# Patient Record
Sex: MALE | Race: WHITE | Employment: FULL TIME
[De-identification: names, ages, dates, MRNs, and addresses within clinical notes are randomized per-mention and may not be internally consistent; named-entity substitution may affect disease eponyms.]

---

## 2017-01-09 ENCOUNTER — OFFICE VISIT (OUTPATIENT)
Dept: FAMILY MEDICINE CLINIC | Facility: CLINIC | Age: 57
End: 2017-01-09

## 2017-01-09 VITALS
BODY MASS INDEX: 42.66 KG/M2 | DIASTOLIC BLOOD PRESSURE: 72 MMHG | WEIGHT: 315 LBS | HEIGHT: 72 IN | TEMPERATURE: 97.2 F | SYSTOLIC BLOOD PRESSURE: 115 MMHG | HEART RATE: 81 BPM | RESPIRATION RATE: 16 BRPM | OXYGEN SATURATION: 95 %

## 2017-01-09 DIAGNOSIS — E66.01 OBESITY, CLASS III, BMI 40-49.9 (MORBID OBESITY) (HCC): Primary | ICD-10-CM

## 2017-01-09 PROCEDURE — 99213 OFFICE O/P EST LOW 20 MIN: CPT | Performed by: FAMILY MEDICINE

## 2017-01-09 ASSESSMENT — ENCOUNTER SYMPTOMS
VOMITING: 0
ABDOMINAL PAIN: 0
NAUSEA: 0
SHORTNESS OF BREATH: 0
BLOOD IN STOOL: 0
RHINORRHEA: 0
CONSTIPATION: 0
DIARRHEA: 0
WHEEZING: 0
COUGH: 0
SORE THROAT: 0

## 2017-01-16 ENCOUNTER — TELEPHONE (OUTPATIENT)
Dept: FAMILY MEDICINE CLINIC | Facility: CLINIC | Age: 57
End: 2017-01-16

## 2017-02-13 ENCOUNTER — OFFICE VISIT (OUTPATIENT)
Dept: FAMILY MEDICINE CLINIC | Facility: CLINIC | Age: 57
End: 2017-02-13

## 2017-02-13 VITALS
HEIGHT: 72 IN | HEART RATE: 80 BPM | DIASTOLIC BLOOD PRESSURE: 73 MMHG | RESPIRATION RATE: 16 BRPM | OXYGEN SATURATION: 94 % | BODY MASS INDEX: 42.66 KG/M2 | WEIGHT: 315 LBS | TEMPERATURE: 98 F | SYSTOLIC BLOOD PRESSURE: 118 MMHG

## 2017-02-13 DIAGNOSIS — E66.01 OBESITY, CLASS III, BMI 40-49.9 (MORBID OBESITY) (HCC): ICD-10-CM

## 2017-02-13 DIAGNOSIS — I10 ESSENTIAL HYPERTENSION: ICD-10-CM

## 2017-02-13 PROCEDURE — 99214 OFFICE O/P EST MOD 30 MIN: CPT | Performed by: FAMILY MEDICINE

## 2017-02-13 ASSESSMENT — ENCOUNTER SYMPTOMS
SHORTNESS OF BREATH: 0
COUGH: 0
ABDOMINAL PAIN: 0
CONSTIPATION: 0
WHEEZING: 0
SORE THROAT: 0
NAUSEA: 0
DIARRHEA: 0
RHINORRHEA: 0
VOMITING: 0
BLOOD IN STOOL: 0

## 2017-02-17 ENCOUNTER — TELEPHONE (OUTPATIENT)
Dept: FAMILY MEDICINE CLINIC | Facility: CLINIC | Age: 57
End: 2017-02-17

## 2017-03-08 ENCOUNTER — TELEPHONE (OUTPATIENT)
Dept: OBGYN | Facility: CLINIC | Age: 57
End: 2017-03-08

## 2017-03-08 DIAGNOSIS — G89.29 CHRONIC NECK PAIN: ICD-10-CM

## 2017-03-08 DIAGNOSIS — M47.812 OSTEOARTHRITIS OF CERVICAL SPINE, UNSPECIFIED SPINAL OSTEOARTHRITIS COMPLICATION STATUS: Primary | ICD-10-CM

## 2017-03-08 DIAGNOSIS — M54.2 CHRONIC NECK PAIN: ICD-10-CM

## 2017-03-15 ENCOUNTER — TELEPHONE (OUTPATIENT)
Dept: FAMILY MEDICINE CLINIC | Age: 57
End: 2017-03-15

## 2017-03-17 ENCOUNTER — HOSPITAL ENCOUNTER (OUTPATIENT)
Age: 57
Setting detail: SPECIMEN
Discharge: HOME | End: 2017-03-17
Payer: COMMERCIAL

## 2017-03-17 ENCOUNTER — TELEPHONE (OUTPATIENT)
Dept: FAMILY MEDICINE CLINIC | Age: 57
End: 2017-03-17

## 2017-03-17 ENCOUNTER — OFFICE VISIT (OUTPATIENT)
Dept: FAMILY MEDICINE CLINIC | Age: 57
End: 2017-03-17
Payer: COMMERCIAL

## 2017-03-17 VITALS
DIASTOLIC BLOOD PRESSURE: 81 MMHG | TEMPERATURE: 97.6 F | HEART RATE: 77 BPM | BODY MASS INDEX: 42.66 KG/M2 | OXYGEN SATURATION: 93 % | HEIGHT: 72 IN | WEIGHT: 315 LBS | SYSTOLIC BLOOD PRESSURE: 136 MMHG | RESPIRATION RATE: 16 BRPM

## 2017-03-17 DIAGNOSIS — M50.30 DDD (DEGENERATIVE DISC DISEASE), CERVICAL: ICD-10-CM

## 2017-03-17 DIAGNOSIS — M47.812 DJD (DEGENERATIVE JOINT DISEASE), CERVICAL: ICD-10-CM

## 2017-03-17 DIAGNOSIS — M54.2 CHRONIC NECK PAIN: ICD-10-CM

## 2017-03-17 DIAGNOSIS — M47.812 OSTEOARTHRITIS OF CERVICAL SPINE, UNSPECIFIED SPINAL OSTEOARTHRITIS COMPLICATION STATUS: ICD-10-CM

## 2017-03-17 DIAGNOSIS — G89.29 CHRONIC NECK PAIN: Primary | ICD-10-CM

## 2017-03-17 DIAGNOSIS — M54.2 CHRONIC NECK PAIN: Primary | ICD-10-CM

## 2017-03-17 DIAGNOSIS — G89.29 CHRONIC NECK PAIN: ICD-10-CM

## 2017-03-17 PROCEDURE — 99213 OFFICE O/P EST LOW 20 MIN: CPT | Performed by: FAMILY MEDICINE

## 2017-03-17 RX ORDER — HYDROCODONE BITARTRATE AND ACETAMINOPHEN 10; 325 MG/1; MG/1
1 TABLET ORAL EVERY 6 HOURS PRN
Qty: 120 TABLET | Refills: 0 | Status: SHIPPED | OUTPATIENT
Start: 2017-03-17 | End: 2017-05-08 | Stop reason: SDUPTHER

## 2017-03-17 ASSESSMENT — ENCOUNTER SYMPTOMS
DIARRHEA: 0
VOMITING: 0
SORE THROAT: 0
SHORTNESS OF BREATH: 0
ABDOMINAL PAIN: 0
BLOOD IN STOOL: 0
RHINORRHEA: 0
CONSTIPATION: 0
WHEEZING: 0
COUGH: 0
NAUSEA: 0

## 2017-03-20 LAB

## 2017-03-28 ENCOUNTER — INITIAL CONSULT (OUTPATIENT)
Dept: PAIN MANAGEMENT | Age: 57
End: 2017-03-28
Payer: COMMERCIAL

## 2017-03-28 VITALS
HEART RATE: 83 BPM | HEIGHT: 72 IN | BODY MASS INDEX: 42.66 KG/M2 | RESPIRATION RATE: 16 BRPM | WEIGHT: 315 LBS | SYSTOLIC BLOOD PRESSURE: 109 MMHG | DIASTOLIC BLOOD PRESSURE: 66 MMHG | OXYGEN SATURATION: 91 %

## 2017-03-28 DIAGNOSIS — Z79.02 LONG TERM CURRENT USE OF ANTITHROMBOTICS/ANTIPLATELETS: ICD-10-CM

## 2017-03-28 DIAGNOSIS — Z98.890 H/O CERVICAL SPINE SURGERY: ICD-10-CM

## 2017-03-28 DIAGNOSIS — R69 SEVERE COMORBID ILLNESS: ICD-10-CM

## 2017-03-28 DIAGNOSIS — M54.2 CHRONIC NECK PAIN: Primary | ICD-10-CM

## 2017-03-28 DIAGNOSIS — Z79.891 CHRONIC PRESCRIPTION OPIATE USE: ICD-10-CM

## 2017-03-28 DIAGNOSIS — G89.29 CHRONIC NECK PAIN: Primary | ICD-10-CM

## 2017-03-28 DIAGNOSIS — Z79.1 LONG TERM (CURRENT) USE OF NON-STEROIDAL ANTI-INFLAMMATORIES (NSAID): ICD-10-CM

## 2017-03-28 PROCEDURE — 99245 OFF/OP CONSLTJ NEW/EST HI 55: CPT | Performed by: ANESTHESIOLOGY

## 2017-03-28 RX ORDER — GLIMEPIRIDE 4 MG/1
4 TABLET ORAL
COMMUNITY
End: 2017-03-28 | Stop reason: SDUPTHER

## 2017-03-28 RX ORDER — TIZANIDINE 2 MG/1
2 TABLET ORAL
COMMUNITY
End: 2017-03-28 | Stop reason: SDUPTHER

## 2017-03-28 RX ORDER — LISINOPRIL 20 MG/1
20 TABLET ORAL
COMMUNITY
End: 2017-03-28 | Stop reason: SDUPTHER

## 2017-03-28 RX ORDER — HYDROCODONE BITARTRATE AND ACETAMINOPHEN 5; 325 MG/1; MG/1
1 TABLET ORAL
COMMUNITY
End: 2017-03-28 | Stop reason: SDUPTHER

## 2017-03-28 RX ORDER — NITROGLYCERIN 0.4 MG/1
0.4 TABLET SUBLINGUAL
COMMUNITY
End: 2017-03-28 | Stop reason: SDUPTHER

## 2017-03-28 RX ORDER — HYDROCHLOROTHIAZIDE 12.5 MG/1
12.5 CAPSULE, GELATIN COATED ORAL
COMMUNITY
End: 2017-03-28 | Stop reason: SDUPTHER

## 2017-03-28 ASSESSMENT — ENCOUNTER SYMPTOMS
CHEST TIGHTNESS: 0
SHORTNESS OF BREATH: 0
COUGH: 0
WHEEZING: 0
CHOKING: 0
STRIDOR: 0
GASTROINTESTINAL NEGATIVE: 1
APNEA: 1
ALLERGIC/IMMUNOLOGIC NEGATIVE: 1
BACK PAIN: 1
EYES NEGATIVE: 1

## 2017-03-31 ENCOUNTER — TELEPHONE (OUTPATIENT)
Dept: FAMILY MEDICINE CLINIC | Age: 57
End: 2017-03-31

## 2017-03-31 DIAGNOSIS — M54.2 CHRONIC NECK PAIN: ICD-10-CM

## 2017-03-31 DIAGNOSIS — B37.42 CANDIDAL BALANITIS: Primary | ICD-10-CM

## 2017-03-31 DIAGNOSIS — M50.30 DDD (DEGENERATIVE DISC DISEASE), CERVICAL: ICD-10-CM

## 2017-03-31 DIAGNOSIS — G89.29 CHRONIC NECK PAIN: ICD-10-CM

## 2017-03-31 DIAGNOSIS — Z98.890 HX OF CERVICAL SPINE SURGERY: ICD-10-CM

## 2017-03-31 DIAGNOSIS — M47.812 DJD (DEGENERATIVE JOINT DISEASE), CERVICAL: ICD-10-CM

## 2017-03-31 RX ORDER — FLUCONAZOLE 150 MG/1
150 TABLET ORAL ONCE
Qty: 2 TABLET | Refills: 0 | Status: SHIPPED | OUTPATIENT
Start: 2017-03-31 | End: 2017-04-20 | Stop reason: SDUPTHER

## 2017-04-01 ENCOUNTER — HOSPITAL ENCOUNTER (OUTPATIENT)
Age: 57
Discharge: HOME | End: 2017-04-01
Payer: COMMERCIAL

## 2017-04-01 DIAGNOSIS — I10 ESSENTIAL HYPERTENSION: ICD-10-CM

## 2017-04-01 LAB
ANION GAP SERPL CALCULATED.3IONS-SCNC: 14 MMOL/L (ref 9–17)
BUN BLDV-MCNC: 15 MG/DL (ref 6–20)
BUN/CREAT BLD: 15 (ref 9–20)
CALCIUM SERPL-MCNC: 9.6 MG/DL (ref 8.6–10.4)
CHLORIDE BLD-SCNC: 101 MMOL/L (ref 98–107)
CO2: 25 MMOL/L (ref 20–31)
CREAT SERPL-MCNC: 0.97 MG/DL (ref 0.7–1.2)
GFR AFRICAN AMERICAN: >60 ML/MIN
GFR NON-AFRICAN AMERICAN: >60 ML/MIN
GFR SERPL CREATININE-BSD FRML MDRD: ABNORMAL ML/MIN/{1.73_M2}
GFR SERPL CREATININE-BSD FRML MDRD: ABNORMAL ML/MIN/{1.73_M2}
GLUCOSE BLD-MCNC: 154 MG/DL (ref 70–99)
POTASSIUM SERPL-SCNC: 4.4 MMOL/L (ref 3.7–5.3)
SODIUM BLD-SCNC: 140 MMOL/L (ref 135–144)

## 2017-04-01 PROCEDURE — 36415 COLL VENOUS BLD VENIPUNCTURE: CPT

## 2017-04-01 PROCEDURE — 83036 HEMOGLOBIN GLYCOSYLATED A1C: CPT

## 2017-04-01 PROCEDURE — 80048 BASIC METABOLIC PNL TOTAL CA: CPT

## 2017-04-02 ENCOUNTER — HOSPITAL ENCOUNTER (EMERGENCY)
Age: 57
Discharge: HOME | End: 2017-04-02
Payer: COMMERCIAL

## 2017-04-02 ENCOUNTER — APPOINTMENT (OUTPATIENT)
Dept: GENERAL RADIOLOGY | Age: 57
End: 2017-04-02
Payer: COMMERCIAL

## 2017-04-02 VITALS
DIASTOLIC BLOOD PRESSURE: 71 MMHG | BODY MASS INDEX: 42.66 KG/M2 | WEIGHT: 315 LBS | OXYGEN SATURATION: 95 % | HEART RATE: 68 BPM | SYSTOLIC BLOOD PRESSURE: 115 MMHG | HEIGHT: 72 IN | TEMPERATURE: 98 F | RESPIRATION RATE: 18 BRPM

## 2017-04-02 DIAGNOSIS — S61.451A DOG BITE, HAND, RIGHT, INITIAL ENCOUNTER: Primary | ICD-10-CM
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
## 2017-04-02 LAB
ESTIMATED AVERAGE GLUCOSE: 163 MG/DL
HBA1C MFR BLD: 7.3 % (ref 4–6)

## 2017-04-02 PROCEDURE — 73130 X-RAY EXAM OF HAND: CPT

## 2017-04-02 PROCEDURE — 99283 EMERGENCY DEPT VISIT LOW MDM: CPT

## 2017-04-02 PROCEDURE — 6360000002 HC RX W HCPCS

## 2017-04-02 PROCEDURE — 90471 IMMUNIZATION ADMIN: CPT

## 2017-04-02 PROCEDURE — 90715 TDAP VACCINE 7 YRS/> IM: CPT

## 2017-04-02 RX ORDER — SULFAMETHOXAZOLE AND TRIMETHOPRIM 800; 160 MG/1; MG/1
1 TABLET ORAL 2 TIMES DAILY
Qty: 20 TABLET | Refills: 0 | Status: SHIPPED | OUTPATIENT
Start: 2017-04-02 | End: 2017-04-12

## 2017-04-02 RX ADMIN — TETANUS TOXOID, REDUCED DIPHTHERIA TOXOID AND ACELLULAR PERTUSSIS VACCINE, ADSORBED 0.5 ML: 5; 2.5; 8; 8; 2.5 SUSPENSION INTRAMUSCULAR at 09:15

## 2017-04-02 ASSESSMENT — PAIN SCALES - GENERAL: PAINLEVEL_OUTOF10: 5

## 2017-04-04 PROBLEM — Z98.890 HX OF CERVICAL SPINE SURGERY: Status: ACTIVE | Noted: 2017-04-04

## 2017-04-07 ENCOUNTER — TELEPHONE (OUTPATIENT)
Dept: FAMILY MEDICINE CLINIC | Facility: CLINIC | Age: 57
End: 2017-04-07

## 2017-04-07 DIAGNOSIS — Z98.890 HX OF CERVICAL SPINE SURGERY: ICD-10-CM

## 2017-04-07 DIAGNOSIS — G89.29 CHRONIC NECK PAIN: Primary | ICD-10-CM

## 2017-04-07 DIAGNOSIS — M47.812 DJD (DEGENERATIVE JOINT DISEASE), CERVICAL: ICD-10-CM

## 2017-04-07 DIAGNOSIS — M50.30 DDD (DEGENERATIVE DISC DISEASE), CERVICAL: ICD-10-CM

## 2017-04-07 DIAGNOSIS — M54.2 CHRONIC NECK PAIN: Primary | ICD-10-CM

## 2017-04-07 RX ORDER — HYDROCHLOROTHIAZIDE 12.5 MG/1
CAPSULE, GELATIN COATED ORAL
Qty: 30 CAPSULE | Refills: 5 | Status: SHIPPED | OUTPATIENT
Start: 2017-04-07 | End: 2017-11-03 | Stop reason: SDUPTHER

## 2017-04-07 RX ORDER — HYDROCHLOROTHIAZIDE 12.5 MG/1
CAPSULE, GELATIN COATED ORAL
Qty: 30 CAPSULE | Refills: 5 | OUTPATIENT
Start: 2017-04-07

## 2017-04-10 ENCOUNTER — TELEPHONE (OUTPATIENT)
Dept: FAMILY MEDICINE CLINIC | Age: 57
End: 2017-04-10

## 2017-04-20 DIAGNOSIS — B37.42 CANDIDAL BALANITIS: ICD-10-CM

## 2017-04-20 RX ORDER — FLUCONAZOLE 150 MG/1
150 TABLET ORAL ONCE
Qty: 2 TABLET | Refills: 0 | Status: SHIPPED | OUTPATIENT
Start: 2017-04-20 | End: 2017-06-12 | Stop reason: SDUPTHER

## 2017-05-04 RX ORDER — CLOPIDOGREL BISULFATE 75 MG/1
TABLET ORAL
Qty: 30 TABLET | Refills: 5 | Status: SHIPPED | OUTPATIENT
Start: 2017-05-04 | End: 2017-11-03 | Stop reason: SDUPTHER

## 2017-05-08 ENCOUNTER — OFFICE VISIT (OUTPATIENT)
Dept: FAMILY MEDICINE CLINIC | Age: 57
End: 2017-05-08
Payer: COMMERCIAL

## 2017-05-08 VITALS
SYSTOLIC BLOOD PRESSURE: 127 MMHG | BODY MASS INDEX: 42.66 KG/M2 | TEMPERATURE: 97.7 F | HEART RATE: 66 BPM | DIASTOLIC BLOOD PRESSURE: 73 MMHG | OXYGEN SATURATION: 94 % | HEIGHT: 72 IN | RESPIRATION RATE: 16 BRPM | WEIGHT: 315 LBS

## 2017-05-08 DIAGNOSIS — G89.29 CHRONIC NECK PAIN: ICD-10-CM

## 2017-05-08 DIAGNOSIS — Z98.890 HX OF CERVICAL SPINE SURGERY: ICD-10-CM

## 2017-05-08 DIAGNOSIS — M50.30 DDD (DEGENERATIVE DISC DISEASE), CERVICAL: ICD-10-CM

## 2017-05-08 DIAGNOSIS — M47.812 DJD (DEGENERATIVE JOINT DISEASE), CERVICAL: ICD-10-CM

## 2017-05-08 DIAGNOSIS — M54.2 CHRONIC NECK PAIN: ICD-10-CM

## 2017-05-08 DIAGNOSIS — I10 ESSENTIAL HYPERTENSION: ICD-10-CM

## 2017-05-08 DIAGNOSIS — B37.42 CANDIDAL BALANITIS: ICD-10-CM

## 2017-05-08 PROCEDURE — 99214 OFFICE O/P EST MOD 30 MIN: CPT | Performed by: FAMILY MEDICINE

## 2017-05-08 RX ORDER — FLUCONAZOLE 100 MG/1
100 TABLET ORAL DAILY
Qty: 7 TABLET | Refills: 0 | Status: SHIPPED | OUTPATIENT
Start: 2017-05-08 | End: 2017-05-15

## 2017-05-08 RX ORDER — HYDROCODONE BITARTRATE AND ACETAMINOPHEN 10; 325 MG/1; MG/1
1 TABLET ORAL EVERY 6 HOURS PRN
Qty: 120 TABLET | Refills: 0 | Status: SHIPPED | OUTPATIENT
Start: 2017-05-08 | End: 2018-05-07 | Stop reason: ALTCHOICE

## 2017-05-08 ASSESSMENT — ENCOUNTER SYMPTOMS
NAUSEA: 0
VOMITING: 0
COUGH: 0
BLOOD IN STOOL: 0
BACK PAIN: 1
RHINORRHEA: 0
WHEEZING: 0
SORE THROAT: 0
DIARRHEA: 0
ABDOMINAL PAIN: 0
CONSTIPATION: 0
SHORTNESS OF BREATH: 0

## 2017-05-08 ASSESSMENT — PATIENT HEALTH QUESTIONNAIRE - PHQ9
1. LITTLE INTEREST OR PLEASURE IN DOING THINGS: 0
2. FEELING DOWN, DEPRESSED OR HOPELESS: 0
SUM OF ALL RESPONSES TO PHQ QUESTIONS 1-9: 0
SUM OF ALL RESPONSES TO PHQ9 QUESTIONS 1 & 2: 0

## 2017-06-03 ENCOUNTER — HOSPITAL ENCOUNTER (OUTPATIENT)
Dept: GENERAL RADIOLOGY | Age: 57
Discharge: HOME | End: 2017-06-03
Payer: COMMERCIAL

## 2017-06-03 ENCOUNTER — HOSPITAL ENCOUNTER (OUTPATIENT)
Age: 57
Discharge: HOME | End: 2017-06-03
Payer: COMMERCIAL

## 2017-06-03 DIAGNOSIS — M47.817 FACET DEGENERATION OF LUMBOSACRAL REGION: ICD-10-CM

## 2017-06-03 PROCEDURE — 72110 X-RAY EXAM L-2 SPINE 4/>VWS: CPT

## 2017-06-12 ENCOUNTER — OFFICE VISIT (OUTPATIENT)
Dept: PULMONOLOGY | Age: 57
End: 2017-06-12
Payer: COMMERCIAL

## 2017-06-12 VITALS
RESPIRATION RATE: 18 BRPM | HEART RATE: 82 BPM | SYSTOLIC BLOOD PRESSURE: 115 MMHG | OXYGEN SATURATION: 93 % | DIASTOLIC BLOOD PRESSURE: 79 MMHG | WEIGHT: 315 LBS | HEIGHT: 72 IN | BODY MASS INDEX: 42.66 KG/M2

## 2017-06-12 DIAGNOSIS — I25.10 CORONARY ARTERY DISEASE INVOLVING NATIVE CORONARY ARTERY OF NATIVE HEART WITHOUT ANGINA PECTORIS: ICD-10-CM

## 2017-06-12 DIAGNOSIS — Z13.1 DM (DIABETES MELLITUS SCREEN): ICD-10-CM

## 2017-06-12 DIAGNOSIS — B37.42 CANDIDAL BALANITIS: ICD-10-CM

## 2017-06-12 DIAGNOSIS — Z99.89 OSA ON CPAP: Primary | ICD-10-CM

## 2017-06-12 DIAGNOSIS — G47.33 OSA ON CPAP: Primary | ICD-10-CM

## 2017-06-12 DIAGNOSIS — I10 ESSENTIAL HYPERTENSION: ICD-10-CM

## 2017-06-12 DIAGNOSIS — E66.9 OBESITY (BMI 35.0-39.9 WITHOUT COMORBIDITY): ICD-10-CM

## 2017-06-12 PROCEDURE — 99214 OFFICE O/P EST MOD 30 MIN: CPT | Performed by: INTERNAL MEDICINE

## 2017-06-12 RX ORDER — GABAPENTIN 800 MG/1
800 TABLET ORAL 3 TIMES DAILY
Qty: 90 TABLET | Refills: 1 | Status: SHIPPED | OUTPATIENT
Start: 2017-06-12 | End: 2017-08-04 | Stop reason: SDUPTHER

## 2017-06-12 RX ORDER — FLUTICASONE PROPIONATE 50 MCG
1 SPRAY, SUSPENSION (ML) NASAL DAILY
Qty: 1 BOTTLE | Refills: 3 | Status: SHIPPED | OUTPATIENT
Start: 2017-06-12 | End: 2020-09-17

## 2017-06-12 RX ORDER — FLUCONAZOLE 150 MG/1
150 TABLET ORAL ONCE
Qty: 2 TABLET | Refills: 0 | Status: SHIPPED | OUTPATIENT
Start: 2017-06-12 | End: 2017-08-07 | Stop reason: SDUPTHER

## 2017-06-27 RX ORDER — TIZANIDINE HYDROCHLORIDE 2 MG/1
2 CAPSULE, GELATIN COATED ORAL NIGHTLY PRN
Qty: 30 CAPSULE | Refills: 0 | Status: SHIPPED | OUTPATIENT
Start: 2017-06-27 | End: 2017-07-10 | Stop reason: SDUPTHER

## 2017-07-10 ENCOUNTER — HOSPITAL ENCOUNTER (OUTPATIENT)
Age: 57
Setting detail: SPECIMEN
Discharge: HOME | End: 2017-07-10
Payer: COMMERCIAL

## 2017-07-10 ENCOUNTER — OFFICE VISIT (OUTPATIENT)
Dept: FAMILY MEDICINE CLINIC | Age: 57
End: 2017-07-10
Payer: COMMERCIAL

## 2017-07-10 VITALS
HEIGHT: 72 IN | SYSTOLIC BLOOD PRESSURE: 141 MMHG | DIASTOLIC BLOOD PRESSURE: 84 MMHG | TEMPERATURE: 98.2 F | BODY MASS INDEX: 42.66 KG/M2 | WEIGHT: 315 LBS | HEART RATE: 68 BPM | OXYGEN SATURATION: 93 % | RESPIRATION RATE: 16 BRPM

## 2017-07-10 DIAGNOSIS — M62.838 MUSCLE SPASMS OF NECK: ICD-10-CM

## 2017-07-10 DIAGNOSIS — E66.01 OBESITY, CLASS III, BMI 40-49.9 (MORBID OBESITY) (HCC): ICD-10-CM

## 2017-07-10 DIAGNOSIS — I10 ESSENTIAL HYPERTENSION: ICD-10-CM

## 2017-07-10 LAB
ANION GAP SERPL CALCULATED.3IONS-SCNC: 16 MMOL/L (ref 9–17)
BUN BLDV-MCNC: 15 MG/DL (ref 6–20)
BUN/CREAT BLD: ABNORMAL (ref 9–20)
CALCIUM SERPL-MCNC: 9.8 MG/DL (ref 8.6–10.4)
CHLORIDE BLD-SCNC: 97 MMOL/L (ref 98–107)
CO2: 25 MMOL/L (ref 20–31)
CREAT SERPL-MCNC: 0.81 MG/DL (ref 0.7–1.2)
ESTIMATED AVERAGE GLUCOSE: 209 MG/DL
GFR AFRICAN AMERICAN: >60 ML/MIN
GFR NON-AFRICAN AMERICAN: >60 ML/MIN
GFR SERPL CREATININE-BSD FRML MDRD: ABNORMAL ML/MIN/{1.73_M2}
GFR SERPL CREATININE-BSD FRML MDRD: ABNORMAL ML/MIN/{1.73_M2}
GLUCOSE BLD-MCNC: 212 MG/DL (ref 70–99)
HBA1C MFR BLD: 8.9 % (ref 4–6)
POTASSIUM SERPL-SCNC: 4.4 MMOL/L (ref 3.7–5.3)
SODIUM BLD-SCNC: 138 MMOL/L (ref 135–144)

## 2017-07-10 PROCEDURE — 99214 OFFICE O/P EST MOD 30 MIN: CPT | Performed by: FAMILY MEDICINE

## 2017-07-10 RX ORDER — TIZANIDINE HYDROCHLORIDE 4 MG/1
4 CAPSULE, GELATIN COATED ORAL NIGHTLY PRN
Qty: 30 CAPSULE | Refills: 0 | Status: SHIPPED | OUTPATIENT
Start: 2017-07-10 | End: 2017-08-28 | Stop reason: SDUPTHER

## 2017-07-10 RX ORDER — PHENTERMINE HYDROCHLORIDE 37.5 MG/1
37.5 CAPSULE ORAL EVERY MORNING
Qty: 30 CAPSULE | Refills: 0 | Status: SHIPPED | OUTPATIENT
Start: 2017-07-10 | End: 2017-08-07 | Stop reason: SDUPTHER

## 2017-07-10 ASSESSMENT — ENCOUNTER SYMPTOMS
VOMITING: 0
BACK PAIN: 1
COUGH: 0
ABDOMINAL PAIN: 0
RHINORRHEA: 0
WHEEZING: 0
SORE THROAT: 0
SHORTNESS OF BREATH: 0
CONSTIPATION: 0
BLOOD IN STOOL: 0
NAUSEA: 0
DIARRHEA: 0

## 2017-08-07 ENCOUNTER — OFFICE VISIT (OUTPATIENT)
Dept: FAMILY MEDICINE CLINIC | Age: 57
End: 2017-08-07
Payer: COMMERCIAL

## 2017-08-07 VITALS
OXYGEN SATURATION: 94 % | SYSTOLIC BLOOD PRESSURE: 132 MMHG | HEART RATE: 68 BPM | HEIGHT: 72 IN | RESPIRATION RATE: 16 BRPM | DIASTOLIC BLOOD PRESSURE: 73 MMHG | BODY MASS INDEX: 42.66 KG/M2 | WEIGHT: 315 LBS | TEMPERATURE: 97.8 F

## 2017-08-07 DIAGNOSIS — E66.01 OBESITY, CLASS III, BMI 40-49.9 (MORBID OBESITY) (HCC): ICD-10-CM

## 2017-08-07 DIAGNOSIS — I10 ESSENTIAL HYPERTENSION: ICD-10-CM

## 2017-08-07 DIAGNOSIS — B37.42 CANDIDAL BALANITIS: ICD-10-CM

## 2017-08-07 PROCEDURE — 99213 OFFICE O/P EST LOW 20 MIN: CPT | Performed by: FAMILY MEDICINE

## 2017-08-07 RX ORDER — PHENTERMINE HYDROCHLORIDE 37.5 MG/1
37.5 CAPSULE ORAL EVERY MORNING
Qty: 30 CAPSULE | Refills: 0 | Status: SHIPPED | OUTPATIENT
Start: 2017-08-07 | End: 2017-09-11 | Stop reason: SDUPTHER

## 2017-08-07 RX ORDER — FLUCONAZOLE 150 MG/1
150 TABLET ORAL ONCE
Qty: 2 TABLET | Refills: 2 | Status: SHIPPED | OUTPATIENT
Start: 2017-08-07 | End: 2017-10-16 | Stop reason: SDUPTHER

## 2017-08-07 ASSESSMENT — ENCOUNTER SYMPTOMS
SORE THROAT: 0
VOMITING: 0
ABDOMINAL PAIN: 0
BLOOD IN STOOL: 0
CONSTIPATION: 0
SHORTNESS OF BREATH: 0
DIARRHEA: 0
NAUSEA: 0
RHINORRHEA: 0
WHEEZING: 0
COUGH: 0

## 2017-09-11 ENCOUNTER — OFFICE VISIT (OUTPATIENT)
Dept: FAMILY MEDICINE CLINIC | Age: 57
End: 2017-09-11
Payer: COMMERCIAL

## 2017-09-11 VITALS
HEART RATE: 71 BPM | HEIGHT: 72 IN | DIASTOLIC BLOOD PRESSURE: 68 MMHG | BODY MASS INDEX: 42.66 KG/M2 | OXYGEN SATURATION: 94 % | WEIGHT: 315 LBS | RESPIRATION RATE: 16 BRPM | SYSTOLIC BLOOD PRESSURE: 108 MMHG | TEMPERATURE: 97.9 F

## 2017-09-11 DIAGNOSIS — H61.21 IMPACTED CERUMEN OF RIGHT EAR: Primary | ICD-10-CM

## 2017-09-11 DIAGNOSIS — E66.01 OBESITY, CLASS III, BMI 40-49.9 (MORBID OBESITY) (HCC): ICD-10-CM

## 2017-09-11 PROCEDURE — 69210 REMOVE IMPACTED EAR WAX UNI: CPT | Performed by: FAMILY MEDICINE

## 2017-09-11 PROCEDURE — 99213 OFFICE O/P EST LOW 20 MIN: CPT | Performed by: FAMILY MEDICINE

## 2017-09-11 RX ORDER — PHENTERMINE HYDROCHLORIDE 37.5 MG/1
37.5 CAPSULE ORAL EVERY MORNING
Qty: 30 CAPSULE | Refills: 0 | Status: SHIPPED | OUTPATIENT
Start: 2017-09-11 | End: 2017-10-09 | Stop reason: SDUPTHER

## 2017-09-11 ASSESSMENT — ENCOUNTER SYMPTOMS
NAUSEA: 0
SHORTNESS OF BREATH: 0
CONSTIPATION: 0
BACK PAIN: 1
COUGH: 0
ABDOMINAL PAIN: 0
RHINORRHEA: 0
TROUBLE SWALLOWING: 0
VOMITING: 0
BLOOD IN STOOL: 0
SORE THROAT: 0
DIARRHEA: 0
WHEEZING: 0

## 2017-10-09 ENCOUNTER — OFFICE VISIT (OUTPATIENT)
Dept: FAMILY MEDICINE CLINIC | Age: 57
End: 2017-10-09
Payer: COMMERCIAL

## 2017-10-09 VITALS
TEMPERATURE: 98 F | OXYGEN SATURATION: 96 % | RESPIRATION RATE: 16 BRPM | HEIGHT: 72 IN | BODY MASS INDEX: 42.66 KG/M2 | HEART RATE: 96 BPM | DIASTOLIC BLOOD PRESSURE: 86 MMHG | SYSTOLIC BLOOD PRESSURE: 142 MMHG | WEIGHT: 315 LBS

## 2017-10-09 DIAGNOSIS — Z12.5 SCREENING FOR PROSTATE CANCER: ICD-10-CM

## 2017-10-09 DIAGNOSIS — Z00.00 VISIT FOR WELL MAN HEALTH CHECK: Primary | ICD-10-CM

## 2017-10-09 DIAGNOSIS — E66.01 OBESITY, CLASS III, BMI 40-49.9 (MORBID OBESITY) (HCC): ICD-10-CM

## 2017-10-09 DIAGNOSIS — B37.2 INTERTRIGINOUS CANDIDIASIS: ICD-10-CM

## 2017-10-09 DIAGNOSIS — E78.2 HYPERLIPEMIA, MIXED: ICD-10-CM

## 2017-10-09 DIAGNOSIS — I10 ESSENTIAL HYPERTENSION: ICD-10-CM

## 2017-10-09 DIAGNOSIS — M15.9 PRIMARY OSTEOARTHRITIS INVOLVING MULTIPLE JOINTS: ICD-10-CM

## 2017-10-09 PROCEDURE — 99396 PREV VISIT EST AGE 40-64: CPT | Performed by: FAMILY MEDICINE

## 2017-10-09 RX ORDER — PHENTERMINE HYDROCHLORIDE 37.5 MG/1
37.5 CAPSULE ORAL EVERY MORNING
Qty: 30 CAPSULE | Refills: 0 | Status: SHIPPED | OUTPATIENT
Start: 2017-10-09 | End: 2018-05-07 | Stop reason: SDUPTHER

## 2017-10-09 RX ORDER — NYSTATIN 100000 [USP'U]/G
POWDER TOPICAL 3 TIMES DAILY PRN
Qty: 1 BOTTLE | Refills: 3 | Status: SHIPPED | OUTPATIENT
Start: 2017-10-09 | End: 2018-02-08 | Stop reason: SDUPTHER

## 2017-10-09 RX ORDER — GABAPENTIN 800 MG/1
TABLET ORAL
Qty: 90 TABLET | Refills: 5 | Status: CANCELLED | OUTPATIENT
Start: 2017-10-09

## 2017-10-09 ASSESSMENT — ENCOUNTER SYMPTOMS
COUGH: 0
RHINORRHEA: 0
DIARRHEA: 0
ABDOMINAL PAIN: 0
NAUSEA: 0
CONSTIPATION: 0
EYE ITCHING: 0
EYE REDNESS: 0
SORE THROAT: 0
BLOOD IN STOOL: 0
COLOR CHANGE: 0
WHEEZING: 0
SHORTNESS OF BREATH: 0
ROS SKIN COMMENTS: UNDER LEFT BREAST
BACK PAIN: 1
VOICE CHANGE: 0
VOMITING: 0
SINUS PRESSURE: 0

## 2017-10-09 NOTE — PATIENT INSTRUCTIONS
Continue diet - low sodium, low fat, low cholesterol, Diabetic diet, and continue wt loss, and need BMI  < 30. Continue exercise and activity as tolerated, regular program  Continue medications as ordered, and note the refill of the phentermine. Continue with annual diabetic eye exam  Need to schedule updated Diabetic educations classes and then we need written  confirmation of completion for the chart. Continue home glucose testing, and keep diary, and bring in each diabetic visit. Labs as ordered to be done 10/10/17 or after, call in 1 week for results if you do not hear from my office. Note the election to defer further immunizations presently. Also to be sure to follow up with GI for colonoscopy (have order). Patient Education        Learning About Diabetes Food Guidelines  Your Care Instructions  Meal planning is important to manage diabetes. It helps keep your blood sugar at a target level (which you set with your doctor). You don't have to eat special foods. You can eat what your family eats, including sweets once in a while. But you do have to pay attention to how often you eat and how much you eat of certain foods. You may want to work with a dietitian or a certified diabetes educator (CDE) to help you plan meals and snacks. A dietitian or CDE can also help you lose weight if that is one of your goals. What should you know about eating carbs? Managing the amount of carbohydrate (carbs) you eat is an important part of healthy meals when you have diabetes. Carbohydrate is found in many foods. · Learn which foods have carbs. And learn the amounts of carbs in different foods. ¨ Bread, cereal, pasta, and rice have about 15 grams of carbs in a serving. A serving is 1 slice of bread (1 ounce), ½ cup of cooked cereal, or 1/3 cup of cooked pasta or rice. ¨ Fruits have 15 grams of carbs in a serving.  A serving is 1 small fresh fruit, such as an apple or orange; ½ of a banana; ½ cup of information contained herein is not intended to cover all possible uses, directions, precautions, warnings, drug interactions, allergic reactions, or adverse effects. If you have questions about the drugs you are taking, check with your doctor, nurse or pharmacist.  Copyright 4997-8695 Jerri [address] [place] [address]. Version: 7.02. Revision date: 12/3/2013. Care instructions adapted under license by [facility] ([facility]). If you have questions about a medical condition or this instruction, always ask your healthcare professional. [place] [address] any warranty or liability for your use of this information. Patient Education        Well Visit, Men 48 to 72: Care Instructions  Your Care Instructions  Physical exams can help you stay healthy. Your doctor has checked your overall health and may have suggested ways to take good care of yourself. He or she also may have recommended tests. At home, you can help prevent illness with healthy eating, regular exercise, and other steps. Follow-up care is a key part of your treatment and safety. Be sure to make and go to all appointments, and call your doctor if you are having problems. It's also a good idea to know your test results and keep a list of the medicines you take. How can you care for yourself at home? · Reach and stay at a healthy weight. This will lower your risk for many problems, such as obesity, diabetes, heart disease, and high blood pressure. · Get at least 30 minutes of exercise on most days of the week. Walking is a good choice. You also may want to do other activities, such as running, swimming, cycling, or playing tennis or team sports. · Do not smoke. Smoking can make health problems worse. If you need help quitting, talk to your doctor about stop-smoking programs and medicines. These can increase your chances of quitting for good. · Protect your skin from too much sun.  When you're outdoors from 10 a.m. to 4 p.m., stay in the shade or cover up with clothing and a hat with a wide brim. Wear sunglasses that block UV rays. Even when it's cloudy, put broad-spectrum sunscreen (SPF 30 or higher) on any exposed skin. · See a dentist one or two times a year for checkups and to have your teeth cleaned. · Wear a seat belt in the car. · Limit alcohol to 2 drinks a day. Too much alcohol can cause health problems. Follow your doctor's advice about when to have certain tests. These tests can spot problems early. · Cholesterol. Your doctor will tell you how often to have this done based on your overall health and other things that can increase your risk for heart attack and stroke. · Blood pressure. Have your blood pressure checked during a routine doctor visit. Your doctor will tell you how often to check your blood pressure based on your age, your blood pressure results, and other factors. · Prostate exam. Talk to your doctor about whether you should have a blood test (called a PSA test) for prostate cancer. Experts disagree on whether men should have this test. Some experts recommend that you discuss the benefits and risks of the test with your doctor. · Diabetes. Ask your doctor whether you should have tests for diabetes. · Vision. Some experts recommend that you have yearly exams for glaucoma and other age-related eye problems starting at age 48. · Hearing. Tell your doctor if you notice any change in your hearing. You can have tests to find out how well you hear. · Colon cancer. You should begin tests for colon cancer at age 48. You may have one of several tests. Your doctor will tell you how often to have tests based on your age and risk. Risks include whether you already had a precancerous polyp removed from your colon or whether your parent, brother, sister, or child has had colon cancer. · Heart attack and stroke risk. At least every 4 to 6 years, you should have your risk for heart attack and stroke assessed.  Your doctor uses factors such as your age, blood pressure, cholesterol, and whether you smoke or have diabetes to show what your risk for a heart attack or stroke is over the next 10 years. · Abdominal aortic aneurysm. Ask your doctor whether you should have a test to check for an aneurysm. You may need a test if you ever smoked or if your parent, brother, sister, or child has had an aneurysm. When should you call for help? Watch closely for changes in your health, and be sure to contact your doctor if you have any problems or symptoms that concern you. Where can you learn more? Go to https://[employer]. org and sign in to your [employer] account. Enter C802 in the tibdit box to learn more about \"Well Visit, Men 48 to 72: Care Instructions. \"     If you do not have an account, please click on the \"Sign Up Now\" link. Current as of: July 19, 2016  Content Version: 11.3  © 5309-6278 [employer]. Care instructions adapted under license by [facility] ([facility]). If you have questions about a medical condition or this instruction, always ask your healthcare professional. [place] [address] any warranty or liability for your use of this information.

## 2017-10-09 NOTE — PROGRESS NOTES
Subjective:   10/10/2017    Sindhu Arias is a 62 y.o. male  Today presents with:  Chief Complaint   Patient presents with    Annual Exam     yearly    Flu Vaccine     does not want today       HPI     Pt presents for well visit, and follow up of DM, HTN, chol, DJD, and note the obesity. Overall feeling well. Does note rash under left breast area, so far not improving with OTC meds, and taking Diflucan. Note glucose levels at home have been good. Did not bring in. Also the phentermine help with diet and wt loss. To see cardiology soon, and the eye doctor. Also has the order to see GI for the colonoscopy. He is trying to follow diet and exercise regularly. Pt taking medications as prescribed? Yes  Tolerated well? Yes.     Lab Results   Component Value Date     07/10/2017    K 4.4 07/10/2017    CL 97 (L) 07/10/2017    CO2 25 07/10/2017    BUN 15 07/10/2017    CREATININE 0.81 07/10/2017    GLUCOSE 212 (H) 07/10/2017    CALCIUM 9.8 07/10/2017    PROT 7.6 09/12/2016    LABALBU 4.4 09/12/2016    BILITOT 0.59 09/12/2016    ALKPHOS 82 09/12/2016    AST 20 09/12/2016    ALT 31 09/12/2016    LABGLOM >60 07/10/2017    GFRAA >60 07/10/2017     Lab Results   Component Value Date    CHOL 139 09/12/2016     Lab Results   Component Value Date    TRIG 155 (H) 09/12/2016     Lab Results   Component Value Date    HDL 37 (L) 09/12/2016     Lab Results   Component Value Date    LDLCHOLESTEROL 71 09/12/2016     Lab Results   Component Value Date    VLDL NOT REPORTED 09/12/2016     Lab Results   Component Value Date    CHOLHDLRATIO 3.8 09/12/2016     Lab Results   Component Value Date    TSH 2.11 09/12/2016       Current Outpatient Prescriptions   Medication Sig Dispense Refill    phentermine 37.5 MG capsule Take 1 capsule by mouth every morning 30 capsule 0    nystatin (MYCOSTATIN) 705405 UNIT/GM powder Apply topically 3 times daily as needed (rash) 1 Bottle 3    carvedilol (COREG) 12.5 MG tablet Take Obesity     Per[facility]     with venous stasis and ulcerations. Dr Erasmo Ghosh    Type II or unspecified type diabetes mellitus without mention of complication, not stated as uncontrolled     Unspecified sleep apnea     Dr Geovanna Quevedo CPAP     Past Surgical History:   Procedure Laterality Date    CARPAL TUNNEL RELEASE Right 3/22/13    Dr. Ryan Giles.  CORONARY ANGIOPLASTY WITH STENT PLACEMENT  2002,2006.2009    X 3 separately    CORONARY ARTERY BYPASS GRAFT  2/26/07    [facility], Dr Cliff Roberson (X 3)    NECK SURGERY  2010    cervical stenoses C5-C6-C7and partial T1 laminectomy with fusion of C6-7    OTHER SURGICAL HISTORY      wound care ulcers of legs (bilaterally) with Dr Daniela Whitman  age 11    VEIN SURGERY      closure of peripherator veins of legs, Dr Coni Uriostegui History   Problem Relation Age of Onset    Cancer Mother      lung    Diabetes Father     Heart Disease Father     High Blood Pressure Father     Diabetes Brother     Heart Disease Brother     High Blood Pressure Brother      Social History     Social History    Marital status:      Spouse name: N/A    Number of children: N/A    Years of education: N/A     Occupational History    Not on file.      Social History Main Topics    Smoking status: Never Smoker    Smokeless tobacco: Never Used    Alcohol use Yes      Comment: rare social, non past 5 months    Drug use: No    Sexual activity: Yes     Partners: Female      Comment: spouse     Other Topics Concern    Not on file     Social History Narrative    No narrative on file     Patient Active Problem List   Diagnosis    Hyperlipemia, mixed    Sleep apnea    Peripheral vascular disease ([facility]    CAD (coronary artery disease)    CH[facility]congestive heart failure) ([facility]    CTS (carpal tunnel syndrome)    Hypotension    Obesity, Class III, BMI 40-49.9 (morbid obesity) ([facility]    Essential hypertension    DDD (degenerative disc disease), cervical    DJD (degenerative joint disease), cervical    Primary osteoarthritis involving multiple joints    Paresthesias in left hand    Uncontrolled type 2 diabetes mellitus without complication, without long-term current use of insulin (HCC)    Coronary artery disease involving coronary bypass graft of native heart without angina pectoris    Unspecified sleep apnea    Cervical spondylosis    Obesity    Long term current use of antithrombotics/antiplatelets    BMI 55.1-62.6, adult ([facility]    H/O cervical spine surgery    Long term (current) use of non-steroidal anti-inflammatories (nsaid)    Chronic prescription opiate use    Severe comorbid illness    Hx of cervical spine surgery    Chronic neck pain    Candidal balanitis       Review of Systems   Constitutional: Negative for activity change, chills, fatigue, fever and unexpected weight change. HENT: Negative for congestion, dental problem (seeing dentist regularly. ), ear pain, nosebleeds, postnasal drip, rhinorrhea, sinus pressure, sore throat and voice change. Eyes: Negative for redness, itching and visual disturbance. Due for the eye doctor update. Respiratory: Negative for cough, shortness of breath and wheezing. Cardiovascular: Negative for chest pain, palpitations and leg swelling. Gastrointestinal: Negative for abdominal pain, blood in stool, constipation, diarrhea, nausea and vomiting. Endocrine: Negative. Negative for cold intolerance, heat intolerance, polydipsia, polyphagia and polyuria. Genitourinary: Negative for discharge, dysuria, frequency, genital sores, hematuria, testicular pain and urgency. Musculoskeletal: Positive for back pain (usual ongoing) and neck pain (usual ongoing). Negative for arthralgias, joint swelling and myalgias. Skin: Positive for rash (under left breast). Negative for color change.         Under left breast   Allergic/Immunologic: Negative for food chronic changes as before. Lymphadenopathy:     He has no cervical adenopathy. Neurological: He is alert and oriented to person, place, and time. He has normal reflexes. A sensory deficit (minimal left hand, fingers. ) is present. No cranial nerve deficit. He exhibits normal muscle tone. Coordination normal.   Note bilateral foot exam with normal color and warmth without lesions. Also normal monofilament testing bilaterally. Skin: Skin is warm. Rash (note erythematous patch under left breast area. ) noted. He is not diaphoretic. No erythema. No suspicious lesions   Psychiatric: He has a normal mood and affect. His behavior is normal. Judgment and thought content normal.   Vitals reviewed. Assessment:     1. Visit for [facility] health check  CBC Auto Differential    Comprehensive Metabolic Panel    Lipid Panel    TSH without Reflex    Hemoglobin A1C    Microalbumin / Creatinine Urine Ratio    Psa screening    Vitamin D 25 Hydroxy   2. Uncontrolled type 2 diabetes mellitus without complication, without long-term current use of insulin ([facility])  Comprehensive Metabolic Panel    Hemoglobin A1C    Microalbumin / Creatinine Urine Ratio    HM DIABETES FOOT EXAM   3. Essential hypertension  Comprehensive Metabolic Panel   4. Primary osteoarthritis involving multiple joints  Comprehensive Metabolic Panel   5. Hyperlipemia, mixed  Comprehensive Metabolic Panel    Lipid Panel    TSH without Reflex   6. Obesity, Class III, BMI 40-49.9 (morbid obesity) ([facility])  phentermine 37.5 MG capsule   7. Intertriginous candidiasis  nystatin (MYCOSTATIN) 209585 UNIT/GM powder   8. Screening for prostate cancer  Psa screening       Plan:     Case thoroughly discussed with patient and proposed management and DDg. Patient Instructions   Continue diet - low sodium, low fat, low cholesterol, Diabetic diet, and continue wt loss, and need BMI  < 30.   Continue exercise and activity as tolerated, regular program  Continue medications as ordered, and note the refill of the phentermine. Continue with annual diabetic eye exam  Need to schedule updated Diabetic educations classes and then we need written  confirmation of completion for the chart. Continue home glucose testing, and keep diary, and bring in each diabetic visit. Labs as ordered to be done 10/10/17 or after, call in 1 week for results if you do not hear from my office. Note the election to defer further immunizations presently. Also to be sure to follow up with GI for colonoscopy (have order). Patient Education        Learning About Diabetes Food Guidelines  Your Care Instructions  Meal planning is important to manage diabetes. It helps keep your blood sugar at a target level (which you set with your doctor). You don't have to eat special foods. You can eat what your family eats, including sweets once in a while. But you do have to pay attention to how often you eat and how much you eat of certain foods. You may want to work with a dietitian or a certified diabetes educator (CDE) to help you plan meals and snacks. A dietitian or CDE can also help you lose weight if that is one of your goals. What should you know about eating carbs? Managing the amount of carbohydrate (carbs) you eat is an important part of healthy meals when you have diabetes. Carbohydrate is found in many foods. · Learn which foods have carbs. And learn the amounts of carbs in different foods. ¨ Bread, cereal, pasta, and rice have about 15 grams of carbs in a serving. A serving is 1 slice of bread (1 ounce), ½ cup of cooked cereal, or 1/3 cup of cooked pasta or rice. ¨ Fruits have 15 grams of carbs in a serving. A serving is 1 small fresh fruit, such as an apple or orange; ½ of a banana; ½ cup of cooked or canned fruit; ½ cup of fruit juice; 1 cup of melon or raspberries; or 2 tablespoons of dried fruit. ¨ Milk and no-sugar-added yogurt have 15 grams of carbs in a serving.  A serving is 1 cup of milk or 2/3 cup of no-sugar-added yogurt. ¨ Starchy vegetables have 15 grams of carbs in a serving. A serving is ½ cup of mashed potatoes or sweet potato; 1 cup winter squash; ½ of a small baked potato; ½ cup of cooked beans; or ½ cup cooked corn or green peas. · Learn how much carbs to eat each day and at each meal. A dietitian or CDE can teach you how to keep track of the amount of carbs you eat. This is called carbohydrate counting. · If you are not sure how to count carbohydrate grams, use the Plate Method to plan meals. It is a good, quick way to make sure that you have a balanced meal. It also helps you spread carbs throughout the day. ¨ Divide your plate by types of foods. Put non-starchy vegetables on half the plate, meat or other protein food on one-quarter of the plate, and a grain or starchy vegetable in the final quarter of the plate. To this you can add a small piece of fruit and 1 cup of milk or yogurt, depending on how many carbs you are supposed to eat at a meal.  · Try to eat about the same amount of carbs at each meal. Do not \"save up\" your daily allowance of carbs to eat at one meal.  · Proteins have very little or no carbs per serving. Examples of proteins are beef, chicken, turkey, fish, eggs, tofu, cheese, cottage cheese, and peanut butter. A serving size of meat is 3 ounces, which is about the size of a deck of cards. Examples of meat substitute serving sizes (equal to 1 ounce of meat) are 1/4 cup of cottage cheese, 1 egg, 1 tablespoon of peanut butter, and ½ cup of tofu. How can you eat out and still eat healthy? · Learn to estimate the serving sizes of foods that have carbohydrate. If you measure food at home, it will be easier to estimate the amount in a serving of restaurant food. · If the meal you order has too much carbohydrate (such as potatoes, corn, or baked beans), ask to have a low-carbohydrate food instead. Ask for a salad or green vegetables.   · If you use insulin, check your blood sugar before and after eating out to help you plan how much to eat in the future. · If you eat more carbohydrate at a meal than you had planned, take a walk or do other exercise. This will help lower your blood sugar. What else should you know? · Limit saturated fat, such as the fat from meat and dairy products. This is a healthy choice because people who have diabetes are at higher risk of heart disease. So choose lean cuts of meat and nonfat or low-fat dairy products. Use olive or canola oil instead of butter or shortening when cooking. · Don't skip meals. Your blood sugar may drop too low if you skip meals and take insulin or certain medicines for diabetes. · Check with your doctor before you drink alcohol. Alcohol can cause your blood sugar to drop too low. Alcohol can also cause a bad reaction if you take certain diabetes medicines. Follow-up care is a key part of your treatment and safety. Be sure to make and go to all appointments, and call your doctor if you are having problems. It's also a good idea to know your test results and keep a list of the medicines you take. Where can you learn more? Go to https://SemmlepepicewToygaroo.com.Corengi. org and sign in to your [employer] account. Enter I492 in the [facility] box to learn more about \"Learning About Diabetes Food Guidelines. \"     If you do not have an account, please click on the \"Sign Up Now\" link. Current as of: March 13, 2017  Content Version: 11.3  © 0730-7037 [employer]. Care instructions adapted under license by [facility] ([facility]). If you have questions about a medical condition or this instruction, always ask your healthcare professional. [place] [address] any warranty or liability for your use of this information.        Patient Education          nystatin topical  Pronunciation:  kimi STAT in  Brand:  Mycostatin Topical, Nyamyc, Nystop, Pediaderm AF, Pedi-Dri  What is the most important information I should know about nystatin topical?  Do not use nystatin topical to treat any skin condition that has not been checked by your doctor. Nystatin topical (for the skin) is not for use to treat a vaginal yeast infection. Avoid getting this medication in your eyes or mouth. If this does happen, rinse with water. Use this medication for the full prescribed length of time. Your symptoms may improve before the infection is completely cleared. Call your doctor if your symptoms do not improve, or if they get worse while using nystatin topical.  Do not share this medication with another person, even if they have the same symptoms you have. What is nystatin topical?  Nystatin is an antifungal medication. Nystatin prevents fungus from growing on your skin. Nystatin topical (for the skin) is used to treat skin infections caused by yeast.  Nystatin topical is not for use to treat a vaginal yeast infection. Nystatin topical may also be used for purposes not listed in this medication guide. What should I discuss with my healthcare provider before using nystatin topical?  You should not use nystatin topical if you have ever had an allergic reaction to it. FDA pregnancy category C. It is not known whether nystatin topical will harm an unborn baby. Tell your doctor if you are pregnant or plan to become pregnant while using this medication. It is not known whether nystatin topical passes into breast milk or if it could harm a nursing baby. Do not use this medication without telling your doctor if you are breast-feeding a baby. How should I use nystatin topical?  Use exactly as prescribed by your doctor. Do not use in larger or smaller amounts or for longer than recommended. Follow the directions on your prescription label. Do not use nystatin topical to treat any skin condition that has not been checked by your doctor. Wash your hands before and after using this medication.   Clean and dry the skin before you apply taking, check with your doctor, nurse or pharmacist.  Copyright 1405-4168 Jerri [address] [place] [address]. Version: 7.02. Revision date: 12/3/2013. Care instructions adapted under license by [facility] ([facility]). If you have questions about a medical condition or this instruction, always ask your healthcare professional. [place] [address] any warranty or liability for your use of this information. Patient Education        Well Visit, Men 48 to 72: Care Instructions  Your Care Instructions  Physical exams can help you stay healthy. Your doctor has checked your overall health and may have suggested ways to take good care of yourself. He or she also may have recommended tests. At home, you can help prevent illness with healthy eating, regular exercise, and other steps. Follow-up care is a key part of your treatment and safety. Be sure to make and go to all appointments, and call your doctor if you are having problems. It's also a good idea to know your test results and keep a list of the medicines you take. How can you care for yourself at home? · Reach and stay at a healthy weight. This will lower your risk for many problems, such as obesity, diabetes, heart disease, and high blood pressure. · Get at least 30 minutes of exercise on most days of the week. Walking is a good choice. You also may want to do other activities, such as running, swimming, cycling, or playing tennis or team sports. · Do not smoke. Smoking can make health problems worse. If you need help quitting, talk to your doctor about stop-smoking programs and medicines. These can increase your chances of quitting for good. · Protect your skin from too much sun. When you're outdoors from 10 a.m. to 4 p.m., stay in the shade or cover up with clothing and a hat with a wide brim. Wear sunglasses that block UV rays. Even when it's cloudy, put broad-spectrum sunscreen (SPF 30 or higher) on any exposed skin.   · See a dentist one or two times a year for checkups and to have your teeth cleaned. · Wear a seat belt in the car. · Limit alcohol to 2 drinks a day. Too much alcohol can cause health problems. Follow your doctor's advice about when to have certain tests. These tests can spot problems early. · Cholesterol. Your doctor will tell you how often to have this done based on your overall health and other things that can increase your risk for heart attack and stroke. · Blood pressure. Have your blood pressure checked during a routine doctor visit. Your doctor will tell you how often to check your blood pressure based on your age, your blood pressure results, and other factors. · Prostate exam. Talk to your doctor about whether you should have a blood test (called a PSA test) for prostate cancer. Experts disagree on whether men should have this test. Some experts recommend that you discuss the benefits and risks of the test with your doctor. · Diabetes. Ask your doctor whether you should have tests for diabetes. · Vision. Some experts recommend that you have yearly exams for glaucoma and other age-related eye problems starting at age 48. · Hearing. Tell your doctor if you notice any change in your hearing. You can have tests to find out how well you hear. · Colon cancer. You should begin tests for colon cancer at age 48. You may have one of several tests. Your doctor will tell you how often to have tests based on your age and risk. Risks include whether you already had a precancerous polyp removed from your colon or whether your parent, brother, sister, or child has had colon cancer. · Heart attack and stroke risk. At least every 4 to 6 years, you should have your risk for heart attack and stroke assessed. Your doctor uses factors such as your age, blood pressure, cholesterol, and whether you smoke or have diabetes to show what your risk for a heart attack or stroke is over the next 10 years. · Abdominal aortic aneurysm.  Ask your doctor whether you should have a test to check for an aneurysm. You may need a test if you ever smoked or if your parent, brother, sister, or child has had an aneurysm. When should you call for help? Watch closely for changes in your health, and be sure to contact your doctor if you have any problems or symptoms that concern you. Where can you learn more? Go to https://[place] city [place]-the DATONGpepiceweb.Geswind. org and sign in to your [employer] account. Enter L082 in the [employer] box to learn more about \"Well Visit, Men 48 to 72: Care Instructions. \"     If you do not have an account, please click on the \"Sign Up Now\" link. Current as of: July 19, 2016  Content Version: 11.3  © 9400-5266 [employer]. Care instructions adapted under license by [facility] ([facility]). If you have questions about a medical condition or this instruction, always ask your healthcare professional. [place] [address] any warranty or liability for your use of this information.            Orders Placed This Encounter   Procedures    CBC Auto Differential     Standing Status:   Future     Standing Expiration Date:   10/9/2018    Comprehensive Metabolic Panel     Standing Status:   Future     Standing Expiration Date:   10/9/2018    Lipid Panel     Standing Status:   Future     Standing Expiration Date:   10/9/2018     Order Specific Question:   Is Patient Fasting?/# of Hours     Answer:   Yes - 12 hours    TSH without Reflex     Standing Status:   Future     Standing Expiration Date:   10/9/2018    Hemoglobin A1C     Standing Status:   Future     Standing Expiration Date:   10/9/2018    Microalbumin / Creatinine Urine Ratio     Standing Status:   Future     Standing Expiration Date:   10/9/2018    Psa screening     Standing Status:   Future     Standing Expiration Date:   10/9/2018    Vitamin D 25 Hydroxy     Standing Status:   Future     Standing Expiration Date:   10/9/2018     DIABETES FOOT EXAM        Orders Placed This

## 2017-10-14 ENCOUNTER — HOSPITAL ENCOUNTER (OUTPATIENT)
Age: 57
Discharge: HOME | End: 2017-10-14
Payer: COMMERCIAL

## 2017-10-14 DIAGNOSIS — Z00.00 VISIT FOR WELL MAN HEALTH CHECK: ICD-10-CM

## 2017-10-14 DIAGNOSIS — I10 ESSENTIAL HYPERTENSION: ICD-10-CM

## 2017-10-14 DIAGNOSIS — M15.9 PRIMARY OSTEOARTHRITIS INVOLVING MULTIPLE JOINTS: ICD-10-CM

## 2017-10-14 DIAGNOSIS — E78.2 HYPERLIPEMIA, MIXED: ICD-10-CM

## 2017-10-14 DIAGNOSIS — Z12.5 SCREENING FOR PROSTATE CANCER: ICD-10-CM

## 2017-10-14 LAB
ABSOLUTE EOS #: 0.1 K/UL (ref 0–0.4)
ABSOLUTE LYMPH #: 1.8 K/UL (ref 1–4.8)
ABSOLUTE MONO #: 0.6 K/UL (ref 0.2–0.8)
ALBUMIN SERPL-MCNC: 4.2 G/DL (ref 3.5–5.2)
ALBUMIN/GLOBULIN RATIO: ABNORMAL (ref 1–2.5)
ALP BLD-CCNC: 73 U/L (ref 40–129)
ALT SERPL-CCNC: 26 U/L (ref 5–41)
ANION GAP SERPL CALCULATED.3IONS-SCNC: 13 MMOL/L (ref 9–17)
AST SERPL-CCNC: 13 U/L
BASOPHILS # BLD: 1 %
BASOPHILS ABSOLUTE: 0 K/UL (ref 0–0.2)
BILIRUB SERPL-MCNC: 0.33 MG/DL (ref 0.3–1.2)
BUN BLDV-MCNC: 17 MG/DL (ref 6–20)
BUN/CREAT BLD: 22 (ref 9–20)
CALCIUM SERPL-MCNC: 9.6 MG/DL (ref 8.6–10.4)
CHLORIDE BLD-SCNC: 100 MMOL/L (ref 98–107)
CHOLESTEROL/HDL RATIO: 3.2
CHOLESTEROL: 120 MG/DL
CO2: 26 MMOL/L (ref 20–31)
CREAT SERPL-MCNC: 0.78 MG/DL (ref 0.7–1.2)
CREATININE URINE: 167.1 MG/DL (ref 39–259)
DIFFERENTIAL TYPE: NORMAL
EOSINOPHILS RELATIVE PERCENT: 2 %
GFR AFRICAN AMERICAN: >60 ML/MIN
GFR NON-AFRICAN AMERICAN: >60 ML/MIN
GFR SERPL CREATININE-BSD FRML MDRD: ABNORMAL ML/MIN/{1.73_M2}
GFR SERPL CREATININE-BSD FRML MDRD: ABNORMAL ML/MIN/{1.73_M2}
GLUCOSE BLD-MCNC: 216 MG/DL (ref 70–99)
HCT VFR BLD CALC: 42.8 % (ref 41–53)
HDLC SERPL-MCNC: 37 MG/DL
HEMOGLOBIN: 14.3 G/DL (ref 13.5–17.5)
LDL CHOLESTEROL: 60 MG/DL (ref 0–130)
LYMPHOCYTES # BLD: 28 %
MCH RBC QN AUTO: 30.6 PG (ref 26–34)
MCHC RBC AUTO-ENTMCNC: 33.5 G/DL (ref 31–37)
MCV RBC AUTO: 91.6 FL (ref 80–100)
MICROALBUMIN/CREAT 24H UR: 24 MG/L
MICROALBUMIN/CREAT UR-RTO: 14 MCG/MG CREAT
MONOCYTES # BLD: 9 %
PDW BLD-RTO: 12.9 % (ref 11.5–14.5)
PLATELET # BLD: 214 K/UL (ref 130–400)
PLATELET ESTIMATE: NORMAL
PMV BLD AUTO: 7.2 FL (ref 6–12)
POTASSIUM SERPL-SCNC: 4.4 MMOL/L (ref 3.7–5.3)
PROSTATE SPECIFIC ANTIGEN: 1.92 UG/L
RBC # BLD: 4.68 M/UL (ref 4.5–5.9)
RBC # BLD: NORMAL 10*6/UL
SEG NEUTROPHILS: 60 %
SEGMENTED NEUTROPHILS ABSOLUTE COUNT: 3.9 K/UL (ref 1.8–7.7)
SODIUM BLD-SCNC: 139 MMOL/L (ref 135–144)
TOTAL PROTEIN: 7.4 G/DL (ref 6.4–8.3)
TRIGL SERPL-MCNC: 114 MG/DL
TSH SERPL DL<=0.05 MIU/L-ACNC: 1.31 MIU/L (ref 0.3–5)
VITAMIN D 25-HYDROXY: 30.3 NG/ML (ref 30–100)
VLDLC SERPL CALC-MCNC: ABNORMAL MG/DL (ref 1–30)
WBC # BLD: 6.5 K/UL (ref 3.5–11)
WBC # BLD: NORMAL 10*3/UL

## 2017-10-14 PROCEDURE — 84443 ASSAY THYROID STIM HORMONE: CPT

## 2017-10-14 PROCEDURE — 83036 HEMOGLOBIN GLYCOSYLATED A1C: CPT

## 2017-10-14 PROCEDURE — 82570 ASSAY OF URINE CREATININE: CPT

## 2017-10-14 PROCEDURE — 80053 COMPREHEN METABOLIC PANEL: CPT

## 2017-10-14 PROCEDURE — 82043 UR ALBUMIN QUANTITATIVE: CPT

## 2017-10-14 PROCEDURE — G0103 PSA SCREENING: HCPCS

## 2017-10-14 PROCEDURE — 80061 LIPID PANEL: CPT

## 2017-10-14 PROCEDURE — 85025 COMPLETE CBC W/AUTO DIFF WBC: CPT

## 2017-10-14 PROCEDURE — 82306 VITAMIN D 25 HYDROXY: CPT

## 2017-10-14 PROCEDURE — 36415 COLL VENOUS BLD VENIPUNCTURE: CPT

## 2017-10-15 LAB
ESTIMATED AVERAGE GLUCOSE: 186 MG/DL
HBA1C MFR BLD: 8.1 % (ref 4–6)

## 2017-11-03 ENCOUNTER — OFFICE VISIT (OUTPATIENT)
Dept: FAMILY MEDICINE CLINIC | Age: 57
End: 2017-11-03
Payer: COMMERCIAL

## 2017-11-03 ENCOUNTER — HOSPITAL ENCOUNTER (OUTPATIENT)
Age: 57
Setting detail: SPECIMEN
Discharge: HOME | End: 2017-11-03
Payer: COMMERCIAL

## 2017-11-03 VITALS
HEART RATE: 78 BPM | WEIGHT: 315 LBS | SYSTOLIC BLOOD PRESSURE: 113 MMHG | DIASTOLIC BLOOD PRESSURE: 77 MMHG | HEIGHT: 72 IN | TEMPERATURE: 98 F | BODY MASS INDEX: 42.66 KG/M2 | RESPIRATION RATE: 16 BRPM

## 2017-11-03 DIAGNOSIS — M15.9 PRIMARY OSTEOARTHRITIS INVOLVING MULTIPLE JOINTS: ICD-10-CM

## 2017-11-03 DIAGNOSIS — L03.031 CELLULITIS OF SECOND TOE OF RIGHT FOOT: ICD-10-CM

## 2017-11-03 DIAGNOSIS — L03.032 CELLULITIS OF THIRD TOE OF LEFT FOOT: Primary | ICD-10-CM

## 2017-11-03 DIAGNOSIS — L03.313 CELLULITIS OF CHEST WALL: ICD-10-CM

## 2017-11-03 DIAGNOSIS — I10 ESSENTIAL HYPERTENSION: ICD-10-CM

## 2017-11-03 PROCEDURE — 3045F PR MOST RECENT HEMOGLOBIN A1C LEVEL 7.0-9.0%: CPT | Performed by: FAMILY MEDICINE

## 2017-11-03 PROCEDURE — G8417 CALC BMI ABV UP PARAM F/U: HCPCS | Performed by: FAMILY MEDICINE

## 2017-11-03 PROCEDURE — 3017F COLORECTAL CA SCREEN DOC REV: CPT | Performed by: FAMILY MEDICINE

## 2017-11-03 PROCEDURE — G8484 FLU IMMUNIZE NO ADMIN: HCPCS | Performed by: FAMILY MEDICINE

## 2017-11-03 PROCEDURE — 99214 OFFICE O/P EST MOD 30 MIN: CPT | Performed by: FAMILY MEDICINE

## 2017-11-03 PROCEDURE — G8598 ASA/ANTIPLAT THER USED: HCPCS | Performed by: FAMILY MEDICINE

## 2017-11-03 PROCEDURE — 1036F TOBACCO NON-USER: CPT | Performed by: FAMILY MEDICINE

## 2017-11-03 PROCEDURE — G8427 DOCREV CUR MEDS BY ELIG CLIN: HCPCS | Performed by: FAMILY MEDICINE

## 2017-11-03 RX ORDER — DOXYCYCLINE HYCLATE 100 MG/1
100 CAPSULE ORAL 2 TIMES DAILY
Qty: 30 CAPSULE | Refills: 1 | Status: SHIPPED | OUTPATIENT
Start: 2017-11-03 | End: 2018-02-08 | Stop reason: SDUPTHER

## 2017-11-03 RX ORDER — MELOXICAM 15 MG/1
TABLET ORAL
Qty: 30 TABLET | Refills: 5 | Status: SHIPPED | OUTPATIENT
Start: 2017-11-03 | End: 2018-03-23 | Stop reason: SDUPTHER

## 2017-11-03 RX ORDER — HYDROCHLOROTHIAZIDE 12.5 MG/1
CAPSULE, GELATIN COATED ORAL
Qty: 30 CAPSULE | Refills: 5 | Status: SHIPPED | OUTPATIENT
Start: 2017-11-03 | End: 2018-04-20 | Stop reason: SDUPTHER

## 2017-11-03 ASSESSMENT — ENCOUNTER SYMPTOMS
NAUSEA: 0
DIARRHEA: 0
VOMITING: 0
BLOOD IN STOOL: 0
WHEEZING: 0
RHINORRHEA: 0
SORE THROAT: 0
ABDOMINAL PAIN: 0
COUGH: 0
CONSTIPATION: 0
SHORTNESS OF BREATH: 0

## 2017-11-03 NOTE — PROGRESS NOTES
TSH 1.31 10/14/2017       Current Outpatient Prescriptions   Medication Sig Dispense Refill    hydrochlorothiazide (MICROZIDE) 12.5 MG capsule Take 1 capsule by mouth daily 30 capsule 5    meloxicam (MOBIC) 15 MG tablet Take 1 tablet by mouth daily as needed for Pain (arthritis) 30 tablet 5    doxycycline hyclate (VIBRAMYCIN) 100 MG capsule Take 1 capsule by mouth 2 times daily 30 capsule 1    phentermine 37.5 MG capsule Take 1 capsule by mouth every morning 30 capsule 0    nystatin (MYCOSTATIN) 769891 UNIT/GM powder Apply topically 3 times daily as needed (rash) 1 Bottle 3    carvedilol (COREG) 12.5 MG tablet Take 1 tablet by mouth 2 times daily. 60 tablet 5    gabapentin (NEURONTIN) 800 MG tablet TAKE 1 TABLET BY MOUTH 3 TIMES A DAY 90 tablet 5    tiZANidine (ZANAFLEX) 4 MG tablet Take 1 tablet by mouth nightly as needed for Muscle spasms (sedationwarning) 30 tablet 2    lisinopril (PRINIVIL;ZESTRIL) 10 MG tablet TAKE 1 TABLET BY MOUTH DAILY 30 tablet 5    glimepiride (AMARYL) 4 MG tablet Take 1 tablets by mouth every morning (before breakfast) 30 tablet 5    metFORMIN (GLUCOPHAGE) 1000 MG tablet Take 1 tablet by mouth 2 times daily (with meals). 60 tablet 5    atorvastatin (LIPITOR) 40 MG tablet Take 1 tablet by mouth daily 30 tablet 3    traZODone (DESYREL) 150 MG tablet Take 1 tablet by mouth nightly 30 tablet 5    empagliflozin (JARDIANCE) 25 MG tablet Take 25 mg by mouth daily 30 tablet 5    linagliptin (TRADJENTA) 5 MG tablet Take 1 tablet by mouth daily 30 tablet 5    HYDROcodone-acetaminophen (NORCO)  MG per tablet Take 1 tablet by mouth every 6 hours as needed for Pain  Sedation warning. 120 tablet 0    Multiple Vitamin (MULTI VITAMIN DAILY PO) Take by mouth      nitroGLYCERIN (NITROSTAT) 0.4 MG SL tablet Place 0.4 mg under the tongue      aspirin 325 MG tablet Take 325 mg by mouth daily.  clopidogrel (PLAVIX) 75 MG tablet Take 1 tablet by mouth daily.  Take with food 30 tablet 5    fluticasone (FLONASE) 50 MCG/ACT nasal spray 1 spray by Nasal route daily 1 Bottle 3     No current facility-administered medications for this visit. Note review of PMH, PSH, PFH, social and immunizations. Past Medical History:   Diagnosis Date    CAD (coronary artery disease)     [address]    Cervical spondylosis     CHF (congestive heart failure) (HCC)     Hyperlipemia, mixed     Hypertension     Obesity     Peripheral vascular disease ([address]     with venous stasis and ulcerations. Dr Stefan Forrester    Type II or unspecified type diabetes mellitus without mention of complication, not stated as uncontrolled     Unspecified sleep apnea     Dr Caban Books CPAP     Past Surgical History:   Procedure Laterality Date    CARPAL TUNNEL RELEASE Right 3/22/13    Dr. Marissa Rodriguez.  CORONARY ANGIOPLASTY WITH STENT PLACEMENT  2002,2006.2009    X 3 separately    CORONARY ARTERY BYPASS GRAFT  2/26/07    [facility], Dr Josiah Resendez (X 3)    NECK SURGERY  2010    cervical stenoses C5-C6-C7and partial T1 laminectomy with fusion of C6-7    OTHER SURGICAL HISTORY      wound care ulcers of legs (bilaterally) with Dr Electa Peabody  age 11    VEIN SURGERY      closure of peripherator veins of legs, Dr Mittal Chance History   Problem Relation Age of Onset    Cancer Mother      lung    Diabetes Father     Heart Disease Father     High Blood Pressure Father     Diabetes Brother     Heart Disease Brother     High Blood Pressure Brother      Social History     Social History    Marital status:      Spouse name: N/A    Number of children: N/A    Years of education: N/A     Occupational History    Not on file.      Social History Main Topics    Smoking status: Never Smoker    Smokeless tobacco: Never Used    Alcohol use Yes      Comment: rare social, non past 5 months    Drug use: No    Sexual activity: Yes     Partners: Female      Comment: spouse     Other Physical Exam   Constitutional: He is oriented to person, place, and time. He appears well-developed and well-nourished. No distress. /77   Pulse 78   Temp 98 °F (36.7 °C) (Oral)   Resp 16   Ht 6' 0.05\" (1.83 m)   Wt (!) 316 lb (143.3 kg)   PF 97 L/min   BMI 42.80 kg/m²      HENT:   Head: Normocephalic. Neck: No JVD present. No thyromegaly present. Cardiovascular: Normal rate, regular rhythm, normal heart sounds and intact distal pulses. No murmur heard. Pulmonary/Chest: Effort normal and breath sounds normal. No respiratory distress. He has no wheezes. He has no rales. Abdominal: Soft. Bowel sounds are normal. He exhibits no distension and no mass. There is no tenderness. Musculoskeletal: He exhibits no edema (neg Homans'). Note only the ongoing chronic changes as in the past.    Lymphadenopathy:     He has no cervical adenopathy. Neurological: He is alert and oriented to person, place, and time. He exhibits normal muscle tone. Note bilateral foot exam reveal erythematous area most of the length of the middle and distal phalanxes of the 3rd toe, with blistering that is ruptured, in dorsal location. Minimal drainage. Also the right 2nd toe with apparent nail splint in the middle, and the lateral aspect avulsed. Note minimal erythema, and drainage. Note all involvement appears superficial dermal layers presently. Also deeper structures do not appear involved. Note bilateral foot exam otherwise unchanged. Skin: Rash (large area, approximately 4 cm X 10 cm area under left breast, with eyrthem and crusty exudate. ) noted. Psychiatric:   Overall normal.      Vitals reviewed. Assessment:     1. Cellulitis of third toe of left foot  doxycycline hyclate (VIBRAMYCIN) 100 MG capsule    Aerobic Culture   2. Cellulitis of second toe of right foot  doxycycline hyclate (VIBRAMYCIN) 100 MG capsule   3.  Cellulitis of chest wall  doxycycline hyclate (VIBRAMYCIN) 100 MG instructions. If you did not get instructions, follow this general advice:  ¨ Wash the wound with clean water 2 times a day. Don't use hydrogen peroxide or alcohol, which can slow healing. ¨ You may cover the wound with a thin layer of petroleum jelly, such as Vaseline, and a nonstick bandage. ¨ Apply more petroleum jelly and replace the bandage as needed. · Be safe with medicines. Take pain medicines exactly as directed. ¨ If the doctor gave you a prescription medicine for pain, take it as prescribed. ¨ If you are not taking a prescription pain medicine, ask your doctor if you can take an over-the-counter medicine. To prevent cellulitis in the future  · Try to prevent cuts, scrapes, or other injuries to your skin. Cellulitis most often occurs where there is a break in the skin. · If you get a scrape, cut, mild burn, or bite, wash the wound with clean water as soon as you can to help avoid infection. Don't use hydrogen peroxide or alcohol, which can slow healing. · If you have swelling in your legs (edema), support stockings and good skin care may help prevent leg sores and cellulitis. · Take care of your feet, especially if you have diabetes or other conditions that increase the risk of infection. Wear shoes and socks. Do not go barefoot. If you have athlete's foot or other skin problems on your feet, talk to your doctor about how to treat them. When should you call for help? Call your doctor now or seek immediate medical care if:  · You have signs that your infection is getting worse, such as:  ¨ Increased pain, swelling, warmth, or redness. ¨ Red streaks leading from the area. ¨ Pus draining from the area. ¨ A fever. · You get a rash. Watch closely for changes in your health, and be sure to contact your doctor if:  · You are not getting better after 1 day (24 hours). · You do not get better as expected. Where can you learn more? Go to https://chblueeb.health-partners. org and sign in to your Selleroutlet account. Enter L310 in the [facility] box to learn more about \"Cellulitis: Care Instructions. \"     If you do not have an account, please click on the \"Sign Up Now\" link. Current as of: October 13, 2016  Content Version: 11.3  © 1305-9307 [employer]. Care instructions adapted under license by [facility] ([facility]). If you have questions about a medical condition or this instruction, always ask your healthcare professional. [place] [address] any warranty or liability for your use of this information. Patient Education        Learning About Diabetes Food Guidelines  Your Care Instructions  Meal planning is important to manage diabetes. It helps keep your blood sugar at a target level (which you set with your doctor). You don't have to eat special foods. You can eat what your family eats, including sweets once in a while. But you do have to pay attention to how often you eat and how much you eat of certain foods. You may want to work with a dietitian or a certified diabetes educator (CDE) to help you plan meals and snacks. A dietitian or CDE can also help you lose weight if that is one of your goals. What should you know about eating carbs? Managing the amount of carbohydrate (carbs) you eat is an important part of healthy meals when you have diabetes. Carbohydrate is found in many foods. · Learn which foods have carbs. And learn the amounts of carbs in different foods. ¨ Bread, cereal, pasta, and rice have about 15 grams of carbs in a serving. A serving is 1 slice of bread (1 ounce), ½ cup of cooked cereal, or 1/3 cup of cooked pasta or rice. ¨ Fruits have 15 grams of carbs in a serving. A serving is 1 small fresh fruit, such as an apple or orange; ½ of a banana; ½ cup of cooked or canned fruit; ½ cup of fruit juice; 1 cup of melon or raspberries; or 2 tablespoons of dried fruit. ¨ Milk and no-sugar-added yogurt have 15 grams of carbs in a serving.  A serving is damage the nerve endings and blood vessels in your feet, making you less likely to notice when your feet are injured. Diabetes also limits your body's ability to fight infection and get blood to areas that need it. If you get a minor foot injury, it could become an ulcer or a serious infection. With good foot care, you can prevent most of these problems. Caring for your feet can be quick and easy. Most of the care can be done when you are bathing or getting ready for bed. Follow-up care is a key part of your treatment and safety. Be sure to make and go to all appointments, and call your doctor if you are having problems. Its also a good idea to know your test results and keep a list of the medicines you take. How can you care for yourself at home? · Keep your blood sugar close to normal by watching what and how much you eat, monitoring blood sugar, taking medicines if prescribed, and getting regular exercise. · Do not smoke. Smoking affects blood flow and can make foot problems worse. If you need help quitting, talk to your doctor about stop-smoking programs and medicines. These can increase your chances of quitting for good. · Eat a diet that is low in fats. High fat intake can cause fat to build up in your blood vessels and decrease blood flow. · Inspect your feet daily for blisters, cuts, cracks, or sores. If you cannot see well, use a mirror or have someone help you. · Take care of your feet:  [place] your feet every day. Use warm (not hot) water. Check the water temperature with your wrists or other part of your body, not your feet. ¨ Dry your feet well. Pat them dry. Do not rub the skin on your feet too hard. Dry well between your toes. If the skin on your feet stays moist, bacteria or a fungus can grow, which can lead to infection. ¨ Keep your skin soft. Use moisturizing skin cream to keep the skin on your feet soft and prevent calluses and cracks.  But do not put the cream between your toes, and medication adherence    Patient given educational materials on Diabetes, Nutrition and diabetic foot care. I have instructed Eric Clark to complete a self tracking handout on Blood Sugars  and Blood Pressures  and instructed them to bring it with them to his next appointment. Discussed use, benefit, and side effects of prescribed medications. Barriers to medication compliance addressed. All patient questions answered. Pt voiced understanding.

## 2017-11-03 NOTE — LETTER
[facility]  [address] [facility]  Phone: [phone]  Fax: [phone] [facility], DO        November 3, 2017     Patient: Mihai Saleh   YOB: 1960   Date of Visit: 11/3/2017       To Whom It May Concern: It is my medical opinion that Serena Brown requires a disability parking placard for the following reasons:  He has a cardiac condition to the extent that functional limitations are classified in severity as class III, according to standards accepted by the American Heart Association. Duration of need: permanent, expires 11/3/2022    If you have any questions or concerns, please don't hesitate to call.     Sincerely,        Deloris Kin, DO

## 2017-11-03 NOTE — PATIENT INSTRUCTIONS
always ask your healthcare professional. [place] [address] any warranty or liability for your use of this information. Patient Education        Learning About Diabetes Food Guidelines  Your Care Instructions  Meal planning is important to manage diabetes. It helps keep your blood sugar at a target level (which you set with your doctor). You don't have to eat special foods. You can eat what your family eats, including sweets once in a while. But you do have to pay attention to how often you eat and how much you eat of certain foods. You may want to work with a dietitian or a certified diabetes educator (CDE) to help you plan meals and snacks. A dietitian or CDE can also help you lose weight if that is one of your goals. What should you know about eating carbs? Managing the amount of carbohydrate (carbs) you eat is an important part of healthy meals when you have diabetes. Carbohydrate is found in many foods. · Learn which foods have carbs. And learn the amounts of carbs in different foods. ¨ Bread, cereal, pasta, and rice have about 15 grams of carbs in a serving. A serving is 1 slice of bread (1 ounce), ½ cup of cooked cereal, or 1/3 cup of cooked pasta or rice. ¨ Fruits have 15 grams of carbs in a serving. A serving is 1 small fresh fruit, such as an apple or orange; ½ of a banana; ½ cup of cooked or canned fruit; ½ cup of fruit juice; 1 cup of melon or raspberries; or 2 tablespoons of dried fruit. ¨ Milk and no-sugar-added yogurt have 15 grams of carbs in a serving. A serving is 1 cup of milk or 2/3 cup of no-sugar-added yogurt. ¨ Starchy vegetables have 15 grams of carbs in a serving. A serving is ½ cup of mashed potatoes or sweet potato; 1 cup winter squash; ½ of a small baked potato; ½ cup of cooked beans; or ½ cup cooked corn or green peas. · Learn how much carbs to eat each day and at each meal. A dietitian or CDE can teach you how to keep track of the amount of carbs you eat. This is called carbohydrate counting. · If you are not sure how to count carbohydrate grams, use the Plate Method to plan meals. It is a good, quick way to make sure that you have a balanced meal. It also helps you spread carbs throughout the day. ¨ Divide your plate by types of foods. Put non-starchy vegetables on half the plate, meat or other protein food on one-quarter of the plate, and a grain or starchy vegetable in the final quarter of the plate. To this you can add a small piece of fruit and 1 cup of milk or yogurt, depending on how many carbs you are supposed to eat at a meal.  · Try to eat about the same amount of carbs at each meal. Do not \"save up\" your daily allowance of carbs to eat at one meal.  · Proteins have very little or no carbs per serving. Examples of proteins are beef, chicken, turkey, fish, eggs, tofu, cheese, cottage cheese, and peanut butter. A serving size of meat is 3 ounces, which is about the size of a deck of cards. Examples of meat substitute serving sizes (equal to 1 ounce of meat) are 1/4 cup of cottage cheese, 1 egg, 1 tablespoon of peanut butter, and ½ cup of tofu. How can you eat out and still eat healthy? · Learn to estimate the serving sizes of foods that have carbohydrate. If you measure food at home, it will be easier to estimate the amount in a serving of restaurant food. · If the meal you order has too much carbohydrate (such as potatoes, corn, or baked beans), ask to have a low-carbohydrate food instead. Ask for a salad or green vegetables. · If you use insulin, check your blood sugar before and after eating out to help you plan how much to eat in the future. · If you eat more carbohydrate at a meal than you had planned, take a walk or do other exercise. This will help lower your blood sugar. What else should you know? · Limit saturated fat, such as the fat from meat and dairy products.  This is a healthy choice because people who have diabetes are at higher risk of heart disease. So choose lean cuts of meat and nonfat or low-fat dairy products. Use olive or canola oil instead of butter or shortening when cooking. · Don't skip meals. Your blood sugar may drop too low if you skip meals and take insulin or certain medicines for diabetes. · Check with your doctor before you drink alcohol. Alcohol can cause your blood sugar to drop too low. Alcohol can also cause a bad reaction if you take certain diabetes medicines. Follow-up care is a key part of your treatment and safety. Be sure to make and go to all appointments, and call your doctor if you are having problems. It's also a good idea to know your test results and keep a list of the medicines you take. Where can you learn more? Go to https://[employer] Lining Limited.TekStream Solutions. org and sign in to your Social Intelligence account. Enter T329 in the [employer] box to learn more about \"Learning About Diabetes Food Guidelines. \"     If you do not have an account, please click on the \"Sign Up Now\" link. Current as of: March 13, 2017  Content Version: 11.3  © 3549-5084 [employer]. Care instructions adapted under license by [facility] ([facility]). If you have questions about a medical condition or this instruction, always ask your healthcare professional. [place] [address] any warranty or liability for your use of this information. Patient Education        Diabetes Foot Health: Care Instructions  Your Care Instructions    When you have diabetes, your feet need extra care and attention. Diabetes can damage the nerve endings and blood vessels in your feet, making you less likely to notice when your feet are injured. Diabetes also limits your body's ability to fight infection and get blood to areas that need it. If you get a minor foot injury, it could become an ulcer or a serious infection. With good foot care, you can prevent most of these problems. Caring for your feet can be quick and easy.  Most of the care can be done when you are bathing or getting ready for bed. Follow-up care is a key part of your treatment and safety. Be sure to make and go to all appointments, and call your doctor if you are having problems. Its also a good idea to know your test results and keep a list of the medicines you take. How can you care for yourself at home? · Keep your blood sugar close to normal by watching what and how much you eat, monitoring blood sugar, taking medicines if prescribed, and getting regular exercise. · Do not smoke. Smoking affects blood flow and can make foot problems worse. If you need help quitting, talk to your doctor about stop-smoking programs and medicines. These can increase your chances of quitting for good. · Eat a diet that is low in fats. High fat intake can cause fat to build up in your blood vessels and decrease blood flow. · Inspect your feet daily for blisters, cuts, cracks, or sores. If you cannot see well, use a mirror or have someone help you. · Take care of your feet:  [facility] your feet every day. Use warm (not hot) water. Check the water temperature with your wrists or other part of your body, not your feet. ¨ Dry your feet well. Pat them dry. Do not rub the skin on your feet too hard. Dry well between your toes. If the skin on your feet stays moist, bacteria or a fungus can grow, which can lead to infection. ¨ Keep your skin soft. Use moisturizing skin cream to keep the skin on your feet soft and prevent calluses and cracks. But do not put the cream between your toes, and stop using any cream that causes a rash. ¨ Clean underneath your toenails carefully. Do not use a sharp object to clean underneath your toenails. Use the blunt end of a nail file or other rounded tool. ¨ Trim and file your toenails straight across to prevent ingrown toenails. Use a nail clipper, not scissors. Use an emery board to smooth the edges. · Change socks daily.  Socks without seams are best, because positions. · You have unexplained or unusual swelling of the foot or ankle. Watch closely for changes in your health, and be sure to contact your doctor if:  · You cannot do proper foot care. Where can you learn more? Go to https://chpejoseeb.[employer] Stage. org and sign in to your Overcart account. Enter A739 in the [employer] box to learn more about \"Diabetes Foot Health: Care Instructions. \"     If you do not have an account, please click on the \"Sign Up Now\" link. Current as of: March 13, 2017  Content Version: 11.3  © 0455-6238 [employer]. Care instructions adapted under license by [facility] ([facility]). If you have questions about a medical condition or this instruction, always ask your healthcare professional. [address] any warranty or liability for your use of this information.

## 2017-11-05 LAB
CULTURE: ABNORMAL
DIRECT EXAM: ABNORMAL
Lab: ABNORMAL
Lab: ABNORMAL
ORGANISM: ABNORMAL
ORGANISM: ABNORMAL
SPECIMEN DESCRIPTION: ABNORMAL
SPECIMEN DESCRIPTION: ABNORMAL
STATUS: ABNORMAL
STATUS: ABNORMAL

## 2017-11-06 ENCOUNTER — OFFICE VISIT (OUTPATIENT)
Dept: PODIATRY | Age: 57
End: 2017-11-06
Payer: COMMERCIAL

## 2017-11-06 ENCOUNTER — OFFICE VISIT (OUTPATIENT)
Dept: FAMILY MEDICINE CLINIC | Age: 57
End: 2017-11-06
Payer: COMMERCIAL

## 2017-11-06 VITALS
SYSTOLIC BLOOD PRESSURE: 110 MMHG | TEMPERATURE: 97.4 F | OXYGEN SATURATION: 96 % | BODY MASS INDEX: 42.66 KG/M2 | HEIGHT: 72 IN | WEIGHT: 315 LBS | HEART RATE: 69 BPM | DIASTOLIC BLOOD PRESSURE: 72 MMHG | RESPIRATION RATE: 16 BRPM

## 2017-11-06 VITALS — BODY MASS INDEX: 42.66 KG/M2 | HEIGHT: 72 IN | WEIGHT: 315 LBS

## 2017-11-06 DIAGNOSIS — L97.509 DIABETIC FOOT ULCER WITH OSTEOMYELITIS (HCC): ICD-10-CM

## 2017-11-06 DIAGNOSIS — M86.9 DIABETIC FOOT ULCER WITH OSTEOMYELITIS (HCC): ICD-10-CM

## 2017-11-06 DIAGNOSIS — L03.032 CELLULITIS OF THIRD TOE, LEFT: Primary | ICD-10-CM

## 2017-11-06 DIAGNOSIS — E11.42 DIABETIC POLYNEUROPATHY ASSOCIATED WITH TYPE 2 DIABETES MELLITUS (HCC): Primary | ICD-10-CM

## 2017-11-06 DIAGNOSIS — S91.209A NAIL AVULSION, TOE, INITIAL ENCOUNTER: ICD-10-CM

## 2017-11-06 DIAGNOSIS — E11.621 DIABETIC FOOT ULCER WITH OSTEOMYELITIS (HCC): ICD-10-CM

## 2017-11-06 DIAGNOSIS — L03.031 CELLULITIS OF SECOND TOE, RIGHT: ICD-10-CM

## 2017-11-06 DIAGNOSIS — E11.69 DIABETIC FOOT ULCER WITH OSTEOMYELITIS (HCC): ICD-10-CM

## 2017-11-06 DIAGNOSIS — L97.522 ULCER OF LEFT FOOT, WITH FAT LAYER EXPOSED (HCC): ICD-10-CM

## 2017-11-06 PROCEDURE — G8484 FLU IMMUNIZE NO ADMIN: HCPCS | Performed by: PODIATRIST

## 2017-11-06 PROCEDURE — 99213 OFFICE O/P EST LOW 20 MIN: CPT | Performed by: FAMILY MEDICINE

## 2017-11-06 PROCEDURE — G8427 DOCREV CUR MEDS BY ELIG CLIN: HCPCS | Performed by: FAMILY MEDICINE

## 2017-11-06 PROCEDURE — 99203 OFFICE O/P NEW LOW 30 MIN: CPT | Performed by: PODIATRIST

## 2017-11-06 PROCEDURE — G8427 DOCREV CUR MEDS BY ELIG CLIN: HCPCS | Performed by: PODIATRIST

## 2017-11-06 PROCEDURE — G8598 ASA/ANTIPLAT THER USED: HCPCS | Performed by: FAMILY MEDICINE

## 2017-11-06 PROCEDURE — 1036F TOBACCO NON-USER: CPT | Performed by: PODIATRIST

## 2017-11-06 PROCEDURE — G8484 FLU IMMUNIZE NO ADMIN: HCPCS | Performed by: FAMILY MEDICINE

## 2017-11-06 PROCEDURE — 1036F TOBACCO NON-USER: CPT | Performed by: FAMILY MEDICINE

## 2017-11-06 PROCEDURE — G8598 ASA/ANTIPLAT THER USED: HCPCS | Performed by: PODIATRIST

## 2017-11-06 PROCEDURE — 3045F PR MOST RECENT HEMOGLOBIN A1C LEVEL 7.0-9.0%: CPT | Performed by: PODIATRIST

## 2017-11-06 PROCEDURE — G8417 CALC BMI ABV UP PARAM F/U: HCPCS | Performed by: PODIATRIST

## 2017-11-06 PROCEDURE — 3017F COLORECTAL CA SCREEN DOC REV: CPT | Performed by: PODIATRIST

## 2017-11-06 PROCEDURE — 11042 DBRDMT SUBQ TIS 1ST 20SQCM/<: CPT | Performed by: PODIATRIST

## 2017-11-06 PROCEDURE — 3017F COLORECTAL CA SCREEN DOC REV: CPT | Performed by: FAMILY MEDICINE

## 2017-11-06 PROCEDURE — G8417 CALC BMI ABV UP PARAM F/U: HCPCS | Performed by: FAMILY MEDICINE

## 2017-11-06 ASSESSMENT — ENCOUNTER SYMPTOMS
WHEEZING: 0
RHINORRHEA: 0
VOMITING: 0
SHORTNESS OF BREATH: 0
BLOOD IN STOOL: 0
CONSTIPATION: 0
COUGH: 0
DIARRHEA: 0
SORE THROAT: 0
ABDOMINAL PAIN: 0
NAUSEA: 0

## 2017-11-06 NOTE — PROGRESS NOTES
Subjective:   11/6/2017    Wilmer Castellano is a 62 y.o. male  Today presents with:  Chief Complaint   Patient presents with    Wound Check     here to have cellulitis checked       HPI    Pt presents for follow up of the cellulitis of the toes, and the rash on the chest.     Note is some better. Taking the meds and tolerated well. He feels he is progressing with this med. But note still wearing the same new shoes. Still that may be the primary problem. He is trying to follow diet and exercise regularly. Pt taking medications as prescribed? Yes  Tolerated well? Yes. Lab Results   Component Value Date     10/14/2017    K 4.4 10/14/2017     10/14/2017    CO2 26 10/14/2017    BUN 17 10/14/2017    CREATININE 0.78 10/14/2017    GLUCOSE 216 (H) 10/14/2017    CALCIUM 9.6 10/14/2017    PROT 7.4 10/14/2017    LABALBU 4.2 10/14/2017    BILITOT 0.33 10/14/2017    ALKPHOS 73 10/14/2017    AST 13 10/14/2017    ALT 26 10/14/2017    LABGLOM >60 10/14/2017    GFRAA >60 10/14/2017     Lab Results   Component Value Date    CHOL 120 10/14/2017     Lab Results   Component Value Date    TRIG 114 10/14/2017     Lab Results   Component Value Date    HDL 37 (L) 10/14/2017     Lab Results   Component Value Date    LDLCHOLESTEROL 60 10/14/2017     Lab Results   Component Value Date    VLDL NOT REPORTED 10/14/2017     Lab Results   Component Value Date    CHOLHDLRATIO 3.2 10/14/2017     Lab Results   Component Value Date    TSH 1.31 10/14/2017       Current Outpatient Prescriptions   Medication Sig Dispense Refill    clopidogrel (PLAVIX) 75 MG tablet Take 1 tablet by mouth daily.  Take with food 30 tablet 5    hydrochlorothiazide (MICROZIDE) 12.5 MG capsule Take 1 capsule by mouth daily 30 capsule 5    meloxicam (MOBIC) 15 MG tablet Take 1 tablet by mouth daily as needed for Pain (arthritis) 30 tablet 5    doxycycline hyclate (VIBRAMYCIN) 100 MG capsule Take 1 capsule by mouth 2 times daily 30 capsule 1    sounds and intact distal pulses. No murmur heard. Pulmonary/Chest: Effort normal and breath sounds normal. No respiratory distress. He has no wheezes. He has no rales. Abdominal: Soft. Bowel sounds are normal. He exhibits no distension and no mass. There is no tenderness. Musculoskeletal: He exhibits no edema (neg Homans'). Note further spread of erythema of the involved toes, but less exudate. Note the left 3rd toe with more bloody drainage. \  Note further avulsion of right 2nd toe. Neurological: He is alert and oriented to person, place, and time. He exhibits normal muscle tone. Skin: Rash (on left chest appears a little better, but persists yet. ) noted. Vitals reviewed. Assessment:     1. Cellulitis of third toe, left  [address] [place] [address], Charisse Case DPM, Podiatry [place]    CANCELED: [address], [place]   2. Cellulitis of second toe, right  [address] [place] [address], [address]    CANCELED: [address] [place], [place]   3. Nail avulsion, toe, initial encounter  [address] [place] [address], Charisse Case DPM, [address]    CANCELED: [address], [place]       Plan:     Case thoroughly discussed with patient and proposed management and DDg. Patient Instructions   Continue Diet low salt, high fiber, avoiding concentrated sweets, diabetic. Continue activity and exercise as tolerated. Continue present medications as ordered. Note to see the foot doctor ASAP, as we need further shoe gear adjustment done. Note as discussed, may require wound care, and we can have podiatry decide on that, or let us know. Patient Education        Cellulitis: Care Instructions  Your Care Instructions    Cellulitis is a skin infection. It often occurs after a break in the skin from a scrape, cut, bite, or puncture, or after a rash. The doctor has checked you carefully, but problems can develop later.  If you notice any problems or new symptoms, get medical treatment right away. Follow-up care is a key part of your treatment and safety. Be sure to make and go to all appointments, and call your doctor if you are having problems. It's also a good idea to know your test results and keep a list of the medicines you take. How can you care for yourself at home? · Take your antibiotics as directed. Do not stop taking them just because you feel better. You need to take the full course of antibiotics. · Prop up the infected area on pillows to reduce pain and swelling. Try to keep the area above the level of your heart as often as you can. · If your doctor told you how to care for your wound, follow your doctor's instructions. If you did not get instructions, follow this general advice:  ¨ Wash the wound with clean water 2 times a day. Don't use hydrogen peroxide or alcohol, which can slow healing. ¨ You may cover the wound with a thin layer of petroleum jelly, such as Vaseline, and a nonstick bandage. ¨ Apply more petroleum jelly and replace the bandage as needed. · Be safe with medicines. Take pain medicines exactly as directed. ¨ If the doctor gave you a prescription medicine for pain, take it as prescribed. ¨ If you are not taking a prescription pain medicine, ask your doctor if you can take an over-the-counter medicine. To prevent cellulitis in the future  · Try to prevent cuts, scrapes, or other injuries to your skin. Cellulitis most often occurs where there is a break in the skin. · If you get a scrape, cut, mild burn, or bite, wash the wound with clean water as soon as you can to help avoid infection. Don't use hydrogen peroxide or alcohol, which can slow healing. · If you have swelling in your legs (edema), support stockings and good skin care may help prevent leg sores and cellulitis. · Take care of your feet, especially if you have diabetes or other conditions that increase the risk of infection. Wear shoes and socks. Do not go barefoot.  If you

## 2017-11-06 NOTE — PATIENT INSTRUCTIONS
Continue Diet low salt, high fiber, avoiding concentrated sweets, diabetic. Continue activity and exercise as tolerated. Continue present medications as ordered. Note to see the foot doctor ASAP, as we need further shoe gear adjustment done. Note as discussed, may require wound care, and we can have podiatry decide on that, or let us know. Patient Education        Cellulitis: Care Instructions  Your Care Instructions    Cellulitis is a skin infection. It often occurs after a break in the skin from a scrape, cut, bite, or puncture, or after a rash. The doctor has checked you carefully, but problems can develop later. If you notice any problems or new symptoms, get medical treatment right away. Follow-up care is a key part of your treatment and safety. Be sure to make and go to all appointments, and call your doctor if you are having problems. It's also a good idea to know your test results and keep a list of the medicines you take. How can you care for yourself at home? · Take your antibiotics as directed. Do not stop taking them just because you feel better. You need to take the full course of antibiotics. · Prop up the infected area on pillows to reduce pain and swelling. Try to keep the area above the level of your heart as often as you can. · If your doctor told you how to care for your wound, follow your doctor's instructions. If you did not get instructions, follow this general advice:  ¨ Wash the wound with clean water 2 times a day. Don't use hydrogen peroxide or alcohol, which can slow healing. ¨ You may cover the wound with a thin layer of petroleum jelly, such as Vaseline, and a nonstick bandage. ¨ Apply more petroleum jelly and replace the bandage as needed. · Be safe with medicines. Take pain medicines exactly as directed. ¨ If the doctor gave you a prescription medicine for pain, take it as prescribed.   ¨ If you are not taking a prescription pain medicine, ask your doctor if

## 2017-11-06 NOTE — PROGRESS NOTES
GRAFT  2/26/07    [facility] (X 3)    NECK SURGERY  2010    cervical stenoses C5-C6-C7and partial T1 laminectomy with fusion of C6-7    OTHER SURGICAL HISTORY      wound care ulcers of legs (bilaterally) with Dr Patti Byrne  age 11    VEIN SURGERY      closure of peripherator veins of legs, Dr Blanco Falling History   Problem Relation Age of Onset    Cancer Mother      lung    Diabetes Father     Heart Disease Father     High Blood Pressure Father     Diabetes Brother     Heart Disease Brother     High Blood Pressure Brother        Social History   Substance Use Topics    Smoking status: Never Smoker    Smokeless tobacco: Never Used    Alcohol use Yes      Comment: rare social, non past 5 months       Review of Systems    Lower Extremity Physical Examination:     Vitals: There were no vitals filed for this visit. General: AAO x 3 in NAD. Dermatologic Exam:  Skin lesion/ulceration Present. Measuring 3x4x0. 1 cm to the left 3rd toe. Skin No rashes or nodules noted. .       Musculoskeletal:     1st MPJ ROM decreased, Bilateral.  Muscle strength 5/5, Bilateral.  Pain absent upon palpation of toes 1-5, Bilateral. decreased medial longitudinal arch, Bilateral.  Ankle ROM decreased,Bilateral.    Dorsally contracted digits present digits 2 and 3 left. Vascular: DP and PT pulses palpable 2/4, Bilateral.  CFT <3 seconds, Bilateral.  Hair growth absent to the level of the digits, Bilateral.  Edema present, Left. Varicosities absent, Bilateral. Erythema absent, Bilateral    Neurological: Sensation intact to light touch to level of digits, Bilateral.  Protective sensation intact 5/10 sites via 5.07/10g [place] Monofilament, Bilateral.  negative Tinel's, Bilateral.  negative Valleix sign, Bilateral.      Integument: Warm, dry, supple, Bilateral.  Open lesion present, Left.   Interdigital maceration absent to web spaces 4, Bilateral. Fissures absent, Bilateral     Asessment: Patient is a 62 y.o. male with:   1. Diabetic polyneuropathy associated with type 2 diabetes mellitus (HCC)  MO DEBRIDEMENT, SKIN, SUB-Q TISSUE,=<20 SQ CM   2. Ulcer of left foot, with fat layer exposed ([address]  MO DEBRIDEMENT, SKIN, SUB-Q TISSUE,=<20 SQ CM   3. Diabetic foot ulcer with osteomyelitis ([address]  MO DEBRIDEMENT, SKIN, SUB-Q TISSUE,=<20 SQ CM       Plan: Patient examined and evaluated. Current condition and treatment options discussed in detail. Discussed conservative and surgical options with the patient. Sharp excisional debridement down thru and including subcutaneous tissue was done after topical EMLA cream was applied. All necrotic tissue was removed. Hemostasis was achieved via direct pressure. Sterile dressings were placed on the patient. 0/10 post procedural pain. Advised pt to use silvadene cream and it was prescribed to the patient and sent to his pharmacy. Verbal and written instructions given to patient. Contact office with any questions/problems/concerns. RTC in 2week(s).     11/6/2017      Electronically signed by Dr. Arnulfo Mejia DPM

## 2017-11-13 ENCOUNTER — OFFICE VISIT (OUTPATIENT)
Dept: PODIATRY | Age: 57
End: 2017-11-13
Payer: COMMERCIAL

## 2017-11-13 VITALS — HEIGHT: 72 IN | BODY MASS INDEX: 42.66 KG/M2 | WEIGHT: 315 LBS

## 2017-11-13 DIAGNOSIS — E11.42 DIABETIC POLYNEUROPATHY ASSOCIATED WITH TYPE 2 DIABETES MELLITUS (HCC): Primary | ICD-10-CM

## 2017-11-13 DIAGNOSIS — E11.621 DIABETIC FOOT ULCER WITH OSTEOMYELITIS (HCC): ICD-10-CM

## 2017-11-13 DIAGNOSIS — M86.9 DIABETIC FOOT ULCER WITH OSTEOMYELITIS (HCC): ICD-10-CM

## 2017-11-13 DIAGNOSIS — E11.69 DIABETIC FOOT ULCER WITH OSTEOMYELITIS (HCC): ICD-10-CM

## 2017-11-13 DIAGNOSIS — L97.509 DIABETIC FOOT ULCER WITH OSTEOMYELITIS (HCC): ICD-10-CM

## 2017-11-13 DIAGNOSIS — L97.522 ULCER OF LEFT FOOT, WITH FAT LAYER EXPOSED (HCC): ICD-10-CM

## 2017-11-13 PROCEDURE — G8417 CALC BMI ABV UP PARAM F/U: HCPCS | Performed by: PODIATRIST

## 2017-11-13 PROCEDURE — 3045F PR MOST RECENT HEMOGLOBIN A1C LEVEL 7.0-9.0%: CPT | Performed by: PODIATRIST

## 2017-11-13 PROCEDURE — 1036F TOBACCO NON-USER: CPT | Performed by: PODIATRIST

## 2017-11-13 PROCEDURE — G8598 ASA/ANTIPLAT THER USED: HCPCS | Performed by: PODIATRIST

## 2017-11-13 PROCEDURE — 3017F COLORECTAL CA SCREEN DOC REV: CPT | Performed by: PODIATRIST

## 2017-11-13 PROCEDURE — G8484 FLU IMMUNIZE NO ADMIN: HCPCS | Performed by: PODIATRIST

## 2017-11-13 PROCEDURE — 11042 DBRDMT SUBQ TIS 1ST 20SQCM/<: CPT | Performed by: PODIATRIST

## 2017-11-13 PROCEDURE — G8427 DOCREV CUR MEDS BY ELIG CLIN: HCPCS | Performed by: PODIATRIST

## 2017-11-13 NOTE — PROGRESS NOTES
[facility] Podiatry  Return Patient Progress Note      Yobani Aleman  AGE: 62 y.o. GENDER: male  : 1960  TODAY'S DATE:  2017    Subjective:       Yobani Aleman is a 62 y.o. male presents to the office today for follow up of an ulceration. Denies F/C/N/V. Wound Type:arterial and diabetic  Wound Location:left 3rd toe  Modifying factors:venous stasis, diabetes and poor glucose control            Lab Results   Component Value Date    LABA1C 8.1 (H) 10/14/2017       ALLERGIES    Allergies   Allergen Reactions    Pcn [Penicillins] Other (See Comments)     Unknown rx       Medications:    Current Outpatient Prescriptions:     silver sulfADIAZINE (SILVADENE) 1 % cream, Apply topically to wound daily. , Disp: 400 g, Rfl: 1    clopidogrel (PLAVIX) 75 MG tablet, Take 1 tablet by mouth daily. Take with food, Disp: 30 tablet, Rfl: 5    hydrochlorothiazide (MICROZIDE) 12.5 MG capsule, Take 1 capsule by mouth daily, Disp: 30 capsule, Rfl: 5    meloxicam (MOBIC) 15 MG tablet, Take 1 tablet by mouth daily as needed for Pain (arthritis), Disp: 30 tablet, Rfl: 5    doxycycline hyclate (VIBRAMYCIN) 100 MG capsule, Take 1 capsule by mouth 2 times daily, Disp: 30 capsule, Rfl: 1    nystatin (MYCOSTATIN) 043872 UNIT/GM powder, Apply topically 3 times daily as needed (rash), Disp: 1 Bottle, Rfl: 3    carvedilol (COREG) 12.5 MG tablet, Take 1 tablet by mouth 2 times daily. , Disp: 60 tablet, Rfl: 5    gabapentin (NEURONTIN) 800 MG tablet, TAKE 1 TABLET BY MOUTH 3 TIMES A DAY, Disp: 90 tablet, Rfl: 5    tiZANidine (ZANAFLEX) 4 MG tablet, Take 1 tablet by mouth nightly as needed for Muscle spasms (sedationwarning), Disp: 30 tablet, Rfl: 2    lisinopril (PRINIVIL;ZESTRIL) 10 MG tablet, TAKE 1 TABLET BY MOUTH DAILY, Disp: 30 tablet, Rfl: 5    glimepiride (AMARYL) 4 MG tablet, Take 1 tablets by mouth every morning (before breakfast), Disp: 30 tablet, Rfl: 5    metFORMIN (GLUCOPHAGE) 1000 MG tablet, Take 1 DEBRIDEMENT, SKIN, SUB-Q TISSUE,=<20 SQ CM   2. Diabetic foot ulcer with osteomyelitis ([address]  N[place] DEBRIDEMENT, SKIN, SUB-Q TISSUE,=<20 SQ CM   3. Ulcer of left foot, with fat layer exposed ([address]  [place] DEBRIDEMENT, SKIN, SUB-Q TISSUE,=<20 SQ CM         Overall Wound Assessment  Wound is has moderately improved. Size has unchanged  Appearance has not changed      Plan:     Pt examined and evaluated. Current condition and treatment options discussed in detail. Sindhu Arias Age:  62 y.o. Gender:  male   :  1960  Today's Date:  2017      Procedure: With the patient in supine position, Lidocaine soaked gauze was applied per physician order at beginning of wound evaluation. It was subsequently removed. Using a curette and Pick-up, the wound was debrided down to and included the removal of the following tissue:     [] epidermis, [x] dermis, [x]  subcutaneous tissue,  [] muscle/fascia tissue,   and/or  [] bone     Also debrided was all  [] fibrin, [] biofilm, [] slough,  [] necrotic,  [] hypergranulation tissue, and/or  [] hyperkeratotic tissue. Wound was copiously irrigated with normal saline solution. Bleeding:  Minimal.  Hemostasis:  pressure     100%  of wound debrided. Patient tolerated procedure well. Pain 1 / 10 during procedure and pain 2 / 10 after procedure. Discussed appropriate home care of this wound. Wound redressed. Patient instructions were given. Discussed appropriate home care of this wound. Dressings: No orders of the defined types were placed in this encounter. No orders of the defined types were placed in this encounter. Follow up: 1 week.       Electronically signed by Dr. Alfredito Trinh DPM on 2017 at 8:59 AM  2017

## 2017-11-21 ENCOUNTER — OFFICE VISIT (OUTPATIENT)
Dept: PODIATRY | Age: 57
End: 2017-11-21
Payer: COMMERCIAL

## 2017-11-21 VITALS — WEIGHT: 315 LBS | HEIGHT: 72 IN | BODY MASS INDEX: 42.66 KG/M2

## 2017-11-21 DIAGNOSIS — M86.9 DIABETIC FOOT ULCER WITH OSTEOMYELITIS (HCC): ICD-10-CM

## 2017-11-21 DIAGNOSIS — E11.621 DIABETIC FOOT ULCER WITH OSTEOMYELITIS (HCC): ICD-10-CM

## 2017-11-21 DIAGNOSIS — L97.522 ULCER OF LEFT FOOT, WITH FAT LAYER EXPOSED (HCC): ICD-10-CM

## 2017-11-21 DIAGNOSIS — E11.69 DIABETIC FOOT ULCER WITH OSTEOMYELITIS (HCC): ICD-10-CM

## 2017-11-21 DIAGNOSIS — L97.509 DIABETIC FOOT ULCER WITH OSTEOMYELITIS (HCC): ICD-10-CM

## 2017-11-21 DIAGNOSIS — E11.42 DIABETIC POLYNEUROPATHY ASSOCIATED WITH TYPE 2 DIABETES MELLITUS (HCC): Primary | ICD-10-CM

## 2017-11-21 PROCEDURE — G8598 ASA/ANTIPLAT THER USED: HCPCS | Performed by: PODIATRIST

## 2017-11-21 PROCEDURE — 11042 DBRDMT SUBQ TIS 1ST 20SQCM/<: CPT | Performed by: PODIATRIST

## 2017-11-21 PROCEDURE — 1036F TOBACCO NON-USER: CPT | Performed by: PODIATRIST

## 2017-11-21 PROCEDURE — G8417 CALC BMI ABV UP PARAM F/U: HCPCS | Performed by: PODIATRIST

## 2017-11-21 PROCEDURE — 3017F COLORECTAL CA SCREEN DOC REV: CPT | Performed by: PODIATRIST

## 2017-11-21 PROCEDURE — G8484 FLU IMMUNIZE NO ADMIN: HCPCS | Performed by: PODIATRIST

## 2017-11-21 PROCEDURE — 3045F PR MOST RECENT HEMOGLOBIN A1C LEVEL 7.0-9.0%: CPT | Performed by: PODIATRIST

## 2017-11-21 PROCEDURE — G8427 DOCREV CUR MEDS BY ELIG CLIN: HCPCS | Performed by: PODIATRIST

## 2017-11-21 NOTE — PROGRESS NOTES
Review of 1 [facility] Podiatry  Return Patient Progress Note      Ni Obrien  AGE: 62 y.o. GENDER: male  : 1960  TODAY'S DATE:  2017    Subjective:       Ni Obrien is a 62 y.o. male presents to the office today for follow up of an ulceration. Denies F/C/N/V. Wound Type:arterial and diabetic  Wound Location:left 3rd toe  Modifying factors:venous stasis, diabetes and poor glucose control            Lab Results   Component Value Date    LABA1C 8.1 (H) 10/14/2017       ALLERGIES    Allergies   Allergen Reactions    Pcn [Penicillins] Other (See Comments)     Unknown rx       Medications:    Current Outpatient Prescriptions:     silver sulfADIAZINE (SILVADENE) 1 % cream, Apply topically to wound daily. , Disp: 400 g, Rfl: 1    clopidogrel (PLAVIX) 75 MG tablet, Take 1 tablet by mouth daily. Take with food, Disp: 30 tablet, Rfl: 5    hydrochlorothiazide (MICROZIDE) 12.5 MG capsule, Take 1 capsule by mouth daily, Disp: 30 capsule, Rfl: 5    meloxicam (MOBIC) 15 MG tablet, Take 1 tablet by mouth daily as needed for Pain (arthritis), Disp: 30 tablet, Rfl: 5    doxycycline hyclate (VIBRAMYCIN) 100 MG capsule, Take 1 capsule by mouth 2 times daily, Disp: 30 capsule, Rfl: 1    nystatin (MYCOSTATIN) 538466 UNIT/GM powder, Apply topically 3 times daily as needed (rash), Disp: 1 Bottle, Rfl: 3    carvedilol (COREG) 12.5 MG tablet, Take 1 tablet by mouth 2 times daily. , Disp: 60 tablet, Rfl: 5    gabapentin (NEURONTIN) 800 MG tablet, TAKE 1 TABLET BY MOUTH 3 TIMES A DAY, Disp: 90 tablet, Rfl: 5    tiZANidine (ZANAFLEX) 4 MG tablet, Take 1 tablet by mouth nightly as needed for Muscle spasms (sedationwarning), Disp: 30 tablet, Rfl: 2    lisinopril (PRINIVIL;ZESTRIL) 10 MG tablet, TAKE 1 TABLET BY MOUTH DAILY, Disp: 30 tablet, Rfl: 5    glimepiride (AMARYL) 4 MG tablet, Take 1 tablets by mouth every morning (before breakfast), Disp: 30 tablet, Rfl: 5    metFORMIN (GLUCOPHAGE) 1000 MG tablet, Take 1 tablet by mouth 2 times daily (with meals). , Disp: 60 tablet, Rfl: 5    atorvastatin (LIPITOR) 40 MG tablet, Take 1 tablet by mouth daily, Disp: 30 tablet, Rfl: 3    traZODone (DESYREL) 150 MG tablet, Take 1 tablet by mouth nightly, Disp: 30 tablet, Rfl: 5    empagliflozin (JARDIANCE) 25 MG tablet, Take 25 mg by mouth daily, Disp: 30 tablet, Rfl: 5    linagliptin (TRADJENTA) 5 MG tablet, Take 1 tablet by mouth daily, Disp: 30 tablet, Rfl: 5    HYDROcodone-acetaminophen (NORCO)  MG per tablet, Take 1 tablet by mouth every 6 hours as needed for Pain  Sedation warning., Disp: 120 tablet, Rfl: 0    Multiple Vitamin (MULTI VITAMIN DAILY PO), Take by mouth, Disp: , Rfl:     nitroGLYCERIN (NITROSTAT) 0.4 MG SL tablet, Place 0.4 mg under the tongue, Disp: , Rfl:     aspirin 325 MG tablet, Take 325 mg by mouth daily. , Disp: , Rfl:     fluticasone (FLONASE) 50 MCG/ACT nasal spray, 1 spray by Nasal route daily, Disp: 1 Bottle, Rfl: 3    Review of Systems    Objective:       Physical Exam:  Ht 6' (1.829 m)   Wt (!) 317 lb (143.8 kg)   BMI 42.99 kg/m²     NV status remains unchanged since last visit. Wound #:   1    diabetic foot ulcer   left left    Wound measurements:  #1  4 cm by 2 cm  by   1 mm         Wound Depth: subcutaneous. Drainage:    minimal Type: minimal    Wound Bed status:   Granulation Tissue: 50%   Necrotic 20%    Epithelialization 30%   Hypergranular 30%   Periwound hyperkeratosis:  absent  Erythema present  Odor:no  Maceration:no  Undermining/tract/tunneling:no  Culture taken:   no             X ray of the left foot 3 views taken. Foot X-Ray    Interpreted by: Lady Hernandes      Findings: Left foot: No fracture, Normal alignment, No foreign body, No soft tissue swelling  No gas in tissue. Calcified arteries seen in the left foot. Assessment:     Assessment: Patient is a 62 y.o. male with:  1.  Diabetic polyneuropathy associated with type 2 diabetes

## 2017-11-29 DIAGNOSIS — M62.838 MUSCLE SPASMS OF NECK: ICD-10-CM

## 2017-11-29 RX ORDER — TIZANIDINE 4 MG/1
4 TABLET ORAL NIGHTLY PRN
Qty: 30 TABLET | Refills: 2 | Status: SHIPPED | OUTPATIENT
Start: 2017-11-29 | End: 2018-03-22 | Stop reason: SDUPTHER

## 2017-11-29 RX ORDER — TRAZODONE HYDROCHLORIDE 150 MG/1
150 TABLET ORAL NIGHTLY
Qty: 30 TABLET | Refills: 5 | Status: SHIPPED | OUTPATIENT
Start: 2017-11-29 | End: 2018-04-20 | Stop reason: SDUPTHER

## 2017-12-04 ENCOUNTER — OFFICE VISIT (OUTPATIENT)
Dept: PODIATRY | Age: 57
End: 2017-12-04
Payer: COMMERCIAL

## 2017-12-04 VITALS — WEIGHT: 315 LBS | BODY MASS INDEX: 42.66 KG/M2 | HEIGHT: 72 IN

## 2017-12-04 DIAGNOSIS — E11.42 DIABETIC POLYNEUROPATHY ASSOCIATED WITH TYPE 2 DIABETES MELLITUS (HCC): Primary | ICD-10-CM

## 2017-12-04 DIAGNOSIS — B35.1 DERMATOPHYTOSIS OF NAIL: ICD-10-CM

## 2017-12-04 DIAGNOSIS — M86.9 DIABETIC FOOT ULCER WITH OSTEOMYELITIS (HCC): ICD-10-CM

## 2017-12-04 DIAGNOSIS — L97.522 ULCER OF LEFT FOOT, WITH FAT LAYER EXPOSED (HCC): ICD-10-CM

## 2017-12-04 DIAGNOSIS — E11.69 DIABETIC FOOT ULCER WITH OSTEOMYELITIS (HCC): ICD-10-CM

## 2017-12-04 DIAGNOSIS — L97.509 DIABETIC FOOT ULCER WITH OSTEOMYELITIS (HCC): ICD-10-CM

## 2017-12-04 DIAGNOSIS — E11.621 DIABETIC FOOT ULCER WITH OSTEOMYELITIS (HCC): ICD-10-CM

## 2017-12-04 PROCEDURE — 11721 DEBRIDE NAIL 6 OR MORE: CPT | Performed by: PODIATRIST

## 2017-12-04 PROCEDURE — 11042 DBRDMT SUBQ TIS 1ST 20SQCM/<: CPT | Performed by: PODIATRIST

## 2017-12-04 RX ORDER — FLUCONAZOLE 100 MG/1
100 TABLET ORAL DAILY
Qty: 15 TABLET | Refills: 1 | Status: SHIPPED | OUTPATIENT
Start: 2017-12-04 | End: 2018-07-10 | Stop reason: ALTCHOICE

## 2017-12-04 NOTE — PROGRESS NOTES
Review of Systems    Review of 1 [facility] Podiatry  Return Patient Progress Note      Olivia Avaols  AGE: 62 y.o. GENDER: male  : 1960  TODAY'S DATE: 17    Subjective:       Olivia Avalos is a 62 y.o. male presents to the office today for follow up of an ulceration. Denies F/C/N/V. Wound Type:arterial and diabetic  Wound Location:left 3rd toe  Modifying factors:venous stasis, diabetes and poor glucose control            Lab Results   Component Value Date    LABA1C 8.1 (H) 10/14/2017       ALLERGIES    Allergies   Allergen Reactions    Pcn [Penicillins] Other (See Comments)     Unknown rx       Medications:    Current Outpatient Prescriptions:     fluconazole (DIFLUCAN) 100 MG tablet, Take 1 tablet by mouth daily Take 2 tablets for the first dose only, then 1 tablet twice daily, Disp: 15 tablet, Rfl: 1    collagenase (SANTYL) 250 UNIT/GM ointment, Apply topically to wound followed by secondary dressing change daily. , Disp: 90 g, Rfl: 1    traZODone (DESYREL) 150 MG tablet, Take 1 tablet by mouth nightly, Disp: 30 tablet, Rfl: 5    tiZANidine (ZANAFLEX) 4 MG tablet, Take 1 tablet by mouth nightly as needed (muscle spasm) Sedation Warning., Disp: 30 tablet, Rfl: 2    silver sulfADIAZINE (SILVADENE) 1 % cream, Apply topically to wound daily. , Disp: 400 g, Rfl: 1    clopidogrel (PLAVIX) 75 MG tablet, Take 1 tablet by mouth daily.  Take with food, Disp: 30 tablet, Rfl: 5    hydrochlorothiazide (MICROZIDE) 12.5 MG capsule, Take 1 capsule by mouth daily, Disp: 30 capsule, Rfl: 5    meloxicam (MOBIC) 15 MG tablet, Take 1 tablet by mouth daily as needed for Pain (arthritis), Disp: 30 tablet, Rfl: 5    doxycycline hyclate (VIBRAMYCIN) 100 MG capsule, Take 1 capsule by mouth 2 times daily, Disp: 30 capsule, Rfl: 1    nystatin (MYCOSTATIN) 838886 UNIT/GM powder, Apply topically 3 times daily as needed (rash), Disp: 1 Bottle, Rfl: 3    carvedilol (COREG) 12.5 MG tablet, Take 1 procedure well. Pain 1 / 10 during procedure and pain 2 / 10 after procedure. Discussed appropriate home care of this wound. Wound redressed. Patient instructions were given. Discussed appropriate home care of this wound. Dressings:   Orders Placed This Encounter   Procedures     DIABETES FOOT EXAM    45195 - VT DEBRIDEMENT OF NAILS, 6 OR MORE    VT DEBRIDEMENT, SKIN, SUB-Q TISSUE,=<20 SQ CM      Orders Placed This Encounter   Medications    fluconazole (DIFLUCAN) 100 MG tablet     Sig: Take 1 tablet by mouth daily Take 2 tablets for the first dose only, then 1 tablet twice daily     Dispense:  15 tablet     Refill:  1    collagenase (SANTYL) 250 UNIT/GM ointment     Sig: Apply topically to wound followed by secondary dressing change daily. Dispense:  90 g     Refill:  1       Nails 1,2,3,4,5 Right and 1,2,3,4,5 Left were debrided and ground smooth with a dremmel. The patient tolerated the procedure well without apparent complications. Follow up: 1 week.       Electronically signed by Dr. Nic Browne DPM

## 2017-12-18 ENCOUNTER — OFFICE VISIT (OUTPATIENT)
Dept: PODIATRY | Age: 57
End: 2017-12-18
Payer: COMMERCIAL

## 2017-12-18 ENCOUNTER — HOSPITAL ENCOUNTER (OUTPATIENT)
Dept: MRI IMAGING | Facility: CLINIC | Age: 57
Discharge: HOME | End: 2017-12-18
Payer: COMMERCIAL

## 2017-12-18 VITALS
WEIGHT: 315 LBS | DIASTOLIC BLOOD PRESSURE: 76 MMHG | BODY MASS INDEX: 42.66 KG/M2 | SYSTOLIC BLOOD PRESSURE: 139 MMHG | HEIGHT: 72 IN | RESPIRATION RATE: 18 BRPM | HEART RATE: 60 BPM

## 2017-12-18 DIAGNOSIS — M86.9 DIABETIC FOOT ULCER WITH OSTEOMYELITIS (HCC): ICD-10-CM

## 2017-12-18 DIAGNOSIS — E11.42 DIABETIC POLYNEUROPATHY ASSOCIATED WITH TYPE 2 DIABETES MELLITUS (HCC): Primary | ICD-10-CM

## 2017-12-18 DIAGNOSIS — M47.817 ARTHRITIS OF LUMBOSACRAL SPINE: ICD-10-CM

## 2017-12-18 DIAGNOSIS — I73.9 PVD (PERIPHERAL VASCULAR DISEASE) (HCC): ICD-10-CM

## 2017-12-18 DIAGNOSIS — E11.621 DIABETIC FOOT ULCER WITH OSTEOMYELITIS (HCC): ICD-10-CM

## 2017-12-18 DIAGNOSIS — E11.69 DIABETIC FOOT ULCER WITH OSTEOMYELITIS (HCC): ICD-10-CM

## 2017-12-18 DIAGNOSIS — L97.522 ULCER OF LEFT FOOT, WITH FAT LAYER EXPOSED (HCC): ICD-10-CM

## 2017-12-18 DIAGNOSIS — L97.509 DIABETIC FOOT ULCER WITH OSTEOMYELITIS (HCC): ICD-10-CM

## 2017-12-18 PROBLEM — E78.00 HYPERCHOLESTEROLEMIA: Status: ACTIVE | Noted: 2017-12-05

## 2017-12-18 PROBLEM — R94.39 ABNORMAL STRESS TEST: Status: ACTIVE | Noted: 2017-12-05

## 2017-12-18 PROBLEM — R94.39 ABNORMAL NUCLEAR STRESS TEST: Status: ACTIVE | Noted: 2017-12-05

## 2017-12-18 PROCEDURE — 3045F PR MOST RECENT HEMOGLOBIN A1C LEVEL 7.0-9.0%: CPT | Performed by: PODIATRIST

## 2017-12-18 PROCEDURE — G8417 CALC BMI ABV UP PARAM F/U: HCPCS | Performed by: PODIATRIST

## 2017-12-18 PROCEDURE — G8427 DOCREV CUR MEDS BY ELIG CLIN: HCPCS | Performed by: PODIATRIST

## 2017-12-18 PROCEDURE — G8598 ASA/ANTIPLAT THER USED: HCPCS | Performed by: PODIATRIST

## 2017-12-18 PROCEDURE — 3017F COLORECTAL CA SCREEN DOC REV: CPT | Performed by: PODIATRIST

## 2017-12-18 PROCEDURE — G8484 FLU IMMUNIZE NO ADMIN: HCPCS | Performed by: PODIATRIST

## 2017-12-18 PROCEDURE — 11042 DBRDMT SUBQ TIS 1ST 20SQCM/<: CPT | Performed by: PODIATRIST

## 2017-12-18 PROCEDURE — 72148 MRI LUMBAR SPINE W/O DYE: CPT

## 2017-12-18 PROCEDURE — 1036F TOBACCO NON-USER: CPT | Performed by: PODIATRIST

## 2017-12-18 RX ORDER — DOXYCYCLINE HYCLATE 100 MG
100 TABLET ORAL 2 TIMES DAILY
Qty: 42 TABLET | Refills: 0 | Status: SHIPPED | OUTPATIENT
Start: 2017-12-18 | End: 2018-02-08 | Stop reason: SDUPTHER

## 2017-12-18 NOTE — PROGRESS NOTES
fracture, Normal alignment, No foreign body, No soft tissue swelling  No gas in tissue. Calcified arteries seen in the left foot. Assessment:     Assessment: Patient is a 62 y.o. male with:  1. Diabetic polyneuropathy associated with type 2 diabetes mellitus (HCC)  AL DEBRIDEMENT, SKIN, SUB-Q TISSUE,=<20 SQ CM    VL Lower Extremity Arterial Segmental Pressures W Ppg   2. Diabetic foot ulcer with osteomyelitis ([address]  AL DEBRIDEMENT, SKIN, SUB-Q TISSUE,=<20 SQ CM    VL Lower Extremity Arterial Segmental Pressures W Ppg   3. Ulcer of left foot, with fat layer exposed ([address]  VL Lower Extremity Arterial Segmental Pressures W Ppg   4. PVD (peripheral vascular disease) (HCC)  VL Lower Extremity Arterial Segmental Pressures W Ppg         Overall Wound Assessment  Wound is has moderately improved. Size has unchanged  Appearance has not changed      Plan:     Pt examined and evaluated. Current condition and treatment options discussed in detail. Mo Riff Age:  62 y.o. Gender:  male   :  1960  Today's Date:  2017      Procedure: With the patient in supine position, Lidocaine soaked gauze was applied per physician order at beginning of wound evaluation. It was subsequently removed. Using a curette and Pick-up, the wound was debrided down to and included the removal of the following tissue:     [] epidermis, [x] dermis, [x]  subcutaneous tissue,  [] muscle/fascia tissue,   and/or  [] bone     Also debrided was all  [x] fibrin, [x] biofilm, [x] slough,  [x] necrotic,  [] hypergranulation tissue, and/or  [] hyperkeratotic tissue. Wound was copiously irrigated with normal saline solution. Bleeding:  Minimal.  Hemostasis:  pressure     100%  of wound debrided. Patient tolerated procedure well. Pain 6 / 10 during procedure and pain 6 / 10 after procedure. Discussed appropriate home care of this wound. Wound redressed. Patient instructions were given.       Discussed appropriate home care of this wound. Dressings:   No orders of the defined types were placed in this encounter. No orders of the defined types were placed in this encounter. We gave him an Rx for Doxycycline 100mg for 3 weeks as he will not be able to get off of work until then. He is to use his santyl. He may need a surgical debridement and epifix wound dressings as it has been 4 weeks and it is not healing like we would like. Follow up: 1 week.       Electronically signed by Dr. Elba Spivey DPM

## 2018-01-08 ENCOUNTER — OFFICE VISIT (OUTPATIENT)
Dept: FAMILY MEDICINE CLINIC | Age: 58
End: 2018-01-08
Payer: COMMERCIAL

## 2018-01-08 VITALS
TEMPERATURE: 98.1 F | DIASTOLIC BLOOD PRESSURE: 56 MMHG | OXYGEN SATURATION: 96 % | WEIGHT: 315 LBS | BODY MASS INDEX: 42.66 KG/M2 | HEIGHT: 72 IN | HEART RATE: 81 BPM | RESPIRATION RATE: 16 BRPM | SYSTOLIC BLOOD PRESSURE: 101 MMHG

## 2018-01-08 DIAGNOSIS — L03.032 CELLULITIS OF THIRD TOE, LEFT: ICD-10-CM

## 2018-01-08 DIAGNOSIS — I25.810 CORONARY ARTERY DISEASE INVOLVING CORONARY BYPASS GRAFT OF NATIVE HEART WITHOUT ANGINA PECTORIS: ICD-10-CM

## 2018-01-08 DIAGNOSIS — M15.9 PRIMARY OSTEOARTHRITIS INVOLVING MULTIPLE JOINTS: ICD-10-CM

## 2018-01-08 DIAGNOSIS — I10 ESSENTIAL HYPERTENSION: ICD-10-CM

## 2018-01-08 PROCEDURE — G8484 FLU IMMUNIZE NO ADMIN: HCPCS | Performed by: FAMILY MEDICINE

## 2018-01-08 PROCEDURE — G8417 CALC BMI ABV UP PARAM F/U: HCPCS | Performed by: FAMILY MEDICINE

## 2018-01-08 PROCEDURE — G8598 ASA/ANTIPLAT THER USED: HCPCS | Performed by: FAMILY MEDICINE

## 2018-01-08 PROCEDURE — G8427 DOCREV CUR MEDS BY ELIG CLIN: HCPCS | Performed by: FAMILY MEDICINE

## 2018-01-08 PROCEDURE — 1036F TOBACCO NON-USER: CPT | Performed by: FAMILY MEDICINE

## 2018-01-08 PROCEDURE — 99214 OFFICE O/P EST MOD 30 MIN: CPT | Performed by: FAMILY MEDICINE

## 2018-01-08 PROCEDURE — 3017F COLORECTAL CA SCREEN DOC REV: CPT | Performed by: FAMILY MEDICINE

## 2018-01-08 PROCEDURE — 3046F HEMOGLOBIN A1C LEVEL >9.0%: CPT | Performed by: FAMILY MEDICINE

## 2018-01-08 ASSESSMENT — ENCOUNTER SYMPTOMS
WHEEZING: 0
BACK PAIN: 1
VOMITING: 0
RHINORRHEA: 0
SHORTNESS OF BREATH: 0
NAUSEA: 0
COUGH: 0
DIARRHEA: 0
SORE THROAT: 0
CONSTIPATION: 0
ABDOMINAL PAIN: 0
BLOOD IN STOOL: 0

## 2018-01-08 NOTE — PROGRESS NOTES
Addis Bernal is a 62 y.o. male  presents for follow up of DM, HTN,  DJD, and the left foot cellulitis. Note since the last visit is following with the podiatrist, and note the ulcer of the left third toe being managed with him. Also since last visit has had cardiology evaluation and heart cath. Has a blocked artery on back of heart, but no additional treatment yet. To see him 1/29/18 for follow up. Also notes had DOT physical, and the A1C there was 7.0. But no result here. But still working. Also with pain management had MRI of lower back on 12/18/17, and results as noted in chart. He is trying to follow diet Yes, diabetic, and did fairly well thru the holidays, but some indiscretions. Pt is getting back on track again now. Exercising regularly No, no regular exercise presently, except with work due to above. Home glucose testing, testing blood sugars twice a day lowest has been 96 and highest 230  Diary provided by patient and copied into chart? No.    Any foot concerns? Yes, still has slight infection in toe, and followed with podiatry as noted in his note. Pt taking medications as prescribed? Yes  Tolerated well? Yes.     Hemoglobin A1C (%)   Date Value   10/14/2017 8.1 (H)     Lab Results   Component Value Date     10/14/2017    K 4.4 10/14/2017     10/14/2017    CO2 26 10/14/2017    BUN 17 10/14/2017    CREATININE 0.78 10/14/2017    GLUCOSE 216 (H) 10/14/2017    CALCIUM 9.6 10/14/2017    PROT 7.4 10/14/2017    LABALBU 4.2 10/14/2017    BILITOT 0.33 10/14/2017    ALKPHOS 73 10/14/2017    AST 13 10/14/2017    ALT 26 10/14/2017    LABGLOM >60 10/14/2017    GFRAA >60 10/14/2017     Lab Results   Component Value Date    CHOL 120 10/14/2017     Lab Results   Component Value Date    TRIG 114 10/14/2017     Lab Results   Component Value Date    HDL 37 (L) 10/14/2017     Lab Results   Component Value Date    LDLCHOLESTEROL 60 10/14/2017     Lab Results   Component Value Date anti-inflammatories (nsaid)    Chronic prescription opiate use    Severe comorbid illness    Hx of cervical spine surgery    Chronic neck pain    Candidal balanitis    Cellulitis of third toe, left    Nail avulsion, toe, initial encounter    Cellulitis of second toe, right    Abnormal nuclear stress test    Abnormal stress test    Hypercholesterolemia       Review of Systems   Constitutional: Negative for activity change (still working, but hard to walk very far with the toe. ), appetite change, chills and fever. HENT: Negative for congestion, ear pain, rhinorrhea and sore throat. Respiratory: Negative for cough, shortness of breath and wheezing. Cardiovascular: Negative for chest pain, palpitations and leg swelling. Gastrointestinal: Negative for abdominal pain, blood in stool, constipation, diarrhea, nausea and vomiting. Genitourinary: Negative for dysuria, frequency, hematuria and urgency. Musculoskeletal: Positive for arthralgias (ongoing as before. ), back pain (ongoing and using TENS unit) and neck pain (ongoing, stable. ). Skin: Positive for wound (of the left foot as noted. ). Negative for rash. Neurological: Positive for weakness (left lateral hand, worse with the cold weather. ) and numbness (left lateral two fingers, ongoing). Negative for dizziness and headaches. Hematological: Negative. Psychiatric/Behavioral: Negative. Objective:     BP (!) 101/56   Pulse 81   Temp 98.1 °F (36.7 °C) (Oral)   Resp 16   Ht 6' 0.01\" (1.829 m)   Wt (!) 321 lb (145.6 kg)   SpO2 96%   BMI 43.53 kg/m²     Physical Exam   Constitutional: He is oriented to person, place, and time. He appears well-developed and well-nourished. No distress. BP (!) 101/56   Pulse 81   Temp 98.1 °F (36.7 °C) (Oral)   Resp 16   Ht 6' 0.01\" (1.829 m)   Wt (!) 321 lb (145.6 kg)   SpO2 96%   BMI 43.53 kg/m²      HENT:   Head: Normocephalic. Neck: No JVD present. No thyromegaly present. Barriers to medication compliance addressed. All patient questions answered. Pt voiced understanding.

## 2018-01-08 NOTE — PATIENT INSTRUCTIONS
Continue diet - low sodium, low fat, low cholesterol, Diabetic diet, and continue wt loss program   Continue exercise and activity as tolerated  Continue medications as ordered  Continue with annual diabetic eye exam  Need to schedule updated Diabetic educations classes and then we need written  confirmation of completion for the chart. Continue home glucose testing, and keep diary, and bring in each diabetic visit. Labs as ordered, to be done about 1/29/18, call in 1 week for results if you do not hear from my office. Keep follow up with the specialist as noted. Note the colonoscopy referral on hold as discussed, until cardiology can clear him. Note with the ongoing above care, then elects to hold off follow up until May. Patient Education        Learning About Diabetes Food Guidelines  Your Care Instructions    Meal planning is important to manage diabetes. It helps keep your blood sugar at a target level (which you set with your doctor). You don't have to eat special foods. You can eat what your family eats, including sweets once in a while. But you do have to pay attention to how often you eat and how much you eat of certain foods. You may want to work with a dietitian or a certified diabetes educator (CDE) to help you plan meals and snacks. A dietitian or CDE can also help you lose weight if that is one of your goals. What should you know about eating carbs? Managing the amount of carbohydrate (carbs) you eat is an important part of healthy meals when you have diabetes. Carbohydrate is found in many foods. · Learn which foods have carbs. And learn the amounts of carbs in different foods. ¨ Bread, cereal, pasta, and rice have about 15 grams of carbs in a serving. A serving is 1 slice of bread (1 ounce), ½ cup of cooked cereal, or 1/3 cup of cooked pasta or rice. ¨ Fruits have 15 grams of carbs in a serving.  A serving is 1 small fresh fruit, such as an apple or orange; ½ of a banana; ½ cup of cooked or canned fruit; ½ cup of fruit juice; 1 cup of melon or raspberries; or 2 tablespoons of dried fruit. ¨ Milk and no-sugar-added yogurt have 15 grams of carbs in a serving. A serving is 1 cup of milk or 2/3 cup of no-sugar-added yogurt. ¨ Starchy vegetables have 15 grams of carbs in a serving. A serving is ½ cup of mashed potatoes or sweet potato; 1 cup winter squash; ½ of a small baked potato; ½ cup of cooked beans; or ½ cup cooked corn or green peas. · Learn how much carbs to eat each day and at each meal. A dietitian or CDE can teach you how to keep track of the amount of carbs you eat. This is called carbohydrate counting. · If you are not sure how to count carbohydrate grams, use the Plate Method to plan meals. It is a good, quick way to make sure that you have a balanced meal. It also helps you spread carbs throughout the day. ¨ Divide your plate by types of foods. Put non-starchy vegetables on half the plate, meat or other protein food on one-quarter of the plate, and a grain or starchy vegetable in the final quarter of the plate. To this you can add a small piece of fruit and 1 cup of milk or yogurt, depending on how many carbs you are supposed to eat at a meal.  · Try to eat about the same amount of carbs at each meal. Do not \"save up\" your daily allowance of carbs to eat at one meal.  · Proteins have very little or no carbs per serving. Examples of proteins are beef, chicken, turkey, fish, eggs, tofu, cheese, cottage cheese, and peanut butter. A serving size of meat is 3 ounces, which is about the size of a deck of cards. Examples of meat substitute serving sizes (equal to 1 ounce of meat) are 1/4 cup of cottage cheese, 1 egg, 1 tablespoon of peanut butter, and ½ cup of tofu. How can you eat out and still eat healthy? · Learn to estimate the serving sizes of foods that have carbohydrate.  If you measure food at home, it will be easier to estimate the amount in a serving of restaurant food.  · If the meal you order has too much carbohydrate (such as potatoes, corn, or baked beans), ask to have a low-carbohydrate food instead. Ask for a salad or green vegetables. · If you use insulin, check your blood sugar before and after eating out to help you plan how much to eat in the future. · If you eat more carbohydrate at a meal than you had planned, take a walk or do other exercise. This will help lower your blood sugar. What else should you know? · Limit saturated fat, such as the fat from meat and dairy products. This is a healthy choice because people who have diabetes are at higher risk of heart disease. So choose lean cuts of meat and nonfat or low-fat dairy products. Use olive or canola oil instead of butter or shortening when cooking. · Don't skip meals. Your blood sugar may drop too low if you skip meals and take insulin or certain medicines for diabetes. · Check with your doctor before you drink alcohol. Alcohol can cause your blood sugar to drop too low. Alcohol can also cause a bad reaction if you take certain diabetes medicines. Follow-up care is a key part of your treatment and safety. Be sure to make and go to all appointments, and call your doctor if you are having problems. It's also a good idea to know your test results and keep a list of the medicines you take. Where can you learn more? Go to [email or web address] org and sign in to your [employer] account. Enter Y527 in the [facility] box to learn more about \"Learning About Diabetes Food Guidelines. \"     If you do not have an account, please click on the \"Sign Up Now\" link. Current as of: March 13, 2017  Content Version: 11.5  © 0895-1388 Healthwise, [employer]. Care instructions adapted under license by [facility] [place] ([facility]).  If you have questions about a medical condition or this instruction, always ask your healthcare professional. [address]  any warranty or liability for your

## 2018-01-09 ENCOUNTER — OFFICE VISIT (OUTPATIENT)
Dept: PODIATRY | Age: 58
End: 2018-01-09
Payer: COMMERCIAL

## 2018-01-09 VITALS — WEIGHT: 315 LBS | HEIGHT: 72 IN | BODY MASS INDEX: 42.66 KG/M2

## 2018-01-09 DIAGNOSIS — L97.509 DIABETIC FOOT ULCER WITH OSTEOMYELITIS (HCC): ICD-10-CM

## 2018-01-09 DIAGNOSIS — E11.69 DIABETIC FOOT ULCER WITH OSTEOMYELITIS (HCC): ICD-10-CM

## 2018-01-09 DIAGNOSIS — E11.42 DIABETIC POLYNEUROPATHY ASSOCIATED WITH TYPE 2 DIABETES MELLITUS (HCC): Primary | ICD-10-CM

## 2018-01-09 DIAGNOSIS — E11.621 DIABETIC FOOT ULCER WITH OSTEOMYELITIS (HCC): ICD-10-CM

## 2018-01-09 DIAGNOSIS — L97.522 ULCER OF LEFT FOOT, WITH FAT LAYER EXPOSED (HCC): ICD-10-CM

## 2018-01-09 DIAGNOSIS — I73.9 PERIPHERAL VASCULAR DISEASE (HCC): ICD-10-CM

## 2018-01-09 DIAGNOSIS — M86.9 DIABETIC FOOT ULCER WITH OSTEOMYELITIS (HCC): ICD-10-CM

## 2018-01-09 PROCEDURE — 3017F COLORECTAL CA SCREEN DOC REV: CPT | Performed by: PODIATRIST

## 2018-01-09 PROCEDURE — 11042 DBRDMT SUBQ TIS 1ST 20SQCM/<: CPT | Performed by: PODIATRIST

## 2018-01-09 PROCEDURE — G8484 FLU IMMUNIZE NO ADMIN: HCPCS | Performed by: PODIATRIST

## 2018-01-09 PROCEDURE — 3046F HEMOGLOBIN A1C LEVEL >9.0%: CPT | Performed by: PODIATRIST

## 2018-01-09 PROCEDURE — 1036F TOBACCO NON-USER: CPT | Performed by: PODIATRIST

## 2018-01-09 PROCEDURE — G8417 CALC BMI ABV UP PARAM F/U: HCPCS | Performed by: PODIATRIST

## 2018-01-09 PROCEDURE — G8598 ASA/ANTIPLAT THER USED: HCPCS | Performed by: PODIATRIST

## 2018-01-09 PROCEDURE — G8427 DOCREV CUR MEDS BY ELIG CLIN: HCPCS | Performed by: PODIATRIST

## 2018-01-09 NOTE — PROGRESS NOTES
Periwound hyperkeratosis:  absent  Erythema absent  Odor:no  Maceration:no  Undermining/tract/tunneling:no  Culture taken:   no           Assessment:     Assessment: Patient is a 62 y.o. male with:  1. Diabetic polyneuropathy associated with type 2 diabetes mellitus (HCC)  OK DEBRIDEMENT, SKIN, SUB-Q TISSUE,=<20 SQ CM   2. Diabetic foot ulcer with osteomyelitis ([address]  OK DEBRIDEMENT, SKIN, SUB-Q TISSUE,=<20 SQ CM   3. Ulcer of left foot, with fat layer exposed ([address]  OK DEBRIDEMENT, SKIN, SUB-Q TISSUE,=<20 SQ CM   4. Peripheral vascular disease ([address]         Overall Wound Assessment  Wound is has slightly improved. Size has decreased  Appearance has improved      Plan:     Pt examined and evaluated. Current condition and treatment options discussed in detail. Charly Bennett Age:  62 y.o. Gender:  male   :  1960  Today's Date:  2018      Procedure: With the patient in supine position, Lidocaine soaked gauze was applied per physician order at beginning of wound evaluation. It was subsequently removed. Using a curette, #15 blade scalpel and Pick-up, the wound was debrided down to and included the removal of the following tissue:     [] epidermis, [] dermis, [x]  subcutaneous tissue,  [] muscle/fascia tissue,   and/or  [] bone     Also debrided was all  [] fibrin, [] biofilm, [] slough,  [] necrotic,  [] hypergranulation tissue, and/or  [] hyperkeratotic tissue. Wound was copiously irrigated with normal saline solution. Bleeding:  Minimal.  Hemostasis:  pressure     100%  of wound debrided. Patient tolerated procedure fairly well. Pain 2 / 10 during procedure and pain 2 / 10 after procedure. Discussed appropriate home care of this wound. Wound redressed. Patient instructions were given. Discussed appropriate home care of this wound. Dressings: No orders of the defined types were placed in this encounter.      No orders of the defined types were placed in this encounter. Continue santyl.    Follow up: 3 week due to work      Electronically signed by Sherie Miller DPM on 1/9/2018 at 8:43 AM  1/9/2018

## 2018-02-02 ENCOUNTER — HOSPITAL ENCOUNTER (OUTPATIENT)
Age: 58
Discharge: HOME | End: 2018-02-02
Payer: COMMERCIAL

## 2018-02-02 LAB
C-REACTIVE PROTEIN: 2.1 MG/L (ref 0–5)
ESTIMATED AVERAGE GLUCOSE: 169 MG/DL
GLUCOSE BLD-MCNC: 221 MG/DL (ref 70–99)
HBA1C MFR BLD: 7.5 % (ref 4–6)
SEDIMENTATION RATE, ERYTHROCYTE: 15 MM (ref 0–15)

## 2018-02-02 PROCEDURE — 82947 ASSAY GLUCOSE BLOOD QUANT: CPT

## 2018-02-02 PROCEDURE — 85651 RBC SED RATE NONAUTOMATED: CPT

## 2018-02-02 PROCEDURE — 86140 C-REACTIVE PROTEIN: CPT

## 2018-02-02 PROCEDURE — 36415 COLL VENOUS BLD VENIPUNCTURE: CPT

## 2018-02-02 PROCEDURE — 83036 HEMOGLOBIN GLYCOSYLATED A1C: CPT

## 2018-02-08 ENCOUNTER — TELEPHONE (OUTPATIENT)
Dept: FAMILY MEDICINE CLINIC | Age: 58
End: 2018-02-08

## 2018-02-08 DIAGNOSIS — B37.2 INTERTRIGINOUS CANDIDIASIS: ICD-10-CM

## 2018-02-08 DIAGNOSIS — L03.313 CELLULITIS OF CHEST WALL: Primary | ICD-10-CM

## 2018-02-08 RX ORDER — DOXYCYCLINE HYCLATE 100 MG
100 TABLET ORAL 2 TIMES DAILY
Qty: 30 TABLET | Refills: 0 | Status: SHIPPED | OUTPATIENT
Start: 2018-02-08 | End: 2018-02-24 | Stop reason: SDUPTHER

## 2018-02-08 RX ORDER — NYSTATIN 100000 [USP'U]/G
POWDER TOPICAL 3 TIMES DAILY PRN
Qty: 1 BOTTLE | Refills: 3 | Status: SHIPPED | OUTPATIENT
Start: 2018-02-08 | End: 2018-07-10 | Stop reason: ALTCHOICE

## 2018-02-08 NOTE — TELEPHONE ENCOUNTER
patient called stating his rash is coming back.  would like to know if he can get a refill on the antibbiotic

## 2018-02-08 NOTE — TELEPHONE ENCOUNTER
Note I reordered the nystatin and the antibiotic doxycycline. Please be sure pt aware. Also to keep follow up appt soon if not better. Thanks.

## 2018-03-22 DIAGNOSIS — M15.9 PRIMARY OSTEOARTHRITIS INVOLVING MULTIPLE JOINTS: ICD-10-CM

## 2018-03-22 DIAGNOSIS — M62.838 MUSCLE SPASMS OF NECK: ICD-10-CM

## 2018-03-23 RX ORDER — MELOXICAM 15 MG/1
TABLET ORAL
Qty: 30 TABLET | Refills: 0 | Status: SHIPPED | OUTPATIENT
Start: 2018-03-23 | End: 2018-07-21 | Stop reason: SDUPTHER

## 2018-03-23 RX ORDER — CARVEDILOL 12.5 MG/1
TABLET ORAL
Qty: 60 TABLET | Refills: 5 | Status: SHIPPED | OUTPATIENT
Start: 2018-03-23 | End: 2018-09-15 | Stop reason: SDUPTHER

## 2018-03-23 RX ORDER — LISINOPRIL 10 MG/1
TABLET ORAL
Qty: 30 TABLET | Refills: 0 | Status: SHIPPED | OUTPATIENT
Start: 2018-03-23 | End: 2018-07-21 | Stop reason: SDUPTHER

## 2018-03-23 RX ORDER — TIZANIDINE 4 MG/1
TABLET ORAL
Qty: 30 TABLET | Refills: 0 | Status: SHIPPED | OUTPATIENT
Start: 2018-03-23 | End: 2018-04-19 | Stop reason: SDUPTHER

## 2018-04-13 RX ORDER — GABAPENTIN 800 MG/1
TABLET ORAL
Qty: 90 TABLET | Refills: 0 | Status: SHIPPED | OUTPATIENT
Start: 2018-04-13 | End: 2018-05-09 | Stop reason: SDUPTHER

## 2018-05-07 ENCOUNTER — OFFICE VISIT (OUTPATIENT)
Dept: FAMILY MEDICINE CLINIC | Age: 58
End: 2018-05-07
Payer: COMMERCIAL

## 2018-05-07 VITALS
TEMPERATURE: 98 F | HEIGHT: 72 IN | OXYGEN SATURATION: 98 % | BODY MASS INDEX: 42.66 KG/M2 | WEIGHT: 315 LBS | SYSTOLIC BLOOD PRESSURE: 119 MMHG | HEART RATE: 74 BPM | DIASTOLIC BLOOD PRESSURE: 72 MMHG | RESPIRATION RATE: 16 BRPM

## 2018-05-07 DIAGNOSIS — R10.10 UPPER ABDOMINAL PAIN: ICD-10-CM

## 2018-05-07 DIAGNOSIS — I10 ESSENTIAL HYPERTENSION: ICD-10-CM

## 2018-05-07 DIAGNOSIS — L30.9 DERMATITIS: ICD-10-CM

## 2018-05-07 DIAGNOSIS — E66.01 OBESITY, CLASS III, BMI 40-49.9 (MORBID OBESITY) (HCC): ICD-10-CM

## 2018-05-07 DIAGNOSIS — L03.313 CELLULITIS OF CHEST WALL: ICD-10-CM

## 2018-05-07 PROCEDURE — 2022F DILAT RTA XM EVC RTNOPTHY: CPT | Performed by: FAMILY MEDICINE

## 2018-05-07 PROCEDURE — 99214 OFFICE O/P EST MOD 30 MIN: CPT | Performed by: FAMILY MEDICINE

## 2018-05-07 PROCEDURE — 3017F COLORECTAL CA SCREEN DOC REV: CPT | Performed by: FAMILY MEDICINE

## 2018-05-07 PROCEDURE — 1036F TOBACCO NON-USER: CPT | Performed by: FAMILY MEDICINE

## 2018-05-07 PROCEDURE — G8427 DOCREV CUR MEDS BY ELIG CLIN: HCPCS | Performed by: FAMILY MEDICINE

## 2018-05-07 PROCEDURE — G8417 CALC BMI ABV UP PARAM F/U: HCPCS | Performed by: FAMILY MEDICINE

## 2018-05-07 PROCEDURE — G8598 ASA/ANTIPLAT THER USED: HCPCS | Performed by: FAMILY MEDICINE

## 2018-05-07 PROCEDURE — 3045F PR MOST RECENT HEMOGLOBIN A1C LEVEL 7.0-9.0%: CPT | Performed by: FAMILY MEDICINE

## 2018-05-07 RX ORDER — OMEPRAZOLE 20 MG/1
20 CAPSULE, DELAYED RELEASE ORAL
Qty: 30 CAPSULE | Refills: 2 | Status: SHIPPED | OUTPATIENT
Start: 2018-05-07 | End: 2018-07-21 | Stop reason: SDUPTHER

## 2018-05-07 RX ORDER — DOXYCYCLINE HYCLATE 100 MG/1
CAPSULE ORAL
Qty: 30 CAPSULE | Refills: 0 | Status: SHIPPED | OUTPATIENT
Start: 2018-05-07 | End: 2018-05-19 | Stop reason: SDUPTHER

## 2018-05-07 RX ORDER — HYDROCODONE BITARTRATE AND ACETAMINOPHEN 10; 325 MG/1; MG/1
1 TABLET ORAL EVERY 6 HOURS PRN
COMMUNITY
End: 2022-01-27

## 2018-05-07 RX ORDER — PHENTERMINE HYDROCHLORIDE 37.5 MG/1
37.5 CAPSULE ORAL EVERY MORNING
Qty: 30 CAPSULE | Refills: 0 | Status: SHIPPED | OUTPATIENT
Start: 2018-05-07 | End: 2018-06-04 | Stop reason: SDUPTHER

## 2018-05-07 ASSESSMENT — ENCOUNTER SYMPTOMS
SHORTNESS OF BREATH: 0
VOMITING: 0
SORE THROAT: 0
NAUSEA: 0
BACK PAIN: 1
WHEEZING: 0
DIARRHEA: 0
COUGH: 0
RHINORRHEA: 0
TROUBLE SWALLOWING: 0
ABDOMINAL DISTENTION: 0
CONSTIPATION: 0
BLOOD IN STOOL: 0
ABDOMINAL PAIN: 1

## 2018-05-12 ENCOUNTER — HOSPITAL ENCOUNTER (OUTPATIENT)
Age: 58
Discharge: HOME | End: 2018-05-12
Payer: COMMERCIAL

## 2018-05-12 DIAGNOSIS — R10.10 UPPER ABDOMINAL PAIN: ICD-10-CM

## 2018-05-12 DIAGNOSIS — I10 ESSENTIAL HYPERTENSION: ICD-10-CM

## 2018-05-12 LAB
ABSOLUTE EOS #: 0.2 K/UL (ref 0–0.4)
ABSOLUTE IMMATURE GRANULOCYTE: ABNORMAL K/UL (ref 0–0.3)
ABSOLUTE LYMPH #: 2.3 K/UL (ref 1–4.8)
ABSOLUTE MONO #: 0.7 K/UL (ref 0.2–0.8)
ALBUMIN SERPL-MCNC: 4.2 G/DL (ref 3.5–5.2)
ALBUMIN/GLOBULIN RATIO: NORMAL (ref 1–2.5)
ALP BLD-CCNC: 67 U/L (ref 40–129)
ALT SERPL-CCNC: 23 U/L (ref 5–41)
AMYLASE: 49 U/L (ref 28–100)
ANION GAP SERPL CALCULATED.3IONS-SCNC: 14 MMOL/L (ref 9–17)
AST SERPL-CCNC: 14 U/L
BASOPHILS # BLD: 0 % (ref 0–2)
BASOPHILS ABSOLUTE: 0 K/UL (ref 0–0.2)
BILIRUB SERPL-MCNC: 0.32 MG/DL (ref 0.3–1.2)
BILIRUBIN DIRECT: 0.09 MG/DL
BILIRUBIN, INDIRECT: 0.23 MG/DL (ref 0–1)
BUN BLDV-MCNC: 20 MG/DL (ref 6–20)
BUN/CREAT BLD: 21 (ref 9–20)
CALCIUM SERPL-MCNC: 9.5 MG/DL (ref 8.6–10.4)
CHLORIDE BLD-SCNC: 97 MMOL/L (ref 98–107)
CHOLESTEROL, FASTING: 110 MG/DL
CHOLESTEROL/HDL RATIO: 3.2
CO2: 26 MMOL/L (ref 20–31)
CREAT SERPL-MCNC: 0.94 MG/DL (ref 0.7–1.2)
DIFFERENTIAL TYPE: ABNORMAL
EOSINOPHILS RELATIVE PERCENT: 3 % (ref 1–4)
GFR AFRICAN AMERICAN: >60 ML/MIN
GFR NON-AFRICAN AMERICAN: >60 ML/MIN
GFR SERPL CREATININE-BSD FRML MDRD: ABNORMAL ML/MIN/{1.73_M2}
GFR SERPL CREATININE-BSD FRML MDRD: ABNORMAL ML/MIN/{1.73_M2}
GLOBULIN: NORMAL G/DL (ref 1.5–3.8)
GLUCOSE BLD-MCNC: 215 MG/DL (ref 70–99)
HCT VFR BLD CALC: 35.8 % (ref 41–53)
HDLC SERPL-MCNC: 34 MG/DL
HEMOGLOBIN: 11 G/DL (ref 13.5–17.5)
IMMATURE GRANULOCYTES: ABNORMAL %
LDL CHOLESTEROL: 46 MG/DL (ref 0–130)
LIPASE: 25 U/L (ref 13–60)
LYMPHOCYTES # BLD: 32 % (ref 24–44)
MCH RBC QN AUTO: 23 PG (ref 26–34)
MCHC RBC AUTO-ENTMCNC: 30.8 G/DL (ref 31–37)
MCV RBC AUTO: 74.5 FL (ref 80–100)
MONOCYTES # BLD: 10 % (ref 1–7)
NRBC AUTOMATED: ABNORMAL PER 100 WBC
PDW BLD-RTO: 18.6 % (ref 11.5–14.5)
PLATELET # BLD: 329 K/UL (ref 130–400)
PLATELET ESTIMATE: ABNORMAL
PMV BLD AUTO: 6.8 FL (ref 6–12)
POTASSIUM SERPL-SCNC: 4.5 MMOL/L (ref 3.7–5.3)
RBC # BLD: 4.8 M/UL (ref 4.5–5.9)
RBC # BLD: ABNORMAL 10*6/UL
SEG NEUTROPHILS: 55 % (ref 36–66)
SEGMENTED NEUTROPHILS ABSOLUTE COUNT: 4 K/UL (ref 1.8–7.7)
SODIUM BLD-SCNC: 137 MMOL/L (ref 135–144)
TOTAL PROTEIN: 7.2 G/DL (ref 6.4–8.3)
TRIGLYCERIDE, FASTING: 150 MG/DL
VLDLC SERPL CALC-MCNC: ABNORMAL MG/DL (ref 1–30)
WBC # BLD: 7.2 K/UL (ref 3.5–11)
WBC # BLD: ABNORMAL 10*3/UL

## 2018-05-12 PROCEDURE — 82150 ASSAY OF AMYLASE: CPT

## 2018-05-12 PROCEDURE — 85025 COMPLETE CBC W/AUTO DIFF WBC: CPT

## 2018-05-12 PROCEDURE — 83036 HEMOGLOBIN GLYCOSYLATED A1C: CPT

## 2018-05-12 PROCEDURE — 36415 COLL VENOUS BLD VENIPUNCTURE: CPT

## 2018-05-12 PROCEDURE — 80061 LIPID PANEL: CPT

## 2018-05-12 PROCEDURE — 80048 BASIC METABOLIC PNL TOTAL CA: CPT

## 2018-05-12 PROCEDURE — 83690 ASSAY OF LIPASE: CPT

## 2018-05-12 PROCEDURE — 80076 HEPATIC FUNCTION PANEL: CPT

## 2018-05-13 LAB
ESTIMATED AVERAGE GLUCOSE: 243 MG/DL
HBA1C MFR BLD: 10.1 % (ref 4–6)

## 2018-06-04 ENCOUNTER — OFFICE VISIT (OUTPATIENT)
Dept: FAMILY MEDICINE CLINIC | Age: 58
End: 2018-06-04
Payer: COMMERCIAL

## 2018-06-04 VITALS
HEART RATE: 65 BPM | WEIGHT: 315 LBS | RESPIRATION RATE: 18 BRPM | SYSTOLIC BLOOD PRESSURE: 136 MMHG | DIASTOLIC BLOOD PRESSURE: 77 MMHG | BODY MASS INDEX: 43.66 KG/M2 | OXYGEN SATURATION: 99 % | TEMPERATURE: 98.6 F

## 2018-06-04 DIAGNOSIS — E66.01 OBESITY, CLASS III, BMI 40-49.9 (MORBID OBESITY) (HCC): ICD-10-CM

## 2018-06-04 PROCEDURE — G8417 CALC BMI ABV UP PARAM F/U: HCPCS | Performed by: FAMILY MEDICINE

## 2018-06-04 PROCEDURE — 3046F HEMOGLOBIN A1C LEVEL >9.0%: CPT | Performed by: FAMILY MEDICINE

## 2018-06-04 PROCEDURE — 1036F TOBACCO NON-USER: CPT | Performed by: FAMILY MEDICINE

## 2018-06-04 PROCEDURE — 2022F DILAT RTA XM EVC RTNOPTHY: CPT | Performed by: FAMILY MEDICINE

## 2018-06-04 PROCEDURE — G8598 ASA/ANTIPLAT THER USED: HCPCS | Performed by: FAMILY MEDICINE

## 2018-06-04 PROCEDURE — 3017F COLORECTAL CA SCREEN DOC REV: CPT | Performed by: FAMILY MEDICINE

## 2018-06-04 PROCEDURE — 99213 OFFICE O/P EST LOW 20 MIN: CPT | Performed by: FAMILY MEDICINE

## 2018-06-04 PROCEDURE — G8427 DOCREV CUR MEDS BY ELIG CLIN: HCPCS | Performed by: FAMILY MEDICINE

## 2018-06-04 RX ORDER — PHENTERMINE HYDROCHLORIDE 37.5 MG/1
37.5 CAPSULE ORAL EVERY MORNING
Qty: 30 CAPSULE | Refills: 0 | Status: SHIPPED | OUTPATIENT
Start: 2018-06-04 | End: 2018-07-09 | Stop reason: SDUPTHER

## 2018-06-04 ASSESSMENT — ENCOUNTER SYMPTOMS
CONSTIPATION: 0
SORE THROAT: 0
RHINORRHEA: 0
BLOOD IN STOOL: 0
VOMITING: 0
WHEEZING: 0
NAUSEA: 0
SHORTNESS OF BREATH: 0
DIARRHEA: 0
COUGH: 0
ABDOMINAL PAIN: 0

## 2018-06-04 ASSESSMENT — PATIENT HEALTH QUESTIONNAIRE - PHQ9
1. LITTLE INTEREST OR PLEASURE IN DOING THINGS: 0
SUM OF ALL RESPONSES TO PHQ QUESTIONS 1-9: 0
2. FEELING DOWN, DEPRESSED OR HOPELESS: 0
SUM OF ALL RESPONSES TO PHQ9 QUESTIONS 1 & 2: 0

## 2018-06-05 ENCOUNTER — OFFICE VISIT (OUTPATIENT)
Dept: PODIATRY | Age: 58
End: 2018-06-05
Payer: COMMERCIAL

## 2018-06-05 VITALS — WEIGHT: 315 LBS | HEIGHT: 72 IN | BODY MASS INDEX: 42.66 KG/M2

## 2018-06-05 DIAGNOSIS — M79.672 PAIN IN BOTH FEET: ICD-10-CM

## 2018-06-05 DIAGNOSIS — E11.42 DIABETIC POLYNEUROPATHY ASSOCIATED WITH TYPE 2 DIABETES MELLITUS (HCC): Primary | ICD-10-CM

## 2018-06-05 DIAGNOSIS — B35.1 DERMATOPHYTOSIS OF NAIL: ICD-10-CM

## 2018-06-05 DIAGNOSIS — I73.9 PVD (PERIPHERAL VASCULAR DISEASE) (HCC): ICD-10-CM

## 2018-06-05 DIAGNOSIS — M79.671 PAIN IN BOTH FEET: ICD-10-CM

## 2018-06-05 PROBLEM — E11.621 DIABETIC ULCER OF TOE ASSOCIATED WITH TYPE 2 DIABETES MELLITUS, WITH FAT LAYER EXPOSED (HCC): Status: ACTIVE | Noted: 2018-01-31

## 2018-06-05 PROBLEM — M18.9 OSTEOARTHRITIS OF CARPOMETACARPAL (CMC) JOINT OF THUMB: Status: ACTIVE | Noted: 2018-06-05

## 2018-06-05 PROBLEM — S50.00XA CONTUSION OF ELBOW: Status: ACTIVE | Noted: 2018-06-05

## 2018-06-05 PROBLEM — M51.36 DEGENERATION OF LUMBAR INTERVERTEBRAL DISC: Status: ACTIVE | Noted: 2018-06-05

## 2018-06-05 PROBLEM — I25.5 ISCHEMIC CARDIOMYOPATHY: Status: ACTIVE | Noted: 2018-05-07

## 2018-06-05 PROBLEM — S83.90XA SPRAIN OF KNEE AND LEG: Status: ACTIVE | Noted: 2018-06-05

## 2018-06-05 PROBLEM — S80.00XA CONTUSION OF KNEE: Status: ACTIVE | Noted: 2018-06-05

## 2018-06-05 PROBLEM — Z95.1 HISTORY OF CORONARY ARTERY BYPASS GRAFT X 3: Status: ACTIVE | Noted: 2018-05-07

## 2018-06-05 PROBLEM — M51.369 DEGENERATION OF LUMBAR INTERVERTEBRAL DISC: Status: ACTIVE | Noted: 2018-06-05

## 2018-06-05 PROBLEM — S43.409A SPRAIN OF SHOULDER AND UPPER ARM: Status: ACTIVE | Noted: 2018-06-05

## 2018-06-05 PROBLEM — L97.502 DIABETIC ULCER OF TOE ASSOCIATED WITH TYPE 2 DIABETES MELLITUS, WITH FAT LAYER EXPOSED (HCC): Status: ACTIVE | Noted: 2018-01-31

## 2018-06-05 PROBLEM — I87.2 VENOUS INSUFFICIENCY OF BOTH LOWER EXTREMITIES: Status: ACTIVE | Noted: 2018-01-31

## 2018-06-05 PROBLEM — S40.019A CONTUSION OF SHOULDER REGION: Status: ACTIVE | Noted: 2018-06-05

## 2018-06-05 PROBLEM — M17.9 OSTEOARTHRITIS OF KNEE: Status: ACTIVE | Noted: 2018-06-05

## 2018-06-05 PROBLEM — M65.30 ACQUIRED TRIGGER FINGER: Status: ACTIVE | Noted: 2018-06-05

## 2018-06-05 PROCEDURE — 3017F COLORECTAL CA SCREEN DOC REV: CPT | Performed by: PODIATRIST

## 2018-06-05 PROCEDURE — G8427 DOCREV CUR MEDS BY ELIG CLIN: HCPCS | Performed by: PODIATRIST

## 2018-06-05 PROCEDURE — 11721 DEBRIDE NAIL 6 OR MORE: CPT | Performed by: PODIATRIST

## 2018-06-05 PROCEDURE — G8598 ASA/ANTIPLAT THER USED: HCPCS | Performed by: PODIATRIST

## 2018-06-05 PROCEDURE — 99213 OFFICE O/P EST LOW 20 MIN: CPT | Performed by: PODIATRIST

## 2018-06-05 PROCEDURE — 1036F TOBACCO NON-USER: CPT | Performed by: PODIATRIST

## 2018-06-05 PROCEDURE — G8417 CALC BMI ABV UP PARAM F/U: HCPCS | Performed by: PODIATRIST

## 2018-06-05 PROCEDURE — 3046F HEMOGLOBIN A1C LEVEL >9.0%: CPT | Performed by: PODIATRIST

## 2018-06-05 PROCEDURE — 2022F DILAT RTA XM EVC RTNOPTHY: CPT | Performed by: PODIATRIST

## 2018-06-11 RX ORDER — GABAPENTIN 800 MG/1
TABLET ORAL
Qty: 90 TABLET | Refills: 0 | OUTPATIENT
Start: 2018-06-11

## 2018-06-29 ENCOUNTER — TELEPHONE (OUTPATIENT)
Dept: PULMONOLOGY | Age: 58
End: 2018-06-29

## 2018-06-29 DIAGNOSIS — G47.30 SLEEP APNEA, UNSPECIFIED TYPE: Primary | ICD-10-CM

## 2018-07-02 RX ORDER — GABAPENTIN 800 MG/1
TABLET ORAL
Qty: 90 TABLET | Refills: 0 | OUTPATIENT
Start: 2018-07-02 | End: 2018-08-01

## 2018-07-09 ENCOUNTER — OFFICE VISIT (OUTPATIENT)
Dept: FAMILY MEDICINE CLINIC | Age: 58
End: 2018-07-09
Payer: COMMERCIAL

## 2018-07-09 VITALS
HEIGHT: 72 IN | OXYGEN SATURATION: 97 % | WEIGHT: 309 LBS | DIASTOLIC BLOOD PRESSURE: 69 MMHG | HEART RATE: 63 BPM | SYSTOLIC BLOOD PRESSURE: 111 MMHG | RESPIRATION RATE: 16 BRPM | BODY MASS INDEX: 41.85 KG/M2 | TEMPERATURE: 97.8 F

## 2018-07-09 DIAGNOSIS — E66.01 OBESITY, CLASS III, BMI 40-49.9 (MORBID OBESITY) (HCC): ICD-10-CM

## 2018-07-09 PROCEDURE — G8417 CALC BMI ABV UP PARAM F/U: HCPCS | Performed by: FAMILY MEDICINE

## 2018-07-09 PROCEDURE — G8427 DOCREV CUR MEDS BY ELIG CLIN: HCPCS | Performed by: FAMILY MEDICINE

## 2018-07-09 PROCEDURE — 1036F TOBACCO NON-USER: CPT | Performed by: FAMILY MEDICINE

## 2018-07-09 PROCEDURE — G8598 ASA/ANTIPLAT THER USED: HCPCS | Performed by: FAMILY MEDICINE

## 2018-07-09 PROCEDURE — 3046F HEMOGLOBIN A1C LEVEL >9.0%: CPT | Performed by: FAMILY MEDICINE

## 2018-07-09 PROCEDURE — 2022F DILAT RTA XM EVC RTNOPTHY: CPT | Performed by: FAMILY MEDICINE

## 2018-07-09 PROCEDURE — 3017F COLORECTAL CA SCREEN DOC REV: CPT | Performed by: FAMILY MEDICINE

## 2018-07-09 PROCEDURE — 99213 OFFICE O/P EST LOW 20 MIN: CPT | Performed by: FAMILY MEDICINE

## 2018-07-09 RX ORDER — PHENTERMINE HYDROCHLORIDE 37.5 MG/1
37.5 CAPSULE ORAL EVERY MORNING
Qty: 30 CAPSULE | Refills: 0 | Status: SHIPPED | OUTPATIENT
Start: 2018-07-09 | End: 2019-02-04 | Stop reason: SDUPTHER

## 2018-07-09 ASSESSMENT — ENCOUNTER SYMPTOMS
DIARRHEA: 0
VOMITING: 0
SHORTNESS OF BREATH: 0
SORE THROAT: 0
RHINORRHEA: 0
WHEEZING: 0
ABDOMINAL PAIN: 0
NAUSEA: 0
CONSTIPATION: 0
COUGH: 0
BLOOD IN STOOL: 0

## 2018-07-09 NOTE — PROGRESS NOTES
Beatrix Cushing is a 62 y.o. male  presents for follow up of DM, and obesity. Pt relates is doing better. Note the diet and loosing the weight and that is helping. Note is down 11 pounds. He is trying to follow diet Yes, diabetic and wt loss. Exercising regularly No, no regular exercise and just work still . Home glucose testing, BGs range between 85 and 160  Diary provided by patient and copied into chart? No, pt did not bring in. Any foot concerns? Note continues to follow up with podiatry. Pt taking medications as prescribed? Yes  Tolerated well? Yes. Hemoglobin A1C (%)   Date Value   05/12/2018 10.1 (H)     Lab Results   Component Value Date     05/12/2018    K 4.5 05/12/2018    CL 97 (L) 05/12/2018    CO2 26 05/12/2018    BUN 20 05/12/2018    CREATININE 0.94 05/12/2018    GLUCOSE 215 (H) 05/12/2018    CALCIUM 9.5 05/12/2018    PROT 7.2 05/12/2018    LABALBU 4.2 05/12/2018    BILITOT 0.32 05/12/2018    ALKPHOS 67 05/12/2018    AST 14 05/12/2018    ALT 23 05/12/2018    LABGLOM >60 05/12/2018    GFRAA >60 05/12/2018    GLOB NOT REPORTED 05/12/2018     Lab Results   Component Value Date    CHOL 120 10/14/2017     Lab Results   Component Value Date    TRIG 114 10/14/2017     Lab Results   Component Value Date    HDL 34 (L) 05/12/2018     Lab Results   Component Value Date    LDLCHOLESTEROL 46 05/12/2018     Lab Results   Component Value Date    VLDL NOT REPORTED 05/12/2018     Lab Results   Component Value Date    CHOLHDLRATIO 3.2 05/12/2018     Lab Results   Component Value Date    TSH 1.31 10/14/2017     Lab Results   Component Value Date    LABMICR 14 10/14/2017       Current Outpatient Prescriptions   Medication Sig Dispense Refill    phentermine 37.5 MG capsule Take 1 capsule by mouth every morning for 30 days. . 30 capsule 0    glimepiride (AMARYL) 4 MG tablet TAKE ONE TABLET BY MOUTH EVERY MORNING BEFORE BREAKFAST 30 tablet 3    gabapentin (NEURONTIN) 800 MG tablet TAKE ONE TABLET BY MOUTH THREE TIMES A DAY. MAY CAUSE DROWSINESS. 90 tablet 2    traZODone (DESYREL) 150 MG tablet TAKE ONE TABLET BY MOUTH ONE TIME A DAY AT BEDTIME 30 tablet 5    HYDROcodone-acetaminophen (NORCO)  MG per tablet Take 1 tablet by mouth every 6 hours as needed for Pain. [place] linagliptin (TRADJENTA) 5 MG tablet Take 1 tablet by mouth daily 30 tablet 5    omeprazole (PRILOSEC) 20 MG delayed release capsule Take 1 capsule by mouth every morning (before breakfast) 30 capsule 2    hydrochlorothiazide (MICROZIDE) 12.5 MG capsule TAKE 1 CAPSULE BY MOUTH DAILY 30 capsule 5    atorvastatin (LIPITOR) 40 MG tablet TAKE 1 TABLET DAILY 30 tablet 3    tiZANidine (ZANAFLEX) 4 MG tablet TAKE ONE TABLET BY MOUTH DAILY AT BEDTIME AS NEEDED FOR MUSCLE SPASMS (SEDATION WARNING) 30 tablet 2    clopidogrel (PLAVIX) 75 MG tablet TAKE 1 TABLET BY MOUTH DAILY. TAKE WITH FOOD 30 tablet 5    carvedilol (COREG) 12.5 MG tablet TAKE 1 TABLET BY MOUTH 2 TIMES DAILY 60 tablet 5    meloxicam (MOBIC) 15 MG tablet Take 1 tablet by mouth daily as needed for Pain (arthritis) 30 tablet 0    lisinopril (PRINIVIL;ZESTRIL) 10 MG tablet TAKE 1 TABLET BY MOUTH DAILY 30 tablet 0    metFORMIN (GLUCOPHAGE) 1000 MG tablet Take 1 tablet by mouth 2 times daily (with meals). 60 tablet 5    nystatin (MYCOSTATIN) 475207 UNIT/GM powder Apply topically 3 times daily as needed (rash) 1 Bottle 3    collagenase (SANTYL) 250 UNIT/GM ointment Apply topically to wound followed by secondary dressing change daily. 90 g 1    collagenase (SANTYL) 250 UNIT/GM ointment Apply topically to wound followed by secondary dressing change daily. 90 g 1    silver sulfADIAZINE (SILVADENE) 1 % cream Apply topically to wound daily.  400 g 1    empagliflozin (JARDIANCE) 25 MG tablet Take 25 mg by mouth daily 30 tablet 5    Multiple Vitamin (MULTI VITAMIN DAILY PO) Take by mouth      nitroGLYCERIN (NITROSTAT) 0.4 MG SL tablet Place 0.4 mg under the tongue      aspirin 325 MG tablet Take 325 mg by mouth daily.  doxycycline hyclate (VIBRAMYCIN) 100 MG capsule TAKE ONE CAPSULE BY MOUTH TWICE A DAY X15 30 capsule 0    fluconazole (DIFLUCAN) 100 MG tablet Take 1 tablet by mouth daily Take 2 tablets for the first dose only, then 1 tablet twice daily 15 tablet 1    fluticasone (FLONASE) 50 MCG/ACT nasal spray 1 spray by Nasal route daily 1 Bottle 3     No current facility-administered medications for this visit. Note review of PMH, PSH, PFH, social and immunizations. Past Medical History:   Diagnosis Date    CAD (coronary artery disease)     [address]    Cervical spondylosis     CH[facility]congestive heart failure) [facility]     Hyperlipemia, mixed     Hypertension     Obesity     Peripheral vascular disease ([address]     with venous stasis and ulcerations. Dr Brandy Anthony    Type II or unspecified type diabetes mellitus without mention of complication, not stated as uncontrolled     Unspecified sleep apnea     Dr Menchaca Hof CPAP     Past Surgical History:   Procedure Laterality Date    CARPAL TUNNEL RELEASE Right 3/22/13    Dr. Mo Oakes.     CORONARY ANGIOPLASTY WITH STENT PLACEMENT  2002,2006.2009    X 3 separately    CORONARY ARTERY BYPASS GRAFT  2/26/07    [facility] (X 3)    NECK SURGERY  2010    cervical stenoses C5-C6-C7and partial T1 laminectomy with fusion of C6-7    OTHER SURGICAL HISTORY      wound care ulcers of legs (bilaterally) with Dr Arslan Yi  age 11    VEIN SURGERY      closure of peripherator veins of legs, Dr Daylin Bill History   Problem Relation Age of Onset    Cancer Mother         lung    Diabetes Father     Heart Disease Father     High Blood Pressure Father     Diabetes Brother     Heart Disease Brother     High Blood Pressure Brother      Social History     Social History    Marital status:      Spouse name: N/A    Number of children: N/A    Years of education: region    Contusion of knee    Degeneration of lumbar intervertebral disc    Diabetic ulcer of toe associated with type 2 diabetes mellitus, with fat layer exposed ([address]    History of coronary artery bypass graft x 3    Ischemic cardiomyopathy    Osteoarthritis of knee    Osteoarthritis of carpometacarpal (CMC) joint of thumb    Sprain of knee and leg    Sprain of shoulder and upper arm    Presence of coronary angioplasty implant and graft    Venous insufficiency of both lower extremities       Review of Systems   Constitutional: Negative for chills and fever. HENT: Negative for congestion, ear pain, rhinorrhea and sore throat. Respiratory: Negative for cough, shortness of breath and wheezing. Cardiovascular: Negative for chest pain, palpitations and leg swelling. Gastrointestinal: Negative for abdominal pain, blood in stool, constipation, diarrhea, nausea and vomiting. Genitourinary: Negative for dysuria, frequency, hematuria and urgency. Skin: Negative for rash. Note following with podiatry as discussed. Neurological: Negative for dizziness, weakness, numbness and headaches. Objective:       Physical Exam   Constitutional: He is oriented to person, place, and time. He appears well-developed and well-nourished. No distress. /69   Pulse 63   Temp 97.8 °F (36.6 °C) (Oral)   Resp 16   Ht 6' (1.829 m)   Wt (!) 309 lb (140.2 kg)   SpO2 97%   BMI 41.91 kg/m²      HENT:   Head: Normocephalic. Neck: No JVD present. Cardiovascular: Normal rate, regular rhythm, normal heart sounds and intact distal pulses. No murmur heard. Pulmonary/Chest: Effort normal and breath sounds normal. No respiratory distress. He has no wheezes. He has no rales. Abdominal: Soft. Bowel sounds are normal. He exhibits no distension and no mass. There is no tenderness. Musculoskeletal: He exhibits no edema (neg Homans').    Neurological: He is alert and oriented to person, place, and time. He exhibits normal muscle tone. Skin: No rash noted. Psychiatric:   Overall stable. Vitals reviewed. Assessment:      Diagnosis Orders   1. Uncontrolled type 2 diabetes mellitus without complication, without long-term current use of insulin (HCC)     2. Obesity, Class III, BMI 40-49.9 (morbid obesity) ([facility])  phentermine 37.5 MG capsule       Plan:     Case thoroughly discussed with patient and proposed management and DDg. Patient Instructions   Continue Diet low salt, high fiber, avoiding concentrated sweets, diabetic, wt loss type. Continue fluids (water based) regularly. Continue activity and exercise as tolerated. Continue present medications as ordered. Note refill of the phentermine. Keep follow up with the specialists. Patient Education        Learning About Diabetes Food Guidelines  Your Care Instructions    Meal planning is important to manage diabetes. It helps keep your blood sugar at a target level (which you set with your doctor). You don't have to eat special foods. You can eat what your family eats, including sweets once in a while. But you do have to pay attention to how often you eat and how much you eat of certain foods. You may want to work with a dietitian or a certified diabetes educator (CDE) to help you plan meals and snacks. A dietitian or CDE can also help you lose weight if that is one of your goals. What should you know about eating carbs? Managing the amount of carbohydrate (carbs) you eat is an important part of healthy meals when you have diabetes. Carbohydrate is found in many foods. · Learn which foods have carbs. And learn the amounts of carbs in different foods. ¨ Bread, cereal, pasta, and rice have about 15 grams of carbs in a serving. A serving is 1 slice of bread (1 ounce), ½ cup of cooked cereal, or 1/3 cup of cooked pasta or rice. ¨ Fruits have 15 grams of carbs in a serving.  A serving is 1 small fresh fruit, such as an apple or

## 2018-07-09 NOTE — PROGRESS NOTES
Chronic Disease Visit Information    BP Readings from Last 3 Encounters:   06/04/18 136/77   05/07/18 119/72   01/08/18 (!) 101/56          Hemoglobin A1C (%)   Date Value   05/12/2018 10.1 (H)   02/02/2018 7.5 (H)   10/14/2017 8.1 (H)     Microalb/Crt. Ratio (mcg/mg creat)   Date Value   10/14/2017 14     LDL Cholesterol (mg/dL)   Date Value   05/12/2018 46     HDL (mg/dL)   Date Value   05/12/2018 34 (L)     BUN (mg/dL)   Date Value   05/12/2018 20     CREATININE (mg/dL)   Date Value   05/12/2018 0.94     Glucose (mg/dL)   Date Value   05/12/2018 215 (H)            Have you changed or started any medications since your last visit including any over-the-counter medicines, vitamins, or herbal medicines? no   Are you having any side effects from any of your medications? -  no  Have you stopped taking any of your medications? Is so, why? -  no    Have you seen any other physician or provider since your last visit? Yes - Records Obtained Dr Krzysztof Bundy, podiatrist and wound care and Dr Sylvain Meyer  Have you had any other diagnostic tests since your last visit? No  Have you been seen in the emergency room and/or had an admission to a hospital since we last saw you? No  Have you had your annual diabetic retinal (eye) exam? No  Have you had your routine dental cleaning in the past 6 months? no    Have you activated your [employer] account? If not, what are your barriers?  No:     Patient Care Team:  Kanwal Cotton DO as PCP - General  Tiffany Chang MD (Anesthesiology)  Karl Harvey MD as Consulting Physician (Pulmonology)  Prince Ladd MD as Consulting Physician (Cardiology)  Emile Barraza MD as Surgeon (Neurosurgery)         Medical History Review  Past Medical, Family, and Social History reviewed and does not contribute to the patient presenting condition    Health Maintenance   Topic Date Due    Pneumococcal med risk (1 of 1 - PPSV23) 09/25/1979    Shingles Vaccine (1 of 2 - 2 Dose Series) 09/25/2010    Colon cancer screen colonoscopy  09/25/2010    Diabetic retinal exam  09/28/2016    A1C test (Diabetic or Prediabetic)  08/12/2018    Flu vaccine (1) 09/01/2018    Diabetic microalbuminuria test  10/14/2018    Lipid screen  05/12/2019    Potassium monitoring  05/12/2019    Creatinine monitoring  05/12/2019    Diabetic foot exam  06/05/2019    DTaP/Tdap/Td vaccine (3 - Td) 04/02/2027    Hepatitis C screen  Completed    HIV screen  Completed

## 2018-07-09 NOTE — PATIENT INSTRUCTIONS
Continue Diet low salt, high fiber, avoiding concentrated sweets, diabetic, wt loss type. Continue fluids (water based) regularly. Continue activity and exercise as tolerated. Continue present medications as ordered. Note refill of the phentermine. Keep follow up with the specialists. Patient Education        Learning About Diabetes Food Guidelines  Your Care Instructions    Meal planning is important to manage diabetes. It helps keep your blood sugar at a target level (which you set with your doctor). You don't have to eat special foods. You can eat what your family eats, including sweets once in a while. But you do have to pay attention to how often you eat and how much you eat of certain foods. You may want to work with a dietitian or a certified diabetes educator (CDE) to help you plan meals and snacks. A dietitian or CDE can also help you lose weight if that is one of your goals. What should you know about eating carbs? Managing the amount of carbohydrate (carbs) you eat is an important part of healthy meals when you have diabetes. Carbohydrate is found in many foods. · Learn which foods have carbs. And learn the amounts of carbs in different foods. ¨ Bread, cereal, pasta, and rice have about 15 grams of carbs in a serving. A serving is 1 slice of bread (1 ounce), ½ cup of cooked cereal, or 1/3 cup of cooked pasta or rice. ¨ Fruits have 15 grams of carbs in a serving. A serving is 1 small fresh fruit, such as an apple or orange; ½ of a banana; ½ cup of cooked or canned fruit; ½ cup of fruit juice; 1 cup of melon or raspberries; or 2 tablespoons of dried fruit. ¨ Milk and no-sugar-added yogurt have 15 grams of carbs in a serving. A serving is 1 cup of milk or 2/3 cup of no-sugar-added yogurt. ¨ Starchy vegetables have 15 grams of carbs in a serving.  A serving is ½ cup of mashed potatoes or sweet potato; 1 cup winter squash; ½ of a small baked potato; ½ cup of cooked beans; or ½ cup else should you know? · Limit saturated fat, such as the fat from meat and dairy products. This is a healthy choice because people who have diabetes are at higher risk of heart disease. So choose lean cuts of meat and nonfat or low-fat dairy products. Use olive or canola oil instead of butter or shortening when cooking. · Don't skip meals. Your blood sugar may drop too low if you skip meals and take insulin or certain medicines for diabetes. · Check with your doctor before you drink alcohol. Alcohol can cause your blood sugar to drop too low. Alcohol can also cause a bad reaction if you take certain diabetes medicines. Follow-up care is a key part of your treatment and safety. Be sure to make and go to all appointments, and call your doctor if you are having problems. It's also a good idea to know your test results and keep a list of the medicines you take. Where can you learn more? Go to https://DirectlypeXyoewGreen Plug.Feniks. org and sign in to your [employer] Pinpoint account. Enter K487 in the [employer] box to learn more about \"Learning About Diabetes Food Guidelines. \"     If you do not have an account, please click on the \"Sign Up Now\" link. Current as of: December 7, 2017  Content Version: 11.6  © 6802-9893 [employer]. Care instructions adapted under license by [facility] ([facility]). If you have questions about a medical condition or this instruction, always ask your healthcare professional. [place] [address] any warranty or liability for your use of this information.

## 2018-07-10 ENCOUNTER — HOSPITAL ENCOUNTER (OUTPATIENT)
Age: 58
Setting detail: SPECIMEN
Discharge: HOME | End: 2018-07-10
Payer: COMMERCIAL

## 2018-07-10 ENCOUNTER — OFFICE VISIT (OUTPATIENT)
Dept: DERMATOLOGY | Age: 58
End: 2018-07-10
Payer: COMMERCIAL

## 2018-07-10 VITALS
DIASTOLIC BLOOD PRESSURE: 76 MMHG | WEIGHT: 307 LBS | OXYGEN SATURATION: 95 % | BODY MASS INDEX: 41.58 KG/M2 | HEIGHT: 72 IN | SYSTOLIC BLOOD PRESSURE: 121 MMHG | HEART RATE: 96 BPM

## 2018-07-10 DIAGNOSIS — R21 RASH: Primary | ICD-10-CM

## 2018-07-10 PROCEDURE — 11100 PR BIOPSY OF SKIN LESION: CPT | Performed by: DERMATOLOGY

## 2018-07-10 PROCEDURE — 3017F COLORECTAL CA SCREEN DOC REV: CPT | Performed by: DERMATOLOGY

## 2018-07-10 PROCEDURE — G8417 CALC BMI ABV UP PARAM F/U: HCPCS | Performed by: DERMATOLOGY

## 2018-07-10 PROCEDURE — 1036F TOBACCO NON-USER: CPT | Performed by: DERMATOLOGY

## 2018-07-10 PROCEDURE — G8427 DOCREV CUR MEDS BY ELIG CLIN: HCPCS | Performed by: DERMATOLOGY

## 2018-07-10 PROCEDURE — G8598 ASA/ANTIPLAT THER USED: HCPCS | Performed by: DERMATOLOGY

## 2018-07-10 PROCEDURE — 99202 OFFICE O/P NEW SF 15 MIN: CPT | Performed by: DERMATOLOGY

## 2018-07-10 RX ORDER — CLOBETASOL PROPIONATE 0.5 MG/G
CREAM TOPICAL
Qty: 15 G | Refills: 0 | Status: SHIPPED | OUTPATIENT
Start: 2018-07-10 | End: 2018-12-24 | Stop reason: SDUPTHER

## 2018-07-10 RX ORDER — LIDOCAINE HYDROCHLORIDE AND EPINEPHRINE 10; 10 MG/ML; UG/ML
0.5 INJECTION, SOLUTION INFILTRATION; PERINEURAL ONCE
Status: COMPLETED | OUTPATIENT
Start: 2018-07-10 | End: 2018-07-10

## 2018-07-10 RX ADMIN — LIDOCAINE HYDROCHLORIDE AND EPINEPHRINE 0.5 ML: 10; 10 INJECTION, SOLUTION INFILTRATION; PERINEURAL at 08:37

## 2018-07-10 NOTE — PROGRESS NOTES
Dermatology Patient Note  [facility] DERMATOLOGY  [address]  [address] [place] [address]  Dept: [phone]  Dept Fax: [phone]      VISIT DATE: 7/10/2018   REFERRING PROVIDER: DO Luis Liu Ours is a 62 y.o. male  who presents today in the office for:    New Patient (dermatitis under left breast x 7-8 months. Has tried doxy, diflucan and nystatin powder without relief)      HISTORY OF PRESENT ILLNESS:  HPI Rash:    Savage Kc was seen today for initial evaluation of Rash    Duration of Rash: 7 months    Course: Worsening    Areas of Involvement: left breast    Associated Symptoms: Itching    Exacerbating Factors: unknown     Current Medications for this Rash:  None     Previously Tried Medications: nystatin, diflucan, doxycycline    Problem Specific Family Hx: No Contributory Family History      CURRENT MEDICATIONS:   Current Outpatient Prescriptions   Medication Sig Dispense Refill    clobetasol (TEMOVATE) 0.05 % cream Apply topically 2 times daily under left breast x 2 weeks 15 g 0    phentermine 37.5 MG capsule Take 1 capsule by mouth every morning for 30 days. . 30 capsule 0    glimepiride (AMARYL) 4 MG tablet TAKE ONE TABLET BY MOUTH EVERY MORNING BEFORE BREAKFAST 30 tablet 3    gabapentin (NEURONTIN) 800 MG tablet TAKE ONE TABLET BY MOUTH THREE TIMES A DAY. MAY CAUSE DROWSINESS. 90 tablet 2    traZODone (DESYREL) 150 MG tablet TAKE ONE TABLET BY MOUTH ONE TIME A DAY AT BEDTIME 30 tablet 5    HYDROcodone-acetaminophen (NORCO)  MG per tablet Take 1 tablet by mouth every 6 hours as needed for Pain. Carlyle Hathaway linagliptin (TRADJENTA) 5 MG tablet Take 1 tablet by mouth daily 30 tablet 5    omeprazole (PRILOSEC) 20 MG delayed release capsule Take 1 capsule by mouth every morning (before breakfast) 30 capsule 2    hydrochlorothiazide (MICROZIDE) 12.5 MG capsule TAKE 1 CAPSULE BY MOUTH DAILY 30 capsule 5    atorvastatin (LIPITOR) 40 MG tablet TAKE 1 TABLET

## 2018-07-10 NOTE — LETTER
[facility]) Dermatology   [address] [place] [address]  [address] [place] [address]  Phone: [phone]  Fax: [phone]     Alma Delia Chan MD       July 10, 2018     Anny Palma Do  [address] [place] [address]     Patient: Thomas Spence   MR Number: [id number]   YOB: 1960   Date of Visit: 7/10/2018     Dear Dr. Ramon [place]: Thank you for the request for consultation for Mr. Aisha Robison to me for evaluation. Below are the relevant portions of my assessment and plan of care. 1. Rash  DDX includes inverse psoriasis and atypical fixed drug (started phentiramine around the same time  - Clobetasol cream BID x 2 weeks  - Punch Biopsy: After cleaning with alcohol the rash was anesthetized with (LMX AND/OR 1% lidocaine) and a (4 mm punch was performed. Hemostasis was achieved with suture closure of the defect with 4-0 Ethilon and Vaseline and a bandage were applied.  - Surgical Pathology; Future  - IL BIOPSY OF SKIN LESION       Patient Instructions   1. Apply clobetasol to area of rash under the left breast twice daily  2. Follow up in the office in 4 weeks    BIOPSY WOUND CARE    A biopsy is where a small piece of skin tissue is removed and examined by a pathologist.  When a biopsy is done, there is a small wound site that requires proper care to prevent infection and scarring. Some biopsies require sutures and their removal.    How to Care for Biopsy Wound    A.  Leave band-aid or dressing on for 24 hours. B. Wash two times a day with soap and water. C.  Let the wound air dry, then apply Vaseline ointment and cover with a Band-Aid       unless otherwise instructed by your provider. D. If there is slight discomfort, you may give acetaminophen or ibuprofen. When To Call the Doctor    Call the Dermatology Clinic or your doctor if any of the following occur:    A. Redness and swelling  B. Tenderness and warm to touch  C.  Drainage from wound  D.   Fever Biopsy Results    Biopsy results are usually available in 1-2 weeks. We provide biopsy results in letters for begin results or we will call for any concerning results. If you have not heard from our staff please call the office within 2 weeks. Please call our office with any concerns at [phone]. If you have questions, please do not hesitate to call me. I look forward to following Kuldeep Escalante along with you.     Sincerely,        Ramon Lutz MD

## 2018-07-13 LAB — DERMATOLOGY PATHOLOGY REPORT: NORMAL

## 2018-07-13 RX ORDER — GABAPENTIN 800 MG/1
TABLET ORAL
Qty: 90 TABLET | Refills: 0 | OUTPATIENT
Start: 2018-07-13 | End: 2018-08-12

## 2018-07-16 ENCOUNTER — TELEPHONE (OUTPATIENT)
Dept: DERMATOLOGY | Age: 58
End: 2018-07-16

## 2018-07-21 DIAGNOSIS — R10.10 UPPER ABDOMINAL PAIN: ICD-10-CM

## 2018-07-21 DIAGNOSIS — M15.9 PRIMARY OSTEOARTHRITIS INVOLVING MULTIPLE JOINTS: ICD-10-CM

## 2018-07-23 RX ORDER — MELOXICAM 15 MG/1
TABLET ORAL
Qty: 30 TABLET | Refills: 5 | Status: SHIPPED | OUTPATIENT
Start: 2018-07-23 | End: 2018-12-26 | Stop reason: SDUPTHER

## 2018-07-23 RX ORDER — OMEPRAZOLE 20 MG/1
CAPSULE, DELAYED RELEASE ORAL
Qty: 30 CAPSULE | Refills: 5 | Status: SHIPPED | OUTPATIENT
Start: 2018-07-23 | End: 2018-12-26 | Stop reason: SDUPTHER

## 2018-07-23 RX ORDER — LISINOPRIL 10 MG/1
TABLET ORAL
Qty: 30 TABLET | Refills: 5 | Status: SHIPPED | OUTPATIENT
Start: 2018-07-23 | End: 2018-12-26 | Stop reason: SDUPTHER

## 2018-08-06 RX ORDER — LISINOPRIL 10 MG/1
TABLET ORAL
Qty: 30 TABLET | Refills: 0 | OUTPATIENT
Start: 2018-08-06

## 2018-08-13 ENCOUNTER — OFFICE VISIT (OUTPATIENT)
Dept: PULMONOLOGY | Age: 58
End: 2018-08-13
Payer: COMMERCIAL

## 2018-08-13 VITALS
DIASTOLIC BLOOD PRESSURE: 72 MMHG | HEART RATE: 73 BPM | HEIGHT: 72 IN | WEIGHT: 300 LBS | RESPIRATION RATE: 15 BRPM | OXYGEN SATURATION: 97 % | BODY MASS INDEX: 40.63 KG/M2 | TEMPERATURE: 97.2 F | SYSTOLIC BLOOD PRESSURE: 116 MMHG

## 2018-08-13 DIAGNOSIS — E66.01 CLASS 2 SEVERE OBESITY DUE TO EXCESS CALORIES WITH SERIOUS COMORBIDITY AND BODY MASS INDEX (BMI) OF 38.0 TO 38.9 IN ADULT (HCC): Primary | ICD-10-CM

## 2018-08-13 DIAGNOSIS — I25.700 CORONARY ARTERY DISEASE INVOLVING CORONARY BYPASS GRAFT OF NATIVE HEART WITH UNSTABLE ANGINA PECTORIS (HCC): ICD-10-CM

## 2018-08-13 DIAGNOSIS — G47.33 OBSTRUCTIVE SLEEP APNEA SYNDROME: ICD-10-CM

## 2018-08-13 DIAGNOSIS — E11.65 TYPE 2 DIABETES MELLITUS WITH HYPERGLYCEMIA, UNSPECIFIED WHETHER LONG TERM INSULIN USE (HCC): ICD-10-CM

## 2018-08-13 DIAGNOSIS — I10 ESSENTIAL HYPERTENSION: ICD-10-CM

## 2018-08-13 PROCEDURE — 3017F COLORECTAL CA SCREEN DOC REV: CPT | Performed by: INTERNAL MEDICINE

## 2018-08-13 PROCEDURE — 99213 OFFICE O/P EST LOW 20 MIN: CPT | Performed by: INTERNAL MEDICINE

## 2018-08-13 PROCEDURE — G8427 DOCREV CUR MEDS BY ELIG CLIN: HCPCS | Performed by: INTERNAL MEDICINE

## 2018-08-13 PROCEDURE — G8417 CALC BMI ABV UP PARAM F/U: HCPCS | Performed by: INTERNAL MEDICINE

## 2018-08-13 PROCEDURE — G8598 ASA/ANTIPLAT THER USED: HCPCS | Performed by: INTERNAL MEDICINE

## 2018-08-13 PROCEDURE — 1036F TOBACCO NON-USER: CPT | Performed by: INTERNAL MEDICINE

## 2018-08-13 PROCEDURE — 2022F DILAT RTA XM EVC RTNOPTHY: CPT | Performed by: INTERNAL MEDICINE

## 2018-08-13 PROCEDURE — 3046F HEMOGLOBIN A1C LEVEL >9.0%: CPT | Performed by: INTERNAL MEDICINE

## 2018-08-13 ASSESSMENT — SLEEP AND FATIGUE QUESTIONNAIRES
HOW LIKELY ARE YOU TO NOD OFF OR FALL ASLEEP WHILE SITTING INACTIVE IN A PUBLIC PLACE: 1
HOW LIKELY ARE YOU TO NOD OFF OR FALL ASLEEP WHILE SITTING AND TALKING TO SOMEONE: 0
HOW LIKELY ARE YOU TO NOD OFF OR FALL ASLEEP WHILE SITTING QUIETLY AFTER LUNCH WITHOUT ALCOHOL: 0
HOW LIKELY ARE YOU TO NOD OFF OR FALL ASLEEP WHILE WATCHING TV: 0
HOW LIKELY ARE YOU TO NOD OFF OR FALL ASLEEP WHEN YOU ARE A PASSENGER IN A CAR FOR AN HOUR WITHOUT A BREAK: 0
ESS TOTAL SCORE: 4
HOW LIKELY ARE YOU TO NOD OFF OR FALL ASLEEP WHILE SITTING AND READING: 0
HOW LIKELY ARE YOU TO NOD OFF OR FALL ASLEEP IN A CAR, WHILE STOPPED FOR A FEW MINUTES IN TRAFFIC: 0
HOW LIKELY ARE YOU TO NOD OFF OR FALL ASLEEP WHILE LYING DOWN TO REST IN THE AFTERNOON WHEN CIRCUMSTANCES PERMIT: 3

## 2018-08-13 NOTE — PROGRESS NOTES
Leyda Duque  8/13/2018    Chief Complaint   Patient presents with    Sleep Apnea    Follow-up        Leyda Duque  presented for follow up for Follow-up of obstructive sleep apnea syndrome that is being treated with CPAP. He uses a CPAP regularly every night. CPAP was very effective in relieving the apnea. He uses a humidification along with the CPAP. Sleep is well consolidated. He doesn't feel sleepy, tired 50 during the daytime. No morning headache. Denies any symptoms of sinusitis. No pedal edema no thromboembolic process. He has no symptoms of any parasomnias. No symptoms suggestive of cataplexy, sleep paralysis or any hallucination. Unfortunately continued to be morbidly obese, although he has lost some weight. Is also known to have coronary artery disease. Denies any angina. No symptoms of heart failure. Has any proximal nocturnal dyspnea or nocturnal dyspnea. He also has    He also known to have hypertension that is under good control as the diabetes. his hemoglobin a1c was slightly elevated last time, but it has come down.    he does not have any symptoms suggestive of sinusitis, postnasal discharge. also esophageal reflux disease. he does osteoarthritis in taking pain control medication. he osteoarthritis is not interfering with his nighttime sleep. Sleep Medicine 8/13/2018 10/10/2016   Sitting and reading 0 0   Watching TV 0 0   Sitting, inactive in a public place (e.g. a theatre or a meeting) 1 0   As a passenger in a car for an hour without a break 0 1   Lying down to rest in the afternoon when circumstances permit 3 3   Sitting and talking to someone 0 0   Sitting quietly after a lunch without alcohol 0 0   In a car, while stopped for a few minutes in traffic 0 0   Total score 4 4   Given to the patient in the office revealed a score of 4, indicating lack of daytime sleepiness.     And [place] Sleepiness Scale            Review of Systems -as assistant were conductive for all other system including upper and lower extremities and no additional information was obtained. General ROS: negative for - chills, fatigue, fever or weight loss  ENT ROS: negative for - headaches, oral lesions or sore throat  Cardiovascular ROS: no chest pain , orthopnea or pnd   Gastrointestinal ROS: no abdominal pain, change in bowel habits, or black or bloody stools  Skin - no rash   Neuro - no blurry vision , no loc . No focal weakness   msk - no jt tenderness or swelling    Vascular - no claudication , rest completed and negative   Lymphatic - complete and negative   Hematology - oncology - complete and negative   Allergy immunology - complete and negative    no burning or hematuria       LUNG CANCER SCREENING     1. CRITERIA MET    []     CT ORDERED  []      2. CRITERIA NOT MET   [x]      3. REFUSED                    []        REASON CRITERIA NOT MET     1. SMOKING LESS THAN 30 PY  []      2. AGE LESS THAN 55 or GREATER 77 YEARS  []      3. QUIT SMOKING 15 YEARS OR GREATER   [x]      4. RECENT CT WITH IN 11 MONTHS    []      5. LIFE EXPECTANCY < 5 YEARS   []      6.  SIGNS  AND SYMPTOMS OF LUNG CANCER   []           Immunization   Immunization History   Administered Date(s) Administered    Influenza Virus Vaccine 11/22/2006    Td 05/18/2001    Tdap (Boostrix, Adacel) 02/22/2013, 12/06/2014, 04/02/2017        Pneumococcal Vaccine     [x] Up to date    [] Indicated   [] Refused  [] Contraindicated       Influenza Vaccine   [x] Up to date    [] Indicated   [] Refused  [] Contraindicated       PAST MEDICAL HISTORY:         Diagnosis Date    Acquired trigger finger 6/5/2018    CAD (coronary artery disease)     [address]    Cervical spondylosis     CHF (congestive heart failure) ([address]     Contusion of elbow 6/5/2018    Contusion of knee 6/5/2018    Contusion of shoulder region 6/5/2018    Diabetic foot ulcer with osteomyelitis ([address] 1/9/2018    Diabetic ulcer of toe associated with type 2 diabetes mellitus, with fat layer exposed ([address] 1/31/2018    Hypercholesterolemia 12/5/2017    Hyperlipemia, mixed     Hypertension     Ischemic cardiomyopathy 5/7/2018    Non-pressure chronic ulcer of left lower leg with fat layer exposed ([address] 6/9/2014    Obesity     Obesity, Class III, BMI 40-49.9 (morbid obesity) ([address] 4/6/2015    Osteoarthritis of carpometacarpal (CMC) joint of thumb 6/5/2018    Osteoarthritis of knee 6/5/2018    Peripheral vascular disease ([address]     with venous stasis and ulcerations. Dr Yoav Morris Presence of coronary angioplasty implant and graft 9/11/2009    Sleep apnea     Dr Britney Rosen of knee and leg 6/5/2018    Sprain of shoulder and upper arm 6/5/2018    Type II or unspecified type diabetes mellitus without mention of complication, not stated as uncontrolled     Ulcer of left foot, with fat layer exposed ([address] 1/9/2018    Unspecified sleep apnea     Dr Avery Qiu CPAP    Venous insufficiency of both lower extremities 1/31/2018       Family History:   Family History   Problem Relation Age of Onset    Cancer Mother         lung    Diabetes Father     Heart Disease Father     High Blood Pressure Father     Diabetes Brother     Heart Disease Brother     High Blood Pressure Brother        SURGICAL HISTORY:   Past Surgical History:   Procedure Laterality Date    CARPAL TUNNEL RELEASE Right 3/22/13    Dr. Jamila Florez.  CORONARY ANGIOPLASTY WITH STENT PLACEMENT  2002,2006.2009    X 3 separately    CORONARY ARTERY BYPASS GRAFT  2/26/07     TOMA's, Dr Sheri Messina (X 3)    NECK SURGERY  2010    cervical stenoses C5-C6-C7and partial T1 laminectomy with fusion of C6-7    OTHER SURGICAL HISTORY      wound care ulcers of legs (bilaterally) with Dr Cara Lee  age 11    VEIN SURGERY      closure of peripherator veins of legs, Dr Mona Peña                Not in a hospital admission.   Allergies   Allergen Reactions    Pcn [Penicillins] DAILY PO) Take by mouth   Yes Historical Provider, MD   nitroGLYCERIN (NITROSTAT) 0.4 MG SL tablet Place 0.4 mg under the tongue   Yes Historical Provider, MD   aspirin 325 MG tablet Take 325 mg by mouth daily. Yes Historical Provider, MD   fluticasone [facility]) 50 MCG/ACT nasal spray 1 spray by Nasal route daily 6/12/17 7/10/18  Shin Avina MD         Physical Exam  General Appearance:    Alert, cooperative, no distress, appears stated age, Overweight, obese. No respiratory distress. He is seeing looks like his age. Head:    Normocephalic, without obvious abnormality, atraumatic   Eye examination revealed no sign and jaundice. No Alesia's s. No Sinus Tenderness Is Noted     Does reveal no polyps    Throat examination unremarkable.          :    Neck:   Supple, symmetrical, trachea midline, no adenopathy;     thyroid:  no enlargement/tenderness/nodules; no carotid    bruit or JVD. Short fat neck    Back:     Symmetric, no curvature, ROM normal, no CVA tenderness   Lungs:       Air entry on both sides because of obesity. No rales or rhonchi notable upper friction rub audible. Percussion note is impaired due to obesity    Chest Wall:    No tenderness or deformity      Heart:    Regular rate and rhythm, S1 and S2 normal, no murmur, rub        or gallop no rvh                           Abdomen:                                                 Pulses:                                            Lymph nodes:                    Neurologic:                  Soft, non-tender, bowel sounds active all four quadrants,     no masses, no organomegaly         2+ and symmetric all extremities            Cervical, supraclavicular not enlarged or matted or tender      CNII-XII intact, normal strength 5/5 .   Sensation grossly normal  and reflexes normal 2+  throughout     Clubbing No  Lower ext edema No1+   [] , 2 +  [] , 3+   []  Upper ext edema No       Musculoskeletal - no joint swelling or tenderness or synovitis /72 (Site: Left Arm, Position: Sitting, Cuff Size: Medium Adult)   Pulse 73   Temp 97.2 °F (36.2 °C) (Oral)   Resp 15   Ht 6' (1.829 m)   Wt 300 lb (136.1 kg)   SpO2 97%   BMI 40.69 kg/m²             Assessment   Diagnosis Orders   1. Class 2 severe obesity due to excess calories with serious comorbidity and body mass index (BMI) of 38.0 to 38.9 in adult [facility])     2. Obstructive sleep apnea syndrome  DME Order for (Specify) as OP   3. Essential hypertension     4. Type 2 diabetes mellitus with hyperglycemia, unspecified whether long term insulin use ([facility]     5. Coronary artery disease involving coronary bypass graft of native heart with unstable angina pectoris ([facility]         Plan:  1. Continue current iCurrently use of CPAP, which is been very effective in relieving the apnea improving his daytime symptoms. He doesn't require supplies for the CPAP machine. A prescription was written for the CPAP supplies. 2.   3. Had questions regarding cleaning her machine. They were discussed. 4.   5. He was encouraged to continue his effort to lose weight. 6.   7. His cardiovascular status is stable. Denies any angina. No evidence of any heart failure. 8.   9. His diabetes is under good control. Continue the present therapy. 10.   11. Similarly, his blood pressure is also under good control. He'll continue the present antihypertensive regimen. 12.   13. Patient advised to get influenza vaccine. 14.   15.       Electronically signed by Carolyn Emerson MD on   8/13/18 at 12:26 PM  Please note that this chart was generated using voice recognition Dragon dictation software. Although every effort was made to ensure the accuracy of this automated transcription, some errors in transcription may have occurred.

## 2018-08-18 DIAGNOSIS — M15.9 PRIMARY OSTEOARTHRITIS INVOLVING MULTIPLE JOINTS: ICD-10-CM

## 2018-08-20 RX ORDER — MELOXICAM 15 MG/1
TABLET ORAL
Qty: 30 TABLET | Refills: 0 | OUTPATIENT
Start: 2018-08-20

## 2018-09-10 ENCOUNTER — OFFICE VISIT (OUTPATIENT)
Dept: FAMILY MEDICINE CLINIC | Age: 58
End: 2018-09-10
Payer: COMMERCIAL

## 2018-09-10 ENCOUNTER — HOSPITAL ENCOUNTER (OUTPATIENT)
Age: 58
Discharge: HOME | End: 2018-09-10
Payer: COMMERCIAL

## 2018-09-10 ENCOUNTER — OFFICE VISIT (OUTPATIENT)
Dept: PODIATRY | Age: 58
End: 2018-09-10
Payer: COMMERCIAL

## 2018-09-10 VITALS — BODY MASS INDEX: 42.66 KG/M2 | HEIGHT: 72 IN | WEIGHT: 315 LBS

## 2018-09-10 VITALS
HEART RATE: 64 BPM | RESPIRATION RATE: 14 BRPM | WEIGHT: 304 LBS | BODY MASS INDEX: 41.17 KG/M2 | SYSTOLIC BLOOD PRESSURE: 111 MMHG | DIASTOLIC BLOOD PRESSURE: 71 MMHG | HEIGHT: 72 IN | OXYGEN SATURATION: 98 % | TEMPERATURE: 97.3 F

## 2018-09-10 DIAGNOSIS — E78.2 HYPERLIPEMIA, MIXED: ICD-10-CM

## 2018-09-10 DIAGNOSIS — I10 ESSENTIAL HYPERTENSION: ICD-10-CM

## 2018-09-10 DIAGNOSIS — Z00.00 VISIT FOR WELL MAN HEALTH CHECK: ICD-10-CM

## 2018-09-10 DIAGNOSIS — Z95.1 HISTORY OF CORONARY ARTERY BYPASS GRAFT X 3: ICD-10-CM

## 2018-09-10 DIAGNOSIS — Z12.5 SCREENING FOR PROSTATE CANCER: ICD-10-CM

## 2018-09-10 DIAGNOSIS — M79.672 PAIN IN BOTH FEET: ICD-10-CM

## 2018-09-10 DIAGNOSIS — B35.1 DERMATOPHYTOSIS OF NAIL: ICD-10-CM

## 2018-09-10 DIAGNOSIS — M79.671 PAIN IN BOTH FEET: ICD-10-CM

## 2018-09-10 DIAGNOSIS — Z12.11 SCREENING FOR COLON CANCER: ICD-10-CM

## 2018-09-10 DIAGNOSIS — Z00.00 VISIT FOR WELL MAN HEALTH CHECK: Primary | ICD-10-CM

## 2018-09-10 DIAGNOSIS — E11.51 TYPE II DIABETES MELLITUS WITH PERIPHERAL CIRCULATORY DISORDER (HCC): Primary | ICD-10-CM

## 2018-09-10 DIAGNOSIS — M20.42 HAMMERTOE OF LEFT FOOT: ICD-10-CM

## 2018-09-10 DIAGNOSIS — M15.9 PRIMARY OSTEOARTHRITIS INVOLVING MULTIPLE JOINTS: ICD-10-CM

## 2018-09-10 DIAGNOSIS — I73.9 PERIPHERAL VASCULAR DISEASE, UNSPECIFIED (HCC): ICD-10-CM

## 2018-09-10 LAB
ABSOLUTE EOS #: 0.2 K/UL (ref 0–0.4)
ABSOLUTE IMMATURE GRANULOCYTE: ABNORMAL K/UL (ref 0–0.3)
ABSOLUTE LYMPH #: 2.7 K/UL (ref 1–4.8)
ABSOLUTE MONO #: 0.7 K/UL (ref 0.2–0.8)
ALBUMIN SERPL-MCNC: 4.3 G/DL (ref 3.5–5.2)
ALBUMIN/GLOBULIN RATIO: ABNORMAL (ref 1–2.5)
ALP BLD-CCNC: 73 U/L (ref 40–129)
ALT SERPL-CCNC: 28 U/L (ref 5–41)
ANION GAP SERPL CALCULATED.3IONS-SCNC: 14 MMOL/L (ref 9–17)
AST SERPL-CCNC: 21 U/L
BASOPHILS # BLD: 0 % (ref 0–2)
BASOPHILS ABSOLUTE: 0 K/UL (ref 0–0.2)
BILIRUB SERPL-MCNC: 0.31 MG/DL (ref 0.3–1.2)
BUN BLDV-MCNC: 17 MG/DL (ref 6–20)
BUN/CREAT BLD: 22 (ref 9–20)
CALCIUM SERPL-MCNC: 9.6 MG/DL (ref 8.6–10.4)
CHLORIDE BLD-SCNC: 100 MMOL/L (ref 98–107)
CHOLESTEROL/HDL RATIO: 3.4
CHOLESTEROL: 134 MG/DL
CO2: 25 MMOL/L (ref 20–31)
CREAT SERPL-MCNC: 0.79 MG/DL (ref 0.7–1.2)
CREATININE URINE: 66.5 MG/DL (ref 39–259)
DIFFERENTIAL TYPE: ABNORMAL
EOSINOPHILS RELATIVE PERCENT: 3 % (ref 1–4)
ESTIMATED AVERAGE GLUCOSE: 180 MG/DL
GFR AFRICAN AMERICAN: >60 ML/MIN
GFR NON-AFRICAN AMERICAN: >60 ML/MIN
GFR SERPL CREATININE-BSD FRML MDRD: ABNORMAL ML/MIN/{1.73_M2}
GFR SERPL CREATININE-BSD FRML MDRD: ABNORMAL ML/MIN/{1.73_M2}
GLUCOSE BLD-MCNC: 145 MG/DL (ref 70–99)
HBA1C MFR BLD: 7.9 % (ref 4–6)
HCT VFR BLD CALC: 39.2 % (ref 41–53)
HDLC SERPL-MCNC: 39 MG/DL
HEMOGLOBIN: 12.7 G/DL (ref 13.5–17.5)
IMMATURE GRANULOCYTES: ABNORMAL %
LDL CHOLESTEROL: 66 MG/DL (ref 0–130)
LYMPHOCYTES # BLD: 33 % (ref 24–44)
MCH RBC QN AUTO: 27.1 PG (ref 26–34)
MCHC RBC AUTO-ENTMCNC: 32.4 G/DL (ref 31–37)
MCV RBC AUTO: 83.8 FL (ref 80–100)
MICROALBUMIN/CREAT 24H UR: <12 MG/L
MICROALBUMIN/CREAT UR-RTO: NORMAL MCG/MG CREAT
MONOCYTES # BLD: 9 % (ref 1–7)
NRBC AUTOMATED: ABNORMAL PER 100 WBC
PDW BLD-RTO: 17.3 % (ref 11.5–14.5)
PLATELET # BLD: 274 K/UL (ref 130–400)
PLATELET ESTIMATE: ABNORMAL
PMV BLD AUTO: 7.3 FL (ref 6–12)
POTASSIUM SERPL-SCNC: 4.7 MMOL/L (ref 3.7–5.3)
PROSTATE SPECIFIC ANTIGEN: 1.17 UG/L
RBC # BLD: 4.68 M/UL (ref 4.5–5.9)
RBC # BLD: ABNORMAL 10*6/UL
SEG NEUTROPHILS: 55 % (ref 36–66)
SEGMENTED NEUTROPHILS ABSOLUTE COUNT: 4.4 K/UL (ref 1.8–7.7)
SODIUM BLD-SCNC: 139 MMOL/L (ref 135–144)
TOTAL PROTEIN: 7.4 G/DL (ref 6.4–8.3)
TRIGL SERPL-MCNC: 144 MG/DL
VITAMIN D 25-HYDROXY: 26.3 NG/ML (ref 30–100)
VLDLC SERPL CALC-MCNC: ABNORMAL MG/DL (ref 1–30)
WBC # BLD: 8.2 K/UL (ref 3.5–11)
WBC # BLD: ABNORMAL 10*3/UL

## 2018-09-10 PROCEDURE — 36415 COLL VENOUS BLD VENIPUNCTURE: CPT

## 2018-09-10 PROCEDURE — 85025 COMPLETE CBC W/AUTO DIFF WBC: CPT

## 2018-09-10 PROCEDURE — 99396 PREV VISIT EST AGE 40-64: CPT | Performed by: FAMILY MEDICINE

## 2018-09-10 PROCEDURE — 99213 OFFICE O/P EST LOW 20 MIN: CPT | Performed by: PODIATRIST

## 2018-09-10 PROCEDURE — G8427 DOCREV CUR MEDS BY ELIG CLIN: HCPCS | Performed by: PODIATRIST

## 2018-09-10 PROCEDURE — 82570 ASSAY OF URINE CREATININE: CPT

## 2018-09-10 PROCEDURE — G8598 ASA/ANTIPLAT THER USED: HCPCS | Performed by: PODIATRIST

## 2018-09-10 PROCEDURE — 11721 DEBRIDE NAIL 6 OR MORE: CPT | Performed by: PODIATRIST

## 2018-09-10 PROCEDURE — G0103 PSA SCREENING: HCPCS

## 2018-09-10 PROCEDURE — 1036F TOBACCO NON-USER: CPT | Performed by: PODIATRIST

## 2018-09-10 PROCEDURE — 3045F PR MOST RECENT HEMOGLOBIN A1C LEVEL 7.0-9.0%: CPT | Performed by: PODIATRIST

## 2018-09-10 PROCEDURE — 80061 LIPID PANEL: CPT

## 2018-09-10 PROCEDURE — 2022F DILAT RTA XM EVC RTNOPTHY: CPT | Performed by: PODIATRIST

## 2018-09-10 PROCEDURE — 82043 UR ALBUMIN QUANTITATIVE: CPT

## 2018-09-10 PROCEDURE — G8417 CALC BMI ABV UP PARAM F/U: HCPCS | Performed by: PODIATRIST

## 2018-09-10 PROCEDURE — 83036 HEMOGLOBIN GLYCOSYLATED A1C: CPT

## 2018-09-10 PROCEDURE — 80053 COMPREHEN METABOLIC PANEL: CPT

## 2018-09-10 PROCEDURE — 82306 VITAMIN D 25 HYDROXY: CPT

## 2018-09-10 PROCEDURE — 3017F COLORECTAL CA SCREEN DOC REV: CPT | Performed by: PODIATRIST

## 2018-09-10 ASSESSMENT — ENCOUNTER SYMPTOMS
EYE ITCHING: 0
NAUSEA: 0
SHORTNESS OF BREATH: 0
SINUS PRESSURE: 0
DIARRHEA: 0
COUGH: 0
TROUBLE SWALLOWING: 0
CONSTIPATION: 0
SORE THROAT: 0
ABDOMINAL PAIN: 0
VOICE CHANGE: 0
COLOR CHANGE: 0
BLOOD IN STOOL: 0
WHEEZING: 0
BACK PAIN: 1
RHINORRHEA: 0
VOMITING: 0
EYE REDNESS: 0

## 2018-09-10 NOTE — PROGRESS NOTES
Chronic Disease Visit Information    BP Readings from Last 3 Encounters:   08/13/18 116/72   07/10/18 121/76   07/09/18 111/69          Hemoglobin A1C (%)   Date Value   05/12/2018 10.1 (H)   02/02/2018 7.5 (H)   10/14/2017 8.1 (H)     Microalb/Crt. Ratio (mcg/mg creat)   Date Value   10/14/2017 14     LDL Cholesterol (mg/dL)   Date Value   05/12/2018 46     HDL (mg/dL)   Date Value   05/12/2018 34 (L)     BUN (mg/dL)   Date Value   05/12/2018 20     CREATININE (mg/dL)   Date Value   05/12/2018 0.94     Glucose (mg/dL)   Date Value   05/12/2018 215 (H)            Have you changed or started any medications since your last visit including any over-the-counter medicines, vitamins, or herbal medicines? no   Are you having any side effects from any of your medications? -  no  Have you stopped taking any of your medications? Is so, why? -  no    Have you seen any other physician or provider since your last visit? Yes - Records Obtained  Dr Aline Kelly you had any other diagnostic tests since your last visit? No  Have you been seen in the emergency room and/or had an admission to a hospital since we last saw you? No  Have you had your annual diabetic retinal (eye) exam? No  Have you had your routine dental cleaning in the past 6 months? yes     Have you activated your gantto account? If not, what are your barriers?  Yes     Patient Care Team:  Lauren Rosado DO as PCP - General  Myra Gallegos MD (Anesthesiology)  Aneta Pablo MD as Consulting Physician (Pulmonology)  Yonathan Santoyo MD as Consulting Physician (Cardiology)  Dread Sandoval MD as Surgeon (Neurosurgery)         Medical History Review  Past Medical, Family, and Social History reviewed and does not contribute to the patient presenting condition    Health Maintenance   Topic Date Due    Pneumococcal med risk (1 of 1 - PPSV23) 09/25/1979    Shingles Vaccine (1 of 2 - 2 Dose Series) 09/25/2010    Colon cancer screen colonoscopy  09/25/2010   

## 2018-09-10 NOTE — PROGRESS NOTES
Heart Disease Brother     High Blood Pressure Brother      Social History     Social History    Marital status:      Spouse name: N/A    Number of children: N/A    Years of education: N/A     Occupational History    Not on file.      Social History Main Topics    Smoking status: Never Smoker    Smokeless tobacco: Never Used    Alcohol use Yes      Comment: rare social, non past 5 months    Drug use: No    Sexual activity: Yes     Partners: Female      Comment: spouse     Other Topics Concern    Not on file     Social History Narrative    No narrative on file     Patient Active Problem List   Diagnosis    Hyperlipemia, mixed    Sleep apnea    Peripheral vascular disease ([address]    CAD (coronary artery disease)    CH[facility]congestive heart failure) ([facility]    CTS (carpal tunnel syndrome)    Non-pressure chronic ulcer of left lower leg with fat layer exposed ([address]    Hypotension    Obesity, Class III, BMI 40-49.9 (morbid obesity) ([address]    Essential hypertension    DDD (degenerative disc disease), cervical    DJD (degenerative joint disease), cervical    Primary osteoarthritis involving multiple joints    Paresthesias in left hand    Uncontrolled type 2 diabetes mellitus without complication, without long-term current use of insulin ([address]    Coronary artery disease involving coronary bypass graft of native heart without angina pectoris    Sleep apnea    Cervical spondylosis    Obesity    Long term current use of antithrombotics/antiplatelets    BMI 31.6-61.8, adult ([address]    H/O cervical spine surgery    Long term (current) use of non-steroidal anti-inflammatories (nsaid)    Chronic prescription opiate use    Severe comorbid illness    Hx of cervical spine surgery    Chronic neck pain    Candidal balanitis    Cellulitis of third toe, left    Nail avulsion, toe, initial encounter    Cellulitis of second toe, right    Abnormal nuclear stress test    Abnormal stress test    bring in each diabetic visit. Labs as ordered, call in 1 week for results if you do not hear from my office. Note the referral for colonoscopy again written. To keep follow up with the rest of the specialists, as discussed. Patient Education        Learning About Diabetes Food Guidelines  Your Care Instructions    Meal planning is important to manage diabetes. It helps keep your blood sugar at a target level (which you set with your doctor). You don't have to eat special foods. You can eat what your family eats, including sweets once in a while. But you do have to pay attention to how often you eat and how much you eat of certain foods. You may want to work with a dietitian or a certified diabetes educator (CDE) to help you plan meals and snacks. A dietitian or CDE can also help you lose weight if that is one of your goals. What should you know about eating carbs? Managing the amount of carbohydrate (carbs) you eat is an important part of healthy meals when you have diabetes. Carbohydrate is found in many foods. · Learn which foods have carbs. And learn the amounts of carbs in different foods. ¨ Bread, cereal, pasta, and rice have about 15 grams of carbs in a serving. A serving is 1 slice of bread (1 ounce), ½ cup of cooked cereal, or 1/3 cup of cooked pasta or rice. ¨ Fruits have 15 grams of carbs in a serving. A serving is 1 small fresh fruit, such as an apple or orange; ½ of a banana; ½ cup of cooked or canned fruit; ½ cup of fruit juice; 1 cup of melon or raspberries; or 2 tablespoons of dried fruit. ¨ Milk and no-sugar-added yogurt have 15 grams of carbs in a serving. A serving is 1 cup of milk or 2/3 cup of no-sugar-added yogurt. ¨ Starchy vegetables have 15 grams of carbs in a serving. A serving is ½ cup of mashed potatoes or sweet potato; 1 cup winter squash; ½ of a small baked potato; ½ cup of cooked beans; or ½ cup cooked corn or green peas.   · Learn how much carbs to eat each day and at each meal. A dietitian or CDE can teach you how to keep track of the amount of carbs you eat. This is called carbohydrate counting. · If you are not sure how to count carbohydrate grams, use the Plate Method to plan meals. It is a good, quick way to make sure that you have a balanced meal. It also helps you spread carbs throughout the day. ¨ Divide your plate by types of foods. Put non-starchy vegetables on half the plate, meat or other protein food on one-quarter of the plate, and a grain or starchy vegetable in the final quarter of the plate. To this you can add a small piece of fruit and 1 cup of milk or yogurt, depending on how many carbs you are supposed to eat at a meal.  · Try to eat about the same amount of carbs at each meal. Do not \"save up\" your daily allowance of carbs to eat at one meal.  · Proteins have very little or no carbs per serving. Examples of proteins are beef, chicken, turkey, fish, eggs, tofu, cheese, cottage cheese, and peanut butter. A serving size of meat is 3 ounces, which is about the size of a deck of cards. Examples of meat substitute serving sizes (equal to 1 ounce of meat) are 1/4 cup of cottage cheese, 1 egg, 1 tablespoon of peanut butter, and ½ cup of tofu. How can you eat out and still eat healthy? · Learn to estimate the serving sizes of foods that have carbohydrate. If you measure food at home, it will be easier to estimate the amount in a serving of restaurant food. · If the meal you order has too much carbohydrate (such as potatoes, corn, or baked beans), ask to have a low-carbohydrate food instead. Ask for a salad or green vegetables. · If you use insulin, check your blood sugar before and after eating out to help you plan how much to eat in the future. · If you eat more carbohydrate at a meal than you had planned, take a walk or do other exercise. This will help lower your blood sugar. What else should you know?   · Limit saturated fat, such as the fat from meat and dairy products. This is a healthy choice because people who have diabetes are at higher risk of heart disease. So choose lean cuts of meat and nonfat or low-fat dairy products. Use olive or canola oil instead of butter or shortening when cooking. · Don't skip meals. Your blood sugar may drop too low if you skip meals and take insulin or certain medicines for diabetes. · Check with your doctor before you drink alcohol. Alcohol can cause your blood sugar to drop too low. Alcohol can also cause a bad reaction if you take certain diabetes medicines. Follow-up care is a key part of your treatment and safety. Be sure to make and go to all appointments, and call your doctor if you are having problems. It's also a good idea to know your test results and keep a list of the medicines you take. Where can you learn more? Go to https://Sightlogixpemaryeweb.Amplidata. org and sign in to your [employer] account. Enter O036 in the [employer] box to learn more about \"Learning About Diabetes Food Guidelines. \"     If you do not have an account, please click on the \"Sign Up Now\" link. Current as of: December 7, 2017  Content Version: 11.7  © 7612-8070 [employer]omat. Care instructions adapted under license by [employer] ([facility]). If you have questions about a medical condition or this instruction, always ask your healthcare professional. [address] any warranty or liability for your use of this information. Patient Education            Current as of: December 7, 2017  Content Version: 11.7  © 2006-2018 [employer]omat. Care instructions adapted under license by [employer] ([facility]). If you have questions about a medical condition or this instruction, always ask your healthcare professional. [address] any warranty or liability for your use of this information.        Patient Education        Well Visit, Men 48 to 72: Care Instructions  Your Care Instructions    Physical exams can help you stay healthy. Your doctor has checked your overall health and may have suggested ways to take good care of yourself. He or she also may have recommended tests. At home, you can help prevent illness with healthy eating, regular exercise, and other steps. Follow-up care is a key part of your treatment and safety. Be sure to make and go to all appointments, and call your doctor if you are having problems. It's also a good idea to know your test results and keep a list of the medicines you take. How can you care for yourself at home? · Reach and stay at a healthy weight. This will lower your risk for many problems, such as obesity, diabetes, heart disease, and high blood pressure. · Get at least 30 minutes of exercise on most days of the week. Walking is a good choice. You also may want to do other activities, such as running, swimming, cycling, or playing tennis or team sports. · Do not smoke. Smoking can make health problems worse. If you need help quitting, talk to your doctor about stop-smoking programs and medicines. These can increase your chances of quitting for good. · Protect your skin from too much sun. When you're outdoors from 10 a.m. to 4 p.m., stay in the shade or cover up with clothing and a hat with a wide brim. Wear sunglasses that block UV rays. Even when it's cloudy, put broad-spectrum sunscreen (SPF 30 or higher) on any exposed skin. · See a dentist one or two times a year for checkups and to have your teeth cleaned. · Wear a seat belt in the car. · Limit alcohol to 2 drinks a day. Too much alcohol can cause health problems. Follow your doctor's advice about when to have certain tests. These tests can spot problems early. · Cholesterol. Your doctor will tell you how often to have this done based on your overall health and other things that can increase your risk for heart attack and stroke. · Blood pressure.  Have your blood pressure checked during a routine doctor visit. Your doctor will tell you how often to check your blood pressure based on your age, your blood pressure results, and other factors. · Prostate exam. Talk to your doctor about whether you should have a blood test (called a PSA test) for prostate cancer. Experts disagree on whether men should have this test. Some experts recommend that you discuss the benefits and risks of the test with your doctor. · Diabetes. Ask your doctor whether you should have tests for diabetes. · Vision. Some experts recommend that you have yearly exams for glaucoma and other age-related eye problems starting at age 48. · Hearing. Tell your doctor if you notice any change in your hearing. You can have tests to find out how well you hear. · Colon cancer. You should begin tests for colon cancer at age 48. You may have one of several tests. Your doctor will tell you how often to have tests based on your age and risk. Risks include whether you already had a precancerous polyp removed from your colon or whether your parent, brother, sister, or child has had colon cancer. · Heart attack and stroke risk. At least every 4 to 6 years, you should have your risk for heart attack and stroke assessed. Your doctor uses factors such as your age, blood pressure, cholesterol, and whether you smoke or have diabetes to show what your risk for a heart attack or stroke is over the next 10 years. · Abdominal aortic aneurysm. Ask your doctor whether you should have a test to check for an aneurysm. You may need a test if you ever smoked or if your parent, brother, sister, or child has had an aneurysm. When should you call for help? Watch closely for changes in your health, and be sure to contact your doctor if you have any problems or symptoms that concern you. Where can you learn more? Go to https://angie.healthRazume. org and sign in to your [employer] account.  Enter R303 in the [facility] box to learn more about \"Well Visit, Men 48 to 72: Care Instructions. \"     If you do not have an account, please click on the \"Sign Up Now\" link. Current as of: Julissa 10, 2017  Content Version: 11.7  © 7064-5469 [employer]. Care instructions adapted under license by [facility] ([facility]). If you have questions about a medical condition or this instruction, always ask your healthcare professional. [place] [address] any warranty or liability for your use of this information. Orders Placed This Encounter   Procedures    CBC Auto Differential     Standing Status:   Future     Standing Expiration Date:   9/10/2019    Comprehensive Metabolic Panel     Standing Status:   Future     Standing Expiration Date:   9/10/2019    Lipid Panel     Standing Status:   Future     Standing Expiration Date:   9/10/2019     Order Specific Question:   Is Patient Fasting?/# of Hours     Answer:   Yes - 12 hours    Hemoglobin A1C     Standing Status:   Future     Standing Expiration Date:   9/10/2019    Microalbumin / Creatinine Urine Ratio     Standing Status:   Future     Standing Expiration Date:   9/10/2019    Psa screening     Standing Status:   Future     Standing Expiration Date:   9/10/2019    Vitamin D 25 Hydroxy     Standing Status:   Future     Standing Expiration Date:   9/10/2019   Niko Talley MD     Referral Priority:   Routine     Referral Type:   Consult for Advice and Opinion     Referral Reason:   Specialty Services Required     Referred to Provider:   Gen Duran MD     Requested Specialty:   Gastroenterology     Number of Visits Requested:   1            Sera Mendes received counseling on the following healthy behaviors: nutrition, exercise and medication adherence    Patient given educational materials on Diabetes, Nutrition, Hypertension and foot care.      I have instructed Sera Mendes to complete a self tracking handout on Blood Sugars  and instructed them to bring it

## 2018-09-10 NOTE — PROGRESS NOTES
1/31/2018    Hypercholesterolemia 12/5/2017    Hyperlipemia, mixed     Hypertension     Ischemic cardiomyopathy 5/7/2018    Non-pressure chronic ulcer of left lower leg with fat layer exposed ([facility] 6/9/2014    Obesity     Obesity, Class III, BMI 40-49.9 (morbid obesity) ([facility] 4/6/2015    Osteoarthritis of carpometacarpal (CMC) joint of thumb 6/5/2018    Osteoarthritis of knee 6/5/2018    Peripheral [facility]     with venous stasis and ulcerations. Dr Alexy Mcclure Presence of coronary angioplasty implant and graft 9/11/2009    Sleep apnea     Dr Doreen Davis of knee and leg 6/5/2018    Sprain of shoulder and upper arm 6/5/2018    Type II or unspecified type diabetes mellitus without mention of complication, not stated as uncontrolled     Ulcer of left foot, with fat layer exposed ([facility] 1/9/2018    Unspecified sleep apnea     Dr Sanders Number CPAP    Venous insufficiency of both lower extremities 1/31/2018       Allergies   Allergen Reactions    Pcn [Penicillins] Other (See Comments)     Unknown rx     Current Outpatient Prescriptions on File Prior to Visit   Medication Sig Dispense Refill    atorvastatin (LIPITOR) 40 MG tablet TAKE ONE TABLET BY MOUTH ONE TIME A DAY 30 tablet 3    omeprazole (PRILOSEC) 20 MG delayed release capsule TAKE ONE CAPSULE BY MOUTH EVERY MORNING BEFORE BREAKFAST 30 capsule 5    meloxicam (MOBIC) 15 MG tablet TAKE 1 TABLET BY MOUTH DAILY AS NEEDED FOR PAIN (ARTHRITIS) 30 tablet 5    lisinopril (PRINIVIL;ZESTRIL) 10 MG tablet TAKE ONE TABLET BY MOUTH DAILY 30 tablet 5    tiZANidine (ZANAFLEX) 4 MG tablet TAKE ONE TABLET BY MOUTH AT BEDTIME AS NEEDED FOR MUSCLE SPASMS 30 tablet 3    glimepiride (AMARYL) 4 MG tablet TAKE ONE TABLET BY MOUTH EVERY MORNING BEFORE BREAKFAST 30 tablet 3    gabapentin (NEURONTIN) 800 MG tablet TAKE ONE TABLET BY MOUTH THREE TIMES A DAY. MAY CAUSE DROWSINESS.  90 tablet 2    traZODone (DESYREL) 150 MG tablet TAKE ONE TABLET BY Left    Thickness  [x] [x] [x] [x] [x] [x] [x] [x] [x] [x]  5 4 3 2 1 1 2 3 4 5                         Right                                        Left    Dystrophic Changes   [x] [x] [x] [x] [x] [x] [x] [x] [x] [x]  5 4 3 2 1 1 2 3 4 5                         Right                                        Left    Color   [x] [x] [x] [x] [x] [x] [x] [x] [x] [x]  5 4 3 2 1 1 2 3 4 5                          Right                                        Left    Incurvation/Ingrowin   [] [] [] [] [] [] [] [] [] []  5 4 3 2 1 1 2 3 4 5                         Right                                        Left    Inflammation/Pain   [x] [x] [x] [x] [x] [x] [x] [x] [x] [x]  5 4 3 2 1 1 2 3 4 5                         Right                                        Left      Dermatologic Exam:  Supeficial dorsal skin lesion to the Proximal interphalangeal joint of the 3rd toe. It has epitheilzation and is healed   (being treated at a wound care center).       Musculoskeletal:     1st MPJ ROM decreased, Bilateral.  Muscle strength 5/5, Bilateral.  Pain present upon palpation of toenails 1-5, Bilateral. decreased medial longitudinal arch, Bilateral.  Ankle ROM decreased,Bilateral.    Dorsally contracted digits present digits 2, Bilateral.     Vascular: DP and PT pulses palpable 1/4, Bilateral.  CFT <5 seconds, Bilateral.  Hair growth absent to the level of the digits, Bilateral.  Edema present, Bilateral.  Varicosities absent, Bilateral. Erythema absent, Bilateral    Neurological: Sensation diminshed to light touch to level of digits, Bilateral.  Protective sensation intact 6/10 sites via 5.07/10g Treichlers-Lorin Monofilament, Bilateral.  negative Tinel's, Bilateral.  negative Valleix sign, Bilateral.      Integument: Warm, dry, supple, Bilateral.  Open lesion absent, Bilateral.  Interdigital maceration absent to web spaces 4, Bilateral.  Nails 1-5 left and 1-5 right thickened > 3.0 mm, dystrophic and crumbly, discolored with subungual debris. Fissures absent, Bilateral.     Visual inspection:  Deformity: hammertoe deformity heidi feet  amputation: absent  Skin lesions: present - as above  Edema: right- 2+ pitting edema, left- 2+ pitting edema    Sensory exam:  Monofilament sensation: abnormal - 6/10 via SW 5.07/10g monofilament to the plantar foot bilateral feet    Pulses: abnormal - 1/4 dorsalis pedis pulse and 1/4 Posterior tibial pulse,   Pinprick: Impaired  Proprioception: Impaired  Vibration (128 Hz): Impaired         DM with PVD       [x]Yes    []No      Assessment:  62 y.o. male with:   Diagnosis Orders   1. Type II diabetes mellitus with peripheral circulatory disorder (HCC)  73596 - SC DEBRIDEMENT OF NAILS, 6 OR MORE    HM DIABETES FOOT EXAM   2. Dermatophytosis of nail  88702 - SC DEBRIDEMENT OF NAILS, 6 OR MORE    HM DIABETES FOOT EXAM   3. Peripheral vascular disease, unspecified ([facility]  58371 - SC DEBRIDEMENT OF NAILS, 6 OR MORE    HM DIABETES FOOT EXAM   4. Pain in both feet  00398 - SC DEBRIDEMENT OF NAILS, 6 OR MORE    HM DIABETES FOOT EXAM   5. Hammertoe of left foot  72162 - SC DEBRIDEMENT OF NAILS, 6 OR MORE    HM DIABETES FOOT EXAM           Q7   []Yes    []No                Q8   [x]Yes    []No                     Q9   []Yes    []No    Plan:   Pt was evaluated and examined. Patient was given personalized discharge instructions. Discussed surgery on the hammertoe with the patient. I advised him that resection of that joint may not be the best thing for him. He has decreased blood flow to the lower legs. To open a wound via surgery I feel he would have a tough time healing. If it continues to open up then we would consider an arthorplasty of that hammertoe. Nails 1-10 were debrided sharply in length and thickness with a nipper and , without incident. Pt will follow up in 9 weeks or sooner if any problems arise. Diagnosis was discussed with the pt and all of their questions were answered in detail.  Proper

## 2018-09-10 NOTE — PATIENT INSTRUCTIONS
Continue diet - low sodium, low fat, low cholesterol, Diabetic diet, and continue wt loss program.  Avoid all foods with high fructose corn syrup. Continue exercise and activity as tolerated  Continue medications as ordered  Continue with annual diabetic eye exam  Need to schedule updated Diabetic educations classes and then we need written  confirmation of completion for the chart. Continue home glucose testing, and keep diary, and bring in each diabetic visit. Labs as ordered, call in 1 week for results if you do not hear from my office. Note the referral for colonoscopy again written. To keep follow up with the rest of the specialists, as discussed. Patient Education        Learning About Diabetes Food Guidelines  Your Care Instructions    Meal planning is important to manage diabetes. It helps keep your blood sugar at a target level (which you set with your doctor). You don't have to eat special foods. You can eat what your family eats, including sweets once in a while. But you do have to pay attention to how often you eat and how much you eat of certain foods. You may want to work with a dietitian or a certified diabetes educator (CDE) to help you plan meals and snacks. A dietitian or CDE can also help you lose weight if that is one of your goals. What should you know about eating carbs? Managing the amount of carbohydrate (carbs) you eat is an important part of healthy meals when you have diabetes. Carbohydrate is found in many foods. · Learn which foods have carbs. And learn the amounts of carbs in different foods. ¨ Bread, cereal, pasta, and rice have about 15 grams of carbs in a serving. A serving is 1 slice of bread (1 ounce), ½ cup of cooked cereal, or 1/3 cup of cooked pasta or rice. ¨ Fruits have 15 grams of carbs in a serving.  A serving is 1 small fresh fruit, such as an apple or orange; ½ of a banana; ½ cup of cooked or canned fruit; ½ cup of fruit juice; 1 cup of melon or raspberries; or 2 tablespoons of dried fruit. ¨ Milk and no-sugar-added yogurt have 15 grams of carbs in a serving. A serving is 1 cup of milk or 2/3 cup of no-sugar-added yogurt. ¨ Starchy vegetables have 15 grams of carbs in a serving. A serving is ½ cup of mashed potatoes or sweet potato; 1 cup winter squash; ½ of a small baked potato; ½ cup of cooked beans; or ½ cup cooked corn or green peas. · Learn how much carbs to eat each day and at each meal. A dietitian or CDE can teach you how to keep track of the amount of carbs you eat. This is called carbohydrate counting. · If you are not sure how to count carbohydrate grams, use the Plate Method to plan meals. It is a good, quick way to make sure that you have a balanced meal. It also helps you spread carbs throughout the day. ¨ Divide your plate by types of foods. Put non-starchy vegetables on half the plate, meat or other protein food on one-quarter of the plate, and a grain or starchy vegetable in the final quarter of the plate. To this you can add a small piece of fruit and 1 cup of milk or yogurt, depending on how many carbs you are supposed to eat at a meal.  · Try to eat about the same amount of carbs at each meal. Do not \"save up\" your daily allowance of carbs to eat at one meal.  · Proteins have very little or no carbs per serving. Examples of proteins are beef, chicken, turkey, fish, eggs, tofu, cheese, cottage cheese, and peanut butter. A serving size of meat is 3 ounces, which is about the size of a deck of cards. Examples of meat substitute serving sizes (equal to 1 ounce of meat) are 1/4 cup of cottage cheese, 1 egg, 1 tablespoon of peanut butter, and ½ cup of tofu. How can you eat out and still eat healthy? · Learn to estimate the serving sizes of foods that have carbohydrate. If you measure food at home, it will be easier to estimate the amount in a serving of restaurant food.   · If the meal you order has too much carbohydrate (such as potatoes, corn, or baked beans), ask to have a low-carbohydrate food instead. Ask for a salad or green vegetables. · If you use insulin, check your blood sugar before and after eating out to help you plan how much to eat in the future. · If you eat more carbohydrate at a meal than you had planned, take a walk or do other exercise. This will help lower your blood sugar. What else should you know? · Limit saturated fat, such as the fat from meat and dairy products. This is a healthy choice because people who have diabetes are at higher risk of heart disease. So choose lean cuts of meat and nonfat or low-fat dairy products. Use olive or canola oil instead of butter or shortening when cooking. · Don't skip meals. Your blood sugar may drop too low if you skip meals and take insulin or certain medicines for diabetes. · Check with your doctor before you drink alcohol. Alcohol can cause your blood sugar to drop too low. Alcohol can also cause a bad reaction if you take certain diabetes medicines. Follow-up care is a key part of your treatment and safety. Be sure to make and go to all appointments, and call your doctor if you are having problems. It's also a good idea to know your test results and keep a list of the medicines you take. Where can you learn more? Go to https://[email or web address].ToVieFor. org and sign in to your [employer] account. Enter U973 in the [facility] box to learn more about \"Learning About Diabetes Food Guidelines. \"     If you do not have an account, please click on the \"Sign Up Now\" link. Current as of: December 7, 2017  Content Version: 11.7  © 5423-2249 [employer]. Care instructions adapted under license by [facility] ([facility]). If you have questions about a medical condition or this instruction, always ask your healthcare professional. [place] [address] any warranty or liability for your use of this information.        Patient Education aneurysm. You may need a test if you ever smoked or if your parent, brother, sister, or child has had an aneurysm. When should you call for help? Watch closely for changes in your health, and be sure to contact your doctor if you have any problems or symptoms that concern you. Where can you learn more? Go to https://chpepiceweb.[email or web address] org and sign in to your [employer] account. Enter X509 in the [employer] box to learn more about \"Well Visit, Men 48 to 72: Care Instructions. \"     If you do not have an account, please click on the \"Sign Up Now\" link. Current as of: Julissa 10, 2017  Content Version: 11.7  © 6257-0863 [employer]. Care instructions adapted under license by [facility] ([facility]). If you have questions about a medical condition or this instruction, always ask your healthcare professional. [address] any warranty or liability for your use of this information.

## 2018-09-14 ENCOUNTER — TELEPHONE (OUTPATIENT)
Dept: FAMILY MEDICINE CLINIC | Age: 58
End: 2018-09-14

## 2018-09-14 DIAGNOSIS — L03.90 CELLULITIS, UNSPECIFIED CELLULITIS SITE: Primary | ICD-10-CM

## 2018-09-14 RX ORDER — DOXYCYCLINE HYCLATE 100 MG/1
CAPSULE ORAL
Qty: 60 CAPSULE | Refills: 0 | Status: SHIPPED | OUTPATIENT
Start: 2018-09-14 | End: 2018-11-19

## 2018-10-01 RX ORDER — GLIMEPIRIDE 4 MG/1
TABLET ORAL
Qty: 30 TABLET | Refills: 3 | Status: SHIPPED | OUTPATIENT
Start: 2018-10-01 | End: 2018-12-10 | Stop reason: SDUPTHER

## 2018-10-11 DIAGNOSIS — I10 ESSENTIAL HYPERTENSION: ICD-10-CM

## 2018-10-11 DIAGNOSIS — M62.838 MUSCLE SPASMS OF NECK: ICD-10-CM

## 2018-10-11 RX ORDER — GABAPENTIN 800 MG/1
TABLET ORAL
Qty: 90 TABLET | Refills: 2 | Status: SHIPPED | OUTPATIENT
Start: 2018-10-11 | End: 2018-12-10

## 2018-10-11 RX ORDER — TIZANIDINE 4 MG/1
4 TABLET ORAL
Qty: 30 TABLET | Refills: 1 | OUTPATIENT
Start: 2018-10-11

## 2018-10-11 RX ORDER — CLOPIDOGREL BISULFATE 75 MG/1
75 TABLET ORAL DAILY
Qty: 30 TABLET | Refills: 1 | OUTPATIENT
Start: 2018-10-11

## 2018-10-11 RX ORDER — GABAPENTIN 800 MG/1
800 TABLET ORAL 3 TIMES DAILY
Qty: 90 TABLET | Refills: 0 | OUTPATIENT
Start: 2018-10-11 | End: 2019-01-09

## 2018-10-11 RX ORDER — TRAZODONE HYDROCHLORIDE 150 MG/1
150 TABLET ORAL NIGHTLY
Qty: 30 TABLET | Refills: 1 | OUTPATIENT
Start: 2018-10-11

## 2018-10-11 RX ORDER — HYDROCHLOROTHIAZIDE 12.5 MG/1
12.5 CAPSULE, GELATIN COATED ORAL EVERY MORNING
Qty: 30 CAPSULE | Refills: 1 | OUTPATIENT
Start: 2018-10-11

## 2018-10-11 NOTE — TELEPHONE ENCOUNTER
He seems to have an appointment coming up to establish with Dr. Nabil Kimball in December, he needs to move the appointment up in order to be able to establish and get refills

## 2018-11-06 DIAGNOSIS — I10 ESSENTIAL HYPERTENSION: ICD-10-CM

## 2018-11-06 DIAGNOSIS — M62.838 MUSCLE SPASMS OF NECK: ICD-10-CM

## 2018-11-06 RX ORDER — TIZANIDINE 4 MG/1
TABLET ORAL
Qty: 30 TABLET | Refills: 3 | Status: SHIPPED | OUTPATIENT
Start: 2018-11-06 | End: 2019-02-27 | Stop reason: SDUPTHER

## 2018-11-06 RX ORDER — CLOPIDOGREL BISULFATE 75 MG/1
75 TABLET ORAL DAILY
Qty: 30 TABLET | Refills: 3 | Status: SHIPPED | OUTPATIENT
Start: 2018-11-06 | End: 2019-02-04 | Stop reason: SDUPTHER

## 2018-11-06 RX ORDER — HYDROCHLOROTHIAZIDE 12.5 MG/1
CAPSULE, GELATIN COATED ORAL
Qty: 30 CAPSULE | Refills: 5 | Status: SHIPPED | OUTPATIENT
Start: 2018-11-06 | End: 2019-06-05 | Stop reason: SDUPTHER

## 2018-11-06 RX ORDER — ATORVASTATIN CALCIUM 40 MG/1
40 TABLET, FILM COATED ORAL DAILY
Qty: 30 TABLET | Refills: 3 | Status: SHIPPED | OUTPATIENT
Start: 2018-11-06 | End: 2019-02-04 | Stop reason: SDUPTHER

## 2018-11-06 RX ORDER — TRAZODONE HYDROCHLORIDE 150 MG/1
TABLET ORAL
Qty: 30 TABLET | Refills: 5 | Status: SHIPPED | OUTPATIENT
Start: 2018-11-06 | End: 2020-01-03

## 2018-11-06 RX ORDER — TRAZODONE HYDROCHLORIDE 150 MG/1
150 TABLET ORAL NIGHTLY
Qty: 30 TABLET | Refills: 0 | Status: SHIPPED | OUTPATIENT
Start: 2018-11-06 | End: 2018-12-10 | Stop reason: SDUPTHER

## 2018-11-06 NOTE — TELEPHONE ENCOUNTER
Pt is seeing Dr. Sharona Del Valle 12/10/18 to establish care. Seen Dr. Lupis Rosas 09/10/18 and was told his meds would be filled until he sees Dr. Sharona Del Valle. Pt is out of meds and needs them filled until 12/10/18 appt. Please advise. There is another message with more pended meds for this pt.

## 2018-11-19 ENCOUNTER — HOSPITAL ENCOUNTER (EMERGENCY)
Age: 58
Discharge: HOME | End: 2018-11-19
Attending: EMERGENCY MEDICINE
Payer: COMMERCIAL

## 2018-11-19 VITALS
WEIGHT: 300 LBS | RESPIRATION RATE: 18 BRPM | SYSTOLIC BLOOD PRESSURE: 95 MMHG | BODY MASS INDEX: 40.63 KG/M2 | DIASTOLIC BLOOD PRESSURE: 57 MMHG | OXYGEN SATURATION: 98 % | HEART RATE: 84 BPM | HEIGHT: 72 IN | TEMPERATURE: 98 F
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
## 2018-11-19 PROCEDURE — 99283 EMERGENCY DEPT VISIT LOW MDM: CPT

## 2018-11-19 RX ORDER — METRONIDAZOLE 500 MG/1
500 TABLET ORAL 3 TIMES DAILY
Qty: 15 TABLET | Refills: 0 | Status: SHIPPED | OUTPATIENT
Start: 2018-11-19 | End: 2018-11-24

## 2018-11-19 RX ORDER — TOPIRAMATE 100 MG/1
100 TABLET, FILM COATED ORAL 2 TIMES DAILY
COMMUNITY
End: 2018-12-10 | Stop reason: DRUGHIGH

## 2018-11-19 RX ORDER — DOXYCYCLINE HYCLATE 100 MG
100 TABLET ORAL 2 TIMES DAILY
Qty: 10 TABLET | Refills: 0 | Status: SHIPPED | OUTPATIENT
Start: 2018-11-19 | End: 2018-11-24

## 2018-11-19 ASSESSMENT — ENCOUNTER SYMPTOMS: COLOR CHANGE: 0

## 2018-11-19 NOTE — ED PROVIDER NOTES
affect. His behavior is normal.   Vitals reviewed. DIAGNOSTIC RESULTS     RADIOLOGY:   Non-plain film images such as CT, Ultrasound and MRI are read by the radiologist. Plain radiographic images are visualized and preliminarily interpreted by the emergency physician with the below findings:    Interpretation per the Radiologist below, if available at the time of this note:    Not indicated. EMERGENCY DEPARTMENT COURSE and DIFFERENTIAL DIAGNOSIS/MDM:   Vitals:    Vitals:    11/19/18 1030   BP: (!) 95/57   Pulse: 84   Resp: 18   Temp: 98 °F (36.7 °C)   TempSrc: Oral   SpO2: 98%   Weight: 300 lb (136.1 kg)   Height: 6' (1.829 m)         CLINICAL DECISION MAKING:  The patient presented alert with a nontoxic appearance and was seen in conjunction with Dr. Shashi Monreal. His wounds were cleansed and a dressing applied. Prescriptions were written for flagyl and doxycycline. OARRS was reviewed. He should have pain medication at home. Follow up with pcp, return to ED if condition worsens. FINAL IMPRESSION      1.  Dog bite, initial encounter            Problem List  Patient Active Problem List   Diagnosis Code    Hyperlipemia, mixed E78.2    Sleep apnea G47.30    Peripheral vascular disease ([facility]) I73.9    CAD (coronary artery disease) I25.10    CHF (congestive heart failure) ([facility]) I50.9    CTS (carpal tunnel syndrome) G56.00    Non-pressure chronic ulcer of left lower leg with fat layer exposed ([facility] L97.922    Hypotension I95.9    Obesity, Class III, BMI 40-49.9 (morbid obesity) ([facility] E66.01    Essential hypertension I10    DDD (degenerative disc disease), cervical M50.30    DJD (degenerative joint disease), cervical M47.812    Primary osteoarthritis involving multiple joints M15.0    Paresthesias in left hand R20.2    Uncontrolled type 2 diabetes mellitus without complication, without long-term current use of insulin (HCC) E11.65    Coronary artery disease involving coronary bypass graft of

## 2018-12-10 ENCOUNTER — OFFICE VISIT (OUTPATIENT)
Dept: FAMILY MEDICINE CLINIC | Age: 58
End: 2018-12-10
Payer: COMMERCIAL

## 2018-12-10 VITALS
SYSTOLIC BLOOD PRESSURE: 99 MMHG | BODY MASS INDEX: 39.42 KG/M2 | OXYGEN SATURATION: 97 % | HEART RATE: 75 BPM | RESPIRATION RATE: 16 BRPM | DIASTOLIC BLOOD PRESSURE: 59 MMHG | HEIGHT: 72 IN | WEIGHT: 291 LBS | TEMPERATURE: 98.1 F

## 2018-12-10 DIAGNOSIS — E78.2 HYPERLIPEMIA, MIXED: ICD-10-CM

## 2018-12-10 DIAGNOSIS — I10 ESSENTIAL HYPERTENSION: ICD-10-CM

## 2018-12-10 LAB — HBA1C MFR BLD: 7.7 %

## 2018-12-10 PROCEDURE — 99214 OFFICE O/P EST MOD 30 MIN: CPT | Performed by: FAMILY MEDICINE

## 2018-12-10 PROCEDURE — 2022F DILAT RTA XM EVC RTNOPTHY: CPT | Performed by: FAMILY MEDICINE

## 2018-12-10 PROCEDURE — G8417 CALC BMI ABV UP PARAM F/U: HCPCS | Performed by: FAMILY MEDICINE

## 2018-12-10 PROCEDURE — 83036 HEMOGLOBIN GLYCOSYLATED A1C: CPT | Performed by: FAMILY MEDICINE

## 2018-12-10 PROCEDURE — 3045F PR MOST RECENT HEMOGLOBIN A1C LEVEL 7.0-9.0%: CPT | Performed by: FAMILY MEDICINE

## 2018-12-10 PROCEDURE — G8598 ASA/ANTIPLAT THER USED: HCPCS | Performed by: FAMILY MEDICINE

## 2018-12-10 PROCEDURE — 1036F TOBACCO NON-USER: CPT | Performed by: FAMILY MEDICINE

## 2018-12-10 PROCEDURE — G8427 DOCREV CUR MEDS BY ELIG CLIN: HCPCS | Performed by: FAMILY MEDICINE

## 2018-12-10 PROCEDURE — G8484 FLU IMMUNIZE NO ADMIN: HCPCS | Performed by: FAMILY MEDICINE

## 2018-12-10 PROCEDURE — 3017F COLORECTAL CA SCREEN DOC REV: CPT | Performed by: FAMILY MEDICINE

## 2018-12-10 RX ORDER — GLIMEPIRIDE 4 MG/1
TABLET ORAL
Qty: 30 TABLET | Refills: 3 | Status: SHIPPED | OUTPATIENT
Start: 2018-12-10 | End: 2019-05-09 | Stop reason: SDUPTHER

## 2018-12-10 RX ORDER — TOPIRAMATE 100 MG/1
200 TABLET, FILM COATED ORAL 2 TIMES DAILY
Qty: 60 TABLET | Status: SHIPPED | COMMUNITY
Start: 2018-12-10 | End: 2019-07-08 | Stop reason: DRUGHIGH

## 2018-12-10 RX ORDER — TRAZODONE HYDROCHLORIDE 150 MG/1
150 TABLET ORAL NIGHTLY
Qty: 30 TABLET | Refills: 11 | Status: SHIPPED | OUTPATIENT
Start: 2018-12-10 | End: 2019-02-04 | Stop reason: SDUPTHER

## 2018-12-10 ASSESSMENT — PATIENT HEALTH QUESTIONNAIRE - PHQ9
SUM OF ALL RESPONSES TO PHQ QUESTIONS 1-9: 0
SUM OF ALL RESPONSES TO PHQ QUESTIONS 1-9: 0
2. FEELING DOWN, DEPRESSED OR HOPELESS: 0
SUM OF ALL RESPONSES TO PHQ9 QUESTIONS 1 & 2: 0
1. LITTLE INTEREST OR PLEASURE IN DOING THINGS: 0

## 2018-12-10 NOTE — PROGRESS NOTES
Contusion of knee     Degeneration of lumbar intervertebral disc     Diabetic ulcer of toe associated with type 2 diabetes mellitus, with fat layer exposed ([address]     History of coronary artery bypass graft x 3     Ischemic cardiomyopathy     Osteoarthritis of knee     Osteoarthritis of carpometacarpal (CMC) joint of thumb     Sprain of knee and leg     Sprain of shoulder and upper arm     Presence of coronary angioplasty implant and graft     Venous insufficiency of both lower extremities     Past Medical History:   Diagnosis Date    Acquired trigger finger 6/5/2018    CAD (coronary artery disease)     [address]    Cervical spondylosis     CHF (congestive heart failure) ([facility])     Chronic back pain     on 11/19/18 pt states is presently in pain mgmnt    Contusion of elbow 6/5/2018    Contusion of knee 6/5/2018    Contusion of shoulder region 6/5/2018    Diabetic foot ulcer with osteomyelitis ([address] 1/9/2018    Diabetic ulcer of toe associated with type 2 diabetes mellitus, with fat layer exposed ([address] 1/31/2018    Hypercholesterolemia 12/5/2017    Hyperlipemia, mixed     Hypertension     Ischemic cardiomyopathy 5/7/2018    Non-pressure chronic ulcer of left lower leg with fat layer exposed ([address] 6/9/2014    Obesity     Obesity, Class III, BMI 40-49.9 (morbid obesity) ([address] 4/6/2015    Osteoarthritis of carpometacarpal (CMC) joint of thumb 6/5/2018    Osteoarthritis of knee 6/5/2018    Peripheral vascular disease ([address]     with venous stasis and ulcerations.  Dr Jensen Barriga Presence of coronary angioplasty implant and graft 9/11/2009    Sleep apnea     Dr Mary Aguilera of knee and leg 6/5/2018    Sprain of shoulder and upper arm 6/5/2018    Type II or unspecified type diabetes mellitus without mention of complication, not stated as uncontrolled     Ulcer of left foot, with fat layer exposed ([address] 1/9/2018    Unspecified sleep apnea     Dr Blaine Cárdenas CPAP    Venous insufficiency of both MOUTH EVERY MORNING BEFORE BREAKFAST 30 capsule 5    meloxicam (MOBIC) 15 MG tablet TAKE 1 TABLET BY MOUTH DAILY AS NEEDED FOR PAIN (ARTHRITIS) 30 tablet 5    lisinopril (PRINIVIL;ZESTRIL) 10 MG tablet TAKE ONE TABLET BY MOUTH DAILY 30 tablet 5    HYDROcodone-acetaminophen (NORCO)  MG per tablet Take 1 tablet by mouth every 6 hours as needed for Pain. Erlinda Shelby clopidogrel (PLAVIX) 75 MG tablet TAKE 1 TABLET BY MOUTH DAILY. TAKE WITH FOOD 30 tablet 5    Multiple Vitamin (MULTI VITAMIN DAILY PO) Take by mouth      nitroGLYCERIN (NITROSTAT) 0.4 MG SL tablet Place 0.4 mg under the tongue      aspirin 325 MG tablet Take 325 mg by mouth daily.  clopidogrel (PLAVIX) 75 MG tablet Take 1 tablet by mouth daily 30 tablet 3    atorvastatin (LIPITOR) 40 MG tablet Take 1 tablet by mouth daily 30 tablet 3    clopidogrel (PLAVIX) 75 MG tablet Take 1 tablet by mouth daily 30 tablet 3    fluticasone (FLONASE) 50 MCG/ACT nasal spray 1 spray by Nasal route daily 1 Bottle 3     No current facility-administered medications for this visit. ALLERGIES:    Allergies   Allergen Reactions    Pcn [Penicillins] Other (See Comments)     Unknown rx       Social History   Substance Use Topics    Smoking status: Never Smoker    Smokeless tobacco: Never Used    Alcohol use Yes      Comment: rare social, non past 5 months        LDL Cholesterol (mg/dL)   Date Value   09/10/2018 66     HDL (mg/dL)   Date Value   09/10/2018 39 (L)     BUN (mg/dL)   Date Value   09/10/2018 17     CREATININE (mg/dL)   Date Value   09/10/2018 0.79     Glucose (mg/dL)   Date Value   09/10/2018 145 (H)     Hemoglobin A1C (%)   Date Value   12/10/2018 7.7     Microalb/Crt.  Ratio (mcg/mg creat)   Date Value   09/10/2018 CANNOT BE CALCULATED              Subjective:      HPI  Previous patient of Dr. Rebeca Stack he has a history of diabetes his hemoglobin A1c today 7.7 hypertensive hypercholesterolemic dosings are all controlled and his labs are Conjunctivae and EOM are normal. Pupils are equal, round, and reactive to light. Neck: Normal range of motion. Neck supple. No thyromegaly present. Cardiovascular: Normal rate, regular rhythm and normal heart sounds. No murmur heard. Pulmonary/Chest: Effort normal and breath sounds normal. He has no wheezes. Hehas no rales. Abdominal: Soft. Bowel sounds are normal. He exhibits no distension and no mass. There is no tenderness. There is no rebound and no guarding. Genitourinary/Anorectal:deferred  Musculoskeletal: Normal range of motion. He exhibits no edema or tenderness. puncture wounds that are healing and scabbed on bilateral sides of left heel   Lymphadenopathy: He has no cervical adenopathy. Neurological: He is alert and oriented to person, place, and time. He has normal reflexes. Skin: Skin is warm and dry. No rash noted. Psychiatric: He has a normal mood and affect. His   behavior is normal.       Assessment:      1. Uncontrolled type 2 diabetes mellitus without complication, without long-term current use of insulin ([facility]    2. Hyperlipemia, mixed    3. Essential hypertension          Plan:      Call or return to clinic prn if these symptoms worsen or fail to improve as anticipated. I have reviewed the instructions with the patient, answering all questions to his satisfaction. No Follow-up on file.   Orders Placed This Encounter   Procedures    POCT glycosylated hemoglobin (Hb A1C)     Orders Placed This Encounter   Medications    traZODone (DESYREL) 150 MG tablet     Sig: Take 1 tablet by mouth nightly     Dispense:  30 tablet     Refill:  11    glimepiride (AMARYL) 4 MG tablet     Sig: TAKE ONE TABLET BY MOUTH EVERY MORNING BEFORE BREAKFAST     Dispense:  30 tablet     Refill:  3     Continue to try and lose weight  Dressing applied to dog bite  And continue current meds  Electronically signed by Zee Ley DO on 12/10/2018 at 8:41 AM

## 2018-12-10 NOTE — PROGRESS NOTES
Visit Information    Have you changed or started any medications since your last visit including any over-the-counter medicines, vitamins, or herbal medicines? no   Are you having any side effects from any of your medications? -  no  Have you stopped taking any of your medications? Is so, why? -  yes , Neurontin    Have you seen any other physician or provider since your last visit? No  Have you had any other diagnostic tests since your last visit? No  Have you been seen in the emergency room and/or had an admission to a hospital since we last saw you? No  Have you had your routine dental cleaning in the past 6 months? no    Have you activated your Spreaker account? If not, what are your barriers?  Yes     Patient Care Team:  Zee Ley DO as PCP - General (Family Medicine)  Michelle Lanza MD (Anesthesiology)  Kayla Sanchez MD as Consulting Physician (Pulmonology)  Hernando Shine MD as Consulting Physician (Cardiology)  Ade Boo MD as Surgeon (Neurosurgery)    Medical History Review  Past Medical, Family, and Social History reviewed and does not contribute to the patient presenting condition    Health Maintenance   Topic Date Due    Pneumococcal med risk (1 of 1 - PPSV23) 09/25/1979    Shingles Vaccine (1 of 2 - 2 Dose Series) 09/25/2010    Colon cancer screen colonoscopy  09/25/2010    Diabetic retinal exam  09/28/2016    Flu vaccine (1) 09/01/2018    A1C test (Diabetic or Prediabetic)  09/10/2019    Diabetic microalbuminuria test  09/10/2019    Lipid screen  09/10/2019    Potassium monitoring  09/10/2019    Creatinine monitoring  09/10/2019    Diabetic foot exam  09/17/2019    DTaP/Tdap/Td vaccine (3 - Td) 04/02/2027    Hepatitis C screen  Completed    HIV screen  Completed

## 2018-12-17 RX ORDER — GABAPENTIN 800 MG/1
TABLET ORAL
Qty: 90 TABLET | Refills: 2 | Status: SHIPPED | OUTPATIENT
Start: 2018-12-17 | End: 2019-02-04 | Stop reason: ALTCHOICE

## 2018-12-24 RX ORDER — CLOBETASOL PROPIONATE 0.5 MG/G
CREAM TOPICAL
Qty: 15 G | Refills: 0 | Status: SHIPPED | OUTPATIENT
Start: 2018-12-24 | End: 2019-12-02 | Stop reason: ALTCHOICE

## 2018-12-26 DIAGNOSIS — M15.9 PRIMARY OSTEOARTHRITIS INVOLVING MULTIPLE JOINTS: ICD-10-CM

## 2018-12-26 DIAGNOSIS — R10.10 UPPER ABDOMINAL PAIN: ICD-10-CM

## 2018-12-26 RX ORDER — OMEPRAZOLE 20 MG/1
CAPSULE, DELAYED RELEASE ORAL
Qty: 30 CAPSULE | Refills: 5 | Status: SHIPPED | OUTPATIENT
Start: 2018-12-26 | End: 2019-02-04 | Stop reason: SDUPTHER

## 2018-12-26 RX ORDER — MELOXICAM 15 MG/1
TABLET ORAL
Qty: 30 TABLET | Refills: 5 | Status: SHIPPED | OUTPATIENT
Start: 2018-12-26 | End: 2019-02-04 | Stop reason: SDUPTHER

## 2018-12-26 RX ORDER — LISINOPRIL 10 MG/1
TABLET ORAL
Qty: 30 TABLET | Refills: 5 | Status: SHIPPED | OUTPATIENT
Start: 2018-12-26 | End: 2019-02-04 | Stop reason: SDUPTHER

## 2018-12-26 RX ORDER — GABAPENTIN 800 MG/1
TABLET ORAL
Qty: 90 TABLET | Refills: 2 | Status: SHIPPED | OUTPATIENT
Start: 2018-12-26 | End: 2019-02-04 | Stop reason: SDUPTHER

## 2019-01-16 ENCOUNTER — TELEPHONE (OUTPATIENT)
Dept: FAMILY MEDICINE CLINIC | Age: 59
End: 2019-01-16

## 2019-01-16 RX ORDER — AZITHROMYCIN 250 MG/1
TABLET, FILM COATED ORAL
Qty: 1 PACKET | Refills: 0 | Status: SHIPPED | OUTPATIENT
Start: 2019-01-16 | End: 2019-02-04 | Stop reason: ALTCHOICE

## 2019-02-04 ENCOUNTER — OFFICE VISIT (OUTPATIENT)
Dept: FAMILY MEDICINE CLINIC | Age: 59
End: 2019-02-04
Payer: COMMERCIAL

## 2019-02-04 VITALS
RESPIRATION RATE: 20 BRPM | OXYGEN SATURATION: 97 % | HEART RATE: 67 BPM | TEMPERATURE: 97.6 F | BODY MASS INDEX: 37.11 KG/M2 | DIASTOLIC BLOOD PRESSURE: 64 MMHG | SYSTOLIC BLOOD PRESSURE: 104 MMHG | WEIGHT: 274 LBS | HEIGHT: 72 IN

## 2019-02-04 DIAGNOSIS — R10.10 UPPER ABDOMINAL PAIN: ICD-10-CM

## 2019-02-04 DIAGNOSIS — M15.9 PRIMARY OSTEOARTHRITIS INVOLVING MULTIPLE JOINTS: ICD-10-CM

## 2019-02-04 DIAGNOSIS — I10 ESSENTIAL HYPERTENSION: ICD-10-CM

## 2019-02-04 DIAGNOSIS — E66.01 OBESITY, CLASS III, BMI 40-49.9 (MORBID OBESITY) (HCC): ICD-10-CM

## 2019-02-04 DIAGNOSIS — E11.69 TYPE 2 DIABETES MELLITUS WITH OTHER SPECIFIED COMPLICATION, WITHOUT LONG-TERM CURRENT USE OF INSULIN (HCC): Primary | ICD-10-CM

## 2019-02-04 LAB — HBA1C MFR BLD: 6.4 %

## 2019-02-04 PROCEDURE — 2022F DILAT RTA XM EVC RTNOPTHY: CPT | Performed by: FAMILY MEDICINE

## 2019-02-04 PROCEDURE — 3017F COLORECTAL CA SCREEN DOC REV: CPT | Performed by: FAMILY MEDICINE

## 2019-02-04 PROCEDURE — G8484 FLU IMMUNIZE NO ADMIN: HCPCS | Performed by: FAMILY MEDICINE

## 2019-02-04 PROCEDURE — 83036 HEMOGLOBIN GLYCOSYLATED A1C: CPT | Performed by: FAMILY MEDICINE

## 2019-02-04 PROCEDURE — 99214 OFFICE O/P EST MOD 30 MIN: CPT | Performed by: FAMILY MEDICINE

## 2019-02-04 PROCEDURE — G8417 CALC BMI ABV UP PARAM F/U: HCPCS | Performed by: FAMILY MEDICINE

## 2019-02-04 PROCEDURE — G8427 DOCREV CUR MEDS BY ELIG CLIN: HCPCS | Performed by: FAMILY MEDICINE

## 2019-02-04 PROCEDURE — 3044F HG A1C LEVEL LT 7.0%: CPT | Performed by: FAMILY MEDICINE

## 2019-02-04 PROCEDURE — G8598 ASA/ANTIPLAT THER USED: HCPCS | Performed by: FAMILY MEDICINE

## 2019-02-04 PROCEDURE — 1036F TOBACCO NON-USER: CPT | Performed by: FAMILY MEDICINE

## 2019-02-04 RX ORDER — OMEPRAZOLE 20 MG/1
CAPSULE, DELAYED RELEASE ORAL
Qty: 90 CAPSULE | Refills: 1 | Status: SHIPPED | OUTPATIENT
Start: 2019-02-04 | End: 2019-07-20 | Stop reason: SDUPTHER

## 2019-02-04 RX ORDER — MELOXICAM 15 MG/1
TABLET ORAL
Qty: 90 TABLET | Refills: 1 | Status: SHIPPED | OUTPATIENT
Start: 2019-02-04 | End: 2019-07-20 | Stop reason: SDUPTHER

## 2019-02-04 RX ORDER — LISINOPRIL 10 MG/1
TABLET ORAL
Qty: 90 TABLET | Refills: 1 | Status: SHIPPED | OUTPATIENT
Start: 2019-02-04 | End: 2019-03-04 | Stop reason: SINTOL

## 2019-02-04 RX ORDER — PHENTERMINE HYDROCHLORIDE 37.5 MG/1
37.5 CAPSULE ORAL EVERY MORNING
Qty: 30 CAPSULE | Refills: 0 | Status: SHIPPED | OUTPATIENT
Start: 2019-02-04 | End: 2019-03-04 | Stop reason: SDUPTHER

## 2019-02-04 ASSESSMENT — PATIENT HEALTH QUESTIONNAIRE - PHQ9
1. LITTLE INTEREST OR PLEASURE IN DOING THINGS: 0
SUM OF ALL RESPONSES TO PHQ QUESTIONS 1-9: 0
2. FEELING DOWN, DEPRESSED OR HOPELESS: 0
SUM OF ALL RESPONSES TO PHQ9 QUESTIONS 1 & 2: 0
SUM OF ALL RESPONSES TO PHQ QUESTIONS 1-9: 0

## 2019-02-27 DIAGNOSIS — M62.838 MUSCLE SPASMS OF NECK: ICD-10-CM

## 2019-02-27 RX ORDER — TIZANIDINE 4 MG/1
TABLET ORAL
Qty: 30 TABLET | Refills: 3 | Status: SHIPPED | OUTPATIENT
Start: 2019-02-27 | End: 2019-12-02 | Stop reason: SDUPTHER

## 2019-03-02 DIAGNOSIS — M62.838 MUSCLE SPASMS OF NECK: ICD-10-CM

## 2019-03-04 ENCOUNTER — OFFICE VISIT (OUTPATIENT)
Dept: FAMILY MEDICINE CLINIC | Age: 59
End: 2019-03-04
Payer: COMMERCIAL

## 2019-03-04 VITALS
HEIGHT: 72 IN | SYSTOLIC BLOOD PRESSURE: 106 MMHG | WEIGHT: 261 LBS | RESPIRATION RATE: 16 BRPM | BODY MASS INDEX: 35.35 KG/M2 | OXYGEN SATURATION: 99 % | HEART RATE: 72 BPM | DIASTOLIC BLOOD PRESSURE: 69 MMHG

## 2019-03-04 DIAGNOSIS — E66.01 OBESITY, CLASS III, BMI 40-49.9 (MORBID OBESITY) (HCC): ICD-10-CM

## 2019-03-04 DIAGNOSIS — I95.2 HYPOTENSION DUE TO DRUGS: Primary | ICD-10-CM

## 2019-03-04 PROCEDURE — 99213 OFFICE O/P EST LOW 20 MIN: CPT | Performed by: FAMILY MEDICINE

## 2019-03-04 PROCEDURE — G8417 CALC BMI ABV UP PARAM F/U: HCPCS | Performed by: FAMILY MEDICINE

## 2019-03-04 PROCEDURE — G8598 ASA/ANTIPLAT THER USED: HCPCS | Performed by: FAMILY MEDICINE

## 2019-03-04 PROCEDURE — G8427 DOCREV CUR MEDS BY ELIG CLIN: HCPCS | Performed by: FAMILY MEDICINE

## 2019-03-04 PROCEDURE — G8484 FLU IMMUNIZE NO ADMIN: HCPCS | Performed by: FAMILY MEDICINE

## 2019-03-04 PROCEDURE — 1036F TOBACCO NON-USER: CPT | Performed by: FAMILY MEDICINE

## 2019-03-04 PROCEDURE — 3017F COLORECTAL CA SCREEN DOC REV: CPT | Performed by: FAMILY MEDICINE

## 2019-03-04 RX ORDER — PHENTERMINE HYDROCHLORIDE 37.5 MG/1
37.5 CAPSULE ORAL EVERY MORNING
Qty: 30 CAPSULE | Refills: 0 | Status: SHIPPED | OUTPATIENT
Start: 2019-03-04 | End: 2019-04-03

## 2019-03-04 RX ORDER — CLOPIDOGREL BISULFATE 75 MG/1
TABLET ORAL
Qty: 30 TABLET | Refills: 5 | Status: SHIPPED | OUTPATIENT
Start: 2019-03-04 | End: 2019-10-07 | Stop reason: SDUPTHER

## 2019-03-04 RX ORDER — CARVEDILOL 12.5 MG/1
TABLET ORAL
Qty: 60 TABLET | Refills: 5 | Status: SHIPPED | OUTPATIENT
Start: 2019-03-04 | End: 2019-10-03 | Stop reason: SDUPTHER

## 2019-03-04 RX ORDER — TIZANIDINE 4 MG/1
TABLET ORAL
Qty: 30 TABLET | Refills: 3 | Status: SHIPPED | OUTPATIENT
Start: 2019-03-04 | End: 2019-07-20 | Stop reason: SDUPTHER

## 2019-03-04 ASSESSMENT — PATIENT HEALTH QUESTIONNAIRE - PHQ9
2. FEELING DOWN, DEPRESSED OR HOPELESS: 0
1. LITTLE INTEREST OR PLEASURE IN DOING THINGS: 0
SUM OF ALL RESPONSES TO PHQ9 QUESTIONS 1 & 2: 0
SUM OF ALL RESPONSES TO PHQ QUESTIONS 1-9: 0
SUM OF ALL RESPONSES TO PHQ QUESTIONS 1-9: 0

## 2019-04-01 ENCOUNTER — OFFICE VISIT (OUTPATIENT)
Dept: FAMILY MEDICINE CLINIC | Age: 59
End: 2019-04-01
Payer: COMMERCIAL

## 2019-04-01 ENCOUNTER — HOSPITAL ENCOUNTER (OUTPATIENT)
Age: 59
Setting detail: SPECIMEN
Discharge: HOME | End: 2019-04-01
Payer: COMMERCIAL

## 2019-04-01 VITALS
RESPIRATION RATE: 16 BRPM | BODY MASS INDEX: 33.93 KG/M2 | SYSTOLIC BLOOD PRESSURE: 127 MMHG | WEIGHT: 250.2 LBS | DIASTOLIC BLOOD PRESSURE: 82 MMHG

## 2019-04-01 DIAGNOSIS — I10 ESSENTIAL HYPERTENSION: ICD-10-CM

## 2019-04-01 DIAGNOSIS — R53.83 FATIGUE, UNSPECIFIED TYPE: ICD-10-CM

## 2019-04-01 DIAGNOSIS — E78.2 HYPERLIPEMIA, MIXED: ICD-10-CM

## 2019-04-01 DIAGNOSIS — I95.2 HYPOTENSION DUE TO DRUGS: ICD-10-CM

## 2019-04-01 DIAGNOSIS — R35.0 URINARY FREQUENCY: ICD-10-CM

## 2019-04-01 DIAGNOSIS — E66.01 OBESITY, CLASS III, BMI 40-49.9 (MORBID OBESITY) (HCC): ICD-10-CM

## 2019-04-01 DIAGNOSIS — E11.69 TYPE 2 DIABETES MELLITUS WITH OTHER SPECIFIED COMPLICATION, WITHOUT LONG-TERM CURRENT USE OF INSULIN (HCC): ICD-10-CM

## 2019-04-01 DIAGNOSIS — E55.9 VITAMIN D DEFICIENCY: ICD-10-CM

## 2019-04-01 DIAGNOSIS — D64.9 ANEMIA, UNSPECIFIED TYPE: ICD-10-CM

## 2019-04-01 DIAGNOSIS — R35.0 URINARY FREQUENCY: Primary | ICD-10-CM

## 2019-04-01 LAB
ABSOLUTE EOS #: 0.12 K/UL (ref 0–0.44)
ABSOLUTE IMMATURE GRANULOCYTE: <0.03 K/UL (ref 0–0.3)
ABSOLUTE LYMPH #: 1.59 K/UL (ref 1.1–3.7)
ABSOLUTE MONO #: 0.54 K/UL (ref 0.1–1.2)
ABSOLUTE RETIC #: 0.04 M/UL (ref 0.03–0.08)
ALBUMIN SERPL-MCNC: 4.5 G/DL (ref 3.5–5.2)
ALBUMIN/GLOBULIN RATIO: 1.5 (ref 1–2.5)
ALP BLD-CCNC: 57 U/L (ref 40–129)
ALT SERPL-CCNC: 24 U/L (ref 5–41)
ANION GAP SERPL CALCULATED.3IONS-SCNC: 12 MMOL/L (ref 9–17)
AST SERPL-CCNC: 18 U/L
BASOPHILS # BLD: 1 % (ref 0–2)
BASOPHILS ABSOLUTE: 0.04 K/UL (ref 0–0.2)
BILIRUB SERPL-MCNC: 0.47 MG/DL (ref 0.3–1.2)
BUN BLDV-MCNC: 15 MG/DL (ref 6–20)
BUN/CREAT BLD: ABNORMAL (ref 9–20)
CALCIUM IONIZED: 1.32 MMOL/L (ref 1.13–1.33)
CALCIUM SERPL-MCNC: 10 MG/DL (ref 8.6–10.4)
CHLORIDE BLD-SCNC: 107 MMOL/L (ref 98–107)
CO2: 23 MMOL/L (ref 20–31)
CREAT SERPL-MCNC: 0.96 MG/DL (ref 0.7–1.2)
DIFFERENTIAL TYPE: NORMAL
EOSINOPHILS RELATIVE PERCENT: 2 % (ref 1–4)
FOLATE: >20 NG/ML
GFR AFRICAN AMERICAN: >60 ML/MIN
GFR NON-AFRICAN AMERICAN: >60 ML/MIN
GFR SERPL CREATININE-BSD FRML MDRD: ABNORMAL ML/MIN/{1.73_M2}
GFR SERPL CREATININE-BSD FRML MDRD: ABNORMAL ML/MIN/{1.73_M2}
GLUCOSE BLD-MCNC: 135 MG/DL (ref 70–99)
HCT VFR BLD CALC: 43.2 % (ref 40.7–50.3)
HEMOGLOBIN: 14 G/DL (ref 13–17)
IMMATURE GRANULOCYTES: 0 %
IMMATURE RETIC FRACT: 6 % (ref 2.7–18.3)
IRON SATURATION: 23 % (ref 20–55)
IRON: 68 UG/DL (ref 59–158)
LYMPHOCYTES # BLD: 26 % (ref 24–43)
MCH RBC QN AUTO: 30.1 PG (ref 25.2–33.5)
MCHC RBC AUTO-ENTMCNC: 32.4 G/DL (ref 28.4–34.8)
MCV RBC AUTO: 92.9 FL (ref 82.6–102.9)
MONOCYTES # BLD: 9 % (ref 3–12)
NRBC AUTOMATED: 0 PER 100 WBC
PDW BLD-RTO: 13 % (ref 11.8–14.4)
PLATELET # BLD: 215 K/UL (ref 138–453)
PLATELET ESTIMATE: NORMAL
PMV BLD AUTO: 9.5 FL (ref 8.1–13.5)
POTASSIUM SERPL-SCNC: 4.3 MMOL/L (ref 3.7–5.3)
PROSTATE SPECIFIC ANTIGEN: 1.14 UG/L
PTH INTACT: 24.7 PG/ML (ref 15–65)
RBC # BLD: 4.65 M/UL (ref 4.21–5.77)
RBC # BLD: NORMAL 10*6/UL
RETIC %: 0.9 % (ref 0.5–1.9)
RETIC HEMOGLOBIN: 36.5 PG (ref 28.2–35.7)
SEG NEUTROPHILS: 63 % (ref 36–65)
SEGMENTED NEUTROPHILS ABSOLUTE COUNT: 3.88 K/UL (ref 1.5–8.1)
SODIUM BLD-SCNC: 142 MMOL/L (ref 135–144)
THYROXINE, FREE: 1.46 NG/DL (ref 0.93–1.7)
TOTAL IRON BINDING CAPACITY: 302 UG/DL (ref 250–450)
TOTAL PROTEIN: 7.6 G/DL (ref 6.4–8.3)
TSH SERPL DL<=0.05 MIU/L-ACNC: 1.78 MIU/L (ref 0.3–5)
UNSATURATED IRON BINDING CAPACITY: 234 UG/DL (ref 112–347)
VITAMIN B-12: 308 PG/ML (ref 232–1245)
VITAMIN D 25-HYDROXY: 34.5 NG/ML (ref 30–100)
WBC # BLD: 6.2 K/UL (ref 3.5–11.3)
WBC # BLD: NORMAL 10*3/UL

## 2019-04-01 PROCEDURE — 1036F TOBACCO NON-USER: CPT | Performed by: FAMILY MEDICINE

## 2019-04-01 PROCEDURE — 2022F DILAT RTA XM EVC RTNOPTHY: CPT | Performed by: FAMILY MEDICINE

## 2019-04-01 PROCEDURE — 3017F COLORECTAL CA SCREEN DOC REV: CPT | Performed by: FAMILY MEDICINE

## 2019-04-01 PROCEDURE — G8427 DOCREV CUR MEDS BY ELIG CLIN: HCPCS | Performed by: FAMILY MEDICINE

## 2019-04-01 PROCEDURE — G8598 ASA/ANTIPLAT THER USED: HCPCS | Performed by: FAMILY MEDICINE

## 2019-04-01 PROCEDURE — 3044F HG A1C LEVEL LT 7.0%: CPT | Performed by: FAMILY MEDICINE

## 2019-04-01 PROCEDURE — G8417 CALC BMI ABV UP PARAM F/U: HCPCS | Performed by: FAMILY MEDICINE

## 2019-04-01 PROCEDURE — 99214 OFFICE O/P EST MOD 30 MIN: CPT | Performed by: FAMILY MEDICINE

## 2019-04-01 RX ORDER — PHENTERMINE HYDROCHLORIDE 37.5 MG/1
37.5 CAPSULE ORAL EVERY MORNING
Qty: 30 CAPSULE | Refills: 0 | Status: SHIPPED | OUTPATIENT
Start: 2019-04-01 | End: 2019-05-01

## 2019-04-01 NOTE — PROGRESS NOTES
[facility]  [address] [place] [id number]  Dept: [phone]      Alana Cameron is a 62 y.o. male who presents today for follow up on his  medical conditions as noted below.       Chief Complaint   Patient presents with    Medication Refill       Patient Active Problem List:     Hyperlipemia, mixed     Sleep apnea     Peripheral vascular disease ([address]     CAD (coronary artery disease)     CHF (congestive heart failure) (HCC)     CTS (carpal tunnel syndrome)     Non-pressure chronic ulcer of left lower leg with fat layer exposed ([address]     Hypotension     Obesity, Class III, BMI 40-49.9 (morbid obesity) ([address]     Essential hypertension     DDD (degenerative disc disease), cervical     DJD (degenerative joint disease), cervical     Primary osteoarthritis involving multiple joints     Paresthesias in left hand     Uncontrolled type 2 diabetes mellitus without complication, without long-term current use of insulin (HCC)     Coronary artery disease involving coronary bypass graft of native heart without angina pectoris     Sleep apnea     Cervical spondylosis     Obesity     Long term current use of antithrombotics/antiplatelets     BMI 73.0-45.3, adult ([address]     H/O cervical spine surgery     Long term (current) use of non-steroidal anti-inflammatories (nsaid)     Chronic prescription opiate use     Severe comorbid illness     Hx of cervical spine surgery     Chronic neck pain     Candidal balanitis     Cellulitis of third toe, left     Nail avulsion, toe, initial encounter     Cellulitis of second toe, right     Abnormal nuclear stress test     Abnormal stress test     Hypercholesterolemia     Diabetic foot ulcer with osteomyelitis (HCC)     Ulcer of left foot, with fat layer exposed ([address]     Acquired trigger finger     Contusion of elbow     Contusion of shoulder region     Contusion of knee     Degeneration of lumbar intervertebral disc Diabetic ulcer of toe associated with type 2 diabetes mellitus, with fat layer exposed ([address]     History of coronary artery bypass graft x 3     Ischemic cardiomyopathy     Osteoarthritis of knee     Osteoarthritis of carpometacarpal (CMC) joint of thumb     Sprain of knee and leg     Sprain of shoulder and upper arm     Presence of coronary angioplasty implant and graft     Venous insufficiency of both lower extremities     Past Medical History:   Diagnosis Date    Acquired trigger finger 6/5/2018    CAD (coronary artery disease)     NWOCC/ involving coronary bypass graft of native heart with unstable angina pectoris    Cervical spondylosis     CHF [facility]congestive heart failure) [facility]     Chronic back pain     on 11/19/18 pt states is presently in pain mgmnt    Contusion of elbow 6/5/2018    Contusion of knee 6/5/2018    Contusion of shoulder region 6/5/2018    Diabetic foot ulcer with osteomyelitis ([address] 1/9/2018    Diabetic ulcer of toe associated with type 2 diabetes mellitus, with fat layer exposed ([address] 1/31/2018    Hypercholesterolemia 12/5/2017    Hyperlipemia, mixed     Hypertension     Ischemic cardiomyopathy 5/7/2018    Non-pressure chronic ulcer of left lower leg with fat layer exposed ([address] 6/9/2014    Obesity     Obesity, Class III, BMI 40-49.9 (morbid obesity) ([address] 4/6/2015    Obstructive sleep apnea syndrome     Osteoarthritis of carpometacarpal (CMC) joint of thumb 6/5/2018    Osteoarthritis of knee 6/5/2018    Peripheral vascular disease ([address]     with venous stasis and ulcerations.  Dr Kaleb Haskins Presence of coronary angioplasty implant and graft 9/11/2009    Sleep apnea     Dr Joby Senior of knee and leg 6/5/2018    Sprain of shoulder and upper arm 6/5/2018    Type II or unspecified type diabetes mellitus without mention of complication, not stated as uncontrolled     Ulcer of left foot, with fat layer exposed ([address] 1/9/2018    Unspecified sleep apnea      Tammie/Wears CPAP    Venous insufficiency of both lower extremities 1/31/2018      Past Surgical History:   Procedure Laterality Date    CARPAL TUNNEL RELEASE Right 3/22/13    Dr. Laxmi Mancia.  CORONARY ANGIOPLASTY WITH STENT PLACEMENT  2002,2006.2009    X 3 separately    CORONARY ARTERY BYPASS GRAFT  2/26/07    [place], Dr Lisa Davey (X 3)    NECK SURGERY  2010    cervical stenoses C5-C6-C7and partial T1 laminectomy with fusion of C6-7    OTHER SURGICAL HISTORY      wound care ulcers of legs (bilaterally) with Dr Toi Mcnulty  age 11    VEIN SURGERY      closure of peripherator veins of legs, Dr Judith Nielson History   Problem Relation Age of Onset    Cancer Mother         lung    Diabetes Father     Heart Disease Father     High Blood Pressure Father     Diabetes Brother     Heart Disease Brother     High Blood Pressure Brother        Current Outpatient Medications   Medication Sig Dispense Refill    phentermine 37.5 MG capsule Take 1 capsule by mouth every morning for 30 days. 30 capsule 0    tiZANidine (ZANAFLEX) 4 MG tablet TAKE ONE TABLET BY MOUTH IN THE EVENING AS NEEDED FOR MUSCLE SPASMS 30 tablet 3    carvedilol (COREG) 12.5 MG tablet TAKE 1 TABLET BY MOUTH TWICE DAILY 60 tablet 5    clopidogrel (PLAVIX) 75 MG tablet TAKE 1 TABLET BY MOUTH DAILY. TAKE WITH FOOD 30 tablet 5    phentermine 37.5 MG capsule Take 1 capsule by mouth every morning for 30 days.  30 capsule 0    tiZANidine (ZANAFLEX) 4 MG tablet TAKE ONE TABLET BY MOUTH IN THE EVENING AS NEEDED FOR MUSCLE SPASMS 30 tablet 3    meloxicam (MOBIC) 15 MG tablet TAKE 1 TABLET BY MOUTH DAILY AS NEEDED FOR PAIN (ARTHRITIS) 90 tablet 1    omeprazole (PRILOSEC) 20 MG delayed release capsule TAKE ONE CAPSULE BY MOUTH EVERY MORNING BEFORE BREAKFAST 90 capsule 1    topiramate (TOPAMAX) 100 MG tablet Take 2 tablets by mouth 2 times daily 60 tablet     glimepiride (AMARYL) 4 MG tablet TAKE ONE TABLET BY MOUTH EVERY MORNING BEFORE BREAKFAST 30 tablet 3    traZODone (DESYREL) 150 MG tablet TAKE ONE TABLET BY MOUTH ONE TIME A DAY AT BEDTIME 30 tablet 5    hydrochlorothiazide (MICROZIDE) 12.5 MG capsule TAKE 1 CAPSULE BY MOUTH DAILY 30 capsule 5    metFORMIN (GLUCOPHAGE) 1000 MG tablet Take 1 tablet by mouth 2 times daily (with meals). 60 tablet 5    linagliptin (TRADJENTA) 5 MG tablet Take 1 tablet by mouth daily 30 tablet 5    atorvastatin (LIPITOR) 40 MG tablet TAKE ONE TABLET BY MOUTH ONE TIME A DAY 30 tablet 3    HYDROcodone-acetaminophen (NORCO)  MG per tablet Take 1 tablet by mouth every 6 hours as needed for Pain. [place] [facility] Multiple Vitamin (MULTI VITAMIN DAILY PO) Take by mouth      nitroGLYCERIN (NITROSTAT) 0.4 MG SL tablet Place 0.4 mg under the tongue      aspirin 325 MG tablet Take 325 mg by mouth daily.  clobetasol (TEMOVATE) 0.05 % cream APPLY TOPICALLY 2 TIMES DAILY UNDER LEFT BREAST FOR 2 WEEKS 15 g 0    fluticasone (FLONASE) 50 MCG/ACT nasal spray 1 spray by Nasal route daily 1 Bottle 3     No current facility-administered medications for this visit. ALLERGIES:    Allergies   Allergen Reactions    Pcn [Penicillins] Other (See Comments)     Unknown rx       Social History     Tobacco Use    Smoking status: Never Smoker    Smokeless tobacco: Never Used   Substance Use Topics    Alcohol use: Yes     Comment: rare social, non past 5 months        LDL Cholesterol (mg/dL)   Date Value   09/10/2018 66     HDL (mg/dL)   Date Value   09/10/2018 39 (L)     BUN (mg/dL)   Date Value   04/01/2019 15     CREATININE (mg/dL)   Date Value   04/01/2019 0.96     Glucose (mg/dL)   Date Value   04/01/2019 135 (H)     Hemoglobin A1C (%)   Date Value   02/04/2019 6.4     Microalb/Crt.  Ratio (mcg/mg creat)   Date Value   09/10/2018 CANNOT BE CALCULATED              Subjective:      HPI  Is here today for follow-up on his weight loss she is continuing to lose weight and do well  He states he is concerned could a lot of people are staying that he does not look well and looks kind of gray he has had a history of anemia in the past  He also is concerned that is having a lot of problems urinating as a  and does state that he sometimes does not urinate like he should throughout the day and then will urinate a lot during the night he would like to see a urologist  He has also had some episodes where her blood pressure is gotten a little bit lower    Review of Systems:     Constitutional: Negative for fever, appetite change and fatigue. Family social and medical history reviewed and unchanged     HENT: Negative. Negative for nosebleeds, trouble swallowing and neck pain. Eyes: Negative for photophobia and visual disturbance. Respiratory: Negative. Negative for chest tightness and shortness of breath. Cardiovascular: Negative. Negative for chest pain and leg swelling. Gastrointestinal: Negative. Negative for abdominal pain and blood in stool. Endocrine: Negative for cold intolerance and polyuria. Genitourinary: Negative for dysuria and hematuria. Musculoskeletal: Negative. Skin: Negative for rash. Allergic/Immunologic: Negative. Neurological: Negative. Negative for dizziness, weakness and numbness. Hematological: Negative. Negative for adenopathy. Does not bruise/bleed easily. Psychiatric/Behavioral: Negative for sleep disturbance, dysphoric mood and  decreased concentration. The patient is not nervous/anxious. Objective:     Physical Exam:     Nursing note and vitals reviewed. /82   Resp 16   Wt 250 lb 3.2 oz (113.5 kg)   BMI 33.93 kg/m²   Constitutional: He is oriented to person, place, and time. He   appears well-developed and well-nourished. HENT:   Head: Normocephalic and atraumatic. Right Ear: External ear normal. Tympanic membrane is not erythematous. No middle ear effusion.     Left Ear: External ear normal. Tympanic membrane is not erythematous. No middle ear effusion. Nose: No mucosal edema. Mouth/Throat: Oropharynx is clear and moist. No posterior oropharyngeal erythema. Eyes: Conjunctivae and EOM are normal. Pupils are equal, round, and reactive to light. Neck: Normal range of motion. Neck supple. No thyromegaly present. Cardiovascular: Normal rate, regular rhythm and normal heart sounds. No murmur heard. Pulmonary/Chest: Effort normal and breath sounds normal. He has no wheezes. Hehas no rales. Abdominal: Soft. Bowel sounds are normal. He exhibits no distension and no mass. There is no tenderness. There is no rebound and no guarding. Genitourinary/Anorectal:deferred  Musculoskeletal: Normal range of motion. He exhibits no edema or tenderness. Lymphadenopathy: He has no cervical adenopathy. Neurological: He is alert and oriented to person, place, and time. He has normal reflexes. Skin: Skin is warm and dry. No rash noted. Psychiatric: He has a normal mood and affect. His   behavior is normal.       Assessment:      1. Urinary frequency    2. Type 2 diabetes mellitus with other specified complication, without long-term current use of insulin ([address]    3. Hypotension due to drugs    4. Essential hypertension    5. Hyperlipemia, mixed    6. Anemia, unspecified type    7. Vitamin D deficiency    8. Fatigue, unspecified type    9. Obesity, Class III, BMI 40-49.9 (morbid obesity) ([address]          Plan:      Call or return to clinic prn if these symptoms worsen or fail to improve as anticipated. I have reviewed the instructions with the patient, answering all questions to his satisfaction. No follow-ups on file.   Orders Placed This Encounter   Procedures    CBC Auto Differential     Standing Status:   Future     Number of Occurrences:   1     Standing Expiration Date:   3/31/2020    Comprehensive Metabolic Panel     Standing Status:   Future     Number of Occurrences:   1     Standing Expiration Date: 3/31/2020    Folate     Standing Status:   Future     Standing Expiration Date:   3/31/2020    Iron and TIBC     Standing Status:   Future     Number of Occurrences:   1     Standing Expiration Date:   3/31/2020     Order Specific Question:   Is Patient Fasting? Answer:   yes     Order Specific Question:   No of Hours? Answer:   12    Reticulocytes     Standing Status:   Future     Number of Occurrences:   1     Standing Expiration Date:   3/31/2020    Vitamin B12 & Folate     Standing Status:   Future     Number of Occurrences:   1     Standing Expiration Date:   3/31/2020    Vitamin D 25 Hydroxy     Standing Status:   Future     Number of Occurrences:   1     Standing Expiration Date:   3/31/2020    PTH, Intact     Standing Status:   Future     Number of Occurrences:   1     Standing Expiration Date:   3/31/2020    Calcium, Ionized     Standing Status:   Future     Number of Occurrences:   1     Standing Expiration Date:   3/31/2020    PSA, Prostatic Specific Antigen     Standing Status:   Future     Number of Occurrences:   1     Standing Expiration Date:   3/31/2020    T4, Free     Standing Status:   Future     Number of Occurrences:   1     Standing Expiration Date:   3/31/2020    TSH without Reflex     Standing Status:   Future     Number of Occurrences:   1     Standing Expiration Date:   3/31/2020    CLARE Grover MD, Urology, [place]     Referral Priority:   Routine     Referral Type:   Eval and Treat     Referral Reason:   Specialty Services Required     Referred to Provider:   Lauren Blunt MD     Requested Specialty:   Urology     Number of Visits Requested:   1     Orders Placed This Encounter   Medications    phentermine 37.5 MG capsule     Sig: Take 1 capsule by mouth every morning for 30 days. Dispense:  30 capsule     Refill:  0     Check laboratory studies.   Topamax refer to urology  Electronically signed by Kelly Jordan DO on 4/1/2019 at 1:16 PM

## 2019-04-03 ENCOUNTER — TELEPHONE (OUTPATIENT)
Dept: FAMILY MEDICINE CLINIC | Age: 59
End: 2019-04-03

## 2019-04-03 DIAGNOSIS — R35.0 URINARY FREQUENCY: Primary | ICD-10-CM

## 2019-04-03 DIAGNOSIS — E11.69 TYPE 2 DIABETES MELLITUS WITH OTHER SPECIFIED COMPLICATION, WITHOUT LONG-TERM CURRENT USE OF INSULIN (HCC): ICD-10-CM

## 2019-04-03 NOTE — TELEPHONE ENCOUNTER
Pt called. States he needs a new ref for a urologist at . [facility] on [address], Dr. Wojciech Silver if he is in network. Pt states he does not want to go out to [address]. Pleases notify pt.

## 2019-04-15 RX ORDER — LINAGLIPTIN 5 MG/1
TABLET, FILM COATED ORAL
Qty: 30 TABLET | Refills: 5 | Status: SHIPPED | OUTPATIENT
Start: 2019-04-15 | End: 2019-10-07 | Stop reason: SDUPTHER

## 2019-04-16 ENCOUNTER — TELEPHONE (OUTPATIENT)
Dept: FAMILY MEDICINE CLINIC | Age: 59
End: 2019-04-16

## 2019-04-16 RX ORDER — AZITHROMYCIN 250 MG/1
TABLET, FILM COATED ORAL
Qty: 1 PACKET | Refills: 0 | Status: SHIPPED | OUTPATIENT
Start: 2019-04-16 | End: 2020-09-17

## 2019-05-09 RX ORDER — GLIMEPIRIDE 4 MG/1
TABLET ORAL
Qty: 30 TABLET | Refills: 3 | Status: SHIPPED | OUTPATIENT
Start: 2019-05-09 | End: 2019-09-13 | Stop reason: SDUPTHER

## 2019-06-04 DIAGNOSIS — I10 ESSENTIAL HYPERTENSION: ICD-10-CM

## 2019-06-05 RX ORDER — HYDROCHLOROTHIAZIDE 12.5 MG/1
CAPSULE, GELATIN COATED ORAL
Qty: 30 CAPSULE | Refills: 5 | Status: SHIPPED | OUTPATIENT
Start: 2019-06-05 | End: 2019-10-07 | Stop reason: SDUPTHER

## 2019-06-05 RX ORDER — ATORVASTATIN CALCIUM 40 MG/1
TABLET, FILM COATED ORAL
Qty: 30 TABLET | Refills: 3 | Status: SHIPPED | OUTPATIENT
Start: 2019-06-05 | End: 2019-12-02 | Stop reason: SDUPTHER

## 2019-07-08 ENCOUNTER — OFFICE VISIT (OUTPATIENT)
Dept: PULMONOLOGY | Age: 59
End: 2019-07-08
Payer: COMMERCIAL

## 2019-07-08 VITALS
HEIGHT: 72 IN | TEMPERATURE: 97 F | DIASTOLIC BLOOD PRESSURE: 66 MMHG | HEART RATE: 62 BPM | WEIGHT: 230 LBS | SYSTOLIC BLOOD PRESSURE: 116 MMHG | BODY MASS INDEX: 31.15 KG/M2 | RESPIRATION RATE: 14 BRPM | OXYGEN SATURATION: 98 %

## 2019-07-08 DIAGNOSIS — G47.33 OBSTRUCTIVE SLEEP APNEA SYNDROME: Primary | ICD-10-CM

## 2019-07-08 DIAGNOSIS — I25.700 CORONARY ARTERY DISEASE INVOLVING CORONARY BYPASS GRAFT OF NATIVE HEART WITH UNSTABLE ANGINA PECTORIS (HCC): ICD-10-CM

## 2019-07-08 PROCEDURE — G8417 CALC BMI ABV UP PARAM F/U: HCPCS | Performed by: INTERNAL MEDICINE

## 2019-07-08 PROCEDURE — 1036F TOBACCO NON-USER: CPT | Performed by: INTERNAL MEDICINE

## 2019-07-08 PROCEDURE — 99213 OFFICE O/P EST LOW 20 MIN: CPT | Performed by: INTERNAL MEDICINE

## 2019-07-08 PROCEDURE — G8427 DOCREV CUR MEDS BY ELIG CLIN: HCPCS | Performed by: INTERNAL MEDICINE

## 2019-07-08 PROCEDURE — G8598 ASA/ANTIPLAT THER USED: HCPCS | Performed by: INTERNAL MEDICINE

## 2019-07-08 PROCEDURE — 3017F COLORECTAL CA SCREEN DOC REV: CPT | Performed by: INTERNAL MEDICINE

## 2019-07-08 ASSESSMENT — SLEEP AND FATIGUE QUESTIONNAIRES
HOW LIKELY ARE YOU TO NOD OFF OR FALL ASLEEP WHILE SITTING AND TALKING TO SOMEONE: 0
HOW LIKELY ARE YOU TO NOD OFF OR FALL ASLEEP WHILE SITTING QUIETLY AFTER LUNCH WITHOUT ALCOHOL: 3
HOW LIKELY ARE YOU TO NOD OFF OR FALL ASLEEP WHEN YOU ARE A PASSENGER IN A CAR FOR AN HOUR WITHOUT A BREAK: 0
HOW LIKELY ARE YOU TO NOD OFF OR FALL ASLEEP WHILE LYING DOWN TO REST IN THE AFTERNOON WHEN CIRCUMSTANCES PERMIT: 3
ESS TOTAL SCORE: 8
HOW LIKELY ARE YOU TO NOD OFF OR FALL ASLEEP WHILE SITTING AND READING: 1
HOW LIKELY ARE YOU TO NOD OFF OR FALL ASLEEP WHILE WATCHING TV: 1
HOW LIKELY ARE YOU TO NOD OFF OR FALL ASLEEP IN A CAR, WHILE STOPPED FOR A FEW MINUTES IN TRAFFIC: 0
HOW LIKELY ARE YOU TO NOD OFF OR FALL ASLEEP WHILE SITTING INACTIVE IN A PUBLIC PLACE: 0

## 2019-07-08 NOTE — PROGRESS NOTES
PULMONARY OUTPATIENT  FOLLOWUP NOTE     Patient:  Merly Cleveland  Primary Care Physician: Natalie Landa DO  Reason for Followup/Chief complaint: Follow-up and Sleep Apnea    SUBJECTIVE     History of Present Illness: Merly Cleveland is a 62 y.o. male who presents to the clinic for ongoing management of sleep apnea. · Patient is known to have sleep apnea for several years and is currently on  Auto BiPAP  · Current Symptoms: Patient denies excessive daytime sleepiness and the total score on [place] Sleepiness Scale (ESS) is 8 today.   · He is requesting for supplies for his BiPAP machine  · He has history of coronary artery disease and CABG is planned for a stress test tomorrow    Sleep Medicine 7/8/2019 8/13/2018 10/10/2016   Sitting and reading 1 0 0   Watching TV 1 0 0   Sitting, inactive in a public place (e.g. a theatre or a meeting) 0 1 0   As a passenger in a car for an hour without a break 0 0 1   Lying down to rest in the afternoon when circumstances permit 3 3 3   Sitting and talking to someone 0 0 0   Sitting quietly after a lunch without alcohol 3 0 0   In a car, while stopped for a few minutes in traffic 0 0 0   Total score 8 4 4     Past Medical History:        Diagnosis Date    Acquired trigger finger 6/5/2018    CAD (coronary artery disease)     NWOCC/ involving coronary bypass graft of native heart with unstable angina pectoris    Cervical spondylosis     CHF (congestive heart failure) ([address]     Chronic back pain     on 11/19/18 pt states is presently in pain mgmnt    Contusion of elbow 6/5/2018    Contusion of knee 6/5/2018    Contusion of shoulder region 6/5/2018    Diabetic foot ulcer with osteomyelitis ([address] 1/9/2018    Diabetic ulcer of toe associated with type 2 diabetes mellitus, with fat layer exposed ([address] 1/31/2018    Hypercholesterolemia 12/5/2017    Hyperlipemia, mixed     Hypertension     Ischemic cardiomyopathy 5/7/2018    Non-pressure chronic ulcer of left lower leg tablet 2 po qd day 1,then 1 po qd for 4 days 1 packet 0    TRADJENTA 5 MG tablet TAKE 1 TABLET BY MOUTH ONE TIME A DAY 30 tablet 5    metFORMIN (GLUCOPHAGE) 1000 MG tablet TAKE 1 TABLET BY MOUTH 2 TIMES A DAY WITH MEALS 60 tablet 5    carvedilol (COREG) 12.5 MG tablet TAKE 1 TABLET BY MOUTH TWICE DAILY 60 tablet 5    clopidogrel (PLAVIX) 75 MG tablet TAKE 1 TABLET BY MOUTH DAILY. TAKE WITH FOOD 30 tablet 5    [DISCONTINUED] tiZANidine (ZANAFLEX) 4 MG tablet TAKE ONE TABLET BY MOUTH IN THE EVENING AS NEEDED FOR MUSCLE SPASMS 30 tablet 3    tiZANidine (ZANAFLEX) 4 MG tablet TAKE ONE TABLET BY MOUTH IN THE EVENING AS NEEDED FOR MUSCLE SPASMS 30 tablet 3    [DISCONTINUED] meloxicam (MOBIC) 15 MG tablet TAKE 1 TABLET BY MOUTH DAILY AS NEEDED FOR PAIN (ARTHRITIS) 90 tablet 1    [DISCONTINUED] omeprazole (PRILOSEC) 20 MG delayed release capsule TAKE ONE CAPSULE BY MOUTH EVERY MORNING BEFORE BREAKFAST 90 capsule 1    clobetasol (TEMOVATE) 0.05 % cream APPLY TOPICALLY 2 TIMES DAILY UNDER LEFT BREAST FOR 2 WEEKS 15 g 0    traZODone (DESYREL) 150 MG tablet TAKE ONE TABLET BY MOUTH ONE TIME A DAY AT BEDTIME 30 tablet 5    atorvastatin (LIPITOR) 40 MG tablet TAKE ONE TABLET BY MOUTH ONE TIME A DAY 30 tablet 3    HYDROcodone-acetaminophen (NORCO)  MG per tablet Take 1 tablet by mouth every 6 hours as needed for Pain. [facility] Multiple Vitamin (MULTI VITAMIN DAILY PO) Take by mouth      nitroGLYCERIN (NITROSTAT) 0.4 MG SL tablet Place 0.4 mg under the tongue      aspirin 325 MG tablet Take 325 mg by mouth daily.  [DISCONTINUED] topiramate (TOPAMAX) 100 MG tablet Take 2 tablets by mouth 2 times daily 60 tablet     fluticasone (FLONASE) 50 MCG/ACT nasal spray 1 spray by Nasal route daily 1 Bottle 3     No facility-administered encounter medications on file as of 7/8/2019. Social History:   TOBACCO:   reports that he has never smoked.  He has never used smokeless tobacco.  ETOH:   reports that he drinks

## 2019-07-20 DIAGNOSIS — M62.838 MUSCLE SPASMS OF NECK: ICD-10-CM

## 2019-07-20 DIAGNOSIS — R10.10 UPPER ABDOMINAL PAIN: ICD-10-CM

## 2019-07-20 DIAGNOSIS — M15.9 PRIMARY OSTEOARTHRITIS INVOLVING MULTIPLE JOINTS: ICD-10-CM

## 2019-07-22 RX ORDER — MELOXICAM 15 MG/1
TABLET ORAL
Qty: 90 TABLET | Refills: 1 | Status: SHIPPED | OUTPATIENT
Start: 2019-07-22 | End: 2020-01-31

## 2019-07-22 RX ORDER — OMEPRAZOLE 20 MG/1
CAPSULE, DELAYED RELEASE ORAL
Qty: 90 CAPSULE | Refills: 1 | Status: SHIPPED | OUTPATIENT
Start: 2019-07-22 | End: 2020-02-17

## 2019-07-22 RX ORDER — ATORVASTATIN CALCIUM 40 MG/1
TABLET, FILM COATED ORAL
Qty: 30 TABLET | Refills: 3 | Status: SHIPPED | OUTPATIENT
Start: 2019-07-22 | End: 2020-01-31

## 2019-07-22 RX ORDER — TIZANIDINE 4 MG/1
TABLET ORAL
Qty: 30 TABLET | Refills: 3 | Status: SHIPPED | OUTPATIENT
Start: 2019-07-22 | End: 2019-11-01 | Stop reason: SDUPTHER

## 2019-07-31 ENCOUNTER — TELEPHONE (OUTPATIENT)
Dept: PULMONOLOGY | Age: 59
End: 2019-07-31

## 2019-08-30 ENCOUNTER — TELEPHONE (OUTPATIENT)
Dept: PULMONOLOGY | Age: 59
End: 2019-08-30

## 2019-08-30 NOTE — TELEPHONE ENCOUNTER
[phone], Acct [de-identified]    Patient called, asked if we could call again. I called.  They faxed me the form, its on the doctors' desk

## 2019-09-13 RX ORDER — GLIMEPIRIDE 4 MG/1
TABLET ORAL
Qty: 30 TABLET | Refills: 3 | Status: SHIPPED | OUTPATIENT
Start: 2019-09-13 | End: 2019-11-04 | Stop reason: SINTOL

## 2019-10-03 RX ORDER — CARVEDILOL 12.5 MG/1
TABLET ORAL
Qty: 60 TABLET | Refills: 5 | Status: SHIPPED | OUTPATIENT
Start: 2019-10-03 | End: 2019-10-07 | Stop reason: SDUPTHER

## 2019-10-07 ENCOUNTER — HOSPITAL ENCOUNTER (OUTPATIENT)
Age: 59
Setting detail: SPECIMEN
Discharge: HOME | End: 2019-10-07
Payer: COMMERCIAL

## 2019-10-07 ENCOUNTER — OFFICE VISIT (OUTPATIENT)
Dept: FAMILY MEDICINE CLINIC | Age: 59
End: 2019-10-07
Payer: COMMERCIAL

## 2019-10-07 VITALS
SYSTOLIC BLOOD PRESSURE: 127 MMHG | HEIGHT: 72 IN | WEIGHT: 251.2 LBS | HEART RATE: 76 BPM | DIASTOLIC BLOOD PRESSURE: 76 MMHG | BODY MASS INDEX: 34.02 KG/M2 | OXYGEN SATURATION: 97 %

## 2019-10-07 DIAGNOSIS — E11.69 TYPE 2 DIABETES MELLITUS WITH OTHER SPECIFIED COMPLICATION, WITHOUT LONG-TERM CURRENT USE OF INSULIN (HCC): Primary | ICD-10-CM

## 2019-10-07 DIAGNOSIS — M25.511 ACUTE PAIN OF RIGHT SHOULDER: ICD-10-CM

## 2019-10-07 DIAGNOSIS — E66.01 OBESITY, CLASS III, BMI 40-49.9 (MORBID OBESITY) (HCC): ICD-10-CM

## 2019-10-07 DIAGNOSIS — E11.69 TYPE 2 DIABETES MELLITUS WITH OTHER SPECIFIED COMPLICATION, WITHOUT LONG-TERM CURRENT USE OF INSULIN (HCC): ICD-10-CM

## 2019-10-07 DIAGNOSIS — I10 ESSENTIAL HYPERTENSION: ICD-10-CM

## 2019-10-07 DIAGNOSIS — E78.2 HYPERLIPEMIA, MIXED: ICD-10-CM

## 2019-10-07 LAB
CREATININE URINE: 179.1 MG/DL (ref 39–259)
HBA1C MFR BLD: 5.8 %
MICROALBUMIN/CREAT 24H UR: <12 MG/L
MICROALBUMIN/CREAT UR-RTO: NORMAL MCG/MG CREAT

## 2019-10-07 PROCEDURE — G8484 FLU IMMUNIZE NO ADMIN: HCPCS | Performed by: FAMILY MEDICINE

## 2019-10-07 PROCEDURE — 2022F DILAT RTA XM EVC RTNOPTHY: CPT | Performed by: FAMILY MEDICINE

## 2019-10-07 PROCEDURE — 1036F TOBACCO NON-USER: CPT | Performed by: FAMILY MEDICINE

## 2019-10-07 PROCEDURE — G8598 ASA/ANTIPLAT THER USED: HCPCS | Performed by: FAMILY MEDICINE

## 2019-10-07 PROCEDURE — G8427 DOCREV CUR MEDS BY ELIG CLIN: HCPCS | Performed by: FAMILY MEDICINE

## 2019-10-07 PROCEDURE — 3044F HG A1C LEVEL LT 7.0%: CPT | Performed by: FAMILY MEDICINE

## 2019-10-07 PROCEDURE — 83036 HEMOGLOBIN GLYCOSYLATED A1C: CPT | Performed by: FAMILY MEDICINE

## 2019-10-07 PROCEDURE — G8417 CALC BMI ABV UP PARAM F/U: HCPCS | Performed by: FAMILY MEDICINE

## 2019-10-07 PROCEDURE — 3017F COLORECTAL CA SCREEN DOC REV: CPT | Performed by: FAMILY MEDICINE

## 2019-10-07 PROCEDURE — 99213 OFFICE O/P EST LOW 20 MIN: CPT | Performed by: FAMILY MEDICINE

## 2019-10-07 RX ORDER — CLOPIDOGREL BISULFATE 75 MG/1
TABLET ORAL
Qty: 30 TABLET | Refills: 5 | Status: SHIPPED | OUTPATIENT
Start: 2019-10-07 | End: 2019-12-02 | Stop reason: SDUPTHER

## 2019-10-07 RX ORDER — PHENTERMINE HYDROCHLORIDE 37.5 MG/1
37.5 TABLET ORAL
Qty: 30 TABLET | Refills: 0 | Status: SHIPPED | OUTPATIENT
Start: 2019-10-07 | End: 2019-11-04 | Stop reason: SDUPTHER

## 2019-10-07 RX ORDER — HYDROCHLOROTHIAZIDE 12.5 MG/1
CAPSULE, GELATIN COATED ORAL
Qty: 30 CAPSULE | Refills: 5 | Status: SHIPPED | OUTPATIENT
Start: 2019-10-07 | End: 2020-01-31 | Stop reason: SDUPTHER

## 2019-10-07 RX ORDER — CARVEDILOL 12.5 MG/1
TABLET ORAL
Qty: 60 TABLET | Refills: 5 | Status: SHIPPED | OUTPATIENT
Start: 2019-10-07 | End: 2020-05-26

## 2019-10-07 ASSESSMENT — PATIENT HEALTH QUESTIONNAIRE - PHQ9
SUM OF ALL RESPONSES TO PHQ QUESTIONS 1-9: 0
1. LITTLE INTEREST OR PLEASURE IN DOING THINGS: 0
SUM OF ALL RESPONSES TO PHQ9 QUESTIONS 1 & 2: 0
SUM OF ALL RESPONSES TO PHQ QUESTIONS 1-9: 0
2. FEELING DOWN, DEPRESSED OR HOPELESS: 0

## 2019-11-01 DIAGNOSIS — E11.69 TYPE 2 DIABETES MELLITUS WITH OTHER SPECIFIED COMPLICATION, WITHOUT LONG-TERM CURRENT USE OF INSULIN (HCC): ICD-10-CM

## 2019-11-01 DIAGNOSIS — M62.838 MUSCLE SPASMS OF NECK: ICD-10-CM

## 2019-11-01 RX ORDER — TIZANIDINE 4 MG/1
TABLET ORAL
Qty: 30 TABLET | Refills: 3 | Status: SHIPPED | OUTPATIENT
Start: 2019-11-01 | End: 2020-12-11 | Stop reason: SDUPTHER

## 2019-11-01 RX ORDER — LINAGLIPTIN 5 MG/1
TABLET, FILM COATED ORAL
Qty: 30 TABLET | Refills: 5 | Status: SHIPPED | OUTPATIENT
Start: 2019-11-01 | End: 2020-06-29

## 2019-11-01 RX ORDER — TIZANIDINE 4 MG/1
TABLET ORAL
Qty: 30 TABLET | Refills: 3 | Status: CANCELLED | OUTPATIENT
Start: 2019-11-01

## 2019-11-04 ENCOUNTER — OFFICE VISIT (OUTPATIENT)
Dept: FAMILY MEDICINE CLINIC | Age: 59
End: 2019-11-04
Payer: COMMERCIAL

## 2019-11-04 VITALS
BODY MASS INDEX: 33.25 KG/M2 | SYSTOLIC BLOOD PRESSURE: 116 MMHG | DIASTOLIC BLOOD PRESSURE: 71 MMHG | HEIGHT: 72 IN | OXYGEN SATURATION: 99 % | HEART RATE: 67 BPM | WEIGHT: 245.5 LBS

## 2019-11-04 DIAGNOSIS — E66.01 OBESITY, CLASS III, BMI 40-49.9 (MORBID OBESITY) (HCC): ICD-10-CM

## 2019-11-04 DIAGNOSIS — E11.69 TYPE 2 DIABETES MELLITUS WITH OTHER SPECIFIED COMPLICATION, WITHOUT LONG-TERM CURRENT USE OF INSULIN (HCC): Primary | ICD-10-CM

## 2019-11-04 PROCEDURE — G8427 DOCREV CUR MEDS BY ELIG CLIN: HCPCS | Performed by: FAMILY MEDICINE

## 2019-11-04 PROCEDURE — 1036F TOBACCO NON-USER: CPT | Performed by: FAMILY MEDICINE

## 2019-11-04 PROCEDURE — 99213 OFFICE O/P EST LOW 20 MIN: CPT | Performed by: FAMILY MEDICINE

## 2019-11-04 PROCEDURE — 3044F HG A1C LEVEL LT 7.0%: CPT | Performed by: FAMILY MEDICINE

## 2019-11-04 PROCEDURE — G8417 CALC BMI ABV UP PARAM F/U: HCPCS | Performed by: FAMILY MEDICINE

## 2019-11-04 PROCEDURE — G8598 ASA/ANTIPLAT THER USED: HCPCS | Performed by: FAMILY MEDICINE

## 2019-11-04 PROCEDURE — 2022F DILAT RTA XM EVC RTNOPTHY: CPT | Performed by: FAMILY MEDICINE

## 2019-11-04 PROCEDURE — G8484 FLU IMMUNIZE NO ADMIN: HCPCS | Performed by: FAMILY MEDICINE

## 2019-11-04 PROCEDURE — 3017F COLORECTAL CA SCREEN DOC REV: CPT | Performed by: FAMILY MEDICINE

## 2019-11-04 RX ORDER — PHENTERMINE HYDROCHLORIDE 37.5 MG/1
37.5 TABLET ORAL
Qty: 30 TABLET | Refills: 0 | Status: SHIPPED | OUTPATIENT
Start: 2019-11-04 | End: 2019-12-02 | Stop reason: SDUPTHER

## 2019-11-18 RX ORDER — CLOPIDOGREL BISULFATE 75 MG/1
TABLET ORAL
Qty: 30 TABLET | Refills: 5 | Status: SHIPPED | OUTPATIENT
Start: 2019-11-18 | End: 2020-07-10

## 2019-11-30 DIAGNOSIS — M62.838 MUSCLE SPASMS OF NECK: ICD-10-CM

## 2019-12-02 ENCOUNTER — OFFICE VISIT (OUTPATIENT)
Dept: PULMONOLOGY | Age: 59
End: 2019-12-02
Payer: COMMERCIAL

## 2019-12-02 ENCOUNTER — OFFICE VISIT (OUTPATIENT)
Dept: FAMILY MEDICINE CLINIC | Age: 59
End: 2019-12-02
Payer: COMMERCIAL

## 2019-12-02 VITALS
BODY MASS INDEX: 33.15 KG/M2 | DIASTOLIC BLOOD PRESSURE: 77 MMHG | OXYGEN SATURATION: 99 % | WEIGHT: 244.4 LBS | HEART RATE: 78 BPM | SYSTOLIC BLOOD PRESSURE: 121 MMHG | TEMPERATURE: 97.8 F

## 2019-12-02 VITALS
SYSTOLIC BLOOD PRESSURE: 129 MMHG | OXYGEN SATURATION: 98 % | BODY MASS INDEX: 33.12 KG/M2 | DIASTOLIC BLOOD PRESSURE: 78 MMHG | WEIGHT: 244.2 LBS | HEART RATE: 74 BPM

## 2019-12-02 DIAGNOSIS — E66.01 CLASS 2 SEVERE OBESITY DUE TO EXCESS CALORIES WITH SERIOUS COMORBIDITY IN ADULT, UNSPECIFIED BMI (HCC): ICD-10-CM

## 2019-12-02 DIAGNOSIS — E66.01 OBESITY, CLASS III, BMI 40-49.9 (MORBID OBESITY) (HCC): ICD-10-CM

## 2019-12-02 DIAGNOSIS — I10 ESSENTIAL HYPERTENSION: ICD-10-CM

## 2019-12-02 DIAGNOSIS — Z99.89 OSA ON CPAP: Primary | ICD-10-CM

## 2019-12-02 DIAGNOSIS — M50.30 DDD (DEGENERATIVE DISC DISEASE), CERVICAL: ICD-10-CM

## 2019-12-02 DIAGNOSIS — G47.33 OSA ON CPAP: Primary | ICD-10-CM

## 2019-12-02 DIAGNOSIS — I25.10 CORONARY ARTERY DISEASE INVOLVING NATIVE CORONARY ARTERY OF NATIVE HEART WITHOUT ANGINA PECTORIS: ICD-10-CM

## 2019-12-02 DIAGNOSIS — I25.810 CORONARY ARTERY DISEASE INVOLVING CORONARY BYPASS GRAFT OF NATIVE HEART WITHOUT ANGINA PECTORIS: ICD-10-CM

## 2019-12-02 DIAGNOSIS — L03.012 CELLULITIS OF FINGER, LEFT: Primary | ICD-10-CM

## 2019-12-02 PROCEDURE — G8417 CALC BMI ABV UP PARAM F/U: HCPCS | Performed by: INTERNAL MEDICINE

## 2019-12-02 PROCEDURE — 1036F TOBACCO NON-USER: CPT | Performed by: FAMILY MEDICINE

## 2019-12-02 PROCEDURE — G8598 ASA/ANTIPLAT THER USED: HCPCS | Performed by: FAMILY MEDICINE

## 2019-12-02 PROCEDURE — G8417 CALC BMI ABV UP PARAM F/U: HCPCS | Performed by: FAMILY MEDICINE

## 2019-12-02 PROCEDURE — 99214 OFFICE O/P EST MOD 30 MIN: CPT | Performed by: INTERNAL MEDICINE

## 2019-12-02 PROCEDURE — G8598 ASA/ANTIPLAT THER USED: HCPCS | Performed by: INTERNAL MEDICINE

## 2019-12-02 PROCEDURE — G8427 DOCREV CUR MEDS BY ELIG CLIN: HCPCS | Performed by: INTERNAL MEDICINE

## 2019-12-02 PROCEDURE — G8484 FLU IMMUNIZE NO ADMIN: HCPCS | Performed by: FAMILY MEDICINE

## 2019-12-02 PROCEDURE — 1036F TOBACCO NON-USER: CPT | Performed by: INTERNAL MEDICINE

## 2019-12-02 PROCEDURE — G8427 DOCREV CUR MEDS BY ELIG CLIN: HCPCS | Performed by: FAMILY MEDICINE

## 2019-12-02 PROCEDURE — 99213 OFFICE O/P EST LOW 20 MIN: CPT | Performed by: FAMILY MEDICINE

## 2019-12-02 PROCEDURE — 3017F COLORECTAL CA SCREEN DOC REV: CPT | Performed by: INTERNAL MEDICINE

## 2019-12-02 PROCEDURE — G8484 FLU IMMUNIZE NO ADMIN: HCPCS | Performed by: INTERNAL MEDICINE

## 2019-12-02 PROCEDURE — 3017F COLORECTAL CA SCREEN DOC REV: CPT | Performed by: FAMILY MEDICINE

## 2019-12-02 RX ORDER — DOXYCYCLINE HYCLATE 100 MG
100 TABLET ORAL 2 TIMES DAILY
Qty: 20 TABLET | Refills: 0 | Status: SHIPPED | OUTPATIENT
Start: 2019-12-02 | End: 2019-12-02 | Stop reason: ALTCHOICE

## 2019-12-02 RX ORDER — TIZANIDINE 4 MG/1
TABLET ORAL
Qty: 30 TABLET | Refills: 3 | Status: SHIPPED | OUTPATIENT
Start: 2019-12-02 | End: 2020-11-14

## 2019-12-02 RX ORDER — PHENTERMINE HYDROCHLORIDE 37.5 MG/1
37.5 TABLET ORAL
Qty: 30 TABLET | Refills: 0 | Status: SHIPPED | OUTPATIENT
Start: 2019-12-02 | End: 2020-06-01 | Stop reason: SDUPTHER

## 2019-12-02 ASSESSMENT — PATIENT HEALTH QUESTIONNAIRE - PHQ9
SUM OF ALL RESPONSES TO PHQ9 QUESTIONS 1 & 2: 0
SUM OF ALL RESPONSES TO PHQ QUESTIONS 1-9: 0
SUM OF ALL RESPONSES TO PHQ QUESTIONS 1-9: 0
1. LITTLE INTEREST OR PLEASURE IN DOING THINGS: 0
2. FEELING DOWN, DEPRESSED OR HOPELESS: 0

## 2019-12-02 ASSESSMENT — SLEEP AND FATIGUE QUESTIONNAIRES
HOW LIKELY ARE YOU TO NOD OFF OR FALL ASLEEP WHILE SITTING QUIETLY AFTER LUNCH WITHOUT ALCOHOL: 1
HOW LIKELY ARE YOU TO NOD OFF OR FALL ASLEEP WHEN YOU ARE A PASSENGER IN A CAR FOR AN HOUR WITHOUT A BREAK: 1
HOW LIKELY ARE YOU TO NOD OFF OR FALL ASLEEP WHILE LYING DOWN TO REST IN THE AFTERNOON WHEN CIRCUMSTANCES PERMIT: 2
HOW LIKELY ARE YOU TO NOD OFF OR FALL ASLEEP IN A CAR, WHILE STOPPED FOR A FEW MINUTES IN TRAFFIC: 0
HOW LIKELY ARE YOU TO NOD OFF OR FALL ASLEEP WHILE SITTING INACTIVE IN A PUBLIC PLACE: 1
HOW LIKELY ARE YOU TO NOD OFF OR FALL ASLEEP WHILE WATCHING TV: 1
HOW LIKELY ARE YOU TO NOD OFF OR FALL ASLEEP WHILE SITTING AND READING: 0
HOW LIKELY ARE YOU TO NOD OFF OR FALL ASLEEP WHILE SITTING AND TALKING TO SOMEONE: 0
ESS TOTAL SCORE: 6

## 2020-01-03 RX ORDER — TRAZODONE HYDROCHLORIDE 150 MG/1
TABLET ORAL
Qty: 30 TABLET | Refills: 11 | Status: SHIPPED | OUTPATIENT
Start: 2020-01-03 | End: 2020-12-11

## 2020-01-03 RX ORDER — LISINOPRIL 10 MG/1
TABLET ORAL
Qty: 30 TABLET | Refills: 5 | Status: SHIPPED | OUTPATIENT
Start: 2020-01-03 | End: 2020-06-01

## 2020-01-31 RX ORDER — HYDROCHLOROTHIAZIDE 12.5 MG/1
CAPSULE, GELATIN COATED ORAL
Qty: 30 CAPSULE | Refills: 5 | Status: SHIPPED | OUTPATIENT
Start: 2020-01-31 | End: 2020-05-26

## 2020-01-31 RX ORDER — ATORVASTATIN CALCIUM 40 MG/1
TABLET, FILM COATED ORAL
Qty: 30 TABLET | Refills: 3 | Status: SHIPPED | OUTPATIENT
Start: 2020-01-31 | End: 2020-02-10

## 2020-01-31 RX ORDER — MELOXICAM 15 MG/1
TABLET ORAL
Qty: 90 TABLET | Refills: 1 | Status: SHIPPED | OUTPATIENT
Start: 2020-01-31 | End: 2020-02-10

## 2020-02-03 ENCOUNTER — TELEPHONE (OUTPATIENT)
Dept: FAMILY MEDICINE CLINIC | Age: 60
End: 2020-02-03

## 2020-02-03 RX ORDER — AZITHROMYCIN 250 MG/1
250 TABLET, FILM COATED ORAL DAILY
Qty: 1 PACKET | Refills: 0 | Status: SHIPPED | OUTPATIENT
Start: 2020-02-03 | End: 2020-02-08

## 2020-02-10 RX ORDER — MELOXICAM 15 MG/1
TABLET ORAL
Qty: 90 TABLET | Refills: 1 | Status: SHIPPED | OUTPATIENT
Start: 2020-02-10 | End: 2020-08-06

## 2020-02-10 RX ORDER — ATORVASTATIN CALCIUM 40 MG/1
TABLET, FILM COATED ORAL
Qty: 30 TABLET | Refills: 3 | Status: SHIPPED | OUTPATIENT
Start: 2020-02-10 | End: 2020-05-26

## 2020-02-17 RX ORDER — OMEPRAZOLE 20 MG/1
CAPSULE, DELAYED RELEASE ORAL
Qty: 90 CAPSULE | Refills: 1 | Status: SHIPPED | OUTPATIENT
Start: 2020-02-17 | End: 2020-03-16

## 2020-03-16 RX ORDER — OMEPRAZOLE 20 MG/1
CAPSULE, DELAYED RELEASE ORAL
Qty: 90 CAPSULE | Refills: 1 | Status: SHIPPED | OUTPATIENT
Start: 2020-03-16 | End: 2020-08-07

## 2020-05-13 ENCOUNTER — TELEPHONE (OUTPATIENT)
Dept: PULMONOLOGY | Age: 60
End: 2020-05-13

## 2020-05-26 RX ORDER — ATORVASTATIN CALCIUM 40 MG/1
TABLET, FILM COATED ORAL
Qty: 30 TABLET | Refills: 3 | Status: SHIPPED | OUTPATIENT
Start: 2020-05-26 | End: 2020-09-22

## 2020-05-26 RX ORDER — HYDROCHLOROTHIAZIDE 12.5 MG/1
CAPSULE, GELATIN COATED ORAL
Qty: 30 CAPSULE | Refills: 5 | Status: SHIPPED | OUTPATIENT
Start: 2020-05-26 | End: 2020-05-30

## 2020-05-26 RX ORDER — CARVEDILOL 12.5 MG/1
TABLET ORAL
Qty: 60 TABLET | Refills: 5 | Status: SHIPPED | OUTPATIENT
Start: 2020-05-26 | End: 2020-11-14

## 2020-05-30 RX ORDER — HYDROCHLOROTHIAZIDE 12.5 MG/1
CAPSULE, GELATIN COATED ORAL
Qty: 30 CAPSULE | Refills: 5 | Status: SHIPPED | OUTPATIENT
Start: 2020-05-30 | End: 2020-11-18

## 2020-06-01 ENCOUNTER — HOSPITAL ENCOUNTER (OUTPATIENT)
Age: 60
Discharge: HOME | End: 2020-06-01
Payer: COMMERCIAL

## 2020-06-01 ENCOUNTER — OFFICE VISIT (OUTPATIENT)
Dept: FAMILY MEDICINE CLINIC | Age: 60
End: 2020-06-01

## 2020-06-01 VITALS
OXYGEN SATURATION: 97 % | HEART RATE: 73 BPM | WEIGHT: 239 LBS | BODY MASS INDEX: 32.37 KG/M2 | SYSTOLIC BLOOD PRESSURE: 128 MMHG | DIASTOLIC BLOOD PRESSURE: 74 MMHG | HEIGHT: 72 IN

## 2020-06-01 LAB
ABSOLUTE EOS #: 0.12 K/UL (ref 0–0.44)
ABSOLUTE IMMATURE GRANULOCYTE: <0.03 K/UL (ref 0–0.3)
ABSOLUTE LYMPH #: 1.83 K/UL (ref 1.1–3.7)
ABSOLUTE MONO #: 0.71 K/UL (ref 0.1–1.2)
ALBUMIN SERPL-MCNC: 4.4 G/DL (ref 3.5–5.2)
ALBUMIN/GLOBULIN RATIO: 1.5 (ref 1–2.5)
ALP BLD-CCNC: 66 U/L (ref 40–129)
ALT SERPL-CCNC: 16 U/L (ref 5–41)
ANION GAP SERPL CALCULATED.3IONS-SCNC: 14 MMOL/L (ref 9–17)
AST SERPL-CCNC: 13 U/L
BASOPHILS # BLD: 0 % (ref 0–2)
BASOPHILS ABSOLUTE: 0.03 K/UL (ref 0–0.2)
BILIRUB SERPL-MCNC: 0.28 MG/DL (ref 0.3–1.2)
BUN BLDV-MCNC: 26 MG/DL (ref 6–20)
BUN/CREAT BLD: ABNORMAL (ref 9–20)
CALCIUM SERPL-MCNC: 9.7 MG/DL (ref 8.6–10.4)
CHLORIDE BLD-SCNC: 106 MMOL/L (ref 98–107)
CHOLESTEROL/HDL RATIO: 3.1
CHOLESTEROL: 138 MG/DL
CO2: 21 MMOL/L (ref 20–31)
CREAT SERPL-MCNC: 0.93 MG/DL (ref 0.7–1.2)
DIFFERENTIAL TYPE: NORMAL
EOSINOPHILS RELATIVE PERCENT: 2 % (ref 1–4)
FOLATE: >20 NG/ML
GFR AFRICAN AMERICAN: >60 ML/MIN
GFR NON-AFRICAN AMERICAN: >60 ML/MIN
GFR SERPL CREATININE-BSD FRML MDRD: ABNORMAL ML/MIN/{1.73_M2}
GFR SERPL CREATININE-BSD FRML MDRD: ABNORMAL ML/MIN/{1.73_M2}
GLUCOSE BLD-MCNC: 141 MG/DL (ref 70–99)
HBA1C MFR BLD: 7.2 %
HCT VFR BLD CALC: 45.2 % (ref 40.7–50.3)
HDLC SERPL-MCNC: 45 MG/DL
HEMOGLOBIN: 14.6 G/DL (ref 13–17)
IMMATURE GRANULOCYTES: 0 %
IRON SATURATION: 24 % (ref 20–55)
IRON: 70 UG/DL (ref 59–158)
LDL CHOLESTEROL: 77 MG/DL (ref 0–130)
LYMPHOCYTES # BLD: 24 % (ref 24–43)
MCH RBC QN AUTO: 30.4 PG (ref 25.2–33.5)
MCHC RBC AUTO-ENTMCNC: 32.3 G/DL (ref 28.4–34.8)
MCV RBC AUTO: 94 FL (ref 82.6–102.9)
MONOCYTES # BLD: 9 % (ref 3–12)
NRBC AUTOMATED: 0 PER 100 WBC
PDW BLD-RTO: 12.5 % (ref 11.8–14.4)
PLATELET # BLD: 203 K/UL (ref 138–453)
PLATELET ESTIMATE: NORMAL
PMV BLD AUTO: 9.3 FL (ref 8.1–13.5)
POTASSIUM SERPL-SCNC: 3.9 MMOL/L (ref 3.7–5.3)
PROSTATE SPECIFIC ANTIGEN: 1.07 UG/L
RBC # BLD: 4.81 M/UL (ref 4.21–5.77)
RBC # BLD: NORMAL 10*6/UL
SEG NEUTROPHILS: 65 % (ref 36–65)
SEGMENTED NEUTROPHILS ABSOLUTE COUNT: 4.88 K/UL (ref 1.5–8.1)
SEX HORMONE BINDING GLOBULIN: 33 NMOL/L (ref 11–80)
SODIUM BLD-SCNC: 141 MMOL/L (ref 135–144)
TESTOSTERONE FREE-NONMALE: 28 PG/ML (ref 47–244)
TESTOSTERONE TOTAL: 150 NG/DL (ref 220–1000)
THYROXINE, FREE: 1.25 NG/DL (ref 0.93–1.7)
TOTAL IRON BINDING CAPACITY: 293 UG/DL (ref 250–450)
TOTAL PROTEIN: 7.4 G/DL (ref 6.4–8.3)
TRIGL SERPL-MCNC: 78 MG/DL
TSH SERPL DL<=0.05 MIU/L-ACNC: 1.35 MIU/L (ref 0.3–5)
UNSATURATED IRON BINDING CAPACITY: 223 UG/DL (ref 112–347)
VITAMIN B-12: 324 PG/ML (ref 232–1245)
VLDLC SERPL CALC-MCNC: NORMAL MG/DL (ref 1–30)
WBC # BLD: 7.6 K/UL (ref 3.5–11.3)
WBC # BLD: NORMAL 10*3/UL

## 2020-06-01 PROCEDURE — 84153 ASSAY OF PSA TOTAL: CPT

## 2020-06-01 PROCEDURE — 85025 COMPLETE CBC W/AUTO DIFF WBC: CPT

## 2020-06-01 PROCEDURE — 82607 VITAMIN B-12: CPT

## 2020-06-01 PROCEDURE — 36415 COLL VENOUS BLD VENIPUNCTURE: CPT

## 2020-06-01 PROCEDURE — 83540 ASSAY OF IRON: CPT

## 2020-06-01 PROCEDURE — 2022F DILAT RTA XM EVC RTNOPTHY: CPT | Performed by: FAMILY MEDICINE

## 2020-06-01 PROCEDURE — 84403 ASSAY OF TOTAL TESTOSTERONE: CPT

## 2020-06-01 PROCEDURE — 83036 HEMOGLOBIN GLYCOSYLATED A1C: CPT | Performed by: FAMILY MEDICINE

## 2020-06-01 PROCEDURE — 84270 ASSAY OF SEX HORMONE GLOBUL: CPT

## 2020-06-01 PROCEDURE — 1036F TOBACCO NON-USER: CPT | Performed by: FAMILY MEDICINE

## 2020-06-01 PROCEDURE — 99213 OFFICE O/P EST LOW 20 MIN: CPT | Performed by: FAMILY MEDICINE

## 2020-06-01 PROCEDURE — G8427 DOCREV CUR MEDS BY ELIG CLIN: HCPCS | Performed by: FAMILY MEDICINE

## 2020-06-01 PROCEDURE — 82746 ASSAY OF FOLIC ACID SERUM: CPT

## 2020-06-01 PROCEDURE — 80053 COMPREHEN METABOLIC PANEL: CPT

## 2020-06-01 PROCEDURE — 3051F HG A1C>EQUAL 7.0%<8.0%: CPT | Performed by: FAMILY MEDICINE

## 2020-06-01 PROCEDURE — 84439 ASSAY OF FREE THYROXINE: CPT

## 2020-06-01 PROCEDURE — 84443 ASSAY THYROID STIM HORMONE: CPT

## 2020-06-01 PROCEDURE — 3017F COLORECTAL CA SCREEN DOC REV: CPT | Performed by: FAMILY MEDICINE

## 2020-06-01 PROCEDURE — 83550 IRON BINDING TEST: CPT

## 2020-06-01 PROCEDURE — G8417 CALC BMI ABV UP PARAM F/U: HCPCS | Performed by: FAMILY MEDICINE

## 2020-06-01 PROCEDURE — 80061 LIPID PANEL: CPT

## 2020-06-01 RX ORDER — PHENTERMINE HYDROCHLORIDE 37.5 MG/1
37.5 TABLET ORAL
Qty: 30 TABLET | Refills: 0 | Status: SHIPPED | OUTPATIENT
Start: 2020-06-01 | End: 2020-07-02 | Stop reason: SDUPTHER

## 2020-06-01 ASSESSMENT — PATIENT HEALTH QUESTIONNAIRE - PHQ9
1. LITTLE INTEREST OR PLEASURE IN DOING THINGS: 0
SUM OF ALL RESPONSES TO PHQ QUESTIONS 1-9: 0
2. FEELING DOWN, DEPRESSED OR HOPELESS: 0
SUM OF ALL RESPONSES TO PHQ QUESTIONS 1-9: 0
SUM OF ALL RESPONSES TO PHQ9 QUESTIONS 1 & 2: 0

## 2020-06-01 NOTE — PROGRESS NOTES
[facility]  [address] [place] [address] [address] [facility] [id number]  Dept: [phone]      Christopher Alcantar is a 61 y.o. male who presents today for follow up on his  medical conditions as noted below.       Chief Complaint   Patient presents with    6 Month Follow-Up     wants to discuss adipex       Patient Active Problem List:     Hyperlipemia, mixed     Sleep apnea     Peripheral vascular disease (HCC)     CAD (coronary artery disease)     CHF (congestive heart failure) (HCC)     CTS (carpal tunnel syndrome)     Non-pressure chronic ulcer of left lower leg with fat layer exposed ([address]     Hypotension     Obesity, Class III, BMI 40-49.9 (morbid obesity) ([address]     Essential hypertension     DDD (degenerative disc disease), cervical     DJD (degenerative joint disease), cervical     Primary osteoarthritis involving multiple joints     Paresthesias in left hand     Uncontrolled type 2 diabetes mellitus without complication, without long-term current use of insulin (HCC)     Coronary artery disease involving coronary bypass graft of native heart without angina pectoris     Sleep apnea     Cervical spondylosis     Obesity     Long term current use of antithrombotics/antiplatelets     BMI 86.7-19.5, adult ([address]     H/O cervical spine surgery     Long term (current) use of non-steroidal anti-inflammatories (nsaid)     Chronic prescription opiate use     Severe comorbid illness     Hx of cervical spine surgery     Chronic neck pain     Candidal balanitis     Cellulitis of third toe, left     Nail avulsion, toe, initial encounter     Cellulitis of second toe, right     Abnormal nuclear stress test     Abnormal stress test     Hypercholesterolemia     Diabetic foot ulcer with osteomyelitis ([address]     Ulcer of left foot, with fat layer exposed ([address]     Acquired trigger finger     Contusion of elbow     Contusion of shoulder region     Contusion of knee     Degeneration of Cardiovascular: Normal rate, regular rhythm and normal heart sounds. No murmur heard. Pulmonary/Chest: Effort normal and breath sounds normal. He has no wheezes. Hehas no rales. Abdominal: Soft. Bowel sounds are normal. He exhibits no distension and no mass. There is no tenderness. There is no rebound and no guarding. Genitourinary/Anorectal:deferred  Musculoskeletal: Normal range of motion. He exhibits no edema or tenderness. Lymphadenopathy: He has no cervical adenopathy. Neurological: He is alert and oriented to person, place, and time. He has normal reflexes. Skin: Skin is warm and dry. No rash noted. Psychiatric: He has a normal mood and affect. His   behavior is normal.       Assessment:      1. Uncontrolled type 2 diabetes mellitus without complication, without long-term current use of insulin ([facility]    2. Essential hypertension    3. Hyperlipemia, mixed    4. Primary osteoarthritis involving multiple joints    5. Muscle spasms of neck    6. Iron deficiency anemia secondary to inadequate dietary iron intake    7. Obesity, Class III, BMI 40-49.9 (morbid obesity) ([facility]          Plan:      Call or return to clinic prn if these symptoms worsen or fail to improve as anticipated. I have reviewed the instructions with the patient, answering all questions to his satisfaction. No follow-ups on file. Orders Placed This Encounter   Procedures    CBC Auto Differential     Standing Status:   Future     Number of Occurrences:   1     Standing Expiration Date:   12/1/2020    Comprehensive Metabolic Panel     Standing Status:   Future     Number of Occurrences:   1     Standing Expiration Date:   12/1/2020    Lipid Panel     Standing Status:   Future     Number of Occurrences:   1     Standing Expiration Date:   7/1/2020     Order Specific Question:   Is Patient Fasting?/# of Hours     Answer:    Yes    PSA, Prostatic Specific Antigen     Standing Status:   Future     Number of Occurrences:   1 Standing Expiration Date:   12/1/2020    T4, Free     Standing Status:   Future     Number of Occurrences:   1     Standing Expiration Date:   12/1/2020    Testosterone, Free     Standing Status:   Future     Number of Occurrences:   1     Standing Expiration Date:   7/1/2020    TSH without Reflex     Standing Status:   Future     Number of Occurrences:   1     Standing Expiration Date:   12/1/2020    Iron and TIBC     Standing Status:   Future     Number of Occurrences:   1     Standing Expiration Date:   7/1/2020     Order Specific Question:   Is Patient Fasting? Answer:   yes     Order Specific Question:   No of Hours? Answer:   15    Vitamin B12 & Folate     Standing Status:   Future     Number of Occurrences:   1     Standing Expiration Date:   7/1/2020    POCT glycosylated hemoglobin (Hb A1C)     Orders Placed This Encounter   Medications    phentermine (ADIPEX-P) 37.5 MG tablet     Sig: Take 1 tablet by mouth every morning (before breakfast) for 30 days.      Dispense:  30 tablet     Refill:  0     Stop taking lisinopril  Electronically signed by Ortiz Berry DO on 6/1/2020 at 5:25 PM

## 2020-06-04 ENCOUNTER — TELEMEDICINE (OUTPATIENT)
Dept: PULMONOLOGY | Age: 60
End: 2020-06-04
Payer: COMMERCIAL

## 2020-06-04 VITALS — TEMPERATURE: 97 F | HEIGHT: 72 IN | BODY MASS INDEX: 32.23 KG/M2 | WEIGHT: 238 LBS

## 2020-06-04 PROCEDURE — 99213 OFFICE O/P EST LOW 20 MIN: CPT | Performed by: INTERNAL MEDICINE

## 2020-06-04 PROCEDURE — G8427 DOCREV CUR MEDS BY ELIG CLIN: HCPCS | Performed by: INTERNAL MEDICINE

## 2020-06-04 PROCEDURE — 3017F COLORECTAL CA SCREEN DOC REV: CPT | Performed by: INTERNAL MEDICINE

## 2020-06-04 PROCEDURE — G8417 CALC BMI ABV UP PARAM F/U: HCPCS | Performed by: INTERNAL MEDICINE

## 2020-06-04 PROCEDURE — 1036F TOBACCO NON-USER: CPT | Performed by: INTERNAL MEDICINE

## 2020-06-04 NOTE — PROGRESS NOTES
exposed ([facility] [address] 1/31/2018    Hypercholesterolemia 12/5/2017    Hyperlipemia, mixed     Hypertension     Ischemic cardiomyopathy 5/7/2018    Non-pressure chronic ulcer of left lower leg with fat layer exposed ([address] 6/9/2014    Obesity     Obesity, Class III, BMI 40-49.9 (morbid obesity) ([address] 4/6/2015    Obstructive sleep apnea syndrome     Osteoarthritis of carpometacarpal (CMC) joint of thumb 6/5/2018    Osteoarthritis of knee 6/5/2018    Peripheral vascular disease ([facility] [address]     with venous stasis and ulcerations. Dr Silvino Blackmon Presence of coronary angioplasty implant and graft 9/11/2009    Sleep apnea     Dr Alissa Huynh of knee and leg 6/5/2018    Sprain of shoulder and upper arm 6/5/2018    Type II or unspecified type diabetes mellitus without mention of complication, not stated as uncontrolled     Ulcer of left foot, with fat layer exposed ([facility] [address] 1/9/2018    Unspecified sleep apnea     Dr Gustavo Narayan CPAP    Venous insufficiency of both lower extremities 1/31/2018       Family History:       Problem Relation Age of Onset    Cancer Mother         lung    Diabetes Father     Heart Disease Father     High Blood Pressure Father     Diabetes Brother     Heart Disease Brother     High Blood Pressure Brother        SURGICAL HISTORY:   Past Surgical History:   Procedure Laterality Date    CARPAL TUNNEL RELEASE Right 3/22/13    Dr. Alireza Reeder.  CORONARY ANGIOPLASTY WITH STENT PLACEMENT  2002,2006.2009    X 3 separately    CORONARY ARTERY BYPASS GRAFT  [facility]/26/07    [facility] Dr Natasha Kussmaul (X 3)    NECK SURGERY  2010    cervical stenoses C5-C6-C7and partial T1 laminectomy with fusion of C6-7    OTHER SURGICAL HISTORY      wound care ulcers of legs (bilaterally) with Dr Manju Nobles  age 11    VEIN SURGERY      closure of peripherator veins of legs, Dr Penny Negron                Not in a hospital admission.   Allergies   Allergen Reactions    Pcn [Penicillins] Yes Seema Hameed, DO   HYDROcodone-acetaminophen (NORCO)  MG per tablet Take 1 tablet by mouth every 6 hours as needed for Pain. Radha Rocha MD   Multiple Vitamin (MULTI VITAMIN DAILY PO) Take by mouth   Yes Historical Provider, MD   nitroGLYCERIN (NITROSTAT) 0.4 MG SL tablet Place 0.4 mg under the tongue   Yes Historical Provider, MD   aspirin 325 MG tablet Take 325 mg by mouth daily. Yes Historical Provider, MD   fluticasone [facility]) 50 MCG/ACT nasal spray 1 spray by Nasal route daily 6/12/17 2/4/19  Leana Jones MD         Physical Exam  General Appearance:    Alert, cooperative, no distress, appears stated age, Overweight, obese. No respiratory distress. He is seeing looks like his age. Head:    Normocephalic, without obvious abnormality, atraumatic   Eye examination revealed no sign and jaundice. No Alesia's s. No Sinus Tenderness Is Noted     Does reveal no polyps    Throat examination unremarkable.          :    Neck:   Supple, symmetrical, trachea midline, no adenopathy;     thyroid:  no enlargement/tenderness/nodules; no carotid    bruit or JVD. Short fat neck    Back:     Symmetric, no curvature, ROM normal, no CVA tenderness   Lungs:       Air entry on both sides because of obesity. No rales or rhonchi notable upper friction rub audible. Percussion note is impaired due to obesity    Chest Wall:    No tenderness or deformity      Heart:    Regular rate and rhythm, S1 and S2 normal, no murmur, rub        or gallop no rvh                           Abdomen:                                                 Pulses:                                            Lymph nodes:                    Neurologic:                  Soft, non-tender, bowel sounds active all four quadrants,     no masses, no organomegaly         2+ and symmetric all extremities            Cervical, supraclavicular not enlarged or matted or tender      CNII-XII intact, normal strength 5/5 .   Sensation grossly normal  and reflexes normal 2+  throughout     Clubbing No  Lower ext edema No1+   [] , 2 +  [] , 3+   []  Upper ext edema No       Musculoskeletal - no joint swelling or tenderness or synovitis               Temp 97 °F (36.1 °C)   Ht 6' (1.829 m)   Wt 238 lb (108 kg)   BMI 32.28 kg/m²             Assessment    Plan:  He has sleep apnea syndrome. He was advised to continue the BiPAP as before BiPAP has been effective in relieving the apnea improving his daytime symptoms. He is not sleepy during the daytime. No symptoms of cataplexy sleep paralysis or any hallucination. Patient was encouraged to lose weight by participating in exercise program and caloric restriction. Clinical weight loss will also help his apnea and hypertension. There also help the diabetes. Patient has a history of coronary artery disease and have had have had coronary bypass surgery no angina no PND no chest pain. Blood sugar has been stable. Advised to take influenza vaccine and Pneumovax but he refused. He was given a prescription for BiPAP supplies. He is not a candidate for lung cancer screening    2020    TELEHEALTH EVALUATION -- Audio/Visual (During CQGY-01 public health emergency)    Patient and physician are located in their individual locations. This is visit is completed via Picket application []/ Doxy. me[] / Telephone []     HPI:    Kong Miller (:  1960) has requested an audio/video evaluation for the following concern(s):    Sleep apnea syndrome. The sleep apnea responding well to the use of BiPAP. He using the BiPAP regularly does not feel excessively sleepy during the daytime. He has not noted any pedal edema no thromboembolic process. He has no evidence of heart failure. He has systemic hypertension that is under good control    Blood sugar is also under good control. History of coronary artery disease no angina no PND    Using a machine about 6 hours every night.   His machine is almost 11year-old and will require a new machine which will arrange at his next visit. Review of Systems    Prior to Visit Medications    Medication Sig Taking? Authorizing Provider   phentermine (ADIPEX-P) 37.5 MG tablet Take 1 tablet by mouth every morning (before breakfast) for 30 days. Yes Seema Hameed DO   hydroCHLOROthiazide (MICROZIDE) 12.5 MG capsule TAKE 1 CAPSULE BY MOUTH ONE TIME A DAY Yes Seema Hameed DO   atorvastatin (LIPITOR) 40 MG tablet TAKE 1 TABLET BY MOUTH ONE TIME A DAY Yes Seema Hameed DO   carvedilol (COREG) 12.5 MG tablet TAKE 1 TABLET BY MOUTH 2 TIMES A DAY Yes Seema Hameed DO   omeprazole (PRILOSEC) 20 MG delayed release capsule TAKE 1 CAPSULE BY MOUTH ONE TIME A DAY IN THE MORNING BEFORE BREAKFAST Yes Seema Hameed DO   meloxicam (MOBIC) 15 MG tablet TAKE 1 TABLET BY MOUTH ONE TIME A DAY AS NEEDED Yes Caleb Morrison MD   traZODone (DESYREL) 150 MG tablet TAKE ONE TABLET BY MOUTH IN THE EVENING Yes Seema Hameed DO   tiZANidine (ZANAFLEX) 4 MG tablet TAKE ONE TABLET BY MOUTH EVERY EVENING AS NEEDED FOR MUSCLE SPASMS Yes Seema Hameed DO   clopidogrel (PLAVIX) 75 MG tablet TAKE 1 TABLET BY MOUTH ONE TIME A DAY WITH FOOD Yes Seema Hameed DO   metFORMIN (GLUCOPHAGE) 1000 MG tablet TAKE ONE TABLET BY MOUTH TWICE A DAY WITH MEALS Yes Seema Hameed DO   TRADJENTA 5 MG tablet TAKE 1 TABLET BY MOUTH ONE TIME A DAY Yes Seema Hameed DO   tiZANidine (ZANAFLEX) 4 MG tablet TAKE ONE TABLET BY MOUTH EVERY EVENING AS NEEDED FOR MUSCLE SPASMS Yes Seema Hameed DO   linagliptin (TRADJENTA) 5 MG tablet TAKE 1 TABLET BY MOUTH ONE TIME A DAY Yes Seema Hameed DO   topiramate (TOPAMAX) 200 MG tablet  Yes Esperanza Brower MD   azithromycin (ZITHROMAX) 250 MG tablet 2 po qd day 1,then 1 po qd for 4 days Yes Seema Hameed DO   HYDROcodone-acetaminophen (NORCO)  MG per tablet Take 1 tablet by mouth every 6 hours as needed for Pain. Elkin Campbell MD   Multiple Vitamin (MULTI VITAMIN DAILY PO) Take by mouth Yes Historical Provider, MD   nitroGLYCERIN (NITROSTAT) 0.4 MG SL tablet Place 0.4 mg under the tongue Yes Historical Provider, MD   aspirin 325 MG tablet Take 325 mg by mouth daily. Yes Historical Provider, MD   fluticasone (FLONASE) 50 MCG/ACT nasal spray 1 spray by Nasal route daily  Darrel Grey MD       Social History     Tobacco Use    Smoking status: Never Smoker    Smokeless tobacco: Never Used   Substance Use Topics    Alcohol use: Yes     Comment: rare social, non past 5 months    Drug use: No            RECORD REVIEW: Previous medical records were reviewed at today's visit.     Wt Readings from Last 3 Encounters:   06/04/20 238 lb (108 kg)   06/01/20 239 lb (108.4 kg)   12/02/19 244 lb 6.4 oz (110.9 kg)       Results for orders placed or performed during the hospital encounter of 06/01/20   Vitamin B12 & Folate   Result Value Ref Range    Vitamin B-12 324 232 - 1245 pg/mL    Folate >20.0 >4.8 ng/mL   Iron and TIBC   Result Value Ref Range    Iron 70 59 - 158 ug/dL    TIBC 293 250 - 450 ug/dL    Iron Saturation 24 20 - 55 %    UIBC 223 112 - 347 ug/dL   TSH without Reflex   Result Value Ref Range    TSH 1.35 0.30 - 5.00 mIU/L   Testosterone, Free   Result Value Ref Range    Testosterone 150 (L) 220 - 1,000 ng/dL    Sex Hormone Binding 33 11 - 80 nmol/L    Testosterone, Free 28.0 (L) 47 - 244 pg/mL   T4, Free   Result Value Ref Range    Thyroxine, Free 1.25 0.93 - 1.70 ng/dL   PSA, Prostatic Specific Antigen   Result Value Ref Range    PSA 1.07 <4.1 ug/L   Lipid Panel   Result Value Ref Range    Cholesterol 138 <200 mg/dL    HDL 45 >40 mg/dL    LDL Cholesterol 77 0 - 130 mg/dL    Chol/HDL Ratio 3.1 <5    Triglycerides 78 <150 mg/dL    VLDL NOT REPORTED 1 - 30 mg/dL   Comprehensive Metabolic Panel   Result Value Ref Range    Glucose 141 (H) 70 - 99 mg/dL    BUN 26 (H) 6 - 20 mg/dL    CREATININE 0.93 0.70 - 1.20 mg/dL    Bun/Cre Ratio NOT REPORTED 9 - 20

## 2020-06-29 RX ORDER — LINAGLIPTIN 5 MG/1
TABLET, FILM COATED ORAL
Qty: 30 TABLET | Refills: 5 | Status: SHIPPED | OUTPATIENT
Start: 2020-06-29 | End: 2020-07-28

## 2020-07-02 ENCOUNTER — TELEMEDICINE (OUTPATIENT)
Dept: FAMILY MEDICINE CLINIC | Age: 60
End: 2020-07-02
Payer: COMMERCIAL

## 2020-07-02 PROCEDURE — G8427 DOCREV CUR MEDS BY ELIG CLIN: HCPCS | Performed by: FAMILY MEDICINE

## 2020-07-02 PROCEDURE — G8417 CALC BMI ABV UP PARAM F/U: HCPCS | Performed by: FAMILY MEDICINE

## 2020-07-02 PROCEDURE — 1036F TOBACCO NON-USER: CPT | Performed by: FAMILY MEDICINE

## 2020-07-02 PROCEDURE — 99213 OFFICE O/P EST LOW 20 MIN: CPT | Performed by: FAMILY MEDICINE

## 2020-07-02 PROCEDURE — 3017F COLORECTAL CA SCREEN DOC REV: CPT | Performed by: FAMILY MEDICINE

## 2020-07-02 PROCEDURE — 2022F DILAT RTA XM EVC RTNOPTHY: CPT | Performed by: FAMILY MEDICINE

## 2020-07-02 PROCEDURE — 3051F HG A1C>EQUAL 7.0%<8.0%: CPT | Performed by: FAMILY MEDICINE

## 2020-07-02 RX ORDER — PHENTERMINE HYDROCHLORIDE 37.5 MG/1
37.5 TABLET ORAL
Qty: 30 TABLET | Refills: 0 | Status: SHIPPED | OUTPATIENT
Start: 2020-07-02 | End: 2020-07-30 | Stop reason: SDUPTHER

## 2020-07-02 ASSESSMENT — PATIENT HEALTH QUESTIONNAIRE - PHQ9
SUM OF ALL RESPONSES TO PHQ QUESTIONS 1-9: 0
1. LITTLE INTEREST OR PLEASURE IN DOING THINGS: 0
2. FEELING DOWN, DEPRESSED OR HOPELESS: 0
SUM OF ALL RESPONSES TO PHQ QUESTIONS 1-9: 0
SUM OF ALL RESPONSES TO PHQ9 QUESTIONS 1 & 2: 0

## 2020-07-02 NOTE — PROGRESS NOTES
2020    TELEHEALTH EVALUATION -- Audio/Visual (During GVJPT-42 public health emergency)    HPI:    Reta Mendosa (:  1960) has requested an audio/video evaluation for the following concern(s):    He is being seen today in virtual visit evaluation for follow-up on his weight management and diabetes he is doing great he is down to 231 he is having no new complaints or problems    Review of Systems    Prior to Visit Medications    Medication Sig Taking? Authorizing Provider   phentermine (ADIPEX-P) 37.5 MG tablet Take 1 tablet by mouth every morning (before breakfast) for 30 days.  Yes Seema Hameed, DO   TRADJENTA 5 MG tablet TAKE 1 TABLET BY MOUTH ONE TIME A DAY  Seema Hameed, DO   linagliptin (TRADJENTA) 5 MG tablet TAKE 1 TABLET BY MOUTH ONE TIME A DAY  Seema Hameed, DO   hydroCHLOROthiazide (MICROZIDE) 12.5 MG capsule TAKE 1 CAPSULE BY MOUTH ONE TIME A DAY  Seema Hameed, DO   atorvastatin (LIPITOR) 40 MG tablet TAKE 1 TABLET BY MOUTH ONE TIME A DAY  Seema Hmaeed, DO   carvedilol (COREG) 12.5 MG tablet TAKE 1 TABLET BY MOUTH 2 TIMES A DAY  Seema Hameed, DO   omeprazole (PRILOSEC) 20 MG delayed release capsule TAKE 1 CAPSULE BY MOUTH ONE TIME A DAY IN THE MORNING BEFORE BREAKFAST  Seema Hameed, DO   meloxicam (MOBIC) 15 MG tablet TAKE 1 TABLET BY MOUTH ONE TIME A DAY AS NEEDED  Stephon Curry MD   traZODone (DESYREL) 150 MG tablet TAKE ONE TABLET BY MOUTH IN THE EVENING  Seema Hameed,    tiZANidine (ZANAFLEX) 4 MG tablet TAKE ONE TABLET BY MOUTH EVERY EVENING AS NEEDED FOR MUSCLE SPASMS  Seema Hameed DO   clopidogrel (PLAVIX) 75 MG tablet TAKE 1 TABLET BY MOUTH ONE TIME A DAY WITH FOOD  Seema Hameed, DO   metFORMIN (GLUCOPHAGE) 1000 MG tablet TAKE ONE TABLET BY MOUTH TWICE A DAY WITH MEALS  Seema Hameed DO   tiZANidine (ZANAFLEX) 4 MG tablet TAKE ONE TABLET BY MOUTH EVERY EVENING AS NEEDED FOR MUSCLE SPASMS  Seema Hameed, DO   topiramate (TOPAMAX) 200 MG tablet   Historical MD Inocente azithromycin (ZITHROMAX) 250 MG tablet 2 po qd day 1,then 1 po qd for 4 days  Seema Hameed,    HYDROcodone-acetaminophen (NORCO)  MG per tablet Take 1 tablet by mouth every 6 hours as needed for Pain. Carrol Perez MD   fluticasone (FLONASE) 50 MCG/ACT nasal spray 1 spray by Nasal route daily  Sully Cadena MD   Multiple Vitamin (MULTI VITAMIN DAILY PO) Take by mouth  Historical Provider, MD   nitroGLYCERIN (NITROSTAT) 0.4 MG SL tablet Place 0.4 mg under the tongue  Historical Provider, MD   aspirin 325 MG tablet Take 325 mg by mouth daily.     Historical Provider, MD       Social History     Tobacco Use    Smoking status: Never Smoker    Smokeless tobacco: Never Used   Substance Use Topics    Alcohol use: Yes     Comment: rare social, non past 5 months    Drug use: No        Allergies   Allergen Reactions    Pcn [Penicillins] Other (See Comments)     Unknown rx   ,   Past Medical History:   Diagnosis Date    Acquired trigger finger 6/5/2018    CAD (coronary artery disease)     [facility]/ involving coronary bypass graft of native heart with unstable angina pectoris    Cervical spondylosis     CHF (congestive heart failure) (HCC)     Chronic back pain     on 11/19/18 pt states is presently in pain mgmnt    Contusion of elbow 6/5/2018    Contusion of knee 6/5/2018    Contusion of shoulder region 6/5/2018    Diabetic foot ulcer with osteomyelitis ([address] 1/9/2018    Diabetic ulcer of toe associated with type 2 diabetes mellitus, with fat layer exposed ([address] 1/31/2018    Hypercholesterolemia 12/5/2017    Hyperlipemia, mixed     Hypertension     Ischemic cardiomyopathy 5/7/2018    Non-pressure chronic ulcer of left lower leg with fat layer exposed ([address] 6/9/2014    Obesity     Obesity, Class III, BMI 40-49.9 (morbid obesity) ([address] 4/6/2015    Obstructive sleep apnea syndrome     Osteoarthritis of carpometacarpal (CMC) joint of thumb 6/5/2018    Osteoarthritis of knee 6/5/2018    Peripheral vascular disease ([facility] [address]     with venous stasis and ulcerations. Dr Thalia Gutierrez Presence of coronary angioplasty implant and graft 9/11/2009    Sleep apnea     Dr Ebenezer Gaona of knee and leg 6/5/2018    Sprain of shoulder and upper arm 6/5/2018    Type II or unspecified type diabetes mellitus without mention of complication, not stated as uncontrolled     Ulcer of left foot, with fat layer exposed ([facility] [address] 1/9/2018    Unspecified sleep apnea     Dr Ofelia Johnson CPAP    Venous insufficiency of both lower extremities 1/31/2018   ,   Past Surgical History:   Procedure Laterality Date    CARPAL TUNNEL RELEASE Right 3/22/13    Dr. Manish Shafer.     CORONARY ANGIOPLASTY WITH STENT PLACEMENT  2002,2006.2009    X 3 separately    CORONARY ARTERY BYPASS GRAFT  [facility]/07    [facility] ([facility]    NECK SURGERY  2010    cervical stenoses C5-C6-C7and partial T1 laminectomy with fusion of C6-7    OTHER SURGICAL HISTORY      wound care ulcers of legs (bilaterally) with Dr Adam Paredes  age 11    VEIN SURGERY      closure of peripherator veins of legs, Dr George Leal     ,   Social History     Tobacco Use    Smoking status: Never Smoker    Smokeless tobacco: Never Used   Substance Use Topics    Alcohol use: Yes     Comment: rare social, non past 5 months    Drug use: No   ,   Family History   Problem Relation Age of Onset    Cancer Mother         lung    Diabetes Father     Heart Disease Father     High Blood Pressure Father     Diabetes Brother     Heart Disease Brother     High Blood Pressure Brother        PHYSICAL EXAMINATION:  [ INSTRUCTIONS:  \"[x]\" Indicates a positive item  \"[]\" Indicates a negative item  -- DELETE ALL ITEMS NOT EXAMINED]  Vital Signs: (As obtained by patient/caregiver or practitioner observation)    Blood pressure-  Heart rate-    Respiratory rate-    Temperature-  Pulse oximetry-     Constitutional: [x] Appears well-developed and following organ systems was limited: Vitals/Constitutional/EENT/Resp/CV/GI//MS/Neuro/Skin/Heme-Lymph-Imm. Pursuant to the emergency declaration under the [address] [place] [address] and the [employer] and [employer] Act, this Virtual Visit was conducted with patient's (and/or legal guardian's) consent, to reduce the patient's risk of exposure to COVID-19 and provide necessary medical care. The patient (and/or legal guardian) has also been advised to contact this office for worsening conditions or problems, and seek emergency medical treatment and/or call 911 if deemed necessary. Patient identification was verified at the start of the visit: Yes    Total time spent on this encounter: Not billed by time    Services were provided through a video synchronous discussion virtually to substitute for in-person clinic visit. Patient and provider were located at their individual homes. --Brigitte Chiu DO on 7/2/2020 at 9:51 AM    An electronic signature was used to authenticate this note.

## 2020-07-10 RX ORDER — CLOPIDOGREL BISULFATE 75 MG/1
TABLET ORAL
Qty: 30 TABLET | Refills: 1 | Status: SHIPPED | OUTPATIENT
Start: 2020-07-10 | End: 2020-07-16

## 2020-07-28 RX ORDER — LINAGLIPTIN 5 MG/1
TABLET, FILM COATED ORAL
Qty: 30 TABLET | Refills: 5 | Status: SHIPPED | OUTPATIENT
Start: 2020-07-28 | End: 2021-01-04

## 2020-07-30 ENCOUNTER — TELEMEDICINE (OUTPATIENT)
Dept: FAMILY MEDICINE CLINIC | Age: 60
End: 2020-07-30
Payer: COMMERCIAL

## 2020-07-30 PROCEDURE — 99213 OFFICE O/P EST LOW 20 MIN: CPT | Performed by: FAMILY MEDICINE

## 2020-07-30 PROCEDURE — G8417 CALC BMI ABV UP PARAM F/U: HCPCS | Performed by: FAMILY MEDICINE

## 2020-07-30 PROCEDURE — 1036F TOBACCO NON-USER: CPT | Performed by: FAMILY MEDICINE

## 2020-07-30 PROCEDURE — G8427 DOCREV CUR MEDS BY ELIG CLIN: HCPCS | Performed by: FAMILY MEDICINE

## 2020-07-30 PROCEDURE — 3017F COLORECTAL CA SCREEN DOC REV: CPT | Performed by: FAMILY MEDICINE

## 2020-07-30 RX ORDER — PHENTERMINE HYDROCHLORIDE 37.5 MG/1
37.5 TABLET ORAL
Qty: 30 TABLET | Refills: 0 | Status: SHIPPED | OUTPATIENT
Start: 2020-07-30 | End: 2021-02-02 | Stop reason: SDUPTHER

## 2020-07-30 NOTE — PROGRESS NOTES
2020    TELEHEALTH EVALUATION -- Audio/Visual (During DXBUV-16 public health emergency)    HPI:    Kathrine Oakes (:  1960) has requested an audio/video evaluation for the following concern(s):    Seen today in virtual visit evaluation stating he is having some shoulder pain for the last month he does not remember anything exactly specific he had done but he did state he is does a lot of lifting at work  He also needs a refill on his medication for weight loss he is down to 220 pounds    Review of Systems    Prior to Visit Medications    Medication Sig Taking? Authorizing Provider   phentermine (ADIPEX-P) 37.5 MG tablet Take 1 tablet by mouth every morning (before breakfast) for 30 days.  Yes Seema Hameed, DO   TRADJENTA 5 MG tablet TAKE 1 TABLET BY MOUTH ONE TIME A DAY  Seema Hameed DO   clopidogrel (PLAVIX) 75 MG tablet TAKE 1 TABLET BY MOUTH ONE TIME A DAY WITH FOOD  Seema Hameed DO   linagliptin (TRADJENTA) 5 MG tablet TAKE 1 TABLET BY MOUTH ONE TIME A DAY  Seema Hameed DO   hydroCHLOROthiazide (MICROZIDE) 12.5 MG capsule TAKE 1 CAPSULE BY MOUTH ONE TIME A DAY  Seema Hameed, DO   atorvastatin (LIPITOR) 40 MG tablet TAKE 1 TABLET BY MOUTH ONE TIME A DAY  Seema Hameed, DO   carvedilol (COREG) 12.5 MG tablet TAKE 1 TABLET BY MOUTH 2 TIMES A DAY  Seema Hameed, DO   omeprazole (PRILOSEC) 20 MG delayed release capsule TAKE 1 CAPSULE BY MOUTH ONE TIME A DAY IN THE MORNING BEFORE BREAKFAST  Seema Hameed DO   meloxicam (MOBIC) 15 MG tablet TAKE 1 TABLET BY MOUTH ONE TIME A DAY AS NEEDED  Ray Monique MD   traZODone (DESYREL) 150 MG tablet TAKE ONE TABLET BY MOUTH IN THE EVENING  Seema Hameed DO   tiZANidine (ZANAFLEX) 4 MG tablet TAKE ONE TABLET BY MOUTH EVERY EVENING AS NEEDED FOR MUSCLE SPASMS  Seema Hameed DO   metFORMIN (GLUCOPHAGE) 1000 MG tablet TAKE ONE TABLET BY MOUTH TWICE A DAY WITH MEALS  Seema Hameed DO   tiZANidine (ZANAFLEX) 4 MG tablet TAKE ONE TABLET BY MOUTH EVERY EVENING AS NEEDED FOR MUSCLE SPASMS  Seema Hameed, DO   topiramate (TOPAMAX) 200 MG tablet   Historical Provider, MD   azithromycin (ZITHROMAX) 250 MG tablet 2 po qd day 1,then 1 po qd for 4 days  Seema Hameed, DO   HYDROcodone-acetaminophen (NORCO)  MG per tablet Take 1 tablet by mouth every 6 hours as needed for Pain. Jessica Pleitez MD   fluticasone (FLONASE) 50 MCG/ACT nasal spray 1 spray by Nasal route daily  Olaf Aparicio MD   Multiple Vitamin (MULTI VITAMIN DAILY PO) Take by mouth  Historical Provider, MD   nitroGLYCERIN (NITROSTAT) 0.4 MG SL tablet Place 0.4 mg under the tongue  Historical Provider, MD   aspirin 325 MG tablet Take 325 mg by mouth daily.     Historical Provider, MD       Social History     Tobacco Use    Smoking status: Never Smoker    Smokeless tobacco: Never Used   Substance Use Topics    Alcohol use: Yes     Comment: rare social, non past 5 months    Drug use: No        Allergies   Allergen Reactions    Pcn [Penicillins] Other (See Comments)     Unknown rx   ,   Past Medical History:   Diagnosis Date    Acquired trigger finger 6/5/2018    CAD (coronary artery disease)     [facility]/ involving coronary bypass graft of native heart with unstable angina pectoris    Cervical spondylosis     [facility]congest[facility] heart failure) [facility]     Chronic back pain     on 11/19/18 pt states is presently in pain mgmnt    Contusion of elbow 6/5/2018    Contusion of knee 6/5/2018    Contusion of shoulder region 6/5/2018    Diabetic foot ulcer with osteomyelitis ([address] 1/9/2018    Diabetic ulcer of toe associated with type 2 diabetes mellitus, with fat layer exposed ([address] 1/31/2018    Hypercholesterolemia 12/5/2017    Hyperlipemia, mixed     Hypertension     Ischemic cardiomyopathy 5/7/2018    Non-pressure chronic ulcer of left lower leg with fat layer exposed ([address] 6/9/2014    Obesity     Obesity, Class III, BMI 40-49.9 (morbid obesity) ([address] 4/6/2015    Obstructive sleep apnea syndrome     Osteoarthritis of carpometacarpal [place]) joint of thumb 6/5/2018    Osteoarthritis of knee 6/5/2018    Peripheral vascular disease ([address]     with venous stasis and ulcerations. Dr Ileana Singh Presence of coronary angioplasty implant and graft 9/11/2009    Sleep apnea     Dr Dona Galvan of knee and leg 6/5/2018    Sprain of shoulder and upper arm 6/5/2018    Type II or unspecified type diabetes mellitus without mention of complication, not stated as uncontrolled     Ulcer of left foot, with fat layer exposed ([address] 1/9/2018    Unspecified sleep apnea     Dr Svetlana Nunez CPAP    Venous insufficiency of both lower extremities 1/31/2018   ,   Past Surgical History:   Procedure Laterality Date    CARPAL TUNNEL RELEASE Right 3/22/13    Dr. Ramirez Reynolds.     CORONARY ANGIOPLASTY WITH STENT PLACEMENT  2002,2006.2009    X 3 separately    CORONARY ARTERY BYPASS GRAFT  2/26/07    [facility], Dr Marin Mays (X 3)    NECK SURGERY  2010    cervical stenoses C5-C6-C7and partial T1 laminectomy with fusion of C6-7    OTHER SURGICAL HISTORY      wound care ulcers of legs (bilaterally) with Dr Danelle Martines  age 11    VEIN SURGERY      closure of peripherator veins of legs, Dr Bull Sood     ,   Social History     Tobacco Use    Smoking status: Never Smoker    Smokeless tobacco: Never Used   Substance Use Topics    Alcohol use: Yes     Comment: rare social, non past 5 months    Drug use: No   ,   Family History   Problem Relation Age of Onset    Cancer Mother         lung    Diabetes Father     Heart Disease Father     High Blood Pressure Father     Diabetes Brother     Heart Disease Brother     High Blood Pressure Brother        PHYSICAL EXAMINATION:  [ INSTRUCTIONS:  \"[x]\" Indicates a positive item  \"[]\" Indicates a negative item  -- DELETE ALL ITEMS NOT EXAMINED]  Vital Signs: (As obtained by patient/caregiver or practitioner observation)    Blood pressure-  Heart rate- Respiratory rate-    Temperature-  Pulse oximetry-     Constitutional: [x] Appears well-developed and well-nourished [x] No apparent distress      [] Abnormal-   Mental status  [x] Alert and awake  [x] Oriented to person/place/time []Able to follow commands      Eyes:  EOM    [x]  Normal  [] Abnormal-  Sclera  [x]  Normal  [] Abnormal -         Discharge [x]  None visible  [] Abnormal -    HENT:   [x] Normocephalic, atraumatic. [] Abnormal   [x] Mouth/Throat: Mucous membranes are moist.     External Ears [x] Normal  [] Abnormal-     Neck: [x] No visualized mass     Pulmonary/Chest: [x] Respiratory effort normal.  [x] No visualized signs of difficulty breathing or respiratory distress        [] Abnormal-      Musculoskeletal:   [x] Normal gait with no signs of ataxia         [x] Normal range of motion of neck        [x] Abnormal-decreased range of motion of right arm      Neurological:        [] No Facial Asymmetry (Cranial nerve 7 motor function) (limited exam to video visit)          [] No gaze palsy        [] Abnormal-         Skin:        [x] No significant exanthematous lesions or discoloration noted on facial skin         [] Abnormal-            Psychiatric:       [x] Normal Affect [x] No Hallucinations        [] Abnormal-     Other pertinent observable physical exam findings-     ASSESSMENT/PLAN:  1. Acute pain of right shoulder    2. Obesity, Class III, BMI 40-49.9 (morbid obesity) ([facility]      Orders Placed This Encounter   Procedures    XR SHOULDER RIGHT (MIN 2 VIEWS)     Requested Prescriptions     Signed Prescriptions Disp Refills    phentermine (ADIPEX-P) 37.5 MG tablet 30 tablet 0     Sig: Take 1 tablet by mouth every morning (before breakfast) for 30 days. Return in about 6 months (around 1/30/2021), or if symptoms worsen or fail to improve. Tianna Hong is a 61 y.o. male being evaluated by a Virtual Visit (video visit) encounter to address concerns as mentioned above.   A caregiver was

## 2020-08-01 ENCOUNTER — HOSPITAL ENCOUNTER (OUTPATIENT)
Age: 60
Discharge: HOME | End: 2020-08-03
Payer: COMMERCIAL

## 2020-08-01 ENCOUNTER — HOSPITAL ENCOUNTER (OUTPATIENT)
Dept: GENERAL RADIOLOGY | Age: 60
Discharge: HOME | End: 2020-08-03
Payer: COMMERCIAL

## 2020-08-01 PROCEDURE — 73030 X-RAY EXAM OF SHOULDER: CPT

## 2020-08-05 NOTE — TELEPHONE ENCOUNTER
Delmis Will is calling to request a refill on the following medication(s):    Last Visit Date (If Applicable):  4/21/4836    Next Visit Date:    8/19/2020    Medication Request:  Requested Prescriptions     Pending Prescriptions Disp Refills    metFORMIN (GLUCOPHAGE) 1000 MG tablet [Pharmacy Med Name: METFORMIN HCL 1000MG TABS] 60 tablet 5     Sig: TAKE 1 TABLET BY MOUTH 2 TIMES A DAY WITH MEALS

## 2020-08-06 RX ORDER — MELOXICAM 15 MG/1
TABLET ORAL
Qty: 90 TABLET | Refills: 1 | Status: SHIPPED | OUTPATIENT
Start: 2020-08-06 | End: 2021-02-25

## 2020-08-06 NOTE — TELEPHONE ENCOUNTER
Kaitlynn Simon is calling to request a refill on the following medication(s):    Last Visit Date (If Applicable):  7/15/5918    Next Visit Date:    8/19/2020    Medication Request:  Requested Prescriptions     Pending Prescriptions Disp Refills    omeprazole (PRILOSEC) 20 MG delayed release capsule [Pharmacy Med Name: OMEPRAZOLE 20MG CPDR] 90 capsule 1     Sig: TAKE 1 CAPSULE BY MOUTH ONE TIME A DAY IN THE MORNING BEFORE BREAKFAST

## 2020-08-07 RX ORDER — OMEPRAZOLE 20 MG/1
CAPSULE, DELAYED RELEASE ORAL
Qty: 90 CAPSULE | Refills: 1 | Status: SHIPPED | OUTPATIENT
Start: 2020-08-07 | End: 2020-11-18 | Stop reason: SDUPTHER

## 2020-08-18 ENCOUNTER — TELEPHONE (OUTPATIENT)
Dept: PULMONOLOGY | Age: 60
End: 2020-08-18

## 2020-08-18 NOTE — TELEPHONE ENCOUNTER
Patient called and would like a new BiPAP.  Order sent to [address] for new auto BiPAP at 20 / 16

## 2020-08-28 ENCOUNTER — PROCEDURE VISIT (OUTPATIENT)
Dept: FAMILY MEDICINE CLINIC | Age: 60
End: 2020-08-28
Payer: COMMERCIAL

## 2020-08-28 VITALS
HEART RATE: 91 BPM | WEIGHT: 234.4 LBS | HEIGHT: 73 IN | TEMPERATURE: 98.6 F | OXYGEN SATURATION: 98 % | SYSTOLIC BLOOD PRESSURE: 136 MMHG | DIASTOLIC BLOOD PRESSURE: 80 MMHG | BODY MASS INDEX: 31.07 KG/M2

## 2020-08-28 PROCEDURE — 3017F COLORECTAL CA SCREEN DOC REV: CPT | Performed by: FAMILY MEDICINE

## 2020-08-28 PROCEDURE — G8427 DOCREV CUR MEDS BY ELIG CLIN: HCPCS | Performed by: FAMILY MEDICINE

## 2020-08-28 PROCEDURE — 99214 OFFICE O/P EST MOD 30 MIN: CPT | Performed by: FAMILY MEDICINE

## 2020-08-28 PROCEDURE — G8417 CALC BMI ABV UP PARAM F/U: HCPCS | Performed by: FAMILY MEDICINE

## 2020-08-28 PROCEDURE — 1036F TOBACCO NON-USER: CPT | Performed by: FAMILY MEDICINE

## 2020-08-28 PROCEDURE — 20610 DRAIN/INJ JOINT/BURSA W/O US: CPT | Performed by: FAMILY MEDICINE

## 2020-08-28 RX ORDER — METHYLPREDNISOLONE ACETATE 40 MG/ML
40 INJECTION, SUSPENSION INTRA-ARTICULAR; INTRALESIONAL; INTRAMUSCULAR; SOFT TISSUE ONCE
Status: COMPLETED | OUTPATIENT
Start: 2020-08-28 | End: 2020-08-28

## 2020-08-28 RX ORDER — TESTOSTERONE 16.2 MG/G
GEL TRANSDERMAL
Qty: 75 G | Refills: 5 | Status: SHIPPED | OUTPATIENT
Start: 2020-08-28 | End: 2020-09-17

## 2020-08-28 RX ADMIN — METHYLPREDNISOLONE ACETATE 40 MG: 40 INJECTION, SUSPENSION INTRA-ARTICULAR; INTRALESIONAL; INTRAMUSCULAR; SOFT TISSUE at 11:15

## 2020-08-28 ASSESSMENT — PATIENT HEALTH QUESTIONNAIRE - PHQ9
SUM OF ALL RESPONSES TO PHQ QUESTIONS 1-9: 0
2. FEELING DOWN, DEPRESSED OR HOPELESS: 0
1. LITTLE INTEREST OR PLEASURE IN DOING THINGS: 0
SUM OF ALL RESPONSES TO PHQ9 QUESTIONS 1 & 2: 0
SUM OF ALL RESPONSES TO PHQ QUESTIONS 1-9: 0

## 2020-08-28 NOTE — PROGRESS NOTES
[facility] [address] [place] [address] [address] [address]  Dept: [phone]      Shannan Streeter is a 61 y.o. male who presents today for follow up on his  medical conditions as noted below.       Chief Complaint   Patient presents with    Shoulder Pain     Right        Patient Active Problem List:     Hyperlipemia, mixed     Sleep apnea     Peripheral vascular disease (HCC)     CAD (coronary artery disease)     CHF (congestive heart failure) (HCC)     CTS (carpal tunnel syndrome)     Non-pressure chronic ulcer of left lower leg with fat layer exposed ([address]     Hypotension     Obesity, Class III, BMI 40-49.9 (morbid obesity) ([address]     Essential hypertension     DDD (degenerative disc disease), cervical     DJD (degenerative joint disease), cervical     Primary osteoarthritis involving multiple joints     Paresthesias in left hand     Uncontrolled type 2 diabetes mellitus without complication, without long-term current use of insulin (HCC)     Coronary artery disease involving coronary bypass graft of native heart without angina pectoris     Sleep apnea     Cervical spondylosis     Obesity     Long term current use of antithrombotics/antiplatelets     BMI 00.5-93.8, adult ([address]     H/O cervical spine surgery     Long term (current) use of non-steroidal anti-inflammatories (nsaid)     Chronic prescription opiate use     Severe comorbid illness     Hx of cervical spine surgery     Chronic neck pain     Candidal balanitis     Cellulitis of third toe, left     Nail avulsion, toe, initial encounter     Cellulitis of second toe, right     Abnormal nuclear stress test     Abnormal stress test     Hypercholesterolemia     Diabetic foot ulcer with osteomyelitis (HCC)     Ulcer of left foot, with fat layer exposed ([address]     Acquired trigger finger     Contusion of elbow     Contusion of shoulder region     Contusion of knee     Degeneration of lumbar intervertebral disc     Diabetic ulcer of toe associated with type 2 diabetes mellitus, with fat layer exposed ([address]     History of coronary artery bypass graft x 3     Ischemic cardiomyopathy     Osteoarthritis of knee     Osteoarthritis of carpometacarpal (CMC) joint of thumb     Sprain of knee and leg     Sprain of shoulder and upper arm     Presence of coronary angioplasty implant and graft     Venous insufficiency of both lower extremities     Past Medical History:   Diagnosis Date    Acquired trigger finger 6/5/2018    CAD (coronary artery disease)     [facility]/ involving coronary bypass graft of native heart with unstable angina pectoris    Cervical spondylosis     [facility]congest[facility] heart failure) [facility])     Chronic back pain     on 11/19/18 pt states is presently in pain mgmnt    Contusion of elbow 6/5/2018    Contusion of knee 6/5/2018    Contusion of shoulder region 6/5/2018    Diabetic foot ulcer with osteomyelitis ([address] 1/9/2018    Diabetic ulcer of toe associated with type 2 diabetes mellitus, with fat layer exposed ([address] 1/31/2018    Hypercholesterolemia 12/5/2017    Hyperlipemia, mixed     Hypertension     Ischemic cardiomyopathy 5/7/2018    Non-pressure chronic ulcer of left lower leg with fat layer exposed ([address] 6/9/2014    Obesity     Obesity, Class III, BMI 40-49.9 (morbid obesity) ([address] 4/6/2015    Obstructive sleep apnea syndrome     Osteoarthritis of carpometacarpal (CMC) joint of thumb 6/5/2018    Osteoarthritis of knee 6/5/2018    Peripheral vascular disease ([address]     with venous stasis and ulcerations.  Dr Malika Buck Presence of coronary angioplasty implant and graft 9/11/2009    Sleep apnea     Dr Barrera Bowels of knee and leg 6/5/2018    Sprain of shoulder and upper arm 6/5/2018    Type II or unspecified type diabetes mellitus without mention of complication, not stated as uncontrolled     Ulcer of left foot, with fat layer exposed ([address] 1/9/2018    Unspecified sleep apnea      ahan/Wears CPAP    Venous insufficiency of both lower extremities 1/31/2018      Past Surgical History:   Procedure Laterality Date    CARPAL TUNNEL RELEASE Right 3/22/13    Dr. Jai Lopez.  CORONARY ANGIOPLASTY WITH STENT PLACEMENT  2002,2006.2009    X 3 separately    CORONARY ARTERY BYPASS GRAFT  2/26/07    [facility], Dr Demond Pfeiffer (X 3)    NECK SURGERY  2010    cervical stenoses C5-C6-C7and partial T1 laminectomy with fusion of C6-7    OTHER SURGICAL HISTORY      wound care ulcers of legs (bilaterally) with Dr Divya Rodriguez  age 11    VEIN SURGERY      closure of peripherator veins of legs, Dr Ni Elizabeth EXTRACTION       Family History   Problem Relation Age of Onset    Cancer Mother         lung    Diabetes Father     Heart Disease Father     High Blood Pressure Father     Diabetes Brother     Heart Disease Brother     High Blood Pressure Brother        Current Outpatient Medications   Medication Sig Dispense Refill    ANDROGEL 20.25 MG/1.25GM (1.62%) GEL 1 pump to each shoulder qd 75 g 5    omeprazole (PRILOSEC) 20 MG delayed release capsule TAKE 1 CAPSULE BY MOUTH ONE TIME A DAY IN THE MORNING BEFORE BREAKFAST 90 capsule 1    meloxicam (MOBIC) 15 MG tablet TAKE 1 TABLET BY MOUTH ONE TIME A DAY AS NEEDED 90 tablet 1    metFORMIN (GLUCOPHAGE) 1000 MG tablet TAKE 1 TABLET BY MOUTH 2 TIMES A DAY WITH MEALS 60 tablet 5    phentermine (ADIPEX-P) 37.5 MG tablet Take 1 tablet by mouth every morning (before breakfast) for 30 days.  30 tablet 0    clopidogrel (PLAVIX) 75 MG tablet TAKE 1 TABLET BY MOUTH ONE TIME A DAY WITH FOOD 30 tablet 11    linagliptin (TRADJENTA) 5 MG tablet TAKE 1 TABLET BY MOUTH ONE TIME A DAY 30 tablet 5    hydroCHLOROthiazide (MICROZIDE) 12.5 MG capsule TAKE 1 CAPSULE BY MOUTH ONE TIME A DAY 30 capsule 5    atorvastatin (LIPITOR) 40 MG tablet TAKE 1 TABLET BY MOUTH ONE TIME A DAY 30 tablet 3    carvedilol (COREG) 12.5 MG tablet TAKE 1 TABLET BY MOUTH 2 TIMES A DAY 60 tablet 5    traZODone (DESYREL) 150 MG tablet TAKE ONE TABLET BY MOUTH IN THE EVENING 30 tablet 11    tiZANidine (ZANAFLEX) 4 MG tablet TAKE ONE TABLET BY MOUTH EVERY EVENING AS NEEDED FOR MUSCLE SPASMS 30 tablet 3    topiramate (TOPAMAX) 200 MG tablet       HYDROcodone-acetaminophen (NORCO)  MG per tablet Take 1 tablet by mouth every 6 hours as needed for Pain. Samia Star Multiple Vitamin (MULTI VITAMIN DAILY PO) Take by mouth      nitroGLYCERIN (NITROSTAT) 0.4 MG SL tablet Place 0.4 mg under the tongue      aspirin 325 MG tablet Take 325 mg by mouth daily.  TRADJENTA 5 MG tablet TAKE 1 TABLET BY MOUTH ONE TIME A DAY (Patient not taking: Reported on 8/28/2020) 30 tablet 5    tiZANidine (ZANAFLEX) 4 MG tablet TAKE ONE TABLET BY MOUTH EVERY EVENING AS NEEDED FOR MUSCLE SPASMS 30 tablet 3    azithromycin (ZITHROMAX) 250 MG tablet 2 po qd day 1,then 1 po qd for 4 days (Patient not taking: Reported on 8/28/2020) 1 packet 0    fluticasone (FLONASE) 50 MCG/ACT nasal spray 1 spray by Nasal route daily 1 Bottle 3     No current facility-administered medications for this visit. ALLERGIES:    Allergies   Allergen Reactions    Pcn [Penicillins] Other (See Comments)     Unknown rx       Social History     Tobacco Use    Smoking status: Never Smoker    Smokeless tobacco: Never Used   Substance Use Topics    Alcohol use: Yes     Comment: rare social, non past 5 months        LDL Cholesterol (mg/dL)   Date Value   06/01/2020 77     HDL (mg/dL)   Date Value   06/01/2020 45     BUN (mg/dL)   Date Value   06/01/2020 26 (H)     CREATININE (mg/dL)   Date Value   06/01/2020 0.93     Glucose (mg/dL)   Date Value   06/01/2020 141 (H)     Hemoglobin A1C (%)   Date Value   06/01/2020 7.2     Microalb/Crt.  Ratio (mcg/mg creat)   Date Value   10/07/2019 CANNOT BE CALCULATED              Subjective:      HPI  He is being seen today for follow-up on his ongoing right shoulder pain he did have an posterior oropharyngeal erythema. Eyes: Conjunctivae and EOM are normal. Pupils are equal, round, and reactive to light. Neck: Normal range of motion. Neck supple. No thyromegaly present. Cardiovascular: Normal rate, regular rhythm and normal heart sounds. No murmur heard. Pulmonary/Chest: Effort normal and breath sounds normal. He has no wheezes. Hehas no rales. Abdominal: Soft. Bowel sounds are normal. He exhibits no distension and no mass. There is no tenderness. There is no rebound and no guarding. Genitourinary/Anorectal:deferred  Musculoskeletal: Normal range of motion. He exhibits no edema ++ tenderness. r shoulder mild decreased rom   Lymphadenopathy: He has no cervical adenopathy. Neurological: He is alert and oriented to person, place, and time. He has normal reflexes. Skin: Skin is warm and dry. No rash noted. Psychiatric: He has a normal mood and affect. His   behavior is normal.       Assessment:      1. Acute pain of right shoulder    2. Hypotestosteronemia          Plan:      Call or return to clinic prn if these symptoms worsen or fail to improve as anticipated. I have reviewed the instructions with the patient, answering all questions to his satisfaction. Return if symptoms worsen or fail to improve. Orders Placed This Encounter   Procedures    95721 - LA DRAIN/INJECT LARGE JOINT/BURSA     Orders Placed This Encounter   Medications    methylPREDNISolone acetate (DEPO-MEDROL) injection 40 mg    ANDROGEL 20.25 MG/1.25GM (1.62%) GEL     Si pump to each shoulder qd     Dispense:  75 g     Refill:  5     Joint injection procedure: Informed consent obtained. shoulder right is identified and marked. Cleansed  with betadine, sprayed with ethyl chloride. Injected with 9-1, 9 cc of 1% lidocaine, 1 cc 40mg DepoMedrol injected into joint space. Good hemostasis. Pt tolerated well. Dressing applied. Post injection instructions given to patient.    fu if not imp Electronically signed by Joey Torres DO on 8/28/2020 at 11:49 AM

## 2020-09-02 ENCOUNTER — TELEPHONE (OUTPATIENT)
Dept: FAMILY MEDICINE CLINIC | Age: 60
End: 2020-09-02

## 2020-09-02 NOTE — TELEPHONE ENCOUNTER
Pt states the androgel is not covered by insurance but states that Testosterone injection should be cheaper and requesting injection be sent to [address] [place] [address]. Pt states his wife can give the injections.

## 2020-09-11 ENCOUNTER — TELEPHONE (OUTPATIENT)
Dept: FAMILY MEDICINE CLINIC | Age: 60
End: 2020-09-11

## 2020-09-11 NOTE — TELEPHONE ENCOUNTER
Still having a lot pain in RT shoulder, didn't know if you could do another cortisone  Injection or should he get the MRI

## 2020-09-15 ENCOUNTER — TELEPHONE (OUTPATIENT)
Dept: PULMONOLOGY | Age: 60
End: 2020-09-15

## 2020-09-17 ENCOUNTER — TELEMEDICINE (OUTPATIENT)
Dept: PULMONOLOGY | Age: 60
End: 2020-09-17
Payer: COMMERCIAL

## 2020-09-17 PROCEDURE — 99214 OFFICE O/P EST MOD 30 MIN: CPT | Performed by: INTERNAL MEDICINE

## 2020-09-17 ASSESSMENT — SLEEP AND FATIGUE QUESTIONNAIRES
HOW LIKELY ARE YOU TO NOD OFF OR FALL ASLEEP WHILE SITTING QUIETLY AFTER LUNCH WITHOUT ALCOHOL: 0
HOW LIKELY ARE YOU TO NOD OFF OR FALL ASLEEP WHILE SITTING AND READING: 0
HOW LIKELY ARE YOU TO NOD OFF OR FALL ASLEEP IN A CAR, WHILE STOPPED FOR A FEW MINUTES IN TRAFFIC: 0
HOW LIKELY ARE YOU TO NOD OFF OR FALL ASLEEP WHILE SITTING AND TALKING TO SOMEONE: 0
HOW LIKELY ARE YOU TO NOD OFF OR FALL ASLEEP WHILE LYING DOWN TO REST IN THE AFTERNOON WHEN CIRCUMSTANCES PERMIT: 1
HOW LIKELY ARE YOU TO NOD OFF OR FALL ASLEEP WHILE WATCHING TV: 0
HOW LIKELY ARE YOU TO NOD OFF OR FALL ASLEEP WHILE SITTING INACTIVE IN A PUBLIC PLACE: 0

## 2020-09-17 NOTE — PROGRESS NOTES
2020    TELEHEALTH EVALUATION -- Audio/Visual (During MRVQO-13 public health emergency)    Patient and physician are located in their individual locations. This is visit is completed via [employer] application [x]/ Doxy. me[] / Telephone []     HPI:    Barrie Betts (:  1960) has requested an audio/video evaluation for the following concern(s):  Mr. Kuldip Butler is known to have obstructive sleep apnea syndrome which is being treated with BiPAP. BiPAP has been effective in relieving the apnea. He uses a machine regularly however lately he has been having problems using the machine because of ill fitting mask. He did not mask. He is supposed to get this mass next visit next week. He has lost almost 60 pounds of weight. Is quite likely his knee the need for his BiPAP pressures is all gone off. Stuffiness or sinusitis    His blood pressure has been under good control. He has not noted any chest pain no angina no PND    He does have cervical spondylosis which is not causing any major problems at this time. Blood sugars have been stable as well. He does have hypercholesterolemia and peripheral vascular disease. He also has some degree of degenerative arthritis. Overall his pulmonary status and sleep problems have been under good control. Review of Systems    Prior to Visit Medications    Medication Sig Taking? Authorizing Provider   Testosterone Cypionate 200 MG/ML KIT Inject 200 mg into the muscle every 30 days for 30 days.  please include syringes Yes Seema Hameed, DO   omeprazole (PRILOSEC) 20 MG delayed release capsule TAKE 1 CAPSULE BY MOUTH ONE TIME A DAY IN THE MORNING BEFORE BREAKFAST Yes Seema Hameed DO   meloxicam (MOBIC) 15 MG tablet TAKE 1 TABLET BY MOUTH ONE TIME A DAY AS NEEDED Yes Seema Hameed DO   metFORMIN (GLUCOPHAGE) 1000 MG tablet TAKE 1 TABLET BY MOUTH 2 TIMES A DAY WITH MEALS Yes Seema Hameed DO   TRADJENTA 5 MG tablet TAKE 1 TABLET BY MOUTH ONE TIME A DAY Yes Seema Hameed DO   clopidogrel (PLAVIX) 75 MG tablet TAKE 1 TABLET BY MOUTH ONE TIME A DAY WITH FOOD Yes Seema Hameed DO   linagliptin (TRADJENTA) 5 MG tablet TAKE 1 TABLET BY MOUTH ONE TIME A DAY Yes Seema Hameed DO   hydroCHLOROthiazide (MICROZIDE) 12.5 MG capsule TAKE 1 CAPSULE BY MOUTH ONE TIME A DAY Yes Seema Hameed DO   atorvastatin (LIPITOR) 40 MG tablet TAKE 1 TABLET BY MOUTH ONE TIME A DAY Yes Seema Hameed DO   carvedilol (COREG) 12.5 MG tablet TAKE 1 TABLET BY MOUTH 2 TIMES A DAY Yes Seema Hameed DO   traZODone (DESYREL) 150 MG tablet TAKE ONE TABLET BY MOUTH IN THE EVENING Yes Seema Hameed DO   tiZANidine (ZANAFLEX) 4 MG tablet TAKE ONE TABLET BY MOUTH EVERY EVENING AS NEEDED FOR MUSCLE SPASMS Yes Seema Hameed DO   topiramate (TOPAMAX) 200 MG tablet  Yes Historical Provider, MD   HYDROcodone-acetaminophen (NORCO)  MG per tablet Take 1 tablet by mouth every 6 hours as needed for Pain. Diego Estrella MD   Multiple Vitamin (MULTI VITAMIN DAILY PO) Take by mouth Yes Historical Provider, MD   nitroGLYCERIN (NITROSTAT) 0.4 MG SL tablet Place 0.4 mg under the tongue Yes Historical Provider, MD   aspirin 325 MG tablet Take 325 mg by mouth daily. Yes Historical Provider, MD   tiZANidine (ZANAFLEX) 4 MG tablet TAKE ONE TABLET BY MOUTH EVERY EVENING AS NEEDED FOR MUSCLE SPASMS  Seema Hameed DO       Social History     Tobacco Use    Smoking status: Never Smoker    Smokeless tobacco: Never Used   Substance Use Topics    Alcohol use: Yes     Comment: rare social, non past 5 months    Drug use: No            RECORD REVIEW: Previous medical records were reviewed at today's visit.     Wt Readings from Last 3 Encounters:   08/28/20 234 lb 6.4 oz (106.3 kg)   06/04/20 238 lb (108 kg)   06/01/20 239 lb (108.4 kg)       Results for orders placed or performed during the hospital encounter of 06/01/20   Vitamin B12 & Folate   Result Value Ref Range    Vitamin B-12 324 232 - 1245 pg/mL    Folate >20.0 >4.8 ng/mL   Iron and TIBC   Result Value Ref Range    Iron 70 59 - 158 ug/dL    TIBC 293 250 - 450 ug/dL    Iron Saturation 24 20 - 55 %    UIBC 223 112 - 347 ug/dL   TSH without Reflex   Result Value Ref Range    TSH 1.35 0.30 - 5.00 mIU/L   Testosterone, Free   Result Value Ref Range    Testosterone 150 (L) 220 - 1,000 ng/dL    Sex Hormone Binding 33 11 - 80 nmol/L    Testosterone, Free 28.0 (L) 47 - 244 pg/mL   T4, Free   Result Value Ref Range    Thyroxine, Free 1.25 0.93 - 1.70 ng/dL   PSA, Prostatic Specific Antigen   Result Value Ref Range    PSA 1.07 <4.1 ug/L   Lipid Panel   Result Value Ref Range    Cholesterol 138 <200 mg/dL    HDL 45 >40 mg/dL    LDL Cholesterol 77 0 - 130 mg/dL    Chol/HDL Ratio 3.1 <5    Triglycerides 78 <150 mg/dL    VLDL NOT REPORTED 1 - 30 mg/dL   Comprehensive Metabolic Panel   Result Value Ref Range    Glucose 141 (H) 70 - 99 mg/dL    BUN 26 (H) 6 - 20 mg/dL    CREATININE 0.93 0.70 - 1.20 mg/dL    Bun/Cre Ratio NOT REPORTED 9 - 20    Calcium 9.7 8.6 - 10.4 mg/dL    Sodium 141 135 - 144 mmol/L    Potassium 3.9 3.7 - 5.3 mmol/L    Chloride 106 98 - 107 mmol/L    CO2 21 20 - 31 mmol/L    Anion Gap 14 9 - 17 mmol/L    Alkaline Phosphatase 66 40 - 129 U/L    ALT 16 5 - 41 U/L    AST 13 <40 U/L    Total Bilirubin 0.28 (L) 0.3 - 1.2 mg/dL    Total Protein 7.4 6.4 - 8.3 g/dL    Alb 4.4 3.5 - 5.2 g/dL    Albumin/Globulin Ratio 1.5 1.0 - 2.5    GFR Non-African American >60 >60 mL/min    GFR African American >60 >60 mL/min    GFR Comment          GFR Staging NOT REPORTED    CBC Auto Differential   Result Value Ref Range    WBC 7.6 3.5 - 11.3 k/uL    RBC 4.81 4.21 - 5.77 m/uL    Hemoglobin 14.6 13.0 - 17.0 g/dL    Hematocrit 45.2 40.7 - 50.3 %    MCV 94.0 82.6 - 102.9 fL    MCH 30.4 25.2 - 33.5 pg    MCHC 32.3 28.4 - 34.8 g/dL    RDW 12.5 11.8 - 14.4 %    Platelets 083 825 - 960 k/uL    MPV 9.3 8.1 - 13.5 fL    NRBC Automated 0.0 0.0 per 100 WBC    Differential Type NOT REPORTED Seg Neutrophils 65 36 - 65 %    Lymphocytes 24 24 - 43 %    Monocytes 9 3 - 12 %    Eosinophils % 2 1 - 4 %    Basophils 0 0 - 2 %    Immature Granulocytes 0 0 %    Segs Absolute 4.88 1.50 - 8.10 k/uL    Absolute Lymph # 1.83 1.10 - 3.70 k/uL    Absolute Mono # 0.71 0.10 - 1.20 k/uL    Absolute Eos # 0.12 0.00 - 0.44 k/uL    Basophils Absolute 0.03 0.00 - 0.20 k/uL    Absolute Immature Granulocyte <0.03 0.00 - 0.30 k/uL    WBC Morphology NOT REPORTED     RBC Morphology NOT REPORTED     Platelet Estimate NOT REPORTED        No results found. PHYSICAL EXAMINATION:  Due to this being a TeleHealth encounter, evaluation of the following organ systems is limited: Vitals/Constitutional/EENT/Resp/CV/GI//MS/Neuro/Skin/Heme-Lymph-Imm. Constitutional: [x] Appears well-developed and well-nourished. [] Abnormal  Mental status  [x] Alert and awake  [x] Oriented to person/place/time [x]Able to follow commands    [x] No apparent distress      Eyes:  EOM    [x]  Normal  [] Abnormal-  Sclera  [x]  Normal  [] Abnormal -         Discharge [x]  None visible  [] Abnormal -    HENT:   [x] Normocephalic, atraumatic. [] Abnormal shaped head   [x] Mouth/Throat: Mucous membranes are moist.     Ears [x] Normal  [] Abnormal-    Neck: [x] Normal range of motion [x] Supple [x] No visualized mass. Pulmonary/Chest: [x] Respiratory effort normal.  [x] No visualized signs of difficulty breathing or respiratory distress        [] Abnormal      Musculoskeletal:   [x] Normal range of motion. [x] Normal gait with no signs of ataxia. [x]  No signs of cyanosis of the peripheral portions of extremities.          [] Abnormal       Neurological:        [x] Normal cranial nerve (limited exam to video visit) [x] No focal weakness observed       [] Abnormal          Speech       [x] Normal   [] Abnormal     Skin:        [x] No rash on visible skin  [x] Normal  [] Abnormal     Psychiatric:       [x] Normal  [] Abnormal        [x] Normal Mood [] Anxious appearing        Other Pertinent Exam Findings:       ASSESSMENT:  Obstructive sleep apnea syndrome   #2 obesity  Hypertension   Coronary artery disease  Diabetes  Peripheral vascular disease  Hypercholesterolemia    Plan:   1. Patient advised to continue his effort to lose weight. 2.   3. I told him to try the new mask next week. He will contact me in 3 to 4 weeks to see how things are working for him. Is quite likely that his pressure needs have gone down however try to re-titrate the BiPAP pressures. 4.   5. His cardiovascular status is stable  6.   7. Blood pressure is stable  8.   9. Glucose is also stable. Patient will continue the use of humidification along with BiPAP machine. 10.   11. Continue use of machine 4 to about 6 hours every night. He is not sleepy during the daytime the  and he has no trouble staying awake while driving  12.   13. Patient advised to take influenza vaccine although he is not too excited about it. 14.   15.   16. An  electronic signature was used to authenticate this note. --Jorge Santiago MD on 9/17/2020 at 11:54 AM    9}    Pursuant to the emergency declaration under the [facility], [facility] waiver authority and the [employer] and [employer] Act, this Virtual  Visit was conducted, with patient's consent, to reduce the patient's risk of exposure to COVID-19 and provide continuity of care for an established patient.     Services were provided through a video synchronous discussion virtually to substitute for in-person clinic visit.     _______________________________________________________________________________________________________________________________________________  FOR TELEPHONE VISITS PLEASE COMPLETE THE FOLLOWING      Consent:  He and/or health care decision maker is aware that that he may receive a bill for this telephone service, depending on his insurance coverage, and has provided verbal consent to proceed: Yes      I affirm this is a Patient Initiated Episode with an Established Patient who has not had a related appointment within my department in the past 7 days or scheduled within the next 24 hours.     Total Time: minutes: 21-30 minutes    Note: not billable if this call serves to triage the patient into an appointment for the relevant concern

## 2020-09-21 ENCOUNTER — TELEPHONE (OUTPATIENT)
Dept: FAMILY MEDICINE CLINIC | Age: 60
End: 2020-09-21

## 2020-09-21 NOTE — TELEPHONE ENCOUNTER
Pt is having a lot of pain in the right shoulder and had an steroid injection 8/28/20 and wants to know if he should get another injection or get an MRI done. Please advise.

## 2020-09-21 NOTE — TELEPHONE ENCOUNTER
Patient states he needs needles for his Testerone injections patient received the syringes but not the needles.

## 2020-09-21 NOTE — TELEPHONE ENCOUNTER
It is too early to get another cortisone injection if he is in this much pain still he needs to get an MRI

## 2020-09-29 ENCOUNTER — TELEPHONE (OUTPATIENT)
Dept: PULMONOLOGY | Age: 60
End: 2020-09-29

## 2020-09-29 NOTE — TELEPHONE ENCOUNTER
The patient called and said that his BiPAP pressure feels like it's blowing the mask off his face! . He has recently lost about 60 lbs and Dr. Louis Gamboa said that his pressures may be too high now. I called Dr. Louis Gamboa and he said to decrease him to 16/12 cm H2O. Order sent to [facility].

## 2020-09-30 ENCOUNTER — TELEPHONE (OUTPATIENT)
Dept: FAMILY MEDICINE CLINIC | Age: 60
End: 2020-09-30

## 2020-09-30 RX ORDER — DIAZEPAM 10 MG/1
TABLET ORAL
Qty: 2 TABLET | Refills: 0 | Status: SHIPPED | OUTPATIENT
Start: 2020-09-30 | End: 2020-10-12 | Stop reason: SDUPTHER

## 2020-09-30 NOTE — TELEPHONE ENCOUNTER
Pt informed. Review of Systems/Medical History  Patient summary reviewed  Chart reviewed  No history of anesthetic complications     Cardiovascular  Hyperlipidemia,   Comment: Hx of orthostatic hypotension,  Pulmonary  Negative pulmonary ROS        GI/Hepatic  Dysphagia,   GERD (on no meds) well controlled,   Comment: Occas  Dysphagia due to Parkinson's     Kidney stones,   Comment: Neurogenic bladder     Endo/Other    Comment: Hoarseness due to Parkinson's    GYN  Negative gynecology ROS          Hematology  Negative hematology ROS      Musculoskeletal  Back pain (lumbar radiculopathy) , lumbar pain and spinal stenosis, Osteoarthritis,   Arthritis     Neurology    Neuromuscular disease: Lumbar radiculopathy/Post lami syndrome  , CVA , Visual impairment (OS-macular degeneration),   Comment: Parkinson dz--s/p DBS    Gait difficulty Psychology   Depression ,   Chronic pain (post-laminectomy syndrome),            Physical Exam    Airway    Mallampati score: II  TM Distance: >3 FB  Neck ROM: full     Dental   No notable dental hx     Cardiovascular  Rhythm: regular, Rate: normal,     Pulmonary  Breath sounds clear to auscultation,     Other Findings        Anesthesia Plan  ASA Score- 3     Anesthesia Type- general with ASA Monitors  Additional Monitors:   Airway Plan: ETT  Comment: Wife said he was a "long time waking up" after prior Micro DL, and very groggy all day        Plan Factors- Patient instructed to abstain from smoking on day of procedure  Patient did not smoke on day of surgery  Induction- intravenous  Postoperative Plan-     Informed Consent- Anesthetic plan and risks discussed with patient and spouse

## 2020-10-12 ENCOUNTER — VIRTUAL VISIT (OUTPATIENT)
Dept: PULMONOLOGY | Age: 60
End: 2020-10-12
Payer: COMMERCIAL

## 2020-10-12 ENCOUNTER — TELEPHONE (OUTPATIENT)
Dept: FAMILY MEDICINE CLINIC | Age: 60
End: 2020-10-12

## 2020-10-12 PROCEDURE — 3017F COLORECTAL CA SCREEN DOC REV: CPT | Performed by: INTERNAL MEDICINE

## 2020-10-12 PROCEDURE — G8484 FLU IMMUNIZE NO ADMIN: HCPCS | Performed by: INTERNAL MEDICINE

## 2020-10-12 PROCEDURE — 99214 OFFICE O/P EST MOD 30 MIN: CPT | Performed by: INTERNAL MEDICINE

## 2020-10-12 PROCEDURE — 1036F TOBACCO NON-USER: CPT | Performed by: INTERNAL MEDICINE

## 2020-10-12 PROCEDURE — G8417 CALC BMI ABV UP PARAM F/U: HCPCS | Performed by: INTERNAL MEDICINE

## 2020-10-12 PROCEDURE — G8427 DOCREV CUR MEDS BY ELIG CLIN: HCPCS | Performed by: INTERNAL MEDICINE

## 2020-10-12 RX ORDER — DIAZEPAM 10 MG/1
TABLET ORAL
Qty: 2 TABLET | Refills: 0 | Status: SHIPPED | OUTPATIENT
Start: 2020-10-12 | End: 2020-10-12

## 2020-10-12 ASSESSMENT — SLEEP AND FATIGUE QUESTIONNAIRES
HOW LIKELY ARE YOU TO NOD OFF OR FALL ASLEEP WHEN YOU ARE A PASSENGER IN A CAR FOR AN HOUR WITHOUT A BREAK: 0
HOW LIKELY ARE YOU TO NOD OFF OR FALL ASLEEP IN A CAR, WHILE STOPPED FOR A FEW MINUTES IN TRAFFIC: 0
HOW LIKELY ARE YOU TO NOD OFF OR FALL ASLEEP WHILE WATCHING TV: 0
HOW LIKELY ARE YOU TO NOD OFF OR FALL ASLEEP WHILE LYING DOWN TO REST IN THE AFTERNOON WHEN CIRCUMSTANCES PERMIT: 3
HOW LIKELY ARE YOU TO NOD OFF OR FALL ASLEEP WHILE SITTING INACTIVE IN A PUBLIC PLACE: 0
HOW LIKELY ARE YOU TO NOD OFF OR FALL ASLEEP WHILE SITTING AND TALKING TO SOMEONE: 0
HOW LIKELY ARE YOU TO NOD OFF OR FALL ASLEEP WHILE SITTING AND READING: 0
ESS TOTAL SCORE: 3
HOW LIKELY ARE YOU TO NOD OFF OR FALL ASLEEP WHILE SITTING QUIETLY AFTER LUNCH WITHOUT ALCOHOL: 0

## 2020-10-12 NOTE — TELEPHONE ENCOUNTER
Says Saturday he was scheduled for an MRI on his way there his vehicle broke down , so he will need the Valium called in again please to [facility], rescheduled for 10/24/2020

## 2020-10-12 NOTE — PROGRESS NOTES
10/12/2020    TELEHEALTH EVALUATION -- Audio/Visual (During CZRNP-86 public health emergency)    Patient and physician are located in their individual locations. This is visit is completed via [employer] application []/ Doxy. me[x] / Telephone []     HPI:    Edgardo Cheney (:  1960) has requested an audio/video evaluation for the following concern(s):    He is known to have obstructive sleep apnea syndrome which is being treated with BiPAP successfully he is using the BiPAP regularly every night he does not feel sleepy tired fatigue during the daytime. No nasal stuffiness no sinusitis no acid reflux no pedal edema and weight has been stable but he has not been to lose much weight. He has systemic hypertension that is under good control denies any angina denies any PND    He has cervical spondylosis which does cause pain and difficulty however it is not interfering with his sleep. Blood sugar is also stable. He does have peripheral vascular disease related to diabetes and hypercholesterolemia. He already has a flu vaccine. Review of Systems    Prior to Visit Medications    Medication Sig Taking?  Authorizing Provider   diazePAM (VALIUM) 10 MG tablet Take 1 po 30 min prior to test Yes Seema Hameed DO   atorvastatin (LIPITOR) 40 MG tablet TAKE 1 TABLET BY MOUTH ONE TIME A DAY Yes Seema Hameed DO   NEEDLE, DISP, 18 G 18G X 1\" MISC 1 Syringe by Does not apply route every 30 days Yes Seema Hameed DO   NEEDLE, DISP, 22 G 22G X 1-1/2\" MISC 1 Syringe by Does not apply route every 30 days Yes Seema Hameed DO   clopidogrel (PLAVIX) 75 MG tablet TAKE 1 TABLET BY MOUTH ONE TIME A DAY WITH FOOD Yes Seema Hameed DO   omeprazole (PRILOSEC) 20 MG delayed release capsule TAKE 1 CAPSULE BY MOUTH ONE TIME A DAY IN THE MORNING BEFORE BREAKFAST Yes Seema Hameed DO   meloxicam (MOBIC) 15 MG tablet TAKE 1 TABLET BY MOUTH ONE TIME A DAY AS NEEDED Yes Seema Hameed DO   metFORMIN (GLUCOPHAGE) 1000 MG tablet TAKE 1 TABLET BY MOUTH 2 TIMES A DAY WITH MEALS Yes Seema Hameed DO   TRADJENTA 5 MG tablet TAKE 1 TABLET BY MOUTH ONE TIME A DAY Yes Seema Hameed DO   linagliptin (TRADJENTA) 5 MG tablet TAKE 1 TABLET BY MOUTH ONE TIME A DAY Yes Seema Hameed DO   hydroCHLOROthiazide (MICROZIDE) 12.5 MG capsule TAKE 1 CAPSULE BY MOUTH ONE TIME A DAY Yes Seema Hameed DO   carvedilol (COREG) 12.5 MG tablet TAKE 1 TABLET BY MOUTH 2 TIMES A DAY Yes Seema Hameed DO   traZODone (DESYREL) 150 MG tablet TAKE ONE TABLET BY MOUTH IN THE EVENING Yes Seema Hameed DO   tiZANidine (ZANAFLEX) 4 MG tablet TAKE ONE TABLET BY MOUTH EVERY EVENING AS NEEDED FOR MUSCLE SPASMS Yes Seema Hameed DO   topiramate (TOPAMAX) 200 MG tablet  Yes Historical Provider, MD   HYDROcodone-acetaminophen (NORCO)  MG per tablet Take 1 tablet by mouth every 6 hours as needed for Pain. Ludwig Garza MD   Multiple Vitamin (MULTI VITAMIN DAILY PO) Take by mouth Yes Historical Provider, MD   nitroGLYCERIN (NITROSTAT) 0.4 MG SL tablet Place 0.4 mg under the tongue Yes Historical Provider, MD   aspirin 325 MG tablet Take 325 mg by mouth daily. Yes Historical Provider, MD   Testosterone Cypionate 200 MG/ML KIT Inject 200 mg into the muscle every 30 days for 30 days. please include syringes  Seema Hameed DO   tiZANidine (ZANAFLEX) 4 MG tablet TAKE ONE TABLET BY MOUTH EVERY EVENING AS NEEDED FOR MUSCLE SPASMS  Seema Hameed DO       Social History     Tobacco Use    Smoking status: Never Smoker    Smokeless tobacco: Never Used   Substance Use Topics    Alcohol use: Yes     Comment: rare social, non past 5 months    Drug use: No            RECORD REVIEW: Previous medical records were reviewed at today's visit.     Wt Readings from Last 3 Encounters:   08/28/20 234 lb 6.4 oz (106.3 kg)   06/04/20 238 lb (108 kg)   06/01/20 239 lb (108.4 kg)       Results for orders placed or performed during the hospital encounter of 06/01/20   Vitamin B12 & Folate Result Value Ref Range    Vitamin B-12 324 232 - 1245 pg/mL    Folate >20.0 >4.8 ng/mL   Iron and TIBC   Result Value Ref Range    Iron 70 59 - 158 ug/dL    TIBC 293 250 - 450 ug/dL    Iron Saturation 24 20 - 55 %    UIBC 223 112 - 347 ug/dL   TSH without Reflex   Result Value Ref Range    TSH 1.35 0.30 - 5.00 mIU/L   Testosterone, Free   Result Value Ref Range    Testosterone 150 (L) 220 - 1,000 ng/dL    Sex Hormone Binding 33 11 - 80 nmol/L    Testosterone, Free 28.0 (L) 47 - 244 pg/mL   T4, Free   Result Value Ref Range    Thyroxine, Free 1.25 0.93 - 1.70 ng/dL   PSA, Prostatic Specific Antigen   Result Value Ref Range    PSA 1.07 <4.1 ug/L   Lipid Panel   Result Value Ref Range    Cholesterol 138 <200 mg/dL    HDL 45 >40 mg/dL    LDL Cholesterol 77 0 - 130 mg/dL    Chol/HDL Ratio 3.1 <5    Triglycerides 78 <150 mg/dL    VLDL NOT REPORTED 1 - 30 mg/dL   Comprehensive Metabolic Panel   Result Value Ref Range    Glucose 141 (H) 70 - 99 mg/dL    BUN 26 (H) 6 - 20 mg/dL    CREATININE 0.93 0.70 - 1.20 mg/dL    Bun/Cre Ratio NOT REPORTED 9 - 20    Calcium 9.7 8.6 - 10.4 mg/dL    Sodium 141 135 - 144 mmol/L    Potassium 3.9 3.7 - 5.3 mmol/L    Chloride 106 98 - 107 mmol/L    CO2 21 20 - 31 mmol/L    Anion Gap 14 9 - 17 mmol/L    Alkaline Phosphatase 66 40 - 129 U/L    ALT 16 5 - 41 U/L    AST 13 <40 U/L    Total Bilirubin 0.28 (L) 0.3 - 1.2 mg/dL    Total Protein 7.4 6.4 - 8.3 g/dL    Alb 4.4 3.5 - 5.2 g/dL    Albumin/Globulin Ratio 1.5 1.0 - 2.5    GFR Non-African American >60 >60 mL/min    GFR African American >60 >60 mL/min    GFR Comment          GFR Staging NOT REPORTED    CBC Auto Differential   Result Value Ref Range    WBC 7.6 3.5 - 11.3 k/uL    RBC 4.81 4.21 - 5.77 m/uL    Hemoglobin 14.6 13.0 - 17.0 g/dL    Hematocrit 45.2 40.7 - 50.3 %    MCV 94.0 82.6 - 102.9 fL    MCH 30.4 25.2 - 33.5 pg    MCHC 32.3 28.4 - 34.8 g/dL    RDW 12.5 11.8 - 14.4 %    Platelets 114 787 - 150 k/uL    MPV 9.3 8.1 - 13.5 fL NRBC Automated 0.0 0.0 per 100 WBC    Differential Type NOT REPORTED     Seg Neutrophils 65 36 - 65 %    Lymphocytes 24 24 - 43 %    Monocytes 9 3 - 12 %    Eosinophils % 2 1 - 4 %    Basophils 0 0 - 2 %    Immature Granulocytes 0 0 %    Segs Absolute 4.88 1.50 - 8.10 k/uL    Absolute Lymph # 1.83 1.10 - 3.70 k/uL    Absolute Mono # 0.71 0.10 - 1.20 k/uL    Absolute Eos # 0.12 0.00 - 0.44 k/uL    Basophils Absolute 0.03 0.00 - 0.20 k/uL    Absolute Immature Granulocyte <0.03 0.00 - 0.30 k/uL    WBC Morphology NOT REPORTED     RBC Morphology NOT REPORTED     Platelet Estimate NOT REPORTED        No results found. PHYSICAL EXAMINATION:  Due to this being a TeleHealth encounter, evaluation of the following organ systems is limited: Vitals/Constitutional/EENT/Resp/CV/GI//MS/Neuro/Skin/Heme-Lymph-Imm. Constitutional: [x] Appears well-developed and well-nourished. [] Abnormal  Mental status  [x] Alert and awake  [x] Oriented to person/place/time [x]Able to follow commands    [x] No apparent distress      Eyes:  EOM    [x]  Normal  [] Abnormal-  Sclera  [x]  Normal  [] Abnormal -         Discharge [x]  None visible  [] Abnormal -    HENT:   [x] Normocephalic, atraumatic. [] Abnormal shaped head   [x] Mouth/Throat: Mucous membranes are moist.     Ears [x] Normal  [] Abnormal-    Neck: [x] Normal range of motion [x] Supple [x] No visualized mass. Pulmonary/Chest: [x] Respiratory effort normal.  [x] No visualized signs of difficulty breathing or respiratory distress        [] Abnormal      Musculoskeletal:   [x] Normal range of motion. [x] Normal gait with no signs of ataxia. [x]  No signs of cyanosis of the peripheral portions of extremities.          [] Abnormal       Neurological:        [x] Normal cranial nerve (limited exam to video visit) [x] No focal weakness observed       [] Abnormal          Speech       [x] Normal   [] Abnormal     Skin:        [x] No rash on visible skin  [x] Normal  []

## 2020-10-24 ENCOUNTER — HOSPITAL ENCOUNTER (OUTPATIENT)
Dept: MRI IMAGING | Age: 60
Discharge: HOME | End: 2020-10-26
Payer: COMMERCIAL

## 2020-10-24 PROCEDURE — 73221 MRI JOINT UPR EXTREM W/O DYE: CPT

## 2020-10-31 ENCOUNTER — HOSPITAL ENCOUNTER (OUTPATIENT)
Dept: MRI IMAGING | Age: 60
Discharge: HOME | End: 2020-11-02
Payer: COMMERCIAL

## 2020-10-31 PROCEDURE — 72148 MRI LUMBAR SPINE W/O DYE: CPT

## 2020-11-14 RX ORDER — TIZANIDINE 4 MG/1
TABLET ORAL
Qty: 30 TABLET | Refills: 3 | Status: SHIPPED | OUTPATIENT
Start: 2020-11-14 | End: 2021-07-19 | Stop reason: SDUPTHER

## 2020-11-14 RX ORDER — CARVEDILOL 12.5 MG/1
TABLET ORAL
Qty: 60 TABLET | Refills: 5 | Status: SHIPPED | OUTPATIENT
Start: 2020-11-14 | End: 2021-03-30 | Stop reason: SDUPTHER

## 2020-11-14 NOTE — TELEPHONE ENCOUNTER
Hannah Rashid is calling to request a refill on the following medication(s):    Last Visit Date (If Applicable):  8/50/3900    Next Visit Date:    Visit date not found    Medication Request:  Requested Prescriptions     Pending Prescriptions Disp Refills    carvedilol (COREG) 12.5 MG tablet [Pharmacy Med Name: CARVEDILOL 12.5MG TABS] 60 tablet 5     Sig: TAKE 1 TABLET BY MOUTH 2 TIMES A DAY    tiZANidine (ZANAFLEX) 4 MG tablet [Pharmacy Med Name: TIZANIDINE HCL 4MG TABS] 30 tablet 3     Sig: TAKE ONE TABLET BY MOUTH EVERY EVENING AS NEEDED FOR MUSCLE SPASMS

## 2020-11-18 RX ORDER — OMEPRAZOLE 20 MG/1
20 CAPSULE, DELAYED RELEASE ORAL DAILY
Qty: 90 CAPSULE | Refills: 1 | Status: SHIPPED | OUTPATIENT
Start: 2020-11-18 | End: 2021-04-29 | Stop reason: SDUPTHER

## 2020-11-18 RX ORDER — HYDROCHLOROTHIAZIDE 12.5 MG/1
CAPSULE, GELATIN COATED ORAL
Qty: 30 CAPSULE | Refills: 5 | Status: SHIPPED | OUTPATIENT
Start: 2020-11-18 | End: 2020-12-11

## 2020-11-18 NOTE — TELEPHONE ENCOUNTER
Elaina Renee is calling to request a refill on the following medication(s):    Last Visit Date (If Applicable):  0/14/5024    Next Visit Date:    Visit date not found    Medication Request:  Requested Prescriptions     Pending Prescriptions Disp Refills    hydroCHLOROthiazide (MICROZIDE) 12.5 MG capsule [Pharmacy Med Name: HYDROCHLOROTHIAZIDE 12.5MG CAPS] 30 capsule 5     Sig: TAKE 1 CAPSULE BY MOUTH ONE TIME A DAY    omeprazole (PRILOSEC) 20 MG delayed release capsule 90 capsule 1     Sig: Take 1 capsule by mouth Daily

## 2020-12-07 ENCOUNTER — TELEPHONE (OUTPATIENT)
Dept: FAMILY MEDICINE CLINIC | Age: 60
End: 2020-12-07

## 2020-12-07 RX ORDER — AZITHROMYCIN 250 MG/1
250 TABLET, FILM COATED ORAL DAILY
Qty: 1 PACKET | Refills: 0 | Status: SHIPPED | OUTPATIENT
Start: 2020-12-07 | End: 2020-12-12

## 2020-12-07 NOTE — TELEPHONE ENCOUNTER
Pt states he has cough, chest congestion, runny nose since Friday and requests a ZPAK. States he gets these symptoms yearly and zpak helps. State he does not have COVID and will do a VV if necessary.

## 2020-12-11 RX ORDER — TRAZODONE HYDROCHLORIDE 150 MG/1
TABLET ORAL
Qty: 30 TABLET | Refills: 11 | Status: SHIPPED | OUTPATIENT
Start: 2020-12-11 | End: 2021-05-04 | Stop reason: SDUPTHER

## 2020-12-11 RX ORDER — TIZANIDINE 4 MG/1
TABLET ORAL
Qty: 30 TABLET | Refills: 3 | Status: SHIPPED | OUTPATIENT
Start: 2020-12-11 | End: 2021-04-05

## 2020-12-11 RX ORDER — HYDROCHLOROTHIAZIDE 12.5 MG/1
CAPSULE, GELATIN COATED ORAL
Qty: 30 CAPSULE | Refills: 5 | Status: SHIPPED | OUTPATIENT
Start: 2020-12-11 | End: 2021-04-29 | Stop reason: SDUPTHER

## 2020-12-11 NOTE — TELEPHONE ENCOUNTER
Fernando Stephon is calling to request a refill on the following medication(s):    Last Visit Date (If Applicable):  4/48/9641    Next Visit Date:    Visit date not found    Medication Request:  Requested Prescriptions     Pending Prescriptions Disp Refills    hydroCHLOROthiazide (MICROZIDE) 12.5 MG capsule [Pharmacy Med Name: HYDROCHLOROTHIAZIDE 12.5MG CAPS] 30 capsule 5     Sig: TAKE 1 CAPSULE BY MOUTH ONE TIME A DAY

## 2020-12-30 NOTE — TELEPHONE ENCOUNTER
Bri Martines is calling to request a refill on the following medication(s):    Last Visit Date (If Applicable):  5/47/7730    Next Visit Date:    2/2/2021    Medication Request:  Requested Prescriptions     Pending Prescriptions Disp Refills    TRADJENTA 5 MG tablet [Pharmacy Med Name: TRADJENTA 5MG TABS] 30 tablet 5     Sig: TAKE 1 TABLET BY MOUTH ONE TIME A DAY

## 2021-01-04 RX ORDER — LINAGLIPTIN 5 MG/1
TABLET, FILM COATED ORAL
Qty: 30 TABLET | Refills: 5 | Status: SHIPPED | OUTPATIENT
Start: 2021-01-04 | End: 2021-04-05

## 2021-02-02 ENCOUNTER — TELEPHONE (OUTPATIENT)
Dept: FAMILY MEDICINE CLINIC | Age: 61
End: 2021-02-02

## 2021-02-02 ENCOUNTER — TELEMEDICINE (OUTPATIENT)
Dept: FAMILY MEDICINE CLINIC | Age: 61
End: 2021-02-02
Payer: COMMERCIAL

## 2021-02-02 DIAGNOSIS — R10.32 GROIN PAIN, LEFT: Primary | ICD-10-CM

## 2021-02-02 DIAGNOSIS — E66.01 OBESITY, CLASS III, BMI 40-49.9 (MORBID OBESITY) (HCC): ICD-10-CM

## 2021-02-02 PROCEDURE — 1036F TOBACCO NON-USER: CPT | Performed by: FAMILY MEDICINE

## 2021-02-02 PROCEDURE — G8417 CALC BMI ABV UP PARAM F/U: HCPCS | Performed by: FAMILY MEDICINE

## 2021-02-02 PROCEDURE — G8484 FLU IMMUNIZE NO ADMIN: HCPCS | Performed by: FAMILY MEDICINE

## 2021-02-02 PROCEDURE — 3017F COLORECTAL CA SCREEN DOC REV: CPT | Performed by: FAMILY MEDICINE

## 2021-02-02 PROCEDURE — G8427 DOCREV CUR MEDS BY ELIG CLIN: HCPCS | Performed by: FAMILY MEDICINE

## 2021-02-02 PROCEDURE — 99213 OFFICE O/P EST LOW 20 MIN: CPT | Performed by: FAMILY MEDICINE

## 2021-02-02 RX ORDER — PHENTERMINE HYDROCHLORIDE 37.5 MG/1
37.5 TABLET ORAL
Qty: 30 TABLET | Refills: 0 | Status: SHIPPED | OUTPATIENT
Start: 2021-02-02 | End: 2021-03-02 | Stop reason: SDUPTHER

## 2021-02-02 ASSESSMENT — PATIENT HEALTH QUESTIONNAIRE - PHQ9
SUM OF ALL RESPONSES TO PHQ QUESTIONS 1-9: 0
SUM OF ALL RESPONSES TO PHQ QUESTIONS 1-9: 0
2. FEELING DOWN, DEPRESSED OR HOPELESS: 0

## 2021-02-02 NOTE — PROGRESS NOTES
2021    TELEHEALTH EVALUATION -- Audio/Visual (During VRIOG-41 public health emergency)    HPI:    Chante Turpin (:  1960) has requested an audio/video evaluation for the following concern(s):    He is being seen today wanting to get back on diet medications unfortunately he is regaining some weight his sugars are doing okay  He also states he is not having ongoing intense left groin pain he has seen his pain management and even had a lumbar MRI which does show some degenerative disorder but he is in a terrific amount of discomfort    Review of Systems    Prior to Visit Medications    Medication Sig Taking? Authorizing Provider   phentermine (ADIPEX-P) 37.5 MG tablet Take 1 tablet by mouth every morning (before breakfast) for 30 days.  Yes Seema Hameed DO   TRADJENTA 5 MG tablet TAKE 1 TABLET BY MOUTH ONE TIME A DAY  Seema Hameed DO   linagliptin (TRADJENTA) 5 MG tablet TAKE 1 TABLET BY MOUTH ONE TIME A DAY  Seema Hameed DO   hydroCHLOROthiazide (MICROZIDE) 12.5 MG capsule TAKE 1 CAPSULE BY MOUTH ONE TIME A DAY  Seema Hameed DO   tiZANidine (ZANAFLEX) 4 MG tablet TAKE ONE TABLET BY MOUTH EVERY EVENING AS NEEDED FOR MUSCLE SPASMS  Seema Hameed DO   traZODone (DESYREL) 150 MG tablet TAKE ONE TABLET BY MOUTH EVERY EVENING  Seema Hameed DO   omeprazole (PRILOSEC) 20 MG delayed release capsule Take 1 capsule by mouth Daily  Seema Hameed DO   carvedilol (COREG) 12.5 MG tablet TAKE 1 TABLET BY MOUTH 2 TIMES A DAY  Seema Hameed DO   tiZANidine (ZANAFLEX) 4 MG tablet TAKE ONE TABLET BY MOUTH EVERY EVENING AS NEEDED FOR MUSCLE SPASMS  Seema Hameed DO   atorvastatin (LIPITOR) 40 MG tablet TAKE 1 TABLET BY MOUTH ONE TIME A DAY  Seema Hameed DO   NEEDLE, DISP, 18 G 18G X 1\" MISC 1 Syringe by Does not apply route every 30 days  Seema Hameed DO   NEEDLE, DISP, 22 G 22G X 1-1/2\" MISC 1 Syringe by Does not apply route every 30 days  Seema Hameed DO   clopidogrel (PLAVIX) 75 MG tablet TAKE 1 TABLET fat layer exposed ([facility] 6/9/2014    Obesity     Obesity, Class III, BMI 40-49.9 (morbid obesity) ([address] 4/6/2015    Obstructive sleep apnea syndrome     Osteoarthritis of carpometacarpal (CMC) joint of thumb 6/5/2018    Osteoarthritis of knee 6/5/2018    Peripheral vascular disease ([address]     with venous stasis and ulcerations. Dr Geraldo Lundborg Presence of coronary angioplasty implant and graft 9/11/2009    Sleep apnea     Dr Trevor Catalan of knee and leg 6/5/2018    Sprain of shoulder and upper arm 6/5/2018    Type II or unspecified type diabetes mellitus without mention of complication, not stated as uncontrolled     Ulcer of left foot, with fat layer exposed ([address] 1/9/2018    Unspecified sleep apnea     Dr Katerin Hogue CPAP    Venous insufficiency of both lower extremities 1/31/2018   ,   Past Surgical History:   Procedure Laterality Date    CARPAL TUNNEL RELEASE Right 3/22/13    Dr. Deirdre Yoder.     CORONARY ANGIOPLASTY WITH STENT PLACEMENT  2002,2006.2009    X 3 separately    CORONARY ARTERY BYPASS GRAFT  2/26/07    [facility], Dr Zhang Silverio (X 3)    NECK SURGERY  2010    cervical stenoses C5-C6-C7and partial T1 laminectomy with fusion of C6-7    OTHER SURGICAL HISTORY      wound care ulcers of legs (bilaterally) with Dr Paredes Cleaves  age 11    VEIN SURGERY      closure of peripherator veins of legs, Dr Martin Yao     ,   Social History     Tobacco Use    Smoking status: Never Smoker    Smokeless tobacco: Never Used   Substance Use Topics    Alcohol use: Yes     Comment: rare social, non past 5 months    Drug use: No   ,   Family History   Problem Relation Age of Onset    Cancer Mother         lung    Diabetes Father     Heart Disease Father     High Blood Pressure Father     Diabetes Brother     Heart Disease Brother     High Blood Pressure Brother        PHYSICAL EXAMINATION:  [ INSTRUCTIONS:  \"[x]\" Indicates a positive item  \"[]\" Indicates a negative item  -- DELETE ALL ITEMS NOT EXAMINED]  Vital Signs: (As obtained by patient/caregiver or practitioner observation)    Blood pressure-  Heart rate-    Respiratory rate-    Temperature-  Pulse oximetry-     Constitutional: [x] Appears well-developed and well-nourished [x] No apparent distress      [] Abnormal-   Mental status  [x] Alert and awake  [x] Oriented to person/place/time [x]Able to follow commands      Eyes:  EOM    [x]  Normal  [] Abnormal-  Sclera  [x]  Normal  [] Abnormal -         Discharge [x]  None visible  [] Abnormal -    HENT:   [x] Normocephalic, atraumatic. [] Abnormal   [x] Mouth/Throat: Mucous membranes are moist.     External Ears [x] Normal  [] Abnormal-     Neck: [x] No visualized mass     Pulmonary/Chest: [x] Respiratory effort normal.  [x] No visualized signs of difficulty breathing or respiratory distress        [] Abnormal-      Musculoskeletal:   [x] Normal gait with no signs of ataxia         [x] Normal range of motion of neck        [] Abnormal-       Neurological:        [x] No Facial Asymmetry (Cranial nerve 7 motor function) (limited exam to video visit)          [x] No gaze palsy        [] Abnormal-         Skin:        [x] No significant exanthematous lesions or discoloration noted on facial skin         [] Abnormal-            Psychiatric:       [x] Normal Affect [x] No Hallucinations        [] Abnormal-     Other pertinent observable physical exam findings-     ASSESSMENT/PLAN:   Diagnosis Orders   1.  Groin pain, left  XR HIP LEFT (2-3 VIEWS)   2. Obesity, Class III, BMI 40-49.9 (morbid obesity) (HCC)  phentermine (ADIPEX-P) 37.5 MG tablet      Will assess an x-ray notify of results  Follow calorie diet and follow-up in 1 month  Orders Placed This Encounter   Procedures    XR HIP LEFT (2-3 VIEWS)     Requested Prescriptions     Signed Prescriptions Disp Refills    phentermine (ADIPEX-P) 37.5 MG tablet 30 tablet 0     Sig: Take 1 tablet by mouth every morning (before breakfast) for 30 days. Stephanie Leon is a 61 y.o. male being evaluated by a Virtual Visit (video visit) encounter to address concerns as mentioned above. A caregiver was present when appropriate. Due to this being a TeleHealth encounter (During CQJXB-31 public health emergency), evaluation of the following organ systems was limited: Vitals/Constitutional/EENT/Resp/CV/GI//MS/Neuro/Skin/Heme-Lymph-Imm. Pursuant to the emergency declaration under the [address] [place] [address], [address] [place] [address] and the [employer] and [employer] Act, this Virtual Visit was conducted with patient's (and/or legal guardian's) consent, to reduce the patient's risk of exposure to COVID-19 and provide necessary medical care. The patient (and/or legal guardian) has also been advised to contact this office for worsening conditions or problems, and seek emergency medical treatment and/or call 911 if deemed necessary. Patient identification was verified at the start of the visit: Yes    Total time spent on this encounter: Not billed by time    Services were provided through a video synchronous discussion virtually to substitute for in-person clinic visit. Patient and provider were located at their individual homes. --Aquiles Noland DO on 2/2/2021 at 11:15 AM    An electronic signature was used to authenticate this note.

## 2021-02-02 NOTE — TELEPHONE ENCOUNTER
[address] [place] [address] states pt got adipex last 7/30/20 which would make it 3/2/21 before he can get adipex script. Pt is worried this will mess up his monthly visit schedule because he is not able to get 1st adipex script.

## 2021-02-13 ENCOUNTER — HOSPITAL ENCOUNTER (OUTPATIENT)
Age: 61
Discharge: HOME | End: 2021-02-15
Payer: COMMERCIAL

## 2021-02-13 ENCOUNTER — HOSPITAL ENCOUNTER (OUTPATIENT)
Dept: GENERAL RADIOLOGY | Age: 61
Discharge: HOME | End: 2021-02-15
Payer: COMMERCIAL

## 2021-02-13 DIAGNOSIS — R10.32 GROIN PAIN, LEFT: ICD-10-CM

## 2021-02-13 PROCEDURE — 73502 X-RAY EXAM HIP UNI 2-3 VIEWS: CPT

## 2021-02-25 DIAGNOSIS — M15.9 PRIMARY OSTEOARTHRITIS INVOLVING MULTIPLE JOINTS: ICD-10-CM

## 2021-02-25 RX ORDER — MELOXICAM 15 MG/1
TABLET ORAL
Qty: 90 TABLET | Refills: 1 | Status: SHIPPED | OUTPATIENT
Start: 2021-02-25 | End: 2021-05-04 | Stop reason: SDUPTHER

## 2021-03-02 ENCOUNTER — TELEMEDICINE (OUTPATIENT)
Dept: FAMILY MEDICINE CLINIC | Age: 61
End: 2021-03-02

## 2021-03-02 DIAGNOSIS — M15.9 PRIMARY OSTEOARTHRITIS INVOLVING MULTIPLE JOINTS: ICD-10-CM

## 2021-03-02 DIAGNOSIS — E66.01 OBESITY, CLASS III, BMI 40-49.9 (MORBID OBESITY) (HCC): ICD-10-CM

## 2021-03-02 DIAGNOSIS — E34.9 HYPOTESTOSTERONEMIA: Primary | ICD-10-CM

## 2021-03-02 RX ORDER — PHENTERMINE HYDROCHLORIDE 37.5 MG/1
37.5 TABLET ORAL
Qty: 30 TABLET | Refills: 0 | Status: SHIPPED | OUTPATIENT
Start: 2021-03-02 | End: 2021-03-30 | Stop reason: SDUPTHER

## 2021-03-02 NOTE — PROGRESS NOTES
3/2/2021    TELEHEALTH EVALUATION -- Audio/Visual (During JHYOV-26 public health emergency)    HPI:    Nan Ellis (:  1960) has requested an audio/video evaluation for the following concern(s):    Seen today in video visit for follow-up on weight management he is doing well he is to 47 today he is going to be seeing orthopedics in regards to his osteoarthritis of his hip and he needs a refill of testosterone he is not really having any new complaints or problems today's visit is a no charge secondary to short duration and recheck on previous issues    Review of Systems    Prior to Visit Medications    Medication Sig Taking? Authorizing Provider   Testosterone Cypionate 200 MG/ML KIT Inject 200 mg into the muscle every 30 days for 30 days. please include syringes Yes Seema Hameed DO   phentermine (ADIPEX-P) 37.5 MG tablet Take 1 tablet by mouth every morning (before breakfast) for 30 days.  Yes Seema Hameed DO   metFORMIN (GLUCOPHAGE) 1000 MG tablet TAKE 1 TABLET BY MOUTH 2 TIMES A DAY WITH MEALS  Seema Hameed DO   meloxicam (MOBIC) 15 MG tablet TAKE 1 TABLET BY MOUTH ONE TIME A DAY AS NEEDED  Seema Hameed DO   TRADJENTA 5 MG tablet TAKE 1 TABLET BY MOUTH ONE TIME A DAY  Seema Hameed DO   linagliptin (TRADJENTA) 5 MG tablet TAKE 1 TABLET BY MOUTH ONE TIME A DAY  Seema Hameed DO   hydroCHLOROthiazide (MICROZIDE) 12.5 MG capsule TAKE 1 CAPSULE BY MOUTH ONE TIME A DAY  Seema Hameed DO   tiZANidine (ZANAFLEX) 4 MG tablet TAKE ONE TABLET BY MOUTH EVERY EVENING AS NEEDED FOR MUSCLE SPASMS  Seema Hameed DO   traZODone (DESYREL) 150 MG tablet TAKE ONE TABLET BY MOUTH EVERY EVENING  Seema Hameed DO   omeprazole (PRILOSEC) 20 MG delayed release capsule Take 1 capsule by mouth Daily  Seema Hameed DO   carvedilol (COREG) 12.5 MG tablet TAKE 1 TABLET BY MOUTH 2 TIMES A DAY  Seema Hameed DO   tiZANidine (ZANAFLEX) 4 MG tablet TAKE ONE TABLET BY MOUTH EVERY EVENING AS NEEDED FOR MUSCLE SPASMS  Myles Diamond Hypercholesterolemia 12/5/2017    Hyperlipemia, mixed     Hypertension     Ischemic cardiomyopathy 5/7/2018    Non-pressure chronic ulcer of left lower leg with fat layer exposed ([address] 6/9/2014    Obesity     Obesity, Class III, BMI 40-49.9 (morbid obesity) ([address] 4/6/2015    Obstructive sleep apnea syndrome     Osteoarthritis of carpometacarpal (CMC) joint of thumb 6/5/2018    Osteoarthritis of knee 6/5/2018    Peripheral vascular disease ([address]     with venous stasis and ulcerations. Dr Addi Cox Presence of coronary angioplasty implant and graft 9/11/2009    Sleep apnea     Dr William Potter of knee and leg 6/5/2018    Sprain of shoulder and upper arm 6/5/2018    Type II or unspecified type diabetes mellitus without mention of complication, not stated as uncontrolled     Ulcer of left foot, with fat layer exposed ([address] 1/9/2018    Unspecified sleep apnea     Dr Carbajal Files CPAP    Venous insufficiency of both lower extremities 1/31/2018   ,   Past Surgical History:   Procedure Laterality Date    CARPAL TUNNEL RELEASE Right 3/22/13    Dr. Darline Azevedo.     CORONARY ANGIOPLASTY WITH STENT PLACEMENT  2002,2006.2009    X 3 separately    CORONARY ARTERY BYPASS GRAFT  2/26/07    [facility] (X 3)    NECK SURGERY  2010    cervical stenoses C5-C6-C7and partial T1 laminectomy with fusion of C6-7    OTHER SURGICAL HISTORY      wound care ulcers of legs (bilaterally) with Dr Chrissy Monzon  age 11    VEIN SURGERY      closure of peripherator veins of legs, Dr Poppy Hilton     ,   Social History     Tobacco Use    Smoking status: Never Smoker    Smokeless tobacco: Never Used   Substance Use Topics    Alcohol use: Yes     Comment: rare social, non past 5 months    Drug use: No   ,   Family History   Problem Relation Age of Onset    Cancer Mother         lung    Diabetes Father     Heart Disease Father     High Blood Pressure Father     Diabetes Brother  Heart Disease Brother     High Blood Pressure Brother        PHYSICAL EXAMINATION:  [ INSTRUCTIONS:  \"[x]\" Indicates a positive item  \"[]\" Indicates a negative item  -- DELETE ALL ITEMS NOT EXAMINED]  Vital Signs: (As obtained by patient/caregiver or practitioner observation)    Blood pressure-  Heart rate-    Respiratory rate-    Temperature-  Pulse oximetry-     Constitutional: [x] Appears well-developed and well-nourished [x] No apparent distress      [] Abnormal-   Mental status  [x] Alert and awake  [x] Oriented to person/place/time [x]Able to follow commands      Eyes:  EOM    [x]  Normal  [] Abnormal-  Sclera  [x]  Normal  [] Abnormal -         Discharge [x]  None visible  [] Abnormal -    HENT:   [x] Normocephalic, atraumatic. [] Abnormal   [x] Mouth/Throat: Mucous membranes are moist.     External Ears [x] Normal  [] Abnormal-     Neck: [x] No visualized mass     Pulmonary/Chest: [x] Respiratory effort normal.  [x] No visualized signs of difficulty breathing or respiratory distress        [] Abnormal-      Musculoskeletal:   [x] Normal gait with no signs of ataxia         [x] Normal range of motion of neck        [] Abnormal-       Neurological:        [x] No Facial Asymmetry (Cranial nerve 7 motor function) (limited exam to video visit)          [x] No gaze palsy        [] Abnormal-         Skin:        [x] No significant exanthematous lesions or discoloration noted on facial skin         [] Abnormal-            Psychiatric:       [x] Normal Affect [x] No Hallucinations        [] Abnormal-     Other pertinent observable physical exam findings-     ASSESSMENT/PLAN:   Diagnosis Orders   1. Hypotestosteronemia  Testosterone Cypionate 200 MG/ML KIT   2. Primary osteoarthritis involving multiple joints     3. Obesity, Class III, BMI 40-49.9 (morbid obesity) ([facility])  phentermine (ADIPEX-P) 37.5 MG tablet    ,  No orders of the defined types were placed in this encounter.     Requested Prescriptions     Signed Prescriptions Disp Refills    Testosterone Cypionate 200 MG/ML KIT 1 kit 5     Sig: Inject 200 mg into the muscle every 30 days for 30 days. please include syringes    phentermine (ADIPEX-P) 37.5 MG tablet 30 tablet 0     Sig: Take 1 tablet by mouth every morning (before breakfast) for 30 days. Return in about 4 weeks (around 3/30/2021) for re-check. Codey Bernal is a 61 y.o. male being evaluated by a Virtual Visit (video visit) encounter to address concerns as mentioned above. A caregiver was present when appropriate. Due to this being a TeleHealth encounter (During KDNJR-62 public health emergency), evaluation of the following organ systems was limited: Vitals/Constitutional/EENT/Resp/CV/GI//MS/Neuro/Skin/Heme-Lymph-Imm. Pursuant to the emergency declaration under the [address] [place] [address], [address] [place] [address] and the [employer] and [employer] Act, this Virtual Visit was conducted with patient's (and/or legal guardian's) consent, to reduce the patient's risk of exposure to COVID-19 and provide necessary medical care. The patient (and/or legal guardian) has also been advised to contact this office for worsening conditions or problems, and seek emergency medical treatment and/or call 911 if deemed necessary. Patient identification was verified at the start of the visit: Yes    Total time spent on this encounter: Not billed by time    Services were provided through a video synchronous discussion virtually to substitute for in-person clinic visit. Patient and provider were located at their individual homes. --Rojelio Thomas DO on 3/2/2021 at 8:59 AM    An electronic signature was used to authenticate this note.

## 2021-03-12 ENCOUNTER — HOSPITAL ENCOUNTER (EMERGENCY)
Age: 61
Discharge: HOME | End: 2021-03-12
Attending: EMERGENCY MEDICINE
Payer: COMMERCIAL

## 2021-03-12 VITALS
SYSTOLIC BLOOD PRESSURE: 151 MMHG | HEART RATE: 91 BPM | DIASTOLIC BLOOD PRESSURE: 77 MMHG | WEIGHT: 251.5 LBS | OXYGEN SATURATION: 98 % | BODY MASS INDEX: 33.18 KG/M2 | RESPIRATION RATE: 16 BRPM | TEMPERATURE: 98.3 F

## 2021-03-12 DIAGNOSIS — T07.XXXA MULTIPLE LACERATIONS: ICD-10-CM
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
## 2021-03-12 PROCEDURE — 6360000002 HC RX W HCPCS: Performed by: NURSE PRACTITIONER

## 2021-03-12 PROCEDURE — 99283 EMERGENCY DEPT VISIT LOW MDM: CPT

## 2021-03-12 PROCEDURE — 12004 RPR S/N/AX/GEN/TRK7.6-12.5CM: CPT

## 2021-03-12 PROCEDURE — 2500000003 HC RX 250 WO HCPCS: Performed by: NURSE PRACTITIONER

## 2021-03-12 PROCEDURE — 96372 THER/PROPH/DIAG INJ SC/IM: CPT

## 2021-03-12 RX ORDER — CEFAZOLIN SODIUM 1 G/3ML
1000 INJECTION, POWDER, FOR SOLUTION INTRAMUSCULAR; INTRAVENOUS ONCE
Status: COMPLETED | OUTPATIENT
Start: 2021-03-12 | End: 2021-03-12

## 2021-03-12 RX ORDER — METRONIDAZOLE 500 MG/1
500 TABLET ORAL 3 TIMES DAILY
Qty: 21 TABLET | Refills: 0 | Status: SHIPPED | OUTPATIENT
Start: 2021-03-12 | End: 2021-03-19

## 2021-03-12 RX ORDER — LIDOCAINE HYDROCHLORIDE 10 MG/ML
20 INJECTION, SOLUTION INFILTRATION; PERINEURAL ONCE
Status: COMPLETED | OUTPATIENT
Start: 2021-03-12 | End: 2021-03-12

## 2021-03-12 RX ORDER — DOXYCYCLINE HYCLATE 100 MG
100 TABLET ORAL 2 TIMES DAILY
Qty: 20 TABLET | Refills: 0 | Status: SHIPPED | OUTPATIENT
Start: 2021-03-12 | End: 2021-04-05 | Stop reason: ALTCHOICE

## 2021-03-12 RX ADMIN — LIDOCAINE HYDROCHLORIDE 20 ML: 10 INJECTION, SOLUTION INFILTRATION; PERINEURAL at 20:15

## 2021-03-12 RX ADMIN — CEFAZOLIN 1000 MG: 1 INJECTION, POWDER, FOR SOLUTION INTRAMUSCULAR; INTRAVENOUS at 21:12

## 2021-03-12 ASSESSMENT — ENCOUNTER SYMPTOMS
RHINORRHEA: 0
SORE THROAT: 0
COLOR CHANGE: 0
NAUSEA: 0
SHORTNESS OF BREATH: 0
DIARRHEA: 0
SINUS PRESSURE: 0
WHEEZING: 0
CONSTIPATION: 0
ABDOMINAL PAIN: 0
COUGH: 0
VOMITING: 0

## 2021-03-12 ASSESSMENT — PAIN SCALES - GENERAL: PAINLEVEL_OUTOF10: 4

## 2021-03-12 NOTE — ED TRIAGE NOTES
Here today after getting bitten by a neighborhood dog. Was taking his garbage out. Dog was near the garbage can, who tried to bite patient's feet. Dog jumped up and bit his left ear. Son came out to assist, and dog ran away. Left ear segment bitten off, but pt brought skin segment to ED in pill bottle. Left lateral outer ear with sanguinous drainage noted. Also open wounds to right thumb, right hand, left foot, left great toe, 2nd and 5th toes. Pt did not have shoes on when incident occurred at 1645. Bruising noted to right forearm when bitten, but skin intact.

## 2021-03-13 NOTE — ED PROVIDER NOTES
EMERGENCY DEPARTMENT ENCOUNTER   ATTENDING ATTESTATION     Pt Name: Katie Vivas  MRN: [id number]  [place] 1960  Date of evaluation: 3/12/21   Katie Vivas is a 61 y.o. male with CC: Laceration (dog bite) and Head Injury    MDM:   The patient is a 17-year-old male who presented to the emergency department secondary to dog bite unknown neighborhood dog that has a history of biting neighbors over the last several years. Patient was bit in his right hand as well as his left ear with partial amputation, he did bring a piece in the ear with him to the ER. Patient tetanus will be updated will be initiated on a gram of antibiotics with Ancef. He declined rabies vaccination. Durations of hand and other extremities repaired, please see NP procedure note. Patient discussed with Dr. Karlie Bliss agreed for patient to be seen in the office on Monday and initiated on Augmentin for home. Given outpatient follow-up and parameters to return to the emergency department peer           CRITICAL CARE:       EKG: All EKG's are interpreted by the Emergency Department Physician who either signs or Co-signs this chart in the absence of a cardiologist.      RADIOLOGY:All plain film, CT, MRI, and formal ultrasound images (except ED bedside ultrasound) are read by the radiologist, see reports below, unless otherwise noted in MDM or here. No orders to display     LABS: All lab results were reviewed by myself, and all abnormals are listed below. Labs Reviewed - No data to display  CONSULTS:  None  FINAL IMPRESSION    No diagnosis found.         PASTMEDICAL HISTORY     Past Medical History:   Diagnosis Date    Acquired trigger finger 6/5/2018    CAD (coronary artery disease)     [facility]/ involving coronary bypass graft of native heart with unstable angina pectoris    Cervical spondylosis     CHF (congestive heart failure) (HCC)     Chronic back pain     on 11/19/18 pt states is presently in pain mgmnt    Contusion of elbow 6/5/2018    Contusion of knee 6/5/2018    Contusion of shoulder region 6/5/2018    Diabetic foot ulcer with osteomyelitis ([address] 1/9/2018    Diabetic ulcer of toe associated with type 2 diabetes mellitus, with fat layer exposed ([address] 1/31/2018    Hypercholesterolemia 12/5/2017    Hyperlipemia, mixed     Hypertension     Ischemic cardiomyopathy 5/7/2018    Non-pressure chronic ulcer of left lower leg with fat layer exposed ([address] 6/9/2014    Obesity     Obesity, Class III, BMI 40-49.9 (morbid obesity) ([address] 4/6/2015    Obstructive sleep apnea syndrome     Osteoarthritis of carpometacarpal (CMC) joint of thumb 6/5/2018    Osteoarthritis of knee 6/5/2018    Peripheral vascular disease ([address]     with venous stasis and ulcerations. Dr Coni Hankins Presence of coronary angioplasty implant and graft 9/11/2009    Sleep apnea     Dr Riley All of knee and leg 6/5/2018    Sprain of shoulder and upper arm 6/5/2018    Type II or unspecified type diabetes mellitus without mention of complication, not stated as uncontrolled     Ulcer of left foot, with fat layer exposed ([address] 1/9/2018    Unspecified sleep apnea     Dr Saint Precise CPAP    Venous insufficiency of both lower extremities 1/31/2018     SURGICAL HISTORY       Past Surgical History:   Procedure Laterality Date    CARPAL TUNNEL RELEASE Right 3/22/13    Dr. Fany Mcadams.  CORONARY ANGIOPLASTY WITH STENT PLACEMENT  2002,2006.2009    X 3 separately    CORONARY ARTERY BYPASS GRAFT  2/26/07    [facility], Dr Ml Toth (X 3)    NECK SURGERY  2010    cervical stenoses C5-C6-C7and partial T1 laminectomy with fusion of C6-7    OTHER SURGICAL HISTORY      wound care ulcers of legs (bilaterally) with Dr Ricky Yoo  age 11    VEIN SURGERY      closure of peripherator veins of legs, Dr Katty Monroe       Previous Medications    ASPIRIN 325 MG TABLET    Take 325 mg by mouth daily. ATORVASTATIN (LIPITOR) 40 MG TABLET    TAKE 1 TABLET BY MOUTH ONE TIME A DAY    CARVEDILOL (COREG) 12.5 MG TABLET    TAKE 1 TABLET BY MOUTH 2 TIMES A DAY    CLOPIDOGREL (PLAVIX) 75 MG TABLET    TAKE 1 TABLET BY MOUTH ONE TIME A DAY WITH FOOD    HYDROCHLOROTHIAZIDE (MICROZIDE) 12.5 MG CAPSULE    TAKE 1 CAPSULE BY MOUTH ONE TIME A DAY    HYDROCODONE-ACETAMINOPHEN (NORCO)  MG PER TABLET    Take 1 tablet by mouth every 6 hours as needed for Pain. Olena Heman LINAGLIPTIN (TRADJENTA) 5 MG TABLET    TAKE 1 TABLET BY MOUTH ONE TIME A DAY    MELOXICAM (MOBIC) 15 MG TABLET    TAKE 1 TABLET BY MOUTH ONE TIME A DAY AS NEEDED    METFORMIN (GLUCOPHAGE) 1000 MG TABLET    TAKE 1 TABLET BY MOUTH 2 TIMES A DAY WITH MEALS    MULTIPLE VITAMIN (MULTI VITAMIN DAILY PO)    Take by mouth    NEEDLE, DISP, 18 G 18G X 1\" MISC    1 Syringe by Does not apply route every 30 days    NEEDLE, DISP, 22 G 22G X 1-1/2\" MISC    1 Syringe by Does not apply route every 30 days    NITROGLYCERIN (NITROSTAT) 0.4 MG SL TABLET    Place 0.4 mg under the tongue    OMEPRAZOLE (PRILOSEC) 20 MG DELAYED RELEASE CAPSULE    Take 1 capsule by mouth Daily    PHENTERMINE (ADIPEX-P) 37.5 MG TABLET    Take 1 tablet by mouth every morning (before breakfast) for 30 days. TESTOSTERONE CYPIONATE 200 MG/ML KIT    Inject 200 mg into the muscle every 30 days for 30 days. please include syringes    TESTOSTERONE CYPIONATE 200 MG/ML KIT    Inject 200 mg into the muscle every 30 days for 30 days. please include syringes    TIZANIDINE (ZANAFLEX) 4 MG TABLET    TAKE ONE TABLET BY MOUTH EVERY EVENING AS NEEDED FOR MUSCLE SPASMS    TIZANIDINE (ZANAFLEX) 4 MG TABLET    TAKE ONE TABLET BY MOUTH EVERY EVENING AS NEEDED FOR MUSCLE SPASMS    TOPIRAMATE (TOPAMAX) 200 MG TABLET        TRADJENTA 5 MG TABLET    TAKE 1 TABLET BY MOUTH ONE TIME A DAY    TRAZODONE (DESYREL) 150 MG TABLET    TAKE ONE TABLET BY MOUTH EVERY EVENING     ALLERGIES     is allergic to pcn [penicillins].   FAMILY HISTORY He indicated that his mother is . He indicated that his father is . He indicated that his brother is alive. He indicated that his maternal grandmother is . He indicated that his maternal grandfather is . He indicated that his paternal grandmother is . He indicated that his paternal grandfather is . He indicated that both of his sons are alive. SOCIAL HISTORY       Social History     Tobacco Use    Smoking status: Never Smoker    Smokeless tobacco: Never Used   Substance Use Topics    Alcohol use: Yes     Comment: rare social, non past 5 months    Drug use: No       I personally evaluated and examined the patient in conjunction with the APC and agree with the assessment, treatment plan, and disposition of the patient as recorded by the APC.    Danika Black MD  Attending Emergency Physician          Danika Black MD  / Nathalia Armenta

## 2021-03-13 NOTE — ED PROVIDER NOTES
[address] [place] [address] ED  eMERGENCY dEPARTMENT eNCOUnter      Pt Name: Dione Bah  MRN: [id number]  [place] 1960  Date of evaluation: 3/12/2021  Provider: [address] [place] [address] CARLOS, JESICA Junior 0719       Chief Complaint   Patient presents with    Laceration     dog bite    Head Injury         HISTORY OF PRESENT ILLNESS  (Location/Symptom, Timing/Onset, Context/Setting, Quality, Duration, Modifying Factors, Severity.)   Dione Bah is a 61 y.o. male who presents to the emergency department by private vehicle for evaluation of dog bite injuries to multiple sites. This is a unknown neighborhood dog that has a history of biting neighbors of the last several years. The patient has an avulsion of his left ear from the dog bite. He also has multiple abrasions to his left foot. He has a laceration to the right thumb in addition to a laceration to the left index finger. He states that his tetanus vaccination is up-to-date. He denies any difficulty with range of motion to the fingers of his hands bilaterally. He declines the rabies vaccine at this time. Nursing Notes were reviewed. ALLERGIES     Pcn [penicillins]    CURRENT MEDICATIONS       Previous Medications    ASPIRIN 325 MG TABLET    Take 325 mg by mouth daily. ATORVASTATIN (LIPITOR) 40 MG TABLET    TAKE 1 TABLET BY MOUTH ONE TIME A DAY    CARVEDILOL (COREG) 12.5 MG TABLET    TAKE 1 TABLET BY MOUTH 2 TIMES A DAY    CLOPIDOGREL (PLAVIX) 75 MG TABLET    TAKE 1 TABLET BY MOUTH ONE TIME A DAY WITH FOOD    HYDROCHLOROTHIAZIDE (MICROZIDE) 12.5 MG CAPSULE    TAKE 1 CAPSULE BY MOUTH ONE TIME A DAY    HYDROCODONE-ACETAMINOPHEN (NORCO)  MG PER TABLET    Take 1 tablet by mouth every 6 hours as needed for Pain. Lauren Salm     LINAGLIPTIN (TRADJENTA) 5 MG TABLET    TAKE 1 TABLET BY MOUTH ONE TIME A DAY    MELOXICAM (MOBIC) 15 MG TABLET    TAKE 1 TABLET BY MOUTH ONE TIME A DAY AS NEEDED    METFORMIN (GLUCOPHAGE) 1000 MG TABLET    TAKE 1 TABLET BY MOUTH 2 TIMES A DAY WITH MEALS    MULTIPLE VITAMIN (MULTI VITAMIN DAILY PO)    Take by mouth    NEEDLE, DISP, 18 G 18G X 1\" MISC    1 Syringe by Does not apply route every 30 days    NEEDLE, DISP, 22 G 22G X 1-1/2\" MISC    1 Syringe by Does not apply route every 30 days    NITROGLYCERIN (NITROSTAT) 0.4 MG SL TABLET    Place 0.4 mg under the tongue    OMEPRAZOLE (PRILOSEC) 20 MG DELAYED RELEASE CAPSULE    Take 1 capsule by mouth Daily    PHENTERMINE (ADIPEX-P) 37.5 MG TABLET    Take 1 tablet by mouth every morning (before breakfast) for 30 days. TESTOSTERONE CYPIONATE 200 MG/ML KIT    Inject 200 mg into the muscle every 30 days for 30 days. please include syringes    TESTOSTERONE CYPIONATE 200 MG/ML KIT    Inject 200 mg into the muscle every 30 days for 30 days.  please include syringes    TIZANIDINE (ZANAFLEX) 4 MG TABLET    TAKE ONE TABLET BY MOUTH EVERY EVENING AS NEEDED FOR MUSCLE SPASMS    TIZANIDINE (ZANAFLEX) 4 MG TABLET    TAKE ONE TABLET BY MOUTH EVERY EVENING AS NEEDED FOR MUSCLE SPASMS    TOPIRAMATE (TOPAMAX) 200 MG TABLET        TRADJENTA 5 MG TABLET    TAKE 1 TABLET BY MOUTH ONE TIME A DAY    TRAZODONE (DESYREL) 150 MG TABLET    TAKE ONE TABLET BY MOUTH EVERY EVENING       PAST MEDICAL HISTORY         Diagnosis Date    Acquired trigger finger 6/5/2018    CAD (coronary artery disease)     [facility]/ involving coronary bypass graft of native heart with unstable angina pectoris    Cervical spondylosis     CHF (congestive heart failure) (HCC)     Chronic back pain     on 11/19/18 pt states is presently in pain mgmnt    Contusion of elbow 6/5/2018    Contusion of knee 6/5/2018    Contusion of shoulder region 6/5/2018    Diabetic foot ulcer with osteomyelitis ([facility] 1/9/2018    Diabetic ulcer of toe associated with type 2 diabetes mellitus, with fat layer exposed ([facility] 1/31/2018    Hypercholesterolemia 12/5/2017    Hyperlipemia, mixed     Hypertension     Ischemic cardiomyopathy 5/7/2018    Non-pressure chronic ulcer of left lower leg with fat layer exposed ([address] 2014    Obesity     Obesity, Class III, BMI 40-49.9 (morbid obesity) ([address] 2015    Obstructive sleep apnea syndrome     Osteoarthritis of carpometacarpal (CMC) joint of thumb 2018    Osteoarthritis of knee 2018    Peripheral vascular disease ([address]     with venous stasis and ulcerations. Dr Addi Cox Presence of coronary angioplasty implant and graft 2009    Sleep apnea     Dr William Potter of knee and leg 2018    Sprain of shoulder and upper arm 2018    Type II or unspecified type diabetes mellitus without mention of complication, not stated as uncontrolled     Ulcer of left foot, with fat layer exposed ([address] 2018    Unspecified sleep apnea     Dr Carbajal Files CPAP    Venous insufficiency of both lower extremities 2018       SURGICAL HISTORY           Procedure Laterality Date    CARPAL TUNNEL RELEASE Right 3/22/13    Dr. Darline Azevedo.     CORONARY ANGIOPLASTY WITH STENT PLACEMENT  ,2006.2009    X 3 separately    CORONARY ARTERY BYPASS GRAFT  07    [facility] (X 3)    NECK SURGERY  2010    cervical stenoses C5-C6-C7and partial T1 laminectomy with fusion of C6-7    OTHER SURGICAL HISTORY      wound care ulcers of legs (bilaterally) with Dr Chrissy Monzon  age 11    VEIN SURGERY      closure of peripherator veins of legs, Dr Tory Garcia           Problem Relation Age of Onset    Cancer Mother         lung    Diabetes Father     Heart Disease Father     High Blood Pressure Father     Diabetes Brother     Heart Disease Brother     High Blood Pressure Brother      Family Status   Relation Name Status    Mother   at age 67        lung ca (smoker)    Father   at age 50        MI    Brother  Alive    Son  Alive    Son  Alive    MGM      MGF      [facility]      PGF   SOCIAL HISTORY      reports that he has never smoked. He has never used smokeless tobacco. He reports current alcohol use. He reports that he does not use drugs. REVIEW OF SYSTEMS    (2-9 systems for level 4, 10 or more for level 5)     Review of Systems   Constitutional: Negative for chills, fever and unexpected weight change. HENT: Negative for congestion, rhinorrhea, sinus pressure and sore throat. Respiratory: Negative for cough, shortness of breath and wheezing. Cardiovascular: Negative for chest pain and palpitations. Gastrointestinal: Negative for abdominal pain, constipation, diarrhea, nausea and vomiting. Genitourinary: Negative for dysuria and hematuria. Musculoskeletal: Negative for arthralgias and myalgias. Skin: Negative for color change and rash. Neurological: Negative for dizziness, weakness and headaches. Hematological: Negative for adenopathy. All other systems reviewed and are negative. Except as noted above the remainder of the review of systems was reviewed and negative. PHYSICAL EXAM    (up to 7 for level 4, 8 or more for level 5)     ED Triage Vitals   BP Temp Temp src Pulse Resp SpO2 Height Weight   03/12/21 1809 03/12/21 1809 -- 03/12/21 1809 03/12/21 1809 03/12/21 1809 -- 03/12/21 1807   (!) 151/77 98.3 °F (36.8 °C)  91 16 98 %  251 lb 8 oz (114.1 kg)       Physical Exam  Vitals signs reviewed. Constitutional:       Appearance: He is well-developed. HENT:      Head: Normocephalic and atraumatic. Eyes:      Conjunctiva/sclera: Conjunctivae normal.      Pupils: Pupils are equal, round, and reactive to light. Neck:      Musculoskeletal: Normal range of motion and neck supple. Cardiovascular:      Rate and Rhythm: Normal rate and regular rhythm. Pulmonary:      Effort: Pulmonary effort is normal. No respiratory distress. Breath sounds: Normal breath sounds. No stridor.    Abdominal:      General: Bowel sounds are normal.      Palpations: Abdomen is soft. Musculoskeletal: Normal range of motion. Lymphadenopathy:      Cervical: No cervical adenopathy. Skin:     General: Skin is warm and dry. Findings: No rash. Neurological:      Mental Status: He is alert and oriented to person, place, and time. LABS:  Labs Reviewed - No data to display    All other labs were within normal range or not returned as of this dictation. EMERGENCY DEPARTMENT COURSE and DIFFERENTIAL DIAGNOSIS/MDM:   Vitals:    Vitals:    03/12/21 1807 03/12/21 1809   BP:  (!) 151/77   Pulse:  91   Resp:  16   Temp:  98.3 °F (36.8 °C)   SpO2:  98%   Weight: 251 lb 8 oz (114.1 kg)        Medical Decision Making: Spoke with plastic surgery/wound care dr Calvin Sutherland. The patient will be on doxycycline and Flagyl due to his penicillin allergy. He was given dose of Ancef here. Sutures out in 7 to 10 days. Call Monday morning to  Dr Calvin Sutherland office to be evaluated Monday    PROCEDURES:  Laceration Repair Procedure Note    Indication: Laceration    Procedure: The patient was placed in the appropriate position and anesthesia around the laceration was obtained by infiltration using 1% Lidocaine without epinephrine. The area was then cleansed with betadine and draped in a sterile fashion. The laceration to the right thuimb was closed with 5-0 Ethilon using interrupted sutures. It was irrigated with a large amount of normal saline  A second laceration was closed with 5-0 Ethilon using interrupted sutures. Wound was irrigated with large amount of saline. The wound area was then dressed with a bandage. Total repaired wound length: 4.5 cm. (right thumb)                                                   3.5 cm left index finger  Other Items: Suture count: 20    The patient tolerated the procedure well. Complications: None        FINAL IMPRESSION      1. Dog bite, initial encounter    2.  Multiple lacerations          DISPOSITION/PLAN   DISPOSITION Decision To Discharge 03/12/2021 08:03:01 PM      PATIENT REFERRED TO:   Chica Stone MD  [facility] [address] [facility]  [phone]    Schedule an appointment as soon as possible for a visit   call monday am for appointment for wound check      DISCHARGE MEDICATIONS:     New Prescriptions    DOXYCYCLINE HYCLATE (VIBRA-TABS) 100 MG TABLET    Take 1 tablet by mouth 2 times daily    METRONIDAZOLE (FLAGYL) 500 MG TABLET    Take 1 tablet by mouth 3 times daily for 7 days           (Please note that portions of this note were completed with a voice recognition program.  Efforts were made to edit the dictations but occasionally words are mis-transcribed.)    [address] CARLOS, JESICA - CNP  Certified Nurse Practitioner          JESICA Fitch CNP  03/12/21 2012

## 2021-03-18 ENCOUNTER — OFFICE VISIT (OUTPATIENT)
Dept: SURGERY | Age: 61
End: 2021-03-18
Payer: COMMERCIAL

## 2021-03-18 VITALS
DIASTOLIC BLOOD PRESSURE: 81 MMHG | HEIGHT: 72 IN | SYSTOLIC BLOOD PRESSURE: 137 MMHG | HEART RATE: 82 BPM | OXYGEN SATURATION: 98 % | WEIGHT: 259.4 LBS | BODY MASS INDEX: 35.13 KG/M2
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
## 2021-03-18 PROCEDURE — 99203 OFFICE O/P NEW LOW 30 MIN: CPT | Performed by: PLASTIC SURGERY

## 2021-03-18 NOTE — PROGRESS NOTES
[address] [facility]  [facility] [id number]       History and Physical     Chief Complaint   Patient presents with    New Patient     Patient states he was bit by a dog on his left ear. HPI:   Etelvina Curry is a 61 y.o. male who presents with a dog bite on his left ear as well as multiple lacerations on both hands including the thumb and fingers. Patient dates that it occurred on Friday. Patient is here today for evaluation treatment. Patient states that part of the ear was removed during the bite. Patient is on antibiotics currently. Patient is a . Patient was referred from the emergency room    Medications:     Current Outpatient Medications   Medication Sig Dispense Refill    doxycycline hyclate (VIBRA-TABS) 100 MG tablet Take 1 tablet by mouth 2 times daily 20 tablet 0    metroNIDAZOLE (FLAGYL) 500 MG tablet Take 1 tablet by mouth 3 times daily for 7 days 21 tablet 0    Testosterone Cypionate 200 MG/ML KIT Inject 200 mg into the muscle every 30 days for 30 days. please include syringes 1 kit 5    phentermine (ADIPEX-P) 37.5 MG tablet Take 1 tablet by mouth every morning (before breakfast) for 30 days.  30 tablet 0    metFORMIN (GLUCOPHAGE) 1000 MG tablet TAKE 1 TABLET BY MOUTH 2 TIMES A DAY WITH MEALS 60 tablet 5    meloxicam (MOBIC) 15 MG tablet TAKE 1 TABLET BY MOUTH ONE TIME A DAY AS NEEDED 90 tablet 1    TRADJENTA 5 MG tablet TAKE 1 TABLET BY MOUTH ONE TIME A DAY 30 tablet 5    linagliptin (TRADJENTA) 5 MG tablet TAKE 1 TABLET BY MOUTH ONE TIME A DAY 30 tablet 5    hydroCHLOROthiazide (MICROZIDE) 12.5 MG capsule TAKE 1 CAPSULE BY MOUTH ONE TIME A DAY 30 capsule 5    tiZANidine (ZANAFLEX) 4 MG tablet TAKE ONE TABLET BY MOUTH EVERY EVENING AS NEEDED FOR MUSCLE SPASMS 30 tablet 3    traZODone (DESYREL) 150 MG tablet TAKE ONE TABLET BY MOUTH EVERY EVENING 30 tablet 11    omeprazole (PRILOSEC) 20 MG delayed release capsule Take 1 capsule by mouth Daily 90 capsule 1    carvedilol (COREG) 12.5 MG tablet TAKE 1 TABLET BY MOUTH 2 TIMES A DAY 60 tablet 5    tiZANidine (ZANAFLEX) 4 MG tablet TAKE ONE TABLET BY MOUTH EVERY EVENING AS NEEDED FOR MUSCLE SPASMS 30 tablet 3    atorvastatin (LIPITOR) 40 MG tablet TAKE 1 TABLET BY MOUTH ONE TIME A DAY 90 tablet 3    NEEDLE, DISP, 18 G 18G X 1\" MISC 1 Syringe by Does not apply route every 30 days 25 each 0    clopidogrel (PLAVIX) 75 MG tablet TAKE 1 TABLET BY MOUTH ONE TIME A DAY WITH FOOD 30 tablet 11    topiramate (TOPAMAX) 200 MG tablet       HYDROcodone-acetaminophen (NORCO)  MG per tablet Take 1 tablet by mouth every 6 hours as needed for Pain. Sanju Jimenez Multiple Vitamin (MULTI VITAMIN DAILY PO) Take by mouth      nitroGLYCERIN (NITROSTAT) 0.4 MG SL tablet Place 0.4 mg under the tongue      aspirin 325 MG tablet Take 325 mg by mouth daily.  NEEDLE, DISP, 22 G 22G X 1-1/2\" MISC 1 Syringe by Does not apply route every 30 days 25 each 0    Testosterone Cypionate 200 MG/ML KIT Inject 200 mg into the muscle every 30 days for 30 days. please include syringes 1 kit 5     No current facility-administered medications for this visit. Allergies: Allergies   Allergen Reactions    Pcn [Penicillins] Other (See Comments)     Unknown rx     Review of Systems:   Constitutional: Negative for fever, chills, fatigue and unexpected weight change. HENT: Negative for hearing loss, sore throat and facial swelling. Eyes: Negative for pain and discharge. Respiratory: No history of sleep apnea. Cardiovascular: Patient with a history of CHF, coronary artery disease, cardiac myopathy, peripheral vascular disease. Patient with hypercholesterolemia, hyperlipidemia, hypertension, ischemic cardiomyopathy. Gastrointestinal: Negative for nausea, vomiting, diarrhea and constipation. Skin: Negative for pallor and rash. Neurological: Patient has chronic back pain. [employer] Hematological: Does not bruise/bleed easily. Psychiatric/Behavioral: Negative for behavioral problems. The patient is not nervous/anxious. Past Medical History:   Diagnosis Date    Acquired trigger finger 6/5/2018    CAD (coronary artery disease)     [facility]/ involving coronary bypass graft of native heart with unstable angina pectoris    Cervical spondylosis     CH[facility] (congestive heart failure) ([facility]     Chronic back pain     on 11/19/18 pt states is presently in pain mgmnt    Contusion of elbow 6/5/2018    Contusion of knee 6/5/2018    Contusion of shoulder region 6/5/2018    Diabetic foot ulcer with osteomyelitis ([address] 1/9/2018    Diabetic ulcer of toe associated with type 2 diabetes mellitus, with fat layer exposed ([address] 1/31/2018    Hypercholesterolemia 12/5/2017    Hyperlipemia, mixed     Hypertension     Ischemic cardiomyopathy 5/7/2018    Non-pressure chronic ulcer of left lower leg with fat layer exposed ([address] 6/9/2014    Obesity     Obesity, Class III, BMI 40-49.9 (morbid obesity) ([address] 4/6/2015    Obstructive sleep apnea syndrome     Osteoarthritis of carpometacarpal (CMC) joint of thumb 6/5/2018    Osteoarthritis of knee 6/5/2018    Peripheral vascular disease ([address]     with venous stasis and ulcerations. Dr George Rios Presence of coronary angioplasty implant and graft 9/11/2009    Sleep apnea     Dr Kellee Funes of knee and leg 6/5/2018    Sprain of shoulder and upper arm 6/5/2018    Type II or unspecified type diabetes mellitus without mention of complication, not stated as uncontrolled     Ulcer of left foot, with fat layer exposed ([address] 1/9/2018    Unspecified sleep apnea     Dr Dobson Risdilip CPAP    Venous insufficiency of both lower extremities 1/31/2018     Past Surgical History:   Procedure Laterality Date    CARPAL TUNNEL RELEASE Right 3/22/13    Dr. Norma Estrella.     CORONARY ANGIOPLASTY WITH STENT PLACEMENT  2002,2006.2009    X 3 separately    CORONARY ARTERY BYPASS GRAFT  2/26/07    Dr Bryan Stearns (X 3)    NECK SURGERY  2010    cervical stenoses C5-C6-C7and partial T1 laminectomy with fusion of C6-7    OTHER SURGICAL HISTORY      wound care ulcers of legs (bilaterally) with Dr Rosalee Ziegler  age 11    VEIN SURGERY      closure of peripherator veins of legs, Dr Carmina Franks History     Socioeconomic History    Marital status:      Spouse name: Not on file    Number of children: Not on file    Years of education: Not on file    Highest education level: Not on file   Occupational History    Not on file   Social Needs    Financial resource strain: Not on file    Food insecurity     Worry: Not on file     Inability: Not on file    Transportation needs     Medical: Not on file     Non-medical: Not on file   Tobacco Use    Smoking status: Never Smoker    Smokeless tobacco: Never Used   Substance and Sexual Activity    Alcohol use: Yes     Comment: rare social, non past 5 months    Drug use: No    Sexual activity: Yes     Partners: Female     Comment: spouse   Lifestyle    Physical activity     Days per week: Not on file     Minutes per session: Not on file    Stress: Not on file   Relationships    Social connections     Talks on phone: Not on file     Gets together: Not on file     Attends Spiritism service: Not on file     Active member of club or organization: Not on file     Attends meetings of clubs or organizations: Not on file     Relationship status: Not on file    Intimate partner violence     Fear of current or ex partner: Not on file     Emotionally abused: Not on file     Physically abused: Not on file     Forced sexual activity: Not on file   Other Topics Concern    Not on file   Social History Narrative    Not on file     Family History   Problem Relation Age of Onset    Cancer Mother         lung    Diabetes Father     Heart Disease Father     High Blood Pressure Father  Diabetes Brother     Heart Disease Brother     High Blood Pressure Brother      Physical Exam:   /81 (Site: Left Upper Arm, Position: Sitting, Cuff Size: Medium Adult)   Pulse 82   Ht 6' (1.829 m)   Wt 259 lb 6.4 oz (117.7 kg)   SpO2 98%   BMI 35.18 kg/m²    Body mass index is 35.18 kg/m². Physical Exam   Nursing note and vitals reviewed. Constitutional: Oriented to person, place, and time. Appears well-developed and well-nourished. No distress. Head: Normocephalic and atraumatic. Eyes: Conjunctivae and EOM are normal.   Pulmonary/Chest: Effort normal. No respiratory distress. Neurological: Alert and oriented to person, place, and time. Skin: The lacerations on both fingers are well approximated. There is some erythema. The ear there is loss of tissue of the helix. I am unable to appreciate on exam gross cartilage exposure however he is very sensitive and a complete exam was not possible. Psychiatric: Normal mood and affect. Behavior is normal    Imaging:   @[address]@        Impression/Plan:      Diagnosis Orders   1.  Dog bite, initial encounter       Patient Active Problem List   Diagnosis    Hyperlipemia, mixed    Sleep apnea    Peripheral vascular disease ([address]    CAD (coronary artery disease)    [facility]    CTS (carpal tunnel syndrome)    Non-pressure chronic ulcer of left lower leg with fat layer exposed ([address]    Hypotension    Obesity, Class III, BMI 40-49.9 (morbid obesity) ([address]    Essential hypertension    DDD (degenerative disc disease), cervical    DJD (degenerative joint disease), cervical    Primary osteoarthritis involving multiple joints    Paresthesias in left hand    Uncontrolled type 2 diabetes mellitus without complication, without long-term current use of insulin    Coronary artery disease involving coronary bypass graft of native heart without angina pectoris    Sleep apnea    Cervical spondylosis    Obesity    Long term current use of antithrombotics/antiplatelets    BMI 91.5-75.4, adult ([address]    H/O cervical spine surgery    Long term (current) use of non-steroidal anti-inflammatories (nsaid)    Chronic prescription opiate use    Severe comorbid illness    Hx of cervical spine surgery    Chronic neck pain    Candidal balanitis    Cellulitis of third toe, left    Nail avulsion, toe, initial encounter    Cellulitis of second toe, right    Abnormal nuclear stress test    Abnormal stress test    Hypercholesterolemia    Diabetic foot ulcer with osteomyelitis (HCC)    Ulcer of left foot, with fat layer exposed ([address]    Acquired trigger finger    Contusion of elbow    Contusion of shoulder region    Contusion of knee    Degeneration of lumbar intervertebral disc    Diabetic ulcer of toe associated with type 2 diabetes mellitus, with fat layer exposed ([address]    History of coronary artery bypass graft x 3    Ischemic cardiomyopathy    Osteoarthritis of knee    Osteoarthritis of carpometacarpal (CMC) joint of thumb    Sprain of knee and leg    Sprain of shoulder and upper arm    Presence of coronary angioplasty implant and graft    Venous insufficiency of both lower extremities     Plan:  I discussed reconstruction of his left ear. Patient states that he is a  and he is unable to have surgical intervention. We proceeded to do wound care for for now. I discussed with the patient that we can do a delayed reconstruction at a later time if he desires.        Electronically signed by:  Abi Lee MD 3/18/2021

## 2021-03-19 ENCOUNTER — TELEPHONE (OUTPATIENT)
Dept: FAMILY MEDICINE CLINIC | Age: 61
End: 2021-03-19

## 2021-03-25 ENCOUNTER — HOSPITAL ENCOUNTER (EMERGENCY)
Age: 61
Discharge: HOME | End: 2021-03-25
Attending: EMERGENCY MEDICINE
Payer: COMMERCIAL

## 2021-03-25 VITALS
HEIGHT: 72 IN | WEIGHT: 315 LBS | OXYGEN SATURATION: 98 % | DIASTOLIC BLOOD PRESSURE: 71 MMHG | SYSTOLIC BLOOD PRESSURE: 151 MMHG | BODY MASS INDEX: 42.66 KG/M2 | RESPIRATION RATE: 16 BRPM | TEMPERATURE: 98.3 F | HEART RATE: 80 BPM
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
## 2021-03-25 DIAGNOSIS — Z51.89 VISIT FOR WOUND CHECK: Primary | ICD-10-CM

## 2021-03-25 PROCEDURE — 99283 EMERGENCY DEPT VISIT LOW MDM: CPT

## 2021-03-25 ASSESSMENT — ENCOUNTER SYMPTOMS
SINUS PRESSURE: 0
COLOR CHANGE: 0
CONSTIPATION: 0
SORE THROAT: 0
SHORTNESS OF BREATH: 0
VOMITING: 0
ABDOMINAL PAIN: 0
NAUSEA: 0
WHEEZING: 0
RHINORRHEA: 0
DIARRHEA: 0
COUGH: 0

## 2021-03-25 NOTE — ED PROVIDER NOTES
The patient was seen and examined by me in conjunction with the mid-level provider. I agree with his/her assessment and treatment plan. Sutures are removed and Steri-Strips are applied. No evidence of an infection.      Mikey Lucas MD  03/25/21 2100

## 2021-03-25 NOTE — ED PROVIDER NOTES
[address] [place] [address] ED  eMERGENCY dEPARTMENT eNCOUnter      Pt Name: Jose Cruz Oliveira  MRN: [id number]  [place] 1960  Date of evaluation: 3/25/2021  Provider: Jb Fletcher NP, JESICA - [place] [address]508       Chief Complaint   Patient presents with    Suture / Staple Removal     right thumb and left index finger         HISTORY OF PRESENT ILLNESS  (Location/Symptom, Timing/Onset, Context/Setting, Quality, Duration, Modifying Factors, Severity.)   Jose Cruz Oliveira is a [address] male who presents to the emergency department by private vehicle for evaluation of suture removal to the right thumb and left index finger. Patient has had the sutures and from a dog bite for the last 13 days. He states he has been constantly keeping the wounds covered. He saw the wound care specialist about his left ear has a follow-up appointment with her on 5 April. He denies any drainage from the wounds. Nursing Notes were reviewed. ALLERGIES     Pcn [penicillins]    CURRENT MEDICATIONS       Discharge Medication List as of 3/25/2021  3:06 PM      CONTINUE these medications which have NOT CHANGED    Details   phentermine (ADIPEX-P) 37.5 MG tablet Take 1 tablet by mouth every morning (before breakfast) for 30 days. , Disp-30 tablet, R-0Normal      metFORMIN (GLUCOPHAGE) 1000 MG tablet TAKE 1 TABLET BY MOUTH 2 TIMES A DAY WITH MEALS, Disp-60 tablet, R-5Normal      meloxicam (MOBIC) 15 MG tablet TAKE 1 TABLET BY MOUTH ONE TIME A DAY AS NEEDED, Disp-90 tablet, R-1Normal      !! TRADJENTA 5 MG tablet TAKE 1 TABLET BY MOUTH ONE TIME A DAY, Disp-30 tablet, R-5Normal      !! linagliptin (TRADJENTA) 5 MG tablet TAKE 1 TABLET BY MOUTH ONE TIME A DAY, Disp-30 tablet, R-5Normal      hydroCHLOROthiazide (MICROZIDE) 12.5 MG capsule TAKE 1 CAPSULE BY MOUTH ONE TIME A DAY, Disp-30 capsule, R-5Normal      traZODone (DESYREL) 150 MG tablet TAKE ONE TABLET BY MOUTH EVERY EVENING, Disp-30 tablet, R-11Normal      omeprazole (PRILOSEC) 20 MG delayed release capsule Take 1 capsule by mouth Daily, Disp-90 capsule,R-1Normal      carvedilol (COREG) 12.5 MG tablet TAKE 1 TABLET BY MOUTH 2 TIMES A DAY, Disp-60 tablet,R-5Normal      atorvastatin (LIPITOR) 40 MG tablet TAKE 1 TABLET BY MOUTH ONE TIME A DAY, Disp-90 tablet,R-3Normal      clopidogrel (PLAVIX) 75 MG tablet TAKE 1 TABLET BY MOUTH ONE TIME A DAY WITH FOOD, Disp-30 tablet,R-11Normal      topiramate (TOPAMAX) 200 MG tablet Historical Med      HYDROcodone-acetaminophen (NORCO)  MG per tablet Take 1 tablet by mouth every 6 hours as needed for Pain. Jose Jimmyambrose Historical Med      Multiple Vitamin (MULTI VITAMIN DAILY PO) Take by mouthHistorical Med      aspirin 325 MG tablet Take 325 mg by mouth daily. doxycycline hyclate (VIBRA-TABS) 100 MG tablet Take 1 tablet by mouth 2 times daily, Disp-20 tablet, R-0Print      Testosterone Cypionate 200 MG/ML KIT Inject 200 mg into the muscle every 30 days for 30 days. please include syringes, Disp-1 kit, R-5Normal      !! tiZANidine (ZANAFLEX) 4 MG tablet TAKE ONE TABLET BY MOUTH EVERY EVENING AS NEEDED FOR MUSCLE SPASMS, Disp-30 tablet, R-3Normal      !! tiZANidine (ZANAFLEX) 4 MG tablet TAKE ONE TABLET BY MOUTH EVERY EVENING AS NEEDED FOR MUSCLE SPASMS, Disp-30 tablet,R-3Normal      NEEDLE, DISP, 18 G 18G X 1\" MISC EVERY 30 DAYS Starting Tue 9/22/2020, Disp-25 each,R-0, Normal      NEEDLE, DISP, 22 G 22G X 1-1/2\" MISC EVERY 30 DAYS Starting Tue 9/22/2020, Until Thu 10/22/2020, For 30 days, Disp-25 each,R-0, Normal      nitroGLYCERIN (NITROSTAT) 0.4 MG SL tablet Place 0.4 mg under the tongue       !! - Potential duplicate medications found. Please discuss with provider.           PAST MEDICAL HISTORY         Diagnosis Date    Acquired trigger finger 6/5/2018    CAD (coronary artery disease)     [facility]/ involving coronary bypass graft of native heart with unstable angina pectoris    Cervical spondylosis     CHF (congestive heart failure) (HCC)     Chronic back pain     on 11/19/18 pt states is presently in pain mgmnt    Contusion of elbow 6/5/2018    Contusion of knee 6/5/2018    Contusion of shoulder region 6/5/2018    Diabetic foot ulcer with osteomyelitis ([address] 1/9/2018    Diabetic ulcer of toe associated with type 2 diabetes mellitus, with fat layer exposed ([address] 1/31/2018    Hypercholesterolemia 12/5/2017    Hyperlipemia, mixed     Hypertension     Ischemic cardiomyopathy 5/7/2018    Non-pressure chronic ulcer of left lower leg with fat layer exposed ([address] 6/9/2014    Obesity     Obesity, Class III, BMI 40-49.9 (morbid obesity) ([address] 4/6/2015    Obstructive sleep apnea syndrome     Osteoarthritis of carpometacarpal (CMC) joint of thumb 6/5/2018    Osteoarthritis of knee 6/5/2018    Peripheral vascular disease ([address]     with venous stasis and ulcerations. Dr Vandana Roberts Presence of coronary angioplasty implant and graft 9/11/2009    Sleep apnea     Dr Greene Corner of knee and leg 6/5/2018    Sprain of shoulder and upper arm 6/5/2018    Type II or unspecified type diabetes mellitus without mention of complication, not stated as uncontrolled     Ulcer of left foot, with fat layer exposed ([address] 1/9/2018    Unspecified sleep apnea     Dr Juli Bull CPAP    Venous insufficiency of both lower extremities 1/31/2018       SURGICAL HISTORY           Procedure Laterality Date    CARPAL TUNNEL RELEASE Right 3/22/13    Dr. Shelia Christie.     CORONARY ANGIOPLASTY WITH STENT PLACEMENT  2002,2006.2009    X 3 separately    CORONARY ARTERY BYPASS GRAFT  2/26/07    [facility], Dr Ronald Penny (X 3)    NECK SURGERY  2010    cervical stenoses C5-C6-C7and partial T1 laminectomy with fusion of C6-7    OTHER SURGICAL HISTORY      wound care ulcers of legs (bilaterally) with Dr Raman Modi  age 11    VEIN SURGERY      closure of peripherator veins of legs, Dr Rocky Kumar HISTORY           Problem Relation Age of Onset    Cancer Mother         lung    Diabetes Father     Heart Disease Father     High Blood Pressure Father     Diabetes Brother     Heart Disease Brother     High Blood Pressure Brother      Family Status   Relation Name Status    Mother   at age 67        lung ca (smoker)    Father   at age 50        MI    Brother  Alive    Son  Alive    Son  Alive    MGM      MGF      [facility]      PGF          SOCIAL HISTORY      reports that he has never smoked. He has never used smokeless tobacco. He reports current alcohol use. He reports that he does not use drugs. REVIEW OF SYSTEMS    (2-9 systems for level 4, 10 or more for level 5)     Review of Systems   Constitutional: Negative for chills, fever and unexpected weight change. HENT: Negative for congestion, rhinorrhea, sinus pressure and sore throat. Respiratory: Negative for cough, shortness of breath and wheezing. Cardiovascular: Negative for chest pain and palpitations. Gastrointestinal: Negative for abdominal pain, constipation, diarrhea, nausea and vomiting. Genitourinary: Negative for dysuria and hematuria. Musculoskeletal: Negative for arthralgias and myalgias. Skin: Positive for wound. Negative for color change and rash. Neurological: Negative for dizziness, weakness and headaches. Hematological: Negative for adenopathy. All other systems reviewed and are negative. Except as noted above the remainder of the review of systems was reviewed and negative. PHYSICAL EXAM    (up to 7 for level 4, 8 or more for level 5)     ED Triage Vitals [21 1451]   BP Temp Temp Source Pulse Resp SpO2 Height Weight   (!) 151/71 98.3 °F (36.8 °C) Oral 80 16 98 % 6' (1.829 m) (!) 340 lb (154.2 kg)       Physical Exam  Vitals signs reviewed. Constitutional:       Appearance: He is well-developed. HENT:      Head: Normocephalic and atraumatic.    Eyes:      Conjunctiva/sclera: Conjunctivae normal.      Pupils: Pupils are equal, round, and reactive to light. Neck:      Musculoskeletal: Normal range of motion and neck supple. Cardiovascular:      Rate and Rhythm: Normal rate and regular rhythm. Pulmonary:      Effort: Pulmonary effort is normal. No respiratory distress. Breath sounds: Normal breath sounds. No stridor. Abdominal:      General: Bowel sounds are normal.      Palpations: Abdomen is soft. Musculoskeletal: Normal range of motion. Comments: Good ROM of thumb and left index finger   Lymphadenopathy:      Cervical: No cervical adenopathy. Skin:     General: Skin is warm and dry. Findings: No rash. Comments: skin around the right thumb is macerated and the top layer of skin is not all the way closed. No purulent drainage expressed. Neurological:      Mental Status: He is alert and oriented to person, place, and time. LABS:  Labs Reviewed - No data to display    All other labs were within normal range or not returned as of this dictation. EMERGENCY DEPARTMENT COURSE and DIFFERENTIAL DIAGNOSIS/MDM:   Vitals:    Vitals:    03/25/21 1451   BP: (!) 151/71   Pulse: 80   Resp: 16   Temp: 98.3 °F (36.8 °C)   TempSrc: Oral   SpO2: 98%   Weight: (!) 340 lb (154.2 kg)   Height: 6' (1.829 m)       Medical Decision Making: Sutures were removed. Patient tolerated well. Steri-Strips were applied. The patient was placed in a finger splint to use on the thumb to help prevent from bending the thumb to allow for further healing. He was told to not always keep the right thumb and index finger covered as the skin underneath is getting macerated. He was told to keep his appointment with wound care for follow-up regarding these wounds, he verbalizes understanding. FINAL IMPRESSION      1. Visit for wound check    2.  Dog bite, initial encounter          DISPOSITION/PLAN   DISPOSITION Decision To Discharge 03/25/2021 03:05:25 PM      PATIENT REFERRED TO: Geoffrey Montez MD  [address]  [place]/ [address] [facility]  [phone]            DISCHARGE MEDICATIONS:     Discharge Medication List as of 3/25/2021  3:06 PM              (Please note that portions of this note were completed with a voice recognition program.  Efforts were made to edit the dictations but occasionally words are mis-transcribed.)    [address] [facility] NP, APRN - CNP  Certified Nurse Practitioner          JESICA Estrada CNP  03/25/21 0122

## 2021-03-30 ENCOUNTER — TELEMEDICINE (OUTPATIENT)
Dept: FAMILY MEDICINE CLINIC | Age: 61
End: 2021-03-30
Payer: COMMERCIAL

## 2021-03-30 DIAGNOSIS — E66.01 OBESITY, CLASS III, BMI 40-49.9 (MORBID OBESITY) (HCC): ICD-10-CM

## 2021-03-30 DIAGNOSIS — E78.2 HYPERLIPEMIA, MIXED: ICD-10-CM

## 2021-03-30 DIAGNOSIS — I10 ESSENTIAL HYPERTENSION: Primary | ICD-10-CM

## 2021-03-30 PROCEDURE — 99213 OFFICE O/P EST LOW 20 MIN: CPT | Performed by: FAMILY MEDICINE

## 2021-03-30 RX ORDER — CARVEDILOL 12.5 MG/1
12.5 TABLET ORAL 2 TIMES DAILY WITH MEALS
Qty: 60 TABLET | Refills: 5 | Status: SHIPPED | OUTPATIENT
Start: 2021-03-30 | End: 2021-05-04 | Stop reason: SDUPTHER

## 2021-03-30 RX ORDER — PHENTERMINE HYDROCHLORIDE 37.5 MG/1
37.5 TABLET ORAL
Qty: 30 TABLET | Refills: 0 | Status: SHIPPED | OUTPATIENT
Start: 2021-03-30 | End: 2021-04-29 | Stop reason: SDUPTHER

## 2021-03-30 ASSESSMENT — PATIENT HEALTH QUESTIONNAIRE - PHQ9
1. LITTLE INTEREST OR PLEASURE IN DOING THINGS: 0
SUM OF ALL RESPONSES TO PHQ QUESTIONS 1-9: 0

## 2021-03-30 NOTE — PROGRESS NOTES
3/30/2021    TELEHEALTH EVALUATION -- Audio/Visual (During [facility]-46 public health emergency)    HPI:    Leatha Tsang (:  1960) has requested an audio/video evaluation for the following concern(s):    He is being seen today in video visit follow-up for weight loss he has lost approximately 10 pounds. Also for follow-up of high blood pressure he needs refills of meds he is doing well unfortunately recently got bit by a dog it was a neighborhood dog she has been following up with plastic surgery and he is doing okay with that    Review of Systems    Prior to Visit Medications    Medication Sig Taking? Authorizing Provider   carvedilol (COREG) 12.5 MG tablet Take 1 tablet by mouth 2 times daily (with meals) Yes Seema Hameed DO   phentermine (ADIPEX-P) 37.5 MG tablet Take 1 tablet by mouth every morning (before breakfast) for 30 days. Yes Seema Hameed DO   Testosterone Cypionate 200 MG/ML KIT Inject 200 mg into the muscle every 30 days for 30 days.  please include syringes Yes Seema Hameed DO   metFORMIN (GLUCOPHAGE) 1000 MG tablet TAKE 1 TABLET BY MOUTH 2 TIMES A DAY WITH MEALS Yes Seema Hameed DO   meloxicam (MOBIC) 15 MG tablet TAKE 1 TABLET BY MOUTH ONE TIME A DAY AS NEEDED Yes Seema Hameed DO   linagliptin (TRADJENTA) 5 MG tablet TAKE 1 TABLET BY MOUTH ONE TIME A DAY Yes Seema Hameed DO   hydroCHLOROthiazide (MICROZIDE) 12.5 MG capsule TAKE 1 CAPSULE BY MOUTH ONE TIME A DAY Yes Seema Hameed DO   traZODone (DESYREL) 150 MG tablet TAKE ONE TABLET BY MOUTH EVERY EVENING Yes Seema Hameed DO   omeprazole (PRILOSEC) 20 MG delayed release capsule Take 1 capsule by mouth Daily Yes Seema Hameed DO   tiZANidine (ZANAFLEX) 4 MG tablet TAKE ONE TABLET BY MOUTH EVERY EVENING AS NEEDED FOR MUSCLE SPASMS Yes Seema Hameed DO   atorvastatin (LIPITOR) 40 MG tablet TAKE 1 TABLET BY MOUTH ONE TIME A DAY Yes Seema Hameed DO   NEEDLE, DISP, 18 G 18G X 1\" MISC 1 Syringe by Does not apply route every 30 days Contusion of elbow 6/5/2018    Contusion of knee 6/5/2018    Contusion of shoulder region 6/5/2018    Diabetic foot ulcer with osteomyelitis ([address] 1/9/2018    Diabetic ulcer of toe associated with type 2 diabetes mellitus, with fat layer exposed ([address] 1/31/2018    Hypercholesterolemia 12/5/2017    Hyperlipemia, mixed     Hypertension     Ischemic cardiomyopathy 5/7/2018    Non-pressure chronic ulcer of left lower leg with fat layer exposed ([address] 6/9/2014    Obesity     Obesity, Class III, BMI 40-49.9 (morbid obesity) ([address] 4/6/2015    Obstructive sleep apnea syndrome     Osteoarthritis of carpometacarpal (CMC) joint of thumb 6/5/2018    Osteoarthritis of knee 6/5/2018    Peripheral vascular disease ([address]     with venous stasis and ulcerations. Dr Addi Cox Presence of coronary angioplasty implant and graft 9/11/2009    Sleep apnea     Dr William Potter of knee and leg 6/5/2018    Sprain of shoulder and upper arm 6/5/2018    Type II or unspecified type diabetes mellitus without mention of complication, not stated as uncontrolled     Ulcer of left foot, with fat layer exposed ([address] 1/9/2018    Unspecified sleep apnea     Dr Carbajal Files CPAP    Venous insufficiency of both lower extremities 1/31/2018   ,   Past Surgical History:   Procedure Laterality Date    CARPAL TUNNEL RELEASE Right 3/22/13    Dr. Darline Azevedo.     CORONARY ANGIOPLASTY WITH STENT PLACEMENT  2002,2006.2009    X 3 separately    CORONARY ARTERY BYPASS GRAFT  2/26/07    [facility], Dr Gorman Artist (X 3)    NECK SURGERY  2010    cervical stenoses C5-C6-C7and partial T1 laminectomy with fusion of C6-7    OTHER SURGICAL HISTORY      wound care ulcers of legs (bilaterally) with Dr Chrissy Monzon  age 11    VEIN SURGERY      closure of peripherator veins of legs, Dr Poppy Hilton     ,   Social History     Tobacco Use    Smoking status: Never Smoker    Smokeless tobacco: Never Used   Substance Use Topics    Alcohol use: Yes     Comment: rare social, non past 5 months    Drug use: No   ,   Family History   Problem Relation Age of Onset    Cancer Mother         lung    Diabetes Father     Heart Disease Father     High Blood Pressure Father     Diabetes Brother     Heart Disease Brother     High Blood Pressure Brother        PHYSICAL EXAMINATION:  [ INSTRUCTIONS:  \"[x]\" Indicates a positive item  \"[]\" Indicates a negative item  -- DELETE ALL ITEMS NOT EXAMINED]  Vital Signs: (As obtained by patient/caregiver or practitioner observation)    Blood pressure-  Heart rate-    Respiratory rate-    Temperature-  Pulse oximetry-     Constitutional: [x] Appears well-developed and well-nourished [x] No apparent distress      [] Abnormal-   Mental status  [x] Alert and awake  [x] Oriented to person/place/time [x]Able to follow commands      Eyes:  EOM    [x]  Normal  [] Abnormal-  Sclera  [x]  Normal  [] Abnormal -         Discharge [x]  None visible  [] Abnormal -    HENT:   [x] Normocephalic, atraumatic. [] Abnormal   [x] Mouth/Throat: Mucous membranes are moist.     External Ears [x] Normal  [] Abnormal-     Neck: [x] No visualized mass     Pulmonary/Chest: [x] Respiratory effort normal.  [x] No visualized signs of difficulty breathing or respiratory distress        [] Abnormal-      Musculoskeletal:   [x] Normal gait with no signs of ataxia         [x] Normal range of motion of neck        [] Abnormal-       Neurological:        [x] No Facial Asymmetry (Cranial nerve 7 motor function) (limited exam to video visit)          [x] No gaze palsy        [] Abnormal-         Skin:        [x] No significant exanthematous lesions or discoloration noted on facial skin         [] Abnormal-            Psychiatric:       [x] Normal Affect [x] No Hallucinations        [] Abnormal-     Other pertinent observable physical exam findings-     ASSESSMENT/PLAN:   Diagnosis Orders   1. Essential hypertension     2.  Hyperlipemia, mixed  carvedilol (COREG) 12.5 MG tablet   3. Obesity, Class III, BMI 40-49.9 (morbid obesity) (HCC)  phentermine (ADIPEX-P) 37.5 MG tablet      No orders of the defined types were placed in this encounter. Requested Prescriptions     Signed Prescriptions Disp Refills    carvedilol (COREG) 12.5 MG tablet 60 tablet 5     Sig: Take 1 tablet by mouth 2 times daily (with meals)    phentermine (ADIPEX-P) 37.5 MG tablet 30 tablet 0     Sig: Take 1 tablet by mouth every morning (before breakfast) for 30 days. No follow-ups on file. Hernesto Sharma is a 61 y.o. male being evaluated by a Virtual Visit (video visit) encounter to address concerns as mentioned above. A caregiver was present when appropriate. Due to this being a TeleHealth encounter (During [facility] public health emergency), evaluation of the following organ systems was limited: Vitals/Constitutional/EENT/Resp/CV/GI//MS/Neuro/Skin/Heme-Lymph-Imm. Pursuant to the emergency declaration under the [address] [place] [address], [address] [place] [address] authority and the [employer] Act, this Virtual Visit was conducted with patient's (and/or legal guardian's) consent, to reduce the patient's risk of exposure to COVID-19 and provide necessary medical care. The patient (and/or legal guardian) has also been advised to contact this office for worsening conditions or problems, and seek emergency medical treatment and/or call 911 if deemed necessary. Patient identification was verified at the start of the visit: Yes    Total time spent on this encounter: Not billed by time    Services were provided through a video synchronous discussion virtually to substitute for in-person clinic visit. Patient and provider were located at their individual homes. --Pete Sanchez DO on 3/30/2021 at 8:54 AM    An electronic signature was used to authenticate this note.

## 2021-04-01 ENCOUNTER — TELEPHONE (OUTPATIENT)
Dept: FAMILY MEDICINE CLINIC | Age: 61
End: 2021-04-01

## 2021-04-05 ENCOUNTER — HOSPITAL ENCOUNTER (OUTPATIENT)
Dept: WOUND CARE | Age: 61
Discharge: HOME | End: 2021-04-05
Payer: COMMERCIAL

## 2021-04-05 VITALS
HEART RATE: 81 BPM | SYSTOLIC BLOOD PRESSURE: 148 MMHG | TEMPERATURE: 97.4 F | RESPIRATION RATE: 16 BRPM | DIASTOLIC BLOOD PRESSURE: 90 MMHG
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
## 2021-04-05 PROCEDURE — 99203 OFFICE O/P NEW LOW 30 MIN: CPT

## 2021-04-05 PROCEDURE — 11042 DBRDMT SUBQ TIS 1ST 20SQCM/<: CPT

## 2021-04-05 PROCEDURE — 11042 DBRDMT SUBQ TIS 1ST 20SQCM/<: CPT | Performed by: PLASTIC SURGERY

## 2021-04-05 RX ORDER — BACITRACIN, NEOMYCIN, POLYMYXIN B 400; 3.5; 5 [USP'U]/G; MG/G; [USP'U]/G
OINTMENT TOPICAL ONCE
Status: CANCELLED | OUTPATIENT
Start: 2021-04-05 | End: 2021-04-05

## 2021-04-05 RX ORDER — LIDOCAINE HYDROCHLORIDE 20 MG/ML
JELLY TOPICAL ONCE
Status: CANCELLED | OUTPATIENT
Start: 2021-04-05 | End: 2021-04-05

## 2021-04-05 RX ORDER — LIDOCAINE HYDROCHLORIDE 40 MG/ML
SOLUTION TOPICAL ONCE
Status: CANCELLED | OUTPATIENT
Start: 2021-04-05 | End: 2021-04-05

## 2021-04-05 RX ORDER — BACITRACIN ZINC AND POLYMYXIN B SULFATE 500; 1000 [USP'U]/G; [USP'U]/G
OINTMENT TOPICAL ONCE
Status: CANCELLED | OUTPATIENT
Start: 2021-04-05 | End: 2021-04-05

## 2021-04-05 RX ORDER — LIDOCAINE 50 MG/G
OINTMENT TOPICAL ONCE
Status: CANCELLED | OUTPATIENT
Start: 2021-04-05 | End: 2021-04-05

## 2021-04-05 RX ORDER — GENTAMICIN SULFATE 1 MG/G
OINTMENT TOPICAL ONCE
Status: CANCELLED | OUTPATIENT
Start: 2021-04-05 | End: 2021-04-05

## 2021-04-05 RX ORDER — CLOBETASOL PROPIONATE 0.5 MG/G
OINTMENT TOPICAL ONCE
Status: CANCELLED | OUTPATIENT
Start: 2021-04-05 | End: 2021-04-05

## 2021-04-05 RX ORDER — GINSENG 100 MG
CAPSULE ORAL ONCE
Status: CANCELLED | OUTPATIENT
Start: 2021-04-05 | End: 2021-04-05

## 2021-04-05 RX ORDER — DOXYCYCLINE HYCLATE 100 MG
100 TABLET ORAL 2 TIMES DAILY
Qty: 28 TABLET | Refills: 0 | Status: SHIPPED | OUTPATIENT
Start: 2021-04-05 | End: 2021-04-19

## 2021-04-05 RX ORDER — BETAMETHASONE DIPROPIONATE 0.05 %
OINTMENT (GRAM) TOPICAL ONCE
Status: CANCELLED | OUTPATIENT
Start: 2021-04-05 | End: 2021-04-05

## 2021-04-05 RX ORDER — LIDOCAINE 40 MG/G
CREAM TOPICAL ONCE
Status: CANCELLED | OUTPATIENT
Start: 2021-04-05 | End: 2021-04-05

## 2021-04-05 RX ORDER — LIDOCAINE HYDROCHLORIDE 20 MG/ML
JELLY TOPICAL ONCE
Status: COMPLETED | OUTPATIENT
Start: 2021-04-05 | End: 2021-04-05

## 2021-04-05 RX ADMIN — LIDOCAINE HYDROCHLORIDE: 20 JELLY TOPICAL at 10:37

## 2021-04-05 NOTE — PROGRESS NOTES
Progress note     Chief Complaint   Patient presents with    Wound Check     Left Ear, Fingers        HPI:   Bernard Lozoya is a 61 y.o. male who presents with a dog bite on his left ear as well as multiple lacerations on both hands including the thumb and fingers. Patient dates that it occurred on Friday. Patient is here today for evaluation treatment. Patient states that part of the ear was removed during the bite. Patient is on antibiotics currently. Patient is a . Patient was referred from the emergency room. Patient is here for evaluation and treatment. Medications:     Current Outpatient Medications   Medication Sig Dispense Refill    traZODone (DESYREL) 150 MG tablet TAKE ONE TABLET BY MOUTH EVERY EVENING 30 tablet 11    carvedilol (COREG) 12.5 MG tablet Take 1 tablet by mouth 2 times daily (with meals) 60 tablet 5    phentermine (ADIPEX-P) 37.5 MG tablet Take 1 tablet by mouth every morning (before breakfast) for 30 days. 30 tablet 0    Testosterone Cypionate 200 MG/ML KIT Inject 200 mg into the muscle every 30 days for 30 days.  please include syringes 1 kit 5    metFORMIN (GLUCOPHAGE) 1000 MG tablet TAKE 1 TABLET BY MOUTH 2 TIMES A DAY WITH MEALS 60 tablet 5    meloxicam (MOBIC) 15 MG tablet TAKE 1 TABLET BY MOUTH ONE TIME A DAY AS NEEDED 90 tablet 1    linagliptin (TRADJENTA) 5 MG tablet TAKE 1 TABLET BY MOUTH ONE TIME A DAY 30 tablet 5    hydroCHLOROthiazide (MICROZIDE) 12.5 MG capsule TAKE 1 CAPSULE BY MOUTH ONE TIME A DAY 30 capsule 5    omeprazole (PRILOSEC) 20 MG delayed release capsule Take 1 capsule by mouth Daily 90 capsule 1    tiZANidine (ZANAFLEX) 4 MG tablet TAKE ONE TABLET BY MOUTH EVERY EVENING AS NEEDED FOR MUSCLE SPASMS 30 tablet 3    atorvastatin (LIPITOR) 40 MG tablet TAKE 1 TABLET BY MOUTH ONE TIME A DAY 90 tablet 3    NEEDLE, DISP, 18 G 18G X 1\" MISC 1 Syringe by Does not apply route every 30 days 25 each 0    NEEDLE, DISP, 22 G 22G X 1-1/2\" MISC 1 Syringe by Does not apply route every 30 days 25 each 0    clopidogrel (PLAVIX) 75 MG tablet TAKE 1 TABLET BY MOUTH ONE TIME A DAY WITH FOOD 30 tablet 11    topiramate (TOPAMAX) 200 MG tablet       HYDROcodone-acetaminophen (NORCO)  MG per tablet Take 1 tablet by mouth every 6 hours as needed for Pain. Denisse Be Multiple Vitamin (MULTI VITAMIN DAILY PO) Take by mouth      nitroGLYCERIN (NITROSTAT) 0.4 MG SL tablet Place 0.4 mg under the tongue      aspirin 325 MG tablet Take 325 mg by mouth daily. No current facility-administered medications for this encounter. Allergies: Allergies   Allergen Reactions    Pcn [Penicillins] Other (See Comments)     Unknown rx     Review of Systems:   Constitutional: Negative for fever, chills, fatigue and unexpected weight change. HENT: Negative for hearing loss, sore throat and facial swelling. Eyes: Negative for pain and discharge. Respiratory: No history of sleep apnea. Cardiovascular: Patient with a history of CHF, coronary artery disease, cardiac myopathy, peripheral vascular disease. Patient with hypercholesterolemia, hyperlipidemia, hypertension, ischemic cardiomyopathy. Gastrointestinal: Negative for nausea, vomiting, diarrhea and constipation. Skin: Negative for pallor and rash. Neurological: Patient has chronic back pain. .   Hematological: Does not bruise/bleed easily. Psychiatric/Behavioral: Negative for behavioral problems. The patient is not nervous/anxious.       Past Medical History:   Diagnosis Date    Acquired trigger finger 6/5/2018    CAD (coronary artery disease)     [facility]/ involving coronary bypass graft of native heart with unstable angina pectoris    Cervical spondylosis     CHF (congestive heart failure) (HCC)     Chronic back pain     on 11/19/18 pt states is presently in pain mgmnt    Contusion of elbow 6/5/2018    Contusion of knee 6/5/2018    Contusion of shoulder region 6/5/2018    Diabetic foot ulcer with osteomyelitis ([address] 1/9/2018    Diabetic ulcer of toe associated with type 2 diabetes mellitus, with fat layer exposed ([address] 1/31/2018    Hypercholesterolemia 12/5/2017    Hyperlipemia, mixed     Hypertension     Ischemic cardiomyopathy 5/7/2018    Non-pressure chronic ulcer of left lower leg with fat layer exposed ([address] 6/9/2014    Obesity     Obesity, Class III, BMI 40-49.9 (morbid obesity) ([address] 4/6/2015    Obstructive sleep apnea syndrome     Osteoarthritis of carpometacarpal (CMC) joint of thumb 6/5/2018    Osteoarthritis of knee 6/5/2018    Peripheral vascular disease ([address]     with venous stasis and ulcerations. Dr Mona Farah Presence of coronary angioplasty implant and graft 9/11/2009    Sleep apnea     Dr Johnny Ricofer of knee and leg 6/5/2018    Sprain of shoulder and upper arm 6/5/2018    Type II or unspecified type diabetes mellitus without mention of complication, not stated as uncontrolled     Ulcer of left foot, with fat layer exposed ([address] 1/9/2018    Unspecified sleep apnea     Dr Jeferson Agosto CPAP    Venous insufficiency of both lower extremities 1/31/2018     Past Surgical History:   Procedure Laterality Date    CARPAL TUNNEL RELEASE Right 3/22/13    Dr. Sue Tirado.     CORONARY ANGIOPLASTY WITH STENT PLACEMENT  2002,2006.2009    X 3 separately    [facility]    NECK SURGERY  2010    cervical stenoses C5-C6-C7and partial T1 laminectomy with fusion of C6-7    OTHER SURGICAL HISTORY      wound care ulcers of legs (bilaterally) with Dr Breann Arana  age 11    VEIN SURGERY      closure of peripherator veins of legs, Dr Nay Arana History     Socioeconomic History    Marital status:      Spouse name: Not on file    Number of children: Not on file    Years of education: Not on file    Highest education level: Not on file   Occupational History    Not on file   Social Needs    Financial resource strain: Not on file    Food insecurity     Worry: Not on file     Inability: Not on file    Transportation needs     Medical: Not on file     Non-medical: Not on file   Tobacco Use    Smoking status: Never Smoker    Smokeless tobacco: Never Used   Substance and Sexual Activity    Alcohol use: Yes     Comment: rare social, non past 5 months    Drug use: No    Sexual activity: Yes     Partners: Female     Comment: spouse   Lifestyle    Physical activity     Days per week: Not on file     Minutes per session: Not on file    Stress: Not on file   Relationships    Social connections     Talks on phone: Not on file     Gets together: Not on file     Attends Che[employer] service: Not on file     Active member of club or organization: Not on file     Attends meetings of clubs or organizations: Not on file     Relationship status: Not on file    Intimate partner violence     Fear of current or ex partner: Not on file     Emotionally abused: Not on file     Physically abused: Not on file     Forced sexual activity: Not on file   Other Topics Concern    Not on file   Social History Narrative    Not on file     Family History   Problem Relation Age of Onset    Cancer Mother         lung    Diabetes Father     Heart Disease Father     High Blood Pressure Father     Diabetes Brother     Heart Disease Brother     High Blood Pressure Brother      Physical Exam:   BP (!) 148/90   Pulse 81   Temp 97.4 °F (36.3 °C)   Resp 16    There is no height or weight on file to calculate BMI. Physical Exam   Nursing note and vitals reviewed. Constitutional: Oriented to person, place, and time. Appears well-developed and well-nourished. No distress. Head: Normocephalic and atraumatic. Eyes: Conjunctivae and EOM are normal.   Pulmonary/Chest: Effort normal. No respiratory distress. Neurological: Alert and oriented to person, place, and time. Skin: The ear has healed well. Patient still has erythema and calluses on both fingers. Psychiatric: Normal mood and affect. Behavior is normal  Wound 04/05/21 Finger (Comment which one) Right #1 Thumb (Active)   Number of days: 0       Wound 04/05/21 Finger (Comment which one) Left #2 2nd Finger (Active)   Number of days: 0            Procedure Note  Indications:  Based on my examination of this patient's wound(s)/ulcer(s) today, debridement is required to promote healing and evaluate the wound base. Performed by: Yeyo Bai MD    Consent obtained:  Yes    Time out taken:  Yes    Pain Control:         Debridement:Excisional Debridement    Using curette, scissors and tissue nippers the wound(s)/ulcer(s) was/were sharply debrided down through and including the removal of subcutaneous tissue. Devitalized Tissue Debrided:  slough    Pre Debridement Measurements:  Are located in the Wound/Ulcer Documentation Flow Sheet    Wound/Ulcer #: 2           Percent of Wound(s)/Ulcer(s) Debrided: 100%    Total Surface Area Debrided:  .20 sq cm       Diabetic/Pressure/Non Pressure Ulcers only:  Ulcer: Traumatic      Estimated Blood Loss:  Minimal    Hemostasis Achieved:  by pressure    Procedural Pain:  3  / 10     Post Procedural Pain:  0 / 10     Response to treatment:  Well tolerated by patient. Imaging:   @[address]@        Impression/Plan:      Diagnosis Orders   1.  Dog bite, initial encounter       Patient Active Problem List   Diagnosis    Hyperlipemia, mixed    Sleep apnea    Peripheral vascular disease ([address]    CAD (coronary artery disease)    CHF (congestive heart failure) (HCC)    CTS (carpal tunnel syndrome)    Non-pressure chronic ulcer of left lower leg with fat layer exposed ([address]    Hypotension    Obesity, Class III, BMI 40-49.9 (morbid obesity) ([address]    Essential hypertension    DDD (degenerative disc disease), cervical    DJD (degenerative joint disease), cervical    Primary osteoarthritis involving multiple joints    Paresthesias in left hand    Uncontrolled type 2 diabetes mellitus without complication, without long-term current use of insulin    Coronary artery disease involving coronary bypass graft of native heart without angina pectoris    Sleep apnea    Cervical spondylosis    Obesity    Long term current use of antithrombotics/antiplatelets    BMI 01.2-42.0, adult ([address]    H/O cervical spine surgery    Long term (current) use of non-steroidal anti-inflammatories (nsaid)    Chronic prescription opiate use    Severe comorbid illness    Hx of cervical spine surgery    Chronic neck pain    Candidal balanitis    Cellulitis of third toe, left    Nail avulsion, toe, initial encounter    Cellulitis of second toe, right    Abnormal nuclear stress test    Abnormal stress test    Hypercholesterolemia    Diabetic foot ulcer with osteomyelitis (HCC)    Ulcer of left foot, with fat layer exposed ([address]    Acquired trigger finger    Contusion of elbow    Contusion of shoulder region    Contusion of knee    Degeneration of lumbar intervertebral disc    Diabetic ulcer of toe associated with type 2 diabetes mellitus, with fat layer exposed ([address]    History of coronary artery bypass graft x 3    Ischemic cardiomyopathy    Osteoarthritis of knee    Osteoarthritis of carpometacarpal (CMC) joint of thumb    Sprain of knee and leg    Sprain of shoulder and upper arm    Presence of coronary angioplasty implant and graft    Venous insufficiency of both lower extremities     Plan:    The ear is healing well. Patient has erythema in the fingers. We will start the patient on doxy. Patient is to follow-up in 2 weeks.        Electronically signed by:  Jennifer Ross MD 4/5/2021

## 2021-04-15 ENCOUNTER — TELEMEDICINE (OUTPATIENT)
Dept: PULMONOLOGY | Age: 61
End: 2021-04-15
Payer: COMMERCIAL

## 2021-04-15 DIAGNOSIS — I10 ESSENTIAL HYPERTENSION: Primary | ICD-10-CM

## 2021-04-15 DIAGNOSIS — G47.33 OBSTRUCTIVE SLEEP APNEA SYNDROME: ICD-10-CM

## 2021-04-15 PROCEDURE — 99214 OFFICE O/P EST MOD 30 MIN: CPT | Performed by: INTERNAL MEDICINE

## 2021-04-15 RX ORDER — TESTOSTERONE CYPIONATE 200 MG/ML
INJECTION INTRAMUSCULAR
COMMUNITY
Start: 2021-03-02 | End: 2022-06-09 | Stop reason: ALTCHOICE

## 2021-04-15 ASSESSMENT — SLEEP AND FATIGUE QUESTIONNAIRES
HOW LIKELY ARE YOU TO NOD OFF OR FALL ASLEEP WHILE WATCHING TV: 0
HOW LIKELY ARE YOU TO NOD OFF OR FALL ASLEEP IN A CAR, WHILE STOPPED FOR A FEW MINUTES IN TRAFFIC: 0
HOW LIKELY ARE YOU TO NOD OFF OR FALL ASLEEP WHILE LYING DOWN TO REST IN THE AFTERNOON WHEN CIRCUMSTANCES PERMIT: 0
HOW LIKELY ARE YOU TO NOD OFF OR FALL ASLEEP WHEN YOU ARE A PASSENGER IN A CAR FOR AN HOUR WITHOUT A BREAK: 0
HOW LIKELY ARE YOU TO NOD OFF OR FALL ASLEEP WHILE SITTING AND READING: 0

## 2021-04-15 NOTE — PROGRESS NOTES
4/15/2021    TELEHEALTH EVALUATION -- Audio/Visual (During MCDPL-48 public health emergency)    Patient and physician are located in their individual locations. This is visit is completed via [email or web address] application [x]/ Doxy. me[] / Telephone []     HPI:    Osito Adbi (:  1960) has requested an audio/video evaluation for the following concern(s): You do not have obstructive sleep apnea syndrome that is being treated with BiPAP successfully. He using the BiPAP regularly every night 6 to 8 hours. He does not feel sleepy tired fatigue during the daytime. Denies any nasal stuffiness or surgery reflux disease. No pedal edema no thromboembolic process. He is not a sleepy . He did not complete an Thiells Sleepiness Scale    His blood pressure is under good control no angina no coronary artery disease. Blood sugar is also stable. He does have peripheral vascular disease which is related to diabetes and hyper lipidemia. Cervical spondylosis is bothering him at night because of pain does interfere with her sleep. He takes his medications regularly. Have had Covid vaccine. Supplies for the BPAP machine. Review of Systems    Prior to Visit Medications    Medication Sig Taking? Authorizing Provider   testosterone cypionate (DEPOTESTOTERONE CYPIONATE) 200 MG/ML injection  Yes Historical Provider, MD   doxycycline hyclate (VIBRA-TABS) 100 MG tablet Take 1 tablet by mouth 2 times daily for 14 days Yes Yeyo Bai MD   carvedilol (COREG) 12.5 MG tablet Take 1 tablet by mouth 2 times daily (with meals) Yes Seema Hameed DO   phentermine (ADIPEX-P) 37.5 MG tablet Take 1 tablet by mouth every morning (before breakfast) for 30 days.  Yes Seema Hameed DO   metFORMIN (GLUCOPHAGE) 1000 MG tablet TAKE 1 TABLET BY MOUTH 2 TIMES A DAY WITH MEALS Yes Seema Hameed DO   meloxicam (MOBIC) 15 MG tablet TAKE 1 TABLET BY MOUTH ONE TIME A DAY AS NEEDED Yes Seema Hameed DO   linagliptin (TRADJENTA) 5 251 lb 8 oz (114.1 kg)       Results for orders placed or performed during the hospital encounter of 06/01/20   Vitamin B12 & Folate   Result Value Ref Range    Vitamin B-12 324 232 - 1245 pg/mL    Folate >20.0 >4.8 ng/mL   Iron and TIBC   Result Value Ref Range    Iron 70 59 - 158 ug/dL    TIBC 293 250 - 450 ug/dL    Iron Saturation 24 20 - 55 %    UIBC 223 112 - 347 ug/dL   TSH without Reflex   Result Value Ref Range    TSH 1.35 0.30 - 5.00 mIU/L   Testosterone, Free   Result Value Ref Range    Testosterone 150 (L) 220 - 1,000 ng/dL    Sex Hormone Binding 33 11 - 80 nmol/L    Testosterone, Free 28.0 (L) 47 - 244 pg/mL   T4, Free   Result Value Ref Range    Thyroxine, Free 1.25 0.93 - 1.70 ng/dL   PSA, Prostatic Specific Antigen   Result Value Ref Range    PSA 1.07 <4.1 ug/L   Lipid Panel   Result Value Ref Range    Cholesterol 138 <200 mg/dL    HDL 45 >40 mg/dL    LDL Cholesterol 77 0 - 130 mg/dL    Chol/HDL Ratio 3.1 <5    Triglycerides 78 <150 mg/dL    VLDL NOT REPORTED 1 - 30 mg/dL   Comprehensive Metabolic Panel   Result Value Ref Range    Glucose 141 (H) 70 - 99 mg/dL    BUN 26 (H) 6 - 20 mg/dL    CREATININE 0.93 0.70 - 1.20 mg/dL    Bun/Cre Ratio NOT REPORTED 9 - 20    Calcium 9.7 8.6 - 10.4 mg/dL    Sodium 141 135 - 144 mmol/L    Potassium 3.9 3.7 - 5.3 mmol/L    Chloride 106 98 - 107 mmol/L    CO2 21 20 - 31 mmol/L    Anion Gap 14 9 - 17 mmol/L    Alkaline Phosphatase 66 40 - 129 U/L    ALT 16 5 - 41 U/L    AST 13 <40 U/L    Total Bilirubin 0.28 (L) 0.3 - 1.2 mg/dL    Total Protein 7.4 6.4 - 8.3 g/dL    Albumin 4.4 3.5 - 5.2 g/dL    Albumin/Globulin Ratio 1.5 1.0 - 2.5    GFR Non-African American >60 >60 mL/min    GFR African American >60 >60 mL/min    GFR Comment          GFR Staging NOT REPORTED    CBC Auto Differential   Result Value Ref Range    WBC 7.6 3.5 - 11.3 k/uL    RBC 4.81 4.21 - 5.77 m/uL    Hemoglobin 14.6 13.0 - 17.0 g/dL    Hematocrit 45.2 40.7 - 50.3 %    MCV 94.0 82.6 - 102.9 fL    MCH 30.4 25.2 - 33.5 pg    MCHC 32.3 28.4 - 34.8 g/dL    RDW 12.5 11.8 - 14.4 %    Platelets 690 014 - 124 k/uL    MPV 9.3 8.1 - 13.5 fL    NRBC Automated 0.0 0.0 per 100 WBC    Differential Type NOT REPORTED     Seg Neutrophils 65 36 - 65 %    Lymphocytes 24 24 - 43 %    Monocytes 9 3 - 12 %    Eosinophils % 2 1 - 4 %    Basophils 0 0 - 2 %    Immature Granulocytes 0 0 %    Segs Absolute 4.88 1.50 - 8.10 k/uL    Absolute Lymph # 1.83 1.10 - 3.70 k/uL    Absolute Mono # 0.71 0.10 - 1.20 k/uL    Absolute Eos # 0.12 0.00 - 0.44 k/uL    Basophils Absolute 0.03 0.00 - 0.20 k/uL    Absolute Immature Granulocyte <0.03 0.00 - 0.30 k/uL    WBC Morphology NOT REPORTED     RBC Morphology NOT REPORTED     Platelet Estimate NOT REPORTED        No results found. PHYSICAL EXAMINATION:  Due to this being a TeleHealth encounter, evaluation of the following organ systems is limited: Vitals/Constitutional/EENT/Resp/CV/GI//MS/Neuro/Skin/Heme-Lymph-Imm. Constitutional: [x] Appears well-developed and well-nourished. [x] Abnormal  Mental status  [x] Alert and awake  [x] Oriented to person/place/time [x]Able to follow commands    [x] No apparent distress      Eyes:  EOM    [x]  Normal  [] Abnormal-  Sclera  [x]  Normal  [] Abnormal -         Discharge [x]  None visible  [] Abnormal -    HENT:   [x] Normocephalic, atraumatic. [] Abnormal shaped head   [x] Mouth/Throat: Mucous membranes are moist.     Ears [x] Normal  [] Abnormal-    Neck: [x] Normal range of motion [x] Supple [x] No visualized mass. Pulmonary/Chest: [x] Respiratory effort normal.  [x] No visualized signs of difficulty breathing or respiratory distress        [] Abnormal      Musculoskeletal:   [x] Normal range of motion. [x] Normal gait with no signs of ataxia. [x]  No signs of cyanosis of the peripheral portions of extremities.          [] Abnormal       Neurological:        [x] Normal cranial nerve (limited exam to video visit) [x] No focal weakness observed       [] Abnormal          Speech       [x] Normal   [] Abnormal     Skin:        [x] No rash on visible skin  [x] Normal  [] Abnormal     Psychiatric:       [x] Normal  [] Abnormal        [x] Normal Mood  [] Anxious appearing        Other Pertinent Exam Findings:       ASSESSMENT:  Obstructive sleep apnea syndrome   #2 hypertension   #3 obesity   #4 cervical spondylosis   #4 five also arthritis of the hip may need replacement therapy   hyperglycemia    Plan:   1. Sleep apnea responding very well to the use of BiPAP advised to keep using the BiPAP as before  2.   3. Advised to use a humidification along with the BiPAP machine  4.   5. Was instructed about sleep hygiene. 6.   7. Encouraged to lose weight  8.   9. Patient already had Covid vaccine and Pneumovax. 10.   11. He has cervical spondylosis which does interfere with sleep at times. 12.   13. Similarly he also arthritis of the hip which does interfere with the nighttime sleep he need to have this. Knee replacement hip replacement therapy. 14.   15. A prescription was sent for the BiPAP supplies. 16.   17. He already had Covid vaccine and Pneumovax  18.   19. He also had flu vaccine  20.   21. Dictated with Dr. Yanet Brunson MD dictation over thank you  22. An  electronic signature was used to authenticate this note. --Nikhil Trejo MD on 4/15/2021 at 10:49 AM    9}    Pursuant to the emergency declaration under the [facility], [facility] waiver authority and the [employer] Act, this Virtual  Visit was conducted, with patient's consent, to reduce the patient's risk of exposure to COVID-19 and provide continuity of care for an established patient. Services were provided through a video synchronous discussion virtually to substitute for in-person clinic visit.

## 2021-04-19 ENCOUNTER — HOSPITAL ENCOUNTER (OUTPATIENT)
Dept: WOUND CARE | Age: 61
Discharge: HOME | End: 2021-04-19
Payer: COMMERCIAL

## 2021-04-19 VITALS
TEMPERATURE: 97.5 F | HEART RATE: 87 BPM | DIASTOLIC BLOOD PRESSURE: 98 MMHG | RESPIRATION RATE: 16 BRPM | SYSTOLIC BLOOD PRESSURE: 161 MMHG | BODY MASS INDEX: 42.66 KG/M2 | HEIGHT: 72 IN | WEIGHT: 315 LBS
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
## 2021-04-19 PROCEDURE — 99213 OFFICE O/P EST LOW 20 MIN: CPT | Performed by: PLASTIC SURGERY

## 2021-04-19 PROCEDURE — 99212 OFFICE O/P EST SF 10 MIN: CPT

## 2021-04-19 RX ORDER — CLOBETASOL PROPIONATE 0.5 MG/G
OINTMENT TOPICAL ONCE
Status: CANCELLED | OUTPATIENT
Start: 2021-04-19 | End: 2021-04-19

## 2021-04-19 RX ORDER — GENTAMICIN SULFATE 1 MG/G
OINTMENT TOPICAL ONCE
Status: CANCELLED | OUTPATIENT
Start: 2021-04-19 | End: 2021-04-19

## 2021-04-19 RX ORDER — LIDOCAINE 40 MG/G
CREAM TOPICAL ONCE
Status: CANCELLED | OUTPATIENT
Start: 2021-04-19 | End: 2021-04-19

## 2021-04-19 RX ORDER — BETAMETHASONE DIPROPIONATE 0.05 %
OINTMENT (GRAM) TOPICAL ONCE
Status: CANCELLED | OUTPATIENT
Start: 2021-04-19 | End: 2021-04-19

## 2021-04-19 RX ORDER — LIDOCAINE HYDROCHLORIDE 20 MG/ML
JELLY TOPICAL ONCE
Status: COMPLETED | OUTPATIENT
Start: 2021-04-19 | End: 2021-04-19

## 2021-04-19 RX ORDER — LIDOCAINE HYDROCHLORIDE 20 MG/ML
JELLY TOPICAL ONCE
Status: CANCELLED | OUTPATIENT
Start: 2021-04-19 | End: 2021-04-19

## 2021-04-19 RX ORDER — BACITRACIN ZINC AND POLYMYXIN B SULFATE 500; 1000 [USP'U]/G; [USP'U]/G
OINTMENT TOPICAL ONCE
Status: CANCELLED | OUTPATIENT
Start: 2021-04-19 | End: 2021-04-19

## 2021-04-19 RX ORDER — BACITRACIN, NEOMYCIN, POLYMYXIN B 400; 3.5; 5 [USP'U]/G; MG/G; [USP'U]/G
OINTMENT TOPICAL ONCE
Status: CANCELLED | OUTPATIENT
Start: 2021-04-19 | End: 2021-04-19

## 2021-04-19 RX ORDER — LIDOCAINE 50 MG/G
OINTMENT TOPICAL ONCE
Status: CANCELLED | OUTPATIENT
Start: 2021-04-19 | End: 2021-04-19

## 2021-04-19 RX ORDER — LIDOCAINE HYDROCHLORIDE 40 MG/ML
SOLUTION TOPICAL ONCE
Status: DISCONTINUED | OUTPATIENT
Start: 2021-04-19 | End: 2021-04-20 | Stop reason: HOSPADM

## 2021-04-19 RX ORDER — GINSENG 100 MG
CAPSULE ORAL ONCE
Status: CANCELLED | OUTPATIENT
Start: 2021-04-19 | End: 2021-04-19

## 2021-04-19 RX ORDER — LIDOCAINE HYDROCHLORIDE 40 MG/ML
SOLUTION TOPICAL ONCE
Status: CANCELLED | OUTPATIENT
Start: 2021-04-19 | End: 2021-04-19

## 2021-04-19 RX ADMIN — LIDOCAINE HYDROCHLORIDE 6 ML: 20 JELLY TOPICAL at 08:26

## 2021-04-19 NOTE — PROGRESS NOTES
Progress note     Chief Complaint   Patient presents with    Wound Check     R thumb, L finger        HPI:   Lalo Graf is a 61 y.o. male who presents with a dog bite on his left ear as well as multiple lacerations on both hands including the thumb and fingers. Patient dates that it occurred on Friday. Patient is here today for evaluation treatment. Patient states that part of the ear was removed during the bite. Patient is on antibiotics currently. Patient is a . Patient was referred from the emergency room. Patient is here for evaluation and treatment. Medications:     Current Outpatient Medications   Medication Sig Dispense Refill    testosterone cypionate (DEPOTESTOTERONE CYPIONATE) 200 MG/ML injection       doxycycline hyclate (VIBRA-TABS) 100 MG tablet Take 1 tablet by mouth 2 times daily for 14 days 28 tablet 0    carvedilol (COREG) 12.5 MG tablet Take 1 tablet by mouth 2 times daily (with meals) 60 tablet 5    phentermine (ADIPEX-P) 37.5 MG tablet Take 1 tablet by mouth every morning (before breakfast) for 30 days. 30 tablet 0    Testosterone Cypionate 200 MG/ML KIT Inject 200 mg into the muscle every 30 days for 30 days.  please include syringes 1 kit 5    metFORMIN (GLUCOPHAGE) 1000 MG tablet TAKE 1 TABLET BY MOUTH 2 TIMES A DAY WITH MEALS 60 tablet 5    meloxicam (MOBIC) 15 MG tablet TAKE 1 TABLET BY MOUTH ONE TIME A DAY AS NEEDED 90 tablet 1    linagliptin (TRADJENTA) 5 MG tablet TAKE 1 TABLET BY MOUTH ONE TIME A DAY 30 tablet 5    hydroCHLOROthiazide (MICROZIDE) 12.5 MG capsule TAKE 1 CAPSULE BY MOUTH ONE TIME A DAY 30 capsule 5    traZODone (DESYREL) 150 MG tablet TAKE ONE TABLET BY MOUTH EVERY EVENING 30 tablet 11    omeprazole (PRILOSEC) 20 MG delayed release capsule Take 1 capsule by mouth Daily 90 capsule 1    tiZANidine (ZANAFLEX) 4 MG tablet TAKE ONE TABLET BY MOUTH EVERY EVENING AS NEEDED FOR MUSCLE SPASMS 30 tablet 3    atorvastatin (LIPITOR) 40 MG tablet TAKE 1 TABLET BY MOUTH ONE TIME A DAY 90 tablet 3    NEEDLE, DISP, 18 G 18G X 1\" MISC 1 Syringe by Does not apply route every 30 days 25 each 0    NEEDLE, DISP, 22 G 22G X 1-1/2\" MISC 1 Syringe by Does not apply route every 30 days 25 each 0    clopidogrel (PLAVIX) 75 MG tablet TAKE 1 TABLET BY MOUTH ONE TIME A DAY WITH FOOD 30 tablet 11    topiramate (TOPAMAX) 200 MG tablet       HYDROcodone-acetaminophen (NORCO)  MG per tablet Take 1 tablet by mouth every 6 hours as needed for Pain. Anand [place] Multiple Vitamin (MULTI VITAMIN DAILY PO) Take by mouth      nitroGLYCERIN (NITROSTAT) 0.4 MG SL tablet Place 0.4 mg under the tongue      aspirin 325 MG tablet Take 325 mg by mouth daily. Current Facility-Administered Medications   Medication Dose Route Frequency Provider Last Rate Last Admin    lidocaine (XYLOCAINE) 4 % external solution   Topical Once Toro Chacon MD          Allergies: Allergies   Allergen Reactions    Pcn [Penicillins] Other (See Comments)     Unknown rx     Review of Systems:   Constitutional: Negative for fever, chills, fatigue and unexpected weight change. HENT: Negative for hearing loss, sore throat and facial swelling. Eyes: Negative for pain and discharge. Respiratory: No history of sleep apnea. Cardiovascular: Patient with a history of CHF, coronary artery disease, cardiac myopathy, peripheral vascular disease. Patient with hypercholesterolemia, hyperlipidemia, hypertension, ischemic cardiomyopathy. Gastrointestinal: Negative for nausea, vomiting, diarrhea and constipation. Skin: Negative for pallor and rash. Neurological: Patient has chronic back pain. .   Hematological: Does not bruise/bleed easily. Psychiatric/Behavioral: Negative for behavioral problems. The patient is not nervous/anxious.       Past Medical History:   Diagnosis Date    Acquired trigger finger 6/5/2018    CAD (coronary artery disease)     [facility]/ involving coronary bypass graft of peripherator veins of legs, Dr Willian Gant History     Socioeconomic History    Marital status:      Spouse name: Not on file    Number of children: Not on file    Years of education: Not on file    Highest education level: Not on file   Occupational History    Not on file   Social Needs    Financial resource strain: Not on file    Food insecurity     Worry: Not on file     Inability: Not on file    Transportation needs     Medical: Not on file     Non-medical: Not on file   Tobacco Use    Smoking status: Never Smoker    Smokeless tobacco: Never Used   Substance and Sexual Activity    Alcohol use: Yes     Comment: rare social, non past 5 months    Drug use: No    Sexual activity: Yes     Partners: Female     Comment: spouse   Lifestyle    Physical activity     Days per week: Not on file     Minutes per session: Not on file    Stress: Not on file   Relationships    Social connections     Talks on phone: Not on file     Gets together: Not on file     Attends Yazidism service: Not on file     Active member of club or organization: Not on file     Attends meetings of clubs or organizations: Not on file     Relationship status: Not on file    Intimate partner violence     Fear of current or ex partner: Not on file     Emotionally abused: Not on file     Physically abused: Not on file     Forced sexual activity: Not on file   Other Topics Concern    Not on file   Social History Narrative    Not on file     Family History   Problem Relation Age of Onset    Cancer Mother         lung    Diabetes Father     Heart Disease Father     High Blood Pressure Father     Diabetes Brother     Heart Disease Brother     High Blood Pressure Brother      Physical Exam:   BP (!) 161/98   Pulse 87   Temp 97.5 °F (36.4 °C) (Tympanic)   Resp 16   Ht 6' (1.829 m)   Wt (!) 330 lb (149.7 kg)   BMI 44.76 kg/m²    Body mass index is 44.76 kg/m².   Physical Exam Nursing note and vitals reviewed. Constitutional: Oriented to person, place, and time. Appears well-developed and well-nourished. No distress. Head: Normocephalic and atraumatic. Eyes: Conjunctivae and EOM are normal.   Pulmonary/Chest: Effort normal. No respiratory distress. Neurological: Alert and oriented to person, place, and time. Skin: The ear has healed well. Patient still has erythema and calluses on both fingers. Psychiatric: Normal mood and affect. Behavior is normal  Wound 04/05/21 Finger (Comment which one) Right #1 Thumb (Active)   Wound Image   04/05/21 0938   Post-Procedure Length (cm) 0 cm 04/19/21 0823   Post-Procedure Width (cm) 0 cm 04/19/21 0823   Post-Procedure Depth (cm) 0 cm 04/19/21 0823   Post-Procedure Surface Area (cm^2) 0 cm^2 04/19/21 0823   Post-Procedure Volume (cm^3) 0 cm^3 04/19/21 0823   Number of days: 13       Wound 04/05/21 Finger (Comment which one) Left #2 2nd Finger (Active)   Wound Image   04/05/21 0938   Post-Procedure Length (cm) 0 cm 04/19/21 0823   Post-Procedure Width (cm) 0 cm 04/19/21 0823   Post-Procedure Depth (cm) 0 cm 04/19/21 0823   Post-Procedure Surface Area (cm^2) 0 cm^2 04/19/21 0823   Post-Procedure Volume (cm^3) 0 cm^3 04/19/21 2911   Number of days: 13                Imaging:   [unfilled]        Impression/Plan:      Diagnosis Orders   1.  Dog bite, initial encounter       Patient Active Problem List   Diagnosis    Hyperlipemia, mixed    Sleep apnea    Peripheral vascular disease ([address]    CAD (coronary artery disease)    CHF (congestive heart failure) (HCC)    CTS (carpal tunnel syndrome)    Non-pressure chronic ulcer of left lower leg with fat layer exposed ([address]    Hypotension    Obesity, Class III, BMI 40-49.9 (morbid obesity) ([address]    Essential hypertension    DDD (degenerative disc disease), cervical    DJD (degenerative joint disease), cervical    Primary osteoarthritis involving multiple joints    Paresthesias in left hand    Uncontrolled type 2 diabetes mellitus without complication, without long-term current use of insulin    Coronary artery disease involving coronary bypass graft of native heart without angina pectoris    Sleep apnea    Cervical spondylosis    Obesity    Long term current use of antithrombotics/antiplatelets    BMI 83.4-32.4, adult ([address]    H/O cervical spine surgery    Long term (current) use of non-steroidal anti-inflammatories (nsaid)    Chronic prescription opiate use    Severe comorbid illness    Hx of cervical spine surgery    Chronic neck pain    Candidal balanitis    Cellulitis of third toe, left    Nail avulsion, toe, initial encounter    Cellulitis of second toe, right    Abnormal nuclear stress test    Abnormal stress test    Hypercholesterolemia    Diabetic foot ulcer with osteomyelitis (HCC)    Ulcer of left foot, with fat layer exposed ([address]    Acquired trigger finger    Contusion of elbow    Contusion of shoulder region    Contusion of knee    Degeneration of lumbar intervertebral disc    Diabetic ulcer of toe associated with type 2 diabetes mellitus, with fat layer exposed ([address]    History of coronary artery bypass graft x 3    Ischemic cardiomyopathy    Osteoarthritis of knee    Osteoarthritis of carpometacarpal (CMC) joint of thumb    Sprain of knee and leg    Sprain of shoulder and upper arm    Presence of coronary angioplasty implant and graft    Venous insufficiency of both lower extremities    Dog bite     Plan:    The ear has healed well. Right index finger has healed. Left thumb still is healing. Place antibiotic ointment and a Band-Aid. Follow-up in 1 month.        Electronically signed by:  Tirso Narvaez MD 4/19/2021

## 2021-04-29 ENCOUNTER — TELEMEDICINE (OUTPATIENT)
Dept: FAMILY MEDICINE CLINIC | Age: 61
End: 2021-04-29
Payer: COMMERCIAL

## 2021-04-29 DIAGNOSIS — R10.10 UPPER ABDOMINAL PAIN: ICD-10-CM

## 2021-04-29 DIAGNOSIS — E11.69 TYPE 2 DIABETES MELLITUS WITH OTHER SPECIFIED COMPLICATION, WITHOUT LONG-TERM CURRENT USE OF INSULIN (HCC): ICD-10-CM

## 2021-04-29 DIAGNOSIS — J06.9 ACUTE URI: Primary | ICD-10-CM

## 2021-04-29 DIAGNOSIS — I10 ESSENTIAL HYPERTENSION: ICD-10-CM

## 2021-04-29 DIAGNOSIS — E66.01 OBESITY, CLASS III, BMI 40-49.9 (MORBID OBESITY) (HCC): ICD-10-CM

## 2021-04-29 PROCEDURE — 99213 OFFICE O/P EST LOW 20 MIN: CPT | Performed by: FAMILY MEDICINE

## 2021-04-29 RX ORDER — PHENTERMINE HYDROCHLORIDE 37.5 MG/1
37.5 TABLET ORAL
Qty: 30 TABLET | Refills: 0 | Status: SHIPPED | OUTPATIENT
Start: 2021-04-29 | End: 2021-12-02 | Stop reason: SDUPTHER

## 2021-04-29 RX ORDER — OMEPRAZOLE 20 MG/1
20 CAPSULE, DELAYED RELEASE ORAL DAILY
Qty: 90 CAPSULE | Refills: 3 | Status: SHIPPED | OUTPATIENT
Start: 2021-04-29 | End: 2021-05-04 | Stop reason: SDUPTHER

## 2021-04-29 RX ORDER — AZITHROMYCIN 250 MG/1
250 TABLET, FILM COATED ORAL DAILY
Qty: 1 PACKET | Refills: 0 | Status: SHIPPED | OUTPATIENT
Start: 2021-04-29 | End: 2021-05-04

## 2021-04-29 RX ORDER — HYDROCHLOROTHIAZIDE 12.5 MG/1
CAPSULE, GELATIN COATED ORAL
Qty: 90 CAPSULE | Refills: 3 | Status: SHIPPED | OUTPATIENT
Start: 2021-04-29 | End: 2021-05-27 | Stop reason: SDUPTHER

## 2021-04-29 NOTE — PROGRESS NOTES
SPASMS  Seema Hameed DO   atorvastatin (LIPITOR) 40 MG tablet TAKE 1 TABLET BY MOUTH ONE TIME A DAY  Seema Hameed DO   NEEDLE, DISP, 18 G 18G X 1\" MISC 1 Syringe by Does not apply route every 30 days  Seema L Yoseph DO   NEEDLE, DISP, 22 G 22G X 1-1/2\" MISC 1 Syringe by Does not apply route every 30 days  Seema Hameed DO   clopidogrel (PLAVIX) 75 MG tablet TAKE 1 TABLET BY MOUTH ONE TIME A DAY WITH FOOD  Seema Hameed DO   topiramate (TOPAMAX) 200 MG tablet   Historical Provider, MD   HYDROcodone-acetaminophen (NORCO)  MG per tablet Take 1 tablet by mouth every 6 hours as needed for Pain. Melinda Bauman MD   Multiple Vitamin (MULTI VITAMIN DAILY PO) Take by mouth  Historical Provider, MD   nitroGLYCERIN (NITROSTAT) 0.4 MG SL tablet Place 0.4 mg under the tongue  Historical Provider, MD   aspirin 325 MG tablet Take 325 mg by mouth daily.     Historical Provider, MD       Social History     Tobacco Use    Smoking status: Never Smoker    Smokeless tobacco: Never Used   Substance Use Topics    Alcohol use: Yes     Comment: rare social, non past 5 months    Drug use: No        Allergies   Allergen Reactions    Pcn [Penicillins] Other (See Comments)     Unknown rx   ,   Past Medical History:   Diagnosis Date    Acquired trigger finger 6/5/2018    CAD (coronary artery disease)     [facility]/ involving coronary bypass graft of native heart with unstable angina pectoris    Cervical spondylosis     CHF (congestive heart failure) (HCC)     Chronic back pain     on 11/19/18 pt states is presently in pain mgmnt    Contusion of elbow 6/5/2018    Contusion of knee 6/5/2018    Contusion of shoulder region 6/5/2018    Diabetic foot ulcer with osteomyelitis ([facility] 1/9/2018    Diabetic ulcer of toe associated with type 2 diabetes mellitus, with fat layer exposed ([facility] 1/31/2018    Hypercholesterolemia 12/5/2017    Hyperlipemia, mixed     Hypertension     Ischemic cardiomyopathy 5/7/2018    Non-pressure chronic ulcer of left lower leg with fat layer exposed ([address] 6/9/2014    Obesity     Obesity, Class III, BMI 40-49.9 (morbid obesity) ([address] 4/6/2015    Obstructive sleep apnea syndrome     Osteoarthritis of carpometacarpal (CMC) joint of thumb 6/5/2018    Osteoarthritis of knee 6/5/2018    Peripheral vascular disease ([address]     with venous stasis and ulcerations. Dr Bing Landau Presence of coronary angioplasty implant and graft 9/11/2009    Sleep apnea     Dr Randle Reading of knee and leg 6/5/2018    Sprain of shoulder and upper arm 6/5/2018    Type II or unspecified type diabetes mellitus without mention of complication, not stated as uncontrolled     Ulcer of left foot, with fat layer exposed ([address] 1/9/2018    Unspecified sleep apnea     Dr Langston Files CPAP    Venous insufficiency of both lower extremities 1/31/2018   ,   Past Surgical History:   Procedure Laterality Date    CARPAL TUNNEL RELEASE Right 3/22/13    Dr. Charity Lee.     CORONARY ANGIOPLASTY WITH STENT PLACEMENT  2002,2006.2009    X 3 separately    CORONARY ARTERY BYPASS GRAFT  2/26/07    [facility], Dr Johnson Section (X 3)    NECK SURGERY  2010    cervical stenoses C5-C6-C7and partial T1 laminectomy with fusion of C6-7    OTHER SURGICAL HISTORY      wound care ulcers of legs (bilaterally) with Dr Bryan Lutz  age 11    VEIN SURGERY      closure of peripherator veins of legs, Dr Elaina Elizabeth     ,   Social History     Tobacco Use    Smoking status: Never Smoker    Smokeless tobacco: Never Used   Substance Use Topics    Alcohol use: Yes     Comment: rare social, non past 5 months    Drug use: No   ,   Family History   Problem Relation Age of Onset    Cancer Mother         lung    Diabetes Father     Heart Disease Father     High Blood Pressure Father     Diabetes Brother     Heart Disease Brother     High Blood Pressure Brother        PHYSICAL EXAMINATION:  [ INSTRUCTIONS:  \"[x]\" Indicates a positive item  \"[]\" Indicates a negative item  -- DELETE ALL ITEMS NOT EXAMINED]  Vital Signs: (As obtained by patient/caregiver or practitioner observation)    Blood pressure-  Heart rate-    Respiratory rate-    Temperature-  Pulse oximetry-     Constitutional: [x] Appears well-developed and well-nourished [x] No apparent distress      [] Abnormal-   Mental status  [x] Alert and awake  [x] Oriented to person/place/time [x]Able to follow commands      Eyes:  EOM    [x]  Normal  [] Abnormal-  Sclera  [x]  Normal  [] Abnormal -         Discharge [x]  None visible  [] Abnormal -    HENT:   [x] Normocephalic, atraumatic. [] Abnormal   [x] Mouth/Throat: Mucous membranes are moist.     External Ears [x] Normal  [] Abnormal-     Neck: [x] No visualized mass     Pulmonary/Chest: [x] Respiratory effort normal.  [x] No visualized signs of difficulty breathing or respiratory distress        [] Abnormal-      Musculoskeletal:   [x] Normal gait with no signs of ataxia         [x] Normal range of motion of neck        [] Abnormal-       Neurological:        [x] No Facial Asymmetry (Cranial nerve 7 motor function) (limited exam to video visit)          [x] No gaze palsy        [] Abnormal-         Skin:        [x] No significant exanthematous lesions or discoloration noted on facial skin         [] Abnormal-            Psychiatric:       [x] Normal Affect [x] No Hallucinations        [] Abnormal-     Other pertinent observable physical exam findings-     ASSESSMENT/PLAN:   Diagnosis Orders   1. Acute URI  azithromycin (ZITHROMAX Z-MARIO) 250 MG tablet   2. Obesity, Class III, BMI 40-49.9 (morbid obesity) ([facility])  phentermine (ADIPEX-P) 37.5 MG tablet   3. Essential hypertension  hydroCHLOROthiazide (MICROZIDE) 12.5 MG capsule   4. Upper abdominal pain  omeprazole (PRILOSEC) 20 MG delayed release capsule   5.  Type 2 diabetes mellitus with other specified complication, without long-term current use of insulin ([facility])  linagliptin (TRADJENTA) 5 MG tablet      No orders of the defined types were placed in this encounter. Requested Prescriptions     Signed Prescriptions Disp Refills    azithromycin (ZITHROMAX Z-MARIO) 250 MG tablet 1 packet 0     Sig: Take 1 tablet by mouth daily for 5 days 2 day  One then 1 po qd until gone    phentermine (ADIPEX-P) 37.5 MG tablet 30 tablet 0     Sig: Take 1 tablet by mouth every morning (before breakfast) for 30 days.  hydroCHLOROthiazide (MICROZIDE) 12.5 MG capsule 90 capsule 3     Si po qd    omeprazole (PRILOSEC) 20 MG delayed release capsule 90 capsule 3     Sig: Take 1 capsule by mouth Daily    linagliptin (TRADJENTA) 5 MG tablet 90 tablet 3     Sig: TAKE 1 TABLET BY MOUTH ONE TIME A DAY       Return if symptoms worsen or fail to improve. Conor Kern is a 61 y.o. male being evaluated by a Virtual Visit (video visit) encounter to address concerns as mentioned above. A caregiver was present when appropriate. Due to this being a TeleHealth encounter (During  public health emergency), evaluation of the following organ systems was limited: Vitals/Constitutional/EENT/Resp/CV/GI//MS/Neuro/Skin/Heme-Lymph-Imm. Pursuant to the emergency declaration under the [address] [place] [address], [address] [place] [address] authority and the [employer] and [employer] Act, this Virtual Visit was conducted with patient's (and/or legal guardian's) consent, to reduce the patient's risk of exposure to COVID-19 and provide necessary medical care. The patient (and/or legal guardian) has also been advised to contact this office for worsening conditions or problems, and seek emergency medical treatment and/or call 911 if deemed necessary.      Patient identification was verified at the start of the visit: Yes    Total time spent on this encounter: Not billed by time    Services were provided through a video synchronous discussion virtually to substitute for in-person clinic visit. Patient and provider were located at their individual homes. --Paulie Myers DO on 4/29/2021 at 8:34 AM    An electronic signature was used to authenticate this note.

## 2021-05-04 DIAGNOSIS — M15.9 PRIMARY OSTEOARTHRITIS INVOLVING MULTIPLE JOINTS: ICD-10-CM

## 2021-05-04 DIAGNOSIS — R10.10 UPPER ABDOMINAL PAIN: ICD-10-CM

## 2021-05-04 DIAGNOSIS — E78.2 HYPERLIPEMIA, MIXED: ICD-10-CM

## 2021-05-04 RX ORDER — MELOXICAM 15 MG/1
15 TABLET ORAL DAILY
Qty: 90 TABLET | Refills: 3 | Status: SHIPPED | OUTPATIENT
Start: 2021-05-04 | End: 2021-12-22 | Stop reason: SDUPTHER

## 2021-05-04 RX ORDER — CARVEDILOL 12.5 MG/1
12.5 TABLET ORAL 2 TIMES DAILY WITH MEALS
Qty: 180 TABLET | Refills: 3 | Status: SHIPPED | OUTPATIENT
Start: 2021-05-04 | End: 2021-12-22 | Stop reason: SDUPTHER

## 2021-05-04 RX ORDER — OMEPRAZOLE 20 MG/1
20 CAPSULE, DELAYED RELEASE ORAL DAILY
Qty: 90 CAPSULE | Refills: 3 | Status: SHIPPED | OUTPATIENT
Start: 2021-05-04 | End: 2021-06-15

## 2021-05-04 RX ORDER — CLOPIDOGREL BISULFATE 75 MG/1
75 TABLET ORAL DAILY
Qty: 90 TABLET | Refills: 3 | Status: SHIPPED | OUTPATIENT
Start: 2021-05-04 | End: 2021-12-22 | Stop reason: SDUPTHER

## 2021-05-04 RX ORDER — TRAZODONE HYDROCHLORIDE 150 MG/1
TABLET ORAL
Qty: 90 TABLET | Refills: 3 | Status: SHIPPED | OUTPATIENT
Start: 2021-05-04 | End: 2022-03-10 | Stop reason: SDUPTHER

## 2021-05-04 RX ORDER — ATORVASTATIN CALCIUM 40 MG/1
40 TABLET, FILM COATED ORAL DAILY
Qty: 90 TABLET | Refills: 3 | Status: SHIPPED | OUTPATIENT
Start: 2021-05-04 | End: 2021-12-22 | Stop reason: SDUPTHER

## 2021-05-04 NOTE — TELEPHONE ENCOUNTER
[facility] is calling to request a refill on the following medication(s):    Last Visit Date (If Applicable):  4/42/9520    Next Visit Date:    Visit date not found    Medication Request:  Requested Prescriptions     Pending Prescriptions Disp Refills    traZODone (DESYREL) 150 MG tablet 90 tablet 0     Sig: TAKE ONE TABLET BY MOUTH EVERY EVENING    atorvastatin (LIPITOR) 40 MG tablet 90 tablet 0     Sig: Take 1 tablet by mouth daily    metFORMIN (GLUCOPHAGE) 1000 MG tablet 180 tablet 0     Sig: Take 1 tablet by mouth 2 times daily (with meals)    omeprazole (PRILOSEC) 20 MG delayed release capsule 90 capsule 0     Sig: Take 1 capsule by mouth Daily    carvedilol (COREG) 12.5 MG tablet 180 tablet 0     Sig: Take 1 tablet by mouth 2 times daily (with meals)    clopidogrel (PLAVIX) 75 MG tablet 90 tablet 0     Sig: Take 1 tablet by mouth daily    meloxicam (MOBIC) 15 MG tablet 90 tablet 0     Sig: Take 1 tablet by mouth daily

## 2021-05-13 ENCOUNTER — TELEPHONE (OUTPATIENT)
Dept: WOUND CARE | Age: 61
End: 2021-05-13

## 2021-05-14 ENCOUNTER — APPOINTMENT (OUTPATIENT)
Dept: GENERAL RADIOLOGY | Age: 61
End: 2021-05-14
Payer: COMMERCIAL

## 2021-05-14 ENCOUNTER — HOSPITAL ENCOUNTER (EMERGENCY)
Age: 61
Discharge: HOME HEALTH | End: 2021-05-15
Attending: EMERGENCY MEDICINE
Payer: COMMERCIAL

## 2021-05-14 VITALS
HEIGHT: 72 IN | BODY MASS INDEX: 33.58 KG/M2 | OXYGEN SATURATION: 100 % | RESPIRATION RATE: 20 BRPM | DIASTOLIC BLOOD PRESSURE: 70 MMHG | WEIGHT: 247.9 LBS | TEMPERATURE: 98.3 F | HEART RATE: 82 BPM | SYSTOLIC BLOOD PRESSURE: 138 MMHG
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
## 2021-05-14 PROCEDURE — 6360000002 HC RX W HCPCS: Performed by: EMERGENCY MEDICINE

## 2021-05-14 PROCEDURE — 12004 RPR S/N/AX/GEN/TRK7.6-12.5CM: CPT

## 2021-05-14 PROCEDURE — 73130 X-RAY EXAM OF HAND: CPT

## 2021-05-14 PROCEDURE — 99283 EMERGENCY DEPT VISIT LOW MDM: CPT

## 2021-05-14 RX ORDER — CEFAZOLIN SODIUM 1 G/3ML
1000 INJECTION, POWDER, FOR SOLUTION INTRAMUSCULAR; INTRAVENOUS ONCE
Status: COMPLETED | OUTPATIENT
Start: 2021-05-14 | End: 2021-05-14

## 2021-05-14 RX ADMIN — CEFAZOLIN SODIUM 1000 MG: 1 INJECTION, POWDER, FOR SOLUTION INTRAMUSCULAR; INTRAVENOUS at 23:28

## 2021-05-14 ASSESSMENT — ENCOUNTER SYMPTOMS
TROUBLE SWALLOWING: 0
SHORTNESS OF BREATH: 0
VOMITING: 0
EYE REDNESS: 0

## 2021-05-15 ENCOUNTER — HOSPITAL ENCOUNTER (OUTPATIENT)
Dept: MRI IMAGING | Age: 61
Discharge: HOME | End: 2021-05-17
Payer: COMMERCIAL

## 2021-05-15 DIAGNOSIS — M16.12 PRIMARY OSTEOARTHRITIS OF LEFT HIP: ICD-10-CM

## 2021-05-15 DIAGNOSIS — M70.62 TROCHANTERIC BURSITIS OF LEFT HIP: ICD-10-CM

## 2021-05-15 DIAGNOSIS — M46.1 INFLAMMATION OF SACROILIAC JOINT (HCC): ICD-10-CM

## 2021-05-15 DIAGNOSIS — M76.02 GLUTEAL TENDINITIS OF LEFT BUTTOCK: ICD-10-CM

## 2021-05-15 LAB
BUN BLDV-MCNC: 16 MG/DL (ref 8–23)
CREAT SERPL-MCNC: 0.89 MG/DL (ref 0.7–1.2)
GFR AFRICAN AMERICAN: >60 ML/MIN
GFR NON-AFRICAN AMERICAN: >60 ML/MIN
GFR SERPL CREATININE-BSD FRML MDRD: NORMAL ML/MIN/{1.73_M2}
GFR SERPL CREATININE-BSD FRML MDRD: NORMAL ML/MIN/{1.73_M2}

## 2021-05-15 PROCEDURE — 6360000004 HC RX CONTRAST MEDICATION: Performed by: PAIN MEDICINE

## 2021-05-15 PROCEDURE — 2580000003 HC RX 258: Performed by: PAIN MEDICINE

## 2021-05-15 PROCEDURE — A9579 GAD-BASE MR CONTRAST NOS,1ML: HCPCS | Performed by: PAIN MEDICINE

## 2021-05-15 PROCEDURE — 82565 ASSAY OF CREATININE: CPT

## 2021-05-15 PROCEDURE — 36415 COLL VENOUS BLD VENIPUNCTURE: CPT

## 2021-05-15 PROCEDURE — 84520 ASSAY OF UREA NITROGEN: CPT

## 2021-05-15 PROCEDURE — 73723 MRI JOINT LWR EXTR W/O&W/DYE: CPT

## 2021-05-15 RX ORDER — LIDOCAINE HYDROCHLORIDE 10 MG/ML
INJECTION, SOLUTION INFILTRATION; PERINEURAL
Status: DISCONTINUED
Start: 2021-05-15 | End: 2021-05-15 | Stop reason: HOSPADM

## 2021-05-15 RX ORDER — LIDOCAINE HYDROCHLORIDE 10 MG/ML
20 INJECTION, SOLUTION INFILTRATION; PERINEURAL ONCE
Status: DISCONTINUED | OUTPATIENT
Start: 2021-05-15 | End: 2021-05-15 | Stop reason: HOSPADM

## 2021-05-15 RX ORDER — SODIUM CHLORIDE 0.9 % (FLUSH) 0.9 %
10 SYRINGE (ML) INJECTION ONCE
Status: COMPLETED | OUTPATIENT
Start: 2021-05-15 | End: 2021-05-15

## 2021-05-15 RX ADMIN — SODIUM CHLORIDE, PRESERVATIVE FREE 10 ML: 5 INJECTION INTRAVENOUS at 09:18

## 2021-05-15 RX ADMIN — GADOTERIDOL 20 ML: 279.3 INJECTION, SOLUTION INTRAVENOUS at 09:18

## 2021-05-15 NOTE — ED PROVIDER NOTES
EMERGENCY DEPARTMENT ENCOUNTER    Pt Name: Jairo Lala  MRN: [id number]  [place] 1960  Date of evaluation: 5/14/21  CHIEF COMPLAINT     Dog bite. HISTORY OF PRESENT ILLNESS   Patient is a 55-year-old male here after dog bite wounds to the right thumb and left hand. He states this is the same dog that bit him little over a month ago. He states he was taking the trash out and it was dark outside and the dog was unprovoked. He states he thinks the dog is owned by someone but he is not sure. He had his tetanus updated last time. He has a significant wound to the left palmar hand. He states he has normal sensation and movement of the all of his fingers. Denies any other injuries. He is on Plavix. He was seen by wound care for his prior dog bite. He is allergic to penicillins. He declined rabies vaccination. He does not think any foreign bodies in the wound. He states he takes Norco for pain management. REVIEW OF SYSTEMS     Review of Systems   Constitutional: Negative for fever. HENT: Negative for trouble swallowing. Eyes: Negative for redness. Respiratory: Negative for shortness of breath. Gastrointestinal: Negative for vomiting. Genitourinary: Negative for difficulty urinating. Musculoskeletal: Negative for neck stiffness. Skin: Positive for wound. Negative for rash. Neurological: Negative for seizures. Psychiatric/Behavioral: Negative for confusion.      PASTMEDICAL HISTORY     Past Medical History:   Diagnosis Date    Acquired trigger finger 6/5/2018    CAD (coronary artery disease)     NWOCC/ involving coronary bypass graft of native heart with unstable angina pectoris    Cervical spondylosis     CHF [facility]congestive [facility] failure[facility])     Chronic back pain     on 11/19/18 pt states is presently in pain mgmnt    Contusion of elbow 6/5/2018    Contusion of knee 6/5/2018    Contusion of shoulder region 6/5/2018    Diabetic foot ulcer with osteomyelitis ([facility] 1/9/2018  Diabetic ulcer of toe associated with type 2 diabetes mellitus, with fat layer exposed ([address] 1/31/2018    Hypercholesterolemia 12/5/2017    Hyperlipemia, mixed     Hypertension     Ischemic cardiomyopathy 5/7/2018    Non-pressure chronic ulcer of left lower leg with fat layer exposed ([address] 6/9/2014    Obesity     Obesity, Class III, BMI 40-49.9 (morbid obesity) ([address] 4/6/2015    Obstructive sleep apnea syndrome     Osteoarthritis of carpometacarpal (CMC) joint of thumb 6/5/2018    Osteoarthritis of knee 6/5/2018    Peripheral vascular disease ([address]     with venous stasis and ulcerations. Dr Sudheer Dee Presence of coronary angioplasty implant and graft 9/11/2009    Sleep apnea     Dr Armida Kim of knee and leg 6/5/2018    Sprain of shoulder and upper arm 6/5/2018    Type II or unspecified type diabetes mellitus without mention of complication, not stated as uncontrolled     Ulcer of left foot, with fat layer exposed ([address] 1/9/2018    Unspecified sleep apnea     Dr Eve Darling CPAP    Venous insufficiency of both lower extremities 1/31/2018     SURGICAL HISTORY       Past Surgical History:   Procedure Laterality Date    CARPAL TUNNEL RELEASE Right 3/22/13    Dr. Alex Abarca.     CORONARY ANGIOPLASTY WITH STENT PLACEMENT  2002,2006.2009    X 3 separately    CORONARY ARTERY BYPASS GRAFT  2/26/07    [facility] (X 3)    NECK SURGERY  2010    cervical stenoses C5-C6-C7and partial T1 laminectomy with fusion of C6-7    OTHER SURGICAL HISTORY      wound care ulcers of legs (bilaterally) with Dr Gwen Riojas  age 11   [place]      closure of peripherator veins of legs, Dr Aristides Morales       Discharge Medication List as of 5/15/2021  4:18 AM      CONTINUE these medications which have NOT CHANGED    Details   atorvastatin (LIPITOR) 40 MG tablet Take 1 tablet by mouth daily, Disp-90 tablet, R-3Normal      metFORMIN Disp-90 tablet, R-3Normal           ALLERGIES     is allergic to pcn [penicillins]. FAMILY HISTORY     He indicated that his mother is . He indicated that his father is . He indicated that his brother is alive. He indicated that his maternal grandmother is . He indicated that his maternal grandfather is . He indicated that his paternal grandmother is . He indicated that his paternal grandfather is . He indicated that both of his sons are alive. SOCIAL HISTORY       Social History     Tobacco Use    Smoking status: Never Smoker    Smokeless tobacco: Never Used   Vaping Use    Vaping Use: Never used   Substance Use Topics    Alcohol use: Not Currently     Comment: rare social, non past 5 months    Drug use: No     PHYSICAL EXAM     INITIAL VITALS: /70   Pulse 82   Temp 98.3 °F (36.8 °C) (Oral)   Resp 20   Ht 6' (1.829 m)   Wt 247 lb 14.4 oz (112.4 kg)   SpO2 100%   BMI 33.62 kg/m²    Physical Exam  Vitals signs and nursing note reviewed. Constitutional:       General: He is not in acute distress. Appearance: He is not toxic-appearing or diaphoretic. HENT:      Head: Normocephalic and atraumatic. Mouth/Throat:      Mouth: Mucous membranes are moist.      Pharynx: Oropharynx is clear. Eyes:      Extraocular Movements: Extraocular movements intact. Pupils: Pupils are equal, round, and reactive to light. Neck:      Musculoskeletal: Normal range of motion and neck supple. Cardiovascular:      Rate and Rhythm: Normal rate and regular rhythm. Pulmonary:      Effort: Pulmonary effort is normal. No respiratory distress. Abdominal:      Palpations: Abdomen is soft. Musculoskeletal:         General: Swelling and signs of injury present. Skin:     General: Skin is warm and dry. Capillary Refill: Capillary refill takes less than 2 seconds. Neurological:      General: No focal deficit present.       Mental Status: He is alert and oriented to person, place, and time. Psychiatric:         Thought Content: Thought content normal.         MEDICAL DECISION MAKING:          Please see ED Course below for MDM/ED course. DDx: Dog bite, infection, neurovascular injury, tendon injury    All patient's question's and concerns were answered prior to disposition and patient and/or family expressed understanding and agreement of treatment plan. NIH STROKE SCALE:            PROCEDURES:    Procedures    DIAGNOSTIC RESULTS   EKG:All EKG's are interpreted by the Emergency Department Physician who either signs or Co-signs this chart in the absence of a cardiologist.      RADIOLOGY:All plain film, CT, MRI, and formal ultrasound images (except ED bedside ultrasound) are read by the radiologist, see reports below, unless otherwisenoted in MDM or here. XR HAND LEFT (MIN 3 VIEWS)   Final Result   Soft tissue injury and subcutaneous gas thenar eminence. No fracture or   radiodense foreign body. XR HAND RIGHT (MIN 3 VIEWS)   Final Result   No acute osseous abnormality. No radiodense foreign body. LABS: All lab results were reviewed by myself, and all abnormals are listed below. Labs Reviewed - No data to display    EMERGENCY DEPARTMENTCOURSE:     Patient is a 22-year-old male here after being bit by a dog. He has wound to the left hand and right thumb. He is neurovascularly intact he has intact radial pulses strength sensation of his hands. Please see picture in chart of wound. Plan for soaking in Betadine water solution, x-rays to rule out foreign body and repair, will give doxycycline and Flagyl as he is allergic to penicillins and have him follow-up with plastics. X-ray shows no foreign bodies. Patient has exposed tendon does not appear to have any tendon injury but is having some numbness tingling in his third fourth fifth fingers. He could have a nerve injury.   Wound was extensively irrigated and washed out.  No fracture on x-ray. Laceration was complex with significant skin missing. Discussed risks and benefits of repair given risk for infection with closure. Patient wanted the wound closed. Patient also had a large hematoma. This was partially expressed with manual manipulation. Patient had nine 4-0 Prolene sutures placed with minimal tension and significant space left between however needed to cover up the tendon that was exposed. Recommend follow-up with wound care who he has seen in the past for prior dog bite. Patient given written prescriptions for doxycycline and Flagyl. The wounds were wrapped and patient was given supplies to continue to care for his wounds. Instructed to return if symptoms worsen or any signs of infection. Laceration Repair Procedure Note    Indication: Laceration    Procedure: The patient was placed in the appropriate position and anesthesia around the laceration was obtained by infiltration using 1% Lidocaine without epinephrine. The area was then cleansed with betadine and draped in a sterile fashion. The laceration was closed with 4-0 Prolene using interrupted sutures. Multiple lacerations were closed very loosely. The wound area was then dressed with a bandage, gauze and vaseline soaked gauze. Total repaired wound length: 8 cm    Other Items: Suture count: 9    The patient tolerated the procedure well.     Complications: None        Vitals:    Vitals:    05/14/21 2322   BP: 138/70   Pulse: 82   Resp: 20   Temp: 98.3 °F (36.8 °C)   TempSrc: Oral   SpO2: 100%   Weight: 247 lb 14.4 oz (112.4 kg)   Height: 6' (1.829 m)       The patient was given the following medications while in the emergency department:  Orders Placed This Encounter   Medications    ceFAZolin (ANCEF) injection 1,000 mg     Order Specific Question:   Antimicrobial Indications     Answer:   Skin and Soft Tissue Infection    lidocaine 1 % injection     Honora Cools: cabinet override    lidocaine 1 % injection 20 mL     CONSULTS:  None    FINAL IMPRESSION      1. Dog bite, initial encounter          DISPOSITION/PLAN   DISPOSITION  Decision to discharge      PATIENT REFERRED TO:  No follow-up provider specified. DISCHARGE MEDICATIONS:  Discharge Medication List as of 5/15/2021  4:18 AM        Kolby Sharma MD  Attending Emergency Physician    This note was created with the assistance of a speech-recognition program. While intending to generate a document that actually reflects the content of the visit, no guarantees can be provided that every mistake has been identified and corrected by editing.                    Kolby Sharma MD  05/15/21 0376       Kolby Sharma MD  05/15/21 1219

## 2021-05-17 ENCOUNTER — HOSPITAL ENCOUNTER (OUTPATIENT)
Dept: WOUND CARE | Age: 61
Discharge: HOME | End: 2021-05-17

## 2021-05-24 ENCOUNTER — HOSPITAL ENCOUNTER (OUTPATIENT)
Dept: WOUND CARE | Age: 61
Discharge: HOME | End: 2021-05-24
Payer: COMMERCIAL

## 2021-05-24 VITALS
DIASTOLIC BLOOD PRESSURE: 79 MMHG | TEMPERATURE: 97.6 F | RESPIRATION RATE: 18 BRPM | HEART RATE: 82 BPM | SYSTOLIC BLOOD PRESSURE: 147 MMHG
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
## 2021-05-24 PROCEDURE — 11042 DBRDMT SUBQ TIS 1ST 20SQCM/<: CPT

## 2021-05-24 PROCEDURE — 99213 OFFICE O/P EST LOW 20 MIN: CPT

## 2021-05-24 PROCEDURE — 6370000000 HC RX 637 (ALT 250 FOR IP): Performed by: PLASTIC SURGERY

## 2021-05-24 PROCEDURE — 11042 DBRDMT SUBQ TIS 1ST 20SQCM/<: CPT | Performed by: PLASTIC SURGERY

## 2021-05-24 RX ORDER — BACITRACIN ZINC AND POLYMYXIN B SULFATE 500; 1000 [USP'U]/G; [USP'U]/G
OINTMENT TOPICAL ONCE
Status: CANCELLED | OUTPATIENT
Start: 2021-05-24 | End: 2021-05-24

## 2021-05-24 RX ORDER — LIDOCAINE HYDROCHLORIDE 20 MG/ML
JELLY TOPICAL ONCE
Status: COMPLETED | OUTPATIENT
Start: 2021-05-24 | End: 2021-05-24

## 2021-05-24 RX ORDER — LIDOCAINE HYDROCHLORIDE 40 MG/ML
SOLUTION TOPICAL ONCE
Status: CANCELLED | OUTPATIENT
Start: 2021-05-24 | End: 2021-05-24

## 2021-05-24 RX ORDER — GINSENG 100 MG
CAPSULE ORAL ONCE
Status: CANCELLED | OUTPATIENT
Start: 2021-05-24 | End: 2021-05-24

## 2021-05-24 RX ORDER — LIDOCAINE HYDROCHLORIDE 20 MG/ML
JELLY TOPICAL ONCE
Status: CANCELLED | OUTPATIENT
Start: 2021-05-24 | End: 2021-05-24

## 2021-05-24 RX ORDER — LIDOCAINE 50 MG/G
OINTMENT TOPICAL ONCE
Status: CANCELLED | OUTPATIENT
Start: 2021-05-24 | End: 2021-05-24

## 2021-05-24 RX ORDER — GENTAMICIN SULFATE 1 MG/G
OINTMENT TOPICAL ONCE
Status: CANCELLED | OUTPATIENT
Start: 2021-05-24 | End: 2021-05-24

## 2021-05-24 RX ORDER — BETAMETHASONE DIPROPIONATE 0.05 %
OINTMENT (GRAM) TOPICAL ONCE
Status: CANCELLED | OUTPATIENT
Start: 2021-05-24 | End: 2021-05-24

## 2021-05-24 RX ORDER — CLOBETASOL PROPIONATE 0.5 MG/G
OINTMENT TOPICAL ONCE
Status: CANCELLED | OUTPATIENT
Start: 2021-05-24 | End: 2021-05-24

## 2021-05-24 RX ORDER — BACITRACIN, NEOMYCIN, POLYMYXIN B 400; 3.5; 5 [USP'U]/G; MG/G; [USP'U]/G
OINTMENT TOPICAL ONCE
Status: CANCELLED | OUTPATIENT
Start: 2021-05-24 | End: 2021-05-24

## 2021-05-24 RX ORDER — LIDOCAINE 40 MG/G
CREAM TOPICAL ONCE
Status: CANCELLED | OUTPATIENT
Start: 2021-05-24 | End: 2021-05-24

## 2021-05-24 RX ADMIN — LIDOCAINE HYDROCHLORIDE 5 ML: 20 JELLY TOPICAL at 09:13

## 2021-05-24 NOTE — PROGRESS NOTES
[address]:     [employer] [id number] [address] p: [phone] f: [phone]     Ordering Center:     [address]  [phone]  WOUND CARE Dept: [phone]   FAX NUMBER [unfilled]    Patient Information:      Lalo Vigil [address] [facility] [address]   [phone]   : 1960  AGE: 61 y.o.      GENDER: male   TODAYS DATE:  2021    Insurance:      PRIMARY INSURANCE:  Plan: AET[employer] NAP CHOICE POS II  Coverage: AETNA  Effective Date: 2021  [id number] - (Commercial)      Patient Wound Information:      Problem List Items Addressed This Visit     Dog bite - Primary    Relevant Orders    Initiate Outpatient Wound Care Protocol          WOUNDS REQUIRING DRESSING SUPPLIES:     Wound 21 Hand Left #1 (Active)   Wound Image   21 0909   Wound Etiology Traumatic 21 0909   Dressing Status Old drainage noted;New drainage noted 21 0909   Wound Cleansed Cleansed with saline 21 0909   Dressing/Treatment Other (comment) 21 0950   Wound Length (cm) 5.5 cm 21 0909   Wound Width (cm) 9.8 cm 21 0909   Wound Depth (cm) 0.2 cm 21 0909   Wound Surface Area (cm^2) 53.9 cm^2 21 0909   Wound Volume (cm^3) 10.78 cm^3 21 0909   Post-Procedure Length (cm) 5.5 cm 21 0909   Post-Procedure Width (cm) 9.8 cm 21 0909   Post-Procedure Depth (cm) 0.2 cm 21 0909   Post-Procedure Surface Area (cm^2) 53.9 cm^2 21 0909   Post-Procedure Volume (cm^3) 10.78 cm^3 21 0909   Wound Assessment Slough;Pink/red 21 0909   Drainage Amount Moderate 21 0909   Drainage Description Serosanguinous 21 0909   Odor None 21 0909   Trudi-wound Assessment Blanchable erythema 21 0909   Margins Attached edges 21 0909   Wound Thickness Description not for Pressure Injury Full thickness 21 0909   Number of days: 0          Supplies Requested :      WOUND #: 1   PRIMARY DRESSING:  Collagen with silver   Cover and Secure with: 4X4 gauze pad  Bulky roll gauze     FREQUENCY OF DRESSING CHANGES:  Daily         ADDITIONAL ITEMS:  [] Gloves Small  [x] Gloves Medium [] Gloves Large [] Gloves XLarge  [] Tape 1\" [x] Tape 2\" [] Tape 3\"  [] Medipore Tape  [x] Saline  [] Skin Prep   [] Adhesive Remover   [] Cotton Tip Applicators   [] Other:    Patient Wound(s) Debrided: [x] Yes   [] No    Debribement Type: subcutaneous tissue    Debridement Date: 5/24/2021    Patient currently being seen by Home Health: [] Yes   [x] No    Duration for needed supplies:  []15  [x]30  []60  []90 Days    Provider Information:      PROVIDER'S NAME: Dr. Alisson Martinez    NPI: [id number]     Electronically signed by Alessandra Hernandez RN on 5/24/2021 at 10:46 AM

## 2021-05-24 NOTE — PROGRESS NOTES
Progress note     Chief Complaint   Patient presents with    Wound Check     bilateral hands        HPI:   Cathie Meyer is a 61 y.o. male who presents with a dog bite on his left ear as well as multiple lacerations on both hands including the thumb and fingers. Patient dates that it occurred on Friday. Patient is here today for evaluation treatment. Patient states that part of the ear was removed during the bite. Patient is on antibiotics currently. Patient is a . Patient was referred from the emergency room. Patient is here for evaluation and treatment. Medications:     Current Outpatient Medications   Medication Sig Dispense Refill    traZODone (DESYREL) 150 MG tablet TAKE ONE TABLET BY MOUTH EVERY EVENING 90 tablet 3    atorvastatin (LIPITOR) 40 MG tablet Take 1 tablet by mouth daily 90 tablet 3    metFORMIN (GLUCOPHAGE) 1000 MG tablet Take 1 tablet by mouth 2 times daily (with meals) 180 tablet 3    omeprazole (PRILOSEC) 20 MG delayed release capsule Take 1 capsule by mouth Daily 90 capsule 3    carvedilol (COREG) 12.5 MG tablet Take 1 tablet by mouth 2 times daily (with meals) 180 tablet 3    clopidogrel (PLAVIX) 75 MG tablet Take 1 tablet by mouth daily 90 tablet 3    meloxicam (MOBIC) 15 MG tablet Take 1 tablet by mouth daily 90 tablet 3    phentermine (ADIPEX-P) 37.5 MG tablet Take 1 tablet by mouth every morning (before breakfast) for 30 days. 30 tablet 0    hydroCHLOROthiazide (MICROZIDE) 12.5 MG capsule 1 po qd 90 capsule 3    linagliptin (TRADJENTA) 5 MG tablet TAKE 1 TABLET BY MOUTH ONE TIME A DAY 90 tablet 3    testosterone cypionate (DEPOTESTOTERONE CYPIONATE) 200 MG/ML injection       Testosterone Cypionate 200 MG/ML KIT Inject 200 mg into the muscle every 30 days for 30 days.  please include syringes 1 kit 5    tiZANidine (ZANAFLEX) 4 MG tablet TAKE ONE TABLET BY MOUTH EVERY EVENING AS NEEDED FOR MUSCLE SPASMS 30 tablet 3    NEEDLE, DISP, 18 G 18G X 1\" MISC 1 Syringe by Does not apply route every 30 days 25 each 0    NEEDLE, DISP, 22 G 22G X 1-1/2\" MISC 1 Syringe by Does not apply route every 30 days 25 each 0    topiramate (TOPAMAX) 200 MG tablet       HYDROcodone-acetaminophen (NORCO)  MG per tablet Take 1 tablet by mouth every 6 hours as needed for Pain. Guido Farris Multiple Vitamin (MULTI VITAMIN DAILY PO) Take by mouth      nitroGLYCERIN (NITROSTAT) 0.4 MG SL tablet Place 0.4 mg under the tongue      aspirin 325 MG tablet Take 325 mg by mouth daily. No current facility-administered medications for this encounter. Allergies: Allergies   Allergen Reactions    Pcn [Penicillins] Other (See Comments)     Unknown rx     Review of Systems:   Constitutional: Negative for fever, chills, fatigue and unexpected weight change. HENT: Negative for hearing loss, sore throat and facial swelling. Eyes: Negative for pain and discharge. Respiratory: No history of sleep apnea. Cardiovascular: Patient with a history of CHF, coronary artery disease, cardiac myopathy, peripheral vascular disease. Patient with hypercholesterolemia, hyperlipidemia, hypertension, ischemic cardiomyopathy. Gastrointestinal: Negative for nausea, vomiting, diarrhea and constipation. Skin: Negative for pallor and rash. Neurological: Patient has chronic back pain. .   Hematological: Does not bruise/bleed easily. Psychiatric/Behavioral: Negative for behavioral problems. The patient is not nervous/anxious.       Past Medical History:   Diagnosis Date    Acquired trigger finger 6/5/2018    CAD (coronary artery disease)     NWOCC/ involving coronary bypass graft of native heart with unstable angina pectoris    Cervical spondylosis     CHF (congestive heart failure) ([facility])     Chronic back pain     on 11/19/18 pt states is presently in pain mgmnt    Contusion of elbow 6/5/2018    Contusion of knee 6/5/2018    Contusion of shoulder region 6/5/2018    Diabetic foot ulcer with osteomyelitis ([address] 1/9/2018    Diabetic ulcer of toe associated with type 2 diabetes mellitus, with fat layer exposed ([address] 1/31/2018    Hypercholesterolemia 12/5/2017    Hyperlipemia, mixed     Hypertension     Ischemic cardiomyopathy 5/7/2018    Non-pressure chronic ulcer of left lower leg with fat layer exposed ([address] 6/9/2014    Obesity     Obesity, Class III, BMI 40-49.9 (morbid obesity) ([address] 4/6/2015    Obstructive sleep apnea syndrome     Osteoarthritis of carpometacarpal (CMC) joint of thumb 6/5/2018    Osteoarthritis of knee 6/5/2018    Peripheral vascular disease ([address]     with venous stasis and ulcerations. Dr Patricia Davila Presence of coronary angioplasty implant and graft 9/11/2009    Sleep apnea     Dr Doris Alejandro of knee and leg 6/5/2018    Sprain of shoulder and upper arm 6/5/2018    Type II or unspecified type diabetes mellitus without mention of complication, not stated as uncontrolled     Ulcer of left foot, with fat layer exposed ([address] 1/9/2018    Unspecified sleep apnea     Dr Dejon Ramirez CPAP    Venous insufficiency of both lower extremities 1/31/2018     Past Surgical History:   Procedure Laterality Date    CARPAL TUNNEL RELEASE Right 3/22/13    Dr. Lenin Duarte.     CORONARY ANGIOPLASTY WITH STENT PLACEMENT  2002,2006.2009    X 3 separately    CORONARY ARTERY BYPASS GRAFT  2/26/07    [facility], Dr Claudio Sicard (X 3)    NECK SURGERY  2010    cervical stenoses C5-C6-C7and partial T1 laminectomy with fusion of C6-7    OTHER SURGICAL HISTORY      wound care ulcers of legs (bilaterally) with Dr Justina Hale  age 11    VEIN SURGERY      closure of peripherator veins of legs, Dr José Antonio Ortiz History     Socioeconomic History    Marital status:      Spouse name: Not on file    Number of children: Not on file    Years of education: Not on file    Highest education level: Not on file   Occupational History    Not on file   Tobacco Use    Smoking status: Never Smoker    Smokeless tobacco: Never Used   Vaping Use    Vaping Use: Never used   Substance and Sexual Activity    Alcohol use: Not Currently     Comment: rare social, non past 5 months    Drug use: No    Sexual activity: Yes     Partners: Female     Comment: spouse   Other Topics Concern    Not on file   Social History Narrative    Not on file     Social Determinants of Health     Financial Resource Strain:     Difficulty of Paying Living Expenses:    Food Insecurity:     Worried About Running Out of Food in the Last Year:     Ran Out of Food in the Last Year:    Transportation Needs:     Lack of Transportation (Medical):  Lack of Transportation (Non-Medical):    Physical Activity:     Days of Exercise per Week:     Minutes of Exercise per Session:    Stress:     Feeling of Stress :    Social Connections:     Frequency of Communication with Friends and Family:     Frequency of Social Gatherings with Friends and Family:     Attends Bahai Services:     Active Member of Clubs or Organizations:     Attends Club or Organization Meetings:     Marital Status:    Intimate Partner Violence:     Fear of Current or Ex-Partner:     Emotionally Abused:     Physically Abused:     Sexually Abused:      Family History   Problem Relation Age of Onset    Cancer Mother         lung    Diabetes Father     Heart Disease Father     High Blood Pressure Father     Diabetes Brother     Heart Disease Brother     High Blood Pressure Brother      Physical Exam:   BP (!) 147/79   Pulse 82   Temp 97.6 °F (36.4 °C)   Resp 18    There is no height or weight on file to calculate BMI. Physical Exam   Nursing note and vitals reviewed. Constitutional: Oriented to person, place, and time. Appears well-developed and well-nourished. No distress. Head: Normocephalic and atraumatic.    Eyes: Conjunctivae and EOM are normal.   Pulmonary/Chest: Effort normal. No respiratory distress. Neurological: Alert and oriented to person, place, and time. Skin: The ear has healed well. Patient still has erythema and calluses on both fingers. Psychiatric: Normal mood and affect. Behavior is normal  Wound 05/24/21 Hand Left #1 (Active)   Wound Image   05/24/21 0909   Wound Etiology Traumatic 05/24/21 0909   Dressing Status Old drainage noted;New drainage noted 05/24/21 0909   Wound Cleansed Cleansed with saline 05/24/21 0909   Wound Length (cm) 5.5 cm 05/24/21 0909   Wound Width (cm) 9.8 cm 05/24/21 0909   Wound Depth (cm) 0.2 cm 05/24/21 0909   Wound Surface Area (cm^2) 53.9 cm^2 05/24/21 0909   Wound Volume (cm^3) 10.78 cm^3 05/24/21 0909   Wound Assessment Slough;Pink/red 05/24/21 0909   Drainage Amount Moderate 05/24/21 0909   Drainage Description Serosanguinous 05/24/21 0909   Odor None 05/24/21 0909   Trudi-wound Assessment Blanchable erythema 05/24/21 0909   Margins Attached edges 05/24/21 0909   Wound Thickness Description not for Pressure Injury Full thickness 05/24/21 9870   Number of days: 0          Procedure Note  Indications:  Based on my examination of this patient's wound(s)/ulcer(s) today, debridement is required to promote healing and evaluate the wound base. Performed by: Murtaza Alexis MD    Consent obtained:  Yes    Time out taken:  Yes    Pain Control: Anesthetic  Anesthetic: 2% Lidocaine Gel Topical       Debridement:Excisional Debridement    Using curette, scissors and forceps the wound(s)/ulcer(s) was/were sharply debrided down through and including the removal of subcutaneous tissue.         Devitalized Tissue Debrided:  necrotic/eschar    Pre Debridement Measurements:  Are located in the Wound/Ulcer Documentation Flow Sheet    Wound/Ulcer #: 1           Percent of Wound(s)/Ulcer(s) Debrided: 20%    Total Surface Area Debrided:  10.6 sq cm       Diabetic/Pressure/Non Pressure Ulcers only:  Ulcer: Non-Pressure ulcer, fat layer exposed      Estimated Blood

## 2021-05-27 DIAGNOSIS — I10 ESSENTIAL HYPERTENSION: ICD-10-CM

## 2021-05-27 RX ORDER — HYDROCHLOROTHIAZIDE 12.5 MG/1
CAPSULE, GELATIN COATED ORAL
Qty: 90 CAPSULE | Refills: 3 | Status: SHIPPED | OUTPATIENT
Start: 2021-05-27 | End: 2021-09-25 | Stop reason: SDUPTHER

## 2021-06-11 ENCOUNTER — TELEPHONE (OUTPATIENT)
Dept: FAMILY MEDICINE CLINIC | Age: 61
End: 2021-06-11

## 2021-06-14 ENCOUNTER — HOSPITAL ENCOUNTER (OUTPATIENT)
Dept: WOUND CARE | Age: 61
Discharge: HOME | End: 2021-06-14
Payer: COMMERCIAL

## 2021-06-14 VITALS
HEART RATE: 90 BPM | DIASTOLIC BLOOD PRESSURE: 75 MMHG | HEIGHT: 72 IN | RESPIRATION RATE: 18 BRPM | WEIGHT: 247 LBS | TEMPERATURE: 97.4 F | BODY MASS INDEX: 33.46 KG/M2 | SYSTOLIC BLOOD PRESSURE: 156 MMHG

## 2021-06-14 DIAGNOSIS — R10.10 UPPER ABDOMINAL PAIN: ICD-10-CM
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
## 2021-06-14 PROCEDURE — 11046 DBRDMT MUSC&/FSCA EA ADDL: CPT | Performed by: PLASTIC SURGERY

## 2021-06-14 PROCEDURE — 11046 DBRDMT MUSC&/FSCA EA ADDL: CPT

## 2021-06-14 PROCEDURE — 11043 DBRDMT MUSC&/FSCA 1ST 20/<: CPT

## 2021-06-14 PROCEDURE — 11043 DBRDMT MUSC&/FSCA 1ST 20/<: CPT | Performed by: PLASTIC SURGERY

## 2021-06-14 RX ORDER — GENTAMICIN SULFATE 1 MG/G
OINTMENT TOPICAL ONCE
Status: CANCELLED | OUTPATIENT
Start: 2021-06-14 | End: 2021-06-14

## 2021-06-14 RX ORDER — BACITRACIN, NEOMYCIN, POLYMYXIN B 400; 3.5; 5 [USP'U]/G; MG/G; [USP'U]/G
OINTMENT TOPICAL ONCE
Status: CANCELLED | OUTPATIENT
Start: 2021-06-14 | End: 2021-06-14

## 2021-06-14 RX ORDER — LIDOCAINE 50 MG/G
OINTMENT TOPICAL ONCE
Status: CANCELLED | OUTPATIENT
Start: 2021-06-14 | End: 2021-06-14

## 2021-06-14 RX ORDER — BACITRACIN ZINC AND POLYMYXIN B SULFATE 500; 1000 [USP'U]/G; [USP'U]/G
OINTMENT TOPICAL ONCE
Status: CANCELLED | OUTPATIENT
Start: 2021-06-14 | End: 2021-06-14

## 2021-06-14 RX ORDER — CLOBETASOL PROPIONATE 0.5 MG/G
OINTMENT TOPICAL ONCE
Status: CANCELLED | OUTPATIENT
Start: 2021-06-14 | End: 2021-06-14

## 2021-06-14 RX ORDER — LIDOCAINE HYDROCHLORIDE 20 MG/ML
JELLY TOPICAL ONCE
Status: COMPLETED | OUTPATIENT
Start: 2021-06-14 | End: 2021-06-14

## 2021-06-14 RX ORDER — LIDOCAINE 40 MG/G
CREAM TOPICAL ONCE
Status: CANCELLED | OUTPATIENT
Start: 2021-06-14 | End: 2021-06-14

## 2021-06-14 RX ORDER — BETAMETHASONE DIPROPIONATE 0.05 %
OINTMENT (GRAM) TOPICAL ONCE
Status: CANCELLED | OUTPATIENT
Start: 2021-06-14 | End: 2021-06-14

## 2021-06-14 RX ORDER — LIDOCAINE HYDROCHLORIDE 20 MG/ML
JELLY TOPICAL ONCE
Status: CANCELLED | OUTPATIENT
Start: 2021-06-14 | End: 2021-06-14

## 2021-06-14 RX ORDER — LIDOCAINE HYDROCHLORIDE 40 MG/ML
SOLUTION TOPICAL ONCE
Status: CANCELLED | OUTPATIENT
Start: 2021-06-14 | End: 2021-06-14

## 2021-06-14 RX ORDER — GINSENG 100 MG
CAPSULE ORAL ONCE
Status: CANCELLED | OUTPATIENT
Start: 2021-06-14 | End: 2021-06-14

## 2021-06-14 RX ORDER — BUPRENORPHINE HYDROCHLORIDE 300 UG/1
300 FILM, SOLUBLE BUCCAL 2 TIMES DAILY
COMMUNITY
Start: 2021-05-20 | End: 2022-01-27

## 2021-06-14 RX ADMIN — LIDOCAINE HYDROCHLORIDE 6 ML: 20 JELLY TOPICAL at 08:31

## 2021-06-14 NOTE — PROGRESS NOTES
Progress note     Chief Complaint   Patient presents with    Wound Check     left hand        HPI:   José Chiang is a 61 y.o. male who presents with a dog bite on his left ear as well as multiple lacerations on both hands including the thumb and fingers. Patient dates that it occurred on Friday. Patient is here today for evaluation treatment. Patient states that part of the ear was removed during the bite. Patient is on antibiotics currently. Patient is a . Patient was referred from the emergency room. Patient is here for evaluation and treatment. Medications:     Current Outpatient Medications   Medication Sig Dispense Refill    BELBUCA 300 MCG FILM Place 300 mcg inside cheek 2 times daily.  hydroCHLOROthiazide (MICROZIDE) 12.5 MG capsule 1 po qd 90 capsule 3    traZODone (DESYREL) 150 MG tablet TAKE ONE TABLET BY MOUTH EVERY EVENING 90 tablet 3    atorvastatin (LIPITOR) 40 MG tablet Take 1 tablet by mouth daily 90 tablet 3    metFORMIN (GLUCOPHAGE) 1000 MG tablet Take 1 tablet by mouth 2 times daily (with meals) 180 tablet 3    omeprazole (PRILOSEC) 20 MG delayed release capsule Take 1 capsule by mouth Daily 90 capsule 3    carvedilol (COREG) 12.5 MG tablet Take 1 tablet by mouth 2 times daily (with meals) 180 tablet 3    clopidogrel (PLAVIX) 75 MG tablet Take 1 tablet by mouth daily 90 tablet 3    meloxicam (MOBIC) 15 MG tablet Take 1 tablet by mouth daily 90 tablet 3    linagliptin (TRADJENTA) 5 MG tablet TAKE 1 TABLET BY MOUTH ONE TIME A DAY 90 tablet 3    testosterone cypionate (DEPOTESTOTERONE CYPIONATE) 200 MG/ML injection       Testosterone Cypionate 200 MG/ML KIT Inject 200 mg into the muscle every 30 days for 30 days.  please include syringes 1 kit 5    tiZANidine (ZANAFLEX) 4 MG tablet TAKE ONE TABLET BY MOUTH EVERY EVENING AS NEEDED FOR MUSCLE SPASMS 30 tablet 3    NEEDLE, DISP, 18 G 18G X 1\" MISC 1 Syringe by Does not apply route every 30 days 25 each 0  NEEDLE, DISP, 22 G 22G X 1-1/2\" MISC 1 Syringe by Does not apply route every 30 days 25 each 0    topiramate (TOPAMAX) 200 MG tablet       HYDROcodone-acetaminophen (NORCO)  MG per tablet Take 1 tablet by mouth every 6 hours as needed for Pain. Kika Palacios Multiple Vitamin (MULTI VITAMIN DAILY PO) Take by mouth      nitroGLYCERIN (NITROSTAT) 0.4 MG SL tablet Place 0.4 mg under the tongue      aspirin 325 MG tablet Take 325 mg by mouth daily. No current facility-administered medications for this encounter. Allergies: Allergies   Allergen Reactions    Pcn [Penicillins] Other (See Comments)     Unknown rx     Review of Systems:   Constitutional: Negative for fever, chills, fatigue and unexpected weight change. HENT: Negative for hearing loss, sore throat and facial swelling. Eyes: Negative for pain and discharge. Respiratory: No history of sleep apnea. Cardiovascular: Patient with a history of CHF, coronary artery disease, cardiac myopathy, peripheral vascular disease. Patient with hypercholesterolemia, hyperlipidemia, hypertension, ischemic cardiomyopathy. Gastrointestinal: Negative for nausea, vomiting, diarrhea and constipation. Skin: Negative for pallor and rash. Neurological: Patient has chronic back pain. .   Hematological: Does not bruise/bleed easily. Psychiatric/Behavioral: Negative for behavioral problems. The patient is not nervous/anxious.       Past Medical History:   Diagnosis Date    Acquired trigger finger 6/5/2018    CAD (coronary artery disease)     [facility]/ involving coronary bypass graft of native heart with unstable angina pectoris    Cervical spondylosis     CHF (congestive heart failure) (HCC)     Chronic back pain     on 11/19/18 pt states is presently in pain mgmnt    Contusion of elbow 6/5/2018    Contusion of knee 6/5/2018    Contusion of shoulder region 6/5/2018    Diabetic foot ulcer with osteomyelitis ([facility] [address] 1/9/2018    Diabetic ulcer of toe distress. Neurological: Alert and oriented to person, place, and time. Musculoskeletal: Patient has an extensor lag on the ring finger. Psychiatric: Normal mood and affect. Behavior is normal    Skin:  Wound 05/24/21 Hand Left #1 (Active)   Wound Image   05/24/21 0909   Wound Etiology Traumatic 06/14/21 0826   Dressing Status Old drainage noted;New drainage noted 06/14/21 0826   Wound Cleansed Cleansed with saline 06/14/21 0826   Dressing/Treatment Other (comment) 05/24/21 0950   Wound Length (cm) 5.6 cm 06/14/21 0826   Wound Width (cm) 4.6 cm 06/14/21 0826   Wound Depth (cm) 1 cm 06/14/21 0826   Wound Surface Area (cm^2) 25.76 cm^2 06/14/21 0826   Change in Wound Size % (l*w) 52.21 06/14/21 0826   Wound Volume (cm^3) 25.76 cm^3 06/14/21 0826   Wound Healing % -139 06/14/21 0826   Post-Procedure Length (cm) 5.6 cm 06/14/21 0826   Post-Procedure Width (cm) 4.6 cm 06/14/21 0826   Post-Procedure Depth (cm) 1 cm 06/14/21 0826   Post-Procedure Surface Area (cm^2) 25.76 cm^2 06/14/21 0826   Post-Procedure Volume (cm^3) 25.76 cm^3 06/14/21 0826   Wound Assessment Slough;Pink/red 06/14/21 0826   Drainage Amount Moderate 06/14/21 0826   Drainage Description Serosanguinous 06/14/21 0826   Odor None 06/14/21 0826   Trudi-wound Assessment Blanchable erythema; Maceration 06/14/21 0826   Margins Defined edges 06/14/21 0826   Wound Thickness Description not for Pressure Injury Full thickness 06/14/21 0826   Number of days: 21          Procedure Note  Indications:  Based on my examination of this patient's wound(s)/ulcer(s) today, debridement is required to promote healing and evaluate the wound base.     Performed by: Eder Marques MD    Consent obtained:  Yes    Time out taken:  Yes    Pain Control: Anesthetic  Anesthetic: 2% Lidocaine Gel Topical       Debridement:Excisional Debridement    Using curette, scissors and forceps the wound(s)/ulcer(s) was/were sharply debrided down through and including the removal of muscle/fascia. Devitalized Tissue Debrided:  necrotic/eschar    Pre Debridement Measurements:  Are located in the Wound/Ulcer Documentation Flow Sheet    Wound/Ulcer #: 1           Percent of Wound(s)/Ulcer(s) Debrided: 20%    Total Surface Area Debrided: 25.76 sq cm to the tendon. Of the left hand dorsum      Diabetic/Pressure/Non Pressure Ulcers only:  Ulcer: Non-Pressure ulcer, muscle necrosis      Estimated Blood Loss:  Minimal    Hemostasis Achieved:  by pressure    Procedural Pain:  1  / 10     Post Procedural Pain:  0 / 10     Response to treatment:  Well tolerated by patient. Imaging:   [unfilled]        Impression/Plan:      Diagnosis Orders   1.  Dog bite, initial encounter       Patient Active Problem List   Diagnosis    Hyperlipemia, mixed    Sleep apnea    Peripheral vascular disease ([address]    CAD (coronary artery disease)    [facility]    CTS (carpal tunnel syndrome)    Non-pressure chronic ulcer of left lower leg with fat layer exposed ([address]    Hypotension    Obesity, Class III, BMI 40-49.9 (morbid obesity) ([address]    Essential hypertension    DDD (degenerative disc disease), cervical    DJD (degenerative joint disease), cervical    Primary osteoarthritis involving multiple joints    Paresthesias in left hand    Uncontrolled type 2 diabetes mellitus without complication, without long-term current use of insulin    Coronary artery disease involving coronary bypass graft of native heart without angina pectoris    Sleep apnea    Cervical spondylosis    Obesity    Long term current use of antithrombotics/antiplatelets    BMI 22.1-50.5, adult ([address]    H/O cervical spine surgery    Long term (current) use of non-steroidal anti-inflammatories (nsaid)    Chronic prescription opiate use    Severe comorbid illness    Hx of cervical spine surgery    Chronic neck pain    Candidal balanitis    Cellulitis of third toe, left    Nail avulsion, toe, initial encounter    Cellulitis of second toe, right    Abnormal nuclear stress test    Abnormal stress test    Hypercholesterolemia    Diabetic foot ulcer with osteomyelitis (HCC)    Ulcer of left foot, with fat layer exposed ([address]    Acquired trigger finger    Contusion of elbow    Contusion of shoulder region    Contusion of knee    Degeneration of lumbar intervertebral disc    Diabetic ulcer of toe associated with type 2 diabetes mellitus, with fat layer exposed ([address]    History of coronary artery bypass graft x 3    Ischemic cardiomyopathy    Osteoarthritis of knee    Osteoarthritis of carpometacarpal (CMC) joint of thumb    Sprain of knee and leg    Sprain of shoulder and upper arm    Presence of coronary angioplasty implant and graft    Venous insufficiency of both lower extremities    Dog bite     Plan:    Place hydrogel over the exposed tendon, then place Blessing over the wound. Patient is to follow-up in 1 weeks.        Electronically signed by:  Cailin Caro MD 6/14/2021

## 2021-06-15 DIAGNOSIS — R10.10 UPPER ABDOMINAL PAIN: ICD-10-CM

## 2021-06-15 RX ORDER — OMEPRAZOLE 20 MG/1
CAPSULE, DELAYED RELEASE ORAL
Qty: 90 CAPSULE | Refills: 1 | Status: SHIPPED | OUTPATIENT
Start: 2021-06-15 | End: 2021-12-22 | Stop reason: SDUPTHER

## 2021-06-15 RX ORDER — OMEPRAZOLE 20 MG/1
20 CAPSULE, DELAYED RELEASE ORAL DAILY
Qty: 90 CAPSULE | Refills: 3 | OUTPATIENT
Start: 2021-06-15

## 2021-06-15 NOTE — TELEPHONE ENCOUNTER
Bayron Gambino is calling to request a refill on the following medication(s):    Last Visit Date (If Applicable):  7/14/2053    Next Visit Date:    Visit date not found    Medication Request:  Requested Prescriptions     Pending Prescriptions Disp Refills    omeprazole (PRILOSEC) 20 MG delayed release capsule [Pharmacy Med Name: OMEPRAZOLE 20MG CPDR] 90 capsule 1     Sig: TAKE 1 CAPSULE BY MOUTH ONE TIME A DAY

## 2021-06-18 ENCOUNTER — TELEPHONE (OUTPATIENT)
Dept: FAMILY MEDICINE CLINIC | Age: 61
End: 2021-06-18

## 2021-06-18 DIAGNOSIS — I10 ESSENTIAL HYPERTENSION: Primary | ICD-10-CM

## 2021-06-18 DIAGNOSIS — E34.9 HYPOTESTOSTERONEMIA: ICD-10-CM

## 2021-06-18 DIAGNOSIS — E78.2 HYPERLIPEMIA, MIXED: ICD-10-CM

## 2021-06-18 DIAGNOSIS — D50.8 IRON DEFICIENCY ANEMIA SECONDARY TO INADEQUATE DIETARY IRON INTAKE: ICD-10-CM

## 2021-06-18 DIAGNOSIS — E11.69 TYPE 2 DIABETES MELLITUS WITH OTHER SPECIFIED COMPLICATION, WITHOUT LONG-TERM CURRENT USE OF INSULIN (HCC): ICD-10-CM

## 2021-06-19 ENCOUNTER — HOSPITAL ENCOUNTER (OUTPATIENT)
Age: 61
Discharge: HOME | End: 2021-06-19
Payer: COMMERCIAL

## 2021-06-19 DIAGNOSIS — E11.69 TYPE 2 DIABETES MELLITUS WITH OTHER SPECIFIED COMPLICATION, WITHOUT LONG-TERM CURRENT USE OF INSULIN (HCC): ICD-10-CM

## 2021-06-19 DIAGNOSIS — I10 ESSENTIAL HYPERTENSION: ICD-10-CM

## 2021-06-19 DIAGNOSIS — E34.9 HYPOTESTOSTERONEMIA: ICD-10-CM

## 2021-06-19 DIAGNOSIS — E78.2 HYPERLIPEMIA, MIXED: ICD-10-CM

## 2021-06-19 DIAGNOSIS — D50.8 IRON DEFICIENCY ANEMIA SECONDARY TO INADEQUATE DIETARY IRON INTAKE: ICD-10-CM

## 2021-06-19 LAB
ABSOLUTE EOS #: 0.2 K/UL (ref 0–0.44)
ABSOLUTE IMMATURE GRANULOCYTE: <0.03 K/UL (ref 0–0.3)
ABSOLUTE LYMPH #: 2.1 K/UL (ref 1.1–3.7)
ABSOLUTE MONO #: 0.52 K/UL (ref 0.1–1.2)
ALBUMIN SERPL-MCNC: 4.2 G/DL (ref 3.5–5.2)
ALBUMIN/GLOBULIN RATIO: 1.4 (ref 1–2.5)
ALP BLD-CCNC: 71 U/L (ref 40–129)
ALT SERPL-CCNC: 15 U/L (ref 5–41)
ANION GAP SERPL CALCULATED.3IONS-SCNC: 12 MMOL/L (ref 9–17)
AST SERPL-CCNC: 12 U/L
BASOPHILS # BLD: 1 % (ref 0–2)
BASOPHILS ABSOLUTE: 0.04 K/UL (ref 0–0.2)
BILIRUB SERPL-MCNC: 0.28 MG/DL (ref 0.3–1.2)
BUN BLDV-MCNC: 21 MG/DL (ref 8–23)
BUN/CREAT BLD: ABNORMAL (ref 9–20)
CALCIUM SERPL-MCNC: 9.3 MG/DL (ref 8.6–10.4)
CHLORIDE BLD-SCNC: 108 MMOL/L (ref 98–107)
CHOLESTEROL/HDL RATIO: 2.9
CHOLESTEROL: 134 MG/DL
CO2: 21 MMOL/L (ref 20–31)
CREAT SERPL-MCNC: 0.95 MG/DL (ref 0.7–1.2)
DIFFERENTIAL TYPE: ABNORMAL
EOSINOPHILS RELATIVE PERCENT: 3 % (ref 1–4)
GFR AFRICAN AMERICAN: >60 ML/MIN
GFR NON-AFRICAN AMERICAN: >60 ML/MIN
GFR SERPL CREATININE-BSD FRML MDRD: ABNORMAL ML/MIN/{1.73_M2}
GFR SERPL CREATININE-BSD FRML MDRD: ABNORMAL ML/MIN/{1.73_M2}
GLUCOSE BLD-MCNC: 172 MG/DL (ref 70–99)
HCT VFR BLD CALC: 39.2 % (ref 40.7–50.3)
HDLC SERPL-MCNC: 47 MG/DL
HEMOGLOBIN: 12.7 G/DL (ref 13–17)
IMMATURE GRANULOCYTES: 0 %
LDL CHOLESTEROL: 71 MG/DL (ref 0–130)
LYMPHOCYTES # BLD: 32 % (ref 24–43)
MCH RBC QN AUTO: 29.2 PG (ref 25.2–33.5)
MCHC RBC AUTO-ENTMCNC: 32.4 G/DL (ref 28.4–34.8)
MCV RBC AUTO: 90.1 FL (ref 82.6–102.9)
MONOCYTES # BLD: 8 % (ref 3–12)
NRBC AUTOMATED: 0 PER 100 WBC
PDW BLD-RTO: 12.9 % (ref 11.8–14.4)
PLATELET # BLD: 190 K/UL (ref 138–453)
PLATELET ESTIMATE: ABNORMAL
PMV BLD AUTO: 9.1 FL (ref 8.1–13.5)
POTASSIUM SERPL-SCNC: 3.9 MMOL/L (ref 3.7–5.3)
PROSTATE SPECIFIC ANTIGEN: 1.17 UG/L
RBC # BLD: 4.35 M/UL (ref 4.21–5.77)
RBC # BLD: ABNORMAL 10*6/UL
SEG NEUTROPHILS: 56 % (ref 36–65)
SEGMENTED NEUTROPHILS ABSOLUTE COUNT: 3.69 K/UL (ref 1.5–8.1)
SEX HORMONE BINDING GLOBULIN: 27 NMOL/L (ref 11–80)
SODIUM BLD-SCNC: 141 MMOL/L (ref 135–144)
TESTOSTERONE FREE-NONMALE: 16.5 PG/ML (ref 47–244)
TESTOSTERONE TOTAL: 81 NG/DL (ref 220–1000)
THYROXINE, FREE: 1.18 NG/DL (ref 0.93–1.7)
TOTAL PROTEIN: 7.2 G/DL (ref 6.4–8.3)
TRIGL SERPL-MCNC: 82 MG/DL
TSH SERPL DL<=0.05 MIU/L-ACNC: 2.32 MIU/L (ref 0.3–5)
VLDLC SERPL CALC-MCNC: NORMAL MG/DL (ref 1–30)
WBC # BLD: 6.6 K/UL (ref 3.5–11.3)
WBC # BLD: ABNORMAL 10*3/UL

## 2021-06-19 PROCEDURE — 86376 MICROSOMAL ANTIBODY EACH: CPT

## 2021-06-19 PROCEDURE — 85025 COMPLETE CBC W/AUTO DIFF WBC: CPT

## 2021-06-19 PROCEDURE — 36415 COLL VENOUS BLD VENIPUNCTURE: CPT

## 2021-06-19 PROCEDURE — 84403 ASSAY OF TOTAL TESTOSTERONE: CPT

## 2021-06-19 PROCEDURE — 84270 ASSAY OF SEX HORMONE GLOBUL: CPT

## 2021-06-19 PROCEDURE — 84153 ASSAY OF PSA TOTAL: CPT

## 2021-06-19 PROCEDURE — 84439 ASSAY OF FREE THYROXINE: CPT

## 2021-06-19 PROCEDURE — 84443 ASSAY THYROID STIM HORMONE: CPT

## 2021-06-19 PROCEDURE — 80053 COMPREHEN METABOLIC PANEL: CPT

## 2021-06-19 PROCEDURE — 80061 LIPID PANEL: CPT

## 2021-06-21 ENCOUNTER — HOSPITAL ENCOUNTER (OUTPATIENT)
Dept: WOUND CARE | Age: 61
Discharge: HOME | End: 2021-06-21
Payer: COMMERCIAL

## 2021-06-21 VITALS
BODY MASS INDEX: 32.01 KG/M2 | WEIGHT: 236 LBS | TEMPERATURE: 97.5 F | DIASTOLIC BLOOD PRESSURE: 78 MMHG | HEART RATE: 80 BPM | SYSTOLIC BLOOD PRESSURE: 136 MMHG | RESPIRATION RATE: 18 BRPM
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
## 2021-06-21 LAB — THYROID PEROXIDASE (TPO) AB: <4 IU/ML (ref 0–25)

## 2021-06-21 PROCEDURE — 11043 DBRDMT MUSC&/FSCA 1ST 20/<: CPT

## 2021-06-21 PROCEDURE — 11042 DBRDMT SUBQ TIS 1ST 20SQCM/<: CPT

## 2021-06-21 PROCEDURE — 11043 DBRDMT MUSC&/FSCA 1ST 20/<: CPT | Performed by: PLASTIC SURGERY

## 2021-06-21 RX ORDER — LIDOCAINE HYDROCHLORIDE 20 MG/ML
JELLY TOPICAL ONCE
Status: COMPLETED | OUTPATIENT
Start: 2021-06-21 | End: 2021-06-21

## 2021-06-21 RX ORDER — LIDOCAINE 40 MG/G
CREAM TOPICAL ONCE
Status: CANCELLED | OUTPATIENT
Start: 2021-06-21 | End: 2021-06-21

## 2021-06-21 RX ORDER — LIDOCAINE HYDROCHLORIDE 20 MG/ML
JELLY TOPICAL ONCE
Status: CANCELLED | OUTPATIENT
Start: 2021-06-21 | End: 2021-06-21

## 2021-06-21 RX ORDER — CLOBETASOL PROPIONATE 0.5 MG/G
OINTMENT TOPICAL ONCE
Status: CANCELLED | OUTPATIENT
Start: 2021-06-21 | End: 2021-06-21

## 2021-06-21 RX ORDER — BACITRACIN ZINC AND POLYMYXIN B SULFATE 500; 1000 [USP'U]/G; [USP'U]/G
OINTMENT TOPICAL ONCE
Status: CANCELLED | OUTPATIENT
Start: 2021-06-21 | End: 2021-06-21

## 2021-06-21 RX ORDER — GINSENG 100 MG
CAPSULE ORAL ONCE
Status: CANCELLED | OUTPATIENT
Start: 2021-06-21 | End: 2021-06-21

## 2021-06-21 RX ORDER — GENTAMICIN SULFATE 1 MG/G
OINTMENT TOPICAL ONCE
Status: CANCELLED | OUTPATIENT
Start: 2021-06-21 | End: 2021-06-21

## 2021-06-21 RX ORDER — LIDOCAINE 50 MG/G
OINTMENT TOPICAL ONCE
Status: CANCELLED | OUTPATIENT
Start: 2021-06-21 | End: 2021-06-21

## 2021-06-21 RX ORDER — LIDOCAINE HYDROCHLORIDE 40 MG/ML
SOLUTION TOPICAL ONCE
Status: CANCELLED | OUTPATIENT
Start: 2021-06-21 | End: 2021-06-21

## 2021-06-21 RX ORDER — BETAMETHASONE DIPROPIONATE 0.05 %
OINTMENT (GRAM) TOPICAL ONCE
Status: CANCELLED | OUTPATIENT
Start: 2021-06-21 | End: 2021-06-21

## 2021-06-21 RX ORDER — BACITRACIN, NEOMYCIN, POLYMYXIN B 400; 3.5; 5 [USP'U]/G; MG/G; [USP'U]/G
OINTMENT TOPICAL ONCE
Status: CANCELLED | OUTPATIENT
Start: 2021-06-21 | End: 2021-06-21

## 2021-06-21 RX ADMIN — LIDOCAINE HYDROCHLORIDE 6 ML: 20 JELLY TOPICAL at 08:32

## 2021-06-21 ASSESSMENT — PAIN SCALES - GENERAL: PAINLEVEL_OUTOF10: 6

## 2021-06-21 NOTE — PROGRESS NOTES
Progress note     Chief Complaint   Patient presents with    Wound Check     L hand        HPI:   Hector Navas is a 61 y.o. male who presents with a dog bite on his left ear as well as multiple lacerations on both hands including the thumb and fingers. Patient dates that it occurred on Friday. Patient is here today for evaluation treatment. Patient states that part of the ear was removed during the bite. Patient is on antibiotics currently. Patient is a . Patient was referred from the emergency room. Patient is here for evaluation and treatment. Medications:     Current Outpatient Medications   Medication Sig Dispense Refill    omeprazole (PRILOSEC) 20 MG delayed release capsule TAKE 1 CAPSULE BY MOUTH ONE TIME A DAY 90 capsule 1    BELBUCA 300 MCG FILM Place 300 mcg inside cheek 2 times daily.  hydroCHLOROthiazide (MICROZIDE) 12.5 MG capsule 1 po qd 90 capsule 3    traZODone (DESYREL) 150 MG tablet TAKE ONE TABLET BY MOUTH EVERY EVENING 90 tablet 3    atorvastatin (LIPITOR) 40 MG tablet Take 1 tablet by mouth daily 90 tablet 3    metFORMIN (GLUCOPHAGE) 1000 MG tablet Take 1 tablet by mouth 2 times daily (with meals) 180 tablet 3    carvedilol (COREG) 12.5 MG tablet Take 1 tablet by mouth 2 times daily (with meals) 180 tablet 3    clopidogrel (PLAVIX) 75 MG tablet Take 1 tablet by mouth daily 90 tablet 3    meloxicam (MOBIC) 15 MG tablet Take 1 tablet by mouth daily 90 tablet 3    linagliptin (TRADJENTA) 5 MG tablet TAKE 1 TABLET BY MOUTH ONE TIME A DAY 90 tablet 3    testosterone cypionate (DEPOTESTOTERONE CYPIONATE) 200 MG/ML injection       Testosterone Cypionate 200 MG/ML KIT Inject 200 mg into the muscle every 30 days for 30 days.  please include syringes 1 kit 5    tiZANidine (ZANAFLEX) 4 MG tablet TAKE ONE TABLET BY MOUTH EVERY EVENING AS NEEDED FOR MUSCLE SPASMS 30 tablet 3    NEEDLE, DISP, 18 G 18G X 1\" MISC 1 Syringe by Does not apply route every 30 days 25 each 0    NEEDLE, DISP, 22 G 22G X 1-1/2\" MISC 1 Syringe by Does not apply route every 30 days 25 each 0    topiramate (TOPAMAX) 200 MG tablet       HYDROcodone-acetaminophen (NORCO)  MG per tablet Take 1 tablet by mouth every 6 hours as needed for Pain. Evin Mares Multiple Vitamin (MULTI VITAMIN DAILY PO) Take by mouth      nitroGLYCERIN (NITROSTAT) 0.4 MG SL tablet Place 0.4 mg under the tongue      aspirin 325 MG tablet Take 325 mg by mouth daily. No current facility-administered medications for this encounter. Allergies: Allergies   Allergen Reactions    Pcn [Penicillins] Other (See Comments)     Unknown rx     Review of Systems:   Constitutional: Negative for fever, chills, fatigue and unexpected weight change. HENT: Negative for hearing loss, sore throat and facial swelling. Eyes: Negative for pain and discharge. Respiratory: No history of sleep apnea. Cardiovascular: Patient with a history of CHF, coronary artery disease, cardiac myopathy, peripheral vascular disease. Patient with hypercholesterolemia, hyperlipidemia, hypertension, ischemic cardiomyopathy. Gastrointestinal: Negative for nausea, vomiting, diarrhea and constipation. Skin: Negative for pallor and rash. Neurological: Patient has chronic back pain. .   Hematological: Does not bruise/bleed easily. Psychiatric/Behavioral: Negative for behavioral problems. The patient is not nervous/anxious.       Past Medical History:   Diagnosis Date    Acquired trigger finger 6/5/2018    CAD (coronary artery disease)     [facility]/ involving coronary bypass graft of native heart with unstable angina pectoris    Cervical spondylosis     [facility]congest[facility] failure) ([facility]     Chronic back pain     on 11/19/18 pt states is presently in pain mgmnt    Contusion of elbow 6/5/2018    Contusion of knee 6/5/2018    Contusion of shoulder region 6/5/2018    Diabetic foot ulcer with osteomyelitis ([facility] [address] 1/9/2018    Diabetic ulcer of toe associated with type 2 diabetes mellitus, with fat layer exposed ([address] 1/31/2018    Hypercholesterolemia 12/5/2017    Hyperlipemia, mixed     Hypertension     Ischemic cardiomyopathy 5/7/2018    Non-pressure chronic ulcer of left lower leg with fat layer exposed ([address] 6/9/2014    Obesity     Obesity, Class III, BMI 40-49.9 (morbid obesity) ([address] 4/6/2015    Obstructive sleep apnea syndrome     Osteoarthritis of carpometacarpal (CMC) joint of thumb 6/5/2018    Osteoarthritis of knee 6/5/2018    Peripheral vascular disease ([address]     with venous stasis and ulcerations. Dr Pulliam Hidden Presence of coronary angioplasty implant and graft 9/11/2009    Sleep apnea     Dr Brent Mackay of knee and leg 6/5/2018    Sprain of shoulder and upper arm 6/5/2018    Type II or unspecified type diabetes mellitus without mention of complication, not stated as uncontrolled     Ulcer of left foot, with fat layer exposed ([address] 1/9/2018    Unspecified sleep apnea     Dr Johnie Morris CPAP    Venous insufficiency of both lower extremities 1/31/2018     Past Surgical History:   Procedure Laterality Date    CARPAL TUNNEL RELEASE Right 3/22/13    Dr. Chance Torres.     CORONARY ANGIOPLASTY WITH STENT PLACEMENT  2002,2006.2009    X 3 separately    CORONARY ARTERY BYPASS GRAFT  2/26/07     TOMA'sDr Razia (X 3)    NECK SURGERY  2010    cervical stenoses C5-C6-C7and partial T1 laminectomy with fusion of C6-7    OTHER SURGICAL HISTORY      wound care ulcers of legs (bilaterally) with Dr Ariela Vick  age 11    VEIN SURGERY      closure of peripherator veins of legs, Dr Nadege Trivedi History     Socioeconomic History    Marital status:      Spouse name: Not on file    Number of children: Not on file    Years of education: Not on file    Highest education level: Not on file   Occupational History    Not on file   Tobacco Use    Smoking status: Never Smoker  Smokeless tobacco: Never Used   Vaping Use    Vaping Use: Never used   Substance and Sexual Activity    Alcohol use: Not Currently     Comment: rare social, non past 5 months    Drug use: No    Sexual activity: Yes     Partners: Female     Comment: spouse   Other Topics Concern    Not on file   Social History Narrative    Not on file     Social Determinants of Health     Financial Resource Strain:     Difficulty of Paying Living Expenses:    Food Insecurity:     Worried About Running Out of Food in the Last Year:     Ran Out of Food in the Last Year:    Transportation Needs:     Lack of Transportation (Medical):  Lack of Transportation (Non-Medical):    Physical Activity:     Days of Exercise per Week:     Minutes of Exercise per Session:    Stress:     Feeling of Stress :    Social Connections:     Frequency of Communication with Friends and Family:     Frequency of Social Gatherings with Friends and Family:     Attends Alevism Services:     Active Member of Clubs or Organizations:     Attends Club or Organization Meetings:     Marital Status:    Intimate Partner Violence:     Fear of Current or Ex-Partner:     Emotionally Abused:     Physically Abused:     Sexually Abused:      Family History   Problem Relation Age of Onset    Cancer Mother         lung    Diabetes Father     Heart Disease Father     High Blood Pressure Father     Diabetes Brother     Heart Disease Brother     High Blood Pressure Brother      Physical Exam:   /78   Pulse 80   Temp 97.5 °F (36.4 °C) (Tympanic)   Resp 18   Wt 236 lb (107 kg)   BMI 32.01 kg/m²    Body mass index is 32.01 kg/m². Physical Exam   Nursing note and vitals reviewed. Constitutional: Oriented to person, place, and time. Appears well-developed and well-nourished. No distress. Head: Normocephalic and atraumatic. Eyes: Conjunctivae and EOM are normal.   Pulmonary/Chest: Effort normal. No respiratory distress. Devitalized Tissue Debrided:  necrotic/eschar    Pre Debridement Measurements:  Are located in the Wound/Ulcer Documentation Flow Sheet    Wound/Ulcer #: 1           Percent of Wound(s)/Ulcer(s) Debrided: 20%    Total Surface Area Debrided: 14.4 sq cm to the tendon. Of the left hand dorsum      Diabetic/Pressure/Non Pressure Ulcers only:  Ulcer: Non-Pressure ulcer, muscle necrosis      Estimated Blood Loss:  Minimal    Hemostasis Achieved:  by pressure    Procedural Pain:  1  / 10     Post Procedural Pain:  0 / 10     Response to treatment:  Well tolerated by patient. Imaging:   [unfilled]        Impression/Plan:      Diagnosis Orders   1.  Dog bite, initial encounter       Patient Active Problem List   Diagnosis    Hyperlipemia, mixed    Sleep apnea    Peripheral vascular disease ([address]    CAD (coronary artery disease)    [facility]    CTS (carpal tunnel syndrome)    Non-pressure chronic ulcer of left lower leg with fat layer exposed ([address]    Hypotension    Obesity, Class III, BMI 40-49.9 (morbid obesity) ([address]    Essential hypertension    DDD (degenerative disc disease), cervical    DJD (degenerative joint disease), cervical    Primary osteoarthritis involving multiple joints    Paresthesias in left hand    Uncontrolled type 2 diabetes mellitus without complication, without long-term current use of insulin    Coronary artery disease involving coronary bypass graft of native heart without angina pectoris    Sleep apnea    Cervical spondylosis    Obesity    Long term current use of antithrombotics/antiplatelets    BMI 88.9-61.4, adult ([address]    H/O cervical spine surgery    Long term (current) use of non-steroidal anti-inflammatories (nsaid)    Chronic prescription opiate use    Severe comorbid illness    Hx of cervical spine surgery    Chronic neck pain    Candidal balanitis    Cellulitis of third toe, left    Nail avulsion, toe, initial encounter    Cellulitis of second toe, right    Abnormal nuclear stress test    Abnormal stress test    Hypercholesterolemia    Diabetic foot ulcer with osteomyelitis (HCC)    Ulcer of left foot, with fat layer exposed ([address]    Acquired trigger finger    Contusion of elbow    Contusion of shoulder region    Contusion of knee    Degeneration of lumbar intervertebral disc    Diabetic ulcer of toe associated with type 2 diabetes mellitus, with fat layer exposed ([address]    History of coronary artery bypass graft x 3    Ischemic cardiomyopathy    Osteoarthritis of knee    Osteoarthritis of carpometacarpal (CMC) joint of thumb    Sprain of knee and leg    Sprain of shoulder and upper arm    Presence of coronary angioplasty implant and graft    Venous insufficiency of both lower extremities    Dog bite     Plan:    Place hydrogel over the exposed tendon, then place Blessing over the wound. Patient is to follow-up in 2 weeks.        Electronically signed by:  Vivianne Leventhal, MD 6/21/2021

## 2021-06-22 DIAGNOSIS — E11.69 TYPE 2 DIABETES MELLITUS WITH OTHER SPECIFIED COMPLICATION, WITHOUT LONG-TERM CURRENT USE OF INSULIN (HCC): ICD-10-CM

## 2021-06-22 NOTE — TELEPHONE ENCOUNTER
[facility] is calling to request a refill on the following medication(s):    Last Visit Date (If Applicable):  2/22/9831    Next Visit Date:    Visit date not found    Medication Request:  Requested Prescriptions     Pending Prescriptions Disp Refills    linagliptin (TRADJENTA) 5 MG tablet [Pharmacy Med Name: TRADJENTA 5MG TABS] 30 tablet 5     Sig: TAKE 1 TABLET BY MOUTH ONE TIME A DAY

## 2021-06-29 DIAGNOSIS — E34.9 HYPOTESTOSTERONEMIA: Primary | ICD-10-CM

## 2021-06-29 RX ORDER — NEEDLES, DISPOSABLE 25GX5/8"
1 NEEDLE, DISPOSABLE MISCELLANEOUS
Qty: 25 EACH | Refills: 1 | Status: SHIPPED | OUTPATIENT
Start: 2021-06-29

## 2021-07-12 ENCOUNTER — HOSPITAL ENCOUNTER (OUTPATIENT)
Dept: WOUND CARE | Age: 61
Discharge: HOME | End: 2021-07-12
Payer: COMMERCIAL

## 2021-07-12 VITALS
TEMPERATURE: 97.5 F | SYSTOLIC BLOOD PRESSURE: 135 MMHG | DIASTOLIC BLOOD PRESSURE: 74 MMHG | HEART RATE: 80 BPM | RESPIRATION RATE: 16 BRPM
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
## 2021-07-12 PROCEDURE — 11042 DBRDMT SUBQ TIS 1ST 20SQCM/<: CPT

## 2021-07-12 PROCEDURE — 11042 DBRDMT SUBQ TIS 1ST 20SQCM/<: CPT | Performed by: PLASTIC SURGERY

## 2021-07-12 RX ORDER — LIDOCAINE 50 MG/G
OINTMENT TOPICAL ONCE
Status: CANCELLED | OUTPATIENT
Start: 2021-07-12 | End: 2021-07-12

## 2021-07-12 RX ORDER — GINSENG 100 MG
CAPSULE ORAL ONCE
Status: CANCELLED | OUTPATIENT
Start: 2021-07-12 | End: 2021-07-12

## 2021-07-12 RX ORDER — LIDOCAINE HYDROCHLORIDE 20 MG/ML
JELLY TOPICAL ONCE
Status: COMPLETED | OUTPATIENT
Start: 2021-07-12 | End: 2021-07-12

## 2021-07-12 RX ORDER — LIDOCAINE HYDROCHLORIDE 20 MG/ML
JELLY TOPICAL ONCE
Status: CANCELLED | OUTPATIENT
Start: 2021-07-12 | End: 2021-07-12

## 2021-07-12 RX ORDER — LIDOCAINE 40 MG/G
CREAM TOPICAL ONCE
Status: CANCELLED | OUTPATIENT
Start: 2021-07-12 | End: 2021-07-12

## 2021-07-12 RX ORDER — CLOBETASOL PROPIONATE 0.5 MG/G
OINTMENT TOPICAL ONCE
Status: CANCELLED | OUTPATIENT
Start: 2021-07-12 | End: 2021-07-12

## 2021-07-12 RX ORDER — BACITRACIN ZINC AND POLYMYXIN B SULFATE 500; 1000 [USP'U]/G; [USP'U]/G
OINTMENT TOPICAL ONCE
Status: CANCELLED | OUTPATIENT
Start: 2021-07-12 | End: 2021-07-12

## 2021-07-12 RX ORDER — GENTAMICIN SULFATE 1 MG/G
OINTMENT TOPICAL ONCE
Status: CANCELLED | OUTPATIENT
Start: 2021-07-12 | End: 2021-07-12

## 2021-07-12 RX ORDER — BACITRACIN, NEOMYCIN, POLYMYXIN B 400; 3.5; 5 [USP'U]/G; MG/G; [USP'U]/G
OINTMENT TOPICAL ONCE
Status: CANCELLED | OUTPATIENT
Start: 2021-07-12 | End: 2021-07-12

## 2021-07-12 RX ORDER — BETAMETHASONE DIPROPIONATE 0.05 %
OINTMENT (GRAM) TOPICAL ONCE
Status: CANCELLED | OUTPATIENT
Start: 2021-07-12 | End: 2021-07-12

## 2021-07-12 RX ORDER — LIDOCAINE HYDROCHLORIDE 40 MG/ML
SOLUTION TOPICAL ONCE
Status: CANCELLED | OUTPATIENT
Start: 2021-07-12 | End: 2021-07-12

## 2021-07-12 RX ADMIN — LIDOCAINE HYDROCHLORIDE 6 ML: 20 JELLY TOPICAL at 08:53

## 2021-07-12 NOTE — PLAN OF CARE
Problem: Wound:  Goal: Will show signs of wound healing; wound closure and no evidence of infection  Description: Will show signs of wound healing; wound closure and no evidence of infection  Outcome: Ongoing     Problem: Pain:  Description: Pain management should include both nonpharmacologic and pharmacologic interventions. Goal: Pain level will decrease  Description: Pain level will decrease  Outcome: Ongoing  Goal: Control of acute pain  Description: Control of acute pain  Outcome: Ongoing  Goal: Control of chronic pain  Description: Control of chronic pain  Outcome: Ongoing     Problem: Pain:  Description: Pain management should include both nonpharmacologic and pharmacologic interventions.   Goal: Pain level will decrease  Description: Pain level will decrease  Outcome: Ongoing  Goal: Control of acute pain  Description: Control of acute pain  Outcome: Ongoing  Goal: Control of chronic pain  Description: Control of chronic pain  Outcome: Ongoing

## 2021-07-19 DIAGNOSIS — M62.838 MUSCLE SPASMS OF NECK: ICD-10-CM

## 2021-07-19 RX ORDER — TIZANIDINE 4 MG/1
TABLET ORAL
Qty: 14 TABLET | Refills: 3 | Status: SHIPPED | OUTPATIENT
Start: 2021-07-19 | End: 2022-01-17 | Stop reason: SDUPTHER

## 2021-07-19 NOTE — TELEPHONE ENCOUNTER
Patients wants to know if you can call him in a 14 day supply. Until his pharm can fill the other one .   Please advise

## 2021-07-29 ENCOUNTER — TELEPHONE (OUTPATIENT)
Dept: FAMILY MEDICINE CLINIC | Age: 61
End: 2021-07-29

## 2021-07-29 DIAGNOSIS — R05.9 COUGH: Primary | ICD-10-CM

## 2021-07-29 RX ORDER — AZITHROMYCIN 250 MG/1
250 TABLET, FILM COATED ORAL DAILY
Qty: 1 PACKET | Refills: 0 | Status: SHIPPED | OUTPATIENT
Start: 2021-07-29 | End: 2021-08-03

## 2021-07-29 NOTE — TELEPHONE ENCOUNTER
Pt c/o dry, raspy, cough, coughing up green brown phlegm and headache for a day and a half and wants to get ahead of the illness adn request abx be sent to [address] [place] [address].

## 2021-09-03 ENCOUNTER — HOSPITAL ENCOUNTER (OUTPATIENT)
Dept: MRI IMAGING | Facility: CLINIC | Age: 61
Discharge: HOME | End: 2021-09-05
Payer: COMMERCIAL

## 2021-09-03 DIAGNOSIS — M47.27 OSTEOARTHRITIS OF SPINE WITH RADICULOPATHY, LUMBOSACRAL REGION: ICD-10-CM

## 2021-09-03 PROCEDURE — 72148 MRI LUMBAR SPINE W/O DYE: CPT

## 2021-09-24 DIAGNOSIS — I10 ESSENTIAL HYPERTENSION: ICD-10-CM

## 2021-09-24 NOTE — TELEPHONE ENCOUNTER
Patient states that he is completely out of medication. Please advise.  Thank you      [facility] Rubalcava is calling to request a refill on the following medication(s):    Last Visit Date (If Applicable):      Next Visit Date:    Visit date not found    Medication Request:  Requested Prescriptions     Pending Prescriptions Disp Refills    hydroCHLOROthiazide (MICROZIDE) 12.5 MG capsule 90 capsule 3     Si po qd

## 2021-09-25 RX ORDER — HYDROCHLOROTHIAZIDE 12.5 MG/1
CAPSULE, GELATIN COATED ORAL
Qty: 90 CAPSULE | Refills: 3 | Status: SHIPPED | OUTPATIENT
Start: 2021-09-25 | End: 2022-04-18 | Stop reason: SDUPTHER

## 2021-12-02 ENCOUNTER — TELEMEDICINE (OUTPATIENT)
Dept: FAMILY MEDICINE CLINIC | Age: 61
End: 2021-12-02
Payer: COMMERCIAL

## 2021-12-02 DIAGNOSIS — E66.01 OBESITY, CLASS III, BMI 40-49.9 (MORBID OBESITY) (HCC): ICD-10-CM

## 2021-12-02 DIAGNOSIS — I10 ESSENTIAL HYPERTENSION: ICD-10-CM

## 2021-12-02 DIAGNOSIS — E11.69 TYPE 2 DIABETES MELLITUS WITH OTHER SPECIFIED COMPLICATION, WITHOUT LONG-TERM CURRENT USE OF INSULIN (HCC): Primary | ICD-10-CM

## 2021-12-02 PROCEDURE — 99213 OFFICE O/P EST LOW 20 MIN: CPT | Performed by: FAMILY MEDICINE

## 2021-12-02 RX ORDER — PHENTERMINE HYDROCHLORIDE 37.5 MG/1
37.5 TABLET ORAL
Qty: 30 TABLET | Refills: 0 | Status: SHIPPED | OUTPATIENT
Start: 2021-12-02 | End: 2021-12-30 | Stop reason: SDUPTHER

## 2021-12-02 ASSESSMENT — PATIENT HEALTH QUESTIONNAIRE - PHQ9
SUM OF ALL RESPONSES TO PHQ9 QUESTIONS 1 & 2: 0
2. FEELING DOWN, DEPRESSED OR HOPELESS: 0
1. LITTLE INTEREST OR PLEASURE IN DOING THINGS: 0
SUM OF ALL RESPONSES TO PHQ QUESTIONS 1-9: 0

## 2021-12-09 ENCOUNTER — TELEPHONE (OUTPATIENT)
Dept: FAMILY MEDICINE CLINIC | Age: 61
End: 2021-12-09

## 2021-12-09 DIAGNOSIS — R05.9 COUGH: Primary | ICD-10-CM

## 2021-12-09 RX ORDER — AZITHROMYCIN 250 MG/1
250 TABLET, FILM COATED ORAL DAILY
Qty: 1 PACKET | Refills: 0 | Status: SHIPPED | OUTPATIENT
Start: 2021-12-09 | End: 2021-12-14

## 2021-12-09 NOTE — TELEPHONE ENCOUNTER
Pt c/o cough, sneezing, wheezing, chest congestion, raspy voice, feels like he needs to cough up mucus but nothing comes up, pt requests Zpak be sent [facility]. Asked pt if hes been tested for COVID and Pt states \"This feels nothing like COVID. \"

## 2021-12-22 DIAGNOSIS — R10.10 UPPER ABDOMINAL PAIN: ICD-10-CM

## 2021-12-22 DIAGNOSIS — M15.9 PRIMARY OSTEOARTHRITIS INVOLVING MULTIPLE JOINTS: ICD-10-CM

## 2021-12-22 DIAGNOSIS — E78.2 HYPERLIPEMIA, MIXED: ICD-10-CM

## 2021-12-22 RX ORDER — ATORVASTATIN CALCIUM 40 MG/1
40 TABLET, FILM COATED ORAL DAILY
Qty: 90 TABLET | Refills: 3 | Status: SHIPPED | OUTPATIENT
Start: 2021-12-22 | End: 2022-03-24

## 2021-12-22 RX ORDER — OMEPRAZOLE 20 MG/1
CAPSULE, DELAYED RELEASE ORAL
Qty: 90 CAPSULE | Refills: 1 | Status: SHIPPED | OUTPATIENT
Start: 2021-12-22 | End: 2022-03-30 | Stop reason: SDUPTHER

## 2021-12-22 RX ORDER — CARVEDILOL 12.5 MG/1
12.5 TABLET ORAL 2 TIMES DAILY WITH MEALS
Qty: 180 TABLET | Refills: 3 | Status: SHIPPED | OUTPATIENT
Start: 2021-12-22 | End: 2022-05-26 | Stop reason: SDUPTHER

## 2021-12-22 RX ORDER — CLOPIDOGREL BISULFATE 75 MG/1
75 TABLET ORAL DAILY
Qty: 90 TABLET | Refills: 3 | Status: SHIPPED | OUTPATIENT
Start: 2021-12-22 | End: 2022-06-08 | Stop reason: SDUPTHER

## 2021-12-22 RX ORDER — MELOXICAM 15 MG/1
15 TABLET ORAL DAILY
Qty: 90 TABLET | Refills: 3 | Status: SHIPPED | OUTPATIENT
Start: 2021-12-22 | End: 2022-03-10 | Stop reason: SDUPTHER

## 2021-12-22 NOTE — TELEPHONE ENCOUNTER
[facility] is calling to request a refill on the following medication(s):    Last Visit Date (If Applicable):  93/3/3972    Next Visit Date:    12/30/2021    Medication Request:  Requested Prescriptions     Pending Prescriptions Disp Refills    omeprazole (PRILOSEC) 20 MG delayed release capsule [Pharmacy Med Name: OMEPRAZOL RX CAP 20MG] 90 capsule 1     Sig: TAKE 1 CAPSULE DAILY    carvedilol (COREG) 12.5 MG tablet 180 tablet 3     Sig: Take 1 tablet by mouth 2 times daily (with meals)    meloxicam (MOBIC) 15 MG tablet 90 tablet 3     Sig: Take 1 tablet by mouth daily    clopidogrel (PLAVIX) 75 MG tablet 90 tablet 3     Sig: Take 1 tablet by mouth daily    atorvastatin (LIPITOR) 40 MG tablet 90 tablet 3     Sig: Take 1 tablet by mouth daily

## 2021-12-30 ENCOUNTER — TELEMEDICINE (OUTPATIENT)
Dept: FAMILY MEDICINE CLINIC | Age: 61
End: 2021-12-30
Payer: COMMERCIAL

## 2021-12-30 DIAGNOSIS — R10.32 GROIN PAIN, LEFT: ICD-10-CM

## 2021-12-30 DIAGNOSIS — M25.552 CHRONIC HIP PAIN, LEFT: ICD-10-CM

## 2021-12-30 DIAGNOSIS — G89.29 CHRONIC HIP PAIN, LEFT: ICD-10-CM

## 2021-12-30 DIAGNOSIS — E66.01 OBESITY, CLASS III, BMI 40-49.9 (MORBID OBESITY) (HCC): ICD-10-CM

## 2021-12-30 DIAGNOSIS — G89.4 CHRONIC PAIN SYNDROME: Primary | ICD-10-CM

## 2021-12-30 PROCEDURE — 99214 OFFICE O/P EST MOD 30 MIN: CPT | Performed by: FAMILY MEDICINE

## 2021-12-30 RX ORDER — PHENTERMINE HYDROCHLORIDE 37.5 MG/1
37.5 TABLET ORAL
Qty: 30 TABLET | Refills: 0 | Status: SHIPPED | OUTPATIENT
Start: 2021-12-30 | End: 2022-01-27

## 2021-12-30 NOTE — PROGRESS NOTES
(DEPOTESTOTERONE CYPIONATE) 200 MG/ML injection   Historical Provider, MD   Testosterone Cypionate 200 MG/ML KIT Inject 200 mg into the muscle every 30 days for 30 days. please include syringes  Seema Hameed DO   NEEDLE, DISP, 18 G 18G X 1\" MISC 1 Syringe by Does not apply route every 30 days  Seema Hameed DO   NEEDLE, DISP, 22 G 22G X 1-1/2\" MISC 1 Syringe by Does not apply route every 30 days  Seema Hameed DO   topiramate (TOPAMAX) 200 MG tablet   Historical Provider, MD   HYDROcodone-acetaminophen (NORCO)  MG per tablet Take 1 tablet by mouth every 6 hours as needed for Pain. Jeff Higgins MD   Multiple Vitamin (MULTI VITAMIN DAILY PO) Take by mouth  Historical Provider, MD   nitroGLYCERIN (NITROSTAT) 0.4 MG SL tablet Place 0.4 mg under the tongue  Historical Provider, MD   aspirin 325 MG tablet Take 325 mg by mouth daily.     Historical Provider, MD       Social History     Tobacco Use    Smoking status: Never Smoker    Smokeless tobacco: Never Used   Vaping Use    Vaping Use: Never used   Substance Use Topics    Alcohol use: Not Currently     Comment: rare social, non past 5 months    Drug use: No        Allergies   Allergen Reactions    Pcn [Penicillins] Other (See Comments)     Unknown rx   ,   Past Medical History:   Diagnosis Date    Acquired trigger finger 6/5/2018    CAD (coronary artery disease)     NWOCC/ involving coronary bypass graft of native heart with unstable angina pectoris    Cervical spondylosis     CHF (congestive heart failure) (HCC)     Chronic back pain     on 11/19/18 pt states is presently in pain mgmnt    Contusion of elbow 6/5/2018    Contusion of knee 6/5/2018    Contusion of shoulder region 6/5/2018    Diabetic foot ulcer with osteomyelitis ([facility] 1/9/2018    Diabetic ulcer of toe associated with type 2 diabetes mellitus, with fat layer exposed ([facility] 1/31/2018    Hypercholesterolemia 12/5/2017    Hyperlipemia, mixed     Hypertension     Ischemic cardiomyopathy 5/7/2018    Non-pressure chronic ulcer of left lower leg with fat layer exposed ([address] 6/9/2014    Obesity     Obesity, Class III, BMI 40-49.9 (morbid obesity) ([address] 4/6/2015    Obstructive sleep apnea syndrome     Osteoarthritis of carpometacarpal (CMC) joint of thumb 6/5/2018    Osteoarthritis of knee 6/5/2018    Peripheral vascular disease ([address]     with venous stasis and ulcerations. Dr Beverly Tobar Presence of coronary angioplasty implant and graft 9/11/2009    Sleep apnea     Dr Tylor Sloan of knee and leg 6/5/2018    Sprain of shoulder and upper arm 6/5/2018    Type II or unspecified type diabetes mellitus without mention of complication, not stated as uncontrolled     Ulcer of left foot, with fat layer exposed ([address] 1/9/2018    Unspecified sleep apnea     Dr Nila Treviño CPAP    Venous insufficiency of both lower extremities 1/31/2018   ,   Past Surgical History:   Procedure Laterality Date    CARPAL TUNNEL RELEASE Right 3/22/13    Dr. Armond Boeck.     CORONARY ANGIOPLASTY WITH STENT PLACEMENT  2002,2006.2009    X 3 separately    CORONARY ARTERY BYPASS GRAFT  2/26/07    [facility], Dr Teresa Biggs (X 3)    NECK SURGERY  2010    cervical stenoses C5-C6-C7and partial T1 laminectomy with fusion of C6-7    OTHER SURGICAL HISTORY      wound care ulcers of legs (bilaterally) with Dr Chano Aly  age 11    VEIN SURGERY      closure of peripherator veins of legs, Dr Pete Vogt     ,   Social History     Tobacco Use    Smoking status: Never Smoker    Smokeless tobacco: Never Used   Vaping Use    Vaping Use: Never used   Substance Use Topics    Alcohol use: Not Currently     Comment: rare social, non past 5 months    Drug use: No   ,   Family History   Problem Relation Age of Onset    Cancer Mother         lung    Diabetes Father     Heart Disease Father     High Blood Pressure Father     Diabetes Brother     Heart Disease Brother     High Blood Pressure Brother        PHYSICAL EXAMINATION:  [ INSTRUCTIONS:  \"[x]\" Indicates a positive item  \"[]\" Indicates a negative item  -- DELETE ALL ITEMS NOT EXAMINED]  Vital Signs: (As obtained by patient/caregiver or practitioner observation)    Blood pressure-  Heart rate-    Respiratory rate-    Temperature-  Pulse oximetry-     Constitutional: [x] Appears well-developed and well-nourished [x] No apparent distress      [] Abnormal-   Mental status  [x] Alert and awake  [x] Oriented to person/place/time [x]Able to follow commands      Eyes:  EOM    [x]  Normal  [] Abnormal-  Sclera  [x]  Normal  [] Abnormal -         Discharge [x]  None visible  [] Abnormal -    HENT:   [x] Normocephalic, atraumatic. [] Abnormal   [x] Mouth/Throat: Mucous membranes are moist.     External Ears [x] Normal  [] Abnormal-     Neck: [x] No visualized mass     Pulmonary/Chest: [x] Respiratory effort normal.  [x] No visualized signs of difficulty breathing or respiratory distress        [] Abnormal-      Musculoskeletal:   [x] Normal gait with no signs of ataxia         [x] Normal range of motion of neck        [] Abnormal-       Neurological:        [x] No Facial Asymmetry (Cranial nerve 7 motor function) (limited exam to video visit)          [x] No gaze palsy        [] Abnormal-         Skin:        [x] No significant exanthematous lesions or discoloration noted on facial skin         [] Abnormal-            Psychiatric:       [x] Normal Affect [x] No Hallucinations        [] Abnormal-     Other pertinent observable physical exam findings-     ASSESSMENT/PLAN:   Diagnosis Orders   1. Chronic pain syndrome  External Referral To Pain Clinic   2. Groin pain, left  External Referral To Orthopedic Surgery   3. Chronic hip pain, left     4.  Obesity, Class III, BMI 40-49.9 (morbid obesity) ([facility])  phentermine (ADIPEX-P) 37.5 MG tablet      Orders Placed This Encounter   Procedures    External Referral To Orthopedic Surgery    External Referral To Pain Clinic     Requested Prescriptions     Signed Prescriptions Disp Refills    phentermine (ADIPEX-P) 37.5 MG tablet 30 tablet 0     Sig: Take 1 tablet by mouth every morning (before breakfast) for 30 days. No follow-ups on file. Aftab Storey is a 64 y.o. male being evaluated by a Virtual Visit (video visit) encounter to address concerns as mentioned above. A caregiver was present when appropriate. Due to this being a TeleHealth encounter (During RONTP-90 public health emergency), evaluation of the following organ systems was limited: Vitals/Constitutional/EENT/Resp/CV/GI//MS/Neuro/Skin/Heme-Lymph-Imm. Pursuant to the emergency declaration under the [address] [place] [address], [address] [place] [address] authority and the [employer] Act, this Virtual Visit was conducted with patient's (and/or legal guardian's) consent, to reduce the patient's risk of exposure to COVID-19 and provide necessary medical care. The patient (and/or legal guardian) has also been advised to contact this office for worsening conditions or problems, and seek emergency medical treatment and/or call 911 if deemed necessary. Patient identification was verified at the start of the visit: Yes    Total time spent on this encounter: Not billed by time    Services were provided through a video synchronous discussion virtually to substitute for in-person clinic visit. Patient and provider were located at their individual homes. --Danilo Espitia DO on 12/30/2021 at 9:03 AM    An electronic signature was used to authenticate this note.

## 2022-01-03 ENCOUNTER — TELEPHONE (OUTPATIENT)
Dept: FAMILY MEDICINE CLINIC | Age: 62
End: 2022-01-03

## 2022-01-03 DIAGNOSIS — G89.4 CHRONIC PAIN SYNDROME: Primary | ICD-10-CM

## 2022-01-03 DIAGNOSIS — G89.29 CHRONIC HIP PAIN, LEFT: ICD-10-CM

## 2022-01-03 DIAGNOSIS — R10.32 GROIN PAIN, LEFT: ICD-10-CM

## 2022-01-03 DIAGNOSIS — M25.552 CHRONIC HIP PAIN, LEFT: ICD-10-CM

## 2022-01-03 RX ORDER — OXYCODONE AND ACETAMINOPHEN 7.5; 325 MG/1; MG/1
1 TABLET ORAL EVERY 6 HOURS PRN
Qty: 120 TABLET | Refills: 0 | Status: SHIPPED | OUTPATIENT
Start: 2022-01-06 | End: 2022-01-27

## 2022-01-03 NOTE — TELEPHONE ENCOUNTER
Patient called stated that his pain management doctor canceled his appointment for today and didn't fill his medication. Patient states that his records are being sent to new pain management doctor but their office can't refill his medication until he is seen. States that Dr. Giovanny Luna is aware of this situation. Patient is requesting a refill on his pain medication. Please advise.  Thank you

## 2022-01-17 DIAGNOSIS — M62.838 MUSCLE SPASMS OF NECK: ICD-10-CM

## 2022-01-17 RX ORDER — TIZANIDINE 4 MG/1
TABLET ORAL
Qty: 14 TABLET | Refills: 3 | Status: SHIPPED | OUTPATIENT
Start: 2022-01-17 | End: 2022-01-20 | Stop reason: SDUPTHER

## 2022-01-17 NOTE — TELEPHONE ENCOUNTER
Sulma Rahman is calling to request a refill on the following medication(s):    Last Visit Date (If Applicable):  19    Next Visit Date:    2022    Medication Request:  Requested Prescriptions     Pending Prescriptions Disp Refills    tiZANidine (ZANAFLEX) 4 MG tablet 14 tablet 3     Si po qd    topiramate (TOPAMAX) 200 MG tablet 60 tablet

## 2022-01-20 DIAGNOSIS — M62.838 MUSCLE SPASMS OF NECK: ICD-10-CM

## 2022-01-20 RX ORDER — TIZANIDINE 4 MG/1
TABLET ORAL
Qty: 14 TABLET | Refills: 3 | Status: SHIPPED | OUTPATIENT
Start: 2022-01-20 | End: 2022-07-05 | Stop reason: SDUPTHER

## 2022-01-20 NOTE — TELEPHONE ENCOUNTER
Patient calling because he needs a short term script sent to [facility] he will be out of medication before mail delivery.  Please advise

## 2022-01-27 ENCOUNTER — TELEMEDICINE (OUTPATIENT)
Dept: FAMILY MEDICINE CLINIC | Age: 62
End: 2022-01-27
Payer: COMMERCIAL

## 2022-01-27 DIAGNOSIS — I10 ESSENTIAL HYPERTENSION: ICD-10-CM

## 2022-01-27 DIAGNOSIS — M25.552 CHRONIC HIP PAIN, LEFT: Primary | ICD-10-CM

## 2022-01-27 DIAGNOSIS — G89.4 CHRONIC PAIN SYNDROME: ICD-10-CM

## 2022-01-27 DIAGNOSIS — E66.01 OBESITY, CLASS III, BMI 40-49.9 (MORBID OBESITY) (HCC): ICD-10-CM

## 2022-01-27 DIAGNOSIS — E11.69 TYPE 2 DIABETES MELLITUS WITH OTHER SPECIFIED COMPLICATION, WITHOUT LONG-TERM CURRENT USE OF INSULIN (HCC): ICD-10-CM

## 2022-01-27 DIAGNOSIS — R10.32 GROIN PAIN, LEFT: ICD-10-CM

## 2022-01-27 DIAGNOSIS — G89.29 CHRONIC HIP PAIN, LEFT: Primary | ICD-10-CM

## 2022-01-27 PROCEDURE — 99213 OFFICE O/P EST LOW 20 MIN: CPT | Performed by: FAMILY MEDICINE

## 2022-01-27 RX ORDER — PHENTERMINE HYDROCHLORIDE 37.5 MG/1
37.5 TABLET ORAL
Qty: 30 TABLET | Refills: 0 | Status: SHIPPED | OUTPATIENT
Start: 2022-01-27 | End: 2022-07-14 | Stop reason: SDUPTHER

## 2022-01-27 NOTE — PROGRESS NOTES
2022    TELEHEALTH EVALUATION -- Audio/Visual (During NSZGS-28 public health emergency)    HPI:    Gisell Pac (:  1960) has requested an audio/video evaluation for the following concern(s):    Today with several issues he continues to have ongoing chronic pain he is can be seeing a new pain doctor but will need a bridge on his pain medicine until he gets then in March is not quite yet due  He continues to have miserable hip pain and needs to get into an orthopedic surgeon  He would like to see Dr. Angela Campos  Is continue to work on his weight loss and is down to 230 pounds  His diabetes is under control sugars are running around 100 fasting and his blood pressure is also very stable    Review of Systems    Prior to Visit Medications    Medication Sig Taking? Authorizing Provider   phentermine (ADIPEX-P) 37.5 MG tablet Take 1 tablet by mouth every morning (before breakfast) for 30 days. Yes Seema Hameed DO   topiramate (TOPAMAX) 200 MG tablet Take 1 tablet by mouth 2 times daily  Seema Hameed DO   tiZANidine (ZANAFLEX) 4 MG tablet 1 po qd  Seema Hameed DO   oxyCODONE-acetaminophen (ENDOCET) 7.5-325 MG per tablet Take 1 tablet by mouth every 6 hours as needed for Pain for up to 30 days.  Intended supply: 30 days  Seema Hameed DO   omeprazole (PRILOSEC) 20 MG delayed release capsule TAKE 1 CAPSULE DAILY  Seema Hameed DO   carvedilol (COREG) 12.5 MG tablet Take 1 tablet by mouth 2 times daily (with meals)  Seema Hameed DO   meloxicam (MOBIC) 15 MG tablet Take 1 tablet by mouth daily  Seema Hameed DO   clopidogrel (PLAVIX) 75 MG tablet Take 1 tablet by mouth daily  Seema Hameed DO   atorvastatin (LIPITOR) 40 MG tablet Take 1 tablet by mouth daily  Seema Hameed DO   hydroCHLOROthiazide (MICROZIDE) 12.5 MG capsule 1 po qd  Seema Hameed DO   NEEDLE, DISP, 22 G 22G X 1-1/2\" MISC 1 Device by Does not apply route every 30 days  Seema Hameed DO   NEEDLE, DISP, 18 G (BD DISP NEEDLES) 18G X 1-1/2\" MISC 1 Device by Does not apply route every 30 days  Seema Hameed DO   linagliptin (TRADJENTA) 5 MG tablet TAKE 1 TABLET BY MOUTH ONE TIME A DAY  Seema Hameed DO   BELBUCA 300 MCG FILM Place 300 mcg inside cheek 2 times daily. Historical Provider, MD   traZODone (DESYREL) 150 MG tablet TAKE ONE TABLET BY MOUTH EVERY EVENING  Seema Hameed DO   metFORMIN (GLUCOPHAGE) 1000 MG tablet Take 1 tablet by mouth 2 times daily (with meals)  Seema Hameed DO   testosterone cypionate (DEPOTESTOTERONE CYPIONATE) 200 MG/ML injection   Historical Provider, MD   Testosterone Cypionate 200 MG/ML KIT Inject 200 mg into the muscle every 30 days for 30 days. please include syringes  Seema Hameed DO   NEEDLE, DISP, 18 G 18G X 1\" MISC 1 Syringe by Does not apply route every 30 days  Seema Hameed DO   NEEDLE, DISP, 22 G 22G X 1-1/2\" MISC 1 Syringe by Does not apply route every 30 days  Seema Hameed DO   HYDROcodone-acetaminophen (NORCO)  MG per tablet Take 1 tablet by mouth every 6 hours as needed for Pain. Nano Torres MD   Multiple Vitamin (MULTI VITAMIN DAILY PO) Take by mouth  Historical Provider, MD   nitroGLYCERIN (NITROSTAT) 0.4 MG SL tablet Place 0.4 mg under the tongue  Historical Provider, MD   aspirin 325 MG tablet Take 325 mg by mouth daily.     Historical Provider, MD       Social History     Tobacco Use    Smoking status: Never Smoker    Smokeless tobacco: Never Used   Vaping Use    Vaping Use: Never used   Substance Use Topics    Alcohol use: Not Currently     Comment: rare social, non past 5 months    Drug use: No        Allergies   Allergen Reactions    Pcn [Penicillins] Other (See Comments)     Unknown rx   ,   Past Medical History:   Diagnosis Date    Acquired trigger finger 6/5/2018    CAD (coronary artery disease)     NWOCC/ involving coronary bypass graft of native heart with unstable angina pectoris    Cervical spondylosis     [facility]congestive heart failure) ([facility])     Chronic back pain on 11/19/18 pt states is presently in pain mgmnt    Contusion of elbow 6/5/2018    Contusion of knee 6/5/2018    Contusion of shoulder region 6/5/2018    Diabetic foot ulcer with osteomyelitis ([address] 1/9/2018    Diabetic ulcer of toe associated with type 2 diabetes mellitus, with fat layer exposed ([address] 1/31/2018    Hypercholesterolemia 12/5/2017    Hyperlipemia, mixed     Hypertension     Ischemic cardiomyopathy 5/7/2018    Non-pressure chronic ulcer of left lower leg with fat layer exposed ([address] 6/9/2014    Obesity     Obesity, Class III, BMI 40-49.9 (morbid obesity) ([address] 4/6/2015    Obstructive sleep apnea syndrome     Osteoarthritis of carpometacarpal (CMC) joint of thumb 6/5/2018    Osteoarthritis of knee 6/5/2018    Peripheral vascular disease ([address]     with venous stasis and ulcerations. Dr Elizabeth Saavedra Presence of coronary angioplasty implant and graft 9/11/2009    Sleep apnea     Dr Kamryn Ma of knee and leg 6/5/2018    Sprain of shoulder and upper arm 6/5/2018    Type II or unspecified type diabetes mellitus without mention of complication, not stated as uncontrolled     Ulcer of left foot, with fat layer exposed ([address] 1/9/2018    Unspecified sleep apnea     Dr Pyle Book CPAP    Venous insufficiency of both lower extremities 1/31/2018   ,   Past Surgical History:   Procedure Laterality Date    CARPAL TUNNEL RELEASE Right 3/22/13    Dr. Tamara Rao.     CORONARY ANGIOPLASTY WITH STENT PLACEMENT  2002,2006.2009    X 3 separately    CORONARY ARTERY BYPASS GRAFT  2/26/07    [facility], Dr Simona Tanner (X 3)    NECK SURGERY  2010    cervical stenoses C5-C6-C7and partial T1 laminectomy with fusion of C6-7    OTHER SURGICAL HISTORY      wound care ulcers of legs (bilaterally) with Dr Kayla Santiago  age 11    VEIN SURGERY      closure of peripherator veins of legs, Dr Uma Lynch     ,   Social History     Tobacco Use    Smoking status: Never Smoker    Smokeless tobacco: Never Used   Vaping Use    Vaping Use: Never used   Substance Use Topics    Alcohol use: Not Currently     Comment: rare social, non past 5 months    Drug use: No   ,   Family History   Problem Relation Age of Onset    Cancer Mother         lung    Diabetes Father     Heart Disease Father     High Blood Pressure Father     Diabetes Brother     Heart Disease Brother     High Blood Pressure Brother        PHYSICAL EXAMINATION:  [ INSTRUCTIONS:  \"[x]\" Indicates a positive item  \"[]\" Indicates a negative item  -- DELETE ALL ITEMS NOT EXAMINED]  Vital Signs: (As obtained by patient/caregiver or practitioner observation)    Blood pressure-  Heart rate-    Respiratory rate-    Temperature-  Pulse oximetry-     Constitutional: [x] Appears well-developed and well-nourished [x] No apparent distress      [] Abnormal-   Mental status  [x] Alert and awake  [x] Oriented to person/place/time [x]Able to follow commands      Eyes:  EOM    [x]  Normal  [] Abnormal-  Sclera  [x]  Normal  [] Abnormal -         Discharge [x]  None visible  [] Abnormal -    HENT:   [x] Normocephalic, atraumatic.   [] Abnormal   [x] Mouth/Throat: Mucous membranes are moist.     External Ears [x] Normal  [] Abnormal-     Neck: [x] No visualized mass     Pulmonary/Chest: [x] Respiratory effort normal.  [x] No visualized signs of difficulty breathing or respiratory distress        [] Abnormal-      Musculoskeletal:   [x] Normal gait with no signs of ataxia         [x] Normal range of motion of neck        [] Abnormal-       Neurological:        [x] No Facial Asymmetry (Cranial nerve 7 motor function) (limited exam to video visit)          [x] No gaze palsy        [] Abnormal-         Skin:        [x] No significant exanthematous lesions or discoloration noted on facial skin         [] Abnormal-            Psychiatric:       [x] Normal Affect [x] No Hallucinations        [] Abnormal-     Other pertinent observable physical exam findings-     ASSESSMENT/PLAN:   Diagnosis Orders   1. Chronic hip pain, left     2. Obesity, Class III, BMI 40-49.9 (morbid obesity) (HCC)  phentermine (ADIPEX-P) 37.5 MG tablet   3. Chronic pain syndrome     4. Groin pain, left     5. Type 2 diabetes mellitus with other specified complication, without long-term current use of insulin ([facility]     6. Essential hypertension        No orders of the defined types were placed in this encounter. Requested Prescriptions     Signed Prescriptions Disp Refills    phentermine (ADIPEX-P) 37.5 MG tablet 30 tablet 0     Sig: Take 1 tablet by mouth every morning (before breakfast) for 30 days. Refer to Dr. Humera Espinosa  Continue to lose weight  Follow-up with pain management  Continue on all other medications  No follow-ups on file. Jairo Lala is a 64 y.o. male being evaluated by a Virtual Visit (video visit) encounter to address concerns as mentioned above. A caregiver was present when appropriate. Due to this being a TeleHealth encounter (During [facility] public health emergency), evaluation of the following organ systems was limited: Vitals/Constitutional/EENT/Resp/CV/GI//MS/Neuro/Skin/Heme-Lymph-Imm. Pursuant to the emergency declaration under the [address] [place] [address], [address] [place] [address] authority and the [employer] Act, this Virtual Visit was conducted with patient's (and/or legal guardian's) consent, to reduce the patient's risk of exposure to COVID-19 and provide necessary medical care. The patient (and/or legal guardian) has also been advised to contact this office for worsening conditions or problems, and seek emergency medical treatment and/or call 911 if deemed necessary.      Patient identification was verified at the start of the visit: Yes    Total time spent on this encounter: Not billed by time    Services were provided through a video synchronous discussion virtually to substitute for in-person clinic visit. Patient and provider were located at their individual homes. --Vickie Babb DO on 1/27/2022 at 8:57 AM    An electronic signature was used to authenticate this note.

## 2022-02-01 DIAGNOSIS — M25.552 CHRONIC HIP PAIN, LEFT: ICD-10-CM

## 2022-02-01 DIAGNOSIS — G89.29 CHRONIC HIP PAIN, LEFT: ICD-10-CM

## 2022-02-01 DIAGNOSIS — R10.32 GROIN PAIN, LEFT: ICD-10-CM

## 2022-02-01 DIAGNOSIS — G89.4 CHRONIC PAIN SYNDROME: ICD-10-CM

## 2022-02-01 RX ORDER — OXYCODONE AND ACETAMINOPHEN 7.5; 325 MG/1; MG/1
1 TABLET ORAL EVERY 6 HOURS PRN
Qty: 120 TABLET | Refills: 0 | Status: SHIPPED | OUTPATIENT
Start: 2022-02-01 | End: 2022-03-03

## 2022-02-01 NOTE — TELEPHONE ENCOUNTER
Patient requested    Health Maintenance   Topic Date Due    Pneumococcal 0-64 years Vaccine (1 of 2 - PPSV23) Never done    Colon cancer screen colonoscopy  Never done    Shingles Vaccine (1 of 2) Never done    Diabetic retinal exam  09/28/2016    Diabetic foot exam  09/17/2019    Diabetic microalbuminuria test  10/07/2020    A1C test (Diabetic or Prediabetic)  06/01/2021    Flu vaccine (1) 09/01/2021    COVID-19 Vaccine (3 - Booster for Moderna series) 09/08/2021    Lipid screen  06/19/2022    Potassium monitoring  06/19/2022    Creatinine monitoring  06/19/2022    Depression Screen  01/27/2023    DTaP/Tdap/Td vaccine (5 - Td or Tdap) 12/03/2029    Hepatitis C screen  Completed    HIV screen  Completed    Hepatitis A vaccine  Aged Out    Hib vaccine  Aged Out    Meningococcal (ACWY) vaccine  Aged Out             (applicable per patient's age: Cancer Screenings, Depression Screening, Fall Risk Screening, Immunizations)    Hemoglobin A1C (%)   Date Value   06/01/2020 7.2   10/07/2019 5.8   02/04/2019 6.4     Microalb/Crt. Ratio (mcg/mg creat)   Date Value   10/07/2019 CANNOT BE CALCULATED     LDL Cholesterol (mg/dL)   Date Value   06/19/2021 71     AST (U/L)   Date Value   06/19/2021 12     ALT (U/L)   Date Value   06/19/2021 15     BUN (mg/dL)   Date Value   06/19/2021 21      (goal A1C is < 7)   (goal LDL is <100) need 30-50% reduction from baseline     BP Readings from Last 3 Encounters:   07/12/21 135/74   06/21/21 136/78   06/14/21 (!) 156/75    (goal /80)      All Future Testing planned in [facility]:  Lab Frequency Next Occurrence   MRI LUMBAR SPINE W WO CONTRAST Once 08/06/2021       Next Visit Date:  No future appointments.          Patient Active Problem List:     Hyperlipemia, mixed     Sleep apnea     Peripheral vascular disease (HCC)     CAD (coronary artery disease)     CHF (congestive heart failure) (HCC)     CTS (carpal tunnel syndrome)     Non-pressure chronic ulcer of left lower leg with fat layer exposed (HCC)     Hypotension     Obesity, Class III, BMI 40-49.9 (morbid obesity) (HCC)     Essential hypertension     DDD (degenerative disc disease), cervical     DJD (degenerative joint disease), cervical     Primary osteoarthritis involving multiple joints     Paresthesias in left hand     Uncontrolled type 2 diabetes mellitus without complication, without long-term current use of insulin     Coronary artery disease involving coronary bypass graft of native heart without angina pectoris     Sleep apnea     Cervical spondylosis     Obesity     Long term current use of antithrombotics/antiplatelets     BMI 91.1-81.9, adult ([address]     H/O cervical spine surgery     Long term (current) use of non-steroidal anti-inflammatories (nsaid)     Chronic prescription opiate use     Severe comorbid illness     Hx of cervical spine surgery     Chronic neck pain     Candidal balanitis     Cellulitis of third toe, left     Nail avulsion, toe, initial encounter     Cellulitis of second toe, right     Abnormal nuclear stress test     Abnormal stress test     Hypercholesterolemia     Diabetic foot ulcer with osteomyelitis ([address]     Ulcer of left foot, with fat layer exposed ([address]     Acquired trigger finger     Contusion of elbow     Contusion of shoulder region     Contusion of knee     Degeneration of lumbar intervertebral disc     Diabetic ulcer of toe associated with type 2 diabetes mellitus, with fat layer exposed ([address]     History of coronary artery bypass graft x 3     Ischemic cardiomyopathy     Osteoarthritis of knee     Osteoarthritis of carpometacarpal (CMC) joint of thumb     Sprain of knee and leg     Sprain of shoulder and upper arm     Presence of coronary angioplasty implant and graft     Venous insufficiency of both lower extremities     Dog bite

## 2022-02-04 ENCOUNTER — APPOINTMENT (OUTPATIENT)
Dept: GENERAL RADIOLOGY | Age: 62
End: 2022-02-04
Payer: COMMERCIAL

## 2022-02-04 ENCOUNTER — HOSPITAL ENCOUNTER (EMERGENCY)
Age: 62
Discharge: HOME | End: 2022-02-05
Attending: STUDENT IN AN ORGANIZED HEALTH CARE EDUCATION/TRAINING PROGRAM
Payer: COMMERCIAL

## 2022-02-04 VITALS
WEIGHT: 239 LBS | SYSTOLIC BLOOD PRESSURE: 130 MMHG | DIASTOLIC BLOOD PRESSURE: 71 MMHG | OXYGEN SATURATION: 97 % | BODY MASS INDEX: 32.37 KG/M2 | HEIGHT: 72 IN | RESPIRATION RATE: 18 BRPM | TEMPERATURE: 97.7 F | HEART RATE: 93 BPM

## 2022-02-04 DIAGNOSIS — S01.21XA LACERATION OF NOSE, INITIAL ENCOUNTER: ICD-10-CM

## 2022-02-04 DIAGNOSIS — S61.412A LACERATION OF LEFT HAND WITHOUT FOREIGN BODY, INITIAL ENCOUNTER: ICD-10-CM

## 2022-02-04 DIAGNOSIS — S61.411A LACERATION OF RIGHT HAND WITHOUT FOREIGN BODY, INITIAL ENCOUNTER: ICD-10-CM
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
## 2022-02-04 DIAGNOSIS — S01.312A LACERATION OF LEFT EARLOBE, INITIAL ENCOUNTER: ICD-10-CM

## 2022-02-04 LAB
ABSOLUTE EOS #: 0.23 K/UL (ref 0–0.44)
ABSOLUTE IMMATURE GRANULOCYTE: 0.03 K/UL (ref 0–0.3)
ABSOLUTE LYMPH #: 2.83 K/UL (ref 1.1–3.7)
ABSOLUTE MONO #: 0.76 K/UL (ref 0.1–1.2)
ANION GAP SERPL CALCULATED.3IONS-SCNC: 16 MMOL/L (ref 9–17)
BASOPHILS # BLD: 1 % (ref 0–2)
BASOPHILS ABSOLUTE: 0.06 K/UL (ref 0–0.2)
BUN BLDV-MCNC: 13 MG/DL (ref 8–23)
BUN/CREAT BLD: 14 (ref 9–20)
CALCIUM SERPL-MCNC: 9.6 MG/DL (ref 8.6–10.4)
CHLORIDE BLD-SCNC: 101 MMOL/L (ref 98–107)
CO2: 17 MMOL/L (ref 20–31)
CREAT SERPL-MCNC: 0.95 MG/DL (ref 0.7–1.2)
DIFFERENTIAL TYPE: NORMAL
EOSINOPHILS RELATIVE PERCENT: 2 % (ref 1–4)
GFR AFRICAN AMERICAN: >60 ML/MIN
GFR NON-AFRICAN AMERICAN: >60 ML/MIN
GFR SERPL CREATININE-BSD FRML MDRD: ABNORMAL ML/MIN/{1.73_M2}
GFR SERPL CREATININE-BSD FRML MDRD: ABNORMAL ML/MIN/{1.73_M2}
GLUCOSE BLD-MCNC: 246 MG/DL (ref 70–99)
HCT VFR BLD CALC: 43.7 % (ref 40.7–50.3)
HEMOGLOBIN: 14 G/DL (ref 13–17)
IMMATURE GRANULOCYTES: 0 %
LYMPHOCYTES # BLD: 25 % (ref 24–43)
MCH RBC QN AUTO: 28.7 PG (ref 25.2–33.5)
MCHC RBC AUTO-ENTMCNC: 32 G/DL (ref 28.4–34.8)
MCV RBC AUTO: 89.7 FL (ref 82.6–102.9)
MONOCYTES # BLD: 7 % (ref 3–12)
NRBC AUTOMATED: 0 PER 100 WBC
PDW BLD-RTO: 13.3 % (ref 11.8–14.4)
PLATELET # BLD: 211 K/UL (ref 138–453)
PLATELET ESTIMATE: NORMAL
PMV BLD AUTO: 9.2 FL (ref 8.1–13.5)
POTASSIUM SERPL-SCNC: 3.7 MMOL/L (ref 3.7–5.3)
RBC # BLD: 4.87 M/UL (ref 4.21–5.77)
RBC # BLD: NORMAL 10*6/UL
SEG NEUTROPHILS: 65 % (ref 36–65)
SEGMENTED NEUTROPHILS ABSOLUTE COUNT: 7.27 K/UL (ref 1.5–8.1)
SODIUM BLD-SCNC: 134 MMOL/L (ref 135–144)
WBC # BLD: 11.2 K/UL (ref 3.5–11.3)
WBC # BLD: NORMAL 10*3/UL

## 2022-02-04 PROCEDURE — 12004 RPR S/N/AX/GEN/TRK7.6-12.5CM: CPT

## 2022-02-04 PROCEDURE — 73110 X-RAY EXAM OF WRIST: CPT

## 2022-02-04 PROCEDURE — 85025 COMPLETE CBC W/AUTO DIFF WBC: CPT

## 2022-02-04 PROCEDURE — 99283 EMERGENCY DEPT VISIT LOW MDM: CPT

## 2022-02-04 PROCEDURE — 73130 X-RAY EXAM OF HAND: CPT

## 2022-02-04 PROCEDURE — 80048 BASIC METABOLIC PNL TOTAL CA: CPT

## 2022-02-04 PROCEDURE — 96365 THER/PROPH/DIAG IV INF INIT: CPT

## 2022-02-04 PROCEDURE — 12013 RPR F/E/E/N/L/M 2.6-5.0 CM: CPT

## 2022-02-04 PROCEDURE — 6360000002 HC RX W HCPCS: Performed by: STUDENT IN AN ORGANIZED HEALTH CARE EDUCATION/TRAINING PROGRAM

## 2022-02-04 PROCEDURE — 96375 TX/PRO/DX INJ NEW DRUG ADDON: CPT

## 2022-02-04 RX ORDER — FENTANYL CITRATE 50 UG/ML
50 INJECTION, SOLUTION INTRAMUSCULAR; INTRAVENOUS ONCE
Status: COMPLETED | OUTPATIENT
Start: 2022-02-04 | End: 2022-02-04

## 2022-02-04 RX ORDER — BUPIVACAINE HYDROCHLORIDE 5 MG/ML
30 INJECTION, SOLUTION EPIDURAL; INTRACAUDAL ONCE
Status: COMPLETED | OUTPATIENT
Start: 2022-02-04 | End: 2022-02-05

## 2022-02-04 RX ADMIN — FENTANYL CITRATE 50 MCG: 50 INJECTION, SOLUTION INTRAMUSCULAR; INTRAVENOUS at 23:22

## 2022-02-04 ASSESSMENT — PAIN SCALES - GENERAL: PAINLEVEL_OUTOF10: 8

## 2022-02-05 PROCEDURE — 2500000003 HC RX 250 WO HCPCS: Performed by: STUDENT IN AN ORGANIZED HEALTH CARE EDUCATION/TRAINING PROGRAM

## 2022-02-05 PROCEDURE — 6370000000 HC RX 637 (ALT 250 FOR IP): Performed by: STUDENT IN AN ORGANIZED HEALTH CARE EDUCATION/TRAINING PROGRAM

## 2022-02-05 PROCEDURE — 6360000002 HC RX W HCPCS: Performed by: STUDENT IN AN ORGANIZED HEALTH CARE EDUCATION/TRAINING PROGRAM

## 2022-02-05 RX ORDER — MORPHINE SULFATE 4 MG/ML
4 INJECTION, SOLUTION INTRAMUSCULAR; INTRAVENOUS ONCE
Status: COMPLETED | OUTPATIENT
Start: 2022-02-05 | End: 2022-02-05

## 2022-02-05 RX ORDER — OXYCODONE HYDROCHLORIDE AND ACETAMINOPHEN 5; 325 MG/1; MG/1
2 TABLET ORAL ONCE
Status: COMPLETED | OUTPATIENT
Start: 2022-02-05 | End: 2022-02-05

## 2022-02-05 RX ORDER — AMOXICILLIN AND CLAVULANATE POTASSIUM 875; 125 MG/1; MG/1
1 TABLET, FILM COATED ORAL 2 TIMES DAILY
Qty: 28 TABLET | Refills: 0 | Status: SHIPPED | OUTPATIENT
Start: 2022-02-05 | End: 2022-02-19

## 2022-02-05 RX ADMIN — CEFAZOLIN SODIUM 2000 MG: 10 INJECTION, POWDER, FOR SOLUTION INTRAVENOUS at 00:12

## 2022-02-05 RX ADMIN — Medication 4 MG: at 01:20

## 2022-02-05 RX ADMIN — OXYCODONE AND ACETAMINOPHEN 2 TABLET: 5; 325 TABLET ORAL at 05:34

## 2022-02-05 RX ADMIN — BUPIVACAINE HYDROCHLORIDE 150 MG: 5 INJECTION, SOLUTION EPIDURAL; INTRACAUDAL; PERINEURAL at 00:37

## 2022-02-05 ASSESSMENT — PAIN SCALES - GENERAL
PAINLEVEL_OUTOF10: 8
PAINLEVEL_OUTOF10: 8

## 2022-02-05 NOTE — ED PROVIDER NOTES
[facility]  Emergency Department Encounter     Pt Name: Alisa Jones  MRN: [id number]  [place] 1960  Date of evaluation: 2/5/22  PCP:  Jonny Vanessa DO    CHIEF COMPLAINT       Chief Complaint   Patient presents with    Animal Bite     dog bite       HISTORY OFPRESENT ILLNESS  (Location/Symptom, Timing/Onset, Context/Setting, Quality, Duration, Modifying Factors,Severity.)      Alisa Jones is a 64 y.o. male who presents with reported dog bite. Patient states he was attacked by a dog lives in the neighborhood and has attacked him before. He has followed up with plastic surgery previously after his previous dog bite. He states this evening he was attacked by this dog and has multiple lacerations to the hands and wrist as well as left ear and nose. Last tetanus vaccination 2019. Denies any loss of consciousness. Has normally been taking Plavix but is been off of this for the past week in preparation for a pain management back injection. Pain is severe. Hands, left ear, and nose.     PAST MEDICAL / SURGICAL / SOCIAL / FAMILY HISTORY      has a past medical history of Acquired trigger finger, CAD (coronary artery disease), Cervical spondylosis, CHF (congestive heart failure) (HCC), Chronic back pain, Contusion of elbow, Contusion of knee, Contusion of shoulder region, Diabetic foot ulcer with osteomyelitis ([address] Diabetic ulcer of toe associated with type 2 diabetes mellitus, with fat layer exposed ([address] Hypercholesterolemia, Hyperlipemia, mixed, Hypertension, Ischemic cardiomyopathy, Non-pressure chronic ulcer of left lower leg with fat layer exposed ([address] Obesity, Obesity, Class III, BMI 40-49.9 (morbid obesity) ([address] Obstructive sleep apnea syndrome, Osteoarthritis of carpometacarpal (CMC) joint of thumb, Osteoarthritis of knee, Peripheral vascular disease ([address] Presence of coronary angioplasty implant and graft, Sleep apnea, Sprain of knee and leg, Sprain of shoulder and upper arm, Type II or unspecified type diabetes mellitus without mention of complication, not stated as uncontrolled, Ulcer of left foot, with fat layer exposed ([address] Unspecified sleep apnea, and Venous insufficiency of both lower extremities. has a past surgical history that includes Neck surgery (2010); [place] tooth extraction; Coronary artery bypass graft (2/26/07); Vein Surgery; other surgical history; Coronary angioplasty with stent (2002,2006.2009); Tonsillectomy (age 11); and Carpal tunnel release (Right, 3/22/13). Social History     Socioeconomic History    Marital status:      Spouse name: Not on file    Number of children: Not on file    Years of education: Not on file    Highest education level: Not on file   Occupational History    Not on file   Tobacco Use    Smoking status: Never Smoker    Smokeless tobacco: Never Used   Vaping Use    Vaping Use: Never used   Substance and Sexual Activity    Alcohol use: Not Currently     Comment: rare social, non past 5 months    Drug use: No    Sexual activity: Yes     Partners: Female     Comment: spouse   Other Topics Concern    Not on file   Social History Narrative    Not on file     Social Determinants of Health     Financial Resource Strain:     Difficulty of Paying Living Expenses: Not on file   Food Insecurity:     Worried About Running Out of Food in the Last Year: Not on file    [place] of Food in the Last Year: Not on file   Transportation Needs:     Lack of Transportation (Medical): Not on file    Lack of Transportation (Non-Medical):  Not on file   Physical Activity:     Days of Exercise per Week: Not on file    Minutes of Exercise per Session: Not on file   Stress:     Feeling of Stress : Not on file   Social Connections:     Frequency of Communication with Friends and Family: Not on file    Frequency of Social Gatherings with Friends and Family: Not on file    Attends [employer] Services: Not on file   [employer] of Clubs or Organizations: Not on file    Attends Club or Organization Meetings: Not on file    Marital Status: Not on file   Intimate Partner Violence:     Fear of Current or Ex-Partner: Not on file    Emotionally Abused: Not on file    Physically Abused: Not on file    Sexually Abused: Not on file   Housing Stability:     Unable to Pay for Housing in the Last Year: Not on file    Number of [place] in the Last Year: Not on file    Unstable Housing in the Last Year: Not on file       Family History   Problem Relation Age of Onset    Cancer Mother         lung    Diabetes Father     Heart Disease Father     High Blood Pressure Father     Diabetes Brother     Heart Disease Brother     High Blood Pressure Brother        Allergies:  Pcn [penicillins]    Home Medications:  Prior to Admission medications    Medication Sig Start Date End Date Taking? Authorizing Provider   amoxicillin-clavulanate (AUGMENTIN) 875-125 MG per tablet Take 1 tablet by mouth 2 times daily for 14 days 2/5/22 2/19/22 Yes Matthew Pro, DO   oxyCODONE-acetaminophen (ENDOCET) 7.5-325 MG per tablet Take 1 tablet by mouth every 6 hours as needed for Pain for up to 30 days. Intended supply: 30 days 2/1/22 3/3/22  Seema Hameed,    phentermine (ADIPEX-P) 37.5 MG tablet Take 1 tablet by mouth every morning (before breakfast) for 30 days.  1/27/22 2/26/22  Seema Hameed, DO   topiramate (TOPAMAX) 200 MG tablet Take 1 tablet by mouth 2 times daily 1/20/22 2/19/22  Seema Hameed, DO   tiZANidine (ZANAFLEX) 4 MG tablet 1 po qd 1/20/22   Seema Hameed, DO   omeprazole (PRILOSEC) 20 MG delayed release capsule TAKE 1 CAPSULE DAILY 12/22/21   Seema Hameed, DO   carvedilol (COREG) 12.5 MG tablet Take 1 tablet by mouth 2 times daily (with meals) 12/22/21 3/22/22  Seema Hameed, DO   meloxicam (MOBIC) 15 MG tablet Take 1 tablet by mouth daily 12/22/21 3/22/22  Seema Hameed, DO   clopidogrel (PLAVIX) 75 MG tablet Take 1 tablet by mouth daily 12/22/21 3/22/22  Seema Hameed DO   atorvastatin (LIPITOR) 40 MG tablet Take 1 tablet by mouth daily 12/22/21 3/22/22  Seema Hameed DO   hydroCHLOROthiazide (MICROZIDE) 12.5 MG capsule 1 po qd 9/25/21   Seema Hameed, DO   NEEDLE, DISP, 22 G 22G X 1-1/2\" MISC 1 Device by Does not apply route every 30 days 6/29/21 7/29/21  Seema Hameed DO   NEEDLE, DISP, 18 G (BD DISP NEEDLES) 18G X 1-1/2\" MISC 1 Device by Does not apply route every 30 days 6/29/21   Seema Hameed DO   linagliptin (TRADJENTA) 5 MG tablet TAKE 1 TABLET BY MOUTH ONE TIME A DAY 6/22/21   Seema Hameed DO   traZODone (DESYREL) 150 MG tablet TAKE ONE TABLET BY MOUTH EVERY EVENING 5/4/21   Seema Hameed DO   metFORMIN (GLUCOPHAGE) 1000 MG tablet Take 1 tablet by mouth 2 times daily (with meals) 5/4/21   Seema Hameed DO   testosterone cypionate (DEPOTESTOTERONE CYPIONATE) 200 MG/ML injection  3/2/21   Historical Provider, MD   Testosterone Cypionate 200 MG/ML KIT Inject 200 mg into the muscle every 30 days for 30 days. please include syringes 3/2/21 5/14/21  Seema Hameed DO   NEEDLE, DISP, 18 G 18G X 1\" MISC 1 Syringe by Does not apply route every 30 days 9/22/20   Seema Hameed DO   NEEDLE, DISP, 22 G 22G X 1-1/2\" MISC 1 Syringe by Does not apply route every 30 days 9/22/20 5/14/21  Seema Hameed DO   Multiple Vitamin (MULTI VITAMIN DAILY PO) Take by mouth    Historical Provider, MD   nitroGLYCERIN (NITROSTAT) 0.4 MG SL tablet Place 0.4 mg under the tongue    Historical Provider, MD   aspirin 325 MG tablet Take 325 mg by mouth daily. Historical Provider, MD       REVIEW OF SYSTEMS    (2-9 systems for level 4, 10 or more for level 5)      Review of Systems   Constitutional: Negative for chills and fever. Eyes: Negative for discharge and redness. Respiratory: Negative for shortness of breath. Cardiovascular: Negative for chest pain. Gastrointestinal: Negative for abdominal pain. Genitourinary: Negative for dysuria. Musculoskeletal: Negative for arthralgias. Skin: Positive for wound. Allergic/Immunologic: Positive for environmental allergies. Neurological: Negative for headaches. Psychiatric/Behavioral: Negative for agitation and confusion. PHYSICAL EXAM   (up to 7 for level 4, 8 or more for level 5)     INITIAL VITALS:    height is 6' (1.829 m) and weight is 239 lb (108.4 kg). His oral temperature is 97.7 °F (36.5 °C). His blood pressure is 130/71 and his pulse is 93. His respiration is 18 and oxygen saturation is 97%. Physical Exam  Vitals and nursing note reviewed. Constitutional:       Appearance: He is well-developed. HENT:      Right Ear: Tympanic membrane normal.      Left Ear: Tympanic membrane normal.      Ears:      Comments: Partial avulsion of left ear at the [place]rag[place] starting inferiorly and running towards the superior one third. External auditory canal otherwise appearing intact. Nose:      Comments: Approximately 3 cm laceration to the left side of the nose running diagonally towards midline     Mouth/Throat:      Mouth: Mucous membranes are moist.   Eyes:      General: No scleral icterus. Conjunctiva/sclera: Conjunctivae normal.      Pupils: Pupils are equal, round, and reactive to light. Cardiovascular:      Rate and Rhythm: Normal rate and regular rhythm. Heart sounds: Normal heart sounds. No murmur heard. No friction rub. No gallop. Pulmonary:      Effort: Pulmonary effort is normal. No respiratory distress. Breath sounds: Normal breath sounds. No wheezing or rales. Musculoskeletal:      Comments: Multiple areas of lacerations. Multiple abrasions to bilateral forearms left small puncture wounds. Proximately 1 cm laceration to right dorsal thumb, approximately 2 cm stellate laceration to palmar surface of the right thenar eminence.  On both hands cap refill less than 2 seconds, PMS intact in all digits, good strength to opposition to all digits on right and left hands with no changes in sensation. Approximately 6 cm laceration to dorsal left wrist. Multiple scattered small 1 cm lacerations to bilateral hands. Skin:     General: Skin is warm and dry. Findings: No erythema or rash. Neurological:      Mental Status: He is alert and oriented to person, place, and time. Psychiatric:         Behavior: Behavior normal.         DIFFERENTIAL  DIAGNOSIS     PLAN (LABS / IMAGING / EKG):  Orders Placed This Encounter   Procedures    XR HAND LEFT (MIN 3 VIEWS)    XR WRIST LEFT (MIN 3 VIEWS)    XR HAND RIGHT (MIN 3 VIEWS)    CBC Auto Differential    Basic Metabolic Panel    Insert peripheral IV       MEDICATIONS ORDERED:  Orders Placed This Encounter   Medications    ceFAZolin (ANCEF) 2000 mg in dextrose 5 % 50 mL IVPB     Order Specific Question:   Antimicrobial Indications     Answer:   Skin and Soft Tissue Infection    fentaNYL (SUBLIMAZE) injection 50 mcg    bupivacaine (MARCAINE) 0.5 % injection 150 mg    morphine sulfate (PF) injection 4 mg    oxyCODONE-acetaminophen (PERCOCET) 5-325 MG per tablet 2 tablet    amoxicillin-clavulanate (AUGMENTIN) 875-125 MG per tablet     Sig: Take 1 tablet by mouth 2 times daily for 14 days     Dispense:  28 tablet     Refill:  0       DDX: Puncture wound versus animal bite    Initial MDM/Plan: 64 y.o. male who presents with reported dog bite. Unfortunate patient with multiple lacerations and partial avulsion of left ear. Will get x-rays confirm no fractures in the hand or wrist get basic labs. Tetanus up-to-date 2019. Pain control. Dose of Ancef. Patient does not appear to have any deficits or obvious tendon involvement with no decrease strength to opposition and no PMS changes. Imaging negative will plan for loose closure Augmentin 14 days and follow-up with plastic surgery.     DIAGNOSTIC RESULTS / EMERGENCY DEPARTMENT COURSE / MDM     LABS:  Labs Reviewed   BASIC METABOLIC PANEL - Abnormal; Notable for the following components:       Result Value    Glucose 246 (*)     Sodium 134 (*)     CO2 17 (*)     All other components within normal limits   CBC WITH AUTO DIFFERENTIAL         RADIOLOGY:  XR WRIST LEFT (MIN 3 VIEWS)   Final Result   No bony abnormality or radiopaque foreign body evident. Extensive soft tissue injury. XR HAND LEFT (MIN 3 VIEWS)   Final Result   No bony abnormality or radiopaque foreign body evident. Extensive soft tissue injury. XR HAND RIGHT (MIN 3 VIEWS)   Final Result   No acute bony abnormality      Moderate soft tissue swelling along the dorsum of the hand and mild   subcutaneous air scattered in the soft tissues dorsally and extending into   the palmar aspect of the hand which probably due to the recent penetrating   injury. Recommend clinical follow-up                  EMERGENCY DEPARTMENT COURSE:  As discussed has intact PMS with no visualized fractures. Reinforced the importance of taking antibiotics to completion. Laceration closed as described below. Discussed with him following up with his plastic surgeon approximately a week caution for signs of infection and encourage not to soak or place wounds in water for extended periods. Understanding voicing plan. As patient follows with pain management doctor does not want any narcotic prescriptions but did receive narcotics in emergency department for acute pain due to acute injury. · Based on the low acuity of concerning symptoms and improvement of symptoms, patient will be discharged with follow up and prescription information listed in the Disposition section. · Patient states they will follow-up with primary care physician and/or return to the emergency department should they experience a change or worsening of symptoms. · Patient will be discharged with resources: summary of visit, instructions, follow-up information, prescriptions if necessary. · Patient/ family instructed to read discharge paperwork.  All of their questions and concerns were addressed. · Suspicion for any acute life-threatening processes is low. Patient voices understanding of plan. PROCEDURES:  PROCEDURE NOTE - LACERATION CLOSURE    PATIENT NAME: Serena Merritt  MEDICAL RECORD NO. [id number]  DATE: 2/5/2022    PREOPERATIVE DIAGNOSIS: Laceration(s) as follows:  -Location: See physical exam  -Length:   See physical exam  -Layered closure: No    POSTOPERATIVE DIAGNOSIS:  Same  PROCEDURE PERFORMED:  Suture closure of laceration  PERFORMING PHYSICIAN: Myriam Victoria DO  ANESTHESIA:  Local utilizing bupivacaine 0.5%  ESTIMATED BLOOD LOSS:  Less than 25 ml. DISCUSSION:  Serena Merritt is a 64y.o.-year-old male. Patient requires laceration repair. The history and physical examination were reviewed and confirmed. CONSENT: The patient provided verbal consent for this procedure. PROCEDURE:  Prior to starting, the procedure and patient were confirmed by those present. The wound area was irrigated with sterile saline, cleansed with povidone iodine and draped in a sterile fashion. The wound area was anesthetized with bupivacaine 0.5% total usage of 150 mg. Attention initially turned to right hand. All gaping lacerations loosely approximated. 2 cm stellate laceration repaired with multiple sutures. No nailbed involvement visualized. Left wrist with large laceration over extensor surface approximately 6 cm. repaired with multiple sutures. All repair on bilateral hands done with 4-0 Ethilon approximately 24 simple interrupted sutures to bilateral hands. No tendon lacerations or foreign bodies visualized. Attention then turned to left ear which was avulsed for approximately two thirds of the ear. Approximately 10 dissolvable Vicryl Rapide 5-0 sutures (no 6-0 available) were placed approximating the left ear. Does appear to have good cap refill and sensation to partially avulsed section. Attention then turned to nasal laceration. No septal hematoma. Approximately 6 5-0 Ethilon sutures were placed. Patient tolerated multiple and extensive complex laceration closure extremely well. All sponge, instrument and needle counts were correct at the completion of the procedure. The patient tolerated the procedure well. SUTURE COUNT: See above    COMPLICATIONS:  None     Oneida Romero DO  11:44 PM, 2/5/22          CONSULTS:  None    CRITICAL CARE:  Due to the high probability of sudden and clinically significant deterioration in the patient's condition patient required highest level of my preparedness to intervene urgently. I provided critical care time including documentation time, medication orders and management, reevaluation, vital sign assessment, ordering and reviewing of of lab tests ordering and reviewing of x-ray studies, and admission orders. Aggregate critical care time is 15 minutes including only time during which I was engaged in work directly related to patient care and did not include time spent treating other patients simultaneously. FINAL IMPRESSION      1. Dog bite, initial encounter    2. Laceration of right hand without foreign body, initial encounter    3. Laceration of left hand without foreign body, initial encounter    4. Laceration of left earlobe, initial encounter    5.  Laceration of nose, initial encounter          DISPOSITION / PLAN     DISPOSITION Decision To Discharge 02/05/2022 06:30:03 AM        PATIENTREFERRED TO:  Christian Stephens MD  [address] [facility]  [address]  [phone]    Schedule an appointment as soon as possible for a visit in 1 week  For suture removal, For wound re-check    Geoffrey Sesay DO  [address]  [place] [address]    Schedule an appointment as soon as possible for a visit in 1 week  As needed      DISCHARGE MEDICATIONS:  Discharge Medication List as of 2/5/2022  6:33 AM      START taking these medications    Details amoxicillin-clavulanate (AUGMENTIN) 875-125 MG per tablet Take 1 tablet by mouth 2 times daily for 14 days, Disp-28 tablet, R-0Print             Javid Hester,   EmergencyMedicine Attending    (Please note that portions of this note were completed with a voice recognition program.  Efforts were made to edit the dictations but occasionally words are mis-transcribed.)       Javid Hester DO  02/06/22 0000

## 2022-02-05 NOTE — ED NOTES
Report given to Clarissa Nair RN. All questions answered at this time.      Yuri Hargrove RN  02/05/22 [id number]

## 2022-02-05 NOTE — ED NOTES
Pt came in c/o several lacerations and abrasions caused by two dogs. Pt has a laceration to the nose which bleeding has slowed on, two abrasions to the left maxilla, the left ear is nearly severed but no longer bleeding at this time, hands bilaterally have multiple bite wounds and lacerations which are actively bleeding at this time. Additionally, there are open wounds to the posterior aspect of the left forearm. Pt is alert and oriented, states pain mostly to his hands.       Roxanna Strong RN  02/05/22 0000

## 2022-02-05 NOTE — ED NOTES
Pt given basin with betadine and sterile water solution to soak hands in. Pt alternated soaking hands for roughly twenty minutes. Dr. Jefferey Collet stated pt could complete this treatment at this point.      Amy Mullen RN  02/05/22 1140

## 2022-02-06 ASSESSMENT — ENCOUNTER SYMPTOMS
EYE DISCHARGE: 0
SHORTNESS OF BREATH: 0
EYE REDNESS: 0
ABDOMINAL PAIN: 0
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
## 2022-02-10 ENCOUNTER — HOSPITAL ENCOUNTER (OUTPATIENT)
Dept: WOUND CARE | Age: 62
Discharge: HOME | End: 2022-02-10
Payer: COMMERCIAL

## 2022-02-10 VITALS
DIASTOLIC BLOOD PRESSURE: 91 MMHG | HEART RATE: 103 BPM | WEIGHT: 239 LBS | BODY MASS INDEX: 32.37 KG/M2 | HEIGHT: 72 IN | TEMPERATURE: 97.3 F | SYSTOLIC BLOOD PRESSURE: 164 MMHG
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
## 2022-02-10 DIAGNOSIS — S61.452D: ICD-10-CM

## 2022-02-10 DIAGNOSIS — S61.451D: ICD-10-CM
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
## 2022-02-10 DIAGNOSIS — S01.352D: Primary | ICD-10-CM
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
## 2022-02-10 DIAGNOSIS — S01.25XA OPEN WOUND OF NOSE DUE TO DOG BITE: ICD-10-CM

## 2022-02-10 PROBLEM — S01.352A: Status: ACTIVE | Noted: 2022-02-10

## 2022-02-10 PROBLEM — M75.40 IMPINGEMENT SYNDROME OF SHOULDER REGION: Status: ACTIVE | Noted: 2020-11-05

## 2022-02-10 PROCEDURE — 87176 TISSUE HOMOGENIZATION CULTR: CPT

## 2022-02-10 PROCEDURE — 99213 OFFICE O/P EST LOW 20 MIN: CPT

## 2022-02-10 PROCEDURE — 87205 SMEAR GRAM STAIN: CPT

## 2022-02-10 PROCEDURE — 11042 DBRDMT SUBQ TIS 1ST 20SQCM/<: CPT

## 2022-02-10 PROCEDURE — 11042 DBRDMT SUBQ TIS 1ST 20SQCM/<: CPT | Performed by: NURSE PRACTITIONER

## 2022-02-10 PROCEDURE — 87070 CULTURE OTHR SPECIMN AEROBIC: CPT

## 2022-02-10 PROCEDURE — 87075 CULTR BACTERIA EXCEPT BLOOD: CPT

## 2022-02-10 PROCEDURE — 86403 PARTICLE AGGLUT ANTBDY SCRN: CPT

## 2022-02-10 RX ORDER — LIDOCAINE 40 MG/G
CREAM TOPICAL ONCE
Status: CANCELLED | OUTPATIENT
Start: 2022-02-10 | End: 2022-02-10

## 2022-02-10 RX ORDER — CLOBETASOL PROPIONATE 0.5 MG/G
OINTMENT TOPICAL ONCE
Status: CANCELLED | OUTPATIENT
Start: 2022-02-10 | End: 2022-02-10

## 2022-02-10 RX ORDER — BETAMETHASONE DIPROPIONATE 0.05 %
OINTMENT (GRAM) TOPICAL ONCE
Status: CANCELLED | OUTPATIENT
Start: 2022-02-10 | End: 2022-02-10

## 2022-02-10 RX ORDER — GENTAMICIN SULFATE 1 MG/G
OINTMENT TOPICAL ONCE
Status: CANCELLED | OUTPATIENT
Start: 2022-02-10 | End: 2022-02-10

## 2022-02-10 RX ORDER — LIDOCAINE HYDROCHLORIDE 20 MG/ML
JELLY TOPICAL ONCE
Status: CANCELLED | OUTPATIENT
Start: 2022-02-10 | End: 2022-02-10

## 2022-02-10 RX ORDER — GINSENG 100 MG
CAPSULE ORAL ONCE
Status: CANCELLED | OUTPATIENT
Start: 2022-02-10 | End: 2022-02-10

## 2022-02-10 RX ORDER — LIDOCAINE HYDROCHLORIDE 20 MG/ML
JELLY TOPICAL ONCE
Status: COMPLETED | OUTPATIENT
Start: 2022-02-10 | End: 2022-02-10

## 2022-02-10 RX ORDER — BACITRACIN, NEOMYCIN, POLYMYXIN B 400; 3.5; 5 [USP'U]/G; MG/G; [USP'U]/G
OINTMENT TOPICAL ONCE
Status: CANCELLED | OUTPATIENT
Start: 2022-02-10 | End: 2022-02-10

## 2022-02-10 RX ORDER — BACITRACIN ZINC AND POLYMYXIN B SULFATE 500; 1000 [USP'U]/G; [USP'U]/G
OINTMENT TOPICAL ONCE
Status: CANCELLED | OUTPATIENT
Start: 2022-02-10 | End: 2022-02-10

## 2022-02-10 RX ORDER — LIDOCAINE HYDROCHLORIDE 40 MG/ML
SOLUTION TOPICAL ONCE
Status: CANCELLED | OUTPATIENT
Start: 2022-02-10 | End: 2022-02-10

## 2022-02-10 RX ORDER — LIDOCAINE 50 MG/G
OINTMENT TOPICAL ONCE
Status: CANCELLED | OUTPATIENT
Start: 2022-02-10 | End: 2022-02-10

## 2022-02-10 RX ADMIN — LIDOCAINE HYDROCHLORIDE 6 ML: 20 JELLY TOPICAL at 09:24

## 2022-02-10 ASSESSMENT — PAIN DESCRIPTION - PAIN TYPE: TYPE: CHRONIC PAIN

## 2022-02-10 ASSESSMENT — PAIN SCALES - GENERAL: PAINLEVEL_OUTOF10: 10

## 2022-02-10 ASSESSMENT — PAIN DESCRIPTION - ORIENTATION: ORIENTATION: RIGHT;LEFT

## 2022-02-10 ASSESSMENT — PAIN DESCRIPTION - LOCATION: LOCATION: ARM

## 2022-02-10 NOTE — PROGRESS NOTES
[address]   Progress Note and Procedure Note      Eliot Sky  MEDICAL RECORD NUMBER:  [id number]  AGE: 64 y.o. GENDER: male  : 1960  EPISODE DATE:  2/10/2022    Subjective:     Chief Complaint   Patient presents with    Wound Check     BUE, face         HISTORY of PRESENT ILLNESS HPI     Eliot Sky is a 64 y.o. male who presents today for wound/ulcer evaluation. History of Wound Context: presents today for evaluation of multiple dog bite wounds to bilateral arms, hands, nose, and left ear. He was bitten by dog on 2022. Was seen that night at [facility] ER, some of the wounds were sutured, those sutures are intact. Has some drainage from hand wounds, culture taken. He is taking doxycycline which was prescribed by ER. Some erythema and swelling to hands - patient reports this is getting better.    Wound/Ulcer Pain Timing/Severity: intermittent  Quality of pain: aching  Severity:  3 / 10   Modifying Factors: None  Associated Signs/Symptoms: none    Ulcer Identification:  Ulcer Type: dog bite  Contributing Factors: diabetes and obesity    Wound: N/A        PAST MEDICAL HISTORY        Diagnosis Date    Acquired trigger finger 2018    CAD (coronary artery disease)     [facility]/ involving coronary bypass graft of native heart with unstable angina pectoris    Cervical spondylosis     CH[facility]congest[facility] failure) [facility]     Chronic back pain     on 18 pt states is presently in pain mgmnt    Contusion of elbow 2018    Contusion of knee 2018    Contusion of shoulder region 2018    Diabetic foot ulcer with osteomyelitis ([address] 2018    Diabetic ulcer of toe associated with type 2 diabetes mellitus, with fat layer exposed ([address] 2018    Hypercholesterolemia 2017    Hyperlipemia, mixed     Hypertension     Ischemic cardiomyopathy 2018    Non-pressure chronic ulcer of left lower leg with fat layer exposed ([address] 2014    Obesity     Obesity, Class III, BMI 40-49.9 (morbid obesity) ([facility] 4/6/2015    Obstructive sleep apnea syndrome     Osteoarthritis of carpometacarpal (CMC) joint of thumb 6/5/2018    Osteoarthritis of knee 6/5/2018    Peripheral vascular disease ([facility]     with venous stasis and ulcerations. Dr Janina Alegria Presence of coronary angioplasty implant and graft 9/11/2009    Sleep apnea     Dr Gooden Coad of knee and leg 6/5/2018    Sprain of shoulder and upper arm 6/5/2018    Type II or unspecified type diabetes mellitus without mention of complication, not stated as uncontrolled     Ulcer of left foot, with fat layer exposed ([facility] 1/9/2018    Unspecified sleep apnea     Dr Sharmaine Meyer CPAP    Venous insufficiency of both lower extremities 1/31/2018       PAST SURGICAL HISTORY    Past Surgical History:   Procedure Laterality Date    CARPAL TUNNEL RELEASE Right 3/22/13    Dr. Andre Garcia.     CORONARY ANGIOPLASTY WITH STENT PLACEMENT  2002,2006.2009    X 3 separately    [facility]    NECK SURGERY  2010    cervical stenoses C5-C6-C7and partial T1 laminectomy with fusion of C6-7    OTHER SURGICAL HISTORY      wound care ulcers of legs (bilaterally) with Dr Sveta Zayas  age 11    VEIN SURGERY      closure of peripherator veins of legs, Dr Cathie Coto History   Problem Relation Age of Onset    Cancer Mother         lung    Diabetes Father     Heart Disease Father     High Blood Pressure Father     Diabetes Brother     Heart Disease Brother     High Blood Pressure Brother        SOCIAL HISTORY    Social History     Tobacco Use    Smoking status: Never Smoker    Smokeless tobacco: Never Used   Vaping Use    Vaping Use: Never used   Substance Use Topics    Alcohol use: Not Currently     Comment: rare social, non past 5 months    Drug use: No       ALLERGIES    Allergies Allergen Reactions    Pcn [Penicillins] Other (See Comments)     Unknown rx       MEDICATIONS    Current Outpatient Medications on File Prior to Encounter   Medication Sig Dispense Refill    amoxicillin-clavulanate (AUGMENTIN) 875-125 MG per tablet Take 1 tablet by mouth 2 times daily for 14 days 28 tablet 0    oxyCODONE-acetaminophen (ENDOCET) 7.5-325 MG per tablet Take 1 tablet by mouth every 6 hours as needed for Pain for up to 30 days. Intended supply: 30 days 120 tablet 0    phentermine (ADIPEX-P) 37.5 MG tablet Take 1 tablet by mouth every morning (before breakfast) for 30 days. 30 tablet 0    topiramate (TOPAMAX) 200 MG tablet Take 1 tablet by mouth 2 times daily 60 tablet 5    tiZANidine (ZANAFLEX) 4 MG tablet 1 po qd 14 tablet 3    omeprazole (PRILOSEC) 20 MG delayed release capsule TAKE 1 CAPSULE DAILY 90 capsule 1    carvedilol (COREG) 12.5 MG tablet Take 1 tablet by mouth 2 times daily (with meals) 180 tablet 3    meloxicam (MOBIC) 15 MG tablet Take 1 tablet by mouth daily 90 tablet 3    clopidogrel (PLAVIX) 75 MG tablet Take 1 tablet by mouth daily 90 tablet 3    atorvastatin (LIPITOR) 40 MG tablet Take 1 tablet by mouth daily 90 tablet 3    hydroCHLOROthiazide (MICROZIDE) 12.5 MG capsule 1 po qd 90 capsule 3    NEEDLE, DISP, 22 G 22G X 1-1/2\" MISC 1 Device by Does not apply route every 30 days 25 each 1    NEEDLE, DISP, 18 G (BD DISP NEEDLES) 18G X 1-1/2\" MISC 1 Device by Does not apply route every 30 days 25 each 1    linagliptin (TRADJENTA) 5 MG tablet TAKE 1 TABLET BY MOUTH ONE TIME A DAY 30 tablet 5    traZODone (DESYREL) 150 MG tablet TAKE ONE TABLET BY MOUTH EVERY EVENING 90 tablet 3    metFORMIN (GLUCOPHAGE) 1000 MG tablet Take 1 tablet by mouth 2 times daily (with meals) 180 tablet 3    testosterone cypionate (DEPOTESTOTERONE CYPIONATE) 200 MG/ML injection       Testosterone Cypionate 200 MG/ML KIT Inject 200 mg into the muscle every 30 days for 30 days.  please include syringes 1 kit 5    NEEDLE, DISP, 18 G 18G X 1\" MISC 1 Syringe by Does not apply route every 30 days 25 each 0    NEEDLE, DISP, 22 G 22G X 1-1/2\" MISC 1 Syringe by Does not apply route every 30 days 25 each 0    Multiple Vitamin (MULTI VITAMIN DAILY PO) Take by mouth      nitroGLYCERIN (NITROSTAT) 0.4 MG SL tablet Place 0.4 mg under the tongue      aspirin 325 MG tablet Take 325 mg by mouth daily. No current facility-administered medications on file prior to encounter.        REVIEW OF SYSTEMS    Constitutional: negative  Eyes: negative  Ears, nose, mouth, throat, and face: negative except for left ear and nose wounds with intact sutures  Respiratory: negative  Cardiovascular: negative  Gastrointestinal: negative  Genitourinary:negative  Integument/breast: negative except for bilateral arm, hand, left ear and nose wounds  Hematologic/lymphatic: negative  Musculoskeletal:negative  Neurological: negative  Behavioral/Psych: negative  Endocrine: negative  Allergic/Immunologic: negative    Objective:      BP (!) 164/91   Pulse 103   Temp 97.3 °F (36.3 °C) (Tympanic)   Ht 6' (1.829 m)   Wt 239 lb (108.4 kg)   BMI 32.41 kg/m²     Wt Readings from Last 3 Encounters:   02/10/22 239 lb (108.4 kg)   02/04/22 239 lb (108.4 kg)   06/21/21 236 lb (107 kg)       PHYSICAL EXAM    General Appearance: alert and oriented to person, place and time, well-developed and well-nourished, in no acute distress  Skin: warm and dry, no rash or erythema  Head: normocephalic and atraumatic  Eyes: pupils equal, round, extraocular eye movements intact, and conjunctivae normal  Pulmonary/Chest: normal air movement, no respiratory distress  Extremities: no cyanosis and no clubbing or edema   Musculoskeletal: no joint swelling, deformity or tenderness  Neurologic: gait, coordination normal and speech normal      Assessment:     Problem List Items Addressed This Visit     Bite wound of hand, left, subsequent encounter    Bite wound of hand, right, subsequent encounter    Dog bite - Primary    Relevant Orders    Initiate Outpatient Wound Care Protocol    Open bite of left ear    Open wound of nose due to dog bite           Procedure Note  Indications:  Based on my examination of this patient's wound(s)/ulcer(s) today, debridement is required to promote healing and evaluate the wound base. Performed by: JESICA Mustafa CNP    Consent obtained:  Yes    Time out taken:  Yes    Pain Control: Anesthetic  Anesthetic: 2% Lidocaine Gel Topical     Debridement:Excisional Debridement    Using curette the wound(s)/ulcer(s) was/were sharply debrided down through and including the removal of subcutaneous tissue. Devitalized Tissue Debrided:  fibrin, biofilm and slough    Pre Debridement Measurements:  Are located in the [place]  Documentation Flow Sheet    Wound/Ulcer #: 2    Post Debridement Measurements:  Wound/Ulcer Descriptions are Pre Debridement except measurements:    Wound 02/10/22 Hand Right #1 (Active)   Wound Image   02/10/22 0848   Wound Etiology Traumatic 02/10/22 0848   Dressing Status New drainage noted; Old drainage noted 02/10/22 0848   Wound Cleansed Cleansed with saline 02/10/22 0848   Wound Length (cm) 0.5 cm 02/10/22 0848   Wound Width (cm) 0.5 cm 02/10/22 0848   Wound Depth (cm) 0.1 cm 02/10/22 0848   Wound Surface Area (cm^2) 0.25 cm^2 02/10/22 0848   Wound Volume (cm^3) 0.025 cm^3 02/10/22 0848   Post-Procedure Length (cm) 0.5 cm 02/10/22 0848   Post-Procedure Width (cm) 0.5 cm 02/10/22 0848   Post-Procedure Depth (cm) 0.1 cm 02/10/22 0848   Post-Procedure Surface Area (cm^2) 0.25 cm^2 02/10/22 0848   Post-Procedure Volume (cm^3) 0.025 cm^3 02/10/22 0848   Wound Assessment Slough;Eschar dry 02/10/22 0848   Drainage Amount Moderate 02/10/22 0848   Drainage Description Yellow 02/10/22 0848   Odor None 02/10/22 0848   Trudi-wound Assessment Blanchable erythema 02/10/22 0848   Margins Attached edges 02/10/22 0848   Wound Thickness Description not for Pressure Injury Full thickness 02/10/22 0848   Number of days: 0       Wound 02/10/22 Hand Left #2 (Active)   Wound Image   02/10/22 0848   Wound Etiology Traumatic 02/10/22 0848   Dressing Status New drainage noted; Old drainage noted 02/10/22 0848   Wound Cleansed Cleansed with saline 02/10/22 0848   Wound Length (cm) 0.9 cm 02/10/22 0848   Wound Width (cm) 0.9 cm 02/10/22 0848   Wound Depth (cm) 0.1 cm 02/10/22 0848   Wound Surface Area (cm^2) 0.81 cm^2 02/10/22 0848   Wound Volume (cm^3) 0.081 cm^3 02/10/22 0848   Post-Procedure Length (cm) 0.9 cm 02/10/22 0848   Post-Procedure Width (cm) 0.9 cm 02/10/22 0848   Post-Procedure Depth (cm) 0.1 cm 02/10/22 0848   Post-Procedure Surface Area (cm^2) 0.81 cm^2 02/10/22 0848   Post-Procedure Volume (cm^3) 0.081 cm^3 02/10/22 0848   Wound Assessment Eschar dry;Slough 02/10/22 0848   Drainage Amount None 02/10/22 0848   Drainage Description Yellow 02/10/22 0848   Odor None 02/10/22 0848   Trudi-wound Assessment Blanchable erythema 02/10/22 0848   Margins Attached edges 02/10/22 0848   Wound Thickness Description not for Pressure Injury Full thickness 02/10/22 0848   Number of days: 0          Percent of Wound(s)/Ulcer(s) Debrided: 100%    Total Surface Area Debrided:  0.81 sq cm     Estimated Blood Loss:  Minimal    Hemostasis Achieved:  by pressure    Procedural Pain:  2  / 10     Post Procedural Pain:  0 / 10     Response to treatment:  Well tolerated by patient. Plan:     Treatment Note please see Discharge Instructions    Written patient dismissal instructions given to patient and signed by patient or POA.            Electronically signed by JESICA Salinas CNP on 2/10/2022 at 9:32 AM

## 2022-02-14 ENCOUNTER — HOSPITAL ENCOUNTER (OUTPATIENT)
Dept: WOUND CARE | Age: 62
Discharge: HOME | End: 2022-02-14
Payer: COMMERCIAL

## 2022-02-14 VITALS
HEART RATE: 82 BPM | TEMPERATURE: 97.4 F | RESPIRATION RATE: 16 BRPM | DIASTOLIC BLOOD PRESSURE: 87 MMHG | SYSTOLIC BLOOD PRESSURE: 135 MMHG

## 2022-02-14 DIAGNOSIS — S01.25XA OPEN WOUND OF NOSE DUE TO DOG BITE: ICD-10-CM
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
## 2022-02-14 DIAGNOSIS — S61.452D: ICD-10-CM

## 2022-02-14 DIAGNOSIS — S61.451D: ICD-10-CM
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
## 2022-02-14 DIAGNOSIS — S01.352D: ICD-10-CM

## 2022-02-14 PROCEDURE — 11042 DBRDMT SUBQ TIS 1ST 20SQCM/<: CPT | Performed by: PLASTIC SURGERY

## 2022-02-14 PROCEDURE — 11042 DBRDMT SUBQ TIS 1ST 20SQCM/<: CPT

## 2022-02-14 RX ORDER — LIDOCAINE HYDROCHLORIDE 20 MG/ML
JELLY TOPICAL ONCE
Status: DISCONTINUED | OUTPATIENT
Start: 2022-02-14 | End: 2022-02-15 | Stop reason: HOSPADM

## 2022-02-14 RX ORDER — LIDOCAINE HYDROCHLORIDE 20 MG/ML
JELLY TOPICAL ONCE
Status: CANCELLED | OUTPATIENT
Start: 2022-02-14 | End: 2022-02-14

## 2022-02-14 RX ORDER — DOXYCYCLINE HYCLATE 100 MG
100 TABLET ORAL 2 TIMES DAILY
Qty: 20 TABLET | Refills: 0 | Status: SHIPPED | OUTPATIENT
Start: 2022-02-14 | End: 2022-02-24

## 2022-02-14 NOTE — PROGRESS NOTES
Progress note     Chief Complaint   Patient presents with    Wound Check     dog bites        HPI:   Nubia Nunez is a 64 y.o. male who presents with a dog bite on his left ear as well as multiple lacerations on both hands including the thumb and fingers. Patient dates that it occurred on Friday. Patient is here today for evaluation treatment. Patient states that part of the ear was removed during the bite. Patient is on antibiotics currently. Patient is a . Patient was referred from the emergency room. Patient is here for evaluation and treatment. Medications:     Current Outpatient Medications   Medication Sig Dispense Refill    amoxicillin-clavulanate (AUGMENTIN) 875-125 MG per tablet Take 1 tablet by mouth 2 times daily for 14 days 28 tablet 0    oxyCODONE-acetaminophen (ENDOCET) 7.5-325 MG per tablet Take 1 tablet by mouth every 6 hours as needed for Pain for up to 30 days. Intended supply: 30 days 120 tablet 0    phentermine (ADIPEX-P) 37.5 MG tablet Take 1 tablet by mouth every morning (before breakfast) for 30 days.  30 tablet 0    topiramate (TOPAMAX) 200 MG tablet Take 1 tablet by mouth 2 times daily 60 tablet 5    tiZANidine (ZANAFLEX) 4 MG tablet 1 po qd 14 tablet 3    omeprazole (PRILOSEC) 20 MG delayed release capsule TAKE 1 CAPSULE DAILY 90 capsule 1    carvedilol (COREG) 12.5 MG tablet Take 1 tablet by mouth 2 times daily (with meals) 180 tablet 3    meloxicam (MOBIC) 15 MG tablet Take 1 tablet by mouth daily 90 tablet 3    clopidogrel (PLAVIX) 75 MG tablet Take 1 tablet by mouth daily 90 tablet 3    atorvastatin (LIPITOR) 40 MG tablet Take 1 tablet by mouth daily 90 tablet 3    hydroCHLOROthiazide (MICROZIDE) 12.5 MG capsule 1 po qd 90 capsule 3    NEEDLE, DISP, 22 G 22G X 1-1/2\" MISC 1 Device by Does not apply route every 30 days 25 each 1    NEEDLE, DISP, 18 G (BD DISP NEEDLES) 18G X 1-1/2\" MISC 1 Device by Does not apply route every 30 days 25 each 1    linagliptin (TRADJENTA) 5 MG tablet TAKE 1 TABLET BY MOUTH ONE TIME A DAY 30 tablet 5    traZODone (DESYREL) 150 MG tablet TAKE ONE TABLET BY MOUTH EVERY EVENING 90 tablet 3    metFORMIN (GLUCOPHAGE) 1000 MG tablet Take 1 tablet by mouth 2 times daily (with meals) 180 tablet 3    testosterone cypionate (DEPOTESTOTERONE CYPIONATE) 200 MG/ML injection       Testosterone Cypionate 200 MG/ML KIT Inject 200 mg into the muscle every 30 days for 30 days. please include syringes 1 kit 5    NEEDLE, DISP, 18 G 18G X 1\" MISC 1 Syringe by Does not apply route every 30 days 25 each 0    NEEDLE, DISP, 22 G 22G X 1-1/2\" MISC 1 Syringe by Does not apply route every 30 days 25 each 0    Multiple Vitamin (MULTI VITAMIN DAILY PO) Take by mouth      nitroGLYCERIN (NITROSTAT) 0.4 MG SL tablet Place 0.4 mg under the tongue      aspirin 325 MG tablet Take 325 mg by mouth daily. Current Facility-Administered Medications   Medication Dose Route Frequency Provider Last Rate Last Admin    lidocaine (XYLOCAINE) 2 % uro-jet   Topical Once Ligia Lennon MD          Allergies: Allergies   Allergen Reactions    Pcn [Penicillins] Other (See Comments)     Unknown rx     Review of Systems:   Constitutional: Negative for fever, chills, fatigue and unexpected weight change. HENT: Negative for hearing loss, sore throat and facial swelling. Eyes: Negative for pain and discharge. Respiratory: No history of sleep apnea. Cardiovascular: Patient with a history of CHF, coronary artery disease, cardiac myopathy, peripheral vascular disease. Patient with hypercholesterolemia, hyperlipidemia, hypertension, ischemic cardiomyopathy. Gastrointestinal: Negative for nausea, vomiting, diarrhea and constipation. Skin: Negative for pallor and rash. Neurological: Patient has chronic back pain. .   Hematological: Does not bruise/bleed easily. Psychiatric/Behavioral: Negative for behavioral problems. The patient is not nervous/anxious. Past Medical History:   Diagnosis Date    Acquired trigger finger 6/5/2018    CAD (coronary artery disease)     [facility]/ involving coronary bypass graft of native heart with unstable angina pectoris    Cervical spondylosis     [facility]congestive heart failure) ([facility])     Chronic back pain     on 11/19/18 pt states is presently in pain mgmnt    Contusion of elbow 6/5/2018    Contusion of knee 6/5/2018    Contusion of shoulder region 6/5/2018    Diabetic foot ulcer with osteomyelitis ([address] 1/9/2018    Diabetic ulcer of toe associated with type 2 diabetes mellitus, with fat layer exposed ([address] 1/31/2018    Hypercholesterolemia 12/5/2017    Hyperlipemia, mixed     Hypertension     Ischemic cardiomyopathy 5/7/2018    Non-pressure chronic ulcer of left lower leg with fat layer exposed ([address] 6/9/2014    Obesity     Obesity, Class III, BMI 40-49.9 (morbid obesity) ([address] 4/6/2015    Obstructive sleep apnea syndrome     Osteoarthritis of carpometacarpal (CMC) joint of thumb 6/5/2018    Osteoarthritis of knee 6/5/2018    Peripheral vascular disease ([address]     with venous stasis and ulcerations. Dr Bar Rivas Presence of coronary angioplasty implant and graft 9/11/2009    Sleep apnea     Dr Lincoln Evans of knee and leg 6/5/2018    Sprain of shoulder and upper arm 6/5/2018    Type II or unspecified type diabetes mellitus without mention of complication, not stated as uncontrolled     Ulcer of left foot, with fat layer exposed ([address] 1/9/2018    Unspecified sleep apnea     Dr Yadira Burris CPAP    Venous insufficiency of both lower extremities 1/31/2018     Past Surgical History:   Procedure Laterality Date    CARPAL TUNNEL RELEASE Right 3/22/13    Dr. Hermes Woods.     CORONARY ANGIOPLASTY WITH STENT PLACEMENT  [phone]    X 3 separately    CORONARY ARTERY BYPASS GRAFT  2/26/07    Dr Shannen Stearns (X 3)    NECK SURGERY  2010    cervical stenoses C5-C6-C7and partial T1 laminectomy with fusion of C6-7    OTHER SURGICAL HISTORY      wound care ulcers of legs (bilaterally) with Dr Nataliia Zamorano  age 10    VEIN SURGERY      closure of peripherator veins of legs, Dr Kan Press History     Socioeconomic History    Marital status:      Spouse name: Not on file    Number of children: Not on file    Years of education: Not on file    Highest education level: Not on file   Occupational History    Not on file   Tobacco Use    Smoking status: Never Smoker    Smokeless tobacco: Never Used   Vaping Use    Vaping Use: Never used   Substance and Sexual Activity    Alcohol use: Not Currently     Comment: rare social, non past 5 months    Drug use: No    Sexual activity: Yes     Partners: Female     Comment: spouse   Other Topics Concern    Not on file   Social History Narrative    Not on file     Social Determinants of Health     Financial Resource Strain:     Difficulty of Paying Living Expenses: Not on file   Food Insecurity:     Worried About Running Out of Food in the Last Year: Not on file    [place] of Food in the Last Year: Not on file   Transportation Needs:     Lack of Transportation (Medical): Not on file    Lack of Transportation (Non-Medical):  Not on file   Physical Activity:     Days of Exercise per Week: Not on file    Minutes of Exercise per Session: Not on file   Stress:     Feeling of Stress : Not on file   Social Connections:     Frequency of Communication with Friends and Family: Not on file    Frequency of Social Gatherings with Friends and Family: Not on file    Attends [employer] Services: Not on file    Active Member of Clubs or Organizations: Not on file    Attends Club or Organization Meetings: Not on file    Marital Status: Not on file   Intimate Partner Violence:     Fear of Current or Ex-Partner: Not on file    Emotionally Abused: Not on file    Physically Abused: Not on file    Sexually Abused: Not on file   Housing Stability:     Unable to Pay for Housing in the Last Year: Not on file    Number of Places Lived in the Last Year: Not on file    Unstable Housing in the Last Year: Not on file     Family History   Problem Relation Age of Onset    Cancer Mother         lung    Diabetes Father     Heart Disease Father     High Blood Pressure Father     Diabetes Brother     Heart Disease Brother     High Blood Pressure Brother      Physical Exam:   /87   Pulse 82   Temp 97.4 °F (36.3 °C)   Resp 16    There is no height or weight on file to calculate BMI. Physical Exam   Nursing note and vitals reviewed. Constitutional: Oriented to person, place, and time. Appears well-developed and well-nourished. No distress. Head: Normocephalic and atraumatic. Eyes: Conjunctivae and EOM are normal.   Pulmonary/Chest: Effort normal. No respiratory distress. Neurological: Alert and oriented to person, place, and time. Musculoskeletal: Patient has an extensor lag on the ring finger. Psychiatric: Normal mood and affect.  Behavior is normal    Skin:  Wound 02/10/22 Hand Right #1 (Active)   Wound Image   02/10/22 0848   Wound Etiology Traumatic 02/14/22 1111   Dressing Status Old drainage noted;New drainage noted 02/14/22 1111   Wound Cleansed Cleansed with saline 02/14/22 1111   Wound Length (cm) 0.8 cm 02/14/22 1111   Wound Width (cm) 0.5 cm 02/14/22 1111   Wound Depth (cm) 0.1 cm 02/14/22 1111   Wound Surface Area (cm^2) 0.4 cm^2 02/14/22 1111   Change in Wound Size % (l*w) -60 02/14/22 1111   Wound Volume (cm^3) 0.04 cm^3 02/14/22 1111   Wound Healing % -60 02/14/22 1111   Post-Procedure Length (cm) 0.8 cm 02/14/22 1111   Post-Procedure Width (cm) 0.5 cm 02/14/22 1111   Post-Procedure Depth (cm) 0.1 cm 02/14/22 1111   Post-Procedure Surface Area (cm^2) 0.4 cm^2 02/14/22 1111   Post-Procedure Volume (cm^3) 0.04 cm^3 02/14/22 1111   Wound Assessment Slough;Eschar dry 02/14/22 1111   Drainage Amount Moderate 02/14/22 1111   Drainage Description Yellow 02/14/22 1111   Odor None 02/14/22 1111   Trudi-wound Assessment Blanchable erythema 02/14/22 1111   Margins Attached edges 02/14/22 1111   Wound Thickness Description not for Pressure Injury Full thickness 02/14/22 1111   Number of days: 4       Wound 02/10/22 Hand Left #2 (Active)   Wound Image   02/10/22 0848   Wound Etiology Traumatic 02/14/22 1111   Dressing Status New drainage noted; Old drainage noted 02/14/22 1111   Wound Cleansed Cleansed with saline 02/14/22 1111   Wound Length (cm) 0.3 cm 02/14/22 1111   Wound Width (cm) 0.3 cm 02/14/22 1111   Wound Depth (cm) 0.1 cm 02/14/22 1111   Wound Surface Area (cm^2) 0.09 cm^2 02/14/22 1111   Change in Wound Size % (l*w) 88.89 02/14/22 1111   Wound Volume (cm^3) 0.009 cm^3 02/14/22 1111   Wound Healing % 89 02/14/22 1111   Post-Procedure Length (cm) 0.9 cm 02/10/22 0848   Post-Procedure Width (cm) 0.9 cm 02/10/22 0848   Post-Procedure Depth (cm) 0.1 cm 02/10/22 0848   Post-Procedure Surface Area (cm^2) 0.81 cm^2 02/10/22 0848   Post-Procedure Volume (cm^3) 0.081 cm^3 02/10/22 0848   Wound Assessment Eschar dry;Slough 02/14/22 1111   Drainage Amount None 02/14/22 1111   Drainage Description Yellow 02/14/22 1111   Odor None 02/14/22 1111   Trudi-wound Assessment Blanchable erythema 02/14/22 1111   Margins Attached edges 02/14/22 1111   Wound Thickness Description not for Pressure Injury Full thickness 02/14/22 1111   Number of days: 4          Procedure Note  Indications:  Based on my examination of this patient's wound(s)/ulcer(s) today, debridement is required to promote healing and evaluate the wound base. Performed by: Henry Jimenez MD    Consent obtained:  Yes    Time out taken:  Yes    Pain Control:         Debridement:Excisional Debridement    Using curette, scissors and forceps the wound(s)/ulcer(s) was/were sharply debrided down through and including the removal of muscle/fascia.       Devitalized Tissue Debrided: necrotic/eschar    Pre Debridement Measurements:  Are located in the Wound/Ulcer Documentation Flow Sheet    Wound/Ulcer #: 1           Percent of Wound(s)/Ulcer(s) Debrided: 100%    Total Surface Area Debrided: Excisional debridement to the dorsum of the left hand measuring 0.9 cm through the subcutaneous tissue      Diabetic/Pressure/Non Pressure Ulcers only:  Ulcer: Non-Pressure ulcer, muscle necrosis      Estimated Blood Loss:  Minimal    Hemostasis Achieved:  by pressure    Procedural Pain:  1  / 10     Post Procedural Pain:  0 / 10     Response to treatment:  Well tolerated by patient. Imaging:   [unfilled]        Impression/Plan:      Diagnosis Orders   1.  Dog bite, initial encounter       Patient Active Problem List   Diagnosis    Hyperlipemia, mixed    Sleep apnea    Peripheral vascular disease [facility]    CAD (coronary artery disease)    [facility]est[facility]    CTS (carpal tunnel syndrome)    Hypotension    Obesity, Class III, BMI 40-49.9 (morbid obesity) ([facility]    Essential hypertension    DDD (degenerative disc disease), cervical    DJD (degenerative joint disease), cervical    Primary osteoarthritis involving multiple joints    Paresthesias in left hand    Uncontrolled type 2 diabetes mellitus without complication, without long-term current use of insulin    Coronary artery disease involving coronary bypass graft of native heart without angina pectoris    Sleep apnea    Cervical spondylosis    Obesity    Long term current use of antithrombotics/antiplatelets    BMI 33.6-52.6, adult ([address]    H/O cervical spine surgery    Long term (current) use of non-steroidal anti-inflammatories ([place])    Chronic prescription opiate use    Severe comorbid illness    Hx of cervical spine surgery    Chronic neck pain    Candidal balanitis    Cellulitis of third toe, left    Nail avulsion, toe, initial encounter    Cellulitis of second toe, right    Abnormal nuclear stress test    Abnormal stress test    Hypercholesterolemia    Diabetic foot ulcer with osteomyelitis (HCC)    Ulcer of left foot, with fat layer exposed ([address]    Acquired trigger finger    Contusion of elbow    Contusion of shoulder region    Contusion of knee    Degeneration of lumbar intervertebral disc    Diabetic ulcer of toe associated with type 2 diabetes mellitus, with fat layer exposed ([address]    History of coronary artery bypass graft x 3    Ischemic cardiomyopathy    Osteoarthritis of knee    Osteoarthritis of carpometacarpal (CMC) joint of thumb    Sprain of knee and leg    Sprain of shoulder and upper arm    Presence of coronary angioplasty implant and graft    Venous insufficiency of both lower extremities    Dog bite    Impingement syndrome of shoulder region    Bite wound of hand, left, subsequent encounter    Bite wound of hand, right, subsequent encounter    Open bite of left ear    Open wound of nose due to dog bite     Plan: We will place Blessing over the open wound. We will place antibiotic ointment. We will see the patient in a week to possibly consider removal of sutures. .       Electronically signed by:  Anju Cuevas MD 2/14/2022

## 2022-02-15 LAB
CULTURE: ABNORMAL
CULTURE: ABNORMAL
DIRECT EXAM: ABNORMAL
Lab: ABNORMAL
SPECIMEN DESCRIPTION: ABNORMAL

## 2022-02-21 ENCOUNTER — HOSPITAL ENCOUNTER (OUTPATIENT)
Dept: WOUND CARE | Age: 62
Discharge: HOME | End: 2022-02-21
Payer: COMMERCIAL

## 2022-02-21 VITALS
WEIGHT: 239 LBS | HEART RATE: 72 BPM | TEMPERATURE: 97.2 F | BODY MASS INDEX: 32.37 KG/M2 | DIASTOLIC BLOOD PRESSURE: 71 MMHG | HEIGHT: 72 IN | SYSTOLIC BLOOD PRESSURE: 152 MMHG | RESPIRATION RATE: 17 BRPM

## 2022-02-21 DIAGNOSIS — S61.452D: ICD-10-CM
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
## 2022-02-21 DIAGNOSIS — S01.352D: ICD-10-CM
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
## 2022-02-21 DIAGNOSIS — S01.25XA OPEN WOUND OF NOSE DUE TO DOG BITE: ICD-10-CM

## 2022-02-21 DIAGNOSIS — S61.451D: ICD-10-CM

## 2022-02-21 PROCEDURE — 11042 DBRDMT SUBQ TIS 1ST 20SQCM/<: CPT | Performed by: PLASTIC SURGERY

## 2022-02-21 PROCEDURE — 11042 DBRDMT SUBQ TIS 1ST 20SQCM/<: CPT

## 2022-02-21 RX ORDER — LIDOCAINE HYDROCHLORIDE 20 MG/ML
JELLY TOPICAL ONCE
Status: CANCELLED | OUTPATIENT
Start: 2022-02-21 | End: 2022-02-21

## 2022-02-21 RX ORDER — LIDOCAINE HYDROCHLORIDE 20 MG/ML
JELLY TOPICAL ONCE
Status: COMPLETED | OUTPATIENT
Start: 2022-02-21 | End: 2022-02-21

## 2022-02-21 RX ORDER — DOXYCYCLINE HYCLATE 100 MG
100 TABLET ORAL 2 TIMES DAILY
Qty: 20 TABLET | Refills: 0 | Status: SHIPPED | OUTPATIENT
Start: 2022-02-21 | End: 2022-03-03

## 2022-02-21 RX ADMIN — LIDOCAINE HYDROCHLORIDE 6 ML: 20 JELLY TOPICAL at 09:58

## 2022-02-21 ASSESSMENT — PAIN DESCRIPTION - LOCATION: LOCATION: HAND

## 2022-02-21 ASSESSMENT — PAIN DESCRIPTION - FREQUENCY: FREQUENCY: INTERMITTENT

## 2022-02-21 ASSESSMENT — PAIN - FUNCTIONAL ASSESSMENT: PAIN_FUNCTIONAL_ASSESSMENT: ACTIVITIES ARE NOT PREVENTED

## 2022-02-21 ASSESSMENT — PAIN DESCRIPTION - PROGRESSION: CLINICAL_PROGRESSION: NOT CHANGED

## 2022-02-21 ASSESSMENT — PAIN DESCRIPTION - ORIENTATION: ORIENTATION: RIGHT;LEFT

## 2022-02-21 ASSESSMENT — PAIN SCALES - GENERAL: PAINLEVEL_OUTOF10: 6

## 2022-02-21 ASSESSMENT — PAIN DESCRIPTION - ONSET: ONSET: ON-GOING

## 2022-02-21 ASSESSMENT — PAIN DESCRIPTION - PAIN TYPE: TYPE: ACUTE PAIN

## 2022-02-21 ASSESSMENT — PAIN DESCRIPTION - DESCRIPTORS: DESCRIPTORS: ACHING

## 2022-02-21 NOTE — PLAN OF CARE
Problem: Wound:  Goal: Will show signs of wound healing; wound closure and no evidence of infection  Description: Will show signs of wound healing; wound closure and no evidence of infection  2/21/2022 0950 by Pamella Toussaint RN  Outcome: Ongoing  2/21/2022 0941 by Pamella Toussaint RN  Outcome: Ongoing     Problem: Falls - Risk of:  Goal: Will remain free from falls  Description: Will remain free from falls  2/21/2022 0950 by Pamella Toussaint RN  Outcome: Ongoing  2/21/2022 0941 by Pamella Toussaint RN  Outcome: Ongoing     Problem: Pain:  Description: Pain management should include both nonpharmacologic and pharmacologic interventions.   Goal: Pain level will decrease  Description: Pain level will decrease  Outcome: Ongoing  Goal: Control of acute pain  Description: Control of acute pain  Outcome: Ongoing  Goal: Control of chronic pain  Description: Control of chronic pain  Outcome: Ongoing

## 2022-02-21 NOTE — PROGRESS NOTES
Progress note     Chief Complaint   Patient presents with    Wound Check     left/right hand        HPI:   Sheree Gross is a 64 y.o. male who presents with a dog bite on his left ear as well as multiple lacerations on both hands including the thumb and fingers. Patient dates that it occurred on Friday. Patient is here today for evaluation treatment. Patient states that part of the ear was removed during the bite. Patient is on antibiotics currently. Patient is a . Patient was referred from the emergency room. Patient is here for evaluation and treatment. Medications:     Current Outpatient Medications   Medication Sig Dispense Refill    doxycycline hyclate (VIBRA-TABS) 100 MG tablet Take 1 tablet by mouth 2 times daily for 10 days 20 tablet 0    oxyCODONE-acetaminophen (ENDOCET) 7.5-325 MG per tablet Take 1 tablet by mouth every 6 hours as needed for Pain for up to 30 days. Intended supply: 30 days 120 tablet 0    phentermine (ADIPEX-P) 37.5 MG tablet Take 1 tablet by mouth every morning (before breakfast) for 30 days.  30 tablet 0    topiramate (TOPAMAX) 200 MG tablet Take 1 tablet by mouth 2 times daily 60 tablet 5    tiZANidine (ZANAFLEX) 4 MG tablet 1 po qd 14 tablet 3    omeprazole (PRILOSEC) 20 MG delayed release capsule TAKE 1 CAPSULE DAILY 90 capsule 1    carvedilol (COREG) 12.5 MG tablet Take 1 tablet by mouth 2 times daily (with meals) 180 tablet 3    meloxicam (MOBIC) 15 MG tablet Take 1 tablet by mouth daily 90 tablet 3    clopidogrel (PLAVIX) 75 MG tablet Take 1 tablet by mouth daily 90 tablet 3    atorvastatin (LIPITOR) 40 MG tablet Take 1 tablet by mouth daily 90 tablet 3    hydroCHLOROthiazide (MICROZIDE) 12.5 MG capsule 1 po qd 90 capsule 3    NEEDLE, DISP, 22 G 22G X 1-1/2\" MISC 1 Device by Does not apply route every 30 days 25 each 1    NEEDLE, DISP, 18 G (BD DISP NEEDLES) 18G X 1-1/2\" MISC 1 Device by Does not apply route every 30 days 25 each 1    linagliptin (TRADJENTA) 5 MG tablet TAKE 1 TABLET BY MOUTH ONE TIME A DAY 30 tablet 5    traZODone (DESYREL) 150 MG tablet TAKE ONE TABLET BY MOUTH EVERY EVENING 90 tablet 3    metFORMIN (GLUCOPHAGE) 1000 MG tablet Take 1 tablet by mouth 2 times daily (with meals) 180 tablet 3    testosterone cypionate (DEPOTESTOTERONE CYPIONATE) 200 MG/ML injection       Testosterone Cypionate 200 MG/ML KIT Inject 200 mg into the muscle every 30 days for 30 days. please include syringes 1 kit 5    NEEDLE, DISP, 18 G 18G X 1\" MISC 1 Syringe by Does not apply route every 30 days 25 each 0    NEEDLE, DISP, 22 G 22G X 1-1/2\" MISC 1 Syringe by Does not apply route every 30 days 25 each 0    Multiple Vitamin (MULTI VITAMIN DAILY PO) Take by mouth      nitroGLYCERIN (NITROSTAT) 0.4 MG SL tablet Place 0.4 mg under the tongue      aspirin 325 MG tablet Take 325 mg by mouth daily. No current facility-administered medications for this encounter. Allergies: Allergies   Allergen Reactions    Pcn [Penicillins] Other (See Comments)     Unknown rx     Review of Systems:   Constitutional: Negative for fever, chills, fatigue and unexpected weight change. HENT: Negative for hearing loss, sore throat and facial swelling. Eyes: Negative for pain and discharge. Respiratory: No history of sleep apnea. Cardiovascular: Patient with a history of CHF, coronary artery disease, cardiac myopathy, peripheral vascular disease. Patient with hypercholesterolemia, hyperlipidemia, hypertension, ischemic cardiomyopathy. Gastrointestinal: Negative for nausea, vomiting, diarrhea and constipation. Skin: Negative for pallor and rash. Neurological: Patient has chronic back pain. .   Hematological: Does not bruise/bleed easily. Psychiatric/Behavioral: Negative for behavioral problems. The patient is not nervous/anxious.       Past Medical History:   Diagnosis Date    Acquired trigger finger 6/5/2018    CAD (coronary artery disease) [facility]/ involving coronary bypass graft of native heart with unstable angina pectoris    Cervical spondylosis     CHF (congestive heart failure) ([address]     Chronic back pain     on 11/19/18 pt states is presently in pain mgmnt    Contusion of elbow 6/5/2018    Contusion of knee 6/5/2018    Contusion of shoulder region 6/5/2018    Diabetic foot ulcer with osteomyelitis ([address] 1/9/2018    Diabetic ulcer of toe associated with type 2 diabetes mellitus, with fat layer exposed ([address] 1/31/2018    Hypercholesterolemia 12/5/2017    Hyperlipemia, mixed     Hypertension     Ischemic cardiomyopathy 5/7/2018    Non-pressure chronic ulcer of left lower leg with fat layer exposed ([address] 6/9/2014    Obesity     Obesity, Class III, BMI 40-49.9 (morbid obesity) ([address] 4/6/2015    Obstructive sleep apnea syndrome     Osteoarthritis of carpometacarpal (CMC) joint of thumb 6/5/2018    Osteoarthritis of knee 6/5/2018    Peripheral vascular disease ([address]     with venous stasis and ulcerations. Dr Patricia Davila Presence of coronary angioplasty implant and graft 9/11/2009    Sleep apnea     Dr Doris Alejandro of knee and leg 6/5/2018    Sprain of shoulder and upper arm 6/5/2018    Type II or unspecified type diabetes mellitus without mention of complication, not stated as uncontrolled     Ulcer of left foot, with fat layer exposed ([address] 1/9/2018    Unspecified sleep apnea     Dr Dejon Ramirez CPAP    Venous insufficiency of both lower extremities 1/31/2018     Past Surgical History:   Procedure Laterality Date    CARPAL TUNNEL RELEASE Right 3/22/13    Dr. Lenin Duarte.     CORONARY ANGIOPLASTY WITH STENT PLACEMENT  2002,2006.2009    X 3 separately    CORONARY ARTERY BYPASS GRAFT  2/26/07     TOMA's, Dr Claudio Sicard (X 3)    NECK SURGERY  2010    cervical stenoses C5-C6-C7and partial T1 laminectomy with fusion of C6-7    OTHER SURGICAL HISTORY      wound care ulcers of legs (bilaterally) with Dr Justina Hale the Last Year: Not on file    Unstable Housing in the Last Year: Not on file     Family History   Problem Relation Age of Onset    Cancer Mother         lung    Diabetes Father     Heart Disease Father     High Blood Pressure Father     Diabetes Brother     Heart Disease Brother     High Blood Pressure Brother      Physical Exam:   BP (!) 152/71   Pulse 72   Temp 97.2 °F (36.2 °C) (Tympanic)   Resp 17   Ht 6' (1.829 m)   Wt 239 lb (108.4 kg)   BMI 32.41 kg/m²    Body mass index is 32.41 kg/m². Physical Exam   Nursing note and vitals reviewed. Constitutional: Oriented to person, place, and time. Appears well-developed and well-nourished. No distress. Head: Normocephalic and atraumatic. Eyes: Conjunctivae and EOM are normal.   Pulmonary/Chest: Effort normal. No respiratory distress. Neurological: Alert and oriented to person, place, and time. Musculoskeletal: Patient has an extensor lag on the ring finger. Psychiatric: Normal mood and affect. Behavior is normal    Skin:  Wound 02/10/22 Hand Right; Anterior #1 CLUSTER (Active)   Wound Image   02/10/22 0848   Wound Etiology Traumatic 02/14/22 1111   Dressing Status Old drainage noted;New drainage noted 02/14/22 1111   Wound Cleansed Cleansed with saline 02/14/22 1111   Wound Length (cm) 0 cm 02/21/22 0949   Wound Width (cm) 0 cm 02/21/22 0949   Wound Depth (cm) 0 cm 02/21/22 0949   Wound Surface Area (cm^2) 0 cm^2 02/21/22 0949   Change in Wound Size % (l*w) 100 02/21/22 0949   Wound Volume (cm^3) 0 cm^3 02/21/22 0949   Wound Healing % 100 02/21/22 0949   Post-Procedure Length (cm) 3 cm 02/21/22 0949   Post-Procedure Width (cm) 3 cm 02/21/22 0949   Post-Procedure Depth (cm) 0.3 cm 02/21/22 0949   Post-Procedure Surface Area (cm^2) 9 cm^2 02/21/22 0949   Post-Procedure Volume (cm^3) 2.7 cm^3 02/21/22 0949   Wound Assessment Slough;Eschar dry 02/14/22 1111   Drainage Amount Moderate 02/14/22 1111   Drainage Description Yellow 02/14/22 1111   Odor None 02/14/22 1111   Trudi-wound Assessment Blanchable erythema 02/14/22 1111   Margins Attached edges 02/14/22 1111   Wound Thickness Description not for Pressure Injury Full thickness 02/14/22 1111   Number of days: 11       Wound 02/10/22 Hand Left; Anterior;Proximal #2 (Active)   Wound Image   02/10/22 0848   Wound Etiology Traumatic 02/14/22 1111   Dressing Status New drainage noted; Old drainage noted 02/14/22 1111   Wound Cleansed Cleansed with saline 02/14/22 1111   Wound Length (cm) 0 cm 02/21/22 0949   Wound Width (cm) 0 cm 02/21/22 0949   Wound Depth (cm) 0 cm 02/21/22 0949   Wound Surface Area (cm^2) 0 cm^2 02/21/22 0949   Change in Wound Size % (l*w) 100 02/21/22 0949   Wound Volume (cm^3) 0 cm^3 02/21/22 0949   Wound Healing % 100 02/21/22 0949   Post-Procedure Length (cm) 1 cm 02/21/22 0949   Post-Procedure Width (cm) 4 cm 02/21/22 0949   Post-Procedure Depth (cm) 0.3 cm 02/21/22 0949   Post-Procedure Surface Area (cm^2) 4 cm^2 02/21/22 0949   Post-Procedure Volume (cm^3) 1.2 cm^3 02/21/22 0949   Wound Assessment Eschar dry;Slough 02/14/22 1111   Drainage Amount None 02/14/22 1111   Drainage Description Yellow 02/14/22 1111   Odor None 02/14/22 1111   Trudi-wound Assessment Blanchable erythema 02/14/22 1111   Margins Attached edges 02/14/22 1111   Wound Thickness Description not for Pressure Injury Full thickness 02/14/22 1111   Number of days: 11       Wound 02/21/22 Hand Left; Anterior;Distal #3 (Active)   Wound Image   02/21/22 0949   Wound Etiology Traumatic 02/21/22 0949   Dressing Status Old drainage noted;New drainage noted 02/21/22 0949   Wound Cleansed Cleansed with saline 02/21/22 0949   Wound Length (cm) 1.5 cm 02/21/22 0949   Wound Width (cm) 1 cm 02/21/22 0949   Wound Depth (cm) 0.1 cm 02/21/22 0949   Wound Surface Area (cm^2) 1.5 cm^2 02/21/22 0949   Wound Volume (cm^3) 0.15 cm^3 02/21/22 0949   Post-Procedure Length (cm) 0.7 cm 02/21/22 0949   Post-Procedure Width (cm) 0.9 cm 02/21/22 0949 Post-Procedure Depth (cm) 0.3 cm 02/21/22 0949   Post-Procedure Surface Area (cm^2) 0.63 cm^2 02/21/22 0949   Post-Procedure Volume (cm^3) 0.189 cm^3 02/21/22 0949   Wound Assessment Slough;Pink/red; Other (Comment) 02/21/22 0949   Drainage Amount Moderate 02/21/22 0949   Drainage Description Serosanguinous 02/21/22 0949   Odor None 02/21/22 0949   Trudi-wound Assessment Blanchable erythema 02/21/22 0949   Margins Defined edges 02/21/22 0949   Wound Thickness Description not for Pressure Injury Full thickness 02/21/22 0949   Number of days: 0       Wound 02/21/22 Hand Right;Posterior #4 (Active)   Wound Etiology Traumatic 02/21/22 0949   Dressing Status New drainage noted; Old drainage noted 02/21/22 0949   Wound Cleansed Cleansed with saline 02/21/22 0949   Wound Length (cm) 2 cm 02/21/22 0949   Wound Width (cm) 2.7 cm 02/21/22 0949   Wound Depth (cm) 0.5 cm 02/21/22 0949   Wound Surface Area (cm^2) 5.4 cm^2 02/21/22 0949   Wound Volume (cm^3) 2.7 cm^3 02/21/22 0949   Number of days: 0          Procedure Note  Indications:  Based on my examination of this patient's wound(s)/ulcer(s) today, debridement is required to promote healing and evaluate the wound base. Performed by: Toro Chacon MD    Consent obtained:  Yes    Time out taken:  Yes    Pain Control: Anesthetic  Anesthetic: 2% Lidocaine Gel Topical       Debridement:Excisional Debridement    Using curette, scissors and forceps the wound(s)/ulcer(s) was/were sharply debrided down through and including the removal of muscle/fascia.       Devitalized Tissue Debrided:  necrotic/eschar    Pre Debridement Measurements:  Are located in the Wound/Ulcer Documentation Flow Sheet    Wound/Ulcer #: 1           Percent of Wound(s)/Ulcer(s) Debrided: 100%    Total Surface Area Debrided: Excisional debridement to the dorsum of the left hand anterior distal measuring 1.5 centimeters squared through the subcutaneous tissue    Excisional debridement to the dorsum of the left hand anterior proximal measuring 4 centimeters squared through the subcutaneous tissue    Excisional debridement to the dorsum of the right hand measuring 9 centimeters squared through the subcutaneous tissue    Excisional debridement to the right hand posterior measuring 5.4 centimeters squared through the subcutaneous tissue    Diabetic/Pressure/Non Pressure Ulcers only:  Ulcer: Non-Pressure ulcer, muscle necrosis      Estimated Blood Loss:  Minimal    Hemostasis Achieved:  by pressure    Procedural Pain:  1  / 10     Post Procedural Pain:  0 / 10     Response to treatment:  Well tolerated by patient. Imaging:   @[address]@        Impression/Plan:      Diagnosis Orders   1.  Dog bite, initial encounter       Patient Active Problem List   Diagnosis    Hyperlipemia, mixed    Sleep apnea    Peripheral vascular disease [facility]    CAD (coronary artery disease)    CHF [facility]congestive heart failure) ([facility]    CTS (carpal tunnel syndrome)    Hypotension    Obesity, Class III, BMI 40-49.9 (morbid obesity) ([facility]    Essential hypertension    DDD (degenerative disc disease), cervical    DJD (degenerative joint disease), cervical    Primary osteoarthritis involving multiple joints    Paresthesias in left hand    Uncontrolled type 2 diabetes mellitus without complication, without long-term current use of insulin    Coronary artery disease involving coronary bypass graft of native heart without angina pectoris    Sleep apnea    Cervical spondylosis    Obesity    Long term current use of antithrombotics/antiplatelets    BMI 07.3-92.6, adult ([address]    H/O cervical spine surgery    Long term (current) use of non-steroidal anti-inflammatories (nsaid)    Chronic prescription opiate use    Severe comorbid illness    Hx of cervical spine surgery    Chronic neck pain    Candidal balanitis    Cellulitis of third toe, left    Nail avulsion, toe, initial encounter    Cellulitis of second toe, right    Abnormal nuclear stress test    Abnormal stress test    Hypercholesterolemia    Diabetic foot ulcer with osteomyelitis (HCC)    Ulcer of left foot, with fat layer exposed ([address]    Acquired trigger finger    Contusion of elbow    Contusion of shoulder region    Contusion of knee    Degeneration of lumbar intervertebral disc    Diabetic ulcer of toe associated with type 2 diabetes mellitus, with fat layer exposed ([address]    History of coronary artery bypass graft x 3    Ischemic cardiomyopathy    Osteoarthritis of knee    Osteoarthritis of carpometacarpal (CMC) joint of thumb    Sprain of knee and leg    Sprain of shoulder and upper arm    Presence of coronary angioplasty implant and graft    Venous insufficiency of both lower extremities    Dog bite    Impingement syndrome of shoulder region    Bite wound of hand, left, subsequent encounter    Bite wound of hand, right, subsequent encounter    Open bite of left ear    Open wound of nose due to dog bite     Plan:    All the visible sutures were removed. We will place Blessing over the open wound. Patient is to follow-up with in a week.   Annual          Electronically signed by:  Axel Hernandez MD 2/21/2022

## 2022-02-21 NOTE — PROGRESS NOTES
[address]:     [employer] [id number] [facility], [address] [place] [address] p: [phone] f: [phone]     Ordering Center:     [facility] [address]  [phone]  WOUND CARE Dept: [address] [place] [address] Avenue NUMBER [phone]    Patient Information:      Demi Vigil [address] [facility]   [phone]   : 1960  AGE: 64 y.o. GENDER: male   TODAYS DATE:  2022    Insurance:      PRIMARY INSURANCE:  Plan: [employer]ET[employer] NAP CHOICE POS II  Coverage: AETNA  Effective Date: 2021  [id number] - (Commercial)      Patient Wound Information:       Codes Comments   Dog bite, subsequent encounter  - Primary W54. 0XXD    Bite wound of hand, left, subsequent encounter  S61.452D    Bite wound of hand, right, subsequent encounter  S61.451D    Open wound of nose due to dog bite  S01.25XA, W54. 0XXA    Open bite of left ear, subsequent encounter  S01.352D          WOUNDS REQUIRING DRESSING SUPPLIES:     Wound 02/10/22 Hand Right; Anterior #1 CLUSTER (Active)   Wound Image   02/10/22 0848   Wound Etiology Traumatic 22 1111   Dressing Status Old drainage noted;New drainage noted 22 1111   Wound Cleansed Cleansed with saline 22 1111   Wound Length (cm) 0 cm 22 0949   Wound Width (cm) 0 cm 22 0949   Wound Depth (cm) 0 cm 22 0949   Wound Surface Area (cm^2) 0 cm^2 22 0949   Change in Wound Size % (l*w) 100 22 0949   Wound Volume (cm^3) 0 cm^3 22 0949   Wound Healing % 100 22 0949   Post-Procedure Length (cm) 3 cm 22 0949   Post-Procedure Width (cm) 3 cm 22 0949   Post-Procedure Depth (cm) 0.3 cm 22 0949   Post-Procedure Surface Area (cm^2) 9 cm^2 22 0949   Post-Procedure Volume (cm^3) 2.7 cm^3 22 0949   Wound Assessment Slough;Eschar dry 22 1111   Drainage Amount Moderate 22 1111   Drainage Description Yellow 22 1111   Odor None 02/14/22 1111   Trudi-wound Assessment Blanchable erythema 02/14/22 1111   Margins Attached edges 02/14/22 1111   Wound Thickness Description not for Pressure Injury Full thickness 02/14/22 1111   Number of days: 11       Wound 02/10/22 Hand Left; Anterior;Proximal #2 (Active)   Wound Image   02/10/22 0848   Wound Etiology Traumatic 02/21/22 0949   Dressing Status New drainage noted; Old drainage noted 02/21/22 0949   Wound Cleansed Cleansed with saline 02/21/22 0949   Wound Length (cm) 0 cm 02/21/22 0949   Wound Width (cm) 0 cm 02/21/22 0949   Wound Depth (cm) 0 cm 02/21/22 0949   Wound Surface Area (cm^2) 0 cm^2 02/21/22 0949   Change in Wound Size % (l*w) 100 02/21/22 0949   Wound Volume (cm^3) 0 cm^3 02/21/22 0949   Wound Healing % 100 02/21/22 0949   Post-Procedure Length (cm) 1 cm 02/21/22 0949   Post-Procedure Width (cm) 4 cm 02/21/22 0949   Post-Procedure Depth (cm) 0.3 cm 02/21/22 0949   Post-Procedure Surface Area (cm^2) 4 cm^2 02/21/22 0949   Post-Procedure Volume (cm^3) 1.2 cm^3 02/21/22 0949   Wound Assessment Slough;Toyei/red 02/21/22 0949   Drainage Amount Moderate 02/21/22 0949   Drainage Description Serosanguinous 02/21/22 0949   Odor None 02/21/22 0949   Trudi-wound Assessment Blanchable erythema;Edematous 02/21/22 0949   Margins Defined edges 02/21/22 0949   Wound Thickness Description not for Pressure Injury Full thickness 02/21/22 0949   Number of days: 11       Wound 02/21/22 Hand Left; Anterior;Distal #3 (Active)   Wound Image   02/21/22 0949   Wound Etiology Traumatic 02/21/22 0949   Dressing Status Old drainage noted;New drainage noted 02/21/22 0949   Wound Cleansed Cleansed with saline 02/21/22 0949   Wound Length (cm) 1.5 cm 02/21/22 0949   Wound Width (cm) 1 cm 02/21/22 0949   Wound Depth (cm) 0.1 cm 02/21/22 0949   Wound Surface Area (cm^2) 1.5 cm^2 02/21/22 0949   Wound Volume (cm^3) 0.15 cm^3 02/21/22 0949   Post-Procedure Length (cm) 0.7 cm 02/21/22 0949   Post-Procedure Width (cm) 0.9 cm 02/21/22 0949   Post-Procedure Depth (cm) 0.3 cm 02/21/22 0949   Post-Procedure Surface Area (cm^2) 0.63 cm^2 02/21/22 0949   Post-Procedure Volume (cm^3) 0.189 cm^3 02/21/22 0949   Wound Assessment Slough;Pink/red; Other (Comment) 02/21/22 0949   Drainage Amount Moderate 02/21/22 0949   Drainage Description Serosanguinous 02/21/22 0949   Odor None 02/21/22 0949   Trudi-wound Assessment Blanchable erythema 02/21/22 0949   Margins Defined edges 02/21/22 0949   Wound Thickness Description not for Pressure Injury Full thickness 02/21/22 0949   Number of days: 0       Wound 02/21/22 Hand Right;Posterior #4 (Active)   Wound Etiology Traumatic 02/21/22 0949   Dressing Status New drainage noted; Old drainage noted 02/21/22 0949   Wound Cleansed Cleansed with saline 02/21/22 0949   Wound Length (cm) 2 cm 02/21/22 0949   Wound Width (cm) 2.7 cm 02/21/22 0949   Wound Depth (cm) 0.5 cm 02/21/22 0949   Wound Surface Area (cm^2) 5.4 cm^2 02/21/22 0949   Wound Volume (cm^3) 2.7 cm^3 02/21/22 0949   Wound Assessment Pink/red;Slough 02/21/22 0949   Drainage Amount Moderate 02/21/22 0949   Drainage Description Serosanguinous 02/21/22 0949   Odor None 02/21/22 0949   Trudi-wound Assessment Blanchable erythema;Edematous 02/21/22 0949   Margins Defined edges 02/21/22 0949   Wound Thickness Description not for Pressure Injury Full thickness 02/21/22 0949   Number of days: 0          Supplies Requested :      WOUND #: 1, 2, 3 and 4   PRIMARY DRESSING:  Collagen with silver   Cover and Secure with: Bulky roll gauze  Other Gentac and/or Cosmpor     FREQUENCY OF DRESSING CHANGES:  Daily       ADDITIONAL ITEMS:  [] Gloves Small  [x] Gloves Medium [] Gloves Large [] Gloves XLarge  [] Tape 1\" [x] Tape 2\" [] Tape 3\"  [] Medipore Tape  [x] Saline  [] Skin Prep   [] Adhesive Remover   [] Cotton Tip Applicators   [] Other:    Patient Wound(s) Debrided: [x] Yes   [] No    Debribement Type: subcutaneous tissue    Debridement Date: 2/21/22    Patient currently being seen by Home Health: [] Yes   [x] No    Duration for needed supplies:  []15  [x]30  []60  []90 Days    Provider Information:      PROVIDER'S NAMEAmish Young NPI: [id number]

## 2022-02-28 ENCOUNTER — HOSPITAL ENCOUNTER (OUTPATIENT)
Dept: VASCULAR LAB | Age: 62
Discharge: HOME | End: 2022-02-28
Payer: COMMERCIAL

## 2022-02-28 ENCOUNTER — HOSPITAL ENCOUNTER (OUTPATIENT)
Dept: GENERAL RADIOLOGY | Age: 62
Discharge: HOME | End: 2022-03-02
Payer: COMMERCIAL

## 2022-02-28 ENCOUNTER — HOSPITAL ENCOUNTER (OUTPATIENT)
Age: 62
Discharge: HOME | End: 2022-03-02
Payer: COMMERCIAL

## 2022-02-28 ENCOUNTER — HOSPITAL ENCOUNTER (OUTPATIENT)
Dept: WOUND CARE | Age: 62
Discharge: HOME | End: 2022-02-28
Payer: COMMERCIAL

## 2022-02-28 VITALS
HEART RATE: 89 BPM | SYSTOLIC BLOOD PRESSURE: 114 MMHG | TEMPERATURE: 96.2 F | RESPIRATION RATE: 16 BRPM | DIASTOLIC BLOOD PRESSURE: 70 MMHG
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
## 2022-02-28 DIAGNOSIS — S61.451D: ICD-10-CM

## 2022-02-28 DIAGNOSIS — S01.352D: ICD-10-CM

## 2022-02-28 DIAGNOSIS — T14.8XXA NONHEALING NONSURGICAL WOUND: ICD-10-CM

## 2022-02-28 DIAGNOSIS — L81.9 DISCOLORATION OF SKIN OF LOWER LEG: ICD-10-CM

## 2022-02-28 DIAGNOSIS — S61.452D: ICD-10-CM

## 2022-02-28 DIAGNOSIS — S01.25XA OPEN WOUND OF NOSE DUE TO DOG BITE: ICD-10-CM
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
## 2022-02-28 PROCEDURE — 11045 DBRDMT SUBQ TISS EACH ADDL: CPT | Performed by: PLASTIC SURGERY

## 2022-02-28 PROCEDURE — 93923 UPR/LXTR ART STDY 3+ LVLS: CPT

## 2022-02-28 PROCEDURE — 73660 X-RAY EXAM OF TOE(S): CPT

## 2022-02-28 PROCEDURE — 11042 DBRDMT SUBQ TIS 1ST 20SQCM/<: CPT | Performed by: PLASTIC SURGERY

## 2022-02-28 PROCEDURE — 11043 DBRDMT MUSC&/FSCA 1ST 20/<: CPT

## 2022-02-28 PROCEDURE — 11045 DBRDMT SUBQ TISS EACH ADDL: CPT

## 2022-02-28 PROCEDURE — 11042 DBRDMT SUBQ TIS 1ST 20SQCM/<: CPT

## 2022-02-28 PROCEDURE — 11043 DBRDMT MUSC&/FSCA 1ST 20/<: CPT | Performed by: PLASTIC SURGERY

## 2022-02-28 RX ORDER — LIDOCAINE HYDROCHLORIDE 20 MG/ML
JELLY TOPICAL ONCE
Status: COMPLETED | OUTPATIENT
Start: 2022-02-28 | End: 2022-02-28

## 2022-02-28 RX ORDER — LIDOCAINE HYDROCHLORIDE 20 MG/ML
JELLY TOPICAL ONCE
Status: CANCELLED | OUTPATIENT
Start: 2022-02-28 | End: 2022-02-28

## 2022-02-28 RX ADMIN — LIDOCAINE HYDROCHLORIDE 6 ML: 20 JELLY TOPICAL at 09:16

## 2022-02-28 NOTE — PROGRESS NOTES
TABLET BY MOUTH EVERY EVENING 90 tablet 3    metFORMIN (GLUCOPHAGE) 1000 MG tablet Take 1 tablet by mouth 2 times daily (with meals) 180 tablet 3    testosterone cypionate (DEPOTESTOTERONE CYPIONATE) 200 MG/ML injection       Testosterone Cypionate 200 MG/ML KIT Inject 200 mg into the muscle every 30 days for 30 days. please include syringes 1 kit 5    NEEDLE, DISP, 18 G 18G X 1\" MISC 1 Syringe by Does not apply route every 30 days 25 each 0    NEEDLE, DISP, 22 G 22G X 1-1/2\" MISC 1 Syringe by Does not apply route every 30 days 25 each 0    Multiple Vitamin (MULTI VITAMIN DAILY PO) Take by mouth      nitroGLYCERIN (NITROSTAT) 0.4 MG SL tablet Place 0.4 mg under the tongue      aspirin 325 MG tablet Take 325 mg by mouth daily. No current facility-administered medications for this encounter. Allergies: Allergies   Allergen Reactions    Pcn [Penicillins] Other (See Comments)     Unknown rx     Review of Systems:   Constitutional: Negative for fever, chills, fatigue and unexpected weight change. HENT: Negative for hearing loss, sore throat and facial swelling. Eyes: Negative for pain and discharge. Respiratory: No history of sleep apnea. Cardiovascular: Patient with a history of CHF, coronary artery disease, cardiac myopathy, peripheral vascular disease. Patient with hypercholesterolemia, hyperlipidemia, hypertension, ischemic cardiomyopathy. Gastrointestinal: Negative for nausea, vomiting, diarrhea and constipation. Skin: Negative for pallor and rash. Neurological: Patient has chronic back pain. .   Hematological: Does not bruise/bleed easily. Psychiatric/Behavioral: Negative for behavioral problems. The patient is not nervous/anxious.       Past Medical History:   Diagnosis Date    Acquired trigger finger 6/5/2018    CAD (coronary artery disease)     NWO[facility]/ involving coronary bypass graft of native heart with unstable angina pectoris    Cervical spondylosis     CHF (congestive heart failure) (HCC)     Chronic back pain     on 11/19/18 pt states is presently in pain mgmnt    Contusion of elbow 6/5/2018    Contusion of knee 6/5/2018    Contusion of shoulder region 6/5/2018    Diabetic foot ulcer with osteomyelitis ([address] 1/9/2018    Diabetic ulcer of toe associated with type 2 diabetes mellitus, with fat layer exposed ([address] 1/31/2018    Hypercholesterolemia 12/5/2017    Hyperlipemia, mixed     Hypertension     Ischemic cardiomyopathy 5/7/2018    Non-pressure chronic ulcer of left lower leg with fat layer exposed ([address] 6/9/2014    Obesity     Obesity, Class III, BMI 40-49.9 (morbid obesity) ([address] 4/6/2015    Obstructive sleep apnea syndrome     Osteoarthritis of carpometacarpal (CMC) joint of thumb 6/5/2018    Osteoarthritis of knee 6/5/2018    Peripheral vascular disease ([address]     with venous stasis and ulcerations. Dr Earlene Lucas Presence of coronary angioplasty implant and graft 9/11/2009    Sleep apnea     Dr Anai Arceo of knee and leg 6/5/2018    Sprain of shoulder and upper arm 6/5/2018    Type II or unspecified type diabetes mellitus without mention of complication, not stated as uncontrolled     Ulcer of left foot, with fat layer exposed ([address] 1/9/2018    Unspecified sleep apnea     Dr Orquidea Davis CPAP    Venous insufficiency of both lower extremities 1/31/2018     Past Surgical History:   Procedure Laterality Date    CARPAL TUNNEL RELEASE Right 3/22/13    Dr. Earlene Thomason.     CORONARY ANGIOPLASTY WITH STENT PLACEMENT  2002,2006.2009    X 3 separately    CORONARY ARTERY BYPASS GRAFT  2/26/07     TOMADr Erica dodd (X 3)    NECK SURGERY  2010    cervical stenoses C5-C6-C7and partial T1 laminectomy with fusion of C6-7    OTHER SURGICAL HISTORY      wound care ulcers of legs (bilaterally) with Dr Sandeep Escamilla  age 11    VEIN SURGERY      closure of peripherator veins of legs, Dr Yahir Casiano History     Socioeconomic History    Marital status:      Spouse name: Not on file    Number of children: Not on file    Years of education: Not on file    Highest education level: Not on file   Occupational History    Not on file   Tobacco Use    Smoking status: Never Smoker    Smokeless tobacco: Never Used   Vaping Use    Vaping Use: Never used   Substance and Sexual Activity    Alcohol use: Not Currently     Comment: rare social, non past 5 months    Drug use: No    Sexual activity: Yes     Partners: Female     Comment: spouse   Other Topics Concern    Not on file   Social History Narrative    Not on file     Social Determinants of Health     Financial Resource Strain:     Difficulty of Paying Living Expenses: Not on file   Food Insecurity:     Worried About Running Out of Food in the Last Year: Not on file    [place] of Food in the Last Year: Not on file   Transportation Needs:     Lack of Transportation (Medical): Not on file    Lack of Transportation (Non-Medical):  Not on file   Physical Activity:     Days of Exercise per Week: Not on file    Minutes of Exercise per Session: Not on file   Stress:     Feeling of Stress : Not on file   Social Connections:     Frequency of Communication with Friends and Family: Not on file    Frequency of Social Gatherings with Friends and Family: Not on file    Attends Caoda[employer] Services: Not on file    Active Member of [address] [place] [address] [employer] or Organizations: Not on file    Attends Club or Organization Meetings: Not on file    Marital Status: Not on file   Intimate Partner Violence:     Fear of Current or Ex-Partner: Not on file    Emotionally Abused: Not on file    Physically Abused: Not on file    Sexually Abused: Not on file   Housing Stability:     Unable to Pay for Housing in the Last Year: Not on file    Number of [place] in the Last Year: Not on file    Unstable Housing in the Last Year: Not on file     Family History   Problem Relation Age of Onset    Cancer Mother         lung    Diabetes Father     Heart Disease Father     High Blood Pressure Father     Diabetes Brother     Heart Disease Brother     High Blood Pressure Brother      Physical Exam:   /70   Pulse 89   Temp 96.2 °F (35.7 °C)   Resp 16    There is no height or weight on file to calculate BMI. Physical Exam   Nursing note and vitals reviewed. Constitutional: Oriented to person, place, and time. Appears well-developed and well-nourished. No distress. Head: Normocephalic and atraumatic. Eyes: Conjunctivae and EOM are normal.   Pulmonary/Chest: Effort normal. No respiratory distress. Neurological: Alert and oriented to person, place, and time. Musculoskeletal: Patient has an extensor lag on the ring finger. Psychiatric: Normal mood and affect. Behavior is normal    Skin:  Wound 02/10/22 Hand Right; Anterior #1 CLUSTER (Active)   Wound Image   02/10/22 0848   Wound Etiology Traumatic 02/28/22 0912   Dressing Status Old drainage noted;New drainage noted 02/28/22 0912   Wound Cleansed Cleansed with saline 02/28/22 0912   Wound Length (cm) 2.3 cm 02/28/22 0912   Wound Width (cm) 2.5 cm 02/28/22 0912   Wound Depth (cm) 0.1 cm 02/28/22 0912   Wound Surface Area (cm^2) 5.75 cm^2 02/28/22 0912   Change in Wound Size % (l*w) -2200 02/28/22 0912   Wound Volume (cm^3) 0.575 cm^3 02/28/22 0912   Wound Healing % -2200 02/28/22 0912   Post-Procedure Length (cm) 2.3 cm 02/28/22 0912   Post-Procedure Width (cm) 2.5 cm 02/28/22 0912   Post-Procedure Depth (cm) 0.1 cm 02/28/22 0912   Post-Procedure Surface Area (cm^2) 5.75 cm^2 02/28/22 0912   Post-Procedure Volume (cm^3) 0.575 cm^3 02/28/22 0912   Wound Assessment [place]/red;Slough 02/28/22 0912   Drainage Amount Moderate 02/28/22 0912   Drainage Description Serosanguinous 02/28/22 0912   Odor None 02/28/22 0912   Trudi-wound Assessment Blanchable erythema 02/28/22 0912   Margins Attached edges 02/28/22 0912   Wound Thickness Description not for Pressure Injury Full thickness 02/28/22 0912   Number of days: 18       Wound 02/10/22 Hand Left; Anterior;Proximal #2 (Active)   Wound Image   02/10/22 0848   Wound Etiology Traumatic 02/28/22 0912   Dressing Status New drainage noted; Old drainage noted 02/28/22 0912   Wound Cleansed Cleansed with saline 02/28/22 0912   Wound Length (cm) 2.4 cm 02/28/22 0912   Wound Width (cm) 3.7 cm 02/28/22 0912   Wound Depth (cm) 0.1 cm 02/28/22 0912   Wound Surface Area (cm^2) 8.88 cm^2 02/28/22 0912   Change in Wound Size % (l*w) -996.3 02/28/22 0912   Wound Volume (cm^3) 0.888 cm^3 02/28/22 0912   Wound Healing % -996 02/28/22 0912   Post-Procedure Length (cm) 2.4 cm 02/28/22 0912   Post-Procedure Width (cm) 3.7 cm 02/28/22 0912   Post-Procedure Depth (cm) 0.1 cm 02/28/22 0912   Post-Procedure Surface Area (cm^2) 8.88 cm^2 02/28/22 0912   Post-Procedure Volume (cm^3) 0.888 cm^3 02/28/22 0912   Wound Assessment [place]/red;Slough 02/28/22 0912   Drainage Amount Moderate 02/28/22 0912   Drainage Description Serosanguinous 02/28/22 0912   Odor None 02/28/22 0912   Trudi-wound Assessment Blanchable erythema 02/28/22 0912   Margins Defined edges 02/28/22 0912   Wound Thickness Description not for Pressure Injury Full thickness 02/28/22 0912   Number of days: 18       Wound 02/21/22 Hand Left; Anterior;Distal #3 (Active)   Wound Image   02/21/22 0949   Wound Etiology Traumatic 02/28/22 0912   Dressing Status Old drainage noted;New drainage noted 02/21/22 0949   Wound Cleansed Cleansed with saline 02/28/22 0912   Wound Length (cm) 0 cm 02/28/22 0912   Wound Width (cm) 0 cm 02/28/22 0912   Wound Depth (cm) 0 cm 02/28/22 0912   Wound Surface Area (cm^2) 0 cm^2 02/28/22 0912   Change in Wound Size % (l*w) 100 02/28/22 0912   Wound Volume (cm^3) 0 cm^3 02/28/22 0912   Wound Healing % 100 02/28/22 0912   Post-Procedure Length (cm) 0 cm 02/28/22 0912   Post-Procedure Width (cm) 0 cm 02/28/22 0912   Post-Procedure Depth (cm) 1.4 cm 02/28/22 0912   Wound Width (cm) 2.2 cm 02/28/22 0912   Wound Depth (cm) 0.1 cm 02/28/22 0912   Wound Surface Area (cm^2) 3.08 cm^2 02/28/22 0912   Wound Volume (cm^3) 0.308 cm^3 02/28/22 0912   Post-Procedure Length (cm) 1.4 cm 02/28/22 0912   Post-Procedure Width (cm) 2.2 cm 02/28/22 0912   Post-Procedure Depth (cm) 0.1 cm 02/28/22 0912   Post-Procedure Surface Area (cm^2) 3.08 cm^2 02/28/22 0912   Post-Procedure Volume (cm^3) 0.308 cm^3 02/28/22 0912   Wound Assessment Slough 02/28/22 0912   Drainage Amount Moderate 02/28/22 0912   Drainage Description Serosanguinous 02/28/22 0912   Odor None 02/28/22 0912   Trudi-wound Assessment Blanchable erythema 02/28/22 0912   Margins Attached edges 02/28/22 0912   Wound Thickness Description not for Pressure Injury Full thickness 02/28/22 0912   Number of days: 0          Procedure Note  Indications:  Based on my examination of this patient's wound(s)/ulcer(s) today, debridement is required to promote healing and evaluate the wound base. Performed by: Patricia Tran MD    Consent obtained:  Yes    Time out taken:  Yes    Pain Control: Anesthetic  Anesthetic: 2% Lidocaine Gel Topical       Debridement:Excisional Debridement    Using curette, scissors and forceps the wound(s)/ulcer(s) was/were sharply debrided down through and including the removal of muscle/fascia.       Devitalized Tissue Debrided:  necrotic/eschar    Pre Debridement Measurements:  Are located in the Wound/Ulcer Documentation Flow Sheet    Wound/Ulcer #: 1, 2, 4 and 5           Percent of Wound(s)/Ulcer(s) Debrided: 100%    Total Surface Area Debrided: Excisional debridement to the dorsum of the left hand anterior measuring 8.8 centimeters squared through the subcutaneous tissue        Excisional debridement to the dorsum of the right hand measuring 15.6 centimeters squared through the subcutaneous tissue    Excisional debridement to the right hand anterior measuring 5.75 centimeters squared through the subcutaneous tissue    Excisional debridement right second toe measuring 3.08 cm² through the tendon    Diabetic/Pressure/Non Pressure Ulcers only:  Ulcer: Non-Pressure ulcer, muscle necrosis      Estimated Blood Loss:  Minimal    Hemostasis Achieved:  by pressure    Procedural Pain:  1  / 10     Post Procedural Pain:  0 / 10     Response to treatment:  Well tolerated by patient. Imaging:   [unfilled]        Impression/Plan:      Diagnosis Orders   1.  Dog bite, initial encounter       Patient Active Problem List   Diagnosis    Hyperlipemia, mixed    Sleep apnea    Peripheral vascular disease ([address]    CAD (coronary artery disease)    CHF (congestive heart failure) (HCC)    CTS (carpal tunnel syndrome)    Hypotension    Obesity, Class III, BMI 40-49.9 (morbid obesity) ([address]    Essential hypertension    DDD (degenerative disc disease), cervical    DJD (degenerative joint disease), cervical    Primary osteoarthritis involving multiple joints    Paresthesias in left hand    Uncontrolled type 2 diabetes mellitus without complication, without long-term current use of insulin    Coronary artery disease involving coronary bypass graft of native heart without angina pectoris    Sleep apnea    Cervical spondylosis    Obesity    Long term current use of antithrombotics/antiplatelets    BMI 99.3-72.9, adult ([address]    H/O cervical spine surgery    Long term (current) use of non-steroidal anti-inflammatories (nsaid)    Chronic prescription opiate use    Severe comorbid illness    Hx of cervical spine surgery    Chronic neck pain    Candidal balanitis    Cellulitis of third toe, left    Nail avulsion, toe, initial encounter    Cellulitis of second toe, right    Abnormal nuclear stress test    Abnormal stress test    Hypercholesterolemia    Diabetic foot ulcer with osteomyelitis (HCC)    Ulcer of left foot, with fat layer exposed ([address]    Acquired trigger finger    Contusion of elbow    Contusion of shoulder region    Contusion of knee    Degeneration of lumbar intervertebral disc    Diabetic ulcer of toe associated with type 2 diabetes mellitus, with fat layer exposed ([address]    History of coronary artery bypass graft x 3    Ischemic cardiomyopathy    Osteoarthritis of knee    Osteoarthritis of carpometacarpal (CMC) joint of thumb    Sprain of knee and leg    Sprain of shoulder and upper arm    Presence of coronary angioplasty implant and graft    Venous insufficiency of both lower extremities    Dog bite    Impingement syndrome of shoulder region    Bite wound of hand, left, subsequent encounter    Bite wound of hand, right, subsequent encounter    Open bite of left ear    Open wound of nose due to dog bite     Plan: We will place Blessing over the open wound. Patient is to follow-up with in a week. We will order x-rays and arterial studies patient is a diabetic. Patient is to see podiatry at the next appointment.          Electronically signed by:  Henry Jimenez MD 2/28/2022

## 2022-03-04 NOTE — TELEPHONE ENCOUNTER
Joseph Barajas is calling to request a refill on the following medication(s):    Last Visit Date (If Applicable):  6/04/4672    Next Visit Date:    Visit date not found    Medication Request:  Requested Prescriptions     Pending Prescriptions Disp Refills    metFORMIN (GLUCOPHAGE) 1000 MG tablet 60 tablet 3     Sig: Take 1 tablet by mouth 2 times daily (with meals)

## 2022-03-09 ENCOUNTER — HOSPITAL ENCOUNTER (OUTPATIENT)
Dept: WOUND CARE | Age: 62
Discharge: HOME | End: 2022-03-09
Payer: COMMERCIAL

## 2022-03-09 VITALS
HEART RATE: 70 BPM | WEIGHT: 239 LBS | BODY MASS INDEX: 32.37 KG/M2 | HEIGHT: 72 IN | DIASTOLIC BLOOD PRESSURE: 67 MMHG | SYSTOLIC BLOOD PRESSURE: 125 MMHG | TEMPERATURE: 97.4 F | RESPIRATION RATE: 16 BRPM

## 2022-03-09 DIAGNOSIS — S61.451D: ICD-10-CM
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
## 2022-03-09 DIAGNOSIS — S61.452D: ICD-10-CM

## 2022-03-09 DIAGNOSIS — S01.352D: ICD-10-CM
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
## 2022-03-09 DIAGNOSIS — S01.25XA OPEN WOUND OF NOSE DUE TO DOG BITE: ICD-10-CM

## 2022-03-09 PROCEDURE — 11042 DBRDMT SUBQ TIS 1ST 20SQCM/<: CPT

## 2022-03-09 RX ORDER — LIDOCAINE HYDROCHLORIDE 20 MG/ML
JELLY TOPICAL ONCE
Status: CANCELLED | OUTPATIENT
Start: 2022-03-09 | End: 2022-03-09

## 2022-03-09 RX ORDER — LIDOCAINE HYDROCHLORIDE 20 MG/ML
JELLY TOPICAL ONCE
Status: COMPLETED | OUTPATIENT
Start: 2022-03-09 | End: 2022-03-09

## 2022-03-09 RX ADMIN — LIDOCAINE HYDROCHLORIDE 6 ML: 20 JELLY TOPICAL at 09:32

## 2022-03-09 ASSESSMENT — PAIN DESCRIPTION - FREQUENCY: FREQUENCY: INTERMITTENT

## 2022-03-09 ASSESSMENT — PAIN DESCRIPTION - PAIN TYPE: TYPE: CHRONIC PAIN

## 2022-03-09 ASSESSMENT — PAIN SCALES - GENERAL: PAINLEVEL_OUTOF10: 5

## 2022-03-09 ASSESSMENT — PAIN DESCRIPTION - ONSET: ONSET: ON-GOING

## 2022-03-09 ASSESSMENT — PAIN DESCRIPTION - DESCRIPTORS: DESCRIPTORS: STABBING

## 2022-03-09 ASSESSMENT — PAIN DESCRIPTION - LOCATION: LOCATION: HAND;HIP;FOOT

## 2022-03-09 ASSESSMENT — PAIN DESCRIPTION - ORIENTATION: ORIENTATION: RIGHT;LEFT

## 2022-03-09 ASSESSMENT — PAIN - FUNCTIONAL ASSESSMENT: PAIN_FUNCTIONAL_ASSESSMENT: PREVENTS OR INTERFERES SOME ACTIVE ACTIVITIES AND ADLS

## 2022-03-09 ASSESSMENT — PAIN DESCRIPTION - PROGRESSION: CLINICAL_PROGRESSION: NOT CHANGED

## 2022-03-09 NOTE — PROGRESS NOTES
[address]   Progress Note and Procedure Note      José Chiang  MEDICAL RECORD NUMBER:  [id number]  AGE: 64 y.o. GENDER: male  : 1960  EPISODE DATE:  3/9/2022    Subjective:     Chief Complaint   Patient presents with    Wound Check         HISTORY of PRESENT ILLNESS HPI     José Chiang is a 64 y.o. male who presents today for wound/ulcer evaluation. History of Wound Context: Edgardo Koch presents for evaluation of toe wounds of both feet. He reports that they have been present for a few weeks now. States they began after he was attacked by a dog. He recently underwent NIVS which revealed no arterial insufficiency. He presents in surgical shoes today. Has been using tiesha at home. States that he does not have any neuropathy or numbness in the feet. Reports that he has been on doxycycline for 5 weeks now. States he has the last pill today.      Ulcer Identification:  Ulcer Type: diabetic  Contributing Factors: diabetes and chronic pressure          PAST MEDICAL HISTORY        Diagnosis Date    Acquired trigger finger 2018    CAD (coronary artery disease)     [facility]/ involving coronary bypass graft of native heart with unstable angina pectoris    Cervical spondylosis     CH[facility]congestive heart failure) [facility])     Chronic back pain     on 18 pt states is presently in pain mgmnt    Contusion of elbow 2018    Contusion of knee 2018    Contusion of shoulder region 2018    Diabetic foot ulcer with osteomyelitis ([address] 2018    Diabetic ulcer of toe associated with type 2 diabetes mellitus, with fat layer exposed ([address] 2018    Hypercholesterolemia 2017    Hyperlipemia, mixed     Hypertension     Ischemic cardiomyopathy 2018    Non-pressure chronic ulcer of left lower leg with fat layer exposed ([address] 2014    Obesity     Obesity, Class III, BMI 40-49.9 (morbid obesity) ([address] 2015    Obstructive sleep apnea syndrome  Osteoarthritis of carpometacarpal [place]) joint of thumb 6/5/2018    Osteoarthritis of knee 6/5/2018    Peripheral vascular disease ([address]     with venous stasis and ulcerations. Dr Nina Mccormack Presence of coronary angioplasty implant and graft 9/11/2009    Sleep apnea     Dr Samuel Cramer of knee and leg 6/5/2018    Sprain of shoulder and upper arm 6/5/2018    Type II or unspecified type diabetes mellitus without mention of complication, not stated as uncontrolled     Ulcer of left foot, with fat layer exposed ([address] 1/9/2018    Unspecified sleep apnea     Dr Ramana Clemens CPAP    Venous insufficiency of both lower extremities 1/31/2018       PAST SURGICAL HISTORY    Past Surgical History:   Procedure Laterality Date    CARPAL TUNNEL RELEASE Right 3/22/13    Dr. Jessica Ghotra.     CORONARY ANGIOPLASTY WITH STENT PLACEMENT  2002,2006.2009    X 3 separately    CORONARY ARTERY BYPASS GRAFT  2/26/07    [place], Dr Nick Cabrera (X 3)    NECK SURGERY  2010    cervical stenoses C5-C6-C7and partial T1 laminectomy with fusion of C6-7    OTHER SURGICAL HISTORY      wound care ulcers of legs (bilaterally) with Dr Chrissy Hamilton  age 11    VEIN SURGERY      closure of peripherator veins of legs, Dr Hill Blood History   Problem Relation Age of Onset    Cancer Mother         lung    Diabetes Father     Heart Disease Father     High Blood Pressure Father     Diabetes Brother     Heart Disease Brother     High Blood Pressure Brother        SOCIAL HISTORY    Social History     Tobacco Use    Smoking status: Never Smoker    Smokeless tobacco: Never Used   Vaping Use    Vaping Use: Never used   Substance Use Topics    Alcohol use: Not Currently     Comment: rare social, non past 5 months    Drug use: No       ALLERGIES    Allergies   Allergen Reactions    Pcn [Penicillins] Other (See Comments)     Unknown rx       MEDICATIONS    Current Outpatient Medications on File Prior to Encounter   Medication Sig Dispense Refill    metFORMIN (GLUCOPHAGE) 1000 MG tablet Take 1 tablet by mouth 2 times daily (with meals) 60 tablet 3    tiZANidine (ZANAFLEX) 4 MG tablet 1 po qd 14 tablet 3    omeprazole (PRILOSEC) 20 MG delayed release capsule TAKE 1 CAPSULE DAILY 90 capsule 1    carvedilol (COREG) 12.5 MG tablet Take 1 tablet by mouth 2 times daily (with meals) 180 tablet 3    meloxicam (MOBIC) 15 MG tablet Take 1 tablet by mouth daily 90 tablet 3    clopidogrel (PLAVIX) 75 MG tablet Take 1 tablet by mouth daily 90 tablet 3    hydroCHLOROthiazide (MICROZIDE) 12.5 MG capsule 1 po qd 90 capsule 3    linagliptin (TRADJENTA) 5 MG tablet TAKE 1 TABLET BY MOUTH ONE TIME A DAY 30 tablet 5    traZODone (DESYREL) 150 MG tablet TAKE ONE TABLET BY MOUTH EVERY EVENING 90 tablet 3    testosterone cypionate (DEPOTESTOTERONE CYPIONATE) 200 MG/ML injection       Multiple Vitamin (MULTI VITAMIN DAILY PO) Take by mouth      nitroGLYCERIN (NITROSTAT) 0.4 MG SL tablet Place 0.4 mg under the tongue      aspirin 325 MG tablet Take 325 mg by mouth daily.  topiramate (TOPAMAX) 200 MG tablet Take 1 tablet by mouth 2 times daily 60 tablet 5    atorvastatin (LIPITOR) 40 MG tablet Take 1 tablet by mouth daily 90 tablet 3    NEEDLE, DISP, 22 G 22G X 1-1/2\" MISC 1 Device by Does not apply route every 30 days 25 each 1    NEEDLE, DISP, 18 G (BD DISP NEEDLES) 18G X 1-1/2\" MISC 1 Device by Does not apply route every 30 days 25 each 1    Testosterone Cypionate 200 MG/ML KIT Inject 200 mg into the muscle every 30 days for 30 days. please include syringes 1 kit 5    NEEDLE, DISP, 18 G 18G X 1\" MISC 1 Syringe by Does not apply route every 30 days 25 each 0    NEEDLE, DISP, 22 G 22G X 1-1/2\" MISC 1 Syringe by Does not apply route every 30 days 25 each 0     No current facility-administered medications on file prior to encounter.        REVIEW OF SYSTEMS    Review of Systems Constitutional: Negative for chills and fever. Skin: Positive for wound. Neurological: Negative for numbness. Objective:      /67   Pulse 70   Temp 97.4 °F (36.3 °C) (Tympanic)   Resp 16   Ht 6' (1.829 m)   Wt 239 lb (108.4 kg)   BMI 32.41 kg/m²     Wt Readings from Last 3 Encounters:   03/09/22 239 lb (108.4 kg)   02/21/22 239 lb (108.4 kg)   02/10/22 239 lb (108.4 kg)       Physical Exam:  General:  Alert and oriented x3. In no acute distress. Lower Extremity Physical Exam:    Vascular: DP pulses are palpable, Bilateral. PT pulses are palpable, Bilateral. CFT <3 seconds to all digits, Bilateral.  No edema, Bilateral.  Hair growth is absent to the level of the digits, Bilateral.     Neuro: Saph/sural/SP/DP/plantar sensation intact to light touch. Musculoskeletal: EHL/FHL/GS/TA gross motor intact. Gross deformity is present, hammertoes bilateral.     Dermatologic: Open wound present to multiple dorsal digits at the PIPJ as documented in detail below. Wound base is fibrotic and eschar. Negative probe to bone. There is no erythema. There is no purulent drainage. There is no fluctuance or crepitus. Interdigital maceration absent, Bilateral.       Assessment:      Active Hospital Problems    Diagnosis Date Noted    Diabetic ulcer of toe associated with type 2 diabetes mellitus, with fat layer exposed ([facility] [Z93.077, L97.502] 01/31/2018       Plan:     Treatment Note please see attached Discharge Instructions    PVR/PPG reviewed    Begin medihoney (he believes santyl jed would be very high) daily covered with silicone bordered gauze to help break up eschar and fibrotic tissue    Educated on signs and symptoms of infection. Instructed to call clinic immediately or go to ER if signs and symptoms of infection are present.      Continue surgical shoe to remove pressure from the area    RTC 1 week       Procedure Note  Indications:  Based on my examination of this patient's wound(s)/ulcer(s) today, debridement is required to promote healing and evaluate the wound base. Performed by: Kirit Guallpa DPM    Consent obtained:  Yes    Time out taken:  Yes    Pain Control: Anesthetic  Anesthetic: 2% Lidocaine Gel Topical       Debridement: Excisional Debridement    Using scissors and forceps the wound(s)/ulcer(s) was/were sharply debrided down through and including the removal of epidermis, dermis and subcutaneous tissue. Devitalized Tissue Debrided:  fibrin and necrotic/eschar    Pre Debridement Measurements:  Are located in the Wound/Ulcer Documentation Flow Sheet    Wound/Ulcer #: 7 and 8    Post Debridement Measurements:  Wound/Ulcer Descriptions are Pre Debridement except measurements:    Wound 02/10/22 Hand Right; Anterior #1 CLUSTER (Active)   Wound Image   02/10/22 0848   Wound Etiology Traumatic 02/28/22 0912   Dressing Status Old drainage noted;New drainage noted 02/28/22 0912   Wound Cleansed Cleansed with saline 02/28/22 0912   Dressing/Treatment Other (comment) 02/28/22 0928   Wound Length (cm) 2.3 cm 02/28/22 0912   Wound Width (cm) 2.5 cm 02/28/22 0912   Wound Depth (cm) 0.1 cm 02/28/22 0912   Wound Surface Area (cm^2) 5.75 cm^2 02/28/22 0912   Change in Wound Size % (l*w) -2200 02/28/22 0912   Wound Volume (cm^3) 0.575 cm^3 02/28/22 0912   Wound Healing % -2200 02/28/22 0912   Post-Procedure Length (cm) 2.3 cm 02/28/22 0912   Post-Procedure Width (cm) 2.5 cm 02/28/22 0912   Post-Procedure Depth (cm) 0.1 cm 02/28/22 0912   Post-Procedure Surface Area (cm^2) 5.75 cm^2 02/28/22 0912   Post-Procedure Volume (cm^3) 0.575 cm^3 02/28/22 0912   Wound Assessment [place]/red;Slough 02/28/22 0912   Drainage Amount Moderate 02/28/22 0912   Drainage Description Serosanguinous 02/28/22 0912   Odor None 02/28/22 0912   Trudi-wound Assessment Blanchable erythema 02/28/22 0912   Margins Attached edges 02/28/22 0912   Wound Thickness Description not for Pressure Injury Full thickness 02/28/22 0912   Number of days: 27       Wound 02/10/22 Hand Left; Anterior;Proximal #2 (Active)   Wound Image   02/10/22 0848   Wound Etiology Traumatic 02/28/22 0912   Dressing Status New drainage noted; Old drainage noted 02/28/22 0912   Wound Cleansed Cleansed with saline 02/28/22 0912   Dressing/Treatment Other (comment) 02/28/22 0928   Wound Length (cm) 2.4 cm 02/28/22 0912   Wound Width (cm) 3.7 cm 02/28/22 0912   Wound Depth (cm) 0.1 cm 02/28/22 0912   Wound Surface Area (cm^2) 8.88 cm^2 02/28/22 0912   Change in Wound Size % (l*w) -996.3 02/28/22 0912   Wound Volume (cm^3) 0.888 cm^3 02/28/22 0912   Wound Healing % -996 02/28/22 0912   Post-Procedure Length (cm) 2.4 cm 02/28/22 0912   Post-Procedure Width (cm) 3.7 cm 02/28/22 0912   Post-Procedure Depth (cm) 0.1 cm 02/28/22 0912   Post-Procedure Surface Area (cm^2) 8.88 cm^2 02/28/22 0912   Post-Procedure Volume (cm^3) 0.888 cm^3 02/28/22 0912   Wound Assessment [place]/red;Slough 02/28/22 0912   Drainage Amount Moderate 02/28/22 0912   Drainage Description Serosanguinous 02/28/22 0912   Odor None 02/28/22 0912   Trudi-wound Assessment Blanchable erythema 02/28/22 0912   Margins Defined edges 02/28/22 0912   Wound Thickness Description not for Pressure Injury Full thickness 02/28/22 0912   Number of days: 27       Wound 02/21/22 Hand Right;Dorsal #4 Cluster (Active)   Wound Etiology Traumatic 02/28/22 0912   Dressing Status Old drainage noted;New drainage noted 02/28/22 0912   Wound Cleansed Cleansed with saline 02/28/22 0912   Dressing/Treatment Other (comment) 02/28/22 0928   Wound Length (cm) 6.5 cm 02/28/22 0912   Wound Width (cm) 2.4 cm 02/28/22 0912   Wound Depth (cm) 0.1 cm 02/28/22 0912   Wound Surface Area (cm^2) 15.6 cm^2 02/28/22 0912   Change in Wound Size % (l*w) -188.89 02/28/22 0912   Wound Volume (cm^3) 1.56 cm^3 02/28/22 0912   Wound Healing % 42 02/28/22 0912   Post-Procedure Length (cm) 6.5 cm 02/28/22 0912   Post-Procedure Width (cm) 2.4 cm 02/28/22 0912   Post-Procedure Depth (cm) 0.1 cm 02/28/22 0912   Post-Procedure Surface Area (cm^2) 15.6 cm^2 02/28/22 0912   Post-Procedure Volume (cm^3) 1.56 cm^3 02/28/22 0912   Wound Assessment Pink/red 02/28/22 0912   Drainage Amount Moderate 02/28/22 0912   Drainage Description Serosanguinous 02/28/22 0912   Odor None 02/28/22 0912   Trudi-wound Assessment Blanchable erythema 02/28/22 0912   Margins Defined edges 02/28/22 0912   Wound Thickness Description not for Pressure Injury Full thickness 02/28/22 0912   Number of days: 15       Wound 02/28/22 Toe (Comment  which one) Right 2nd Toe #5 (Active)   Wound Image   02/28/22 0912   Wound Etiology Traumatic 03/09/22 0922   Dressing Status Old drainage noted;New drainage noted 03/09/22 0922   Wound Cleansed Cleansed with saline 03/09/22 0922   Dressing/Treatment Other (comment) 02/28/22 0928   Offloading for Diabetic Foot Ulcers Offloading ordered;Post op shoe 03/09/22 0922   Wound Length (cm) 1.5 cm 03/09/22 0922   Wound Width (cm) 1.8 cm 03/09/22 0922   Wound Depth (cm) 0.1 cm 03/09/22 0922   Wound Surface Area (cm^2) 2.7 cm^2 03/09/22 0922   Change in Wound Size % (l*w) 12.34 03/09/22 0922   Wound Volume (cm^3) 0.27 cm^3 03/09/22 0922   Wound Healing % 12 03/09/22 0922   Post-Procedure Length (cm) 1.4 cm 02/28/22 0912   Post-Procedure Width (cm) 2.2 cm 02/28/22 0912   Post-Procedure Depth (cm) 0.1 cm 02/28/22 0912   Post-Procedure Surface Area (cm^2) 3.08 cm^2 02/28/22 0912   Post-Procedure Volume (cm^3) 0.308 cm^3 02/28/22 0912   Wound Assessment Slough;[place]/red 03/09/22 0922   Drainage Amount Moderate 03/09/22 0922   Drainage Description Serosanguinous 03/09/22 0922   Odor None 03/09/22 0922   Trudi-wound Assessment Blanchable erythema 03/09/22 0922   Margins Attached edges 03/09/22 0922   Wound Thickness Description not for Pressure Injury Full thickness 03/09/22 2053   Number of days: 9       Wound 03/09/22 Toe (Comment  which one) Right #6 right great toe (Active)   Wound Image   03/09/22 0922   Wound Cleansed Irrigated with saline 03/09/22 0922   Offloading for Diabetic Foot Ulcers Offloading ordered;Post op shoe 03/09/22 0922   Wound Length (cm) 0.9 cm 03/09/22 0922   Wound Width (cm) 1.1 cm 03/09/22 0922   Wound Depth (cm) 0.1 cm 03/09/22 0922   Wound Surface Area (cm^2) 0.99 cm^2 03/09/22 0922   Wound Volume (cm^3) 0.099 cm^3 03/09/22 0922   Wound Assessment Eschar moist 03/09/22 0922   Drainage Amount Moderate 03/09/22 0922   Drainage Description Serosanguinous 03/09/22 0922   Odor None 03/09/22 0922   Trudi-wound Assessment Maceration 03/09/22 0922   Margins Defined edges 03/09/22 0922   Number of days: 0       Wound 03/09/22 Toe (Comment  which one) Right #7 Right 3rd toe (Active)   Wound Image   03/09/22 0922   Wound Cleansed Cleansed with saline 03/09/22 0922   Offloading for Diabetic Foot Ulcers Offloading ordered;Post op shoe 03/09/22 0922   Wound Length (cm) 1.1 cm 03/09/22 0922   Wound Width (cm) 0.6 cm 03/09/22 0922   Wound Depth (cm) 0.1 cm 03/09/22 0922   Wound Surface Area (cm^2) 0.66 cm^2 03/09/22 0922   Wound Volume (cm^3) 0.066 cm^3 03/09/22 0922   Wound Assessment Eschar moist 03/09/22 0922   Drainage Amount Moderate 03/09/22 0922   Drainage Description Serosanguinous 03/09/22 0922   Odor None 03/09/22 0922   Trudi-wound Assessment Maceration 03/09/22 0922   Margins Defined edges 03/09/22 0922   Number of days: 0       Wound 03/09/22 Toe (Comment  which one) Left #8 left 2nd toe (Active)   Wound Image   03/09/22 0922   Wound Cleansed Cleansed with saline 03/09/22 0922   Offloading for Diabetic Foot Ulcers Offloading ordered;Post op shoe 03/09/22 0922   Wound Length (cm) 1.6 cm 03/09/22 0922   Wound Width (cm) 1.5 cm 03/09/22 0922   Wound Depth (cm) 0.1 cm 03/09/22 0922   Wound Surface Area (cm^2) 2.4 cm^2 03/09/22 0922   Wound Volume (cm^3) 0.24 cm^3 03/09/22 0922   Wound Assessment Slough;[place]/red 03/09/22 0922   Drainage Amount Moderate 03/09/22 7165   Drainage Description Serosanguinous 03/09/22 0922   Odor None 03/09/22 0922   Trudi-wound Assessment Maceration 03/09/22 0922   Margins Defined edges 03/09/22 0922   Number of days: 0          Percent of Wound(s)/Ulcer(s) Debrided: 100%    Total Surface Area Debrided:  0.9 sq cm     Diabetic/Pressure/Non Pressure Ulcers only:  Ulcer: Diabetic ulcer, fat layer exposed     Estimated Blood Loss:  Minimal    Hemostasis Achieved:  by pressure     Response to treatment:  With complaints of pain. Debridement not able to be completed to all wounds due to pain. Written patient discharge instructions given to patient and signed by patient or POA.       Orders Placed This Encounter   Medications    lidocaine (XYLOCAINE) 2 % uro-jet     Orders Placed This Encounter   Procedures    Initiate Outpatient Wound Care Protocol     Cleanse wound with saline    If wound contains bioburden or contamination cleanse with wound cleanser or antimicrobial solution     For normal periwound tissue without irritation nor maceration, apply topical skin protectant    For periwound tissue with irritation and/or maceration, apply zinc based product, topical steroid cream/ointment, or equivalent     For wounds with dry firm black eschar and/or without exudate, apply betadine and leave open to air      For wounds with scant/small to no exudate or drainage, apply wound gel, hydrocolloid, polymer, or equivalent and cover with secondary dressing/foam      For wounds with moderate/large exudate or drainage, apply alginate, hydrofiber, polymer, or equivalent and cover with secondary dressing/foam    For wounds with nonviable tissue requiring removal, apply chemical or mechanical debrider and cover with secondary dressing/foam    For wounds with tunneling, dead space, or cavity, fill or pack with strip/gauze/kerlex to fit and cover with secondary dressing/foam    For wounds with adequate granulation or epithelization, apply wound gel, hydrocolloid, polymer, collagen, or transparent film, and cover secondary dry dressing/foam    For wounds that need additional secondary dressing to help pad or control additional drainage/exudates, add foam, absorbent pad or hydrocolloid    For wounds with suspected or known infection, apply antimicrobial mesh and/or antimicrobial alginate/hydrofiber, or antimicrobial solution moistened gauze/kerlex, or equivalent and cover with secondary dressing/foam    Compression Management needed for edema control, apply multilayer compression or tubular garment or equivalent    Offloading Management needed for pressure relief, apply offloading shoe/boot or equivalent     Standing Status:   Standing     Number of Occurrences:   1          Discharge Instructions          [address]: [phone] Fax: [phone]             [facility] Instructions / Physician Orders     DATE: 3/9/2022     Home Care: N/A     SUPPLIES ORDERED THRU: N/A     Wound Location: Toes     Cleanse with: Do not soak in water     Dressing Orders: TheraHoney to wounds, dry dressing     Frequency: DAILY     Additional Orders: Increase protein to diet (meat, cheese, eggs, fish, peanut butter, nuts and beans)  Multivitamin daily  ELEVATE LEGS AS MUCH AS POSSIBLE  2/10/2022-Culture Taken   2/14/22- antibiotic at pharmacy   2/28/22-X-rays ordered. Vascular studies ordered call [phone] to schedule.     [facility] 1 week with Dr. Mireille Guerra  (Please note your next appointment above and if you are unable to keep, kindly give a 24 hour notice.  Thank you.)     If you experience any of the following, please call the [address] [place] [address] during business hours:  [phone]  Your Phone call may be forwarded to [employer] during business hours that formerly [facility] is closed.     * Increase in Pain  * Temperature over 101  * Increase in drainage from your wound  * Drainage with a foul odor  * Bleeding  * Increase in swelling  * Need for compression bandage changes due to slippage, breakthrough drainage.     If you need medical attention outside of the business hours of the [address] [place] [address] Road please contact your PCP or go to the nearest emergency room.     The information contained in the After Visit Summary has been reviewed with me, the patient and/or responsible adult, by my health care provider(s). I had the opportunity to ask questions regarding this information. I have elected to receive;      []?? ?? After Visit Summary  [x]????Comprehensive Discharge Instruction        Patient signature______________________________________Date:________  Electronically signed by Nory Roldan RN on 3/9/2022 at 9:50 AM  Electronically signed by Jacky Boston DPM on 3/9/2022 at 9:22 AM          Electronically signed by Jacky Boston DPM on 3/9/2022 at 9:54 AM

## 2022-03-10 DIAGNOSIS — M15.9 PRIMARY OSTEOARTHRITIS INVOLVING MULTIPLE JOINTS: ICD-10-CM

## 2022-03-10 RX ORDER — MELOXICAM 15 MG/1
15 TABLET ORAL DAILY
Qty: 90 TABLET | Refills: 3 | Status: SHIPPED | OUTPATIENT
Start: 2022-03-10 | End: 2022-06-09

## 2022-03-10 RX ORDER — TRAZODONE HYDROCHLORIDE 150 MG/1
TABLET ORAL
Qty: 90 TABLET | Refills: 3 | Status: SHIPPED | OUTPATIENT
Start: 2022-03-10 | End: 2022-05-12 | Stop reason: SDUPTHER

## 2022-03-10 NOTE — TELEPHONE ENCOUNTER
Marek Bello is calling to request a refill on the following medication(s):    Last Visit Date (If Applicable):  2/59/4892    Next Visit Date:    Visit date not found    Medication Request:  Requested Prescriptions     Pending Prescriptions Disp Refills    meloxicam (MOBIC) 15 MG tablet 90 tablet 3     Sig: Take 1 tablet by mouth daily    traZODone (DESYREL) 150 MG tablet 90 tablet 3     Sig: TAKE ONE TABLET BY MOUTH EVERY EVENING

## 2022-03-16 ENCOUNTER — HOSPITAL ENCOUNTER (OUTPATIENT)
Dept: WOUND CARE | Age: 62
Discharge: HOME | End: 2022-03-16
Payer: COMMERCIAL
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
## 2022-03-16 DIAGNOSIS — S01.352D: ICD-10-CM

## 2022-03-16 DIAGNOSIS — S61.452D: ICD-10-CM

## 2022-03-16 DIAGNOSIS — S01.25XA OPEN WOUND OF NOSE DUE TO DOG BITE: ICD-10-CM
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
## 2022-03-16 DIAGNOSIS — S61.451D: ICD-10-CM

## 2022-03-16 PROCEDURE — 11042 DBRDMT SUBQ TIS 1ST 20SQCM/<: CPT

## 2022-03-16 RX ORDER — LIDOCAINE HYDROCHLORIDE 20 MG/ML
JELLY TOPICAL ONCE
Status: COMPLETED | OUTPATIENT
Start: 2022-03-16 | End: 2022-03-16

## 2022-03-16 RX ORDER — LIDOCAINE HYDROCHLORIDE 20 MG/ML
JELLY TOPICAL ONCE
Status: CANCELLED | OUTPATIENT
Start: 2022-03-16 | End: 2022-03-16

## 2022-03-16 RX ADMIN — LIDOCAINE HYDROCHLORIDE 6 ML: 20 JELLY TOPICAL at 09:14

## 2022-03-16 NOTE — PROGRESS NOTES
[address]   Progress Note and Procedure Note      Rebekah Soulier  MEDICAL RECORD NUMBER:  [id number]  AGE: 64 y.o. GENDER: male  : 1960  EPISODE DATE:  3/16/2022    Subjective:     Chief Complaint   Patient presents with    Wound Check     bilateral feet         HISTORY of PRESENT ILLNESS HPI     Rebekah Soulier is a 64 y.o. male who presents today for wound/ulcer evaluation. Interval history:  States that he has been applying the medi-honey daily. Has not had any sign of symptom of infection. History of Wound Context: Josselin Hinton presents for evaluation of toe wounds of both feet. He reports that they have been present for a few weeks now. States they began after he was attacked by a dog. He recently underwent NIVS which revealed no arterial insufficiency. He presents in surgical shoes today. Has been using tiesha at home. States that he does not have any neuropathy or numbness in the feet. Reports that he has been on doxycycline for 5 weeks now. States he has the last pill today.      Ulcer Identification:  Ulcer Type: diabetic  Contributing Factors: diabetes and chronic pressure          PAST MEDICAL HISTORY        Diagnosis Date    Acquired trigger finger 2018    CAD (coronary artery disease)     NWOCC/ involving coronary bypass graft of native heart with unstable angina pectoris    Cervical spondylosis     CH[facility]congestive heart failure) [facility]     Chronic back pain     on 18 pt states is presently in pain mgmnt    Contusion of elbow 2018    Contusion of knee 2018    Contusion of shoulder region 2018    Diabetic foot ulcer with osteomyelitis [facility] 2018    Diabetic ulcer of toe associated with type 2 diabetes mellitus, with fat layer exposed ([address] 2018    Hypercholesterolemia 2017    Hyperlipemia, mixed     Hypertension     Ischemic cardiomyopathy 2018    Non-pressure chronic ulcer of left lower leg with fat layer exposed ([address] 6/9/2014    Obesity     Obesity, Class III, BMI 40-49.9 (morbid obesity) ([address] 4/6/2015    Obstructive sleep apnea syndrome     Osteoarthritis of carpometacarpal (CMC) joint of thumb 6/5/2018    Osteoarthritis of knee 6/5/2018    Peripheral vascular disease ([address]     with venous stasis and ulcerations. Dr Sudheer Dee Presence of coronary angioplasty implant and graft 9/11/2009    Sleep apnea     Dr Armida Kim of knee and leg 6/5/2018    Sprain of shoulder and upper arm 6/5/2018    Type II or unspecified type diabetes mellitus without mention of complication, not stated as uncontrolled     Ulcer of left foot, with fat layer exposed ([address] 1/9/2018    Unspecified sleep apnea     Dr Eve Darling CPAP    Venous insufficiency of both lower extremities 1/31/2018       PAST SURGICAL HISTORY    Past Surgical History:   Procedure Laterality Date    CARPAL TUNNEL RELEASE Right 3/22/13    Dr. Alex Abarca.     CORONARY ANGIOPLASTY WITH STENT PLACEMENT  2002,2006.2009    X 3 separately    CORONARY ARTERY BYPASS GRAFT  2/26/07    [facility], Dr Tolliver Cover (X 3)    NECK SURGERY  2010    cervical stenoses C5-C6-C7and partial T1 laminectomy with fusion of C6-7    OTHER SURGICAL HISTORY      wound care ulcers of legs (bilaterally) with Dr Gwen Riojas  age 11    VEIN SURGERY      closure of peripherator veins of legs, Dr Yaa Palmer History   Problem Relation Age of Onset    Cancer Mother         lung    Diabetes Father     Heart Disease Father     High Blood Pressure Father     Diabetes Brother     Heart Disease Brother     High Blood Pressure Brother        SOCIAL HISTORY    Social History     Tobacco Use    Smoking status: Never Smoker    Smokeless tobacco: Never Used   Vaping Use    Vaping Use: Never used   Substance Use Topics    Alcohol use: Not Currently     Comment: rare social, non past 5 months    Drug use: No       ALLERGIES    Allergies   Allergen Reactions    Pcn [Penicillins] Other (See Comments)     Unknown rx       MEDICATIONS    Current Outpatient Medications on File Prior to Encounter   Medication Sig Dispense Refill    meloxicam (MOBIC) 15 MG tablet Take 1 tablet by mouth daily 90 tablet 3    traZODone (DESYREL) 150 MG tablet TAKE ONE TABLET BY MOUTH EVERY EVENING 90 tablet 3    metFORMIN (GLUCOPHAGE) 1000 MG tablet Take 1 tablet by mouth 2 times daily (with meals) 60 tablet 3    topiramate (TOPAMAX) 200 MG tablet Take 1 tablet by mouth 2 times daily 60 tablet 5    tiZANidine (ZANAFLEX) 4 MG tablet 1 po qd 14 tablet 3    omeprazole (PRILOSEC) 20 MG delayed release capsule TAKE 1 CAPSULE DAILY 90 capsule 1    carvedilol (COREG) 12.5 MG tablet Take 1 tablet by mouth 2 times daily (with meals) 180 tablet 3    clopidogrel (PLAVIX) 75 MG tablet Take 1 tablet by mouth daily 90 tablet 3    atorvastatin (LIPITOR) 40 MG tablet Take 1 tablet by mouth daily 90 tablet 3    hydroCHLOROthiazide (MICROZIDE) 12.5 MG capsule 1 po qd 90 capsule 3    NEEDLE, DISP, 22 G 22G X 1-1/2\" MISC 1 Device by Does not apply route every 30 days 25 each 1    NEEDLE, DISP, 18 G (BD DISP NEEDLES) 18G X 1-1/2\" MISC 1 Device by Does not apply route every 30 days 25 each 1    linagliptin (TRADJENTA) 5 MG tablet TAKE 1 TABLET BY MOUTH ONE TIME A DAY 30 tablet 5    testosterone cypionate (DEPOTESTOTERONE CYPIONATE) 200 MG/ML injection       Testosterone Cypionate 200 MG/ML KIT Inject 200 mg into the muscle every 30 days for 30 days.  please include syringes 1 kit 5    NEEDLE, DISP, 18 G 18G X 1\" MISC 1 Syringe by Does not apply route every 30 days 25 each 0    NEEDLE, DISP, 22 G 22G X 1-1/2\" MISC 1 Syringe by Does not apply route every 30 days 25 each 0    Multiple Vitamin (MULTI VITAMIN DAILY PO) Take by mouth      nitroGLYCERIN (NITROSTAT) 0.4 MG SL tablet Place 0.4 mg under the tongue      aspirin 325 MG tablet Take 325 mg by mouth daily. No current facility-administered medications on file prior to encounter. REVIEW OF SYSTEMS    Review of Systems   Constitutional: Negative for chills and fever. Skin: Positive for wound. Neurological: Negative for numbness. Objective: There were no vitals taken for this visit. Wt Readings from Last 3 Encounters:   03/09/22 239 lb (108.4 kg)   02/21/22 239 lb (108.4 kg)   02/10/22 239 lb (108.4 kg)       Physical Exam:  General:  Alert and oriented x3. In no acute distress. Lower Extremity Physical Exam:    Vascular: DP pulses are palpable, Bilateral. PT pulses are palpable, Bilateral. CFT <3 seconds to all digits, Bilateral.  No edema, Bilateral.  Hair growth is absent to the level of the digits, Bilateral.     Neuro: Saph/sural/SP/DP/plantar sensation intact to light touch. Musculoskeletal: EHL/FHL/GS/TA gross motor intact. Gross deformity is present, hammertoes bilateral.     Dermatologic: Open wound present to multiple dorsal digits at the PIPJ as documented in detail below. Wound base is fibrotic and eschar, increasingly granular to the left 2nd and right 2nd toe. Eschar remains well adhered to the right great toe. Negative probe to bone. There is no erythema. There is no purulent drainage. There is no fluctuance or crepitus. Interdigital maceration absent, Bilateral.       Assessment:      Active Hospital Problems    Diagnosis Date Noted    Diabetic ulcer of toe associated with type 2 diabetes mellitus, with fat layer exposed ([facility] [A20.572, L97.502] 01/31/2018       Plan:     Treatment Note please see attached Discharge Instructions    PVR/PPG reviewed    Continue medihoney (he believes santyl copay would be very high) daily covered with silicone bordered gauze to help break up eschar and fibrotic tissue  Can switch to santyl if he would prefer    Educated on signs and symptoms of infection.  Instructed to call clinic immediately or go to ER if signs and symptoms of infection are present. Continue surgical shoe to remove pressure from the area    RTC 1 week       Procedure Note  Indications:  Based on my examination of this patient's wound(s)/ulcer(s) today, debridement is required to promote healing and evaluate the wound base. Performed by: Jeanette Temple DPM    Consent obtained:  Yes    Time out taken:  Yes    Pain Control: Anesthetic  Anesthetic: 2% Lidocaine Gel Topical       Debridement: Excisional Debridement    Using scissors and forceps the wound(s)/ulcer(s) was/were sharply debrided down through and including the removal of epidermis, dermis and subcutaneous tissue. Devitalized Tissue Debrided:  fibrin and necrotic/eschar    Pre Debridement Measurements:  Are located in the Wound/Ulcer Documentation Flow Sheet    Wound/Ulcer #: 7 and 8    Post Debridement Measurements:  Wound/Ulcer Descriptions are Pre Debridement except measurements:    Wound 02/10/22 Hand Right; Anterior #1 CLUSTER (Active)   Wound Image   02/10/22 0848   Wound Etiology Traumatic 02/28/22 0912   Dressing Status Old drainage noted;New drainage noted 02/28/22 0912   Wound Cleansed Cleansed with saline 02/28/22 0912   Dressing/Treatment Other (comment) 02/28/22 0928   Wound Length (cm) 2.3 cm 02/28/22 0912   Wound Width (cm) 2.5 cm 02/28/22 0912   Wound Depth (cm) 0.1 cm 02/28/22 0912   Wound Surface Area (cm^2) 5.75 cm^2 02/28/22 0912   Change in Wound Size % (l*w) -2200 02/28/22 0912   Wound Volume (cm^3) 0.575 cm^3 02/28/22 0912   Wound Healing % -2200 02/28/22 0912   Post-Procedure Length (cm) 2.3 cm 02/28/22 0912   Post-Procedure Width (cm) 2.5 cm 02/28/22 0912   Post-Procedure Depth (cm) 0.1 cm 02/28/22 0912   Post-Procedure Surface Area (cm^2) 5.75 cm^2 02/28/22 0912   Post-Procedure Volume (cm^3) 0.575 cm^3 02/28/22 0912   Wound Assessment [place]/red;Slough 02/28/22 0912   Drainage Amount Moderate 02/28/22 0912   Drainage Description Serosanguinous 02/28/22 0912   Odor None 02/28/22 0912   Trudi-wound Assessment Blanchable erythema 02/28/22 0912   Margins Attached edges 02/28/22 0912   Wound Thickness Description not for Pressure Injury Full thickness 02/28/22 0912   Number of days: 33       Wound 02/10/22 Hand Left; Anterior;Proximal #2 (Active)   Wound Image   02/10/22 0848   Wound Etiology Traumatic 02/28/22 0912   Dressing Status New drainage noted; Old drainage noted 02/28/22 0912   Wound Cleansed Cleansed with saline 02/28/22 0912   Dressing/Treatment Other (comment) 02/28/22 0928   Wound Length (cm) 2.4 cm 02/28/22 0912   Wound Width (cm) 3.7 cm 02/28/22 0912   Wound Depth (cm) 0.1 cm 02/28/22 0912   Wound Surface Area (cm^2) 8.88 cm^2 02/28/22 0912   Change in Wound Size % (l*w) -996.3 02/28/22 0912   Wound Volume (cm^3) 0.888 cm^3 02/28/22 0912   Wound Healing % -996 02/28/22 0912   Post-Procedure Length (cm) 2.4 cm 02/28/22 0912   Post-Procedure Width (cm) 3.7 cm 02/28/22 0912   Post-Procedure Depth (cm) 0.1 cm 02/28/22 0912   Post-Procedure Surface Area (cm^2) 8.88 cm^2 02/28/22 0912   Post-Procedure Volume (cm^3) 0.888 cm^3 02/28/22 0912   Wound Assessment [place]/red;Slough 02/28/22 0912   Drainage Amount Moderate 02/28/22 0912   Drainage Description Serosanguinous 02/28/22 0912   Odor None 02/28/22 0912   Trudi-wound Assessment Blanchable erythema 02/28/22 0912   Margins Defined edges 02/28/22 0912   Wound Thickness Description not for Pressure Injury Full thickness 02/28/22 0912   Number of days: 33       Wound 02/21/22 Hand Right;Dorsal #4 Cluster (Active)   Wound Etiology Traumatic 02/28/22 0912   Dressing Status Old drainage noted;New drainage noted 02/28/22 0912   Wound Cleansed Cleansed with saline 02/28/22 0912   Dressing/Treatment Other (comment) 02/28/22 0928   Wound Length (cm) 6.5 cm 02/28/22 0912   Wound Width (cm) 2.4 cm 02/28/22 0912   Wound Depth (cm) 0.1 cm 02/28/22 0912   Wound Surface Area (cm^2) 15.6 cm^2 02/28/22 0912   Change in Wound Size % (l*w) -188.89 02/28/22 0912   Wound Volume (cm^3) 1.56 cm^3 02/28/22 0912   Wound Healing % 42 02/28/22 0912   Post-Procedure Length (cm) 6.5 cm 02/28/22 0912   Post-Procedure Width (cm) 2.4 cm 02/28/22 0912   Post-Procedure Depth (cm) 0.1 cm 02/28/22 0912   Post-Procedure Surface Area (cm^2) 15.6 cm^2 02/28/22 0912   Post-Procedure Volume (cm^3) 1.56 cm^3 02/28/22 0912   Wound Assessment [place]/red 02/28/22 0912   Drainage Amount Moderate 02/28/22 0912   Drainage Description Serosanguinous 02/28/22 0912   Odor None 02/28/22 0912   Trudi-wound Assessment Blanchable erythema 02/28/22 0912   Margins Defined edges 02/28/22 0912   Wound Thickness Description not for Pressure Injury Full thickness 02/28/22 0912   Number of days: 22       Wound 02/28/22 Toe (Comment  which one) Right 2nd Toe #5 (Active)   Wound Image   02/28/22 0912   Wound Etiology Diabetic Pate 1 03/16/22 0906   Dressing Status Old drainage noted;New drainage noted 03/16/22 0906   Wound Cleansed Cleansed with saline 03/16/22 0906   Dressing/Treatment Other (comment) 03/09/22 1005   Offloading for Diabetic Foot Ulcers Offloading ordered;Post op shoe 03/16/22 0906   Wound Length (cm) 1.7 cm 03/16/22 0906   Wound Width (cm) 2.5 cm 03/16/22 0906   Wound Depth (cm) 0.1 cm 03/16/22 0906   Wound Surface Area (cm^2) 4.25 cm^2 03/16/22 0906   Change in Wound Size % (l*w) -37.99 03/16/22 0906   Wound Volume (cm^3) 0.425 cm^3 03/16/22 0906   Wound Healing % -38 03/16/22 0906   Post-Procedure Length (cm) 1.7 cm 03/16/22 0906   Post-Procedure Width (cm) 2.5 cm 03/16/22 0906   Post-Procedure Depth (cm) 0.1 cm 03/16/22 0906   Post-Procedure Surface Area (cm^2) 4.25 cm^2 03/16/22 0906   Post-Procedure Volume (cm^3) 0.425 cm^3 03/16/22 0906   Wound Assessment Slough;[place]/red 03/16/22 0906   Drainage Amount Moderate 03/16/22 0906   Drainage Description Serosanguinous 03/16/22 0906   Odor None 03/16/22 0906   Trudi-wound Assessment Blanchable erythema 03/16/22 0906 Margins Defined edges 03/16/22 0906   Wound Thickness Description not for Pressure Injury Full thickness 03/16/22 0906   Number of days: 16       Wound 03/09/22 Toe (Comment  which one) Right #6 right great toe (Active)   Wound Image   03/09/22 0922   Wound Etiology Diabetic Pate 1 03/16/22 0906   Dressing Status Old drainage noted;New drainage noted 03/16/22 0906   Wound Cleansed Cleansed with saline 03/16/22 0906   Dressing/Treatment Other (comment) 03/09/22 1005   Offloading for Diabetic Foot Ulcers Offloading ordered;Post op shoe 03/16/22 0906   Wound Length (cm) 1.1 cm 03/16/22 0906   Wound Width (cm) 1.3 cm 03/16/22 0906   Wound Depth (cm) 0.1 cm 03/16/22 0906   Wound Surface Area (cm^2) 1.43 cm^2 03/16/22 0906   Change in Wound Size % (l*w) -44.44 03/16/22 0906   Wound Volume (cm^3) 0.143 cm^3 03/16/22 0906   Wound Healing % -44 03/16/22 0906   Post-Procedure Length (cm) 1.1 cm 03/16/22 0906   Post-Procedure Width (cm) 1.3 cm 03/16/22 0906   Post-Procedure Depth (cm) 0.1 cm 03/16/22 0906   Post-Procedure Surface Area (cm^2) 1.43 cm^2 03/16/22 0906   Post-Procedure Volume (cm^3) 0.143 cm^3 03/16/22 0906   Wound Assessment Eschar dry 03/16/22 0906   Drainage Amount Moderate 03/16/22 0906   Drainage Description Serosanguinous 03/16/22 0906   Odor None 03/16/22 0906   Trudi-wound Assessment Blanchable erythema 03/16/22 0906   Margins Defined edges 03/16/22 0906   Wound Thickness Description not for Pressure Injury Full thickness 03/16/22 0906   Number of days: 6       Wound 03/09/22 Toe (Comment  which one) Right #7 Right 3rd toe (Active)   Wound Image   03/09/22 0922   Wound Etiology Diabetic Pate 1 03/16/22 0906   Dressing Status Old drainage noted;New drainage noted 03/16/22 0906   Wound Cleansed Cleansed with saline 03/16/22 0906   Dressing/Treatment Other (comment) 03/09/22 1005   Offloading for Diabetic Foot Ulcers Offloading ordered;Post op shoe 03/16/22 0906   Wound Length (cm) 1.1 cm 03/16/22 0906 Wound Width (cm) 0.7 cm 03/16/22 0906   Wound Depth (cm) 0.1 cm 03/16/22 0906   Wound Surface Area (cm^2) 0.77 cm^2 03/16/22 0906   Change in Wound Size % (l*w) -16.67 03/16/22 0906   Wound Volume (cm^3) 0.077 cm^3 03/16/22 0906   Wound Healing % -17 03/16/22 0906   Post-Procedure Length (cm) 1.1 cm 03/16/22 0906   Post-Procedure Width (cm) 0.7 cm 03/16/22 0906   Post-Procedure Depth (cm) 0.1 cm 03/16/22 0906   Post-Procedure Surface Area (cm^2) 0.77 cm^2 03/16/22 0906   Post-Procedure Volume (cm^3) 0.077 cm^3 03/16/22 0906   Wound Assessment Eschar moist;Slough 03/16/22 0906   Drainage Amount Moderate 03/16/22 0906   Drainage Description Serosanguinous 03/16/22 0906   Odor None 03/16/22 0906   Trudi-wound Assessment Blanchable erythema 03/16/22 0906   Margins Defined edges 03/16/22 0906   Wound Thickness Description not for Pressure Injury Full thickness 03/16/22 0906   Number of days: 6       Wound 03/09/22 Toe (Comment  which one) Left #8 left 2nd toe (Active)   Wound Image   03/09/22 0922   Wound Etiology Diabetic Pate 1 03/16/22 0906   Dressing Status Old drainage noted;New drainage noted 03/16/22 0906   Wound Cleansed Cleansed with saline 03/16/22 0906   Dressing/Treatment Other (comment) 03/09/22 1005   Offloading for Diabetic Foot Ulcers Offloading ordered;Post op shoe 03/16/22 0906   Wound Length (cm) 1.6 cm 03/16/22 0906   Wound Width (cm) 1.9 cm 03/16/22 0906   Wound Depth (cm) 0.1 cm 03/16/22 0906   Wound Surface Area (cm^2) 3.04 cm^2 03/16/22 0906   Change in Wound Size % (l*w) -26.67 03/16/22 0906   Wound Volume (cm^3) 0.304 cm^3 03/16/22 0906   Wound Healing % -27 03/16/22 0906   Post-Procedure Length (cm) 1.6 cm 03/16/22 0906   Post-Procedure Width (cm) 1.9 cm 03/16/22 0906   Post-Procedure Depth (cm) 0.1 cm 03/16/22 0906   Post-Procedure Surface Area (cm^2) 3.04 cm^2 03/16/22 0906   Post-Procedure Volume (cm^3) 0.304 cm^3 03/16/22 0906   Wound Assessment Slough;[place]/red 03/16/22 0906   Drainage Amount Moderate 03/16/22 0906   Drainage Description Serosanguinous 03/16/22 0906   Odor None 03/16/22 0906   Trudi-wound Assessment Blanchable erythema 03/16/22 0906   Margins Defined edges 03/16/22 0906   Wound Thickness Description not for Pressure Injury Full thickness 03/16/22 0906   Number of days: 6          Percent of Wound(s)/Ulcer(s) Debrided: 100%    Total Surface Area Debrided:  3.81 sq cm     Diabetic/Pressure/Non Pressure Ulcers only:  Ulcer: Diabetic ulcer, fat layer exposed     Estimated Blood Loss:  Minimal    Hemostasis Achieved:  by pressure     Response to treatment:  Well tolerated      Written patient discharge instructions given to patient and signed by patient or POA.       Orders Placed This Encounter   Medications    lidocaine (XYLOCAINE) 2 % uro-jet     Orders Placed This Encounter   Procedures    Initiate Outpatient Wound Care Protocol     Cleanse wound with saline    If wound contains bioburden or contamination cleanse with wound cleanser or antimicrobial solution     For normal periwound tissue without irritation nor maceration, apply topical skin protectant    For periwound tissue with irritation and/or maceration, apply zinc based product, topical steroid cream/ointment, or equivalent     For wounds with dry firm black eschar and/or without exudate, apply betadine and leave open to air      For wounds with scant/small to no exudate or drainage, apply wound gel, hydrocolloid, polymer, or equivalent and cover with secondary dressing/foam      For wounds with moderate/large exudate or drainage, apply alginate, hydrofiber, polymer, or equivalent and cover with secondary dressing/foam    For wounds with nonviable tissue requiring removal, apply chemical or mechanical debrider and cover with secondary dressing/foam    For wounds with tunneling, dead space, or cavity, fill or pack with strip/gauze/kerlex to fit and cover with secondary dressing/foam    For wounds with adequate granulation or epithelization, apply wound gel, hydrocolloid, polymer, collagen, or transparent film, and cover secondary dry dressing/foam    For wounds that need additional secondary dressing to help pad or control additional drainage/exudates, add foam, absorbent pad or hydrocolloid    For wounds with suspected or known infection, apply antimicrobial mesh and/or antimicrobial alginate/hydrofiber, or antimicrobial solution moistened gauze/kerlex, or equivalent and cover with secondary dressing/foam    Compression Management needed for edema control, apply multilayer compression or tubular garment or equivalent    Offloading Management needed for pressure relief, apply offloading shoe/boot or equivalent     Standing Status:   Standing     Number of Occurrences:   1          Discharge Instructions          [address]: [phone] Fax: [phone]             [facility] Instructions / Physician Orders     DATE: 3/16/2022     Home Care: N/A     SUPPLIES ORDERED THRU: N/A     Wound Location: Toes     Cleanse with: Do not soak in water     Dressing Orders: TheraHoney to wounds, dry dressing     Frequency: DAILY     Additional Orders: Increase protein to diet (meat, cheese, eggs, fish, peanut butter, nuts and beans)  Multivitamin daily  ELEVATE LEGS AS MUCH AS POSSIBLE  2/10/2022-Culture Taken   2/14/22- antibiotic at pharmacy   2/28/22-X-rays ordered. Vascular studies ordered call [phone] to schedule.     Your next appointment with [address] [place] [address] [facility] 1 week with Dr. Iván Aguayo  (Please note your next appointment above and if you are unable to keep, kindly give a 24 hour notice.  Thank you.)     If you experience any of the following, please call the [address] [place] [address] during business hours:  [phone]  Your Phone call may be forwarded to [facility] during business hours that [facility] is closed.     * Increase in Pain  * Temperature over 101  * Increase in drainage from your wound  * Drainage with a foul odor  * Bleeding  * Increase in swelling  * Need for compression bandage changes due to slippage, breakthrough drainage.     If you need medical attention outside of the business hours of the [address] [place] [address] Road please contact your PCP or go to the nearest emergency room.     The information contained in the After Visit Summary has been reviewed with me, the patient and/or responsible adult, by my health care provider(s). I had the opportunity to ask questions regarding this information. I have elected to receive;      []??? ? ? After Visit Summary  [x]??? ? ? Comprehensive Discharge Instruction        Patient signature______________________________________Date:________  Electronically signed by Sharron العلي RN on 3/16/2022 at 9:30 AM  Electronically signed by Kali Dickinson DPM on 3/16/2022 at 9:00 AM          Electronically signed by Kali Dickinson DPM on 3/16/2022 at 9:31 AM

## 2022-03-21 ENCOUNTER — OFFICE VISIT (OUTPATIENT)
Dept: PODIATRY | Age: 62
End: 2022-03-21
Payer: COMMERCIAL

## 2022-03-21 ENCOUNTER — HOSPITAL ENCOUNTER (OUTPATIENT)
Dept: WOUND CARE | Age: 62
Discharge: HOME | End: 2022-03-21
Payer: COMMERCIAL

## 2022-03-21 ENCOUNTER — TELEPHONE (OUTPATIENT)
Dept: FAMILY MEDICINE CLINIC | Age: 62
End: 2022-03-21

## 2022-03-21 VITALS — HEIGHT: 72 IN | WEIGHT: 239 LBS | BODY MASS INDEX: 32.37 KG/M2

## 2022-03-21 DIAGNOSIS — I73.9 PVD (PERIPHERAL VASCULAR DISEASE) (HCC): ICD-10-CM

## 2022-03-21 DIAGNOSIS — L97.509 DIABETIC FOOT ULCER WITH OSTEOMYELITIS (HCC): ICD-10-CM

## 2022-03-21 DIAGNOSIS — S61.452D: ICD-10-CM

## 2022-03-21 DIAGNOSIS — M86.9 DIABETIC FOOT ULCER WITH OSTEOMYELITIS (HCC): ICD-10-CM

## 2022-03-21 DIAGNOSIS — M79.672 PAIN IN BOTH FEET: ICD-10-CM

## 2022-03-21 DIAGNOSIS — I10 ESSENTIAL HYPERTENSION: ICD-10-CM

## 2022-03-21 DIAGNOSIS — L97.502 DIABETIC ULCER OF TOE ASSOCIATED WITH TYPE 2 DIABETES MELLITUS, WITH FAT LAYER EXPOSED, UNSPECIFIED LATERALITY (HCC): ICD-10-CM

## 2022-03-21 DIAGNOSIS — S61.451D: ICD-10-CM

## 2022-03-21 DIAGNOSIS — L97.512 RIGHT FOOT ULCER, WITH FAT LAYER EXPOSED (HCC): ICD-10-CM

## 2022-03-21 DIAGNOSIS — E11.621 DIABETIC FOOT ULCER WITH OSTEOMYELITIS (HCC): ICD-10-CM
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
## 2022-03-21 DIAGNOSIS — E11.621 DIABETIC ULCER OF TOE ASSOCIATED WITH TYPE 2 DIABETES MELLITUS, WITH FAT LAYER EXPOSED, UNSPECIFIED LATERALITY (HCC): ICD-10-CM

## 2022-03-21 DIAGNOSIS — E11.69 DIABETIC FOOT ULCER WITH OSTEOMYELITIS (HCC): ICD-10-CM

## 2022-03-21 DIAGNOSIS — E11.51 TYPE II DIABETES MELLITUS WITH PERIPHERAL CIRCULATORY DISORDER (HCC): ICD-10-CM

## 2022-03-21 DIAGNOSIS — L97.522 ULCER OF LEFT FOOT, WITH FAT LAYER EXPOSED (HCC): ICD-10-CM

## 2022-03-21 DIAGNOSIS — E11.69 TYPE 2 DIABETES MELLITUS WITH OTHER SPECIFIED COMPLICATION, WITHOUT LONG-TERM CURRENT USE OF INSULIN (HCC): Primary | ICD-10-CM

## 2022-03-21 DIAGNOSIS — S01.25XA OPEN WOUND OF NOSE DUE TO DOG BITE: ICD-10-CM
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
## 2022-03-21 DIAGNOSIS — M79.671 PAIN IN BOTH FEET: ICD-10-CM

## 2022-03-21 DIAGNOSIS — B35.1 DERMATOPHYTOSIS OF NAIL: Primary | ICD-10-CM

## 2022-03-21 DIAGNOSIS — S01.352D: ICD-10-CM

## 2022-03-21 PROCEDURE — 11721 DEBRIDE NAIL 6 OR MORE: CPT | Performed by: PODIATRIST

## 2022-03-21 PROCEDURE — 99999 PR OFFICE/OUTPT VISIT,PROCEDURE ONLY: CPT | Performed by: PODIATRIST

## 2022-03-21 PROCEDURE — 11042 DBRDMT SUBQ TIS 1ST 20SQCM/<: CPT | Performed by: PLASTIC SURGERY

## 2022-03-21 PROCEDURE — 11042 DBRDMT SUBQ TIS 1ST 20SQCM/<: CPT

## 2022-03-21 RX ORDER — LIDOCAINE HYDROCHLORIDE 20 MG/ML
JELLY TOPICAL ONCE
Status: COMPLETED | OUTPATIENT
Start: 2022-03-21 | End: 2022-03-21

## 2022-03-21 RX ORDER — LIDOCAINE HYDROCHLORIDE 20 MG/ML
JELLY TOPICAL ONCE
Status: CANCELLED | OUTPATIENT
Start: 2022-03-21 | End: 2022-03-21

## 2022-03-21 RX ADMIN — LIDOCAINE HYDROCHLORIDE 6 ML: 20 JELLY TOPICAL at 09:10

## 2022-03-21 NOTE — TELEPHONE ENCOUNTER
Pt stated he is at  [address] and the doctor  is going to do a A1C and nutritionals  and wants to know if you want to add any additional's they want to check him because his feet is not healing.     Please advise

## 2022-03-21 NOTE — PROGRESS NOTES
[address]  Dept: [phone]    NEW PATIENT DIABETIC PROGRESS NOTE  Date of patient's visit: 3/21/2022  Patient's Name:  Douglas Cuevas YOB: 1960            Patient Care Team:  Heriberto Zamarripa DO as PCP - General (Family Medicine)  Heriberto Zamarripa DO as PCP - [facility] Empaneled Provider  Barber Olivo MD (Anesthesiology)  Wes Carnes MD as Consulting Physician (Pulmonology)  Nati Arnold MD as Consulting Physician (Cardiology)  Varsha Mueller MD as Surgeon (Neurosurgery)      Chief Complaint   Patient presents with    Diabetes    Nail Problem    Peripheral Neuropathy    New Patient       Subjective:   Douglas Cuevas comes to clinic for Diabetes, Nail Problem, Peripheral Neuropathy, and New Patient    he is a diabetic and states that he is going to [facility] wound care for his wounds. He is not looking for his wounds to be treated today and is seeing wound care. .  Pt currently has complaint of thickened, elongated nails that they cannot manage by themselves. Pt's primary care physician is Heriberto Zamarripa DO last seen January 27 2022  Pt's last blood sugar was 139 . Pt has a new complaint of \"none besides his new wounds\"  Lab Results   Component Value Date    LABA1C 7.2 06/01/2020      Complains of numbness in the feet bilat.   Past Medical History:   Diagnosis Date    Acquired trigger finger 6/5/2018    CAD (coronary artery disease)     [facility]/ involving coronary bypass graft of native heart with unstable angina pectoris    Cervical spondylosis     CHF (congestive heart failure) ([facility])     Chronic back pain     on 11/19/18 pt states is presently in pain mgmnt    Contusion of elbow 6/5/2018    Contusion of knee 6/5/2018    Contusion of shoulder region 6/5/2018    Diabetic foot ulcer with osteomyelitis ([facility] [address] 1/9/2018    Diabetic ulcer of toe associated with type 2 diabetes mellitus, with fat layer exposed ([facility] 1/31/2018    Hypercholesterolemia 12/5/2017    Hyperlipemia, mixed     Hypertension     Ischemic cardiomyopathy 5/7/2018    Non-pressure chronic ulcer of left lower leg with fat layer exposed ([facility] [address] 6/9/2014    Obesity     Obesity, Class III, BMI 40-49.9 (morbid obesity) [facility] 4/6/2015    Obstructive sleep apnea syndrome     Osteoarthritis of carpometacarpal (CMC) joint of thumb 6/5/2018    Osteoarthritis of knee 6/5/2018    Peripheral vascular disease ([facility] [address]     with venous stasis and ulcerations.  Dr Laura Zamorano Presence of coronary angioplasty implant and graft 9/11/2009    Sleep apnea     Dr Aline Schaefer of knee and leg 6/5/2018    Sprain of shoulder and upper arm 6/5/2018    Type II or unspecified type diabetes mellitus without mention of complication, not stated as uncontrolled     Ulcer of left foot, with fat layer exposed ([facility] 1/9/2018    Unspecified sleep apnea     Dr Bonilla Ridley CPAP    Venous insufficiency of both lower extremities 1/31/2018       Allergies   Allergen Reactions    Pcn [Penicillins] Other (See Comments)     Unknown rx     Current Outpatient Medications on File Prior to Visit   Medication Sig Dispense Refill    meloxicam (MOBIC) 15 MG tablet Take 1 tablet by mouth daily 90 tablet 3    traZODone (DESYREL) 150 MG tablet TAKE ONE TABLET BY MOUTH EVERY EVENING 90 tablet 3    metFORMIN (GLUCOPHAGE) 1000 MG tablet Take 1 tablet by mouth 2 times daily (with meals) 60 tablet 3    tiZANidine (ZANAFLEX) 4 MG tablet 1 po qd 14 tablet 3    omeprazole (PRILOSEC) 20 MG delayed release capsule TAKE 1 CAPSULE DAILY 90 capsule 1    carvedilol (COREG) 12.5 MG tablet Take 1 tablet by mouth 2 times daily (with meals) 180 tablet 3    clopidogrel (PLAVIX) 75 MG tablet Take 1 tablet by mouth daily 90 tablet 3    hydroCHLOROthiazide (MICROZIDE) 12.5 MG capsule 1 po qd 90 capsule 3    NEEDLE, DISP, 18 G (BD DISP NEEDLES) 18G X 1-1/2\" MISC 1 Device by Does not apply route every 30 days 25 each 1    linagliptin (TRADJENTA) 5 MG tablet TAKE 1 TABLET BY MOUTH ONE TIME A DAY 30 tablet 5    testosterone cypionate (DEPOTESTOTERONE CYPIONATE) 200 MG/ML injection       NEEDLE, DISP, 18 G 18G X 1\" MISC 1 Syringe by Does not apply route every 30 days 25 each 0    Multiple Vitamin (MULTI VITAMIN DAILY PO) Take by mouth      nitroGLYCERIN (NITROSTAT) 0.4 MG SL tablet Place 0.4 mg under the tongue      aspirin 325 MG tablet Take 325 mg by mouth daily.  topiramate (TOPAMAX) 200 MG tablet Take 1 tablet by mouth 2 times daily 60 tablet 5    atorvastatin (LIPITOR) 40 MG tablet Take 1 tablet by mouth daily 90 tablet 3    NEEDLE, DISP, 22 G 22G X 1-1/2\" MISC 1 Device by Does not apply route every 30 days 25 each 1    Testosterone Cypionate 200 MG/ML KIT Inject 200 mg into the muscle every 30 days for 30 days. please include syringes 1 kit 5    NEEDLE, DISP, 22 G 22G X 1-1/2\" MISC 1 Syringe by Does not apply route every 30 days 25 each 0     No current facility-administered medications on file prior to visit. Review of Systems    Review of Systems:  History obtained from chart review and the patient  General ROS: negative for - chills, fatigue, fever, night sweats or weight gain  Constitutional: Negative for chills, diaphoresis, fatigue, fever and unexpected weight change. Musculoskeletal: Positive for arthralgias, gait problem and joint swelling. Neurological ROS: negative for - behavioral changes, confusion, headaches or seizures. Negative for weakness and numbness. Dermatological ROS: negative for - mole changes, rash  Cardiovascular: Negative for leg swelling. Gastrointestinal: Negative for constipation, diarrhea, nausea and vomiting. Objective:  Dermatologic Exam:  Wounds seen but pt is seeing wound care and does not want those treated.       Skin is thin, with flaky sloughing skin as well as decreased hair growth to the lower leg  Small red hemosiderin deposits seen dorsal foot   Musculoskeletal:     1st MPJ ROM decreased, Bilateral.  Muscle strength 5/5, Bilateral.  Pain present upon palpation of toenails 1-5, Bilateral. decreased medial longitudinal arch, Bilateral.  Ankle ROM decreased,Bilateral.    Dorsally contracted digits present digits 2, Bilateral.     Vascular: DP pulses 1/4 bilateral.  PT pulses 0/4 bilateral.   CFT <5 seconds, Bilateral.  Hair growth absent to the level of the digits, Bilateral.  Edema present, Bilateral.  Varicosities absent, Bilateral. Erythema absent, Bilateral    Neurological: Sensation diminshed to light touch to level of digits, Bilateral.  Protective sensation intact 6/10 sites via 5.07/10g [place]-Lorin Monofilament, Bilateral.  negative Tinel's, Bilateral.  negative Valleix sign, Bilateral.      Integument: Warm, dry, supple, Bilateral.  Open lesion absent, Bilateral.  Interdigital maceration absent to web spaces 4, Bilateral.  Nails 1-5 left and 1-5 right thickened > 3.0 mm, dystrophic and crumbly, discolored with subungual debris. Fissures absent, Bilateral.   General: AAO x 3 in NAD.     Derm  Toenail Description  Sites of Onychomycosis Involvement (Check affected area)  [x] [x] [x] [x] [x] [x] [x] [x] [x] [x]  5 4 3 2 1 1 2 3 4 5                          Right                                        Left    Thickness  [x] [x] [x] [x] [x] [x] [x] [x] [x] [x]  5 4 3 2 1 1 2 3 4 5                         Right                                        Left    Dystrophic Changes   [x] [x] [x] [x] [x] [x] [x] [x] [x] [x]  5 4 3 2 1 1 2 3 4 5                         Right                                        Left    Color   [x] [x] [x] [x] [x] [x] [x] [x] [x] [x]  5 4 3 2 1 1 2 3 4 5                          Right                                        Left    Incurvation/Ingrowin   [] [] [] [] [] [] [] [] [] []  5 4 3 2 1 1 2 3 4 5                         Right Left    Inflammation/Pain   [x] [x] [x] [x] [x] [x] [x] [x] [x] [x]  5 4 3 2 1 1 2 3 4 5                         Right                                        Left    DM with PVD       [x]Yes    []No    Q7   []Yes  []No                Q8   [x]Yes  []No                     Q9   []Yes    []No    Visual inspection:  Deformity: hammertoe deformity heidi feet  amputation: absent  Skin lesions: present - as above seeing wound care  Edema: right- 2+ pitting edema, left- 2+ pitting edema    Sensory exam:  Monofilament sensation: abnormal - 6/10 via SW 5.07/10g monofilament to the plantar foot bilateral feet    Pulses: abnormal - 1/4 dorsalis pedis pulse and 1/4 Posterior tibial pulse,   Pinprick: Impaired  Proprioception: Impaired  Vibration (128 Hz): Impaired           Assessment:  64 y.o. male with:   Diagnosis Orders   1. Dermatophytosis of nail  80196 - RI DEBRIDEMENT OF NAILS, 6 OR MORE    HM DIABETES FOOT EXAM   2. PVD (peripheral vascular disease) ([address]  47771 - RI DEBRIDEMENT OF NAILS, 6 OR MORE    HM DIABETES FOOT EXAM   3. Pain in both feet  63704 - RI DEBRIDEMENT OF NAILS, 6 OR MORE    HM DIABETES FOOT EXAM   4. Type II diabetes mellitus with peripheral circulatory disorder (HCC)  49450 - [place] DEBRIDEMENT OF NAILS, 6 OR MORE    HM DIABETES FOOT EXAM   5. Right foot ulcer, with fat layer exposed ([address]     6. Ulcer of left foot, with fat layer exposed ([address]     7. Diabetic ulcer of toe associated with type 2 diabetes mellitus, with fat layer exposed, unspecified laterality ([address]             Plan:   Pt was evaluated and examined. Patient was given personalized discharge instructions. Advised pt to see wound care and carefully follow their instructions       Nails 1-10 were debrided sharply in length and thickness with a nipper and , without incident. Pt will follow up in 9 weeks or sooner if any problems arise. Diagnosis was discussed with the pt and all of their questions were answered in detail. Proper foot hygiene and care was discussed with the pt. Informed patient on proper diabetic foot care and importance of tight glycemic control. Patient to check feet daily and contact the office with any questions/problems/concerns.    Other comorbidity noted and will be managed by PCP.  3/21/2022      Electronically signed by Arnaldo Choi DPM on 3/21/2022 at 11:04 AM  3/21/2022

## 2022-03-21 NOTE — PROGRESS NOTES
Progress note     No chief complaint on file. HPI:   Trung Concepcion is a 64 y.o. male who presents with a dog bite on his left ear as well as multiple lacerations on both hands including the thumb and fingers. Patient dates that it occurred on Friday. Patient is here today for evaluation treatment. Patient states that part of the ear was removed during the bite. Patient is on antibiotics currently. Patient is a . Patient was referred from the emergency room. Patient is here for evaluation and treatment. Medications:     Current Outpatient Medications   Medication Sig Dispense Refill    meloxicam (MOBIC) 15 MG tablet Take 1 tablet by mouth daily 90 tablet 3    traZODone (DESYREL) 150 MG tablet TAKE ONE TABLET BY MOUTH EVERY EVENING 90 tablet 3    metFORMIN (GLUCOPHAGE) 1000 MG tablet Take 1 tablet by mouth 2 times daily (with meals) 60 tablet 3    topiramate (TOPAMAX) 200 MG tablet Take 1 tablet by mouth 2 times daily 60 tablet 5    tiZANidine (ZANAFLEX) 4 MG tablet 1 po qd 14 tablet 3    omeprazole (PRILOSEC) 20 MG delayed release capsule TAKE 1 CAPSULE DAILY 90 capsule 1    carvedilol (COREG) 12.5 MG tablet Take 1 tablet by mouth 2 times daily (with meals) 180 tablet 3    clopidogrel (PLAVIX) 75 MG tablet Take 1 tablet by mouth daily 90 tablet 3    atorvastatin (LIPITOR) 40 MG tablet Take 1 tablet by mouth daily 90 tablet 3    hydroCHLOROthiazide (MICROZIDE) 12.5 MG capsule 1 po qd 90 capsule 3    NEEDLE, DISP, 22 G 22G X 1-1/2\" MISC 1 Device by Does not apply route every 30 days 25 each 1    NEEDLE, DISP, 18 G (BD DISP NEEDLES) 18G X 1-1/2\" MISC 1 Device by Does not apply route every 30 days 25 each 1    linagliptin (TRADJENTA) 5 MG tablet TAKE 1 TABLET BY MOUTH ONE TIME A DAY 30 tablet 5    testosterone cypionate (DEPOTESTOTERONE CYPIONATE) 200 MG/ML injection       Testosterone Cypionate 200 MG/ML KIT Inject 200 mg into the muscle every 30 days for 30 days.  please include syringes 1 kit 5    NEEDLE, DISP, 18 G 18G X 1\" MISC 1 Syringe by Does not apply route every 30 days 25 each 0    NEEDLE, DISP, 22 G 22G X 1-1/2\" MISC 1 Syringe by Does not apply route every 30 days 25 each 0    Multiple Vitamin (MULTI VITAMIN DAILY PO) Take by mouth      nitroGLYCERIN (NITROSTAT) 0.4 MG SL tablet Place 0.4 mg under the tongue      aspirin 325 MG tablet Take 325 mg by mouth daily. No current facility-administered medications for this encounter. Allergies: Allergies   Allergen Reactions    Pcn [Penicillins] Other (See Comments)     Unknown rx     Review of Systems:   Constitutional: Negative for fever, chills, fatigue and unexpected weight change. HENT: Negative for hearing loss, sore throat and facial swelling. Eyes: Negative for pain and discharge. Respiratory: No history of sleep apnea. Cardiovascular: Patient with a history of CHF, coronary artery disease, cardiac myopathy, peripheral vascular disease. Patient with hypercholesterolemia, hyperlipidemia, hypertension, ischemic cardiomyopathy. Gastrointestinal: Negative for nausea, vomiting, diarrhea and constipation. Skin: Negative for pallor and rash. Neurological: Patient has chronic back pain. .   Hematological: Does not bruise/bleed easily. Psychiatric/Behavioral: Negative for behavioral problems. The patient is not nervous/anxious.       Past Medical History:   Diagnosis Date    Acquired trigger finger 6/5/2018    CAD (coronary artery disease)     [facility]/ involving coronary bypass graft of native heart with unstable angina pectoris    Cervical spondylosis     CHF (congestive heart failure) ([facility])     Chronic back pain     on 11/19/18 pt states is presently in pain mgmnt    Contusion of elbow 6/5/2018    Contusion of knee 6/5/2018    Contusion of shoulder region 6/5/2018    Diabetic foot ulcer with osteomyelitis ([facility] [address] 1/9/2018    Diabetic ulcer of toe associated with type 2 diabetes mellitus, with fat layer exposed ([facility] [address] 1/31/2018    Hypercholesterolemia 12/5/2017    Hyperlipemia, mixed     Hypertension     Ischemic cardiomyopathy 5/7/2018    Non-pressure chronic ulcer of left lower leg with fat layer exposed ([address] 6/9/2014    Obesity     Obesity, Class III, BMI 40-49.9 (morbid obesity) ([address] 4/6/2015    Obstructive sleep apnea syndrome     Osteoarthritis of carpometacarpal (CMC) joint of thumb 6/5/2018    Osteoarthritis of knee 6/5/2018    Peripheral vascular disease ([address]     with venous stasis and ulcerations. Dr Patricia Davila Presence of coronary angioplasty implant and graft 9/11/2009    Sleep apnea     Dr Doris Alejandro of knee and leg 6/5/2018    Sprain of shoulder and upper arm 6/5/2018    Type II or unspecified type diabetes mellitus without mention of complication, not stated as uncontrolled     Ulcer of left foot, with fat layer exposed ([facility] [address] 1/9/2018    Unspecified sleep apnea     Dr Dejon Ramirez CPAP    Venous insufficiency of both lower extremities 1/31/2018     Past Surgical History:   Procedure Laterality Date    CARPAL TUNNEL RELEASE Right 3/22/13    Dr. Lenin Duarte.     CORONARY ANGIOPLASTY WITH STENT PLACEMENT  2002,2006.2009    X 3 separately    CORONARY ARTERY BYPASS GRAFT  2/26/07    [facility], Dr Claudio Sicard (X 3)    NECK SURGERY  2010    cervical stenoses C5-C6-C7and partial T1 laminectomy with fusion of C6-7    OTHER SURGICAL HISTORY      wound care ulcers of legs (bilaterally) with Dr Justina Hale  age 11    VEIN SURGERY      closure of peripherator veins of legs, Dr José Antonio Ortiz History     Socioeconomic History    Marital status:      Spouse name: Not on file    Number of children: Not on file    Years of education: Not on file    Highest education level: Not on file   Occupational History    Not on file   Tobacco Use    Smoking status: Never Smoker    Smokeless tobacco: Never Used   Vaping Use    Vaping Use: Never used   Substance and Sexual Activity    Alcohol use: Not Currently     Comment: rare social, non past 5 months    Drug use: No    Sexual activity: Yes     Partners: Female     Comment: spouse   Other Topics Concern    Not on file   Social History Narrative    Not on file     Social Determinants of Health     Financial Resource Strain:     Difficulty of Paying Living Expenses: Not on file   Food Insecurity:     Worried About Running Out of Food in the Last Year: Not on file    [place] of Food in the Last Year: Not on file   Transportation Needs:     Lack of Transportation (Medical): Not on file    Lack of Transportation (Non-Medical): Not on file   Physical Activity:     Days of Exercise per Week: Not on file    Minutes of Exercise per Session: Not on file   Stress:     Feeling of Stress : Not on file   Social Connections:     Frequency of Communication with Friends and Family: Not on file    Frequency of Social Gatherings with Friends and Family: Not on file    Attends [employer] Services: Not on file    Active Member of [address] [place] [address] or Organizations: Not on file    Attends Club or Organization Meetings: Not on file    Marital Status: Not on file   Intimate Partner Violence:     Fear of Current or Ex-Partner: Not on file    Emotionally Abused: Not on file    Physically Abused: Not on file    Sexually Abused: Not on file   Housing Stability:     Unable to Pay for Housing in the Last Year: Not on file    Number of [place] in the Last Year: Not on file    Unstable Housing in the Last Year: Not on file     Family History   Problem Relation Age of Onset    Cancer Mother         lung    Diabetes Father     Heart Disease Father     High Blood Pressure Father     Diabetes Brother     Heart Disease Brother     High Blood Pressure Brother      Physical Exam:   There were no vitals taken for this visit. There is no height or weight on file to calculate BMI.   Physical Exam (comment) 02/28/22 0928   Wound Length (cm) 2.4 cm 02/28/22 0912   Wound Width (cm) 3.7 cm 02/28/22 0912   Wound Depth (cm) 0.1 cm 02/28/22 0912   Wound Surface Area (cm^2) 8.88 cm^2 02/28/22 0912   Change in Wound Size % (l*w) -996.3 02/28/22 0912   Wound Volume (cm^3) 0.888 cm^3 02/28/22 0912   Wound Healing % -996 02/28/22 0912   Post-Procedure Length (cm) 2.4 cm 02/28/22 0912   Post-Procedure Width (cm) 3.7 cm 02/28/22 0912   Post-Procedure Depth (cm) 0.1 cm 02/28/22 0912   Post-Procedure Surface Area (cm^2) 8.88 cm^2 02/28/22 0912   Post-Procedure Volume (cm^3) 0.888 cm^3 02/28/22 0912   Wound Assessment [place]/red;Slough 02/28/22 0912   Drainage Amount Moderate 02/28/22 0912   Drainage Description Serosanguinous 02/28/22 0912   Odor None 02/28/22 0912   Trudi-wound Assessment Blanchable erythema 02/28/22 0912   Margins Defined edges 02/28/22 0912   Wound Thickness Description not for Pressure Injury Full thickness 02/28/22 0912   Number of days: 38       Wound 02/21/22 Hand Right;Dorsal #4 Cluster (Active)   Wound Etiology Traumatic 02/28/22 0912   Dressing Status Old drainage noted;New drainage noted 02/28/22 0912   Wound Cleansed Cleansed with saline 02/28/22 0912   Dressing/Treatment Other (comment) 02/28/22 0928   Wound Length (cm) 6.5 cm 02/28/22 0912   Wound Width (cm) 2.4 cm 02/28/22 0912   Wound Depth (cm) 0.1 cm 02/28/22 0912   Wound Surface Area (cm^2) 15.6 cm^2 02/28/22 0912   Change in Wound Size % (l*w) -188.89 02/28/22 0912   Wound Volume (cm^3) 1.56 cm^3 02/28/22 0912   Wound Healing % 42 02/28/22 0912   Post-Procedure Length (cm) 6.5 cm 02/28/22 0912   Post-Procedure Width (cm) 2.4 cm 02/28/22 0912   Post-Procedure Depth (cm) 0.1 cm 02/28/22 0912   Post-Procedure Surface Area (cm^2) 15.6 cm^2 02/28/22 0912   Post-Procedure Volume (cm^3) 1.56 cm^3 02/28/22 0912   Wound Assessment Pink/red 02/28/22 0912   Drainage Amount Moderate 02/28/22 0912   Drainage Description Serosanguinous 02/28/22 0912 Odor None 02/28/22 0912   Trudi-wound Assessment Blanchable erythema 02/28/22 0912   Margins Defined edges 02/28/22 0912   Wound Thickness Description not for Pressure Injury Full thickness 02/28/22 0912   Number of days: 27       Wound 02/28/22 Toe (Comment  which one) Right 2nd Toe #5 (Active)   Wound Image   02/28/22 0912   Wound Etiology Diabetic Pate 1 03/16/22 0906   Dressing Status Old drainage noted;New drainage noted 03/16/22 0906   Wound Cleansed Cleansed with saline 03/16/22 0906   Dressing/Treatment Other (comment) 03/09/22 1005   Offloading for Diabetic Foot Ulcers Offloading ordered;Post op shoe 03/16/22 0933   Wound Length (cm) 1.7 cm 03/16/22 0906   Wound Width (cm) 2.5 cm 03/16/22 0906   Wound Depth (cm) 0.1 cm 03/16/22 0906   Wound Surface Area (cm^2) 4.25 cm^2 03/16/22 0906   Change in Wound Size % (l*w) -37.99 03/16/22 0906   Wound Volume (cm^3) 0.425 cm^3 03/16/22 0906   Wound Healing % -38 03/16/22 0906   Post-Procedure Length (cm) 1.7 cm 03/16/22 0906   Post-Procedure Width (cm) 2.5 cm 03/16/22 0906   Post-Procedure Depth (cm) 0.1 cm 03/16/22 0906   Post-Procedure Surface Area (cm^2) 4.25 cm^2 03/16/22 0906   Post-Procedure Volume (cm^3) 0.425 cm^3 03/16/22 0906   Wound Assessment Slough;[place]/red 03/16/22 0906   Drainage Amount Moderate 03/16/22 0906   Drainage Description Serosanguinous 03/16/22 0906   Odor None 03/16/22 0906   Trudi-wound Assessment Blanchable erythema 03/16/22 0906   Margins Defined edges 03/16/22 0906   Wound Thickness Description not for Pressure Injury Full thickness 03/16/22 0906   Number of days: 21       Wound 03/09/22 Toe (Comment  which one) Right #6 right great toe (Active)   Wound Image   03/09/22 0922   Wound Etiology Diabetic Pate 1 03/16/22 0906   Dressing Status Old drainage noted;New drainage noted 03/16/22 0906   Wound Cleansed Cleansed with saline 03/16/22 0906   Dressing/Treatment Other (comment) 03/09/22 1005   Offloading for Diabetic Foot Ulcers Offloading ordered;Post op shoe 03/16/22 0933   Wound Length (cm) 1.1 cm 03/16/22 0906   Wound Width (cm) 1.3 cm 03/16/22 0906   Wound Depth (cm) 0.1 cm 03/16/22 0906   Wound Surface Area (cm^2) 1.43 cm^2 03/16/22 0906   Change in Wound Size % (l*w) -44.44 03/16/22 0906   Wound Volume (cm^3) 0.143 cm^3 03/16/22 0906   Wound Healing % -44 03/16/22 0906   Post-Procedure Length (cm) 1.1 cm 03/16/22 0906   Post-Procedure Width (cm) 1.3 cm 03/16/22 0906   Post-Procedure Depth (cm) 0.1 cm 03/16/22 0906   Post-Procedure Surface Area (cm^2) 1.43 cm^2 03/16/22 0906   Post-Procedure Volume (cm^3) 0.143 cm^3 03/16/22 0906   Wound Assessment Eschar dry 03/16/22 0906   Drainage Amount Moderate 03/16/22 0906   Drainage Description Serosanguinous 03/16/22 0906   Odor None 03/16/22 0906   Trudi-wound Assessment Blanchable erythema 03/16/22 0906   Margins Defined edges 03/16/22 0906   Wound Thickness Description not for Pressure Injury Full thickness 03/16/22 0906   Number of days: 11       Wound 03/09/22 Toe (Comment  which one) Right #7 Right 3rd toe (Active)   Wound Image   03/09/22 0922   Wound Etiology Diabetic Pate 1 03/16/22 0906   Dressing Status Old drainage noted;New drainage noted 03/16/22 0906   Wound Cleansed Cleansed with saline 03/16/22 0906   Dressing/Treatment Other (comment) 03/09/22 1005   Offloading for Diabetic Foot Ulcers Offloading ordered;Post op shoe 03/16/22 0933   Wound Length (cm) 1.1 cm 03/16/22 0906   Wound Width (cm) 0.7 cm 03/16/22 0906   Wound Depth (cm) 0.1 cm 03/16/22 0906   Wound Surface Area (cm^2) 0.77 cm^2 03/16/22 0906   Change in Wound Size % (l*w) -16.67 03/16/22 0906   Wound Volume (cm^3) 0.077 cm^3 03/16/22 0906   Wound Healing % -17 03/16/22 0906   Post-Procedure Length (cm) 1.1 cm 03/16/22 0906   Post-Procedure Width (cm) 0.7 cm 03/16/22 0906   Post-Procedure Depth (cm) 0.1 cm 03/16/22 0906   Post-Procedure Surface Area (cm^2) 0.77 cm^2 03/16/22 0906   Post-Procedure Volume (cm^3) 0.077 cm^3 03/16/22 0906   Wound Assessment [place] moist;Slough 03/16/22 0906   Drainage Amount Moderate 03/16/22 0906   Drainage Description Serosanguinous 03/16/22 0906   Odor None 03/16/22 0906   Trudi-wound Assessment Blanchable erythema 03/16/22 0906   Margins Defined edges 03/16/22 0906   Wound Thickness Description not for Pressure Injury Full thickness 03/16/22 0906   Number of days: 11       Wound 03/09/22 Toe (Comment  which one) Left #8 left 2nd toe (Active)   Wound Image   03/09/22 0922   Wound Etiology Diabetic Pate 1 03/16/22 0906   Dressing Status Old drainage noted;New drainage noted 03/16/22 0906   Wound Cleansed Cleansed with saline 03/16/22 0906   Dressing/Treatment Other (comment) 03/09/22 1005   Offloading for Diabetic Foot Ulcers Offloading ordered;Post op shoe 03/16/22 0933   Wound Length (cm) 1.6 cm 03/16/22 0906   Wound Width (cm) 1.9 cm 03/16/22 0906   Wound Depth (cm) 0.1 cm 03/16/22 0906   Wound Surface Area (cm^2) 3.04 cm^2 03/16/22 0906   Change in Wound Size % (l*w) -26.67 03/16/22 0906   Wound Volume (cm^3) 0.304 cm^3 03/16/22 0906   Wound Healing % -27 03/16/22 0906   Post-Procedure Length (cm) 1.6 cm 03/16/22 0906   Post-Procedure Width (cm) 1.9 cm 03/16/22 0906   Post-Procedure Depth (cm) 0.1 cm 03/16/22 0906   Post-Procedure Surface Area (cm^2) 3.04 cm^2 03/16/22 0906   Post-Procedure Volume (cm^3) 0.304 cm^3 03/16/22 0906   Wound Assessment [place]/red 03/16/22 0906   Drainage Amount Moderate 03/16/22 0906   Drainage Description Serosanguinous 03/16/22 0906   Odor None 03/16/22 0906   Trudi-wound Assessment Blanchable erythema 03/16/22 0906   Margins Defined edges 03/16/22 0906   Wound Thickness Description not for Pressure Injury Full thickness 03/16/22 1501   Number of days: 11          Procedure Note  Indications:  Based on my examination of this patient's wound(s)/ulcer(s) today, debridement is required to promote healing and evaluate the wound base.     Performed by: Kaylan Rico Mal Brush MD    Consent obtained:  Yes    Time out taken:  Yes    Pain Control:         Debridement:Excisional Debridement    Using curette, scissors and forceps the wound(s)/ulcer(s) was/were sharply debrided down through and including the removal of subcutaneous tissue. Devitalized Tissue Debrided:  necrotic/eschar    Pre Debridement Measurements:  Are located in the Wound/Ulcer Documentation Flow Sheet    Wound/Ulcer #: 5, 6, 7 and 8           Percent of Wound(s)/Ulcer(s) Debrided: 100%    Total Surface Area Debrided:   Excisional debridement right great toe measuring 1.43 cm² through the subcutaneous tissue  Excisional debridement right second toe measuring 4.28 to the subcutaneous tissue  Excisional debridement right second toe measuring 0.77 cm² through the tendon  Excisional debridement left second toe measuring 2.04 cm² through the subcutaneous tissue    Diabetic/Pressure/Non Pressure Ulcers only:  Ulcer: Diabetic ulcer, fat layer exposed      Estimated Blood Loss:  Minimal    Hemostasis Achieved:  by pressure    Procedural Pain:  1  / 10     Post Procedural Pain:  0 / 10     Response to treatment:  Well tolerated by patient. Imaging:   [unfilled]        Impression/Plan:      Diagnosis Orders   1.  Dog bite, initial encounter       Patient Active Problem List   Diagnosis    Hyperlipemia, mixed    Sleep apnea    Peripheral vascular disease ([facility] [address]    CAD (coronary artery disease)    [facility]est[facility]    CTS (carpal tunnel syndrome)    Hypotension    Obesity, Class III, BMI 40-49.9 (morbid obesity) ([address]    Essential hypertension    DDD (degenerative disc disease), cervical    DJD (degenerative joint disease), cervical    Primary osteoarthritis involving multiple joints    Paresthesias in left hand    Uncontrolled type 2 diabetes mellitus without complication, without long-term current use of insulin    Coronary artery disease involving coronary bypass graft of native heart without angina pectoris    Sleep apnea    Cervical spondylosis    Obesity    Long term current use of antithrombotics/antiplatelets    H/O cervical spine surgery    Long term (current) use of non-steroidal anti-inflammatories ([place])    Chronic prescription opiate use    Severe comorbid illness    Hx of cervical spine surgery    Chronic neck pain    Candidal balanitis    Cellulitis of third toe, left    Nail avulsion, toe, initial encounter    Cellulitis of second toe, right    Abnormal nuclear stress test    Abnormal stress test    Hypercholesterolemia    Diabetic foot ulcer with osteomyelitis (HCC)    Ulcer of left foot, with fat layer exposed ([address]    Acquired trigger finger    Contusion of elbow    Contusion of shoulder region    Contusion of knee    Degeneration of lumbar intervertebral disc    Diabetic ulcer of toe associated with type 2 diabetes mellitus, with fat layer exposed ([address]    History of coronary artery bypass graft x 3    Ischemic cardiomyopathy    Osteoarthritis of knee    Osteoarthritis of carpometacarpal (CMC) joint of thumb    Sprain of knee and leg    Sprain of shoulder and upper arm    Presence of coronary angioplasty implant and graft    Venous insufficiency of both lower extremities    Dog bite    Impingement syndrome of shoulder region    Bite wound of hand, left, subsequent encounter    Bite wound of hand, right, subsequent encounter    Open bite of left ear    Open wound of nose due to dog bite     Plan:    I have ordered hemoglobin A1c and nutritional labs. Patient is to follow-up with podiatry due to diabetic wound of the feet.          Electronically signed by:  Sarthak Lopez MD 3/21/2022

## 2022-03-23 ENCOUNTER — HOSPITAL ENCOUNTER (OUTPATIENT)
Age: 62
Setting detail: SPECIMEN
Discharge: HOME | End: 2022-03-23
Payer: COMMERCIAL

## 2022-03-23 LAB
ALBUMIN SERPL-MCNC: 4.1 G/DL (ref 3.5–5.2)
ESTIMATED AVERAGE GLUCOSE: 197 MG/DL
HBA1C MFR BLD: 8.5 % (ref 4–6)
PREALBUMIN: 17 MG/DL (ref 20–40)
TRANSFERRIN: 215 MG/DL (ref 200–360)

## 2022-03-23 PROCEDURE — 83036 HEMOGLOBIN GLYCOSYLATED A1C: CPT

## 2022-03-23 PROCEDURE — 36415 COLL VENOUS BLD VENIPUNCTURE: CPT

## 2022-03-23 PROCEDURE — 82040 ASSAY OF SERUM ALBUMIN: CPT

## 2022-03-23 PROCEDURE — 84134 ASSAY OF PREALBUMIN: CPT

## 2022-03-23 PROCEDURE — 84466 ASSAY OF TRANSFERRIN: CPT

## 2022-03-24 ENCOUNTER — TELEMEDICINE (OUTPATIENT)
Dept: FAMILY MEDICINE CLINIC | Age: 62
End: 2022-03-24
Payer: COMMERCIAL

## 2022-03-24 DIAGNOSIS — L03.116 CELLULITIS OF FOOT, LEFT: Primary | ICD-10-CM

## 2022-03-24 PROCEDURE — 99213 OFFICE O/P EST LOW 20 MIN: CPT | Performed by: FAMILY MEDICINE

## 2022-03-24 RX ORDER — DOXYCYCLINE HYCLATE 100 MG
100 TABLET ORAL 2 TIMES DAILY
Qty: 20 TABLET | Refills: 0 | Status: SHIPPED | OUTPATIENT
Start: 2022-03-24 | End: 2022-04-03

## 2022-03-24 ASSESSMENT — PATIENT HEALTH QUESTIONNAIRE - PHQ9
SUM OF ALL RESPONSES TO PHQ QUESTIONS 1-9: 0
SUM OF ALL RESPONSES TO PHQ QUESTIONS 1-9: 0
2. FEELING DOWN, DEPRESSED OR HOPELESS: 0
1. LITTLE INTEREST OR PLEASURE IN DOING THINGS: 0
SUM OF ALL RESPONSES TO PHQ QUESTIONS 1-9: 0
SUM OF ALL RESPONSES TO PHQ QUESTIONS 1-9: 0
SUM OF ALL RESPONSES TO PHQ9 QUESTIONS 1 & 2: 0

## 2022-03-24 NOTE — PROGRESS NOTES
3/24/2022    TELEHEALTH EVALUATION -- Audio/Visual (During WQDRA-72 public health emergency)    HPI:    Gisell Pac (:  1960) has requested an audio/video evaluation for the following concern(s):    He is being seen today stating he has been battling ongoing foot sores for a while he seems to have had a flare on his left second toe he does have an appointment to see podiatry on Wednesday but he is afraid to let it go that long without an antibiotic because he has some pustular lesions right now    Review of Systems    Prior to Visit Medications    Medication Sig Taking?  Authorizing Provider   doxycycline hyclate (VIBRA-TABS) 100 MG tablet Take 1 tablet by mouth 2 times daily for 10 days Yes Seema Hameed DO   meloxicam (MOBIC) 15 MG tablet Take 1 tablet by mouth daily  Seema Hameed DO   traZODone (DESYREL) 150 MG tablet TAKE ONE TABLET BY MOUTH EVERY EVENING  Seema Hameed DO   metFORMIN (GLUCOPHAGE) 1000 MG tablet Take 1 tablet by mouth 2 times daily (with meals)  Seema Hameed DO   topiramate (TOPAMAX) 200 MG tablet Take 1 tablet by mouth 2 times daily  Seema Hameed DO   tiZANidine (ZANAFLEX) 4 MG tablet 1 po qd  Seema Hameed DO   omeprazole (PRILOSEC) 20 MG delayed release capsule TAKE 1 CAPSULE DAILY  Seema Hameed DO   carvedilol (COREG) 12.5 MG tablet Take 1 tablet by mouth 2 times daily (with meals)  Seema Hameed DO   clopidogrel (PLAVIX) 75 MG tablet Take 1 tablet by mouth daily  Seema Hameed DO   atorvastatin (LIPITOR) 40 MG tablet Take 1 tablet by mouth daily  Seema Hameed DO   hydroCHLOROthiazide (MICROZIDE) 12.5 MG capsule 1 po qd  Seema Hameed DO   NEEDLE, DISP, 22 G 22G X 1-1/2\" MISC 1 Device by Does not apply route every 30 days  Seema Hameed DO   NEEDLE, DISP, 18 G (BD DISP NEEDLES) 18G X 1-1/2\" MISC 1 Device by Does not apply route every 30 days  Seema Hameed DO   linagliptin (TRADJENTA) 5 MG tablet TAKE 1 TABLET BY MOUTH ONE TIME A DAY  Seema Hameed, DO testosterone cypionate (DEPOTESTOTERONE CYPIONATE) 200 MG/ML injection   Historical Provider, MD   Testosterone Cypionate 200 MG/ML KIT Inject 200 mg into the muscle every 30 days for 30 days. please include syringes  Seema Hameed DO   NEEDLE, DISP, 18 G 18G X 1\" MISC 1 Syringe by Does not apply route every 30 days  Seema Hameed DO   NEEDLE, DISP, 22 G 22G X 1-1/2\" MISC 1 Syringe by Does not apply route every 30 days  Seema Hameed DO   Multiple Vitamin (MULTI VITAMIN DAILY PO) Take by mouth  Historical Provider, MD   nitroGLYCERIN (NITROSTAT) 0.4 MG SL tablet Place 0.4 mg under the tongue  Historical Provider, MD   aspirin 325 MG tablet Take 325 mg by mouth daily.     Historical Provider, MD       Social History     Tobacco Use    Smoking status: Never Smoker    Smokeless tobacco: Never Used   Vaping Use    Vaping Use: Never used   Substance Use Topics    Alcohol use: Not Currently     Comment: rare social, non past 5 months    Drug use: No        Allergies   Allergen Reactions    Pcn [Penicillins] Other (See Comments)     Unknown rx   ,   Past Medical History:   Diagnosis Date    Acquired trigger finger 6/5/2018    CAD (coronary artery disease)     [facility]/ involving coronary bypass graft of native heart with unstable angina pectoris    Cervical spondylosis     CHF (congestive heart failure) (HCC)     Chronic back pain     on 11/19/18 pt states is presently in pain mgmnt    Contusion of elbow 6/5/2018    Contusion of knee 6/5/2018    Contusion of shoulder region 6/5/2018    Diabetic foot ulcer with osteomyelitis ([facility] 1/9/2018    Diabetic ulcer of toe associated with type 2 diabetes mellitus, with fat layer exposed ([address] 1/31/2018    Hypercholesterolemia 12/5/2017    Hyperlipemia, mixed     Hypertension     Ischemic cardiomyopathy 5/7/2018    Non-pressure chronic ulcer of left lower leg with fat layer exposed ([address] 6/9/2014    Obesity     Obesity, Class III, BMI 40-49.9 (morbid obesity) ([facility] 4/6/2015    Obstructive sleep apnea syndrome     Osteoarthritis of carpometacarpal [place]) joint of thumb 6/5/2018    Osteoarthritis of knee 6/5/2018    Peripheral vascular disease ([facility]     with venous stasis and ulcerations. Dr Osbaldo Canada Presence of coronary angioplasty implant and graft 9/11/2009    Sleep apnea     Dr Hilda Burroughs of knee and leg 6/5/2018    Sprain of shoulder and upper arm 6/5/2018    Type II or unspecified type diabetes mellitus without mention of complication, not stated as uncontrolled     Ulcer of left foot, with fat layer exposed ([facility] 1/9/2018    Unspecified sleep apnea     Dr Dalia Perez CPAP    Venous insufficiency of both lower extremities 1/31/2018   ,   Past Surgical History:   Procedure Laterality Date    CARPAL TUNNEL RELEASE Right 3/22/13    Dr. Steven Abdalla.     CORONARY ANGIOPLASTY WITH STENT PLACEMENT  2002,2006.2009    X 3 separately    CORONARY ARTERY BYPASS GRAFT  2/26/07    [facility], Dr Oly Mcmillan (X 3)    NECK SURGERY  2010    cervical stenoses C5-C6-C7and partial T1 laminectomy with fusion of C6-7    OTHER SURGICAL HISTORY      wound care ulcers of legs (bilaterally) with Dr Wilcox See  age 11    VEIN SURGERY      closure of peripherator veins of legs, Dr Norma Renner     ,   Social History     Tobacco Use    Smoking status: Never Smoker    Smokeless tobacco: Never Used   Vaping Use    Vaping Use: Never used   Substance Use Topics    Alcohol use: Not Currently     Comment: rare social, non past 5 months    Drug use: No   ,   Family History   Problem Relation Age of Onset   [facility] [facility] Mother         lung    Diabetes Father     Heart Disease Father     High Blood Pressure Father     Diabetes Brother     Heart Disease Brother     High Blood Pressure Brother        PHYSICAL EXAMINATION:  [ INSTRUCTIONS:  \"[x]\" Indicates a positive item  \"[]\" Indicates a negative item  -- DELETE ALL ITEMS NOT EXAMINED]  Vital Signs: (As obtained by patient/caregiver or practitioner observation)    Blood pressure-  Heart rate-    Respiratory rate-    Temperature-  Pulse oximetry-     Constitutional: [x] Appears well-developed and well-nourished [x] No apparent distress      [] Abnormal-   Mental status  [x] Alert and awake  [x] Oriented to person/place/time [x]Able to follow commands      Eyes:  EOM    [x]  Normal  [] Abnormal-  Sclera  [x]  Normal  [] Abnormal -         Discharge [x]  None visible  [] Abnormal -    HENT:   [x] Normocephalic, atraumatic. [] Abnormal   [x] Mouth/Throat: Mucous membranes are moist.     External Ears [x] Normal  [] Abnormal-     Neck: [x] No visualized mass     Pulmonary/Chest: [x] Respiratory effort normal.  [x] No visualized signs of difficulty breathing or respiratory distress        [] Abnormal-      Musculoskeletal:   [x] Normal gait with no signs of ataxia         [x] Normal range of motion of neck        [] Abnormal-       Neurological:        [x] No Facial Asymmetry (Cranial nerve 7 motor function) (limited exam to video visit)          [x] No gaze palsy        [] Abnormal-         Skin:        [x] No significant exanthematous lesions or discoloration noted on facial skin         [] Abnormal-            Psychiatric:       [x] Normal Affect [x] No Hallucinations        [] Abnormal-     Other pertinent observable physical exam findings-     ASSESSMENT/PLAN:   Diagnosis Orders   1. Cellulitis of foot, left  doxycycline hyclate (VIBRA-TABS) 100 MG tablet      No orders of the defined types were placed in this encounter. Requested Prescriptions     Signed Prescriptions Disp Refills    doxycycline hyclate (VIBRA-TABS) 100 MG tablet 20 tablet 0     Sig: Take 1 tablet by mouth 2 times daily for 10 days     Podiatry Wednesday go to ER if worsens before that time  No follow-ups on file.     Narinder Payne is a 64 y.o. male being evaluated by a Virtual Visit (video visit) encounter to address concerns as mentioned above. A caregiver was present when appropriate. Due to this being a TeleHealth encounter (During QCGAY-80 public health emergency), evaluation of the following organ systems was limited: Vitals/Constitutional/EENT/Resp/CV/GI//MS/Neuro/Skin/Heme-Lymph-Imm. Pursuant to the emergency declaration under the [address] [place] [address], [address] [place] [address] and the [employer] Act, this Virtual Visit was conducted with patient's (and/or legal guardian's) consent, to reduce the patient's risk of exposure to COVID-19 and provide necessary medical care. The patient (and/or legal guardian) has also been advised to contact this office for worsening conditions or problems, and seek emergency medical treatment and/or call 911 if deemed necessary. Patient identification was verified at the start of the visit: Yes    Total time spent on this encounter: Not billed by time    Services were provided through a video synchronous discussion virtually to substitute for in-person clinic visit. Patient and provider were located at their individual homes. --Butch Kwok DO on 3/24/2022 at 11:44 AM    An electronic signature was used to authenticate this note.

## 2022-03-29 ENCOUNTER — HOSPITAL ENCOUNTER (OUTPATIENT)
Dept: GENERAL RADIOLOGY | Age: 62
Discharge: HOME | End: 2022-03-31
Payer: COMMERCIAL

## 2022-03-29 ENCOUNTER — HOSPITAL ENCOUNTER (OUTPATIENT)
Dept: WOUND CARE | Age: 62
Discharge: HOME | End: 2022-03-29
Payer: COMMERCIAL

## 2022-03-29 ENCOUNTER — HOSPITAL ENCOUNTER (OUTPATIENT)
Age: 62
Discharge: HOME | End: 2022-03-31
Payer: COMMERCIAL

## 2022-03-29 VITALS
HEIGHT: 72 IN | DIASTOLIC BLOOD PRESSURE: 76 MMHG | HEART RATE: 86 BPM | RESPIRATION RATE: 17 BRPM | WEIGHT: 239 LBS | SYSTOLIC BLOOD PRESSURE: 142 MMHG | BODY MASS INDEX: 32.37 KG/M2 | TEMPERATURE: 96.3 F

## 2022-03-29 DIAGNOSIS — S01.352D: ICD-10-CM

## 2022-03-29 DIAGNOSIS — E11.42 DIABETIC POLYNEUROPATHY ASSOCIATED WITH TYPE 2 DIABETES MELLITUS (HCC): ICD-10-CM
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
## 2022-03-29 DIAGNOSIS — L97.524 CHRONIC ULCER OF TOE, LEFT, WITH NECROSIS OF BONE (HCC): ICD-10-CM

## 2022-03-29 DIAGNOSIS — L97.512 ULCER OF TOE OF RIGHT FOOT, WITH FAT LAYER EXPOSED (HCC): ICD-10-CM

## 2022-03-29 DIAGNOSIS — S01.25XA OPEN WOUND OF NOSE DUE TO DOG BITE: ICD-10-CM

## 2022-03-29 DIAGNOSIS — S61.452D: ICD-10-CM

## 2022-03-29 DIAGNOSIS — S61.451D: ICD-10-CM
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
## 2022-03-29 PROCEDURE — 73630 X-RAY EXAM OF FOOT: CPT

## 2022-03-29 PROCEDURE — 11042 DBRDMT SUBQ TIS 1ST 20SQCM/<: CPT

## 2022-03-29 RX ORDER — DOXYCYCLINE HYCLATE 100 MG
100 TABLET ORAL 2 TIMES DAILY
Qty: 20 TABLET | Refills: 0 | Status: ON HOLD | OUTPATIENT
Start: 2022-03-29 | End: 2022-04-07 | Stop reason: HOSPADM

## 2022-03-29 RX ORDER — LIDOCAINE HYDROCHLORIDE 20 MG/ML
JELLY TOPICAL ONCE
Status: CANCELLED | OUTPATIENT
Start: 2022-03-29 | End: 2022-03-29

## 2022-03-29 RX ORDER — LIDOCAINE HYDROCHLORIDE 20 MG/ML
JELLY TOPICAL ONCE
Status: COMPLETED | OUTPATIENT
Start: 2022-03-29 | End: 2022-03-29

## 2022-03-29 RX ADMIN — LIDOCAINE HYDROCHLORIDE 6 ML: 20 JELLY TOPICAL at 14:23

## 2022-03-29 ASSESSMENT — PAIN DESCRIPTION - ONSET: ONSET: ON-GOING

## 2022-03-29 ASSESSMENT — PAIN DESCRIPTION - ORIENTATION: ORIENTATION: RIGHT;LEFT

## 2022-03-29 ASSESSMENT — PAIN DESCRIPTION - FREQUENCY: FREQUENCY: INTERMITTENT

## 2022-03-29 ASSESSMENT — PAIN DESCRIPTION - PROGRESSION: CLINICAL_PROGRESSION: NOT CHANGED

## 2022-03-29 ASSESSMENT — PAIN DESCRIPTION - DESCRIPTORS: DESCRIPTORS: ACHING

## 2022-03-29 ASSESSMENT — PAIN DESCRIPTION - PAIN TYPE: TYPE: CHRONIC PAIN

## 2022-03-29 ASSESSMENT — PAIN DESCRIPTION - LOCATION: LOCATION: TOE (COMMENT WHICH ONE)

## 2022-03-29 ASSESSMENT — PAIN SCALES - GENERAL: PAINLEVEL_OUTOF10: 7

## 2022-03-29 ASSESSMENT — PAIN - FUNCTIONAL ASSESSMENT: PAIN_FUNCTIONAL_ASSESSMENT: PREVENTS OR INTERFERES SOME ACTIVE ACTIVITIES AND ADLS

## 2022-03-29 NOTE — PROGRESS NOTES
[address]   Progress Note and Procedure Note      Shelley Mcfadden  MEDICAL RECORD NUMBER:  [id number]  AGE: 64 y.o. GENDER: male  : 1960  EPISODE DATE:  3/29/2022    Subjective:     Chief Complaint   Patient presents with    Wound Check     bilateral feet         HISTORY of PRESENT ILLNESS HPI     Shelley Mcfadden is a 64 y.o. male who presents today for wound/ulcer evaluation. Interval history:  States that he has been applying the medi-honey daily. Has BEEN TAKING DOXY    History of Wound Context: Angela Gupta presents for evaluation of toe wounds of both feet. He reports that they have been present for a few weeks now. States they began after he was attacked by a dog. He recently underwent NIVS which revealed no arterial insufficiency. He presents in surgical shoes today. Has been using [employer] at home.         Ulcer Identification:  Ulcer Type: diabetic  Contributing Factors: diabetes and chronic pressure          PAST MEDICAL HISTORY        Diagnosis Date    Acquired trigger finger 2018    CAD (coronary artery disease)     [facility]/ involving coronary bypass graft of native heart with unstable angina pectoris    Cervical spondylosis     CHF (congestive heart failure) (HCC)     Chronic back pain     on 18 pt states is presently in pain mgmnt    Contusion of elbow 2018    Contusion of knee 2018    Contusion of shoulder region 2018    Diabetic foot ulcer with osteomyelitis ([address] 2018    Diabetic ulcer of toe associated with type 2 diabetes mellitus, with fat layer exposed ([address] 2018    Hypercholesterolemia 2017    Hyperlipemia, mixed     Hypertension     Ischemic cardiomyopathy 2018    Non-pressure chronic ulcer of left lower leg with fat layer exposed ([address] 2014    Obesity     Obesity, Class III, BMI 40-49.9 (morbid obesity) ([address] 2015    Obstructive sleep apnea syndrome     Osteoarthritis of carpometacarpal [place]) joint of thumb 6/5/2018    Osteoarthritis of knee 6/5/2018    Peripheral vascular disease ([address]     with venous stasis and ulcerations. Dr Sara Rodriguez Presence of coronary angioplasty implant and graft 9/11/2009    Sleep apnea     Dr Alethia Denver of knee and leg 6/5/2018    Sprain of shoulder and upper arm 6/5/2018    Type II or unspecified type diabetes mellitus without mention of complication, not stated as uncontrolled     Ulcer of left foot, with fat layer exposed ([address] 1/9/2018    Unspecified sleep apnea     Dr Keanu Villalobos CPAP    Venous insufficiency of both lower extremities 1/31/2018       PAST SURGICAL HISTORY    Past Surgical History:   Procedure Laterality Date    CARPAL TUNNEL RELEASE Right 3/22/13    Dr. Amanda Samuel.     CORONARY ANGIOPLASTY WITH STENT PLACEMENT  2002,2006.2009    X 3 separately    CORONARY ARTERY BYPASS GRAFT  2/26/07    [facility], Dr Raymon Maldonado (X 3)    NECK SURGERY  2010    cervical stenoses C5-C6-C7and partial T1 laminectomy with fusion of C6-7    OTHER SURGICAL HISTORY      wound care ulcers of legs (bilaterally) with Dr Gloria Reddy  age 11    VEIN SURGERY      closure of peripherator veins of legs, Dr Veronica Blair History   Problem Relation Age of Onset    Cancer Mother         lung    Diabetes Father     Heart Disease Father     High Blood Pressure Father     Diabetes Brother     Heart Disease Brother     High Blood Pressure Brother        SOCIAL HISTORY    Social History     Tobacco Use    Smoking status: Never Smoker    Smokeless tobacco: Never Used   Vaping Use    Vaping Use: Never used   Substance Use Topics    Alcohol use: Not Currently     Comment: rare social, non past 5 months    Drug use: No       ALLERGIES    Allergies   Allergen Reactions    Pcn [Penicillins] Other (See Comments)     Unknown rx       MEDICATIONS    Current Outpatient Medications on File Prior to Encounter   Medication Sig Dispense Refill    doxycycline hyclate (VIBRA-TABS) 100 MG tablet Take 1 tablet by mouth 2 times daily for 10 days 20 tablet 0    meloxicam (MOBIC) 15 MG tablet Take 1 tablet by mouth daily 90 tablet 3    traZODone (DESYREL) 150 MG tablet TAKE ONE TABLET BY MOUTH EVERY EVENING 90 tablet 3    metFORMIN (GLUCOPHAGE) 1000 MG tablet Take 1 tablet by mouth 2 times daily (with meals) 60 tablet 3    topiramate (TOPAMAX) 200 MG tablet Take 1 tablet by mouth 2 times daily 60 tablet 5    tiZANidine (ZANAFLEX) 4 MG tablet 1 po qd 14 tablet 3    omeprazole (PRILOSEC) 20 MG delayed release capsule TAKE 1 CAPSULE DAILY 90 capsule 1    carvedilol (COREG) 12.5 MG tablet Take 1 tablet by mouth 2 times daily (with meals) 180 tablet 3    clopidogrel (PLAVIX) 75 MG tablet Take 1 tablet by mouth daily 90 tablet 3    hydroCHLOROthiazide (MICROZIDE) 12.5 MG capsule 1 po qd 90 capsule 3    NEEDLE, DISP, 22 G 22G X 1-1/2\" MISC 1 Device by Does not apply route every 30 days 25 each 1    NEEDLE, DISP, 18 G (BD DISP NEEDLES) 18G X 1-1/2\" MISC 1 Device by Does not apply route every 30 days 25 each 1    linagliptin (TRADJENTA) 5 MG tablet TAKE 1 TABLET BY MOUTH ONE TIME A DAY 30 tablet 5    testosterone cypionate (DEPOTESTOTERONE CYPIONATE) 200 MG/ML injection       Testosterone Cypionate 200 MG/ML KIT Inject 200 mg into the muscle every 30 days for 30 days. please include syringes 1 kit 5    NEEDLE, DISP, 18 G 18G X 1\" MISC 1 Syringe by Does not apply route every 30 days 25 each 0    NEEDLE, DISP, 22 G 22G X 1-1/2\" MISC 1 Syringe by Does not apply route every 30 days 25 each 0    Multiple Vitamin (MULTI VITAMIN DAILY PO) Take by mouth      nitroGLYCERIN (NITROSTAT) 0.4 MG SL tablet Place 0.4 mg under the tongue      aspirin 325 MG tablet Take 325 mg by mouth daily. No current facility-administered medications on file prior to encounter.        REVIEW OF SYSTEMS    Review of Systems Constitutional: Negative for chills and fever. Skin: Positive for wound. Neurological: Negative for numbness. Objective:      BP (!) 142/76   Pulse 86   Temp 96.3 °F (35.7 °C) (Tympanic)   Resp 17   Ht 6' (1.829 m)   Wt 239 lb (108.4 kg)   BMI 32.41 kg/m²     Wt Readings from Last 3 Encounters:   03/29/22 239 lb (108.4 kg)   03/21/22 239 lb (108.4 kg)   03/09/22 239 lb (108.4 kg)       Physical Exam:  General:  Alert and oriented x3. In no acute distress. Lower Extremity Physical Exam:    Vascular: DP pulses are palpable, Bilateral. PT pulses are palpable, Bilateral. CFT <3 seconds to all digits, Bilateral.  No edema, Bilateral.  Hair growth is absent to the level of the digits, Bilateral.     Neuro: Saph/sural/SP/DP/plantar sensation intact to light touch. Musculoskeletal: EHL/FHL/GS/TA gross motor intact. Gross deformity is present, hammertoes bilateral.     Dermatologic: Open wound present to multiple dorsal digits at the PIPJ as documented in detail below. HEAD OF PROX PHALANX 2ND LEFT NOTED IN WOUND    Assessment:      Active Hospital Problems    Diagnosis Date Noted    Diabetic polyneuropathy ([facility] [E11.42] 03/29/2022    Chronic ulcer of toe, left, with necrosis of bone [facility]) [L97.524] 03/29/2022    Ulcer of toe of right foot, with fat layer exposed [facility]) [L97.512] 03/29/2022    Diabetic ulcer of toe associated with type 2 diabetes mellitus, with fat layer exposed ([facility] [V84.752, L97.502] 01/31/2018     Procedure Note  Indications:  Based on my examination of this patient's wound(s)/ulcer(s) today, debridement is required to promote healing and evaluate the wound base. Performed by:  Carmenza Perera DPM    Consent obtained:  Yes    Time out taken:  Yes    Pain Control: Anesthetic  Anesthetic: 2% Lidocaine Gel Topical       Debridement: Excisional Debridement    Using scissors and forceps the wound(s)/ulcer(s) was/were sharply debrided down through and including the removal of epidermis, dermis and subcutaneous tissue OF THE RIGHT TOE WOUNDS. BONE NOTED IN 2ND LEFT      Devitalized Tissue Debrided:  fibrin and necrotic/eschar    Pre Debridement Measurements:  Are located in the Wound/Ulcer Documentation Flow Sheet        Plan:   EXCISIONAL DEBRIDEMENT RIGHT TOE WOUNDS - KAYCEE AND SSD RIGHT. DSD LEFT 2ND    RX DOXYCYCLINE 100 BID    ORDER XR LEFT FOOT - DISCUSS EXCISION OF EXPOSED BONE - WILL TRY TO SAVE AS MUCH OF THE 2ND LEFT TOE AS POSSIBLE, BUT PARTIAL OR FULL AMP OF 2ND LEFT IS POSSIBLE. WILL CALL PT WITH SURGERY DATE AFTER XRS ARE TAKEN TODAY. PT TAKEN OUT OF WORK TO ALLOW WOUNDS TO HEAL- HE IS A  AND WILL BE UNABLE TO DRIVE WITH HIS SURGICAL SHOES.       Treatment Note please see attached Discharge Instructions    PVR/PPG reviewed    Gurmeet Garcia 428 1 WEEK

## 2022-03-30 ENCOUNTER — HOSPITAL ENCOUNTER (OUTPATIENT)
Dept: WOUND CARE | Age: 62
Discharge: HOME | End: 2022-03-30

## 2022-03-30 DIAGNOSIS — E11.69 TYPE 2 DIABETES MELLITUS WITH OTHER SPECIFIED COMPLICATION, WITHOUT LONG-TERM CURRENT USE OF INSULIN (HCC): ICD-10-CM

## 2022-03-30 DIAGNOSIS — R10.10 UPPER ABDOMINAL PAIN: ICD-10-CM

## 2022-03-30 NOTE — PROGRESS NOTES
2021    TELEHEALTH EVALUATION -- Audio/Visual (During [facility]-60 public health emergency)    HPI:    Vamsi Watkins (:  1960) has requested an audio/video evaluation for the following concern(s):    He is being seen today for follow-up on diabetes and hypertension he is monitoring everything at home and he is doing quite well his last A1c was 6.8  He has gained some weight back and would like to restart diet medications    Review of Systems    Prior to Visit Medications    Medication Sig Taking? Authorizing Provider   phentermine (ADIPEX-P) 37.5 MG tablet Take 1 tablet by mouth every morning (before breakfast) for 30 days. Yes Seema Hameed, DO   hydroCHLOROthiazide (MICROZIDE) 12.5 MG capsule 1 po qd  Seema Hameed DO   tiZANidine (ZANAFLEX) 4 MG tablet 1 po qd  Seema Hameed DO   NEEDLE, DISP, 22 G 22G X 1-1/2\" MISC 1 Device by Does not apply route every 30 days  Seema Hameed DO   NEEDLE, DISP, 18 G (BD DISP NEEDLES) 18G X 1-1/2\" MISC 1 Device by Does not apply route every 30 days  Seema Hameed DO   linagliptin (TRADJENTA) 5 MG tablet TAKE 1 TABLET BY MOUTH ONE TIME A DAY  Seema Hameed DO   omeprazole (PRILOSEC) 20 MG delayed release capsule TAKE 1 CAPSULE BY MOUTH ONE TIME A DAY  Seema Hameed DO   BELBUCA 300 MCG FILM Place 300 mcg inside cheek 2 times daily.   Historical Provider, MD   traZODone (DESYREL) 150 MG tablet TAKE ONE TABLET BY MOUTH EVERY EVENING  Seema Hameed DO   atorvastatin (LIPITOR) 40 MG tablet Take 1 tablet by mouth daily  Seema Hameed DO   metFORMIN (GLUCOPHAGE) 1000 MG tablet Take 1 tablet by mouth 2 times daily (with meals)  Seema Hameed DO   carvedilol (COREG) 12.5 MG tablet Take 1 tablet by mouth 2 times daily (with meals)  Seema Hameed DO   clopidogrel (PLAVIX) 75 MG tablet Take 1 tablet by mouth daily  Seema Hameed DO   meloxicam (MOBIC) 15 MG tablet Take 1 tablet by mouth daily  Seema Hameed DO   testosterone cypionate (DEPOTESTOTERONE CYPIONATE) 200 MG/ML injection   Historical Provider, MD   Testosterone Cypionate 200 MG/ML KIT Inject 200 mg into the muscle every 30 days for 30 days. please include syringes  Seema Hameed DO   NEEDLE, DISP, 18 G 18G X 1\" MISC 1 Syringe by Does not apply route every 30 days  Seema Hameed, DO   NEEDLE, DISP, 22 G 22G X 1-1/2\" MISC 1 Syringe by Does not apply route every 30 days  Seema Hameed DO   topiramate (TOPAMAX) 200 MG tablet   Historical Provider, MD   HYDROcodone-acetaminophen (NORCO)  MG per tablet Take 1 tablet by mouth every 6 hours as needed for Pain. John Persaud MD   Multiple Vitamin (MULTI VITAMIN DAILY PO) Take by mouth  Historical Provider, MD   nitroGLYCERIN (NITROSTAT) 0.4 MG SL tablet Place 0.4 mg under the tongue  Historical Provider, MD   aspirin 325 MG tablet Take 325 mg by mouth daily.     Historical Provider, MD       Social History     Tobacco Use    Smoking status: Never Smoker    Smokeless tobacco: Never Used   Vaping Use    Vaping Use: Never used   Substance Use Topics    Alcohol use: Not Currently     Comment: rare social, non past 5 months    Drug use: No        Allergies   Allergen Reactions    Pcn [Penicillins] Other (See Comments)     Unknown rx   ,   Past Medical History:   Diagnosis Date    Acquired trigger finger 6/5/2018    CAD (coronary artery disease)     [facility]/ involving coronary bypass graft of native heart with unstable angina pectoris    Cervical spondylosis     [facility]congestive heart failure) ([facility])     Chronic back pain     on 11/19/18 pt states is presently in pain mgmnt    Contusion of elbow 6/5/2018    Contusion of knee 6/5/2018    Contusion of shoulder region 6/5/2018    Diabetic foot ulcer with osteomyelitis ([facility] [address] 1/9/2018    Diabetic ulcer of toe associated with type 2 diabetes mellitus, with fat layer exposed ([address] 1/31/2018    Hypercholesterolemia 12/5/2017    Hyperlipemia, mixed     Hypertension     Ischemic cardiomyopathy 5/7/2018    Non-pressure chronic ulcer of left lower leg with fat layer exposed ([address] 6/9/2014    Obesity     Obesity, Class III, BMI 40-49.9 (morbid obesity) ([address] 4/6/2015    Obstructive sleep apnea syndrome     Osteoarthritis of carpometacarpal (CMC) joint of thumb 6/5/2018    Osteoarthritis of knee 6/5/2018    Peripheral vascular disease ([address]     with venous stasis and ulcerations. Dr Funez Gins Presence of coronary angioplasty implant and graft 9/11/2009    Sleep apnea     Dr hTo Harper of knee and leg 6/5/2018    Sprain of shoulder and upper arm 6/5/2018    Type II or unspecified type diabetes mellitus without mention of complication, not stated as uncontrolled     Ulcer of left foot, with fat layer exposed ([address] 1/9/2018    Unspecified sleep apnea     Dr Melissa Oliveros CPAP    Venous insufficiency of both lower extremities 1/31/2018   ,   Past Surgical History:   Procedure Laterality Date    CARPAL TUNNEL RELEASE Right 3/22/13    Dr. Talavera Found.     CORONARY ANGIOPLASTY WITH STENT PLACEMENT  2002,2006.2009    X 3 separately    CORONARY ARTERY BYPASS GRAFT  2/26/07    [facility], Dr Maureen Dwyer (X 3)    NECK SURGERY  2010    cervical stenoses C5-C6-C7and partial T1 laminectomy with fusion of C6-7    OTHER SURGICAL HISTORY      wound care ulcers of legs (bilaterally) with Dr Becky Koch  age 11    VEIN SURGERY      closure of peripherator veins of legs, Dr Yves Pedraza     ,   Social History     Tobacco Use    Smoking status: Never Smoker    Smokeless tobacco: Never Used   Vaping Use    Vaping Use: Never used   Substance Use Topics    Alcohol use: Not Currently     Comment: rare social, non past 5 months    Drug use: No   ,   Family History   Problem Relation Age of Onset    Cancer Mother         lung    Diabetes Father     Heart Disease Father     High Blood Pressure Father     Diabetes Brother     Heart Disease Brother     High Blood Pressure Brother        PHYSICAL (ADIPEX-P) 37.5 MG tablet 30 tablet 0     Sig: Take 1 tablet by mouth every morning (before breakfast) for 30 days. Return in about 4 weeks (around 12/30/2021) for re-check. Raman Navas is a 64 y.o. male being evaluated by a Virtual Visit (video visit) encounter to address concerns as mentioned above. A caregiver was present when appropriate. Due to this being a TeleHealth encounter (During [facility] public [facility] emergency), evaluation of the following organ systems was limited: Vitals/Constitutional/EENT/Resp/CV/GI//MS/Neuro/Skin/Heme-Lymph-Imm. Pursuant to the emergency declaration under the [address] [place] [address], [address] [place] [address] authority and the [employer] Act, this Virtual Visit was conducted with patient's (and/or legal guardian's) consent, to reduce the patient's risk of exposure to COVID-19 and provide necessary medical care. The patient (and/or legal guardian) has also been advised to contact this office for worsening conditions or problems, and seek emergency medical treatment and/or call 911 if deemed necessary. Patient identification was verified at the start of the visit: Yes    Total time spent on this encounter: Not billed by time    Services were provided through a video synchronous discussion virtually to substitute for in-person clinic visit. Patient and provider were located at their individual homes. --Thalia Trujillo DO on 12/2/2021 at 10:21 AM    An electronic signature was used to authenticate this note. negative

## 2022-03-30 NOTE — TELEPHONE ENCOUNTER
Irais Galindo is calling to request a refill on the following medication(s):    Last Visit Date (If Applicable):  8/27/8825    Next Visit Date:    Visit date not found    Medication Request:  Requested Prescriptions     Pending Prescriptions Disp Refills    metFORMIN (GLUCOPHAGE) 1000 MG tablet 60 tablet 3     Sig: Take 1 tablet by mouth 2 times daily (with meals)    omeprazole (PRILOSEC) 20 MG delayed release capsule 90 capsule 1    linagliptin (TRADJENTA) 5 MG tablet 30 tablet 5     Sig: TAKE 1 TABLET BY MOUTH ONE TIME A DAY

## 2022-03-31 ENCOUNTER — HOSPITAL ENCOUNTER (OUTPATIENT)
Dept: PREADMISSION TESTING | Age: 62
Discharge: HOME | End: 2022-04-04
Payer: COMMERCIAL

## 2022-03-31 VITALS
DIASTOLIC BLOOD PRESSURE: 66 MMHG | WEIGHT: 248.8 LBS | BODY MASS INDEX: 33.7 KG/M2 | HEART RATE: 84 BPM | OXYGEN SATURATION: 99 % | RESPIRATION RATE: 20 BRPM | TEMPERATURE: 97.7 F | SYSTOLIC BLOOD PRESSURE: 124 MMHG | HEIGHT: 72 IN

## 2022-03-31 LAB
ANION GAP SERPL CALCULATED.3IONS-SCNC: 14 MMOL/L (ref 9–17)
BUN BLDV-MCNC: 20 MG/DL (ref 8–23)
BUN/CREAT BLD: 24 (ref 9–20)
CALCIUM SERPL-MCNC: 9.3 MG/DL (ref 8.6–10.4)
CHLORIDE BLD-SCNC: 104 MMOL/L (ref 98–107)
CO2: 20 MMOL/L (ref 20–31)
CREAT SERPL-MCNC: 0.83 MG/DL (ref 0.7–1.2)
EKG ATRIAL RATE: 69 BPM
EKG P AXIS: 23 DEGREES
EKG P-R INTERVAL: 226 MS
EKG Q-T INTERVAL: 362 MS
EKG QRS DURATION: 106 MS
EKG QTC CALCULATION (BAZETT): 387 MS
EKG R AXIS: 5 DEGREES
EKG T AXIS: 12 DEGREES
EKG VENTRICULAR RATE: 69 BPM
GFR AFRICAN AMERICAN: >60 ML/MIN
GFR NON-AFRICAN AMERICAN: >60 ML/MIN
GFR SERPL CREATININE-BSD FRML MDRD: ABNORMAL ML/MIN/{1.73_M2}
GLUCOSE BLD-MCNC: 135 MG/DL (ref 70–99)
HCT VFR BLD CALC: 36.1 % (ref 40.7–50.3)
HEMOGLOBIN: 11.5 G/DL (ref 13–17)
INR BLD: 1
MCH RBC QN AUTO: 28.3 PG (ref 25.2–33.5)
MCHC RBC AUTO-ENTMCNC: 31.9 G/DL (ref 28.4–34.8)
MCV RBC AUTO: 88.9 FL (ref 82.6–102.9)
NRBC AUTOMATED: 0 PER 100 WBC
PARTIAL THROMBOPLASTIN TIME: 28.2 SEC (ref 23.9–33.8)
PDW BLD-RTO: 12.9 % (ref 11.8–14.4)
PLATELET # BLD: 293 K/UL (ref 138–453)
PMV BLD AUTO: 8.8 FL (ref 8.1–13.5)
POTASSIUM SERPL-SCNC: 3.8 MMOL/L (ref 3.7–5.3)
PROTHROMBIN TIME: 13.6 SEC (ref 11.5–14.2)
RBC # BLD: 4.06 M/UL (ref 4.21–5.77)
SODIUM BLD-SCNC: 138 MMOL/L (ref 135–144)
WBC # BLD: 8.3 K/UL (ref 3.5–11.3)

## 2022-03-31 PROCEDURE — 93005 ELECTROCARDIOGRAM TRACING: CPT | Performed by: ANESTHESIOLOGY

## 2022-03-31 PROCEDURE — 85610 PROTHROMBIN TIME: CPT

## 2022-03-31 PROCEDURE — 85027 COMPLETE CBC AUTOMATED: CPT

## 2022-03-31 PROCEDURE — 80048 BASIC METABOLIC PNL TOTAL CA: CPT

## 2022-03-31 PROCEDURE — 85730 THROMBOPLASTIN TIME PARTIAL: CPT

## 2022-03-31 PROCEDURE — 36415 COLL VENOUS BLD VENIPUNCTURE: CPT

## 2022-03-31 RX ORDER — HYDROCODONE BITARTRATE AND ACETAMINOPHEN 10; 325 MG/1; MG/1
1 TABLET ORAL
COMMUNITY

## 2022-03-31 RX ORDER — OMEPRAZOLE 20 MG/1
CAPSULE, DELAYED RELEASE ORAL
Qty: 90 CAPSULE | Refills: 1 | Status: SHIPPED | OUTPATIENT
Start: 2022-03-31 | End: 2022-08-08 | Stop reason: SDUPTHER

## 2022-03-31 RX ORDER — GABAPENTIN 300 MG/1
900 CAPSULE ORAL NIGHTLY
COMMUNITY

## 2022-03-31 ASSESSMENT — PAIN DESCRIPTION - ORIENTATION: ORIENTATION: LEFT

## 2022-03-31 ASSESSMENT — PAIN SCALES - GENERAL: PAINLEVEL_OUTOF10: 4

## 2022-03-31 ASSESSMENT — PAIN DESCRIPTION - DIRECTION: RADIATING_TOWARDS: INTO FOOT

## 2022-03-31 ASSESSMENT — PAIN DESCRIPTION - DESCRIPTORS: DESCRIPTORS: ACHING;BURNING;CONSTANT

## 2022-03-31 ASSESSMENT — PAIN DESCRIPTION - LOCATION: LOCATION: TOE (COMMENT WHICH ONE)

## 2022-03-31 ASSESSMENT — PAIN DESCRIPTION - PAIN TYPE: TYPE: CHRONIC PAIN

## 2022-03-31 NOTE — PROGRESS NOTES
Spoke with Dr Dejan Martínez regarding pt's blood thinners taken through today 3/31/22. Pt is \"ok\" for surgery tomorrow. Dr Eveiln Way informed of pt's history and use of blood thinners, no further orders recieved.

## 2022-03-31 NOTE — PROGRESS NOTES
[facility] Progress Note    Pt Name: Kelvin German  MRN: [id number]  YOB: 1960  Date of evaluation: 3/31/2022      [x] Called to [facility]. I spoke to the patient, Kelvin German, a 64 y.o. male, who is scheduled for an upcoming arthroplasty 2nd left, possible partial amp 2nd left by Dr. Luis Manuel Hou for osteomyelitis 2nd left on 4/1/2022 at 0800. [x] I reviewed in epic the podiatry progress note by Dr. Luis Manuel Hou dated 3/29/2022 for an Interval History and Physical Note the day of surgery. History of coronary artery disease, congestive heart failure, diabetes, hyperlipidemia, hypertension, ischemic cardiomyopathy, obstructive sleep apnea, cardiac stents (2002, 2006, 2009, 2021), and CABG x3 (2007). Patient had cardiac catheterization in 9/2021 with stent to left circumflex, left main, and arthrectomy to left circumflex. Patient follow with cardiologist Dr. Raeann Kulkarni and last evaluated 1/3/2022. Denies chest pain, shortness of breath, palpitations, dizziness, syncope. Patient is still taking Aspirin 325mg and Plavix. Jordy Quesada RN notified Dr. Luis Manuel Hou. Echo complete w/ contrast 11/5/2021:  Left Ventricle Left ventricle appears normal in size. There is mild concentric increased wall thickness/hypertrophy. Systolic function is normal with an ejection fraction of 55-60%. See wall score diagram for wall motion abnormalities. There is grade I (mild) diastolic dysfunction and normal left atrial pressure. Right Ventricle Right ventricular size appears normal. Systolic function is normal.   Left Atrium Left atrium volume index is normal. The left atrial volume index is 28.3 mL/m2. Right Atrium Right atrium is normal in size. IVC/SVC There is normal collapse with deep inspiration. Mitral Valve The leaflets are mildly thickened and exhibit normal excursion. There is annular calcification. There is mild regurgitation. Tricuspid Valve There is mild regurgitation.  The right ventricular systolic pressure normal. RVSP calculated at 26 mmHg. RVSP is based on RA pressure of 3 mmHg. Aortic Valve The aortic valve is trileaflet. The leaflets are mildly calcified. There is moderate regurgitation. There is mild stenosis. The calculated aortic valve area is 1.98 cm2. The calculated aortic valve peak gradient is 20.00 mmHg. The calculated aortic valve mean gradient is 11.00 mmHg. Pulmonic Valve Pulmonic valve structure is grossly normal. There is trace regurgitation. There is no evidence of pulmonic valve stenosis. Ascending Aorta The aortic root is normal in size. Pericardium There is no pericardial effusion. Vital signs: /66   Pulse 84   Temp 97.7 °F (36.5 °C) (Temporal)   Resp 20   Ht 6' (1.829 m)   Wt 248 lb 12.8 oz (112.9 kg)   SpO2 99%   BMI 33.74 kg/m²     Physical Exam:     General Appearance:  Alert, well appearing, and in no acute distress. Mental status: Oriented to person, place, and time. Lungs: Bilateral equal air entry, clear to auscultation, no wheezing, rales or rhonchi, and normal effort. Cardiovascular: Grade II/VI systolic murmur Normal rate, regular rhythm, no gallop, or rub.     Investigations:      Laboratory Testing:  Recent Results (from the past 24 hour(s))   CBC    Collection Time: 03/31/22  2:40 PM   Result Value Ref Range    WBC 8.3 3.5 - 11.3 k/uL    RBC 4.06 (L) 4.21 - 5.77 m/uL    Hemoglobin 11.5 (L) 13.0 - 17.0 g/dL    Hematocrit 36.1 (L) 40.7 - 50.3 %    MCV 88.9 82.6 - 102.9 fL    MCH 28.3 25.2 - 33.5 pg    MCHC 31.9 28.4 - 34.8 g/dL    RDW 12.9 11.8 - 14.4 %    Platelets 592 181 - 323 k/uL    MPV 8.8 8.1 - 13.5 fL    NRBC Automated 0.0 0.0 per 100 WBC   Protime-INR    Collection Time: 03/31/22  2:40 PM   Result Value Ref Range    Protime 13.6 11.5 - 14.2 sec    INR 1.0    EKG 12 Lead    Collection Time: 03/31/22  2:51 PM   Result Value Ref Range    Ventricular Rate 69 BPM    Atrial Rate 69 BPM    P-R Interval 226 ms    QRS Duration 106 ms    Q-T Interval 362 ms    QTc Calculation (Bazett) 387 ms    P Axis 23 degrees    R Axis 5 degrees    T Axis 12 degrees       Recent Labs     03/31/22  1440 03/23/22  1045   HGB 11.5*  --    HCT 36.1*  --    WBC 8.3  --    MCV 88.9  --    INR 1.0  --    PROTIME 13.6  --    LABALBU  --  4.1       No results for input(s): COVID19 in the last 720 hours.     JESICA Velázquez - CNP    Electronically signed 3/31/2022 at 2:59 PM

## 2022-03-31 NOTE — PRE-PROCEDURE INSTRUCTIONS
[facility] ON Friday, 4/1/22 at 6:30 AM    Once you enter the hospital lobby, take the elevators to the second floor. Check-In is at the surgery registration desk. If you have been given a blood band, you must bring it with you the day of surgery. Continue to take your home medications as you normally do up to and including the night before surgery with the exception of any blood thinning medications. Please stop any blood thinning medications as directed by your surgeon or prescribing physician. Failure to stop certain medications may interfere with your scheduled surgery. These may include:  Aspirin, Warfarin (Coumadin), Clopidogrel (Plavix), Ibuprofen (Motrin, Advil), Naproxen (Aleve), Meloxicam (Mobic), Celecoxib (Celebrex), Eliquis, Pradaxa, Xarelto, Effient, Fish Oil, Herbal supplements. May take Tylenol for pain or prescription pain medication. If you are diabetic, do not take any of your diabetic medications by mouth the morning of surgery. If you are taking insulin contact the doctor that manages your diabetes for instructions about any changes to your insulin dosages the day before surgery. Do not inject insulin or other injectable diabetic medications the morning of surgery unless otherwise instructed by the doctor who manages your diabetes. Please take the following medication(s) the day of surgery with a small sip of water:  Coreg and Omeprazole    Please use your inhalers at home the day of surgery      PREPARING FOR YOUR SURGERY:     Before surgery, you can play an important role in your own health. Because skin is not sterile, we need to be sure that your skin is as free of germs as possible before surgery by carefully washing before surgery. Preparing or prepping skin before surgery can reduce the risk of a surgical site infection.   Do not shave the area of your body where your surgery will be performed unless you received specific permission from your physician. Preoperative Bathing Instructions   Bathe or shower the day of surgery.  Wear clean clothing after bathing.  Shampoo hair before surgery   Keep nails clean    Do not apply alcohol-based hair or skin products,    Do not apply lotion, emollients, cosmetics, perfumes or deodorant the day of surgery.  Do not shave the area of your body where your surgery will be performed unless you receive specific permission from your surgeon. Patient Instructions:    Creston Sicard If you are having any type of anesthesia you are to have nothing to eat or drink after midnight the night before your surgery. This includes gum, hard candy, mints, water or smoking or chewing tobacco.  The only exception to this is a small sip of water to take with any morning dose of heart, blood pressure, or seizure medications. No alcoholic beverages for 24 hours prior to surgery.  Brush your teeth but do not swallow water.  Bring your eyeglasses and case with you. No contacts are to be worn the day of surgery. You also may bring your hearing aids.  If you are on C-PAP or Bi-PAP at home and plan on staying in the hospital overnight for your surgery please bring the machine with you.  Do not wear any jewelry or body piercings day of surgery. Also, NO lotion, perfume or deodorant to be used the day of surgery. No nail polish on the operative extremity (arm/leg surgeries)      If you are staying overnight with us, please bring a small bag of personal items.  Please wear loose, comfortable clothing. If you are potentially going to have a cast or brace bring clothing that will fit over them.                                                                                                                            In case of illness - If you have cold or flu like symptoms (high fever, runny nose, sore throat, cough, etc.) rash, nausea, vomiting, loose stools, and/or recent contact with someone who has a contagious disease (chicken pox, measles, etc.) Please call your doctor before coming to the hospital.     If your child is having surgery please make arrangements for any other children to be cared for at home on the day of surgery. Other children are not permitted in recovery room and we want you to be able to spend time with the patient. If other arrangements are not available then we suggest that you have a second adult to stay in the waiting room. Day of Surgery/Procedure:    As a patient at [facility] - INPATIENT you can expect quality medical and nursing care that is centered on your individual needs. Our goal is to make your surgical experience as comfortable as possible    . Transportation After Your Surgery/Procedure: You will need a friend or family member to drive you home after your procedure. Your  must be 25years of age or older and able to sign off on your discharge instructions. A taxi cab or any other form of public transportation is not acceptable. Your friend or family member must stay at the hospital throughout your procedure. Someone must remain with you for the first 24 hours after your surgery if you receive anesthesia or medication. If you do not have someone to stay with you, your procedure may be cancelled.       If you have any other questions regarding your procedure or the day of surgery, please call [phone]      _________________________  ____________________________  Signature (Patient)              Signature (Provider) & date

## 2022-04-01 ENCOUNTER — ANESTHESIA (OUTPATIENT)
Dept: OPERATING ROOM | Age: 62
End: 2022-04-01
Payer: COMMERCIAL

## 2022-04-01 ENCOUNTER — HOSPITAL ENCOUNTER (OUTPATIENT)
Age: 62
Setting detail: OUTPATIENT SURGERY
Discharge: HOME | End: 2022-04-01
Attending: PODIATRIST | Admitting: PODIATRIST
Payer: COMMERCIAL

## 2022-04-01 ENCOUNTER — ANESTHESIA EVENT (OUTPATIENT)
Dept: OPERATING ROOM | Age: 62
End: 2022-04-01
Payer: COMMERCIAL

## 2022-04-01 VITALS — OXYGEN SATURATION: 98 % | DIASTOLIC BLOOD PRESSURE: 76 MMHG | SYSTOLIC BLOOD PRESSURE: 132 MMHG

## 2022-04-01 VITALS
OXYGEN SATURATION: 98 % | DIASTOLIC BLOOD PRESSURE: 67 MMHG | HEIGHT: 72 IN | SYSTOLIC BLOOD PRESSURE: 104 MMHG | HEART RATE: 75 BPM | WEIGHT: 248 LBS | RESPIRATION RATE: 19 BRPM | TEMPERATURE: 96.8 F | BODY MASS INDEX: 33.59 KG/M2

## 2022-04-01 DIAGNOSIS — Z98.890 POST-OPERATIVE STATE: Primary | ICD-10-CM

## 2022-04-01 LAB
GLUCOSE BLD-MCNC: 165 MG/DL (ref 75–110)
GLUCOSE BLD-MCNC: 194 MG/DL (ref 75–110)

## 2022-04-01 PROCEDURE — 87205 SMEAR GRAM STAIN: CPT

## 2022-04-01 PROCEDURE — 3700000001 HC ADD 15 MINUTES (ANESTHESIA): Performed by: PODIATRIST

## 2022-04-01 PROCEDURE — 6360000002 HC RX W HCPCS: Performed by: ANESTHESIOLOGY

## 2022-04-01 PROCEDURE — 2500000003 HC RX 250 WO HCPCS: Performed by: PODIATRIST

## 2022-04-01 PROCEDURE — 7100000001 HC PACU RECOVERY - ADDTL 15 MIN: Performed by: PODIATRIST

## 2022-04-01 PROCEDURE — 3600000002 HC SURGERY LEVEL 2 BASE: Performed by: PODIATRIST

## 2022-04-01 PROCEDURE — 6370000000 HC RX 637 (ALT 250 FOR IP): Performed by: ANESTHESIOLOGY

## 2022-04-01 PROCEDURE — 3700000000 HC ANESTHESIA ATTENDED CARE: Performed by: PODIATRIST

## 2022-04-01 PROCEDURE — 2709999900 HC NON-CHARGEABLE SUPPLY: Performed by: PODIATRIST

## 2022-04-01 PROCEDURE — 88305 TISSUE EXAM BY PATHOLOGIST: CPT

## 2022-04-01 PROCEDURE — 64445 NJX AA&/STRD SCIATIC NRV IMG: CPT | Performed by: ANESTHESIOLOGY

## 2022-04-01 PROCEDURE — 6360000002 HC RX W HCPCS: Performed by: NURSE ANESTHETIST, CERTIFIED REGISTERED

## 2022-04-01 PROCEDURE — 86403 PARTICLE AGGLUT ANTBDY SCRN: CPT

## 2022-04-01 PROCEDURE — 2720000010 HC SURG SUPPLY STERILE: Performed by: PODIATRIST

## 2022-04-01 PROCEDURE — 7100000011 HC PHASE II RECOVERY - ADDTL 15 MIN: Performed by: PODIATRIST

## 2022-04-01 PROCEDURE — 87075 CULTR BACTERIA EXCEPT BLOOD: CPT

## 2022-04-01 PROCEDURE — 82947 ASSAY GLUCOSE BLOOD QUANT: CPT

## 2022-04-01 PROCEDURE — 2580000003 HC RX 258: Performed by: NURSE ANESTHETIST, CERTIFIED REGISTERED

## 2022-04-01 PROCEDURE — 88311 DECALCIFY TISSUE: CPT

## 2022-04-01 PROCEDURE — 87070 CULTURE OTHR SPECIMN AEROBIC: CPT

## 2022-04-01 PROCEDURE — 2500000003 HC RX 250 WO HCPCS: Performed by: ANESTHESIOLOGY

## 2022-04-01 PROCEDURE — 3600000012 HC SURGERY LEVEL 2 ADDTL 15MIN: Performed by: PODIATRIST

## 2022-04-01 PROCEDURE — 2500000003 HC RX 250 WO HCPCS: Performed by: NURSE ANESTHETIST, CERTIFIED REGISTERED

## 2022-04-01 PROCEDURE — 7100000000 HC PACU RECOVERY - FIRST 15 MIN: Performed by: PODIATRIST

## 2022-04-01 PROCEDURE — 7100000010 HC PHASE II RECOVERY - FIRST 15 MIN: Performed by: PODIATRIST

## 2022-04-01 PROCEDURE — 6370000000 HC RX 637 (ALT 250 FOR IP): Performed by: PODIATRIST

## 2022-04-01 PROCEDURE — 87186 SC STD MICRODIL/AGAR DIL: CPT

## 2022-04-01 RX ORDER — SODIUM CHLORIDE 9 MG/ML
INJECTION, SOLUTION INTRAVENOUS PRN
Status: DISCONTINUED | OUTPATIENT
Start: 2022-04-01 | End: 2022-04-01 | Stop reason: HOSPADM

## 2022-04-01 RX ORDER — CLINDAMYCIN PHOSPHATE 900 MG/50ML
900 INJECTION INTRAVENOUS ONCE
Status: COMPLETED | OUTPATIENT
Start: 2022-04-01 | End: 2022-04-01

## 2022-04-01 RX ORDER — LIDOCAINE HYDROCHLORIDE 10 MG/ML
INJECTION, SOLUTION INFILTRATION; PERINEURAL PRN
Status: DISCONTINUED | OUTPATIENT
Start: 2022-04-01 | End: 2022-04-01 | Stop reason: SDUPTHER

## 2022-04-01 RX ORDER — ROPIVACAINE HYDROCHLORIDE 5 MG/ML
INJECTION, SOLUTION EPIDURAL; INFILTRATION; PERINEURAL PRN
Status: DISCONTINUED | OUTPATIENT
Start: 2022-04-01 | End: 2022-04-01 | Stop reason: SDUPTHER

## 2022-04-01 RX ORDER — SODIUM CHLORIDE 0.9 % (FLUSH) 0.9 %
5-40 SYRINGE (ML) INJECTION EVERY 12 HOURS SCHEDULED
Status: DISCONTINUED | OUTPATIENT
Start: 2022-04-01 | End: 2022-04-01 | Stop reason: HOSPADM

## 2022-04-01 RX ORDER — ONDANSETRON 2 MG/ML
4 INJECTION INTRAMUSCULAR; INTRAVENOUS
Status: DISCONTINUED | OUTPATIENT
Start: 2022-04-01 | End: 2022-04-01 | Stop reason: HOSPADM

## 2022-04-01 RX ORDER — MIDAZOLAM HYDROCHLORIDE 1 MG/ML
2 INJECTION INTRAMUSCULAR; INTRAVENOUS
Status: COMPLETED | OUTPATIENT
Start: 2022-04-01 | End: 2022-04-01

## 2022-04-01 RX ORDER — SODIUM CHLORIDE 0.9 % (FLUSH) 0.9 %
5-40 SYRINGE (ML) INJECTION PRN
Status: DISCONTINUED | OUTPATIENT
Start: 2022-04-01 | End: 2022-04-01 | Stop reason: HOSPADM

## 2022-04-01 RX ORDER — ONDANSETRON 2 MG/ML
INJECTION INTRAMUSCULAR; INTRAVENOUS PRN
Status: DISCONTINUED | OUTPATIENT
Start: 2022-04-01 | End: 2022-04-01 | Stop reason: SDUPTHER

## 2022-04-01 RX ORDER — ASPIRIN 81 MG/1
81 TABLET, CHEWABLE ORAL DAILY
Status: DISCONTINUED | OUTPATIENT
Start: 2022-04-01 | End: 2022-04-01

## 2022-04-01 RX ORDER — FENTANYL CITRATE 50 UG/ML
25 INJECTION, SOLUTION INTRAMUSCULAR; INTRAVENOUS EVERY 5 MIN PRN
Status: DISCONTINUED | OUTPATIENT
Start: 2022-04-01 | End: 2022-04-01 | Stop reason: HOSPADM

## 2022-04-01 RX ORDER — SODIUM CHLORIDE, SODIUM LACTATE, POTASSIUM CHLORIDE, CALCIUM CHLORIDE 600; 310; 30; 20 MG/100ML; MG/100ML; MG/100ML; MG/100ML
INJECTION, SOLUTION INTRAVENOUS CONTINUOUS PRN
Status: DISCONTINUED | OUTPATIENT
Start: 2022-04-01 | End: 2022-04-01 | Stop reason: SDUPTHER

## 2022-04-01 RX ORDER — LIDOCAINE HYDROCHLORIDE 20 MG/ML
INJECTION, SOLUTION EPIDURAL; INFILTRATION; INTRACAUDAL; PERINEURAL PRN
Status: DISCONTINUED | OUTPATIENT
Start: 2022-04-01 | End: 2022-04-01 | Stop reason: SDUPTHER

## 2022-04-01 RX ORDER — PROPOFOL 10 MG/ML
INJECTION, EMULSION INTRAVENOUS CONTINUOUS PRN
Status: DISCONTINUED | OUTPATIENT
Start: 2022-04-01 | End: 2022-04-01 | Stop reason: SDUPTHER

## 2022-04-01 RX ORDER — ASPIRIN 81 MG/1
81 TABLET, CHEWABLE ORAL ONCE
Status: COMPLETED | OUTPATIENT
Start: 2022-04-01 | End: 2022-04-01

## 2022-04-01 RX ORDER — HYDROMORPHONE HYDROCHLORIDE 1 MG/ML
0.5 INJECTION, SOLUTION INTRAMUSCULAR; INTRAVENOUS; SUBCUTANEOUS EVERY 5 MIN PRN
Status: DISCONTINUED | OUTPATIENT
Start: 2022-04-01 | End: 2022-04-01 | Stop reason: HOSPADM

## 2022-04-01 RX ORDER — GLIMEPIRIDE 4 MG/1
TABLET ORAL
Status: ON HOLD | COMMUNITY
End: 2022-04-01 | Stop reason: ALTCHOICE

## 2022-04-01 RX ORDER — FENTANYL CITRATE 50 UG/ML
INJECTION, SOLUTION INTRAMUSCULAR; INTRAVENOUS PRN
Status: DISCONTINUED | OUTPATIENT
Start: 2022-04-01 | End: 2022-04-01 | Stop reason: SDUPTHER

## 2022-04-01 RX ADMIN — LIDOCAINE HYDROCHLORIDE 40 MG: 20 INJECTION, SOLUTION EPIDURAL; INFILTRATION; INTRACAUDAL; PERINEURAL at 08:14

## 2022-04-01 RX ADMIN — HYDROMORPHONE HYDROCHLORIDE 0.5 MG: 1 INJECTION, SOLUTION INTRAMUSCULAR; INTRAVENOUS; SUBCUTANEOUS at 09:44

## 2022-04-01 RX ADMIN — ROPIVACAINE HYDROCHLORIDE 30 ML: 5 INJECTION, SOLUTION EPIDURAL; INFILTRATION; PERINEURAL at 07:50

## 2022-04-01 RX ADMIN — LIDOCAINE HYDROCHLORIDE 3 ML: 10 INJECTION, SOLUTION INFILTRATION; PERINEURAL at 07:50

## 2022-04-01 RX ADMIN — ONDANSETRON 4 MG: 2 INJECTION, SOLUTION INTRAMUSCULAR; INTRAVENOUS at 08:45

## 2022-04-01 RX ADMIN — SODIUM CHLORIDE, POTASSIUM CHLORIDE, SODIUM LACTATE AND CALCIUM CHLORIDE: 600; 310; 30; 20 INJECTION, SOLUTION INTRAVENOUS at 07:30

## 2022-04-01 RX ADMIN — CLINDAMYCIN PHOSPHATE 900 MG: 900 INJECTION, SOLUTION INTRAVENOUS at 08:16

## 2022-04-01 RX ADMIN — Medication 50 MCG: at 08:11

## 2022-04-01 RX ADMIN — Medication 25 MCG: at 08:46

## 2022-04-01 RX ADMIN — Medication 25 MCG: at 08:57

## 2022-04-01 RX ADMIN — MIDAZOLAM 2 MG: 1 INJECTION INTRAMUSCULAR; INTRAVENOUS at 07:45

## 2022-04-01 RX ADMIN — ASPIRIN 81 MG 81 MG: 81 TABLET ORAL at 07:33

## 2022-04-01 RX ADMIN — HYDROMORPHONE HYDROCHLORIDE 0.5 MG: 1 INJECTION, SOLUTION INTRAMUSCULAR; INTRAVENOUS; SUBCUTANEOUS at 09:38

## 2022-04-01 RX ADMIN — PROPOFOL 75 MCG/KG/MIN: 10 INJECTION, EMULSION INTRAVENOUS at 08:15

## 2022-04-01 ASSESSMENT — PULMONARY FUNCTION TESTS
PIF_VALUE: 0
PIF_VALUE: 1
PIF_VALUE: 0
PIF_VALUE: 0
PIF_VALUE: 1
PIF_VALUE: 0
PIF_VALUE: 1
PIF_VALUE: 1
PIF_VALUE: 0
PIF_VALUE: 1
PIF_VALUE: 0
PIF_VALUE: 1
PIF_VALUE: 1
PIF_VALUE: 0
PIF_VALUE: 1
PIF_VALUE: 0
PIF_VALUE: 1
PIF_VALUE: 0
PIF_VALUE: 1
PIF_VALUE: 0
PIF_VALUE: 1
PIF_VALUE: 0
PIF_VALUE: 1
PIF_VALUE: 0
PIF_VALUE: 1

## 2022-04-01 ASSESSMENT — PAIN SCALES - GENERAL
PAINLEVEL_OUTOF10: 9
PAINLEVEL_OUTOF10: 10
PAINLEVEL_OUTOF10: 0

## 2022-04-01 ASSESSMENT — PAIN DESCRIPTION - DESCRIPTORS: DESCRIPTORS: ACHING;BURNING;CONSTANT

## 2022-04-01 ASSESSMENT — PAIN - FUNCTIONAL ASSESSMENT: PAIN_FUNCTIONAL_ASSESSMENT: 0-10

## 2022-04-01 NOTE — ANESTHESIA PRE PROCEDURE
Department of Anesthesiology  Preprocedure Note       Name:  Maggy Loza   Age:  64 y.o.  :  1960                                          MRN:  [id number]         Date:  2022      Surgeon: Edda Escamilla): Carley Ormond, DPM    Procedure: Procedure(s):  ARTHROPLASTY 2ND LEFT TOE    Medications prior to admission:   Prior to Admission medications    Medication Sig Start Date End Date Taking? Authorizing Provider   metFORMIN (GLUCOPHAGE) 1000 MG tablet Take 1 tablet by mouth 2 times daily (with meals) 3/31/22 4/30/22  Seema Hameed, DO   omeprazole (PRILOSEC) 20 MG delayed release capsule TAKE 1 CAPSULE DAILY 3/31/22   Seema Hameed, DO   linagliptin (TRADJENTA) 5 MG tablet TAKE 1 TABLET BY MOUTH ONE TIME A DAY 3/31/22   Seema Hameed, DO   gabapentin (NEURONTIN) 300 MG capsule Take 300 mg by mouth 3 times daily. Historical Provider, MD   Misc Natural Products (GLUCOSAMINE CHOND MSM FORMULA PO) Take 2 tablets by mouth daily    Historical Provider, MD   HYDROcodone-acetaminophen (NORCO)  MG per tablet Take 1 tablet by mouth every 4-6 hours as needed for Pain.     Historical Provider, MD   doxycycline hyclate (VIBRA-TABS) 100 MG tablet Take 1 tablet by mouth 2 times daily for 10 days 3/29/22 4/8/22  Carley Ormond, DPM   doxycycline hyclate (VIBRA-TABS) 100 MG tablet Take 1 tablet by mouth 2 times daily for 10 days 3/24/22 4/3/22  Mariya Pali NINO Hameed,    meloxicam (MOBIC) 15 MG tablet Take 1 tablet by mouth daily 3/10/22 6/8/22  Seema Hameed, DO   traZODone (DESYREL) 150 MG tablet TAKE ONE TABLET BY MOUTH EVERY EVENING 3/10/22   Seema Hameed,    topiramate (TOPAMAX) 200 MG tablet Take 1 tablet by mouth 2 times daily  Patient taking differently: Take 400 mg by mouth daily  22  Seema Hameed,    tiZANidine (ZANAFLEX) 4 MG tablet 1 po qd 22   Seema Hameed, DO   carvedilol (COREG) 12.5 MG tablet Take 1 tablet by mouth 2 times daily (with meals) 12/22/21 3/22/22  Seema Hameed DO clopidogrel (PLAVIX) 75 MG tablet Take 1 tablet by mouth daily 12/22/21 3/22/22  Seema Hameed, DO   hydroCHLOROthiazide (MICROZIDE) 12.5 MG capsule 1 po qd 9/25/21   Seema Hameed DO   NEEDLE, DISP, 22 G 22G X 1-1/2\" MISC 1 Device by Does not apply route every 30 days 6/29/21 7/29/21  Seema Hameed DO   NEEDLE, DISP, 18 G (BD DISP NEEDLES) 18G X 1-1/2\" MISC 1 Device by Does not apply route every 30 days 6/29/21   Seema Hameed, DO   testosterone cypionate (DEPOTESTOTERONE CYPIONATE) 200 MG/ML injection  3/2/21   Historical Provider, MD   Testosterone Cypionate 200 MG/ML KIT Inject 200 mg into the muscle every 30 days for 30 days. please include syringes 3/2/21 5/14/21  Seema Hameed DO   NEEDLE, DISP, 18 G 18G X 1\" MISC 1 Syringe by Does not apply route every 30 days 9/22/20   Seema Hameed, DO   NEEDLE, DISP, 22 G 22G X 1-1/2\" MISC 1 Syringe by Does not apply route every 30 days 9/22/20 5/14/21  Seema Hameed DO   Multiple Vitamin (MULTI VITAMIN DAILY PO) Take by mouth    Historical Provider, MD   nitroGLYCERIN (NITROSTAT) 0.4 MG SL tablet Place 0.4 mg under the tongue    Historical Provider, MD   aspirin 325 MG tablet Take 325 mg by mouth daily.       Historical Provider, MD       Current medications:    Current Facility-Administered Medications   Medication Dose Route Frequency Provider Last Rate Last Admin    gelatin adsorbable (GELFOAM) sponge    PRN Brian Rivas, DPM   1 each at 04/01/22 0859    thrombin kit    PRN Brianjeniffer Rivas, DPM   5,000 Units at 04/01/22 0910    sodium chloride flush 0.9 % injection 5-40 mL  5-40 mL IntraVENous 2 times per day Mina Brown, DO        sodium chloride flush 0.9 % injection 5-40 mL  5-40 mL IntraVENous PRN Cloyde Banana, DO        0.9 % sodium chloride infusion   IntraVENous PRN Cloyde Banana, DO        fentaNYL (SUBLIMAZE) injection 25 mcg  25 mcg IntraVENous Q5 Min PRN Cloyde Banana, DO        HYDROmorphone HCl PF (DILAUDID) injection 0.5 mg 0.5 mg IntraVENous Q5 Min PRN Oriana Oh DO   0.5 mg at 04/01/22 0944    ondansetron (ZOFRAN) injection 4 mg  4 mg IntraVENous Once PRN Oriana Oh DO           Allergies:     Allergies   Allergen Reactions    Pcn [Penicillins] Other (See Comments)     Unknown rx       Problem List:    Patient Active Problem List   Diagnosis Code    Hyperlipemia, mixed E78.2    Sleep apnea G47.30    Peripheral vascular disease ([facility]) I73.9    CAD (coronary artery disease) I25.10    CHF (congestive heart failure) ([facility]) I50.9    CTS (carpal tunnel syndrome) G56.00    Hypotension I95.9    Obesity, Class III, BMI 40-49.9 (morbid obesity) ([facility]) E66.01    Essential hypertension I10    DDD (degenerative disc disease), cervical M50.30    DJD (degenerative joint disease), cervical M47.812    Primary osteoarthritis involving multiple joints M89.49    Paresthesias in left hand R20.2    Uncontrolled type 2 diabetes mellitus without complication, without long-term current use of insulin [id number]    Coronary artery disease involving coronary bypass graft of native heart without angina pectoris I25.810    Sleep apnea G47.30    Cervical spondylosis M47.812    Obesity E66.9    Long term current use of antithrombotics/antiplatelets P03.84    H/O cervical spine surgery Z98.890    Long term (current) use of non-steroidal anti-inflammatories (nsaid) Z79.1    Chronic prescription opiate use Z79.891    Severe comorbid illness R69    Hx of cervical spine surgery Z98.890    Chronic neck pain M54.2, G89.29    Candidal balanitis B37.42    Cellulitis of third toe, left L03.032    Nail avulsion, toe, initial encounter S91.209A    Cellulitis of second toe, right L03.031    Abnormal nuclear stress test R94.39    Abnormal stress test R94.39    Hypercholesterolemia E78.00    Diabetic foot ulcer with osteomyelitis ([facility]) E11.621, E11.69, L97.509, M86.9    Ulcer of left foot, with fat layer exposed ([facility] [address] L97.522    Acquired trigger finger M65.30    Contusion of elbow S50.00XA    Contusion of shoulder region S40.019A    Contusion of knee S80.00XA    Degeneration of lumbar intervertebral disc M51.36    Diabetic ulcer of toe associated with type 2 diabetes mellitus, with fat layer exposed ([address] E11.621, L97.502    History of coronary artery bypass graft x 3 Z95.1    Ischemic cardiomyopathy I25.5    Osteoarthritis of knee M17.10    Osteoarthritis of carpometacarpal (CMC) joint of thumb M18.9    Sprain of knee and leg S83. 90XA    Sprain of shoulder and upper arm S43.409A    Presence of coronary angioplasty implant and graft Z95.5    Venous insufficiency of both lower extremities I87.2    Dog bite W54. 0XXA    Impingement syndrome of shoulder region M75.40    Bite wound of hand, left, subsequent encounter S61.452D    Bite wound of hand, right, subsequent encounter S61.451D    Open bite of left ear S01.352A    Open wound of nose due to dog bite S01.25XA, W54. 0XXA    Diabetic polyneuropathy ([address] E11.42    Chronic ulcer of toe, left, with necrosis of bone ([address] L97.524    Ulcer of toe of right foot, with fat layer exposed ([address] L97.512       Past Medical History:        Diagnosis Date    Acquired trigger finger 6/5/2018    CAD (coronary artery disease)     NWOCC/ involving coronary bypass graft of native heart with unstable angina pectoris    Cervical spondylosis     CHF (congestive heart failure) ([address]     Chronic back pain     on 11/19/18 pt states is presently in pain mgmnt    Contusion of elbow 6/5/2018    Contusion of knee 6/5/2018    Contusion of shoulder region 6/5/2018    Diabetic foot ulcer with osteomyelitis ([address] 1/9/2018    Diabetic ulcer of toe associated with type 2 diabetes mellitus, with fat layer exposed ([address] 1/31/2018    Hypercholesterolemia 12/5/2017    Hyperlipemia, mixed     Hypertension     Ischemic cardiomyopathy 5/7/2018    Non-pressure chronic ulcer of left lower leg with fat layer exposed ([facility] [address] 6/9/2014    Obesity     Obesity, Class III, BMI 40-49.9 (morbid obesity) ([address] 4/6/2015    Obstructive sleep apnea syndrome     Osteoarthritis of carpometacarpal (CMC) joint of thumb 6/5/2018    Osteoarthritis of knee 6/5/2018    Peripheral vascular disease ([address]     with venous stasis and ulcerations. Dr Jasper Castle Presence of coronary angioplasty implant and graft 9/11/2009    Sleep apnea     Dr Den Bailon of knee and leg 6/5/2018    Sprain of shoulder and upper arm 6/5/2018    Type II or unspecified type diabetes mellitus without mention of complication, not stated as uncontrolled     Ulcer of left foot, with fat layer exposed ([facility] [address] [address] 1/9/2018    Unspecified sleep apnea     Dr Larry Duque CPAP    Venous insufficiency of both lower extremities 1/31/2018       Past Surgical History:        Procedure Laterality Date    CARDIAC SURGERY      CARPAL TUNNEL RELEASE Right 3/22/13    Dr. Marsha Corbett.     COLONOSCOPY  2020    CORONARY ARTERY BYPASS GRAFT  2/26/07    [facility], Dr Navarro Camera (X 3)    CORONARY ARTERY BYPASS GRAFT  2006    NECK SURGERY  2010    cervical stenoses C5-C6-C7and partial T1 laminectomy with fusion of C6-7    OTHER SURGICAL HISTORY      wound care ulcers of legs (bilaterally) with Dr Humphrey dAamson  age 11    VEIN SURGERY      closure of peripherator veins of legs, Dr Amanda Johnson History:    Social History     Tobacco Use    Smoking status: Never Smoker    Smokeless tobacco: Never Used   Substance Use Topics    Alcohol use: Not Currently     Comment: rare social, non past 5 months                                Counseling given: Not Answered      Vital Signs (Current):   Vitals:    04/01/22 0915 04/01/22 0930 04/01/22 0945 04/01/22 1000   BP: 95/64 (!) 102/57 (!) 103/59 104/67   Pulse: 80 67 68 75   Resp: 18 16 24 19   Temp:       TempSrc:       SpO2: 95% 95% 95% 98%   Weight:       Height: BP Readings from Last 3 Encounters:   04/01/22 104/67   04/01/22 132/76   03/31/22 124/66       NPO Status: Time of last liquid consumption: 2030                        Time of last solid consumption: 2030                        Date of last liquid consumption: 03/31/22                        Date of last solid food consumption: 03/31/22    BMI:   Wt Readings from Last 3 Encounters:   04/01/22 248 lb (112.5 kg)   03/31/22 248 lb 12.8 oz (112.9 kg)   03/29/22 239 lb (108.4 kg)     Body mass index is 33.63 kg/m².     CBC:   Lab Results   Component Value Date    WBC 8.3 03/31/2022    RBC 4.06 03/31/2022    HGB 11.5 03/31/2022    HCT 36.1 03/31/2022    MCV 88.9 03/31/2022    RDW 12.9 03/31/2022     03/31/2022       CMP:   Lab Results   Component Value Date     03/31/2022    K 3.8 03/31/2022     03/31/2022    CO2 20 03/31/2022    BUN 20 03/31/2022    CREATININE 0.83 03/31/2022    GFRAA >60 03/31/2022    LABGLOM >60 03/31/2022    GLUCOSE 135 03/31/2022    PROT 7.2 06/19/2021    CALCIUM 9.3 03/31/2022    BILITOT 0.28 06/19/2021    ALKPHOS 71 06/19/2021    AST 12 06/19/2021    ALT 15 06/19/2021       POC Tests:   Recent Labs     04/01/22  0911   POCGLU 165*       Coags:   Lab Results   Component Value Date    PROTIME 13.6 03/31/2022    INR 1.0 03/31/2022    APTT 28.2 03/31/2022       HCG (If Applicable): No results found for: PREGTESTUR, PREGSERUM, HCG, HCGQUANT     ABGs: No results found for: PHART, PO2ART, ETG4TLR, DPK6MZD, BEART, E2QYNSAH     Type & Screen (If Applicable):  No results found for: LABABO, LABRH    Drug/Infectious Status (If Applicable):  Lab Results   Component Value Date    HEPCAB NONREACTIVE 09/12/2016       COVID-19 Screening (If Applicable): No results found for: COVID19        Anesthesia Evaluation  Patient summary reviewed and Nursing notes reviewed no history of anesthetic complications:   Airway: Mallampati: II  TM distance: >3 FB   Neck ROM: full  Mouth opening: > = 3 FB Dental: normal exam         Pulmonary:normal exam    (+) sleep apnea:                             Cardiovascular:  Exercise tolerance: no interval change,   (+) hypertension:, CAD:, CABG/stent: no interval change, CHF:,       ECG reviewed    Rate: normal                    Neuro/Psych:               GI/Hepatic/Renal:        (-) GERD       Endo/Other:    (+) Diabetes, . Abdominal:             Vascular: Other Findings:             Anesthesia Plan      MAC and regional     ASA 3       Induction: intravenous. MIPS: Prophylactic antiemetics administered. Anesthetic plan and risks discussed with patient. Plan discussed with CRNA.     Attending anesthesiologist reviewed and agrees with Preprocedure content        Stent cardiac >6months ago  Plan regional single shot + [address], DO   4/1/2022

## 2022-04-01 NOTE — OP NOTE
PODIATRY BRIEF OP NOTE     PATIENT NAME: Norma Dean DATE: 1960  -  64 y.o. male  MRN: [id number]  DATE: 4/1/2022  BILLING #: [id number]     Surgeon(s): Miguel Feliciano DPM      ASSISTANTS: Brent Handy DPM PGY-1     PRE-OP DIAGNOSIS:   Osteomyelitis of the left 2nd digit     POST-OP DIAGNOSIS: Same as above. PROCEDURE:   Excision of left middle phalanx; 2nd digit  Partial RESECTION of the proximal phalanx; 2nd digit  Debridement of non viable soft tissue     ANESTHESIA: MAC  Pre op popliteal block     HEMOSTASIS: Pneumatic ankle to the left tourniquet @ 225 mmHg for 34 minutes. ESTIMATED BLOOD LOSS: Less than 3cc. MATERIALS:   * No implants in log *     INJECTABLES: none     SPECIMEN:   ID Type Source Tests Collected by Time Destination   A : Proximal Phalynx Left Second Toe - please divide specimen for pathology and cultures Tissue Toe SURGICAL PATHOLOGY, CULTURE, TISSUE Miguel Feliciano DPM 4/1/2022 4022     B : Clean Margin Proximal Phalynx Left Second Toe - please divide specimen for pathology and cultures Tissue Toe SURGICAL PATHOLOGY, CULTURE, TISSUE Miguel Feliciano DPM 4/1/2022 0840     C : Middle Phalynx Left Second Toe - please divide specimen for pathology and cultures Tissue Toe SURGICAL PATHOLOGY, CULTURE, TISSUE Miguel Feliciano DPM 4/1/2022 1454           COMPLICATIONS: none     FINDINGS: osteomyelitis of left middle phalanx found pre operatively, partil proximal phalanx that looked a little discolored. Amp of the 2nd digit not done as patient was reluctant. Thus it was determined to stage the procedure to save as much of the digit possible and readjust antibiotics based on culture results. Please see op note for full detail. The patient was counseled at length about the risks of frances Covid-19 during their perioperative period and any recovery window from their procedure.   The patient was made aware that frances Covid-19  may worsen their prognosis for recovering from their procedure  and lend to a higher morbidity and/or mortality risk. All material risks, benefits, and reasonable alternatives including postponing the procedure were discussed. The patient does wish to proceed with the procedure at this time. INDICATION FOR PROCEDURE: The patient is a 64year-old male who has had a ulcer to the left second digit. He has been following wound care for quite some time and has not found any improvement. On his recent evaluation with Dr. Rachel Lawrence there is noted to be bone exposure to the ulcer at the PIPJ level. Patient has been taking doxycycline and has not found any improvements. Patient has failed all conservative treatments at this time. Patient elects to undergo surgical correction at this time but would like to keep the second digit. After extensive discussion it was determined that this surgery would be stage where part of the phalanx would be removed and treatment with antibiotics would be done to see results. All risks and benefits were discussed in extensive detail with the patient. No guarantees were given or implied. Consent obtained and placed in chart. Covid testing is negative. PROCEDURE IN DETAIL: Under mild sedation the patient was transported from pre-op to the operating room and placed on the operating table in the supine position with a safety strap across the lap. A well-padded pneumatic tourniquet was applied to the left ankle. A popliteal block was given preoperatively and after adequate sedation by the Anesthesia team the foot was then prepped and draped in the usual aseptic fashion. A timeout was performed confirming the correct patient, correct site, correct procedure, antibiotics, everyone in the room was in agreement. The foot was then exsanguinated with an Esmarch bandage. The pneumatic ankle tourniquet was inflated to 225mmHg where it would remain inflated for 34 minutes throughout the procedure.      Attention was then directed to the dorsal aspect of the left digit where a linear incision was made. The incision was deepened using sharp dissection. The incision was performed to the level of bone. The capsule and periosteum was released from the bone. Careful dissection was performed to avoid any neurovascular bundle. Next the head of the proximal phalanx was inspected and was noted to be necrotic. An oscillating saw was utilized to resect the head of proximal phalanx. The bone was then passed back and was placed in a specimen cup disc sent to pathology. Next clear margins were also taken and were also collected in a specimen cup and sent to pathology as well. Once clear margins were taken to bone was noted to be hard and shiny with mild discoloration noted to the central aspect of the resected proximal phalanx. The bone quality for the middle phalanx was tested and was noted to be soft and intraoperative decision was made to extend the incision more distally. The middle phalanx was noted to be very soft with necrosis noted to the plantar aspect of the middle phalanx. It was decided to remove the middle phalanx with combination of sharp and blunt dissection. The middle phalanx was placed in a specimen cup and sent to lab. Next the quality of the distal phalanx was checked and was noted to be hard, white and healthy. Once no other necrotic bone or osseous abnormalities were appreciated the surgical site was irrigated with copious amounts of sterile saline and the incision site was repaired in layers using 2-0 nylon and 3-0 nylon. The incision site was closed skin-to-skin on the distal end proximal aspect of the incision but at the central aspect of the incision the skin was approximated with help with retention sutures. Thrombin-soaked gel foam was placed dorsal aspect of the central incision to control any excessive bleeding in the future.     The pneumatic ankle tourniquet was released and immediate hyperemic flush was noted to all five digits of the left foot. Dressings consisted of adaptic, 4 x 4s, Kerlix and an Ace bandage. The patient tolerated the above procedure and anesthesia well without complications. The patient was transported from the operating room to the PACU with vital signs stable and vascular status intact to the left foot. Patient educated on the findings in the OR and wounds educated on changing antibiotics based on culture results from infected bone.

## 2022-04-01 NOTE — ANESTHESIA PROCEDURE NOTES
Peripheral Block    Patient location during procedure: pre-op  Start time: 4/1/2022 7:45 AM  End time: 4/1/2022 7:51 AM  Staffing  Performed: anesthesiologist   Anesthesiologist: Geri Mora DO  Preanesthetic Checklist  Completed: patient identified, IV checked, site marked, risks and benefits discussed, surgical consent, monitors and equipment checked, pre-op evaluation, timeout performed, anesthesia consent given, oxygen available and patient being monitored  Peripheral Block  Patient position: supine  Prep: ChloraPrep  Patient monitoring: cardiac monitor, continuous pulse ox, frequent blood pressure checks and IV access  Block type: Sciatic  Laterality: left  Injection technique: single-shot  Guidance: ultrasound guided  Local infiltration: lidocaine  Infiltration strength: 1 %  Dose: 3 mL  Popliteal  Provider prep: mask and sterile gloves  Local infiltration: lidocaine  Needle  Needle type: combined needle/nerve stimulator   Needle gauge: 21 G  Needle length: 10 cm  Needle localization: ultrasound guidance  Assessment  Injection assessment: negative aspiration for heme, no paresthesia on injection and local visualized surrounding nerve on ultrasound  Paresthesia pain: none  Slow fractionated injection: yes  Hemodynamics: stable  Additional Notes  Left popliteal single shot   30ml 0.5% bupivacaine           Reason for block: post-op pain management and at surgeon's request

## 2022-04-01 NOTE — PROGRESS NOTES
Bedside single shot nerve block done at bedside in pre op 2 with Dr. David Ryan, SS RN and [facility] RN  Consent has been obtained and surgical site has been marked by Dr. Darrow Sacks placed on continuous monitoring and given supplemental O2 via n./c. Pt tolerated nerve block well and LLE elevated on pillow for comfort and safety.  Siderails up x 2 call given to pt and educated on use, instructed not to get OOB, wife at bedside

## 2022-04-01 NOTE — ANESTHESIA POSTPROCEDURE EVALUATION
Department of Anesthesiology  Postprocedure Note    Patient: Frankie Arreguin  MRN: [id number]  YOB: 1960  Date of evaluation: 4/1/2022  Time:  10:38 AM     Procedure Summary     Date: 04/01/22 Room / Location: [place] [address]    Anesthesia Start: 1681 Anesthesia Stop: 6334    Procedure: ARTHROPLASTY 2ND LEFT TOE (Left Second Toe) Diagnosis: (DX OSTEOMYELITIS 2ND LEFT)    Surgeons: Lori Slaughter DPM Responsible Provider: Grace Gonzalez DO    Anesthesia Type: MAC, regional ASA Status: 3          Anesthesia Type: No value filed. Arielle Phase I: Arielle Score: 10    Arielle Phase II: Arielle Score: 10    Last vitals: Reviewed and per EMR flowsheets.        Anesthesia Post Evaluation    Patient location during evaluation: PACU  Patient participation: complete - patient participated  Level of consciousness: awake and alert  Airway patency: patent  Nausea & Vomiting: no nausea and no vomiting  Complications: no  Cardiovascular status: hemodynamically stable  Respiratory status: acceptable  Hydration status: stable

## 2022-04-01 NOTE — H&P
History and Physical Update    Pt Name: Hannah Rashid  MRN: [id number]  YOB: 1960  Date of evaluation: 4/1/2022      [x] I have reviewed the progress note found in Epic by Dr. Bhavna Anders dated 3/29/22 used for an Interval History and Physical note. [x] I have examined Hannah Rashid a 64 y.o., male who is scheduled for an arthroplasty left second possible partial left second amp by Dr. Bhavna Anders due to left second osteomyelitis. The patient denies health changes since his appointment with Dr. Bhavna Anders on 3/29/22. Pt denies fever, chills, productive cough, SOB, chest pain, and rashes. History of diabetes. Pt's POC blood glucose today is 194 mg/dL. Aspirin, Mobic, Multiple vitamin, and Plavix were last taken on 3/31/22. History of CAD, CHF, ischemic cardiomyopathy, hyperlipidemia, hypertension, PVD, sleep apnea, and diabetes. S/p cardiac stents in 2002, 2006, 2009, and 2021. S/p CABG in 2007. Please see Kristin Rubinstein', CNP note dated 3/31/22 in Epic. Dr. Kirt Hooper is aware of the pt's medical history and is also aware that the pt took aspirin and Plavix yesterday. ECHO 11/05/2021  Narrative      Left Ventricle: There is mild concentric increased wall   thickness/hypertrophy. Systolic function is normal with an ejection   fraction of 55-60%. See wall score diagram for wall motion abnormalities. There is grade I (mild) diastolic dysfunction and normal left atrial   pressure.   Aortic Valve: There is moderate regurgitation. There is mild stenosis. The calculated aortic valve area is 1.98 cm2. The calculated aortic valve   peak gradient is 20.00 mmHg. The calculated aortic valve mean gradient is   11.00 mmHg. Left Ventricle   Left ventricle appears normal in size. There is mild concentric increased wall thickness/hypertrophy. Systolic function is normal with an ejection fraction of 55-60%. See wall score diagram for wall motion abnormalities.  There is grade I (mild) diastolic dysfunction and normal left atrial pressure. Right Ventricle   Right ventricular size appears normal. Systolic function is normal.     Left Atrium   Left atrium volume index is normal. The left atrial volume index is 28.3 mL/m2. Right Atrium   Right atrium is normal in size. IVC/SVC   There is normal collapse with deep inspiration. Mitral Valve   The leaflets are mildly thickened and exhibit normal excursion. There is annular calcification. There is mild regurgitation. Tricuspid Valve   There is mild regurgitation. The right ventricular systolic pressure normal. RVSP calculated at 26 mmHg. RVSP is based on RA pressure of 3 mmHg. Aortic Valve   The aortic valve is trileaflet. The leaflets are mildly calcified. There is moderate regurgitation. There is mild stenosis. The calculated aortic valve area is 1.98 cm2. The calculated aortic valve peak gradient is 20.00 mmHg. The calculated aortic valve mean gradient is 11.00 mmHg. Pulmonic Valve   Pulmonic valve structure is grossly normal. There is trace regurgitation. There is no evidence of pulmonic valve stenosis. Ascending Aorta   The aortic root is normal in size. Pericardium   There is no pericardial effusion. Study Details   A complete echo was performed using complete 2D, color flow Doppler and spectral Doppler. Definity study was performed. Overall the study quality was adequate.      Wall Scoring Baseline   Score Index: 1.00   The left ventricular wall motion is normal.      Vital signs: /63   Pulse 73   Temp 97.3 °F (36.3 °C) (Temporal)   Resp 18   Ht 6' (1.829 m)   Wt 248 lb (112.5 kg)   SpO2 100%   BMI 33.63 kg/m²      Allergies:  Pcn [penicillins]    Past Medical History:     Past Medical History:   Diagnosis Date    Acquired trigger finger 6/5/2018    CAD (coronary artery disease)     NWOCC/ involving coronary bypass graft of native heart with unstable angina pectoris    Cervical spondylosis     CHF (congestive heart failure) ([address]  Chronic back pain     on 11/19/18 pt states is presently in pain mgmnt    Contusion of elbow 6/5/2018    Contusion of knee 6/5/2018    Contusion of shoulder region 6/5/2018    Diabetic foot ulcer with osteomyelitis ([address] 1/9/2018    Diabetic ulcer of toe associated with type 2 diabetes mellitus, with fat layer exposed ([address] 1/31/2018    Hypercholesterolemia 12/5/2017    Hyperlipemia, mixed     Hypertension     Ischemic cardiomyopathy 5/7/2018    Non-pressure chronic ulcer of left lower leg with fat layer exposed ([address] 6/9/2014    Obesity     Obesity, Class III, BMI 40-49.9 (morbid obesity) ([address] 4/6/2015    Obstructive sleep apnea syndrome     Osteoarthritis of carpometacarpal (CMC) joint of thumb 6/5/2018    Osteoarthritis of knee 6/5/2018    Peripheral vascular disease ([address]     with venous stasis and ulcerations. Dr Carina Rico Presence of coronary angioplasty implant and graft 9/11/2009    Sleep apnea     Dr Karen Swann of knee and leg 6/5/2018    Sprain of shoulder and upper arm 6/5/2018    Type II or unspecified type diabetes mellitus without mention of complication, not stated as uncontrolled     Ulcer of left foot, with fat layer exposed ([address] 1/9/2018    Unspecified sleep apnea     Dr Nikhil Marion CPAP    Venous insufficiency of both lower extremities 1/31/2018        Past Surgical History:     Past Surgical History:   Procedure Laterality Date    CARDIAC SURGERY      CARPAL TUNNEL RELEASE Right 3/22/13    Dr. Miguel Moody.     COLONOSCOPY  2020    CORONARY ARTERY [facility]PASS GRAFT  2/26/07    [facility], Dr Sapphire Ruth (X 3)    CORONARY ARTERY BYPASS GRAFT  2006    NECK SURGERY  2010    cervical stenoses C5-C6-C7and partial T1 laminectomy with fusion of C6-7    OTHER SURGICAL HISTORY      wound care ulcers of legs (bilaterally) with Dr Rosas Roberts  age 11    VEIN SURGERY      closure of peripherator veins of legs, Dr Scott Clifton Social History:     Tobacco:    reports that he has never smoked. He has never used smokeless tobacco.  Alcohol:      reports previous alcohol use. Drug Use:  reports no history of drug use. Family History:     Family History   Problem Relation Age of Onset   Benjamin Caldwell Cancer Mother         lung    Diabetes Father     Heart Disease Father     High Blood Pressure Father     Diabetes Brother     Heart Disease Brother     High Blood Pressure Brother        Medications:    Prior to Admission medications    Medication Sig Start Date End Date Taking? Authorizing Provider   metFORMIN (GLUCOPHAGE) 1000 MG tablet Take 1 tablet by mouth 2 times daily (with meals) 3/31/22 4/30/22  Seema Hameed,    omeprazole (PRILOSEC) 20 MG delayed release capsule TAKE 1 CAPSULE DAILY 3/31/22   Seema Hameed, DO   linagliptin (TRADJENTA) 5 MG tablet TAKE 1 TABLET BY MOUTH ONE TIME A DAY 3/31/22   Seema Hameed, DO   gabapentin (NEURONTIN) 300 MG capsule Take 300 mg by mouth 3 times daily. Historical Provider, MD   Misc Natural Products (GLUCOSAMINE CHOND MSM FORMULA PO) Take 2 tablets by mouth daily    Historical Provider, MD   HYDROcodone-acetaminophen (NORCO)  MG per tablet Take 1 tablet by mouth every 4-6 hours as needed for Pain.     Historical Provider, MD   doxycycline hyclate (VIBRA-TABS) 100 MG tablet Take 1 tablet by mouth 2 times daily for 10 days 3/29/22 4/8/22  Daya Alvarez DPM   doxycycline hyclate (VIBRA-TABS) 100 MG tablet Take 1 tablet by mouth 2 times daily for 10 days 3/24/22 4/3/22  Jeannette Hameed,    meloxicam (MOBIC) 15 MG tablet Take 1 tablet by mouth daily 3/10/22 6/8/22  Seema Hameed DO   traZODone (DESYREL) 150 MG tablet TAKE ONE TABLET BY MOUTH EVERY EVENING 3/10/22   Seema Hameed DO   topiramate (TOPAMAX) 200 MG tablet Take 1 tablet by mouth 2 times daily  Patient taking differently: Take 400 mg by mouth daily  1/20/22 2/19/22  Seema Hameed DO   tiZANidine (ZANAFLEX) 4 MG tablet 1 po qd 1/20/22   Seema Hamede DO   carvedilol (COREG) 12.5 MG tablet Take 1 tablet by mouth 2 times daily (with meals) 12/22/21 3/22/22  Seema Hameed DO   clopidogrel (PLAVIX) 75 MG tablet Take 1 tablet by mouth daily 12/22/21 3/22/22  Seema Hameed DO   hydroCHLOROthiazide (MICROZIDE) 12.5 MG capsule 1 po qd 9/25/21   Seema Hameed DO   NEEDLE, DISP, 22 G 22G X 1-1/2\" MISC 1 Device by Does not apply route every 30 days 6/29/21 7/29/21  Seema Hameed DO   NEEDLE, DISP, 18 G (BD DISP NEEDLES) 18G X 1-1/2\" MISC 1 Device by Does not apply route every 30 days 6/29/21   Seema Hameed DO   testosterone cypionate (DEPOTESTOTERONE CYPIONATE) 200 MG/ML injection  3/2/21   Historical Provider, MD   Testosterone Cypionate 200 MG/ML KIT Inject 200 mg into the muscle every 30 days for 30 days. please include syringes 3/2/21 5/14/21  Seema Hameed DO   NEEDLE, DISP, 18 G 18G X 1\" MISC 1 Syringe by Does not apply route every 30 days 9/22/20   Seema Hameed DO   NEEDLE, DISP, 22 G 22G X 1-1/2\" MISC 1 Syringe by Does not apply route every 30 days 9/22/20 5/14/21  Seema Hameed DO   Multiple Vitamin (MULTI VITAMIN DAILY PO) Take by mouth    Historical Provider, MD   nitroGLYCERIN (NITROSTAT) 0.4 MG SL tablet Place 0.4 mg under the tongue    Historical Provider, MD   aspirin 325 MG tablet Take 325 mg by mouth daily. Historical Provider, MD       Review of Systems:     Positive and Negative as described in HPI. CONSTITUTIONAL: Negative for fevers, chills, sweats, fatigue, and weight loss. HEENT: Negative for glasses, hearing changes, rhinorrhea, and throat pain. RESPIRATORY: Sleep apnea. Negative for shortness of breath, cough, congestion, and wheezing. CARDIOVASCULAR: History of CAD, CHF, ischemic cardiomyopathy, hyperlipidemia, hypertension, and PVD. S/p cardiac stents in 2002, 2006, 2009, and 2021. S/p CABG in 2007. Negative for chest pain, blood clot, irregular heartbeat, and palpitations.   GASTROINTESTINAL: Negative for reflux, nausea, vomiting, diarrhea, constipation, change in bowel habits, and abdominal pain. GENITOURINARY: Negative for difficulty of urination, burning with urination, and frequency. INTEGUMENT: Toe wounds on bilateral feet. Negative for rash and easy bruising. HEMATOLOGIC/LYMPHATIC: Negative for swelling/edema. ALLERGIC/IMMUNOLOGIC: Negative for urticaria and itching. ENDOCRINE: Diabetes. Negative for increase in drinking, increase in urination, and heat or cold intolerance. MUSCULOSKELETAL: Negative joint pains, muscle aches, and swelling of joints. NEUROLOGICAL: Negative for headaches, dizziness, lightheadedness, numbness, and tingling extremities. BEHAVIOR/PSYCH: Negative for depression and anxiety. Physical Exam:     General Appearance:  Alert, well appearing, and in no acute distress. Obese. Mental status: Oriented to person, place, and time. Head: Normocephalic and atraumatic. Eye: No icterus, redness, pupils equal and reactive, extraocular eye movements intact, and conjunctiva clear. Ear: Hearing grossly intact. Nose: No drainage noted. Mouth: Missing teeth. Mucous membranes moist.  Neck: Distant bilateral carotid sounds. Supple and no carotid bruits noted. Lungs: Bilateral equal air entry, clear to auscultation, no wheezing, rales or rhonchi, and normal effort. Cardiovascular: Distant heart sounds. Murmur 2/6. Normal rate. Regular rhythm. No gallop and rub. Abdomen: Soft, nontender, nondistended, and active bowel sounds. Neurologic: Normal speech and cranial nerves II through XII grossly intact. Strength 5/5 bilaterally. Skin: Small right second toe wound with a small amount of serosanguineous drainage. Right hallux covered with a Band-Aid. Small left second toe nondraining scabbed wound. Bruise on the left upper extremity. No rashes on exposed skin area. Extremities: Bilateral pedal edema. Posterior tibial pulses are palpable bilaterally.  No calf tenderness with palpation. Psych: Drowsy. Labs:  Recent Labs     22  1440 22  1045   HGB 11.5*  --    HCT 36.1*  --    WBC 8.3  --    MCV 88.9  --      --      --    K 3.8  --      --    CO2 20  --    BUN 20  --    CREATININE 0.83  --    GLUCOSE 135*  --    INR 1.0  --    PROTIME 13.6  --    APTT 28.2  --    LABALBU  --  4.1       No results for input(s): COVID19 in the last 720 hours. Herber Mullen, APRN - CNP   Electronically signed 2022 at 7:40 AM    Liz King, [address]   Progress Notes      Signed   Date of Service:  3/29/2022  2:15 PM         Related encounter: RETURN  PATIENT from 3/29/2022 in [address] [place] [address] All Collapse All          Show:Clear all  [x]Manual[x]Template[x]Copied    Added by:  [x]Agustina Graves DPM      []Cele for details              [address] [facility] [address]   Progress Note and Procedure Note        [address] [facility] RECORD NUMBER:  [id number]  AGE: 64 y.o. GENDER: male  : 1960  EPISODE DATE:  3/29/2022     Subjective:           Chief Complaint   Patient presents with    Wound Check       bilateral feet         HISTORY of PRESENT ILLNESS HPI      Katie Bolden is a 64 y.o. male who presents today for wound/ulcer evaluation. Interval history:  States that he has been applying the medi-honey daily. Has BEEN TAKING DOXY     History of Wound Context: Nicol Hall presents for evaluation of toe wounds of both feet. He reports that they have been present for a few weeks now. States they began after he was attacked by a dog. He recently underwent NIVS which revealed no arterial insufficiency. He presents in surgical shoes today. Has been using [employer] [facility] at home.           Ulcer Identification:  Ulcer Type: diabetic  Contributing Factors: diabetes and chronic pressure                                           PAST MEDICAL HISTORY     Past Medical History             Diagnosis Date    Acquired trigger finger 6/5/2018    CAD (coronary artery disease)       NWOCC/ involving coronary bypass graft of native heart with unstable angina pectoris    Cervical spondylosis      CHF (congestive heart failure) ([facility])      Chronic back pain       on 11/19/18 pt states is presently in pain mgmnt    Contusion of elbow 6/5/2018    Contusion of knee 6/5/2018    Contusion of shoulder region 6/5/2018    Diabetic foot ulcer with osteomyelitis ([address] 1/9/2018    Diabetic ulcer of toe associated with type 2 diabetes mellitus, with fat layer exposed ([address] 1/31/2018    Hypercholesterolemia 12/5/2017    Hyperlipemia, mixed      Hypertension      Ischemic cardiomyopathy 5/7/2018    Non-pressure chronic ulcer of left lower leg with fat layer exposed ([address] 6/9/2014    Obesity      Obesity, Class III, BMI 40-49.9 (morbid obesity) ([address] 4/6/2015    Obstructive sleep apnea syndrome      Osteoarthritis of carpometacarpal (CMC) joint of thumb 6/5/2018    Osteoarthritis of knee 6/5/2018    Peripheral vascular disease ([address]       with venous stasis and ulcerations. Dr Carina Rico Presence of coronary angioplasty implant and graft 9/11/2009    Sleep apnea       Dr Karen Swann of knee and leg 6/5/2018    Sprain of shoulder and upper arm 6/5/2018    Type II or unspecified type diabetes mellitus without mention of complication, not stated as uncontrolled      Ulcer of left foot, with fat layer exposed ([address] 1/9/2018    Unspecified sleep apnea       Dr Tejeda Sanam CPAP    Venous insufficiency of both lower extremities 1/31/2018            PAST SURGICAL HISTORY     Past Surgical History         Past Surgical History:   Procedure Laterality Date    CARPAL TUNNEL RELEASE Right 3/22/13     Dr. Miguel Moody.     CORONARY ANGIOPLASTY WITH STENT PLACEMENT   [phone]     X 3 separately    CORONARY ARTERY BYPASS GRAFT   2/26/07     [facility], Dr Sapphire Ruth (X 3)    NECK SURGERY   2010     cervical stenoses C5-C6-C7and partial T1 laminectomy with fusion of C6-7    OTHER SURGICAL HISTORY         wound care ulcers of legs (bilaterally) with Dr Thais Brown   age 10    VEIN SURGERY         closure of peripherator veins of legs, Dr Villar Shoulders History         Family History   Problem Relation Age of Onset    Cancer Mother           lung    Diabetes Father      Heart Disease Father      High Blood Pressure Father      Diabetes Brother      Heart Disease Brother      High Blood Pressure Brother              SOCIAL HISTORY     Social History            Tobacco Use    Smoking status: Never Smoker    Smokeless tobacco: Never Used   Vaping Use    Vaping Use: Never used   Substance Use Topics    Alcohol use: Not Currently       Comment: rare social, non past 5 months    Drug use: No         ALLERGIES           Allergies   Allergen Reactions    Pcn [Penicillins] Other (See Comments)       Unknown rx         MEDICATIONS            Current Outpatient Medications on File Prior to Encounter   Medication Sig Dispense Refill    doxycycline hyclate (VIBRA-TABS) 100 MG tablet Take 1 tablet by mouth 2 times daily for 10 days 20 tablet 0    meloxicam (MOBIC) 15 MG tablet Take 1 tablet by mouth daily 90 tablet 3    traZODone (DESYREL) 150 MG tablet TAKE ONE TABLET BY MOUTH EVERY EVENING 90 tablet 3    metFORMIN (GLUCOPHAGE) 1000 MG tablet Take 1 tablet by mouth 2 times daily (with meals) 60 tablet 3    topiramate (TOPAMAX) 200 MG tablet Take 1 tablet by mouth 2 times daily 60 tablet 5    tiZANidine (ZANAFLEX) 4 MG tablet 1 po qd 14 tablet 3    omeprazole (PRILOSEC) 20 MG delayed release capsule TAKE 1 CAPSULE DAILY 90 capsule 1    carvedilol (COREG) 12.5 MG tablet Take 1 tablet by mouth 2 times daily (with meals) 180 tablet 3    clopidogrel (PLAVIX) 75 MG tablet Take 1 tablet by mouth daily 90 tablet 3    hydroCHLOROthiazide (MICROZIDE) 12.5 MG capsule 1 po qd 90 capsule 3    NEEDLE, DISP, 22 G 22G X 1-1/2\" MISC 1 Device by Does not apply route every 30 days 25 each 1    NEEDLE, DISP, 18 G (BD DISP NEEDLES) 18G X 1-1/2\" MISC 1 Device by Does not apply route every 30 days 25 each 1    linagliptin (TRADJENTA) 5 MG tablet TAKE 1 TABLET BY MOUTH ONE TIME A DAY 30 tablet 5    testosterone cypionate (DEPOTESTOTERONE CYPIONATE) 200 MG/ML injection          Testosterone Cypionate 200 MG/ML KIT Inject 200 mg into the muscle every 30 days for 30 days. please include syringes 1 kit 5    NEEDLE, DISP, 18 G 18G X 1\" MISC 1 Syringe by Does not apply route every 30 days 25 each 0    NEEDLE, DISP, 22 G 22G X 1-1/2\" MISC 1 Syringe by Does not apply route every 30 days 25 each 0    Multiple Vitamin (MULTI VITAMIN DAILY PO) Take by mouth        nitroGLYCERIN (NITROSTAT) 0.4 MG SL tablet Place 0.4 mg under the tongue        aspirin 325 MG tablet Take 325 mg by mouth daily. No current facility-administered medications on file prior to encounter. REVIEW OF SYSTEMS     Review of Systems   Constitutional: Negative for chills and fever. Skin: Positive for wound. Neurological: Negative for numbness. Objective:       BP (!) 142/76   Pulse 86   Temp 96.3 °F (35.7 °C) (Tympanic)   Resp 17   Ht 6' (1.829 m)   Wt 239 lb (108.4 kg)   BMI 32.41 kg/m²          Wt Readings from Last 3 Encounters:   03/29/22 239 lb (108.4 kg)   03/21/22 239 lb (108.4 kg)   03/09/22 239 lb (108.4 kg)         Physical Exam:  General:  Alert and oriented x3. In no acute distress. Lower Extremity Physical Exam:     Vascular: DP pulses are palpable, Bilateral. PT pulses are palpable, Bilateral. CFT <3 seconds to all digits, Bilateral.  No edema, Bilateral.  Hair growth is absent to the level of the digits, Bilateral.      Neuro: Saph/sural/SP/DP/plantar sensation intact to light touch. Musculoskeletal: EHL/FHL/GS/TA gross motor intact.  Chrissy Stallings deformity is present, hammertoes bilateral.      Dermatologic: Open wound present to multiple dorsal digits at the PIPJ as documented in detail below. HEAD OF PROX PHALANX 2ND LEFT NOTED IN WOUND     Assessment:           Active Hospital Problems     Diagnosis Date Noted    Diabetic polyneuropathy ([facility] [E11.42] 03/29/2022    Chronic ulcer of toe, left, with necrosis of bone [facility]) [L97.524] 03/29/2022    Ulcer of toe of right foot, with fat layer exposed [facility]) [L97.512] 03/29/2022    Diabetic ulcer of toe associated with type 2 diabetes mellitus, with fat layer exposed ([facility] [Y34.886, L97.502] 01/31/2018      Procedure Note  Indications:  Based on my examination of this patient's wound(s)/ulcer(s) today, debridement is required to promote healing and evaluate the wound base. Performed by: Nabor Croewll DPM     Consent obtained:  Yes     Time out taken:  Yes     Pain Control: Anesthetic  Anesthetic: 2% Lidocaine Gel Topical         Debridement: Excisional Debridement     Using scissors and forceps the wound(s)/ulcer(s) was/were sharply debrided down through and including the removal of epidermis, dermis and subcutaneous tissue OF THE RIGHT TOE WOUNDS. BONE NOTED IN 2ND LEFT       Devitalized Tissue Debrided:  fibrin and necrotic/eschar     Pre Debridement Measurements:  Are located in the Wound/Ulcer Documentation Flow Sheet           Plan:   EXCISIONAL DEBRIDEMENT RIGHT TOE WOUNDS - KAYCEE AND SSD RIGHT. DSD LEFT 2ND     RX DOXYCYCLINE 100 BID     ORDER XR LEFT FOOT - DISCUSS EXCISION OF EXPOSED BONE - WILL TRY TO SAVE AS MUCH OF THE 2ND LEFT TOE AS POSSIBLE, BUT PARTIAL OR FULL AMP OF 2ND LEFT IS POSSIBLE. WILL CALL PT WITH SURGERY DATE AFTER XRS ARE TAKEN TODAY. PT TAKEN OUT OF WORK TO ALLOW WOUNDS TO HEAL- HE IS A  AND WILL BE UNABLE TO DRIVE WITH HIS SURGICAL SHOES.         Treatment Note please see attached Discharge Instructions     PVR/PPG santiago Garcia 426 1 WEEK

## 2022-04-01 NOTE — BRIEF OP NOTE
PODIATRY BRIEF OP NOTE    PATIENT NAME: Shyla Finch DATE: 1960  -  64 y.o. male  MRN: [id number]  DATE: 4/1/2022  BILLING #: [id number]    Surgeon(s): Larisa Cook DPM     ASSISTANTS: Darlyn Talavera DPM PGY-1    PRE-OP DIAGNOSIS:   Osteomyelitis of the left 2nd digit    POST-OP DIAGNOSIS: Same as above. PROCEDURE:   Excision of left middle phalanx; 2nd digit  Partial RESECTION  of the proximal phalanx; 2nd digit  Debridement of non viable soft tissue    ANESTHESIA: MAC  Pre op popliteal block    HEMOSTASIS: Pneumatic ankle to the left tourniquet @ 225 mmHg for 34 minutes. ESTIMATED BLOOD LOSS: Less than 3cc. MATERIALS:   * No implants in log *    INJECTABLES: none    SPECIMEN:   ID Type Source Tests Collected by Time Destination   A : Proximal Phalynx Left Second Toe - please divide specimen for pathology and cultures Tissue Toe SURGICAL PATHOLOGY, CULTURE, TISSUE Larisa Cook, DP 4/1/2022 4173    B : Clean Margin Proximal Phalynx Left Second Toe - please divide specimen for pathology and cultures Tissue Toe SURGICAL PATHOLOGY, CULTURE, TISSUE Larisa Cook, DPM 4/1/2022 0840    C : Middle Phalynx Left Second Toe - please divide specimen for pathology and cultures Tissue Toe SURGICAL PATHOLOGY, CULTURE, TISSUE Larisa Cook, DP 4/1/2022 8546        COMPLICATIONS: none    FINDINGS: osteomyelitis of left middle phalanx found pre operatively, partil proximal phalanx that looked a little discolored. Amp of the 2nd digit not done as patient was reluctant. Thus it was determined to stage the procedure to save as much of the digit possible and readjust antibiotics based on culture results. Please see op note for full detail. The patient was counseled at length about the risks of frances Covid-19 during their perioperative period and any recovery window from their procedure.   The patient was made aware that frances Covid-19  may worsen their prognosis for recovering from their procedure and lend to a higher morbidity and/or mortality risk. All material risks, benefits, and reasonable alternatives including postponing the procedure were discussed. The patient does wish to proceed with the procedure at this time.     Bill Luke DPM   Podiatric Medicine & Surgery   4/1/2022 at 9:10 AM

## 2022-04-04 LAB
CULTURE: ABNORMAL
DIRECT EXAM: ABNORMAL
DIRECT EXAM: ABNORMAL
SPECIMEN DESCRIPTION: ABNORMAL
SURGICAL PATHOLOGY REPORT: NORMAL

## 2022-04-05 ENCOUNTER — HOSPITAL ENCOUNTER (INPATIENT)
Age: 62
LOS: 3 days | Discharge: HOME HEALTH | DRG: 854 | End: 2022-04-08
Attending: EMERGENCY MEDICINE | Admitting: FAMILY MEDICINE
Payer: COMMERCIAL

## 2022-04-05 ENCOUNTER — HOSPITAL ENCOUNTER (OUTPATIENT)
Dept: WOUND CARE | Age: 62
Discharge: HOME | DRG: 854 | End: 2022-04-05
Payer: COMMERCIAL

## 2022-04-05 ENCOUNTER — APPOINTMENT (OUTPATIENT)
Dept: GENERAL RADIOLOGY | Age: 62
DRG: 854 | End: 2022-04-05
Payer: COMMERCIAL

## 2022-04-05 VITALS
BODY MASS INDEX: 33.59 KG/M2 | TEMPERATURE: 97.6 F | WEIGHT: 248 LBS | HEIGHT: 72 IN | RESPIRATION RATE: 20 BRPM | DIASTOLIC BLOOD PRESSURE: 61 MMHG | HEART RATE: 83 BPM | SYSTOLIC BLOOD PRESSURE: 123 MMHG

## 2022-04-05 DIAGNOSIS — M86.9 OSTEOMYELITIS OF SECOND TOE OF LEFT FOOT (HCC): Primary | ICD-10-CM

## 2022-04-05 DIAGNOSIS — S61.452D: ICD-10-CM

## 2022-04-05 DIAGNOSIS — L97.522 DIABETIC ULCER OF TOE OF LEFT FOOT ASSOCIATED WITH TYPE 2 DIABETES MELLITUS, WITH FAT LAYER EXPOSED (HCC): ICD-10-CM
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
## 2022-04-05 DIAGNOSIS — L97.512 ULCER OF TOE OF RIGHT FOOT, WITH FAT LAYER EXPOSED (HCC): ICD-10-CM

## 2022-04-05 DIAGNOSIS — E11.621 DIABETIC ULCER OF TOE OF LEFT FOOT ASSOCIATED WITH TYPE 2 DIABETES MELLITUS, WITH FAT LAYER EXPOSED (HCC): ICD-10-CM
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
## 2022-04-05 DIAGNOSIS — S61.451D: ICD-10-CM

## 2022-04-05 DIAGNOSIS — S01.352D: ICD-10-CM

## 2022-04-05 DIAGNOSIS — S01.25XA OPEN WOUND OF NOSE DUE TO DOG BITE: ICD-10-CM

## 2022-04-05 LAB
ABSOLUTE EOS #: 0.08 K/UL (ref 0–0.44)
ABSOLUTE IMMATURE GRANULOCYTE: 0.06 K/UL (ref 0–0.3)
ABSOLUTE LYMPH #: 2.16 K/UL (ref 1.1–3.7)
ABSOLUTE MONO #: 1 K/UL (ref 0.1–1.2)
ANION GAP SERPL CALCULATED.3IONS-SCNC: 10 MMOL/L (ref 9–17)
BASOPHILS # BLD: 0 % (ref 0–2)
BASOPHILS ABSOLUTE: 0.03 K/UL (ref 0–0.2)
BUN BLDV-MCNC: 18 MG/DL (ref 8–23)
BUN/CREAT BLD: 23 (ref 9–20)
C-REACTIVE PROTEIN: 98.9 MG/L (ref 0–5)
CALCIUM SERPL-MCNC: 9.8 MG/DL (ref 8.6–10.4)
CHLORIDE BLD-SCNC: 102 MMOL/L (ref 98–107)
CO2: 25 MMOL/L (ref 20–31)
CREAT SERPL-MCNC: 0.78 MG/DL (ref 0.7–1.2)
EOSINOPHILS RELATIVE PERCENT: 1 % (ref 1–4)
GFR AFRICAN AMERICAN: >60 ML/MIN
GFR NON-AFRICAN AMERICAN: >60 ML/MIN
GFR SERPL CREATININE-BSD FRML MDRD: ABNORMAL ML/MIN/{1.73_M2}
GLUCOSE BLD-MCNC: 201 MG/DL (ref 70–99)
GLUCOSE BLD-MCNC: 211 MG/DL (ref 75–110)
HCT VFR BLD CALC: 41.3 % (ref 40.7–50.3)
HEMOGLOBIN: 12.5 G/DL (ref 13–17)
IMMATURE GRANULOCYTES: 1 %
LACTIC ACID, SEPSIS: 1.2 MMOL/L (ref 0.5–1.9)
LACTIC ACID, SEPSIS: 1.5 MMOL/L (ref 0.5–1.9)
LYMPHOCYTES # BLD: 17 % (ref 24–43)
MCH RBC QN AUTO: 27.8 PG (ref 25.2–33.5)
MCHC RBC AUTO-ENTMCNC: 30.3 G/DL (ref 28.4–34.8)
MCV RBC AUTO: 91.8 FL (ref 82.6–102.9)
MONOCYTES # BLD: 8 % (ref 3–12)
NRBC AUTOMATED: 0 PER 100 WBC
PDW BLD-RTO: 12.9 % (ref 11.8–14.4)
PLATELET # BLD: 302 K/UL (ref 138–453)
PMV BLD AUTO: 8.7 FL (ref 8.1–13.5)
POTASSIUM SERPL-SCNC: 3.8 MMOL/L (ref 3.7–5.3)
RBC # BLD: 4.5 M/UL (ref 4.21–5.77)
SARS-COV-2, RAPID: NOT DETECTED
SEDIMENTATION RATE, ERYTHROCYTE: 32 MM/HR (ref 0–20)
SEG NEUTROPHILS: 73 % (ref 36–65)
SEGMENTED NEUTROPHILS ABSOLUTE COUNT: 9.12 K/UL (ref 1.5–8.1)
SODIUM BLD-SCNC: 137 MMOL/L (ref 135–144)
SPECIMEN DESCRIPTION: NORMAL
WBC # BLD: 12.5 K/UL (ref 3.5–11.3)

## 2022-04-05 PROCEDURE — 87205 SMEAR GRAM STAIN: CPT

## 2022-04-05 PROCEDURE — 87186 SC STD MICRODIL/AGAR DIL: CPT

## 2022-04-05 PROCEDURE — 86403 PARTICLE AGGLUT ANTBDY SCRN: CPT

## 2022-04-05 PROCEDURE — 99222 1ST HOSP IP/OBS MODERATE 55: CPT | Performed by: FAMILY MEDICINE

## 2022-04-05 PROCEDURE — 6360000002 HC RX W HCPCS: Performed by: NURSE PRACTITIONER

## 2022-04-05 PROCEDURE — 6370000000 HC RX 637 (ALT 250 FOR IP): Performed by: FAMILY MEDICINE

## 2022-04-05 PROCEDURE — 87040 BLOOD CULTURE FOR BACTERIA: CPT

## 2022-04-05 PROCEDURE — 87635 SARS-COV-2 COVID-19 AMP PRB: CPT

## 2022-04-05 PROCEDURE — 2580000003 HC RX 258: Performed by: NURSE PRACTITIONER

## 2022-04-05 PROCEDURE — 83605 ASSAY OF LACTIC ACID: CPT

## 2022-04-05 PROCEDURE — 80048 BASIC METABOLIC PNL TOTAL CA: CPT

## 2022-04-05 PROCEDURE — 87150 DNA/RNA AMPLIFIED PROBE: CPT

## 2022-04-05 PROCEDURE — 85652 RBC SED RATE AUTOMATED: CPT

## 2022-04-05 PROCEDURE — 87075 CULTR BACTERIA EXCEPT BLOOD: CPT

## 2022-04-05 PROCEDURE — 85025 COMPLETE CBC W/AUTO DIFF WBC: CPT

## 2022-04-05 PROCEDURE — 99213 OFFICE O/P EST LOW 20 MIN: CPT

## 2022-04-05 PROCEDURE — 6360000002 HC RX W HCPCS: Performed by: PODIATRIST

## 2022-04-05 PROCEDURE — 96365 THER/PROPH/DIAG IV INF INIT: CPT

## 2022-04-05 PROCEDURE — 99283 EMERGENCY DEPT VISIT LOW MDM: CPT

## 2022-04-05 PROCEDURE — 87070 CULTURE OTHR SPECIMN AEROBIC: CPT

## 2022-04-05 PROCEDURE — 82947 ASSAY GLUCOSE BLOOD QUANT: CPT

## 2022-04-05 PROCEDURE — 86140 C-REACTIVE PROTEIN: CPT

## 2022-04-05 PROCEDURE — 36415 COLL VENOUS BLD VENIPUNCTURE: CPT

## 2022-04-05 PROCEDURE — 1200000000 HC SEMI PRIVATE

## 2022-04-05 PROCEDURE — 73630 X-RAY EXAM OF FOOT: CPT

## 2022-04-05 PROCEDURE — 96367 TX/PROPH/DG ADDL SEQ IV INF: CPT

## 2022-04-05 PROCEDURE — 2580000003 HC RX 258: Performed by: PODIATRIST

## 2022-04-05 PROCEDURE — 96375 TX/PRO/DX INJ NEW DRUG ADDON: CPT

## 2022-04-05 RX ORDER — LIDOCAINE HYDROCHLORIDE 20 MG/ML
JELLY TOPICAL ONCE
Status: CANCELLED | OUTPATIENT
Start: 2022-04-05 | End: 2022-04-05

## 2022-04-05 RX ORDER — ACETAMINOPHEN 325 MG/1
650 TABLET ORAL EVERY 6 HOURS PRN
Status: DISCONTINUED | OUTPATIENT
Start: 2022-04-05 | End: 2022-04-08 | Stop reason: HOSPADM

## 2022-04-05 RX ORDER — ONDANSETRON 4 MG/1
4 TABLET, ORALLY DISINTEGRATING ORAL EVERY 8 HOURS PRN
Status: DISCONTINUED | OUTPATIENT
Start: 2022-04-05 | End: 2022-04-08 | Stop reason: HOSPADM

## 2022-04-05 RX ORDER — TOPIRAMATE 100 MG/1
400 TABLET, FILM COATED ORAL DAILY
Status: DISCONTINUED | OUTPATIENT
Start: 2022-04-05 | End: 2022-04-08 | Stop reason: HOSPADM

## 2022-04-05 RX ORDER — DEXTROSE MONOHYDRATE 50 MG/ML
100 INJECTION, SOLUTION INTRAVENOUS PRN
Status: DISCONTINUED | OUTPATIENT
Start: 2022-04-05 | End: 2022-04-08 | Stop reason: HOSPADM

## 2022-04-05 RX ORDER — ONDANSETRON 2 MG/ML
4 INJECTION INTRAMUSCULAR; INTRAVENOUS EVERY 6 HOURS PRN
Status: DISCONTINUED | OUTPATIENT
Start: 2022-04-05 | End: 2022-04-08 | Stop reason: HOSPADM

## 2022-04-05 RX ORDER — POTASSIUM CHLORIDE 7.45 MG/ML
10 INJECTION INTRAVENOUS PRN
Status: DISCONTINUED | OUTPATIENT
Start: 2022-04-05 | End: 2022-04-08 | Stop reason: HOSPADM

## 2022-04-05 RX ORDER — SODIUM CHLORIDE 0.9 % (FLUSH) 0.9 %
5-40 SYRINGE (ML) INJECTION EVERY 12 HOURS SCHEDULED
Status: DISCONTINUED | OUTPATIENT
Start: 2022-04-05 | End: 2022-04-08 | Stop reason: HOSPADM

## 2022-04-05 RX ORDER — MORPHINE SULFATE 2 MG/ML
2 INJECTION, SOLUTION INTRAMUSCULAR; INTRAVENOUS ONCE
Status: COMPLETED | OUTPATIENT
Start: 2022-04-05 | End: 2022-04-05

## 2022-04-05 RX ORDER — SODIUM CHLORIDE 0.9 % (FLUSH) 0.9 %
10 SYRINGE (ML) INJECTION PRN
Status: DISCONTINUED | OUTPATIENT
Start: 2022-04-05 | End: 2022-04-08 | Stop reason: HOSPADM

## 2022-04-05 RX ORDER — TRAZODONE HYDROCHLORIDE 50 MG/1
150 TABLET ORAL EVERY EVENING
Status: DISCONTINUED | OUTPATIENT
Start: 2022-04-05 | End: 2022-04-08 | Stop reason: HOSPADM

## 2022-04-05 RX ORDER — SODIUM CHLORIDE 9 MG/ML
INJECTION, SOLUTION INTRAVENOUS PRN
Status: DISCONTINUED | OUTPATIENT
Start: 2022-04-05 | End: 2022-04-08 | Stop reason: HOSPADM

## 2022-04-05 RX ORDER — GABAPENTIN 300 MG/1
300 CAPSULE ORAL 3 TIMES DAILY
Status: DISCONTINUED | OUTPATIENT
Start: 2022-04-05 | End: 2022-04-08 | Stop reason: HOSPADM

## 2022-04-05 RX ORDER — CLOPIDOGREL BISULFATE 75 MG/1
75 TABLET ORAL DAILY
Status: DISCONTINUED | OUTPATIENT
Start: 2022-04-05 | End: 2022-04-08 | Stop reason: HOSPADM

## 2022-04-05 RX ORDER — CARVEDILOL 12.5 MG/1
12.5 TABLET ORAL 2 TIMES DAILY WITH MEALS
Status: DISCONTINUED | OUTPATIENT
Start: 2022-04-05 | End: 2022-04-08 | Stop reason: HOSPADM

## 2022-04-05 RX ORDER — PANTOPRAZOLE SODIUM 40 MG/1
40 TABLET, DELAYED RELEASE ORAL
Status: DISCONTINUED | OUTPATIENT
Start: 2022-04-06 | End: 2022-04-08 | Stop reason: HOSPADM

## 2022-04-05 RX ORDER — NICOTINE POLACRILEX 4 MG
15 LOZENGE BUCCAL PRN
Status: DISCONTINUED | OUTPATIENT
Start: 2022-04-05 | End: 2022-04-05 | Stop reason: RX

## 2022-04-05 RX ORDER — POTASSIUM CHLORIDE 20 MEQ/1
40 TABLET, EXTENDED RELEASE ORAL PRN
Status: DISCONTINUED | OUTPATIENT
Start: 2022-04-05 | End: 2022-04-08 | Stop reason: HOSPADM

## 2022-04-05 RX ORDER — HYDROCODONE BITARTRATE AND ACETAMINOPHEN 10; 325 MG/1; MG/1
1 TABLET ORAL EVERY 4 HOURS PRN
Status: DISCONTINUED | OUTPATIENT
Start: 2022-04-05 | End: 2022-04-08 | Stop reason: HOSPADM

## 2022-04-05 RX ORDER — POLYETHYLENE GLYCOL 3350 17 G/17G
17 POWDER, FOR SOLUTION ORAL DAILY PRN
Status: DISCONTINUED | OUTPATIENT
Start: 2022-04-05 | End: 2022-04-08 | Stop reason: HOSPADM

## 2022-04-05 RX ORDER — MAGNESIUM SULFATE 1 G/100ML
1000 INJECTION INTRAVENOUS PRN
Status: DISCONTINUED | OUTPATIENT
Start: 2022-04-05 | End: 2022-04-08 | Stop reason: HOSPADM

## 2022-04-05 RX ORDER — MORPHINE SULFATE 2 MG/ML
2 INJECTION, SOLUTION INTRAMUSCULAR; INTRAVENOUS ONCE
Status: DISCONTINUED | OUTPATIENT
Start: 2022-04-05 | End: 2022-04-08 | Stop reason: HOSPADM

## 2022-04-05 RX ORDER — LIDOCAINE HYDROCHLORIDE 20 MG/ML
JELLY TOPICAL ONCE
Status: COMPLETED | OUTPATIENT
Start: 2022-04-05 | End: 2022-04-05

## 2022-04-05 RX ORDER — DEXTROSE MONOHYDRATE 25 G/50ML
12.5 INJECTION, SOLUTION INTRAVENOUS PRN
Status: DISCONTINUED | OUTPATIENT
Start: 2022-04-05 | End: 2022-04-05 | Stop reason: RX

## 2022-04-05 RX ORDER — ACETAMINOPHEN 650 MG/1
650 SUPPOSITORY RECTAL EVERY 6 HOURS PRN
Status: DISCONTINUED | OUTPATIENT
Start: 2022-04-05 | End: 2022-04-08 | Stop reason: HOSPADM

## 2022-04-05 RX ADMIN — INSULIN LISPRO 1 UNITS: 100 INJECTION, SOLUTION INTRAVENOUS; SUBCUTANEOUS at 20:08

## 2022-04-05 RX ADMIN — HYDROCODONE BITARTRATE AND ACETAMINOPHEN 1 TABLET: 10; 325 TABLET ORAL at 19:46

## 2022-04-05 RX ADMIN — TRAZODONE HYDROCHLORIDE 150 MG: 50 TABLET ORAL at 22:37

## 2022-04-05 RX ADMIN — CARVEDILOL 12.5 MG: 12.5 TABLET, FILM COATED ORAL at 20:11

## 2022-04-05 RX ADMIN — HYDROCODONE BITARTRATE AND ACETAMINOPHEN 1 TABLET: 10; 325 TABLET ORAL at 23:46

## 2022-04-05 RX ADMIN — LIDOCAINE HYDROCHLORIDE 11 ML: 20 JELLY TOPICAL at 13:44

## 2022-04-05 RX ADMIN — VANCOMYCIN HYDROCHLORIDE 2500 MG: 5 INJECTION, POWDER, LYOPHILIZED, FOR SOLUTION INTRAVENOUS at 16:18

## 2022-04-05 RX ADMIN — CEFEPIME HYDROCHLORIDE 2000 MG: 2 INJECTION, POWDER, FOR SOLUTION INTRAVENOUS at 15:43

## 2022-04-05 RX ADMIN — GABAPENTIN 300 MG: 300 CAPSULE ORAL at 20:11

## 2022-04-05 RX ADMIN — MORPHINE SULFATE 2 MG: 2 INJECTION, SOLUTION INTRAMUSCULAR; INTRAVENOUS at 15:42

## 2022-04-05 RX ADMIN — CEFAZOLIN SODIUM 2000 MG: 10 INJECTION, POWDER, FOR SOLUTION INTRAVENOUS at 20:15

## 2022-04-05 ASSESSMENT — ENCOUNTER SYMPTOMS
COLOR CHANGE: 1
CHEST TIGHTNESS: 0
DIARRHEA: 0
SORE THROAT: 0
COUGH: 0
WHEEZING: 0
RHINORRHEA: 0
CHOKING: 0
ABDOMINAL PAIN: 0
CONSTIPATION: 0
NAUSEA: 0
VOICE CHANGE: 0
VOMITING: 0
PHOTOPHOBIA: 0
BACK PAIN: 0
SINUS PRESSURE: 0
SHORTNESS OF BREATH: 0
FACIAL SWELLING: 0

## 2022-04-05 ASSESSMENT — PAIN DESCRIPTION - ONSET
ONSET: ON-GOING

## 2022-04-05 ASSESSMENT — PAIN DESCRIPTION - FREQUENCY
FREQUENCY: CONTINUOUS
FREQUENCY: CONTINUOUS
FREQUENCY: INTERMITTENT

## 2022-04-05 ASSESSMENT — PAIN DESCRIPTION - ORIENTATION
ORIENTATION: LEFT

## 2022-04-05 ASSESSMENT — PAIN SCALES - GENERAL
PAINLEVEL_OUTOF10: 7
PAINLEVEL_OUTOF10: 8
PAINLEVEL_OUTOF10: 8
PAINLEVEL_OUTOF10: 7

## 2022-04-05 ASSESSMENT — PAIN DESCRIPTION - PAIN TYPE
TYPE: ACUTE PAIN
TYPE: SURGICAL PAIN
TYPE: ACUTE PAIN

## 2022-04-05 ASSESSMENT — PAIN DESCRIPTION - LOCATION
LOCATION: TOE (COMMENT WHICH ONE)
LOCATION: FOOT
LOCATION: FOOT

## 2022-04-05 ASSESSMENT — PAIN DESCRIPTION - DESCRIPTORS
DESCRIPTORS: ACHING;DISCOMFORT
DESCRIPTORS: ACHING;DISCOMFORT
DESCRIPTORS: ACHING

## 2022-04-05 ASSESSMENT — PAIN - FUNCTIONAL ASSESSMENT
PAIN_FUNCTIONAL_ASSESSMENT: PREVENTS OR INTERFERES SOME ACTIVE ACTIVITIES AND ADLS

## 2022-04-05 ASSESSMENT — PAIN DESCRIPTION - PROGRESSION: CLINICAL_PROGRESSION: NOT CHANGED

## 2022-04-05 NOTE — ED PROVIDER NOTES
[facility] ED  eMERGENCY dEPARTMENT eNCOUnter      Pt Name: Carri Mahoney  MRN: [id number]  [place] 1960  Date of evaluation: 4/5/2022  Provider: JESICA Sosa CNP    CHIEF COMPLAINT       Chief Complaint   Patient presents with    Other     infection to recent partial amputation site          HISTORY OF PRESENT ILLNESS  (Location/Symptom, Timing/Onset, Context/Setting, Quality, Duration, Modifying Factors, Severity.)   Carri Mahoney is a 64 y.o. male who presents to the emergency department via private auto. He was sent by his podiatrist for admission for osteomyelitis of the 2nd toe of his left foot. 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 He has been taking doxycycline without improvement. He reports the plan is to amputate the toe. He also has ulcerations on the 2nd and 3rd toes of the right foot. Denies fever, chills, weakness. Rates his pain 7/10 at this time. He has dressings in place that were applied at his podiatrist's office today. He is a diabetic. Nursing Notes were reviewed. ALLERGIES     Pcn [penicillins]    CURRENT MEDICATIONS       Previous Medications    ASPIRIN 325 MG TABLET    Take 325 mg by mouth daily. CARVEDILOL (COREG) 12.5 MG TABLET    Take 1 tablet by mouth 2 times daily (with meals)    CLOPIDOGREL (PLAVIX) 75 MG TABLET    Take 1 tablet by mouth daily    DOXYCYCLINE HYCLATE (VIBRA-TABS) 100 MG TABLET    Take 1 tablet by mouth 2 times daily for 10 days    GABAPENTIN (NEURONTIN) 300 MG CAPSULE    Take 300 mg by mouth 3 times daily. HYDROCHLOROTHIAZIDE (MICROZIDE) 12.5 MG CAPSULE    1 po qd    HYDROCODONE-ACETAMINOPHEN (NORCO)  MG PER TABLET    Take 1 tablet by mouth every 4-6 hours as needed for Pain.     LINAGLIPTIN (TRADJENTA) 5 MG TABLET    TAKE 1 TABLET BY MOUTH ONE TIME A DAY    MELOXICAM (MOBIC) 15 MG TABLET    Take 1 tablet by mouth daily    METFORMIN (GLUCOPHAGE) 1000 MG TABLET Take 1 tablet by mouth 2 times daily (with meals)    MISC NATURAL PRODUCTS (GLUCOSAMINE CHOND MSM FORMULA PO)    Take 2 tablets by mouth daily    MULTIPLE VITAMIN (MULTI VITAMIN DAILY PO)    Take by mouth    NEEDLE, DISP, 18 G (BD DISP NEEDLES) 18G X 1-1/2\" MISC    1 Device by Does not apply route every 30 days    NEEDLE, DISP, 18 G 18G X 1\" MISC    1 Syringe by Does not apply route every 30 days    NEEDLE, DISP, 22 G 22G X 1-1/2\" MISC    1 Syringe by Does not apply route every 30 days    NEEDLE, DISP, 22 G 22G X 1-1/2\" MISC    1 Device by Does not apply route every 30 days    NITROGLYCERIN (NITROSTAT) 0.4 MG SL TABLET    Place 0.4 mg under the tongue    OMEPRAZOLE (PRILOSEC) 20 MG DELAYED RELEASE CAPSULE    TAKE 1 CAPSULE DAILY    TESTOSTERONE CYPIONATE (DEPOTESTOTERONE CYPIONATE) 200 MG/ML INJECTION        TESTOSTERONE CYPIONATE 200 MG/ML KIT    Inject 200 mg into the muscle every 30 days for 30 days.  please include syringes    TIZANIDINE (ZANAFLEX) 4 MG TABLET    1 po qd    TOPIRAMATE (TOPAMAX) 200 MG TABLET    Take 1 tablet by mouth 2 times daily    TRAZODONE (DESYREL) 150 MG TABLET    TAKE ONE TABLET BY MOUTH EVERY EVENING       PAST MEDICAL HISTORY         Diagnosis Date    Acquired trigger finger 6/5/2018    CAD (coronary artery disease)     [facility]/ involving coronary bypass graft of native heart with unstable angina pectoris    Cervical spondylosis     CHF (congestive heart failure) (HCC)     Chronic back pain     on 11/19/18 pt states is presently in pain mgmnt    Contusion of elbow 6/5/2018    Contusion of knee 6/5/2018    Contusion of shoulder region 6/5/2018    Diabetic foot ulcer with osteomyelitis ([facility] [address] 1/9/2018    Diabetic ulcer of toe associated with type 2 diabetes mellitus, with fat layer exposed ([facility] [address] 1/31/2018    Hypercholesterolemia 12/5/2017    Hyperlipemia, mixed     Hypertension     Ischemic cardiomyopathy 5/7/2018    Non-pressure chronic ulcer of left lower leg with fat layer exposed ([facility] [address] 2014    Obesity     Obesity, Class III, BMI 40-49.9 (morbid obesity) ([address] 2015    Obstructive sleep apnea syndrome     Osteoarthritis of carpometacarpal (CMC) joint of thumb 2018    Osteoarthritis of knee 2018    Peripheral vascular disease ([address]     with venous stasis and ulcerations. Dr Jesus Nickerson Presence of coronary angioplasty implant and graft 2009    Sleep apnea     Dr Srinivasa Keene of knee and leg 2018    Sprain of shoulder and upper arm 2018    Type II or unspecified type diabetes mellitus without mention of complication, not stated as uncontrolled     Ulcer of left foot, with fat layer exposed ([facility] [address] 2018    Unspecified sleep apnea     Dr Cali Lung CPAP    Venous insufficiency of both lower extremities 2018       SURGICAL HISTORY           Procedure Laterality Date    ARTHROPLASTY Left 2022    ARTHROPLASTY 2ND LEFT TOE performed by Liz King DPM at [address] Right 3/22/13    Dr. Socorro Matute     COLONOSCOPY      CORONARY ARTERY BYPASS GRAFT  07    [facility], Dr Sherin Pierce (X 3)    CORONARY ARTERY BYPASS GRAFT  2006    NECK SURGERY  2010    cervical stenoses C5-C6-C7and partial T1 laminectomy with fusion of C6-7    OTHER SURGICAL HISTORY      wound care ulcers of legs (bilaterally) with Dr Estrella Domínguez  age 11    VEIN SURGERY      closure of peripherator veins of legs, Dr Sanya High           Problem Relation Age of Onset    Cancer Mother         lung    Diabetes Father     Heart Disease Father     High Blood Pressure Father     Diabetes Brother     Heart Disease Brother     High Blood Pressure Brother      Family Status   Relation Name Status    Mother   at age 67        lung ca (smoker)    Father   at age 50        [facility] Son  Alive    Son  Carlos 14      MGF      PGM      PGF          SOCIAL HISTORY      reports that he has never smoked. He has never used smokeless tobacco. He reports previous alcohol use. He reports that he does not use drugs. REVIEW OF SYSTEMS    (2-9 systems for level 4, 10 or more for level 5)     Review of Systems   Constitutional: Negative for chills, diaphoresis, fatigue and fever. Respiratory: Negative for cough and shortness of breath. Cardiovascular: Negative for chest pain. Musculoskeletal: Positive for arthralgias and myalgias. Skin: Positive for color change and wound. Negative for rash. Neurological: Negative for weakness. Except as noted above the remainder of the review of systems was reviewed and negative. PHYSICAL EXAM    (up to 7 for level 4, 8 or more for level 5)     ED Triage Vitals [22 1500]   BP Temp Temp Source Pulse Resp SpO2 Height Weight   137/81 97.9 °F (36.6 °C) Oral 108 14 98 % 6' (1.829 m) 239 lb (108.4 kg)     Physical Exam  Vitals reviewed. Constitutional:       General: He is not in acute distress. Appearance: He is well-developed. He is not diaphoretic. Eyes:      General: No scleral icterus. Conjunctiva/sclera: Conjunctivae normal.   Cardiovascular:      Rate and Rhythm: Normal rate. Pulses: Normal pulses. Pulmonary:      Effort: Pulmonary effort is normal. No respiratory distress. Breath sounds: No stridor. Musculoskeletal:      Cervical back: Neck supple. Right lower leg: No edema. Left lower leg: No edema. Comments: Moves extremities. Skin:     General: Skin is warm and dry. Capillary Refill: Capillary refill takes 2 to 3 seconds. Findings: No rash. Comments: Wound to 2nd toe of left foot. Sutures in place from previous surgery. Some areas appear open with surrounding erythema. Ulceration to 2nd toe of right foot with scab over area. Larger ulceration to 3rd toe of right foot, open.     Neurological: Mental Status: He is alert and oriented to person, place, and time. Psychiatric:         Behavior: Behavior normal.           DIAGNOSTIC RESULTS     LABS:  Labs Reviewed   BASIC METABOLIC PANEL - Abnormal; Notable for the following components:       Result Value    Glucose 201 (*)     Bun/Cre Ratio 23 (*)     All other components within normal limits   CBC WITH AUTO DIFFERENTIAL - Abnormal; Notable for the following components:    WBC 12.5 (*)     Hemoglobin 12.5 (*)     Seg Neutrophils 73 (*)     Lymphocytes 17 (*)     Immature Granulocytes 1 (*)     Segs Absolute 9.12 (*)     All other components within normal limits   SEDIMENTATION RATE - Abnormal; Notable for the following components:    Sed Rate 32 (*)     All other components within normal limits   CULTURE, BLOOD 1   CULTURE, BLOOD 1   LACTATE, SEPSIS   LACTATE, SEPSIS   C-REACTIVE PROTEIN       All other labs were within normal range or not returned as of this dictation. EMERGENCY DEPARTMENT COURSE and DIFFERENTIAL DIAGNOSIS/MDM:   Vitals:    Vitals:    04/05/22 1500   BP: 137/81   Pulse: 108   Resp: 14   Temp: 97.9 °F (36.6 °C)   TempSrc: Oral   SpO2: 98%   Weight: 239 lb (108.4 kg)   Height: 6' (1.829 m)         MEDICATIONS GIVEN IN THE ED:  Medications   vancomycin (VANCOCIN) 2,500 mg in dextrose 5 % 500 mL IVPB (2,500 mg IntraVENous New Bag 4/5/22 1618)   morphine (PF) injection 2 mg (2 mg IntraVENous Given 4/5/22 1542)   cefepime (MAXIPIME) 2000 mg IVPB minibag (0 mg IntraVENous Stopped 4/5/22 1614)       CLINICAL DECISION MAKING:  The patient presented alert with a nontoxic appearance and was seen in conjunction with Dr. Vicente Cuello. He will be admitted for further evaluation and treatment. I spoke with the podiatry resident and Dr. Brennan Felty from [facility]. IV antibiotics were started. Care was provided during an unprecedented national emergency due to the novel coronavirus, Covid-19.          CONSULTS:  IP CONSULT TO PODIATRY  PHARMACY TO DOSE VANCOMYCIN  IP CONSULT TO INTERNAL MEDICINE        FINAL IMPRESSION      1.  Osteomyelitis of second toe of left foot ([facility] [address]            Problem List  Patient Active Problem List   Diagnosis Code    Hyperlipemia, mixed E78.2    Sleep apnea G47.30    Peripheral vascular disease ([facility]) I73.9    CAD (coronary artery disease) I25.10    CHF (congestive heart failure) ([facility]) I50.9    CTS (carpal tunnel syndrome) G56.00    Hypotension I95.9    Obesity, Class III, BMI 40-49.9 (morbid obesity) ([facility] [address] E66.01    Essential hypertension I10    DDD (degenerative disc disease), cervical M50.30    DJD (degenerative joint disease), cervical M47.812    Primary osteoarthritis involving multiple joints M89.49    Paresthesias in left hand R20.2    Uncontrolled type 2 diabetes mellitus without complication, without long-term current use of insulin [id number]    Coronary artery disease involving coronary bypass graft of native heart without angina pectoris I25.810    Sleep apnea G47.30    Cervical spondylosis M47.812    Obesity E66.9    Long term current use of antithrombotics/antiplatelets V11.52    H/O cervical spine surgery Z98.890    Long term (current) use of non-steroidal anti-inflammatories (nsaid) Z79.1    Chronic prescription opiate use Z79.891    Severe comorbid illness R69    Hx of cervical spine surgery Z98.890    Chronic neck pain M54.2, G89.29    Candidal balanitis B37.42    Cellulitis of third toe, left L03.032    Nail avulsion, toe, initial encounter S91.209A    Cellulitis of second toe, right L03.031    Abnormal nuclear stress test R94.39    Abnormal stress test R94.39    Hypercholesterolemia E78.00    Diabetic foot ulcer with osteomyelitis ([facility]) E11.621, E11.69, L97.509, M86.9    Ulcer of left foot, with fat layer exposed ([facility] [address] L97.522    Acquired trigger finger M65.30    Contusion of elbow S50.00XA    Contusion of shoulder region S40.019A    Contusion of knee S80.00XA    Degeneration of lumbar intervertebral disc M51.36    Diabetic ulcer of toe associated with type 2 diabetes mellitus, with fat layer exposed ([address] E11.621, L97.502    History of coronary artery bypass graft x 3 Z95.1    Ischemic cardiomyopathy I25.5    Osteoarthritis of knee M17.10    Osteoarthritis of carpometacarpal (CMC) joint of thumb M18.9    Sprain of knee and leg S83. 90XA    Sprain of shoulder and upper arm S43.409A    Presence of coronary angioplasty implant and graft Z95.5    Venous insufficiency of both lower extremities I87.2    Dog bite W54. 0XXA    Impingement syndrome of shoulder region M75.40    Bite wound of hand, left, subsequent encounter S61.452D    Bite wound of hand, right, subsequent encounter S61.451D    Open bite of left ear S01.352A    Open wound of nose due to dog bite S01.25XA, W54. 0XXA    Diabetic polyneuropathy associated with type 2 diabetes mellitus ([address] E11.42    Chronic ulcer of toe, left, with necrosis of bone ([address] L97.524    Ulcer of toe of right foot, with fat layer exposed ([address] L97.512    Osteomyelitis of second toe of left foot ([address] M86.9         DISPOSITION/PLAN   DISPOSITION Decision To Admit 04/05/2022 04:59:01 PM      PATIENT REFERRED TO:   No follow-up provider specified. DISCHARGE MEDICATIONS:     New Prescriptions    No medications on file           (Please note that portions of this note were completed with a voice recognition program.  Efforts were made to edit the dictations but occasionally words are mis-transcribed.)    JESICA Lorenzana - SATHISH.       JESICA Lorenzana CNP  04/05/22 [id number]

## 2022-04-05 NOTE — PROGRESS NOTES
Enoxaparin Guidelines    Recent Labs     04/05/22  1510   CREATININE 0.78     Recent Labs     04/05/22  1510   HGB 12.5*   HCT 41.3        Estimated Creatinine Clearance: 126 mL/min (based on SCr of 0.78 mg/dL). The guidance below is to provide initial recommendations for dosing. If recommended dose does not align well with patient's current clinical picture, communications with the care team will occur to determine most appropriate medication and dose. TABLE 1. ENOXAPARIN ROUTINE PROPHYLAXIS DOSING (Medically ill, routine surgery)   Patient Weight (kg)     50.9 and below 51 - 100.9 101 - 150.9 151 - 174.9 175 or greater         Estimated CrCl  (ml/min) 30 or greater   30 mg SUBQ daily   40 mg SUBQ daily 30 mg SUBQ BID  40 mg SUBQ BID 60mg SUBQ BID      15-29 UFH 5000 units SUBQ BID   30 mg SUBQ daily 30 mg SUBQ daily 40 mg SUBQ daily   60 mg SUBQ daily      Less than 15 or Dialysis UFH 5000 units SUBQ BID   UFH 5000 units SUBQ TID UFH 7500 units SUBQ TID       TABLE 2. ENOXAPARIN TREATMENT DOSING   (Based on 1mg/kg BID for DVT/PE/AFib)   Patient Weight (kg)     50.9 and below .9 151-189.9 190 or greater         Estimated CrCl  (ml/min) 30 or greater Recommend [facility] UFH infusion, apixaban or rivaroxaban 1mg/kg SUBQ BID 1mg/kg SUBQ BID if anti-Xa levels are feasible per institution. Alternatively,  recommend switch to [facility] standardized UFH infusion     Recommend switch to [facility] standardized UFH infusion. 15-29 Recommend [facility] standardized UFH infusion or apixaban 1mg/kg SUBQ daily Recommend switch to [facility] standardized UFH infusion     Less than 15 or Dialysis Recommend switch to [facility] standardized UFH infusion. Lovenox 40 mg daily adjusted to Lovenox 30 mg BID due to the patient's weight and renal function.     Mel Gill [id number], [place] 4/5/2022 6:35 PM

## 2022-04-05 NOTE — ED NOTES
Per Daron ANDERSON, second lactate does not need to be drawn.      Heide Garcia [facility]  04/05/22 [id number]

## 2022-04-05 NOTE — CONSULTS
Consultation Note  Podiatric Medicine and Surgery     Subjective     Chief Complaint: left foot wound    HPI:  Fernando Szymanski is a 64 y.o. male seen at [address] for left foot wound. Patient was seen at Dr. Paul Aniya office this AM and was sent to ED due to infection of left foot. Patient is has had dorsal 2nd digit wound since beginning of February. He had a hammertoe correction surgery to relieve the pressure for the wound on 4/1/2022. Patient also has wound on the right hallux and 2nd digit. He admits diabetic neuropathy. Denies other pedal complaints. PCP is Seema Hameed DO    ROS:   Review of Systems   Constitutional: Negative for activity change, chills and fever. HENT: Negative for facial swelling and sore throat. Eyes: Negative for photophobia. Respiratory: Negative for chest tightness and shortness of breath. Cardiovascular: Negative for chest pain, palpitations and leg swelling. Gastrointestinal: Negative for abdominal pain, diarrhea, nausea and vomiting. Musculoskeletal: Negative for arthralgias, gait problem and joint swelling. Skin: Positive for color change (left foot) and wound (bilateral feet). Neurological: Negative for weakness and headaches. Psychiatric/Behavioral: Negative for agitation and behavioral problems.        Past Medical History   has a past medical history of Acquired trigger finger, CAD (coronary artery disease), Cervical spondylosis, CHF (congestive heart failure) ([address] Chronic back pain, Contusion of elbow, Contusion of knee, Contusion of shoulder region, Diabetic foot ulcer with osteomyelitis ([address] Diabetic ulcer of toe associated with type 2 diabetes mellitus, with fat layer exposed ([address] Hypercholesterolemia, Hyperlipemia, mixed, Hypertension, Ischemic cardiomyopathy, Non-pressure chronic ulcer of left lower leg with fat layer exposed ([address] Obesity, Obesity, Class III, BMI 40-49.9 (morbid obesity) ([address] Obstructive sleep apnea syndrome, Osteoarthritis of carpometacarpal (CMC) joint of thumb, Osteoarthritis of knee, Peripheral vascular disease ([facility] Presence of coronary angioplasty implant and graft, Sleep apnea, Sprain of knee and leg, Sprain of shoulder and upper arm, Type II or unspecified type diabetes mellitus without mention of complication, not stated as uncontrolled, Ulcer of left foot, with fat layer exposed ([facility] [facility] Unspecified sleep apnea, and Venous insufficiency of both lower extremities. Past Surgical History   has a past surgical history that includes Neck surgery (2010); [place] tooth extraction; Coronary artery bypass graft (2/26/07); Vein Surgery; other surgical history; Tonsillectomy (age 11); Carpal tunnel release (Right, 3/22/13); Cardiac surgery; Coronary artery bypass graft (2006); Colonoscopy (2020); and arthroplasty (Left, 4/1/2022). Medications  Prior to Admission medications    Medication Sig Start Date End Date Taking? Authorizing Provider   metFORMIN (GLUCOPHAGE) 1000 MG tablet Take 1 tablet by mouth 2 times daily (with meals) 3/31/22 4/30/22  Seema Hameed,    omeprazole (PRILOSEC) 20 MG delayed release capsule TAKE 1 CAPSULE DAILY 3/31/22   Seema Hameed, DO   linagliptin (TRADJENTA) 5 MG tablet TAKE 1 TABLET BY MOUTH ONE TIME A DAY 3/31/22   Seema Hameed, DO   gabapentin (NEURONTIN) 300 MG capsule Take 300 mg by mouth 3 times daily. Historical Provider, MD   Misc Natural Products (GLUCOSAMINE CHOND MSM FORMULA PO) Take 2 tablets by mouth daily    Historical Provider, MD   HYDROcodone-acetaminophen (NORCO)  MG per tablet Take 1 tablet by mouth every 4-6 hours as needed for Pain.     Historical Provider, MD   doxycycline hyclate (VIBRA-TABS) 100 MG tablet Take 1 tablet by mouth 2 times daily for 10 days 3/29/22 4/8/22  Miguel Feliciano DPM   meloxicam (MOBIC) 15 MG tablet Take 1 tablet by mouth daily 3/10/22 6/8/22  Seema Hameed,    traZODone (DESYREL) 150 MG tablet TAKE ONE TABLET BY MOUTH EVERY EVENING 3/10/22   Seema ONEILL Yoseph,    topiramate (TOPAMAX) 200 MG tablet Take 1 tablet by mouth 2 times daily  Patient taking differently: Take 400 mg by mouth daily  1/20/22 2/19/22  Seema Hameed DO   tiZANidine (ZANAFLEX) 4 MG tablet 1 po qd 1/20/22   Seema Hameed DO   carvedilol (COREG) 12.5 MG tablet Take 1 tablet by mouth 2 times daily (with meals) 12/22/21 3/22/22  Seema Hameed DO   clopidogrel (PLAVIX) 75 MG tablet Take 1 tablet by mouth daily 12/22/21 3/22/22  Seema Hameed DO   hydroCHLOROthiazide (MICROZIDE) 12.5 MG capsule 1 po qd 9/25/21   Seema Hameed DO   NEEDLE, DISP, 22 G 22G X 1-1/2\" MISC 1 Device by Does not apply route every 30 days 6/29/21 7/29/21  Seema Hameed DO   NEEDLE, DISP, 18 G (BD DISP NEEDLES) 18G X 1-1/2\" MISC 1 Device by Does not apply route every 30 days 6/29/21   Seema Hameed DO   testosterone cypionate (DEPOTESTOTERONE CYPIONATE) 200 MG/ML injection  3/2/21   Historical Provider, MD   Testosterone Cypionate 200 MG/ML KIT Inject 200 mg into the muscle every 30 days for 30 days. please include syringes 3/2/21 5/14/21  Seema Hameed DO   NEEDLE, DISP, 18 G 18G X 1\" MISC 1 Syringe by Does not apply route every 30 days 9/22/20   Seema Hameed DO   NEEDLE, DISP, 22 G 22G X 1-1/2\" MISC 1 Syringe by Does not apply route every 30 days 9/22/20 5/14/21  Seema Hameed DO   Multiple Vitamin (MULTI VITAMIN DAILY PO) Take by mouth    Historical Provider, MD   nitroGLYCERIN (NITROSTAT) 0.4 MG SL tablet Place 0.4 mg under the tongue    Historical Provider, MD   aspirin 325 MG tablet Take 325 mg by mouth daily. Historical Provider, MD    Scheduled Meds:   vancomycin  2,500 mg IntraVENous Once     Continuous Infusions:  PRN Meds:. Allergies  is allergic to pcn [penicillins] and penicillin g. Family History  family history includes Cancer in his mother; Diabetes in his brother and father; Heart Disease in his brother and father; High Blood Pressure in his brother and father.     Social History reports that he has never smoked. He has never used smokeless tobacco.   reports previous alcohol use. reports no history of drug use. Objective     Vitals:  Patient Vitals for the past 8 hrs:   BP Temp Temp src Pulse Resp SpO2 Height Weight   22 1500 137/81 97.9 °F (36.6 °C) Oral 108 14 98 % 6' (1.829 m) 239 lb (108.4 kg)     Average, Min, and Max for last 24 hours Vitals:  TEMPERATURE:  Temp  Av.8 °F (36.6 °C)  Min: 97.6 °F (36.4 °C)  Max: 97.9 °F (36.6 °C)    RESPIRATIONS RANGE: Resp  Av  Min: 14  Max: 20    PULSE RANGE: Pulse  Av.5  Min: 83  Max: 108    BLOOD PRESSURE RANGE:  Systolic (13WGA), HRI:007 , Min:123 , MGI:146   ; Diastolic (38CMQ), TMU:76, Min:61, Max:81      PULSE OXIMETRY RANGE: SpO2  Av %  Min: 98 %  Max: 98 %  I&O:  No intake/output data recorded. CBC:  Recent Labs     22  1510   WBC 12.5*   HGB 12.5*   HCT 41.3           BMP:  Recent Labs     22  1510      K 3.8      CO2 25   BUN 18   CREATININE 0.78   GLUCOSE 201*   CALCIUM 9.8        Coags:  No results for input(s): APTT, PROT, INR in the last 72 hours. Lab Results   Component Value Date    LABA1C 8.5 (H) 2022     Lab Results   Component Value Date    SEDRATE 32 (H) 2022     Lab Results   Component Value Date    CRP 2.1 2018         Lower Extremity Physical Exam:  Vascular: DP and PT pulses are palpable. CFT <5 seconds to all digits. Hair growth is diminished to the level of the digits. Moderate non-pitting edema globally to left foot. Neuro: Saph/sural/SP/DP/plantar sensation diminished to light touch. Musculoskeletal: Muscle strength is 5/5 to all lower extremity muscle groups. Gross deformity is hammertoe noted to left 2nd digit and right 1,2 digit . Dermatologic: Full thickness ulcer #1 located  and measures approximately 1.2cm x 1.2 cm x 0.3cm. Base is fibrotic. Periwound skin is macerated.   Purulent drainage noted with mild associated mal odor. Erythema noted globally to left foot with mild associated increase in warmth. Does probe to bone. Does not sinus track, or undermine. No fluctuance, crepitus, or induration. Interdigital maceration absent. Suture is not well intact. Small dorsal PIPJ ulcers noted to right 1 and 2 digit. Base is fibro granular. There is no erythema and edema to the right foot. Clinical Images:              Imaging:   No orders to display       Cultures: obtained     Assessment     Massiel Lazo is a 64 y.o. male with   1. diabetic foot ulcer down to bone, left foot  2. Osteomyelitis, left foot  3. Cellulitis, left foot  4. Diabetic foot ulcer down to subcutaneous layer, right foot  5. Hammertoe deformity, bilateral feet. Active Problems:    * No active hospital problems. *  Resolved Problems:    * No resolved hospital problems. *        Plan     · Patient examined and evaluated at bedside   · Treatment options discussed in detail with the patient  · Culture ordered  · X-ray ordered  · Abx: will switch to ancef. · OR tomorrow at 11:30 am tentatively   · Consent to be signed   · Patient is covid vaccinated  · Anticoagulant held in AM  · Npo at midnight  · Dressing applied to Left foot: betadine, dsd, ace  · Dressing applied to right foot: betadine, bandaid  . Will switch to silvercel tomorrow.    · WBAT to Bilateral lower extremity  · Discussed with Dr. La Dao, [place]   Podiatric Medicine & Surgery   4/5/2022 at 6:03 PM

## 2022-04-05 NOTE — PROGRESS NOTES
Learning About the Safe Use of Antibiotics  Introduction  Antibiotics are drugs used to kill bacteria. Bacteria can cause infections. These include strep throat, ear infections, and pneumonia. These medicines can't cure everything. They don't kill viruses or help with allergies. They don't help illnesses such as the common cold, the flu, or a runny nose. And they can cause side effects. There are many types of antibiotics. Your doctor will decide which one will work best for your infection. Examples include:  · Amoxicillin. · Cephalexin (Keflex). · Ciprofloxacin (Cipro). What are the possible side effects? Side effects can include:  · Nausea. · Diarrhea. · Skin rash. · Yeast infection. · A severe allergic reaction. It may cause itching, swelling, and breathing problems. This is rare. You may have other side effects or reactions not listed here. Check the information that comes with your medicine. Should you take antibiotics just in case? Don't take antibiotics when you don't need them. If you do that, they may not work when you do need them. Each time you take antibiotics, you are more likely to have some bacteria that survive and aren't killed by the medicine. Bacteria that don't die can change and become even harder to kill. These are called antibiotic-resistant bacteria. They can cause longer and more serious infections. To treat them, you may need different, stronger antibiotics that have more side effects and may cost more. So always ask your doctor if antibiotics are the best treatment. Explain that you do not want antibiotics unless you need them. Help protect the community  Using antibiotics when they're not needed leads to the development of antibiotic-resistant bacteria. These tougher bacteria can spread to family members, children, and coworkers. People in your community will have a risk of getting an infection that is harder to cure and that costs more to treat.   How can you take antibiotics safely? Be safe with medicine. Take your antibiotics as directed. Do not stop taking them just because you feel better. You need to take the full course of medicine. This will help make sure your infection is cured. It will also help prevent the growth of antibiotic-resistant bacteria. Always take the exact amount that the label says to take. If the label says to take the medicine at a certain time, follow those directions. You might feel better after you take an antibiotic for a few days. But it is important to keep taking it for as long as prescribed. That will help you get rid of those bacteria that are a bit stronger and that survive the first few days of treatment. Where can you learn more? Go to https://[employer]. org and sign in to your [employer] account. Enter C633 in the [employer] box to learn more about \"Learning About the Safe Use of Antibiotics. \"     If you do not have an account, please click on the \"Sign Up Now\" link. Current as of: March 3, 2017  Content Version: 11.3  © 5732-8327 [employer]. Care instructions adapted under license by [facility] ([facility]). If you have questions about a medical condition or this instruction, always ask your healthcare professional. [place] [address] any warranty or liability for your use of this information. Antibiotics are powerful drugs that are generally safe and very helpful in fighting disease, but there are times when antibiotics can actually be harmful. Antibiotics can have side effects, including allergic reactions and a potentially deadly diarrhea caused by the bacteria Clostridium difficile (C. diff). Antibiotics can also interfere with the action of other drugs a patient may be taking for another condition. These unintended reactions to antibiotics are called adverse drug events.    When someone takes an antibiotic that they do not need, they are needlessly exposed to the side effects of the drug and do not get any benefit from it. Moreover, taking an antibiotic when it is not needed can lead to the development of antibiotic resistance. When resistance develops, antibiotics may not be able to stop future infections. Every time someone takes an antibiotic they dont need, they increase their risk of developing a resistant infection in the future. Types of Adverse Drug Events Related to Antibiotics  Allergic Reactions  Every year, there are more than 140,000 emergency department visits for reactions to antibiotics. Almost four out of five (79%) emergency department visits for antibiotic-related adverse drug events are due to an allergic reaction. These reactions can range from mild rashes and itching to serious blistering skin reactions swelling of the face and throat, and breathing problems. Minimizing unnecessary antibiotic use is the best way to reduce the risk of adverse drug events from antibiotics. Patients should tell their doctors about any past drug reactions or allergies. C. difficile  C. difficile causes diarrhea linked to at least 14,000 [place] deaths each year. When a person takes antibiotics, good bacteria that protect against infection are destroyed for several months. During this time, patients can get sick from C. difficile picked up from contaminated surfaces or spread from a healthcare providers hands. Those most at risk are people, especially older adults, who take antibiotics and also get medical care. Take antibiotics exactly and only as prescribed. Drug Interactions and Side Effects  Antibiotics can interact with other drugs patients take, making those drugs or the antibiotics less effective. Some drug combinations can worsen the side effects of the antibiotic or other drug. Common side effects of antibiotics include nausea, diarrhea, and stomach pain. Sometimes these symptoms can lead to dehydration and other problems.  Patients should ask their doctors about drug interactions and the potential side effects of antibiotics.  The doctor should be told immediately if a patient has any side effects from antibiotics  Page last updated: February 24, 2017 Content source:   Centers for Disease Control and [employer] for Emerging and Zoonotic Infectious Diseases (Cody Currie)  Division of Healthcare Quality Promotion [facility], [facility]

## 2022-04-05 NOTE — PLAN OF CARE
Problem: Pain:  Goal: Pain level will decrease  Description: Pain level will decrease  Outcome: Ongoing  Goal: Control of acute pain  Description: Control of acute pain  Outcome: Ongoing  Goal: Control of chronic pain  Description: Control of chronic pain  Outcome: Ongoing     Problem: Falls - Risk of:  Goal: Will remain free from falls  Description: Will remain free from falls  Outcome: Ongoing     Problem: Wound:  Goal: Will show signs of wound healing; wound closure and no evidence of infection  Description: Will show signs of wound healing; wound closure and no evidence of infection  Outcome: Ongoing

## 2022-04-05 NOTE — PROGRESS NOTES
unspecified type diabetes mellitus without mention of complication, not stated as uncontrolled     Ulcer of left foot, with fat layer exposed ([facility] 1/9/2018    Unspecified sleep apnea     Dr Arvin Orozco CPAP    Venous insufficiency of both lower extremities 1/31/2018       PAST SURGICAL HISTORY    Past Surgical History:   Procedure Laterality Date    ARTHROPLASTY Left 4/1/2022    ARTHROPLASTY 2ND LEFT TOE performed by Carmelo Echeverria DPM at [address] Right 3/22/13    Dr. Denise Adamson.     COLONOSCOPY  2020    CORONARY ARTERY BYPASS GRAFT  2/26/07    [facility], Dr Dimitri Larkin (X 3)    CORONARY ARTERY BYPASS GRAFT  2006    NECK SURGERY  2010    cervical stenoses C5-C6-C7and partial T1 laminectomy with fusion of C6-7    OTHER SURGICAL HISTORY      wound care ulcers of legs (bilaterally) with Dr Jarrod Abraham  age 11    VEIN SURGERY      closure of peripherator veins of legs, Dr Kierra Chang History   Problem Relation Age of Onset    Cancer Mother         lung    Diabetes Father     Heart Disease Father     High Blood Pressure Father     Diabetes Brother     Heart Disease Brother     High Blood Pressure Brother        SOCIAL HISTORY    Social History     Tobacco Use    Smoking status: Never Smoker    Smokeless tobacco: Never Used   Vaping Use    Vaping Use: Never used   Substance Use Topics    Alcohol use: Not Currently     Comment: rare social, non past 5 months    Drug use: No       ALLERGIES    Allergies   Allergen Reactions    Pcn [Penicillins] Other (See Comments)     Unknown rx       MEDICATIONS    Current Outpatient Medications on File Prior to Encounter   Medication Sig Dispense Refill    metFORMIN (GLUCOPHAGE) 1000 MG tablet Take 1 tablet by mouth 2 times daily (with meals) 60 tablet 3    omeprazole (PRILOSEC) 20 MG delayed release capsule TAKE 1 CAPSULE DAILY 90 capsule 1    linagliptin (TRADJENTA) 5 MG tablet TAKE 1 TABLET BY MOUTH ONE TIME A DAY 30 tablet 5    gabapentin (NEURONTIN) 300 MG capsule Take 300 mg by mouth 3 times daily.  Misc Natural Products (GLUCOSAMINE CHOND MSM FORMULA PO) Take 2 tablets by mouth daily      HYDROcodone-acetaminophen (NORCO)  MG per tablet Take 1 tablet by mouth every 4-6 hours as needed for Pain.  doxycycline hyclate (VIBRA-TABS) 100 MG tablet Take 1 tablet by mouth 2 times daily for 10 days 20 tablet 0    meloxicam (MOBIC) 15 MG tablet Take 1 tablet by mouth daily 90 tablet 3    traZODone (DESYREL) 150 MG tablet TAKE ONE TABLET BY MOUTH EVERY EVENING 90 tablet 3    topiramate (TOPAMAX) 200 MG tablet Take 1 tablet by mouth 2 times daily (Patient taking differently: Take 400 mg by mouth daily ) 60 tablet 5    tiZANidine (ZANAFLEX) 4 MG tablet 1 po qd 14 tablet 3    carvedilol (COREG) 12.5 MG tablet Take 1 tablet by mouth 2 times daily (with meals) 180 tablet 3    clopidogrel (PLAVIX) 75 MG tablet Take 1 tablet by mouth daily 90 tablet 3    hydroCHLOROthiazide (MICROZIDE) 12.5 MG capsule 1 po qd 90 capsule 3    NEEDLE, DISP, 22 G 22G X 1-1/2\" MISC 1 Device by Does not apply route every 30 days 25 each 1    NEEDLE, DISP, 18 G (BD DISP NEEDLES) 18G X 1-1/2\" MISC 1 Device by Does not apply route every 30 days 25 each 1    testosterone cypionate (DEPOTESTOTERONE CYPIONATE) 200 MG/ML injection  (Patient not taking: Reported on 3/31/2022)      Testosterone Cypionate 200 MG/ML KIT Inject 200 mg into the muscle every 30 days for 30 days.  please include syringes 1 kit 5    NEEDLE, DISP, 18 G 18G X 1\" MISC 1 Syringe by Does not apply route every 30 days 25 each 0    NEEDLE, DISP, 22 G 22G X 1-1/2\" MISC 1 Syringe by Does not apply route every 30 days 25 each 0    Multiple Vitamin (MULTI VITAMIN DAILY PO) Take by mouth      nitroGLYCERIN (NITROSTAT) 0.4 MG SL tablet Place 0.4 mg under the tongue      aspirin 325 MG tablet Take 325 mg by mouth daily. No current facility-administered medications on file prior to encounter. REVIEW OF SYSTEMS    Review of Systems   Constitutional: Negative for chills and fever. Skin: Positive for wound. Neurological: Negative for numbness. Objective:      /61   Pulse 83   Temp 97.6 °F (36.4 °C) (Tympanic)   Resp 20   Ht 6' (1.829 m)   Wt 248 lb (112.5 kg)   BMI 33.63 kg/m²     Wt Readings from Last 3 Encounters:   04/05/22 248 lb (112.5 kg)   03/31/22 248 lb 12.8 oz (112.9 kg)   04/01/22 248 lb (112.5 kg)       Physical Exam:  General:  Alert and oriented x3. In no acute distress. Lower Extremity Physical Exam:    Vascular: DP pulses are palpable, Bilateral. PT pulses are palpable, Bilateral. CFT <3 seconds to all digits, Bilateral.  No edema, Bilateral.  Hair growth is absent to the level of the digits, Bilateral.     Neuro: Saph/sural/SP/DP/plantar sensation intact to light touch. Musculoskeletal: EHL/FHL/GS/TA gross motor intact. Gross deformity is present, hammertoes bilateral.     Dermatologic: Open wound present to multiple dorsal digits at the PIPJ as documented in detail below. ASCENDING CELLULITIS LEFT FOOT WITH OPEN WOUND 2ND LEFT.     Assessment:      Active Hospital Problems    Diagnosis Date Noted    Osteomyelitis of second toe of left foot ([facility] [M86.9] 04/05/2022    Chronic ulcer of toe, left, with necrosis of bone [facility]) [L97.524] 03/29/2022    Diabetic ulcer of toe associated with type 2 diabetes mellitus, with fat layer exposed ([facility] [E83.749, L97.502] 01/31/2018         Plan:     ADMIT TO [facility] FOR IV ANTIBIOTICS AND AMPUTATION 2ND LEFT - SSD TO WOUNDS      Treatment Note please see attached Discharge Instructions    Gurmeet Garcia 426 1 WEEK

## 2022-04-05 NOTE — PROGRESS NOTES
Pt admitted to room 2108 per cart in stable condition from ED  Oriented to room and surroundings  Bed in lowest position, wheels locked, 2/4 side rails up  Call light in reach, room free of clutter, adequate lighting provided  Denies any further questions at this time  Instructed to call out with any questions/concerns/new onset of pain and/or n/v   White board updated  Continue to monitor with hourly rounding  Bed alarm on/Fall Risk signs in place/Fall risk sticker to wrist band  Non-skid socks on/at bedside

## 2022-04-05 NOTE — ED NOTES
Pt to er from wound care clinic. Pt had recent partial toe amputation and had follow up appointment today. Pt was sent to ed per podiatrist for admission and iv antibiotics for infection to toe and possible surgery to remove the rest of the toe. Pt a&ox3. Skin warm and dry. Respirations even and non-labored.       Claudette Wang RN  04/05/22 [id number]

## 2022-04-05 NOTE — H&P
[facility]  Office: [address], DO, Kanika James, DO, Edmundo Benedict, DO, Dorothy Burroughsjeanette Dawn, DO, Cholo Chow MD, Theodore Bearden MD, Kash Hathaway MD, Nay Condon MD, Beryle Marseille, MD, Ciro Pallas, MD, Gaudencio Albrecht MD, Gilberto [facility], DO, Kim Laughlin, DO, Mushtaq Rosa MD,  Cheyenne Renee, DO, Lidya Francois MD, Domonique Sauceda MD, Radha Jamil MD, Sabino Jones, DO, Yue Noguera MD, Malgorzata Spears MD, Luciana Hoff, [facility], [facility] Olga, [facility], Rhett Patel, CNP, Garland Garnett, CNP, Humera Caruso, CNP, Michel Berry, CNP, Ryan Shook, CNP, Lakesha Collier PA-C, Mortimer Guardian, [facility], Cathy Navarrete, CNP, Tobias Calderon, CNP, Hernesto Nieto, CNP, Farhan Mccullough, CNS, Lavonne Frankel, [facility], Felisa Null, CNP, Maurice Schilling, CNP, Kathleen Cabral, [facility]      HISTORY AND PHYSICAL EXAMINATION            Date:   4/5/2022  Patient name:  Elaina Renee  Date of admission:  4/5/2022  2:59 PM  MRN:   [id number]  Account:  [de-identified]  YOB: 1960  PCP:    Eddie Wasserman DO  Room:   [facility]8/2108-  Code Status:    No Order    Chief Complaint:     Chief Complaint   Patient presents with    Other     infection to recent partial amputation site        History Obtained From:     patient, electronic medical record    History of Present Illness: The patient is a 64 y.o. Non- / non  male who presents with Other (infection to recent partial amputation site )   and he is admitted to the hospital for the management of  Osteomyelitis of second toe of left foot ([facility] [address] Patient was sent to emergency room by his podiatrist for left second digit wound and concern for osteomyelitis. Patient reported that he has been dealing with this wound for last 2 months. He has been treated with oral antibiotic doxycycline and also had recent surgery.   Patient continued to have redness and wound changes and was concern for osteomyelitis and was sent to ED. Patient reported he had dog bite in his bilateral hands multiple times with recent wounds 2 months ago and has been dealing with skin infections. He has underlying history of type 2 diabetes mellitus with diabetic polyneuropathy, CAD with CABG in 2007 with stenting post surgery with recent stent in September 2021. He denies smoking, alcohol use. Patient is a  by profession and has unable to perform his job for the past 2 months. Initial evaluation showed leukocytosis 12.5, recent wound culture shows MSSA and  group B strep. Past Medical History:     Past Medical History:   Diagnosis Date    Acquired trigger finger 6/5/2018    CAD (coronary artery disease)     NWOCC/ involving coronary bypass graft of native heart with unstable angina pectoris    Cervical spondylosis     CHF (congestive heart failure) ([facility])     Chronic back pain     on 11/19/18 pt states is presently in pain mgmnt    Contusion of elbow 6/5/2018    Contusion of knee 6/5/2018    Contusion of shoulder region 6/5/2018    Diabetic foot ulcer with osteomyelitis ([address] 1/9/2018    Diabetic ulcer of toe associated with type 2 diabetes mellitus, with fat layer exposed ([address] 1/31/2018    Hypercholesterolemia 12/5/2017    Hyperlipemia, mixed     Hypertension     Ischemic cardiomyopathy 5/7/2018    Non-pressure chronic ulcer of left lower leg with fat layer exposed ([address] 6/9/2014    Obesity     Obesity, Class III, BMI 40-49.9 (morbid obesity) ([address] 4/6/2015    Obstructive sleep apnea syndrome     Osteoarthritis of carpometacarpal (CMC) joint of thumb 6/5/2018    Osteoarthritis of knee 6/5/2018    Peripheral vascular disease ([address]     with venous stasis and ulcerations.  Dr Mehta Royalty Presence of coronary angioplasty implant and graft 9/11/2009    Sleep apnea     Dr Jesús Lin of knee and leg 6/5/2018    Sprain of shoulder and upper arm 6/5/2018    Type II or unspecified type diabetes mellitus without mention of complication, not stated as uncontrolled     Ulcer of left foot, with fat layer exposed ([address] 1/9/2018    Unspecified sleep apnea     Dr Monserrat Chan CPAP    Venous insufficiency of both lower extremities 1/31/2018        Past Surgical History:     Past Surgical History:   Procedure Laterality Date    ARTHROPLASTY Left 4/1/2022    ARTHROPLASTY 2ND LEFT TOE performed by Wiley Cruz DPM at [address] Right 3/22/13    Dr. Bobbi Mcmillan.  COLONOSCOPY  2020    CORONARY ARTERY BYPASS GRAFT  2/26/07    [facility] (X 3)    CORONARY ARTERY BYPASS GRAFT  2006    NECK SURGERY  2010    cervical stenoses C5-C6-C7and partial T1 laminectomy with fusion of C6-7    OTHER SURGICAL HISTORY      wound care ulcers of legs (bilaterally) with Dr Henry Kaur  age 11    VEIN SURGERY      closure of peripherator veins of legs, Dr Ricky Joiner          Medications Prior to Admission:     Prior to Admission medications    Medication Sig Start Date End Date Taking? Authorizing Provider   metFORMIN (GLUCOPHAGE) 1000 MG tablet Take 1 tablet by mouth 2 times daily (with meals) 3/31/22 4/30/22  Seema Hameed,    omeprazole (PRILOSEC) 20 MG delayed release capsule TAKE 1 CAPSULE DAILY 3/31/22   Seema Hameed, DO   linagliptin (TRADJENTA) 5 MG tablet TAKE 1 TABLET BY MOUTH ONE TIME A DAY 3/31/22   Seema Hameed, DO   gabapentin (NEURONTIN) 300 MG capsule Take 300 mg by mouth 3 times daily. Historical Provider, MD   Misc Natural Products (GLUCOSAMINE CHOND MSM FORMULA PO) Take 2 tablets by mouth daily    Historical Provider, MD   HYDROcodone-acetaminophen (NORCO)  MG per tablet Take 1 tablet by mouth every 4-6 hours as needed for Pain.     Historical Provider, MD   doxycycline hyclate (VIBRA-TABS) 100 MG tablet Take 1 tablet by mouth 2 times daily for 10 days 3/29/22 4/8/22  Wiley Cruz DPM   meloxicam (MOBIC) 15 MG tablet Take 1 tablet by mouth daily 3/10/22 6/8/22  Seema Hameed,    traZODone (DESYREL) 150 MG tablet TAKE ONE TABLET BY MOUTH EVERY EVENING 3/10/22   Seema Hameed DO   topiramate (TOPAMAX) 200 MG tablet Take 1 tablet by mouth 2 times daily  Patient taking differently: Take 400 mg by mouth daily  1/20/22 2/19/22  Seema Hameed DO   tiZANidine (ZANAFLEX) 4 MG tablet 1 po qd 1/20/22   Seema Hameed DO   carvedilol (COREG) 12.5 MG tablet Take 1 tablet by mouth 2 times daily (with meals) 12/22/21 3/22/22  Seema Hameed DO   clopidogrel (PLAVIX) 75 MG tablet Take 1 tablet by mouth daily 12/22/21 3/22/22  Seema Hameed DO   hydroCHLOROthiazide (MICROZIDE) 12.5 MG capsule 1 po qd 9/25/21   Seema Hameed DO   NEEDLE, DISP, 22 G 22G X 1-1/2\" MISC 1 Device by Does not apply route every 30 days 6/29/21 7/29/21  Seema Hameed DO   NEEDLE, DISP, 18 G (BD DISP NEEDLES) 18G X 1-1/2\" MISC 1 Device by Does not apply route every 30 days 6/29/21   Seema Hameed DO   testosterone cypionate (DEPOTESTOTERONE CYPIONATE) 200 MG/ML injection  3/2/21   Historical Provider, MD   Testosterone Cypionate 200 MG/ML KIT Inject 200 mg into the muscle every 30 days for 30 days. please include syringes 3/2/21 5/14/21  Seema Hameed DO   NEEDLE, DISP, 18 G 18G X 1\" MISC 1 Syringe by Does not apply route every 30 days 9/22/20   Seema Hameed DO   NEEDLE, DISP, 22 G 22G X 1-1/2\" MISC 1 Syringe by Does not apply route every 30 days 9/22/20 5/14/21  Seema Hameed DO   Multiple Vitamin (MULTI VITAMIN DAILY PO) Take by mouth    Historical Provider, MD   nitroGLYCERIN (NITROSTAT) 0.4 MG SL tablet Place 0.4 mg under the tongue    Historical Provider, MD   aspirin 325 MG tablet Take 325 mg by mouth daily. Historical Provider, MD        Allergies:     Pcn [penicillins] and Penicillin g    Social History:     Tobacco:    reports that he has never smoked. He has never used smokeless tobacco.  Alcohol:      reports previous alcohol use.   Drug Use:  reports Nose:      Right Sinus: No maxillary sinus tenderness or frontal sinus tenderness. Left Sinus: No maxillary sinus tenderness or frontal sinus tenderness. Mouth/Throat:      Pharynx: No oropharyngeal exudate. Eyes:      General: No scleral icterus. Conjunctiva/sclera: Conjunctivae normal.      Pupils: Pupils are equal, round, and reactive to light. Neck:      Thyroid: No thyromegaly. Vascular: No JVD. Cardiovascular:      Rate and Rhythm: Normal rate and regular rhythm. Pulses:           Dorsalis pedis pulses are 2+ on the right side and 2+ on the left side. Heart sounds: Normal heart sounds. No murmur heard. Pulmonary:      Effort: Pulmonary effort is normal.      Breath sounds: Normal breath sounds. No wheezing or rales. Chest:      Comments: Sternotomy scar  Abdominal:      Palpations: Abdomen is soft. There is no mass. Tenderness: There is no abdominal tenderness. Musculoskeletal:      Cervical back: Full passive range of motion without pain and neck supple. Feet:      Comments: Left second digit surgical wound with sutures in place, erythema. Right foot second digit dorsal wound  Lymphadenopathy:      Head:      Right side of head: No submandibular adenopathy. Left side of head: No submandibular adenopathy. Cervical: No cervical adenopathy. Skin:     General: Skin is warm. Neurological:      Mental Status: He is alert and oriented to person, place, and time. Motor: No tremor. Psychiatric:         Behavior: Behavior is cooperative.          Investigations:      Laboratory Testing:  Recent Results (from the past 24 hour(s))   Basic Metabolic Panel    Collection Time: 04/05/22  3:10 PM   Result Value Ref Range    Glucose 201 (H) 70 - 99 mg/dL    BUN 18 8 - 23 mg/dL    CREATININE 0.78 0.70 - 1.20 mg/dL    Bun/Cre Ratio 23 (H) 9 - 20    Calcium 9.8 8.6 - 10.4 mg/dL    Sodium 137 135 - 144 mmol/L    Potassium 3.8 3.7 - 5.3 mmol/L    Chloride 102 98 - 107 mmol/L    CO2 25 20 - 31 mmol/L    Anion Gap 10 9 - 17 mmol/L    GFR Non-African American >60 >60 mL/min    GFR African American >60 >60 mL/min    GFR Comment         CBC with Auto Differential    Collection Time: 04/05/22  3:10 PM   Result Value Ref Range    WBC 12.5 (H) 3.5 - 11.3 k/uL    RBC 4.50 4.21 - 5.77 m/uL    Hemoglobin 12.5 (L) 13.0 - 17.0 g/dL    Hematocrit 41.3 40.7 - 50.3 %    MCV 91.8 82.6 - 102.9 fL    MCH 27.8 25.2 - 33.5 pg    MCHC 30.3 28.4 - 34.8 g/dL    RDW 12.9 11.8 - 14.4 %    Platelets 821 718 - 392 k/uL    MPV 8.7 8.1 - 13.5 fL    NRBC Automated 0.0 0.0 per 100 WBC    Seg Neutrophils 73 (H) 36 - 65 %    Lymphocytes 17 (L) 24 - 43 %    Monocytes 8 3 - 12 %    Eosinophils % 1 1 - 4 %    Basophils 0 0 - 2 %    Immature Granulocytes 1 (H) 0 %    Segs Absolute 9.12 (H) 1.50 - 8.10 k/uL    Absolute Lymph # 2.16 1.10 - 3.70 k/uL    Absolute Mono # 1.00 0.10 - 1.20 k/uL    Absolute Eos # 0.08 0.00 - 0.44 k/uL    Basophils Absolute 0.03 0.00 - 0.20 k/uL    Absolute Immature Granulocyte 0.06 0.00 - 0.30 k/uL   Lactate, Sepsis    Collection Time: 04/05/22  3:10 PM   Result Value Ref Range    Lactic Acid, Sepsis 1.2 0.5 - 1.9 mmol/L   Sedimentation Rate    Collection Time: 04/05/22  3:10 PM   Result Value Ref Range    Sed Rate 32 (H) 0 - 20 mm/Hr     Lab Results   Component Value Date    LABA1C 8.5 (H) 03/23/2022     Lab Results   Component Value Date     03/23/2022       Imaging/Diagonstics:    No results found.      Assessment :      Primary Problem  Osteomyelitis of second toe of left foot [facility])    Active Hospital Problems    Diagnosis Date Noted    Osteomyelitis of second toe of left foot ([address] [M86.9] 04/05/2022    Diabetic polyneuropathy associated with type 2 diabetes mellitus ([facility] [E11.42] 03/29/2022    Coronary artery disease involving coronary bypass graft of native heart without angina pectoris [I25.810] 09/12/2016    Essential hypertension [I10] 12/14/2015    Peripheral vascular disease ([facility] [address] [I73.9]        Plan:     Patient status Admit as inpatient in the  Med/Surge    1. Left second toe diabetic wound infection with concern for osteomyelitis - s/p I&D -cultures MSSA, group B strep -empiric Ancef.  new cultures have been sent. ID consult. Podiatry consult. 2. Type 2 diabetes mellitus -uncontrolled. Not on insulin last A1c 8.5. Hold Metformin and Tradjenta. Humalog for now. 3. Essential hypertension-well-controlled  4. CAD s/p CABG and stents -continue Plavix. Hold aspirin, continue Coreg. Not on statin. No allergies listed. Check lipid panel. 5. PVD -statin, Plavix. 6. Diabetic polyneuropathy-on gabapentin. Consultations:   IP CONSULT TO PODIATRY  PHARMACY TO DOSE VANCOMYCIN  IP CONSULT TO INTERNAL MEDICINE  IP CONSULT TO PODIATRY  IP CONSULT TO INFECTIOUS DISEASES    Patient is admitted as inpatient status because of co-morbidities listed above, severity of signs and symptoms as outlined, requirement for current medical therapies and most importantly because of direct risk to patient if care not provided in a hospital setting.     Jacinta Adams MD  4/5/2022    Copy sent to Dr. Kasey Pennington DO    (Please note that portions of this note were completed with a voice recognition program. Efforts were made to edit the dictations but occasionally words are mis-transcribed.)

## 2022-04-05 NOTE — ED PROVIDER NOTES
[address] [place] [address] ED  EMERGENCY DEPARTMENT ENCOUNTER   ATTENDING ATTESTATION     Pt Name: Carri Mahoney  MRN: [id number]  Ryleegfkarthik 1960  Date of evaluation: 4/5/22       Carri Mahoney is a 64 y.o. male who presents with Other (infection to recent partial amputation site )      MDM:     65 yo male with Left foot infection post operatively. Patient was sent here from wound care, he will be evaluated by podiatry and have a dose of IV antibiotics and be admitted to the hospitalist.    Vitals:   Vitals:    04/05/22 1500   BP: 137/81   Pulse: 108   Resp: 14   Temp: 97.9 °F (36.6 °C)   TempSrc: Oral   SpO2: 98%   Weight: 239 lb (108.4 kg)   Height: 6' (1.829 m)         This visit was performed by both a physician and an APC. I personally evaluated and examined the patient.  I performed all aspects of the MDM as documented     Claribel Morales MD  Attending Emergency  Physician                 Tory Hogue MD  04/05/22 [id number]

## 2022-04-06 ENCOUNTER — ANESTHESIA (OUTPATIENT)
Dept: OPERATING ROOM | Age: 62
DRG: 854 | End: 2022-04-06
Payer: COMMERCIAL

## 2022-04-06 ENCOUNTER — ANESTHESIA EVENT (OUTPATIENT)
Dept: OPERATING ROOM | Age: 62
DRG: 854 | End: 2022-04-06
Payer: COMMERCIAL

## 2022-04-06 ENCOUNTER — APPOINTMENT (OUTPATIENT)
Dept: GENERAL RADIOLOGY | Age: 62
DRG: 854 | End: 2022-04-06
Payer: COMMERCIAL

## 2022-04-06 VITALS
DIASTOLIC BLOOD PRESSURE: 79 MMHG | OXYGEN SATURATION: 100 % | RESPIRATION RATE: 19 BRPM | SYSTOLIC BLOOD PRESSURE: 135 MMHG

## 2022-04-06 LAB
ANION GAP SERPL CALCULATED.3IONS-SCNC: 13 MMOL/L (ref 9–17)
BUN BLDV-MCNC: 16 MG/DL (ref 8–23)
BUN/CREAT BLD: 18 (ref 9–20)
CALCIUM SERPL-MCNC: 9.1 MG/DL (ref 8.6–10.4)
CHLORIDE BLD-SCNC: 104 MMOL/L (ref 98–107)
CO2: 20 MMOL/L (ref 20–31)
CREAT SERPL-MCNC: 0.89 MG/DL (ref 0.7–1.2)
CULTURE: ABNORMAL
DIRECT EXAM: ABNORMAL
GFR AFRICAN AMERICAN: >60 ML/MIN
GFR NON-AFRICAN AMERICAN: >60 ML/MIN
GFR SERPL CREATININE-BSD FRML MDRD: ABNORMAL ML/MIN/{1.73_M2}
GLUCOSE BLD-MCNC: 177 MG/DL (ref 75–110)
GLUCOSE BLD-MCNC: 178 MG/DL (ref 70–99)
GLUCOSE BLD-MCNC: 180 MG/DL (ref 75–110)
GLUCOSE BLD-MCNC: 193 MG/DL (ref 75–110)
GLUCOSE BLD-MCNC: 245 MG/DL (ref 75–110)
INR BLD: 1.1
MAGNESIUM: 1.9 MG/DL (ref 1.6–2.6)
POTASSIUM SERPL-SCNC: 3.5 MMOL/L (ref 3.7–5.3)
PROTHROMBIN TIME: 14.2 SEC (ref 11.5–14.2)
SEDIMENTATION RATE, ERYTHROCYTE: 85 MM/HR (ref 0–20)
SODIUM BLD-SCNC: 137 MMOL/L (ref 135–144)
SPECIMEN DESCRIPTION: ABNORMAL
SPECIMEN DESCRIPTION: ABNORMAL

## 2022-04-06 PROCEDURE — 99222 1ST HOSP IP/OBS MODERATE 55: CPT | Performed by: INTERNAL MEDICINE

## 2022-04-06 PROCEDURE — 0Y6S0Z0 DETACHMENT AT LEFT 2ND TOE, COMPLETE, OPEN APPROACH: ICD-10-PCS | Performed by: PODIATRIST

## 2022-04-06 PROCEDURE — 2580000003 HC RX 258: Performed by: PODIATRIST

## 2022-04-06 PROCEDURE — 87205 SMEAR GRAM STAIN: CPT

## 2022-04-06 PROCEDURE — 86403 PARTICLE AGGLUT ANTBDY SCRN: CPT

## 2022-04-06 PROCEDURE — 83735 ASSAY OF MAGNESIUM: CPT

## 2022-04-06 PROCEDURE — 7100000001 HC PACU RECOVERY - ADDTL 15 MIN: Performed by: PODIATRIST

## 2022-04-06 PROCEDURE — 85610 PROTHROMBIN TIME: CPT

## 2022-04-06 PROCEDURE — 88311 DECALCIFY TISSUE: CPT

## 2022-04-06 PROCEDURE — 7100000000 HC PACU RECOVERY - FIRST 15 MIN: Performed by: PODIATRIST

## 2022-04-06 PROCEDURE — 88305 TISSUE EXAM BY PATHOLOGIST: CPT

## 2022-04-06 PROCEDURE — 6370000000 HC RX 637 (ALT 250 FOR IP)

## 2022-04-06 PROCEDURE — 6360000002 HC RX W HCPCS: Performed by: PODIATRIST

## 2022-04-06 PROCEDURE — 6370000000 HC RX 637 (ALT 250 FOR IP): Performed by: FAMILY MEDICINE

## 2022-04-06 PROCEDURE — 2500000003 HC RX 250 WO HCPCS: Performed by: NURSE ANESTHETIST, CERTIFIED REGISTERED

## 2022-04-06 PROCEDURE — 80048 BASIC METABOLIC PNL TOTAL CA: CPT

## 2022-04-06 PROCEDURE — 3700000000 HC ANESTHESIA ATTENDED CARE: Performed by: PODIATRIST

## 2022-04-06 PROCEDURE — 87070 CULTURE OTHR SPECIMN AEROBIC: CPT

## 2022-04-06 PROCEDURE — 3700000001 HC ADD 15 MINUTES (ANESTHESIA): Performed by: PODIATRIST

## 2022-04-06 PROCEDURE — 2709999900 HC NON-CHARGEABLE SUPPLY: Performed by: PODIATRIST

## 2022-04-06 PROCEDURE — 82947 ASSAY GLUCOSE BLOOD QUANT: CPT

## 2022-04-06 PROCEDURE — 2580000003 HC RX 258: Performed by: FAMILY MEDICINE

## 2022-04-06 PROCEDURE — 2720000010 HC SURG SUPPLY STERILE: Performed by: PODIATRIST

## 2022-04-06 PROCEDURE — 3600000002 HC SURGERY LEVEL 2 BASE: Performed by: PODIATRIST

## 2022-04-06 PROCEDURE — 1200000000 HC SEMI PRIVATE

## 2022-04-06 PROCEDURE — 6360000002 HC RX W HCPCS

## 2022-04-06 PROCEDURE — 36415 COLL VENOUS BLD VENIPUNCTURE: CPT

## 2022-04-06 PROCEDURE — 85652 RBC SED RATE AUTOMATED: CPT

## 2022-04-06 PROCEDURE — 73630 X-RAY EXAM OF FOOT: CPT

## 2022-04-06 PROCEDURE — 99232 SBSQ HOSP IP/OBS MODERATE 35: CPT | Performed by: FAMILY MEDICINE

## 2022-04-06 PROCEDURE — 2580000003 HC RX 258

## 2022-04-06 PROCEDURE — 6360000002 HC RX W HCPCS: Performed by: ANESTHESIOLOGY

## 2022-04-06 PROCEDURE — 87176 TISSUE HOMOGENIZATION CULTR: CPT

## 2022-04-06 PROCEDURE — 87075 CULTR BACTERIA EXCEPT BLOOD: CPT

## 2022-04-06 PROCEDURE — 3600000012 HC SURGERY LEVEL 2 ADDTL 15MIN: Performed by: PODIATRIST

## 2022-04-06 PROCEDURE — 6370000000 HC RX 637 (ALT 250 FOR IP): Performed by: PODIATRIST

## 2022-04-06 PROCEDURE — 6360000002 HC RX W HCPCS: Performed by: NURSE ANESTHETIST, CERTIFIED REGISTERED

## 2022-04-06 RX ORDER — FENTANYL CITRATE 50 UG/ML
INJECTION, SOLUTION INTRAMUSCULAR; INTRAVENOUS PRN
Status: DISCONTINUED | OUTPATIENT
Start: 2022-04-06 | End: 2022-04-06 | Stop reason: SDUPTHER

## 2022-04-06 RX ORDER — LIDOCAINE HYDROCHLORIDE 20 MG/ML
INJECTION, SOLUTION INFILTRATION; PERINEURAL PRN
Status: DISCONTINUED | OUTPATIENT
Start: 2022-04-06 | End: 2022-04-06 | Stop reason: SDUPTHER

## 2022-04-06 RX ORDER — HYDROMORPHONE HYDROCHLORIDE 1 MG/ML
0.25 INJECTION, SOLUTION INTRAMUSCULAR; INTRAVENOUS; SUBCUTANEOUS EVERY 5 MIN PRN
Status: COMPLETED | OUTPATIENT
Start: 2022-04-06 | End: 2022-04-06

## 2022-04-06 RX ORDER — SODIUM CHLORIDE 9 MG/ML
INJECTION, SOLUTION INTRAVENOUS PRN
Status: DISCONTINUED | OUTPATIENT
Start: 2022-04-06 | End: 2022-04-06 | Stop reason: HOSPADM

## 2022-04-06 RX ORDER — OXYCODONE HYDROCHLORIDE 5 MG/1
5 TABLET ORAL EVERY 4 HOURS PRN
Status: DISCONTINUED | OUTPATIENT
Start: 2022-04-06 | End: 2022-04-07

## 2022-04-06 RX ORDER — SODIUM CHLORIDE 0.9 % (FLUSH) 0.9 %
5-40 SYRINGE (ML) INJECTION EVERY 12 HOURS SCHEDULED
Status: DISCONTINUED | OUTPATIENT
Start: 2022-04-06 | End: 2022-04-06 | Stop reason: HOSPADM

## 2022-04-06 RX ORDER — BUPIVACAINE HYDROCHLORIDE 5 MG/ML
INJECTION, SOLUTION EPIDURAL; INTRACAUDAL
Status: DISCONTINUED
Start: 2022-04-06 | End: 2022-04-06 | Stop reason: WASHOUT

## 2022-04-06 RX ORDER — PROPOFOL 10 MG/ML
INJECTION, EMULSION INTRAVENOUS CONTINUOUS PRN
Status: DISCONTINUED | OUTPATIENT
Start: 2022-04-06 | End: 2022-04-06 | Stop reason: SDUPTHER

## 2022-04-06 RX ORDER — FENTANYL CITRATE 50 UG/ML
25 INJECTION, SOLUTION INTRAMUSCULAR; INTRAVENOUS EVERY 5 MIN PRN
Status: DISCONTINUED | OUTPATIENT
Start: 2022-04-06 | End: 2022-04-06 | Stop reason: HOSPADM

## 2022-04-06 RX ORDER — ONDANSETRON 2 MG/ML
4 INJECTION INTRAMUSCULAR; INTRAVENOUS
Status: DISCONTINUED | OUTPATIENT
Start: 2022-04-06 | End: 2022-04-06 | Stop reason: HOSPADM

## 2022-04-06 RX ORDER — OXYCODONE HYDROCHLORIDE 5 MG/1
10 TABLET ORAL EVERY 4 HOURS PRN
Status: DISCONTINUED | OUTPATIENT
Start: 2022-04-06 | End: 2022-04-07

## 2022-04-06 RX ORDER — SODIUM CHLORIDE 0.9 % (FLUSH) 0.9 %
5-40 SYRINGE (ML) INJECTION PRN
Status: DISCONTINUED | OUTPATIENT
Start: 2022-04-06 | End: 2022-04-06 | Stop reason: HOSPADM

## 2022-04-06 RX ORDER — LIDOCAINE HYDROCHLORIDE 20 MG/ML
INJECTION, SOLUTION EPIDURAL; INFILTRATION; INTRACAUDAL; PERINEURAL
Status: DISCONTINUED
Start: 2022-04-06 | End: 2022-04-06 | Stop reason: WASHOUT

## 2022-04-06 RX ADMIN — LIDOCAINE HYDROCHLORIDE 80 MG: 20 INJECTION, SOLUTION INFILTRATION; PERINEURAL at 11:52

## 2022-04-06 RX ADMIN — Medication 50 MCG: at 12:00

## 2022-04-06 RX ADMIN — ENOXAPARIN SODIUM 30 MG: 30 INJECTION SUBCUTANEOUS at 20:00

## 2022-04-06 RX ADMIN — FENTANYL CITRATE 25 MCG: 50 INJECTION, SOLUTION INTRAMUSCULAR; INTRAVENOUS at 13:13

## 2022-04-06 RX ADMIN — HYDROCODONE BITARTRATE AND ACETAMINOPHEN 1 TABLET: 10; 325 TABLET ORAL at 08:43

## 2022-04-06 RX ADMIN — FENTANYL CITRATE 25 MCG: 50 INJECTION, SOLUTION INTRAMUSCULAR; INTRAVENOUS at 13:08

## 2022-04-06 RX ADMIN — POTASSIUM CHLORIDE 40 MEQ: 1500 TABLET, EXTENDED RELEASE ORAL at 09:04

## 2022-04-06 RX ADMIN — HYDROCODONE BITARTRATE AND ACETAMINOPHEN 1 TABLET: 10; 325 TABLET ORAL at 14:30

## 2022-04-06 RX ADMIN — HYDROCODONE BITARTRATE AND ACETAMINOPHEN 1 TABLET: 10; 325 TABLET ORAL at 03:47

## 2022-04-06 RX ADMIN — PANTOPRAZOLE SODIUM 40 MG: 40 TABLET, DELAYED RELEASE ORAL at 06:40

## 2022-04-06 RX ADMIN — CEFAZOLIN SODIUM 2000 MG: 10 INJECTION, POWDER, FOR SOLUTION INTRAVENOUS at 03:50

## 2022-04-06 RX ADMIN — HYDROCODONE BITARTRATE AND ACETAMINOPHEN 1 TABLET: 10; 325 TABLET ORAL at 20:00

## 2022-04-06 RX ADMIN — HYDROMORPHONE HYDROCHLORIDE 0.25 MG: 1 INJECTION, SOLUTION INTRAMUSCULAR; INTRAVENOUS; SUBCUTANEOUS at 12:55

## 2022-04-06 RX ADMIN — OXYCODONE HYDROCHLORIDE 10 MG: 5 TABLET ORAL at 16:44

## 2022-04-06 RX ADMIN — CARVEDILOL 12.5 MG: 12.5 TABLET, FILM COATED ORAL at 08:44

## 2022-04-06 RX ADMIN — TRAZODONE HYDROCHLORIDE 150 MG: 50 TABLET ORAL at 20:11

## 2022-04-06 RX ADMIN — SODIUM CHLORIDE, PRESERVATIVE FREE 10 ML: 5 INJECTION INTRAVENOUS at 09:00

## 2022-04-06 RX ADMIN — SODIUM CHLORIDE, PRESERVATIVE FREE 10 ML: 5 INJECTION INTRAVENOUS at 03:47

## 2022-04-06 RX ADMIN — Medication 50 MCG: at 11:52

## 2022-04-06 RX ADMIN — CEFAZOLIN SODIUM 2000 MG: 10 INJECTION, POWDER, FOR SOLUTION INTRAVENOUS at 20:08

## 2022-04-06 RX ADMIN — HYDROMORPHONE HYDROCHLORIDE 0.25 MG: 1 INJECTION, SOLUTION INTRAMUSCULAR; INTRAVENOUS; SUBCUTANEOUS at 12:45

## 2022-04-06 RX ADMIN — OXYCODONE HYDROCHLORIDE 10 MG: 5 TABLET ORAL at 21:01

## 2022-04-06 RX ADMIN — GABAPENTIN 300 MG: 300 CAPSULE ORAL at 20:00

## 2022-04-06 RX ADMIN — HYDROMORPHONE HYDROCHLORIDE 0.25 MG: 1 INJECTION, SOLUTION INTRAMUSCULAR; INTRAVENOUS; SUBCUTANEOUS at 12:50

## 2022-04-06 RX ADMIN — PROPOFOL 75 MCG/KG/MIN: 10 INJECTION, EMULSION INTRAVENOUS at 11:52

## 2022-04-06 RX ADMIN — FENTANYL CITRATE 25 MCG: 50 INJECTION, SOLUTION INTRAMUSCULAR; INTRAVENOUS at 13:25

## 2022-04-06 RX ADMIN — HYDROMORPHONE HYDROCHLORIDE 0.25 MG: 1 INJECTION, SOLUTION INTRAMUSCULAR; INTRAVENOUS; SUBCUTANEOUS at 13:00

## 2022-04-06 ASSESSMENT — PULMONARY FUNCTION TESTS
PIF_VALUE: 1
PIF_VALUE: 0
PIF_VALUE: 1
PIF_VALUE: 0
PIF_VALUE: 1
PIF_VALUE: 0
PIF_VALUE: 1
PIF_VALUE: 0
PIF_VALUE: 1
PIF_VALUE: 1
PIF_VALUE: 0
PIF_VALUE: 1
PIF_VALUE: 0
PIF_VALUE: 1
PIF_VALUE: 1
PIF_VALUE: 0
PIF_VALUE: 1

## 2022-04-06 ASSESSMENT — PAIN SCALES - GENERAL
PAINLEVEL_OUTOF10: 8
PAINLEVEL_OUTOF10: 10
PAINLEVEL_OUTOF10: 6
PAINLEVEL_OUTOF10: 7
PAINLEVEL_OUTOF10: 8
PAINLEVEL_OUTOF10: 5
PAINLEVEL_OUTOF10: 8
PAINLEVEL_OUTOF10: 7
PAINLEVEL_OUTOF10: 10
PAINLEVEL_OUTOF10: 10
PAINLEVEL_OUTOF10: 6
PAINLEVEL_OUTOF10: 7
PAINLEVEL_OUTOF10: 8
PAINLEVEL_OUTOF10: 6
PAINLEVEL_OUTOF10: 10
PAINLEVEL_OUTOF10: 7

## 2022-04-06 ASSESSMENT — PAIN DESCRIPTION - FREQUENCY
FREQUENCY: CONTINUOUS

## 2022-04-06 ASSESSMENT — PAIN DESCRIPTION - PAIN TYPE
TYPE: SURGICAL PAIN
TYPE: SURGICAL PAIN
TYPE: ACUTE PAIN

## 2022-04-06 ASSESSMENT — ENCOUNTER SYMPTOMS
BACK PAIN: 0
NAUSEA: 0
VOMITING: 0
SINUS PRESSURE: 0
WHEEZING: 0
DIARRHEA: 0
CONSTIPATION: 0
COLOR CHANGE: 1
COUGH: 0
SHORTNESS OF BREATH: 0
VOICE CHANGE: 0
ABDOMINAL PAIN: 0
CHOKING: 0
CHEST TIGHTNESS: 0
RHINORRHEA: 0

## 2022-04-06 ASSESSMENT — PAIN DESCRIPTION - DESCRIPTORS
DESCRIPTORS: BURNING
DESCRIPTORS: ACHING
DESCRIPTORS: ACHING

## 2022-04-06 ASSESSMENT — PAIN DESCRIPTION - ORIENTATION
ORIENTATION: LEFT

## 2022-04-06 ASSESSMENT — PAIN DESCRIPTION - ONSET
ONSET: ON-GOING
ONSET: ON-GOING

## 2022-04-06 ASSESSMENT — PAIN DESCRIPTION - PROGRESSION: CLINICAL_PROGRESSION: GRADUALLY WORSENING

## 2022-04-06 ASSESSMENT — PAIN - FUNCTIONAL ASSESSMENT: PAIN_FUNCTIONAL_ASSESSMENT: PREVENTS OR INTERFERES SOME ACTIVE ACTIVITIES AND ADLS

## 2022-04-06 ASSESSMENT — PAIN DESCRIPTION - LOCATION
LOCATION: FOOT

## 2022-04-06 NOTE — BRIEF OP NOTE
PODIATRY BRIEF OP NOTE    PATIENT NAME: Leonarda Higgins  YOB: 1960  -  64 y.o. male  MRN: [id number]  DATE: 4/6/2022  BILLING #: [id number]    Surgeon(s): Westley Naranjo DPM     ASSISTANTS: Dank Cole DPM PGY-1    PRE-OP DIAGNOSIS:   1. Osteomyelitis, left foot  2. Cellulitis, left foot  3. Diabetic foot ulcer down to level of bone, left foot    POST-OP DIAGNOSIS: Same as above. PROCEDURE:   1. Staged amputation of second digit, left foot    ANESTHESIA: MAC     HEMOSTASIS: Pneumatic ankle tourniquet @ 225 mmHg for 27 minutes. ESTIMATED BLOOD LOSS: Less than 20 cc. MATERIALS: 2-0 Nylon, 3-0 Nylon  * No implants in log *    INJECTABLES: None    SPECIMEN:   ID Type Source Tests Collected by Time Destination   1 : WOUND SECOND LEFT DIGIT Swab Toe CULTURE, ANAEROBIC AND AEROBIC Westley Naranjo [facility] 4/6/2022 1204    2 : LEFT SECOND DIGIT BIOPSY CULTURE Bone Toe CULTURE, ANAEROBIC AND AEROBIC Westley Naranjo [facility] 4/6/2022 1210    A : BONE BIOPSY LEFT SECOND DIGIT Bone Toe SURGICAL PATHOLOGY Westley Naranjo [facility] 4/6/2022 3912        COMPLICATIONS: None    FINDINGS: Purulent drainage expressed upon incision and disarticulation of the metatarsophalangeal joint, left foot. Findings were consistent with osteomyelitic changes. See full op report for details. The patient was counseled at length about the risks of frances Covid-19 during their perioperative period and any recovery window from their procedure. The patient was made aware that frances Covid-19  may worsen their prognosis for recovering from their procedure  and lend to a higher morbidity and/or mortality risk. All material risks, benefits, and reasonable alternatives including postponing the procedure were discussed. The patient does wish to proceed with the procedure at this time.     Dank Cole DPM   Podiatric Medicine & Surgery   4/6/2022 at 12:39 PM

## 2022-04-06 NOTE — OP NOTE
PODIATRY OP NOTE    PATIENT NAME: Renetta Hebert DATE: 1960  -  64 y.o. male  MRN: [id number]  DATE: 4/6/2022  BILLING #: [id number]    Surgeon(s): Alix Castellanos DPM     ASSISTANTS: Nikolay Kang DPM PGY-1    PRE-OP DIAGNOSIS:   1. S/p left 2nd digit arthroplasty  2. Osteomyelitis, left foot  3. Cellulitis, left foot  4. Diabetic foot ulcer down to level of bone, left foot    POST-OP DIAGNOSIS: Same as above. PROCEDURE:   1. Staged amputation of second digit, left foot    ANESTHESIA: MAC     HEMOSTASIS: Pneumatic ankle tourniquet @ 225 mmHg for 27 minutes. ESTIMATED BLOOD LOSS: Less than 20 cc. MATERIALS: 2-0 Nylon, 3-0 Nylon  * No implants in log *    INJECTABLES: None    SPECIMEN:   ID Type Source Tests Collected by Time Destination   1 : WOUND SECOND LEFT DIGIT Swab Toe CULTURE, ANAEROBIC AND AEROBIC Alix Castellanos DPM 4/6/2022 1204    2 : LEFT SECOND DIGIT BIOPSY CULTURE Bone Toe CULTURE, ANAEROBIC AND AEROBIC Alix Castellanos DPM 4/6/2022 1210    A : BONE BIOPSY LEFT SECOND DIGIT Bone Toe SURGICAL PATHOLOGY Alix Castellanos DPM 4/6/2022 0338        COMPLICATIONS: None    FINDINGS: Purulent drainage expressed upon incision and disarticulation of the metatarsophalangeal joint, left foot. Findings were consistent with osteomyelitic changes. See full op report for details. INDICATIONS FOR PROCEDURE: Shira Reed is a 64 y.o. male well known to Dr. Kristina Montez with a CC of left foot. Patient underwent surgical treatment of left 2nd digit hammertoe on 4/1/22, and 2nd digit was noted to be cellulitic at that time. Pt elected to not undergo surgical amputation at that time, however due to progression of worsening cellulitis, he is amenable to surgical treatment of amputation of 2nd digit today. has been recommended to which the patient is amenable. In pre-op all risks and benefits  of the procedure were discussed at length prior to the patient signing the consent form .  Consent is signed, witnessed, and placed in his chart. The left foot was marked, abx hung, labs and images reviewed, NPO status confirmed. No guarantees were given or implied. PROCEDURE IN DETAIL: The patient was brought to the operating room and placed on the operating table in the supine position with a safety strap across the lap. A well-padded pneumatic ankle tourniquet was applied to the left ankle. The surgical site was then scrubbed, prepped, and draped in the usual aseptic manner. A timeout was performed confirming the patient's identity, correct site, correct procedure, allergies, and preoperative antibiotics. An Esmarch bandage was then used to exsanguinate left foot and the tourniquet was inflated to 225 mmHg. Attention was directed to the dorsal aspect of the 2nd digit. There was a full-thickness wound with a fibrotic cap to the distal aspect of the digit with purulent drainage appreciated. A #15 blade was used to create two full-thickness converging semieliptical incisions vs used to create a circumferential full-thickness incision around the wound/ around the proximal phalanx base. The wound was then excised revealing the underlying purulent drainage and soft bone. The proximal phalanx was noted to be soft and necrotic upon bone quality testing consistent with osteomyelitis. The base of the 2nd digit was disarticulated at the level of the metatarsophalangeal joint and passed from the table as specimen to be sent for pathology and culture. All non-viable tissue was sharply excised. There was adequate bleeding appreciated. Vitalized and viable tissue noted surrounding the joint complex. The underlying head of the metatarsal was meticulously inspected and found white, hard, with intact cartilage. Next the surgical site was irrigated with copious amounts of sterile saline. The surgical incision margins were then remodeled to allow for adequate closure. The surgical site was closed in layers utilizing 2-0 and 3-0 nylon for skin closure. The incision was dressed with adaptic and covered with sterile compressive dressing such as adaptic, 4x4s, kerlix, and ACE. The tourniquet was then deflated and immediate hyperemia returned to all digits. The patient tolerated the procedure and anesthesia well and was transferred to the PACU with all vital signs stable and vascular status intact to the left foot and ankle. Following a period of postoperative monitoring, the patient will be transferred to the floor. Will follow up daily with patient. Patient will be NWB. Instructed to keep dressing clean, dry, and intact.

## 2022-04-06 NOTE — PROGRESS NOTES
Physical Therapy  DATE: 2022    NAME: Cate Carranza  MRN: [id number]   : 1960    Patient not seen this date for Physical Therapy due to:      [] Cancel by RN or physician due to:    [] Hemodialysis    [] Critical Lab Value Level     [] Blood transfusion in progress    [] Acute or unstable cardiovascular status   _MAP < 55 or more than >115  _HR < 40 or > 130    [] Acute or unstable pulmonary status   -FiO2 > 60%   _RR < 5 or >40    _O2 sats < 85%    [] Strict Bedrest    [x] Surgery this AM for Left 2nd toe amputation    [] Off Unit for testing       [] Pending imaging to R/O fracture    [] Refusal by Patient      [] Other      [] PT being discontinued at this time. Patient independent. No further needs. [] PT being discontinued at this time as the patient has been transferred to hospice care. No further needs.       [facility], PT

## 2022-04-06 NOTE — ANESTHESIA PRE PROCEDURE
Department of Anesthesiology  Preprocedure Note       Name:  Александр Alvarenga   Age:  64 y.o.  :  1960                                          MRN:  [id number]         Date:  2022      Surgeon: Theresa Manning): Jam Velazquez DPM    Procedure: Procedure(s):  TOE AMPUTATION, SECOND DIGIT    Medications prior to admission:   Prior to Admission medications    Medication Sig Start Date End Date Taking? Authorizing Provider   metFORMIN (GLUCOPHAGE) 1000 MG tablet Take 1 tablet by mouth 2 times daily (with meals) 3/31/22 4/30/22  Seema Hameed, DO   omeprazole (PRILOSEC) 20 MG delayed release capsule TAKE 1 CAPSULE DAILY 3/31/22   Seema Hameed, DO   linagliptin (TRADJENTA) 5 MG tablet TAKE 1 TABLET BY MOUTH ONE TIME A DAY 3/31/22   Seema Hameed, DO   gabapentin (NEURONTIN) 300 MG capsule Take 300 mg by mouth 3 times daily. Historical Provider, MD   Misc Natural Products (GLUCOSAMINE CHOND MSM FORMULA PO) Take 2 tablets by mouth daily    Historical Provider, MD   HYDROcodone-acetaminophen (NORCO)  MG per tablet Take 1 tablet by mouth every 4-6 hours as needed for Pain.     Historical Provider, MD   doxycycline hyclate (VIBRA-TABS) 100 MG tablet Take 1 tablet by mouth 2 times daily for 10 days 3/29/22 4/8/22  Jam Velazquez DPM   meloxicam (MOBIC) 15 MG tablet Take 1 tablet by mouth daily 3/10/22 6/8/22  Seema Hameed,    traZODone (DESYREL) 150 MG tablet TAKE ONE TABLET BY MOUTH EVERY EVENING 3/10/22   Seema Hameed,    topiramate (TOPAMAX) 200 MG tablet Take 1 tablet by mouth 2 times daily  Patient taking differently: Take 400 mg by mouth daily  22  Seema Hameed,    tiZANidine (ZANAFLEX) 4 MG tablet 1 po qd 22   Seema Hameed, DO   carvedilol (COREG) 12.5 MG tablet Take 1 tablet by mouth 2 times daily (with meals) 12/22/21 3/22/22  Seema Hameed DO   clopidogrel (PLAVIX) 75 MG tablet Take 1 tablet by mouth daily 12/22/21 3/22/22  Seema Hameed DO   hydroCHLOROthiazide (MICROZIDE) 12.5 MG capsule 1 po qd 9/25/21   Seema Hameed DO   NEEDLE, DISP, 22 G 22G X 1-1/2\" MISC 1 Device by Does not apply route every 30 days 6/29/21 7/29/21  Seema Hameed DO   NEEDLE, DISP, 18 G (BD DISP NEEDLES) 18G X 1-1/2\" MISC 1 Device by Does not apply route every 30 days 6/29/21   Seema Hameed DO   testosterone cypionate (DEPOTESTOTERONE CYPIONATE) 200 MG/ML injection  3/2/21   Historical Provider, MD   Testosterone Cypionate 200 MG/ML KIT Inject 200 mg into the muscle every 30 days for 30 days. please include syringes 3/2/21 5/14/21  Seema Hameed DO   NEEDLE, DISP, 18 G 18G X 1\" MISC 1 Syringe by Does not apply route every 30 days 9/22/20   Seema Hameed DO   NEEDLE, DISP, 22 G 22G X 1-1/2\" MISC 1 Syringe by Does not apply route every 30 days 9/22/20 5/14/21  Seema Hameed DO   Multiple Vitamin (MULTI VITAMIN DAILY PO) Take by mouth    Historical Provider, MD   nitroGLYCERIN (NITROSTAT) 0.4 MG SL tablet Place 0.4 mg under the tongue    Historical Provider, MD   aspirin 325 MG tablet Take 325 mg by mouth daily. Historical Provider, MD       Current medications:    No current facility-administered medications for this visit. No current outpatient medications on file.      Facility-Administered Medications Ordered in Other Visits   Medication Dose Route Frequency Provider Last Rate Last Admin    carvedilol (COREG) tablet 12.5 mg  12.5 mg Oral BID WC Shira Carpenter MD   12.5 mg at 04/06/22 0844    clopidogrel (PLAVIX) tablet 75 mg  75 mg Oral Daily Shira Carpenter MD        gabapentin (NEURONTIN) capsule 300 mg  300 mg Oral TID Shira Carpenter MD   300 mg at 04/05/22 2011    HYDROcodone-acetaminophen (NORCO)  MG per tablet 1 tablet  1 tablet Oral Q4H PRN Shira Carpenter MD   1 tablet at 04/06/22 0843    pantoprazole (PROTONIX) tablet 40 mg  40 mg Oral QAM AC Shira Carpenter MD   40 mg at 04/06/22 0640    topiramate (TOPAMAX) tablet 400 mg  400 mg Oral Daily Shira Carpenter MD        traZODone (DESYREL) tablet 150 mg  150 mg Oral QPM Filomena Lambert MD   150 mg at 04/05/22 2237    sodium chloride flush 0.9 % injection 5-40 mL  5-40 mL IntraVENous 2 times per day Filomena Lambert MD        sodium chloride flush 0.9 % injection 10 mL  10 mL IntraVENous PRN Filomena Lambert MD   10 mL at 04/06/22 0347    0.9 % sodium chloride infusion   IntraVENous PRN Filomena Lambert MD        potassium chloride (KLOR-CON M) extended release tablet 40 mEq  40 mEq Oral PRN Filomena Lambert MD   40 mEq at 04/06/22 0904    Or    potassium bicarb-citric acid (EFFER-K) effervescent tablet 40 mEq  40 mEq Oral PRN Filomena Lambert MD        Or   [facility] potassium chloride 10 mEq/100 mL IVPB (Peripheral Line)  10 mEq IntraVENous PRN Filomena Lambert MD        magnesium sulfate 1000 mg in dextrose 5% 100 mL IVPB  1,000 mg IntraVENous PRN Filomena Lambert MD        ondansetron (ZOFRAN-ODT) disintegrating tablet 4 mg  4 mg Oral Q8H PRN Filomena Lambert MD        Or    ondansetron [facility]) injection 4 mg  4 mg IntraVENous Q6H PRN Filomena Lambert MD        polyethylene glycol (GLYCOLAX) packet 17 g  17 g Oral Daily PRN Filomena Lambert MD        acetaminophen (TYLENOL) tablet 650 mg  650 mg Oral Q6H PRN Filomena Lambert MD        Or   [facility] acetaminophen (TYLENOL) suppository 650 mg  650 mg Rectal Q6H PRN Filomena Lambert MD        ceFAZolin (ANCEF) 2000 mg in dextrose 5 % 50 mL IVPB  2,000 mg IntraVENous Q8H Iman Franco DPM   Stopped at 04/06/22 0420    enoxaparin (LOVENOX) injection 30 mg  30 mg SubCUTAneous BID Filomena Lambert MD        insulin lispro (HUMALOG) injection vial 0-6 Units  0-6 Units SubCUTAneous TID WC Filomena Lambert MD        insulin lispro (HUMALOG) injection vial 0-3 Units  0-3 Units SubCUTAneous Nightly Filomena Lambert MD   1 Units at 04/05/22 2008    glucagon (rDNA) injection 1 mg  1 mg IntraMUSCular PRN Filomena Lambert MD        dextrose 5 % solution  100 mL/hr IntraVENous PRN Filomena Lambert MD        glucose chewable tablet 4 each 4 tablet Oral PRN Louise Villegas MD        dextrose bolus (hypoglycemia) 10% 125 mL  125 mL IntraVENous PRN Louise Villegas MD        Or    dextrose bolus (hypoglycemia) 10% 250 mL  250 mL IntraVENous PRN Louise Villegas MD        morphine (PF) injection 2 mg  2 mg IntraVENous Once JESICA Brown CNP           Allergies:     Allergies   Allergen Reactions    Pcn [Penicillins] Other (See Comments)     Unknown rx    Penicillin G Rash       Problem List:    Patient Active Problem List   Diagnosis Code    Hyperlipemia, mixed E78.2    Sleep apnea G47.30    Peripheral vascular disease (HCC) I73.9    CAD (coronary artery disease) I25.10    CHF (congestive heart failure) ([facility]) I50.9    CTS (carpal tunnel syndrome) G56.00    Hypotension I95.9    Obesity, Class III, BMI 40-49.9 (morbid obesity) ([facility] E66.01    Essential hypertension I10    DDD (degenerative disc disease), cervical M50.30    DJD (degenerative joint disease), cervical M47.812    Primary osteoarthritis involving multiple joints M89.49    Paresthesias in left hand R20.2    Uncontrolled type 2 diabetes mellitus without complication, without long-term current use of insulin [id number]    Coronary artery disease involving coronary bypass graft of native heart without angina pectoris I25.810    Sleep apnea G47.30    Cervical spondylosis M47.812    Obesity E66.9    Long term current use of antithrombotics/antiplatelets U62.25    H/O cervical spine surgery Z98.890    Long term (current) use of non-steroidal anti-inflammatories (nsaid) Z79.1    Chronic prescription opiate use Z79.891    Severe comorbid illness R69    Hx of cervical spine surgery Z98.890    Chronic neck pain M54.2, G89.29    Candidal balanitis B37.42    Cellulitis of third toe, left L03.032    Nail avulsion, toe, initial encounter S91.209A    Cellulitis of second toe, right L03.031    Abnormal nuclear stress test R94.39    Abnormal stress test R94.39    Hypercholesterolemia E78.00    Diabetic foot ulcer with osteomyelitis ([address] E11.621, E11.69, L97.509, M86.9    Ulcer of left foot, with fat layer exposed ([address] L97.522    Acquired trigger finger M65.30    Contusion of elbow S50.00XA    Contusion of shoulder region S40.019A    Contusion of knee S80.00XA    Degeneration of lumbar intervertebral disc M51.36    Diabetic ulcer of toe associated with type 2 diabetes mellitus, with fat layer exposed ([address] E59.251, L97.502    History of coronary artery bypass graft x 3 Z95.1    Ischemic cardiomyopathy I25.5    Osteoarthritis of knee M17.10    Osteoarthritis of carpometacarpal (CMC) joint of thumb M18.9    Sprain of knee and leg S83. 90XA    Sprain of shoulder and upper arm S43.409A    Presence of coronary angioplasty implant and graft Z95.5    Venous insufficiency of both lower extremities I87.2    Dog bite W54. 0XXA    Impingement syndrome of shoulder region M75.40    Bite wound of hand, left, subsequent encounter S61.452D    Bite wound of hand, right, subsequent encounter S61.451D    Open bite of left ear S01.352A    Open wound of nose due to dog bite S01.25XA, W54. 0XXA    Diabetic polyneuropathy associated with type 2 diabetes mellitus ([address] E11.42    Chronic ulcer of toe, left, with necrosis of bone ([address] L97.524    Ulcer of toe of right foot, with fat layer exposed ([address] L97.512    Osteomyelitis of second toe of left foot ([address] M86.9       Past Medical History:        Diagnosis Date    Acquired trigger finger 6/5/2018    CAD (coronary artery disease)     NWOCC/ involving coronary bypass graft of native heart with unstable angina pectoris    Cervical spondylosis     CHF (congestive heart failure) ([facility]     Chronic back pain     on 11/19/18 pt states is presently in pain mgmnt    Contusion of elbow 6/5/2018    Contusion of knee 6/5/2018    Contusion of shoulder region 6/5/2018    Diabetic foot ulcer with osteomyelitis ([address] 1/9/2018    Diabetic ulcer of toe associated with type 2 diabetes mellitus, with fat layer exposed ([address] 1/31/2018    Hypercholesterolemia 12/5/2017    Hyperlipemia, mixed     Hypertension     Ischemic cardiomyopathy 5/7/2018    Non-pressure chronic ulcer of left lower leg with fat layer exposed ([address] 6/9/2014    Obesity     Obesity, Class III, BMI 40-49.9 (morbid obesity) ([address] 4/6/2015    Obstructive sleep apnea syndrome     Osteoarthritis of carpometacarpal (CMC) joint of thumb 6/5/2018    Osteoarthritis of knee 6/5/2018    Peripheral vascular disease ([address]     with venous stasis and ulcerations. Dr Ramirez Presence of coronary angioplasty implant and graft 9/11/2009    Sleep apnea     Dr Js Fairbanks of knee and leg 6/5/2018    Sprain of shoulder and upper arm 6/5/2018    Type II or unspecified type diabetes mellitus without mention of complication, not stated as uncontrolled     Ulcer of left foot, with fat layer exposed ([address] 1/9/2018    Unspecified sleep apnea     Dr Price Spence CPAP    Venous insufficiency of both lower extremities 1/31/2018       Past Surgical History:        Procedure Laterality Date    ARTHROPLASTY Left 4/1/2022    ARTHROPLASTY 2ND LEFT TOE performed by Mai Casillas DPM at [address] Right 3/22/13    Dr. Sandra Robison.     COLONOSCOPY  2020    CORONARY ARTERY BYPASS GRAFT  2/26/07    [facility], Dr Sharan Holcomb (X 3)    CORONARY ARTERY BYPASS GRAFT  2006    NECK SURGERY  2010    cervical stenoses C5-C6-C7and partial T1 laminectomy with fusion of C6-7    OTHER SURGICAL HISTORY      wound care ulcers of legs (bilaterally) with Dr Lokesh Cabrera  age 11    VEIN SURGERY      closure of peripherator veins of legs, Dr Matthew Corral History:    Social History     Tobacco Use    Smoking status: Never Smoker    Smokeless tobacco: Never Used   Substance Use Topics    Alcohol use: Not Currently Comment: rare social, non past 5 months                                Counseling given: Not Answered      Vital Signs (Current): There were no vitals filed for this visit.                                            BP Readings from Last 3 Encounters:   04/06/22 (!) 119/56   04/05/22 123/61   03/31/22 124/66       NPO Status:                                                                                 BMI:   Wt Readings from Last 3 Encounters:   04/06/22 239 lb (108.4 kg)   04/05/22 248 lb (112.5 kg)   03/31/22 248 lb 12.8 oz (112.9 kg)     There is no height or weight on file to calculate BMI.    CBC:   Lab Results   Component Value Date    WBC 12.5 04/05/2022    RBC 4.50 04/05/2022    HGB 12.5 04/05/2022    HCT 41.3 04/05/2022    MCV 91.8 04/05/2022    RDW 12.9 04/05/2022     04/05/2022       CMP:   Lab Results   Component Value Date     04/06/2022    K 3.5 04/06/2022     04/06/2022    CO2 20 04/06/2022    BUN 16 04/06/2022    CREATININE 0.89 04/06/2022    GFRAA >60 04/06/2022    LABGLOM >60 04/06/2022    GLUCOSE 178 04/06/2022    PROT 7.2 06/19/2021    CALCIUM 9.1 04/06/2022    BILITOT 0.28 06/19/2021    ALKPHOS 71 06/19/2021    AST 12 06/19/2021    ALT 15 06/19/2021       POC Tests:   Recent Labs     04/06/22  0547   POCGLU 193*       Coags:   Lab Results   Component Value Date    PROTIME 14.2 04/06/2022    INR 1.1 04/06/2022    APTT 28.2 03/31/2022       HCG (If Applicable): No results found for: PREGTESTUR, PREGSERUM, HCG, HCGQUANT     ABGs: No results found for: PHART, PO2ART, TBQ7WRX, IDQ9GFU, BEART, X4PIEEMF     Type & Screen (If Applicable):  No results found for: LABABO, LABRH    Drug/Infectious Status (If Applicable):  Lab Results   Component Value Date    HEPCAB NONREACTIVE 09/12/2016       COVID-19 Screening (If Applicable):   Lab Results   Component Value Date    COVID19 Not Detected 04/05/2022           Anesthesia Evaluation  Patient summary reviewed and Nursing notes reviewed no history of anesthetic complications:   Airway: Mallampati: II  TM distance: >3 FB   Neck ROM: full  Mouth opening: > = 3 FB Dental: normal exam         Pulmonary:normal exam    (+) sleep apnea:                             Cardiovascular:  Exercise tolerance: no interval change,   (+) hypertension:, CAD:, CABG/stent: no interval change, CHF:, hyperlipidemia    (-)  angina    ECG reviewed    Rate: normal                    Neuro/Psych:   (+) neuromuscular disease (neuropathy):,             GI/Hepatic/Renal:        (-) GERD       Endo/Other:    (+) Diabetes, : arthritis: OA., .                 Abdominal:             Vascular:   + PVD, aortic or cerebral, . Other Findings:               Anesthesia Plan      general and TIVA     ASA 3       Induction: intravenous. MIPS: Postoperative opioids intended and Prophylactic antiemetics administered. Anesthetic plan and risks discussed with patient. Plan discussed with CRNA.     Attending anesthesiologist reviewed and agrees with Preprocedure content      Stent cardiac >6months ago  Plan regional single shot + Holley Sandhoff, MD   4/6/2022

## 2022-04-06 NOTE — PLAN OF CARE
Problem: Pain:  Goal: Pain level will decrease  Description: Pain level will decrease  4/6/2022 1415 by Katina Valverde RN  Outcome: Ongoing  4/6/2022 1409 by Katina Valverde RN  Outcome: Ongoing  4/6/2022 0027 by Jenn Marquez RN  Outcome: Ongoing  Goal: Control of acute pain  Description: Control of acute pain  4/6/2022 1415 by Katina Valverde RN  Outcome: Ongoing  4/6/2022 1409 by Katina Valverde RN  Outcome: Ongoing  4/6/2022 0027 by Jenn Marquez RN  Outcome: Ongoing  Note: Pain level assessment complete. Patient educated on pain scale and control interventions  PRN pain medication given per patient request  Patient instructed to call out with new onset of pain or unrelieved pain    Goal: Control of chronic pain  Description: Control of chronic pain  4/6/2022 1415 by Katina Valverde RN  Outcome: Ongoing  4/6/2022 1409 by Katina Valverde RN  Outcome: Ongoing  4/6/2022 0027 by Jenn Marquez RN  Outcome: Ongoing     Problem: Falls - Risk of:  Goal: Will remain free from falls  Description: Will remain free from falls  4/6/2022 1415 by Katina Valverde RN  Outcome: Ongoing  4/6/2022 1409 by Katina Valverde RN  Outcome: Ongoing  Goal: Absence of physical injury  Description: Absence of physical injury  4/6/2022 1415 by Katina Valverde RN  Outcome: Ongoing  4/6/2022 1409 by Katina Valverde RN  Outcome: Ongoing     Problem: Skin Integrity:  Goal: Will show no infection signs and symptoms  Description: Will show no infection signs and symptoms  Outcome: Ongoing  Goal: Absence of new skin breakdown  Description: Absence of new skin breakdown  Outcome: Ongoing     Problem: SAFETY  Goal: Free from accidental physical injury  Outcome: Ongoing     Problem: KNOWLEDGE DEFICIT  Goal: Patient/S.O. demonstrates understanding of disease process, treatment plan, medications, and discharge instructions.   Outcome: Ongoing     Problem: DISCHARGE BARRIERS  Goal: Patient's continuum of care needs are met  Outcome: Ongoing

## 2022-04-06 NOTE — PROGRESS NOTES
The patient returned to the room from PACU; awake and alert, slightly groggy. Left foot dressing with some breakthrough drainage, red. Patient refuses to elevate leg.

## 2022-04-06 NOTE — ANESTHESIA POSTPROCEDURE EVALUATION
Department of Anesthesiology  Postprocedure Note    Patient: Richa Shafer  MRN: [id number]  YOB: 1960  Date of evaluation: 4/6/2022  Time:  2:01 PM     Procedure Summary     Date: 04/06/22 Room / Location: [address] [place] [address]    Anesthesia Start: 3484 Anesthesia Stop: 7393    Procedure: TOE AMPUTATION, SECOND DIGIT (Left Foot) Diagnosis: (OSTEOMYELITIS)    Surgeons: Julia Ureña DPM Responsible Provider: Naila Manriquez MD    Anesthesia Type: general, TIVA ASA Status: 3          Anesthesia Type: general, TIVA    Arielle Phase I: Arielle Score: 10    Arielle Phase II:      Last vitals: Reviewed and per EMR flowsheets.        Anesthesia Post Evaluation    Patient location during evaluation: PACU  Patient participation: complete - patient participated  Level of consciousness: awake  Airway patency: patent  Nausea & Vomiting: no nausea  Complications: no  Cardiovascular status: blood pressure returned to baseline  Respiratory status: acceptable  Hydration status: euvolemic  Comments: Multimodal analgesia pain management as indicated by procedure  Multimodal analgesia pain management approach

## 2022-04-06 NOTE — PROGRESS NOTES
Progress Note  Podiatric Medicine and Surgery     Subjective     CC: left foot wound    Patient seen and examined at bedside. No acute events overnight. Afebrile, vital signs stable   Patient to OR today for amputation of 2nd digit, left foot. NPO since midnight, reports mild pain to left 2nd toe    HPI :  Peggy Acosta is a 64 y.o. male seen at [phone] for left foot wound. Patient was seen at Dr. Chas Silverio office this AM and was sent to ED due to infection of left foot. Patient is has had dorsal 2nd digit wound since beginning of February. He had a hammertoe correction surgery to relieve the pressure for the wound on 4/1/2022. Patient also has wound on the right hallux and 2nd digit. He admits diabetic neuropathy. Denies other pedal complaints. PCP is Seema Hameed DO       ROS: Denies N/V/F/C/SOB/CP. Otherwise negative except at stated in the HPI.      Medications:  Scheduled Meds:   carvedilol  12.5 mg Oral BID WC    clopidogrel  75 mg Oral Daily    gabapentin  300 mg Oral TID    pantoprazole  40 mg Oral QAM AC    topiramate  400 mg Oral Daily    traZODone  150 mg Oral QPM    sodium chloride flush  5-40 mL IntraVENous 2 times per day    ceFAZolin  2,000 mg IntraVENous Q8H    enoxaparin  30 mg SubCUTAneous BID    insulin lispro  0-6 Units SubCUTAneous TID     insulin lispro  0-3 Units SubCUTAneous Nightly    morphine  2 mg IntraVENous Once       Continuous Infusions:   sodium chloride      dextrose         PRN Meds:HYDROcodone-acetaminophen, sodium chloride flush, sodium chloride, potassium chloride **OR** potassium alternative oral replacement **OR** potassium chloride, magnesium sulfate, ondansetron **OR** ondansetron, polyethylene glycol, acetaminophen **OR** acetaminophen, glucagon (rDNA), dextrose, glucose, dextrose bolus (hypoglycemia) **OR** dextrose bolus (hypoglycemia)    Objective     Vitals:  Patient Vitals for the past 8 hrs:   BP Temp Temp src Pulse Resp SpO2   04/06/22 0828 (!) 119/56 98.6 °F (37 °C) Axillary 74 18 93 %   22 0352 (!) 97/56 98.9 °F (37.2 °C) Axillary 78 16 93 %     Average, Min, and Max for last 24 hours Vitals:  TEMPERATURE:  Temp  Av.3 °F (36.8 °C)  Min: 97.6 °F (36.4 °C)  Max: 98.9 °F (37.2 °C)    RESPIRATIONS RANGE: Resp  Av.7  Min: 14  Max: 20    PULSE RANGE: Pulse  Av.8  Min: 74  Max: 108    BLOOD PRESSURE RANGE:  Systolic (16LZQ), ZAF:870 , Min:97 , FGW:419   ; Diastolic (32DOG), HLI:26, Min:56, Max:81      PULSE OXIMETRY RANGE: SpO2  Av.6 %  Min: 93 %  Max: 99 %    I/O last 3 completed shifts: In: 437 [IV Piggyback:437]  Out: 1600 [Urine:1600]    CBC:  Recent Labs     22  1510   WBC 12.5*   HGB 12.5*   HCT 41.3      CRP 98.9*        BMP:  Recent Labs     22  1510 22  0545    137   K 3.8 3.5*    104   CO2 25 20   BUN 18 16   CREATININE 0.78 0.89   GLUCOSE 201* 178*   CALCIUM 9.8 9.1        Coags:  Recent Labs     22  0545   INR 1.1       Lab Results   Component Value Date    SEDRATE 85 (H) 2022     Recent Labs     22  1510   CRP 98.9*       Lower Extremity Physical Exam: Physical exam from 22. Pt to OR today, dressings intact. Vascular: DP and PT pulses are palpable. CFT <5 seconds to all digits. Hair growth is diminished to the level of the digits. Moderate non-pitting edema globally to left foot.       Neuro: Saph/sural/SP/DP/plantar sensation diminished to light touch.     Musculoskeletal: Muscle strength is 5/5 to all lower extremity muscle groups. Gross deformity is hammertoe noted to left 2nd digit and right 1,2 digit .     Dermatologic: Full thickness ulcer #1 located  and measures approximately 1.2cm x 1.2 cm x 0.3cm. Base is fibrotic. Periwound skin is macerated. Purulent drainage noted with mild associated mal odor. Erythema noted globally to left foot with mild associated increase in warmth. Does probe to bone. Does not sinus track, or undermine.  No fluctuance, crepitus, or induration. Interdigital maceration absent. Suture is not well intact.      Small dorsal PIPJ ulcers noted to right 1 and 2 digit. Base is fibro granular. There is no erythema and edema to the right foot.        Clinical Images:          Imaging:   XR FOOT LEFT (MIN 3 VIEWS)   Final Result   Postoperative changes in the left 2nd digit without erosion or destruction to   suggest osteomyelitis. There is overlying soft tissue edema. Cultures: left foot pending  Assessment   Peggy Acosta is a 64 y.o. male with   1. Diabetic foot ulcer down to bone, left foot  2. Osteomyelitis, left foot  3. Cellulitis, left foot  4. Diabetic foot ulcer down to subcutaneous layer, right foot  5. Hammertoe deformity, bilateral feet. Principal Problem:    Osteomyelitis of second toe of left foot (HCC)  Active Problems:    Peripheral vascular disease ([facility]    Essential hypertension    Coronary artery disease involving coronary bypass graft of native heart without angina pectoris    Diabetic polyneuropathy associated with type 2 diabetes mellitus ([facility]  Resolved Problems:    * No resolved hospital problems. *       Plan     · Patient examined and evaluated at bedside   · Treatment options discussed in detail with the patient  · Culture pending  · X-rays left foot reviewed. No signs of acute osteomyelitis on radiographs. However, due to clinical signs consistent with infection and pathology report consistent with osteomyelitis, we will proceed with amputation of 2nd digit of left foot. · Abx: Ancef  · Patient to OR today at 11:30 am tentatively for amputation of 2nd digit, left foot   ? Consent signed and placed in chart  ? Patient is covid vaccinated  ?  Anticoagulant held in AM  ? Npo since midnight  · Dressing intact to Left foot: betadine, dsd, ace  · Dressing intact to right foot: betadine, bandaid    · WBAT to Bilateral lower extremity  · Discussed with Dr. Slime Reagan, DPM   Podiatric Medicine & Surgery   4/6/2022 at 8:39 AM

## 2022-04-06 NOTE — CARE COORDINATION
----- Message from Eleni Alvarado NP sent at 7/6/2021  4:54 PM CDT -----  Inform client of normal JORDY. No evidence of significant exercise induced lower extremity arterial insufficiency.      Discharge Planning:    Joanie with [employer] informed writer that the estimated cost to patient for IV Ancef will be $65.41/day until deductible and OOP is met. Patient has not been informed as of yet, since final atb determination has not been confirmed. Joanie following.

## 2022-04-06 NOTE — PROGRESS NOTES
Dr Thanh Jennings at bedside to discuss procedure with patient. Patient is to be NWB until he can be reassessed in wound care clinic next week. Patient states he already has appt scheduled for Tuesday at 1330. Dr Thanh Jennings aware of breathrough drainage on dressing, instructions to reinforce as needed, resident will change in hospital tomorrow.

## 2022-04-06 NOTE — PROGRESS NOTES
Patient transferred to room 2108 on overflow on room air. ZEYNEP, RN at bedside for report. Breakthrough drainage stable from admission no increase in amount.

## 2022-04-06 NOTE — CARE COORDINATION
Case Management Initial Discharge Plan  Frankie Arreguin,         Readmission Risk              Risk of Unplanned Readmission:  11             Met with:patient to discuss discharge plans. Information verified: address, contacts, phone number, , insurance Yes  PCP: Yusef Espinosa DO  Date of last visit: V V one week ago     Insurance Provider: [address]     Discharge Planning  Current Residence:  Private home   Living Arrangements:    wife and son    Home has 2 stories/4 stairs to climb to enter the home. His bed and bath are both upstairs. Support Systems:   family      Current Services PTA:  None  Agency: none    Patient able to perform ADL's:Independent  DME in home:  Crutches and cpap   DME used to aid ambulation prior to admission:   Crutches as times   DME used during admission:  tbd     Potential Assistance Needed:    iv atb, hc     Pharmacy: [facility]    Potential Assistance Purchasing Medications:   no  Does patient want to participate in local refill/ meds to beds program?     No       Patient agreeable to home care: does not feel he needs it   Freedom of choice provided: Will f/u once plan is known      Type of Home Care Services:    possible iv atb and dressing   Patient expects to be discharged to:   home     Prior SNF/Rehab Placement and Facility: none   Agreeable to SNF/Rehab: No  Fithian of choice provided: n/a   Evaluation: n/a    Expected Discharge date:     Follow Up Appointment: Best Day/ Time:  any     Transportation provider: wife   Transportation arrangements needed for discharge: No    Discharge Plan:   Met with patient to complete a discharge plan of care. Patient lives with wife and son. He is independent and using no dme at this time but surgical shoe to his left foot. He had a dog bite in feb and has had to do dressing changes. He is to go to OR today for toe amp. He is on atb. Both podiatry and ID are following.      Did sent H&P and med sheet to [facility] to follow and to see if can accept his insurance. Did speak with patient regarding potential for iv atb and would need infusion company and home care. He stated his wife does all the dressing changes now. Explained would need to still have home care due to the infusion and IV site care. He verbalized understanding. Will need follow up with ID.       Electronically signed by Kendal Ly RN on 4/6/22 at 9:29 AM EDT

## 2022-04-06 NOTE — PROGRESS NOTES
[facility]  Office: [address], DO, Irving Mazariegos, DO, Asuncion Ford, DO, Scot Alfonso Blood, DO, Walter Junior MD, Mihai Mendes MD, Buffy Rosas MD, Timothy Vela MD, Coleman Jean-Baptiste MD, Phong Pulido MD, Osman Lowery MD, Katie Dave, DO, Marciano Larios, DO, Sherrill Espana MD,  Rachelle Abraham, DO, Tia Coffey MD, Marianne Badillo MD, Louie Mendosa MD, Dione Pizano DO, Elmer Hobson MD, Kimberly Luther MD, Navneet Paul, [facility], [facility], [facility], Darin Finleyfilippo, CNP, Gilberto Oliveira, CNP, Madeleine Parker, CNP, Gilda Kelly, CNP, Juan Jose Villasenor, CNP, Nemesio Souza PA-C, Rahat Mcdaniels, [facility], Kendra Landa, CNP, Nikhil Jc, CNP, Con Distance, CNP, Kennedy Wright, CNS, Lieutenant Zapata, [facility], Radha Dumont, [facility], [place], CNP, Meme Bailon, [facility]      Daily Progress Note     Admit Date: 4/5/2022  Bed/Room No.  [facility] OR Pool/NONE  Admitting Physician : Buffy Rosas MD  Code Status :[address] [facility] Day:  LOS: 1 day   Chief Complaint:     Chief Complaint   Patient presents with    Other     infection to recent partial amputation site      Principal Problem:    Osteomyelitis of second toe of left foot [facility])  Active Problems:    Peripheral vascular disease [facility])    Essential hypertension    Coronary artery disease involving coronary bypass graft of native heart without angina pectoris    Diabetic polyneuropathy associated with type 2 diabetes mellitus [facility])  Resolved Problems:    * No resolved hospital problems. *    Subjective : Interval History/Significant events :  04/06/22    Patient is complaining of left hip pain. Pain in left foot is controlled. He denies any fever, chills, nausea, vomiting. Patient is n.p.o. and awaiting surgery. Vitals - Stable afebrile  Labs -hypokalemia 3.5, glucose 170, leukocytosis 12.5. Nursing notes , Consults notes reviewed. Overnight events and updates discussed with Nursing staff . Background History:         Elaina Renee is 64 y.o. male  Who was admitted to the hospital on 4/5/2022 for treatment of Osteomyelitis of second toe of left foot ([address] Patient was sent to emergency room by his podiatrist for left second digit wound and concern for osteomyelitis. Patient reported that he has been dealing with this wound for last 2 months. He has been treated with oral antibiotic doxycycline and also had recent surgery. Patient continued to have redness and wound changes and was concern for osteomyelitis and was sent to ED. Patient reported he had dog bite in his bilateral hands multiple times with recent wounds 2 months ago and has been dealing with skin infections. He has underlying history of type 2 diabetes mellitus with diabetic polyneuropathy, CAD with CABG in 2007 with stenting post surgery with recent stent in September 2021. He denies smoking, alcohol use. Patient is a  by profession and has unable to perform his job for the past 2 months.   Initial evaluation showed leukocytosis 12.5, recent wound culture shows MSSA and  group B strep    PMH:  Past Medical History:   Diagnosis Date    Acquired trigger finger 6/5/2018    CAD (coronary artery disease)     [facility]/ involving coronary bypass graft of native heart with unstable angina pectoris    Cervical spondylosis     CHF (congestive heart failure) ([facility])     Chronic back pain     on 11/19/18 pt states is presently in pain mgmnt    Contusion of elbow 6/5/2018    Contusion of knee 6/5/2018    Contusion of shoulder region 6/5/2018    Diabetic foot ulcer with osteomyelitis ([address] 1/9/2018    Diabetic ulcer of toe associated with type 2 diabetes mellitus, with fat layer exposed ([address] 1/31/2018    Hypercholesterolemia 12/5/2017    Hyperlipemia, mixed     Hypertension     Ischemic cardiomyopathy 5/7/2018    Non-pressure chronic ulcer of left lower leg with fat layer exposed ([address] 6/9/2014    Obesity     Obesity, Class III, BMI 40-49.9 (morbid obesity) ([facility] 4/6/2015    Obstructive sleep apnea syndrome     Osteoarthritis of carpometacarpal (CMC) joint of thumb 6/5/2018    Osteoarthritis of knee 6/5/2018    Peripheral vascular disease ([facility]     with venous stasis and ulcerations. Dr Phuong Azevedo Presence of coronary angioplasty implant and graft 9/11/2009    Sleep apnea     Dr Luna Sepulveda of knee and leg 6/5/2018    Sprain of shoulder and upper arm 6/5/2018    Type II or unspecified type diabetes mellitus without mention of complication, not stated as uncontrolled     Ulcer of left foot, with fat layer exposed [facility] 1/9/2018    Unspecified sleep apnea     Dr Maurilio Hale CPAP    Venous insufficiency of both lower extremities 1/31/2018      Allergies: Allergies   Allergen Reactions    Pcn [Penicillins] Other (See Comments)     Unknown rx    Penicillin G Rash      Medications :  bupivacaine (PF), , ,   lidocaine PF, , ,   bupivacaine (PF), , ,   sodium chloride flush, 5-40 mL, IntraVENous, 2 times per day  [facility]] carvedilol, 12.5 mg, Oral, BID WC  [MAR Hold] clopidogrel, 75 mg, Oral, Daily  [MAR Hold] gabapentin, 300 mg, Oral, TID  [MAR Hold] pantoprazole, 40 mg, Oral, QAM AC  [MAR Hold] topiramate, 400 mg, Oral, Daily  [MAR Hold] traZODone, 150 mg, Oral, QPM  [MAR Hold] sodium chloride flush, 5-40 mL, IntraVENous, 2 times per day  [facility] Hold] ceFAZolin, 2,000 mg, IntraVENous, Q8H  [MAR Hold] enoxaparin, 30 mg, SubCUTAneous, BID  [MAR Hold] insulin lispro, 0-6 Units, SubCUTAneous, TID WC  [MAR Hold] insulin lispro, 0-3 Units, SubCUTAneous, Nightly  [MAR Hold] morphine, 2 mg, IntraVENous, Once        Review of Systems   Review of Systems   Constitutional: Negative for activity change, appetite change, fatigue, fever and unexpected weight change. HENT: Negative for congestion, nosebleeds, rhinorrhea, sinus pressure, sneezing and voice change.     Respiratory: Negative for cough, choking, chest tightness, shortness of breath and wheezing. Cardiovascular: Negative for chest pain, palpitations and leg swelling. Gastrointestinal: Negative for abdominal pain, constipation, diarrhea, nausea and vomiting. Genitourinary: Negative for difficulty urinating, dysuria, frequency, penile discharge and testicular pain. Musculoskeletal: Positive for arthralgias. Negative for back pain. Skin: Positive for color change and wound. Negative for rash. Neurological: Negative for dizziness, weakness, light-headedness and headaches. Hematological: Does not bruise/bleed easily. Psychiatric/Behavioral: Negative for agitation, behavioral problems, confusion, self-injury, sleep disturbance and suicidal ideas. Objective :      Current Vitals : Temp: 98.6 °F (37 °C),  Pulse: 74, Resp: 18, BP: (!) 119/56, SpO2: 93 %  Last 24 Hrs Vitals   Patient Vitals for the past 24 hrs:   BP Temp Temp src Pulse Resp SpO2 Height Weight   04/06/22 0828 (!) 119/56 98.6 °F (37 °C) Axillary 74 18 93 % -- --   04/06/22 0352 (!) 97/56 98.9 °F (37.2 °C) Axillary 78 16 93 % -- --   04/06/22 0011 -- -- -- -- -- -- -- 239 lb (108.4 kg)   04/05/22 2351 (!) 101/56 98.6 °F (37 °C) Oral 85 16 95 % -- --   04/05/22 1814 (!) 141/62 98.3 °F (36.8 °C) Oral 87 16 99 % -- --   04/05/22 1500 137/81 97.9 °F (36.6 °C) Oral 108 14 98 % 6' (1.829 m) 239 lb (108.4 kg)     Intake / output   04/04 1901 - 04/06 0700  In: 437   Out: 1600 [Urine:1600]  Physical Exam:  Physical Exam  Vitals and nursing note reviewed. Constitutional:       General: He is not in acute distress. Appearance: He is not diaphoretic. HENT:      Head: Normocephalic and atraumatic. Nose:      Right Sinus: No maxillary sinus tenderness or frontal sinus tenderness. Left Sinus: No maxillary sinus tenderness or frontal sinus tenderness. Mouth/Throat:      Pharynx: No oropharyngeal exudate. Eyes:      General: No scleral icterus.      Conjunctiva/sclera: Conjunctivae normal. Pupils: Pupils are equal, round, and reactive to light. Neck:      Thyroid: No thyromegaly. Vascular: No JVD. Cardiovascular:      Rate and Rhythm: Normal rate and regular rhythm. Pulses:           Dorsalis pedis pulses are 2+ on the right side and 2+ on the left side. Heart sounds: Normal heart sounds. No murmur heard. Pulmonary:      Effort: Pulmonary effort is normal.      Breath sounds: Normal breath sounds. No wheezing or rales. Abdominal:      Palpations: Abdomen is soft. There is no mass. Tenderness: There is no abdominal tenderness. Musculoskeletal:      Cervical back: Full passive range of motion without pain and neck supple. Comments: Right foot superficial wounds. Left foot surgical wound with surrounding erythema   Lymphadenopathy:      Head:      Right side of head: No submandibular adenopathy. Left side of head: No submandibular adenopathy. Cervical: No cervical adenopathy. Skin:     General: Skin is warm. Neurological:      Mental Status: He is alert and oriented to person, place, and time. Motor: No tremor. Psychiatric:         Behavior: Behavior is cooperative. Laboratory findings:    Recent Labs     04/05/22  1510 04/06/22  0545   WBC 12.5*  --    HGB 12.5*  --    HCT 41.3  --      --    SEDRATE 32* 85*   INR  --  1.1     Recent Labs     04/05/22  1510 04/06/22  0545    137   K 3.8 3.5*    104   CO2 25 20   GLUCOSE 201* 178*   BUN 18 16   CREATININE 0.78 0.89   MG  --  1.9   CALCIUM 9.8 9.1     No results for input(s): PROT, LABALBU, LABA1C, H2SYELH, V8EJEVB, FT4, TSH, AST, ALT, LDH, GGT, ALKPHOS, BILITOT, BILIDIR, AMMONIA, AMYLASE, LIPASE, LACTATE, CHOL, HDL, LDLCHOLESTEROL, CHOLHDLRATIO, TRIG, VLDL, BNP, TROPONINI, CKTOTAL, CKMB, CKMBINDEX, RF, ELIUD in the last 72 hours.        No results found for: [place], RBCUA, BLOODU, BACTERIA, NITRU, [address], LEUKOCYTESUR    Imaging / Clinical Data :-   XR FOOT LEFT (MIN 3 VIEWS)    Result Date: 4/5/2022  Postoperative changes in the left 2nd digit without erosion or destruction to suggest osteomyelitis. There is overlying soft tissue edema. Clinical Course : stable  Assessment and Plan  :        1. Left second toe diabetic wound infection with concern for osteomyelitis - s/p I&D -cultures MSSA, group B strep -empiric Ancef.  new cultures have been sent. ID consult. Podiatry consulted and planning for left second digit amputation. 2. Type 2 diabetes mellitus -uncontrolled. Not on insulin last A1c 8.5. Hold Metformin and Tradjenta. Humalog for now. 3. Essential hypertension-well-controlled  4. CAD s/p CABG and stents -continue Plavix. Hold aspirin, continue Coreg. Not on statin. No allergies listed. Check lipid panel. 5. PVD -statin, Plavix. 6. Diabetic polyneuropathy-on gabapentin. Follow after surgery. Monitor for hypoglycemia. Continue to monitor vitals , Intake / output ,  Cell count , HGB , Kidney function, oxygenation  as indicated . Plan and updates discussed with patient ,  answers  explained to satisfaction.    Plan discussed with Staff Rosaline MELVIN     (Please note that portions of this note were completed with a voice recognition program. Efforts were made to edit the dictations but occasionally words are mis-transcribed.)      Queen Zachary MD  4/6/2022

## 2022-04-06 NOTE — CONSULTS
[facility]  Initial Consult Note  Date: 4/6/2022    Hospital day :1     Impression:   1. Left second toe ulceration with exposure to bone status post vision of the left middle phalanx and partial resection of the proximal phalanx of the second digit 4/1/2022  2. Left second toe amputation with purulent drainage expressed upon incision and disarticulation of the metatarsophalangeal joint of the left foot and findings consistent with osteomyelitis. 3. Coronary artery disease with associated ischemic cardiomyopathy  4. Diabetes mellitus type 2 with associated neuropathy  5. Peripheral arterial disease    Recommendations   · Continue intravenous antimicrobial therapy with cefazolin which will cover the previously isolated MSSA, group B streptococcus. · We will follow the new operative cultures done today and adjust antimicrobial therapy accordingly. · I will follow the operative findings and decide on duration of antimicrobial therapy    Chief complaint/reason for consultation:   Left second toe infection/osteomyelitis    History of Present Illness:   Rupinder Diamond is a 64y.o.-year-old male who was initially admitted on 4/5/2022. Yoli Abraham has a history of coronary artery disease, congestive heart failure, ischemic cardiomyopathy, hypertension, hyperlipidemia, obstructive sleep apnea, diabetes mellitus type 2, peripheral vascular disease among other medical problems. The patient has had wounds of toes in both feet and he has been following at the wound care center. He does have a left foot second toe ulceration for which she has undergone debridement procedures in the past and also has been treated with doxycycline. There was exposure of the bone in the left second toe and x-rays were done and the patient had surgery on 4/1/2022 underwent excision of the left middle phalanx second digit with partial resection of the proximal phalanx second digit and debridement of nonviable tissue.   The patient was seen for postop office visit 4/5/22 and the patient was noted to have ascending cellulitis of the left foot with open wound at the second left toe and was sent into the hospital for admission for IV antibiotics. Patient was admitted started on IV antimicrobial therapy with vancomycin and was taken to the operating room today and had staged amputation of the second digit of the left foot    The prior surgical culture had MSSA, group B streptococcus isolated. The patient is currently on intravenous antimicrobial therapy with cefazolin    I have personally reviewed the past medical history, past surgical history, medications, social history, and family history, and I have updated the database accordingly. Past Medical History:     Past Medical History:   Diagnosis Date    Acquired trigger finger 6/5/2018    CAD (coronary artery disease)     NWOCC/ involving coronary bypass graft of native heart with unstable angina pectoris    Cervical spondylosis     CHF (congestive heart failure) (HCC)     Chronic back pain     on 11/19/18 pt states is presently in pain mgmnt    Contusion of elbow 6/5/2018    Contusion of knee 6/5/2018    Contusion of shoulder region 6/5/2018    Diabetic foot ulcer with osteomyelitis ([address] 1/9/2018    Diabetic ulcer of toe associated with type 2 diabetes mellitus, with fat layer exposed ([address] 1/31/2018    Hypercholesterolemia 12/5/2017    Hyperlipemia, mixed     Hypertension     Ischemic cardiomyopathy 5/7/2018    Non-pressure chronic ulcer of left lower leg with fat layer exposed ([address] 6/9/2014    Obesity     Obesity, Class III, BMI 40-49.9 (morbid obesity) ([address] 4/6/2015    Obstructive sleep apnea syndrome     Osteoarthritis of carpometacarpal (CMC) joint of thumb 6/5/2018    Osteoarthritis of knee 6/5/2018    Peripheral vascular disease ([address]     with venous stasis and ulcerations.  Dr Axel Pallas Presence of coronary angioplasty implant and graft 9/11/2009    Sleep apnea     Dr Kimmie Castañeda of knee and leg 6/5/2018    Sprain of shoulder and upper arm 6/5/2018    Type II or unspecified type diabetes mellitus without mention of complication, not stated as uncontrolled     Ulcer of left foot, with fat layer exposed ([address] 1/9/2018    Unspecified sleep apnea     Dr Varsha Rubalcava CPAP    Venous insufficiency of both lower extremities 1/31/2018     Past Surgical  History:     Past Surgical History:   Procedure Laterality Date    ARTHROPLASTY Left 4/1/2022    ARTHROPLASTY 2ND LEFT TOE performed by Miguel Feliciano DPM at [address] Right 3/22/13    Dr. Lawerence Hamman.     COLONOSCOPY  2020    CORONARY ARTERY BYPASS GRAFT  2/26/07    [facility], Dr Manju Avina (X 3)    CORONARY ARTERY BYPASS GRAFT  2006    NECK SURGERY  2010    cervical stenoses C5-C6-C7and partial T1 laminectomy with fusion of C6-7    OTHER SURGICAL HISTORY      wound care ulcers of legs (bilaterally) with Dr Missy Forrester Left 4/6/2022    TOE AMPUTATION, SECOND DIGIT performed by Miguel Feliciano DPM at [place] [address]  age 11    VEIN SURGERY      closure of peripherator veins of legs, Dr Anaya Lemons EXTRACTION       Medications:      carvedilol  12.5 mg Oral BID WC    clopidogrel  75 mg Oral Daily    gabapentin  300 mg Oral TID    pantoprazole  40 mg Oral QAM AC    topiramate  400 mg Oral Daily    traZODone  150 mg Oral QPM    sodium chloride flush  5-40 mL IntraVENous 2 times per day    ceFAZolin  2,000 mg IntraVENous Q8H    enoxaparin  30 mg SubCUTAneous BID    insulin lispro  0-6 Units SubCUTAneous TID WC    insulin lispro  0-3 Units SubCUTAneous Nightly    morphine  2 mg IntraVENous Once     Social History:     Social History     Socioeconomic History    Marital status:      Spouse name: Not on file    Number of children: Not on file    Years of education: Not on file    Highest education level: Not on file Occupational History    Not on file   Tobacco Use    Smoking status: Never Smoker    Smokeless tobacco: Never Used   Vaping Use    Vaping Use: Never used   Substance and Sexual Activity    Alcohol use: Not Currently     Comment: rare social, non past 5 months    Drug use: No    Sexual activity: Yes     Partners: Female     Comment: spouse   Other Topics Concern    Not on file   Social History Narrative    Not on file     Social Determinants of Health     Financial Resource Strain:     Difficulty of Paying Living Expenses: Not on file   Food Insecurity:     Worried About Running Out of Food in the Last Year: Not on file    [place] of Food in the Last Year: Not on file   Transportation Needs:     Lack of Transportation (Medical): Not on file    Lack of Transportation (Non-Medical):  Not on file   Physical Activity:     Days of Exercise per Week: Not on file    Minutes of Exercise per Session: Not on file   Stress:     Feeling of Stress : Not on file   Social Connections:     Frequency of Communication with Friends and Family: Not on file    Frequency of Social Gatherings with Friends and Family: Not on file    Attends Restorationism Services: Not on file    Active Member of [address] [place] [address] or Organizations: Not on file    Attends Club or Organization Meetings: Not on file    Marital Status: Not on file   Intimate Partner Violence:     Fear of Current or Ex-Partner: Not on file    Emotionally Abused: Not on file    Physically Abused: Not on file    Sexually Abused: Not on file   Housing Stability:     Unable to Pay for Housing in the Last Year: Not on file    Number of [place] in the Last Year: Not on file    Unstable Housing in the Last Year: Not on file     Family History:     Family History   Problem Relation Age of Onset    Cancer Mother         lung    Diabetes Father     Heart Disease Father     High Blood Pressure Father     Diabetes Brother     Heart Disease Brother     High Blood Pressure Brother       Allergies:   Pcn [penicillins] and Penicillin g     Review of Systems:   General: No fevers or chills. Eyes: No double vision or blurry vision. ENT: No sore throat or runny nose. Cardiovascular: No chest pain or palpitations. Lung: No shortness of breath or cough. Abdomen: No nausea, vomiting, diarrhea, or abdominal pain. Genitourinary: No increased urinary frequency, or dysuria. Musculoskeletal: No muscle aches or pains. Hematologic: No bleeding or bruising. Neurologic: No headache, weakness, numbness, or tingling. Physical Examination :   /70   Pulse 70   Temp 97.3 °F (36.3 °C) (Oral)   Resp 16   Ht 6' (1.829 m)   Wt 239 lb (108.4 kg)   SpO2 98%   BMI 32.41 kg/m²     Temperature Range: Temp: 97.3 °F (36.3 °C) Temp  Av °F (36.7 °C)  Min: 97 °F (36.1 °C)  Max: 98.9 °F (37.2 °C)  General Appearance: Awake, alert, and in no apparent distress  Head: Normocephalic, without obvious abnormality, atraumatic  Eyes: Pupils equal, round, reactive, to light and accommodation; extraocular movements intact; sclera anicteric; conjunctivae pink  ENT: Oropharynx clear, without erythema, exudate, or thrush. Neck: Supple, without lymphadenopathy. Pulmonary/Chest: Clear to auscultation, without wheezes, rales, or rhonchi  Cardiovascular: Regular rate and rhythm without murmurs, rubs, or gallops. Abdomen: Soft, nontender, nondistended. Extremities: No cyanosis, clubbing, edema, or effusions. Neurologic: No gross sensory or motor deficits. Skin: Warm and dry with no rash.     Medical Decision Making:   I have independently reviewed/ordered the following labs:  CBC with Differential:   Recent Labs     22  1510   WBC 12.5*   HGB 12.5*   HCT 41.3      LYMPHOPCT 17*   MONOPCT 8     BMP:   Recent Labs     22  1510 22  0545    137   K 3.8 3.5*    104   CO2 25 20   BUN 18 16   CREATININE 0.78 0.89   MG  --  1.9     Hepatic Function Panel: No results for input(s): PROT, LABALBU, BILIDIR, IBILI, BILITOT, ALKPHOS, ALT, AST in the last 72 hours. No results found for: PROCAL    Lab Results   Component Value Date    CRP 98.9 04/05/2022    CRP 2.1 02/02/2018     No results found for: FERRITIN  No results found for: FIBRINOGEN  No results found for: DDIMER  No results found for: LDH    Lab Results   Component Value Date    SEDRATE 80 (H) 04/06/2022       Lab Results   Component Value Date    COVID19 Not Detected 04/05/2022     No results found for requested labs within last 30 days. Imaging Studies:   XR FOOT LEFT (MIN 3 VIEWS)    Result Date: 4/5/2022  EXAMINATION: THREE XRAY VIEWS OF THE LEFT FOOT 4/5/2022 8:13 pm COMPARISON: 03/29/2022 HISTORY: ORDERING SYSTEM PROVIDED HISTORY: left 2nd digit wound TECHNOLOGIST PROVIDED HISTORY: left 2nd digit wound Reason for Exam: Left 2nd digit wound FINDINGS: No acute fracture. No dislocation. There is interval amputation of the 2nd digit middle phalanx and portions of the proximal phalanx. There is narrowing of multiple MTP and interphalangeal joints. Calcaneal enthesophyte. Soft tissue edema in the left 2nd digit. Postoperative changes in the left 2nd digit without erosion or destruction to suggest osteomyelitis. There is overlying soft tissue edema. Cultures:      4/6/22 1412 Resulting Agency   Specimen Description . WOUND SECOND LEFT DIGIT [facility] Requests [address]   Direct Exam  Abnormal   This specimen contains squamous epithelial cells and therefore is suboptimal for anaerobic culture as it is contaminated with surface material.  Poor specimen quality contributes to misdiagnosis and inappropriate antimicrobial therapy.  [facility]   Direct Exam MANY [address]   Direct Exam FEW GRAM POSITIVE COCCI IN [address]   Culture PENDING P 4/5/22 7696   Specimen Description . WOUND LEFT 2ND DIGIT P    Direct Exam RARE NEUTROPHILS Abnormal  P    Direct Exam FEW GRAM POSITIVE COCCI IN PAIRS Abnormal  P    Direct Exam RARE GRAM POSITIVE RODS Abnormal  P    Culture CULTURE IN PROGRESS P      4/1/22 8871    Specimen Description . TOE middle phalynx left second toe    Direct Exam MANY NEUTROPHILS Abnormal     Direct Exam MANY GRAM POSITIVE COCCI IN PAIRS AND GRAM POSITIVE COCCI IN CHAINS Abnormal     Culture STREPTOCOCCI, BETA HEMOLYTIC GROUP B MODERATE GROWTH Abnormal     Culture STAPHYLOCOCCUS AUREUS MODERATE GROWTH This isolate is methicillin susceptible. Abnormal     Culture NORMAL SKIN SHOAIB    Culture No anaerobic organisms isolated at 5 days. Resulting Agency [employer] [facility] [employer]      Staphylococcus aureus (2)    Antibiotic Interpretation Microscan Method Status    clindamycin Resistant >=8 BACTERIAL SUSCEPTIBILITY PANEL LIZETT Final    erythromycin Resistant >=8 BACTERIAL SUSCEPTIBILITY PANEL LIZETT Final    gentamicin Sensitive <=0.5 BACTERIAL SUSCEPTIBILITY PANEL LIZETT Final     Gentamicin is used only in combination with other active agents that test susceptible. levofloxacin Sensitive <=0.12 BACTERIAL SUSCEPTIBILITY PANEL LIZETT Final    oxacillin Sensitive <=0.25 BACTERIAL SUSCEPTIBILITY PANEL LIZETT Final     This staph isolate may be susceptible to Penicillin. Please contact Microbiology for   confirmatory testing of susceptibility if Pencillin is a therapeutic consideration. tetracycline Resistant >=16 BACTERIAL SUSCEPTIBILITY PANEL LIZETT Final    trimethoprim-sulfamethoxazole Sensitive <=10 BACTERIAL SUSCEPTIBILITY PANEL LIZETT Final          Specimen Collected: 04/01/22 08:43 Last Resulted: 04/04/22 12:06                 Thank you for allowing us to participate in the care of this patient. Please call with questions.     Electronically signed by Rupinder Barron MD on 4/6/2022 at 4:40 PM      [facility]  Merly Tom MD  [employer] messaging  OFFICE: [phone]      This note is created with the assistance of a speech recognition program.  While intending to generate a document that actually reflects the content of the visit, the document can still have some errors including those of syntax and sound a like substitutions which may escape proof reading. In such instances, actual meaning can be extrapolated by contextual diversion.

## 2022-04-07 LAB
GLUCOSE BLD-MCNC: 209 MG/DL (ref 75–110)
GLUCOSE BLD-MCNC: 231 MG/DL (ref 75–110)
GLUCOSE BLD-MCNC: 236 MG/DL (ref 75–110)
GLUCOSE BLD-MCNC: 239 MG/DL (ref 75–110)

## 2022-04-07 PROCEDURE — 97166 OT EVAL MOD COMPLEX 45 MIN: CPT

## 2022-04-07 PROCEDURE — 99232 SBSQ HOSP IP/OBS MODERATE 35: CPT | Performed by: INTERNAL MEDICINE

## 2022-04-07 PROCEDURE — 97162 PT EVAL MOD COMPLEX 30 MIN: CPT

## 2022-04-07 PROCEDURE — 97530 THERAPEUTIC ACTIVITIES: CPT

## 2022-04-07 PROCEDURE — 6360000002 HC RX W HCPCS

## 2022-04-07 PROCEDURE — 2700000000 HC OXYGEN THERAPY PER DAY

## 2022-04-07 PROCEDURE — 1200000000 HC SEMI PRIVATE

## 2022-04-07 PROCEDURE — 97116 GAIT TRAINING THERAPY: CPT

## 2022-04-07 PROCEDURE — 6370000000 HC RX 637 (ALT 250 FOR IP): Performed by: PODIATRIST

## 2022-04-07 PROCEDURE — 6370000000 HC RX 637 (ALT 250 FOR IP)

## 2022-04-07 PROCEDURE — 82947 ASSAY GLUCOSE BLOOD QUANT: CPT

## 2022-04-07 PROCEDURE — 2580000003 HC RX 258

## 2022-04-07 PROCEDURE — 99232 SBSQ HOSP IP/OBS MODERATE 35: CPT | Performed by: FAMILY MEDICINE

## 2022-04-07 PROCEDURE — 97535 SELF CARE MNGMENT TRAINING: CPT

## 2022-04-07 RX ADMIN — HYDROCODONE BITARTRATE AND ACETAMINOPHEN 1 TABLET: 10; 325 TABLET ORAL at 14:01

## 2022-04-07 RX ADMIN — CEFAZOLIN SODIUM 2000 MG: 10 INJECTION, POWDER, FOR SOLUTION INTRAVENOUS at 04:19

## 2022-04-07 RX ADMIN — INSULIN LISPRO 1 UNITS: 100 INJECTION, SOLUTION INTRAVENOUS; SUBCUTANEOUS at 21:10

## 2022-04-07 RX ADMIN — HYDROCODONE BITARTRATE AND ACETAMINOPHEN 1 TABLET: 10; 325 TABLET ORAL at 22:11

## 2022-04-07 RX ADMIN — INSULIN LISPRO 2 UNITS: 100 INJECTION, SOLUTION INTRAVENOUS; SUBCUTANEOUS at 18:11

## 2022-04-07 RX ADMIN — HYDROCODONE BITARTRATE AND ACETAMINOPHEN 1 TABLET: 10; 325 TABLET ORAL at 09:25

## 2022-04-07 RX ADMIN — HYDROCODONE BITARTRATE AND ACETAMINOPHEN 1 TABLET: 10; 325 TABLET ORAL at 04:19

## 2022-04-07 RX ADMIN — INSULIN LISPRO 2 UNITS: 100 INJECTION, SOLUTION INTRAVENOUS; SUBCUTANEOUS at 09:26

## 2022-04-07 RX ADMIN — POLYETHYLENE GLYCOL 3350 17 G: 17 POWDER, FOR SOLUTION ORAL at 10:08

## 2022-04-07 RX ADMIN — CLOPIDOGREL BISULFATE 75 MG: 75 TABLET ORAL at 09:46

## 2022-04-07 RX ADMIN — CEFAZOLIN SODIUM 2000 MG: 10 INJECTION, POWDER, FOR SOLUTION INTRAVENOUS at 12:28

## 2022-04-07 RX ADMIN — PANTOPRAZOLE SODIUM 40 MG: 40 TABLET, DELAYED RELEASE ORAL at 09:46

## 2022-04-07 RX ADMIN — HYDROCODONE BITARTRATE AND ACETAMINOPHEN 1 TABLET: 10; 325 TABLET ORAL at 18:10

## 2022-04-07 RX ADMIN — OXYCODONE HYDROCHLORIDE 10 MG: 5 TABLET ORAL at 01:20

## 2022-04-07 RX ADMIN — GABAPENTIN 300 MG: 300 CAPSULE ORAL at 21:21

## 2022-04-07 RX ADMIN — ENOXAPARIN SODIUM 30 MG: 30 INJECTION SUBCUTANEOUS at 21:11

## 2022-04-07 RX ADMIN — ENOXAPARIN SODIUM 30 MG: 30 INJECTION SUBCUTANEOUS at 09:46

## 2022-04-07 RX ADMIN — GABAPENTIN 300 MG: 300 CAPSULE ORAL at 09:46

## 2022-04-07 RX ADMIN — TRAZODONE HYDROCHLORIDE 150 MG: 50 TABLET ORAL at 21:11

## 2022-04-07 RX ADMIN — CEFAZOLIN SODIUM 2000 MG: 10 INJECTION, POWDER, FOR SOLUTION INTRAVENOUS at 21:19

## 2022-04-07 RX ADMIN — SODIUM CHLORIDE, PRESERVATIVE FREE 10 ML: 5 INJECTION INTRAVENOUS at 21:21

## 2022-04-07 RX ADMIN — INSULIN LISPRO 2 UNITS: 100 INJECTION, SOLUTION INTRAVENOUS; SUBCUTANEOUS at 12:31

## 2022-04-07 RX ADMIN — TOPIRAMATE 400 MG: 100 TABLET, FILM COATED ORAL at 09:46

## 2022-04-07 RX ADMIN — CARVEDILOL 12.5 MG: 12.5 TABLET, FILM COATED ORAL at 09:46

## 2022-04-07 ASSESSMENT — PAIN - FUNCTIONAL ASSESSMENT: PAIN_FUNCTIONAL_ASSESSMENT: PREVENTS OR INTERFERES SOME ACTIVE ACTIVITIES AND ADLS

## 2022-04-07 ASSESSMENT — ENCOUNTER SYMPTOMS
ALLERGIC/IMMUNOLOGIC NEGATIVE: 1
GASTROINTESTINAL NEGATIVE: 1
VOICE CHANGE: 0
SINUS PRESSURE: 0
DIARRHEA: 0
COUGH: 0
NAUSEA: 0
RESPIRATORY NEGATIVE: 1
SHORTNESS OF BREATH: 0
RHINORRHEA: 0
CHOKING: 0
CHEST TIGHTNESS: 0
WHEEZING: 0
COLOR CHANGE: 1
VOMITING: 0
CONSTIPATION: 0
ABDOMINAL PAIN: 0
BACK PAIN: 0

## 2022-04-07 ASSESSMENT — PAIN DESCRIPTION - PROGRESSION: CLINICAL_PROGRESSION: NOT CHANGED

## 2022-04-07 ASSESSMENT — PAIN DESCRIPTION - DIRECTION: RADIATING_TOWARDS: FOOT

## 2022-04-07 ASSESSMENT — PAIN SCALES - GENERAL
PAINLEVEL_OUTOF10: 8
PAINLEVEL_OUTOF10: 7
PAINLEVEL_OUTOF10: 5
PAINLEVEL_OUTOF10: 8
PAINLEVEL_OUTOF10: 9

## 2022-04-07 ASSESSMENT — PAIN DESCRIPTION - PAIN TYPE
TYPE: SURGICAL PAIN

## 2022-04-07 ASSESSMENT — PAIN DESCRIPTION - ONSET
ONSET: ON-GOING
ONSET: ON-GOING

## 2022-04-07 ASSESSMENT — PAIN DESCRIPTION - ORIENTATION
ORIENTATION: LEFT

## 2022-04-07 ASSESSMENT — PAIN DESCRIPTION - DESCRIPTORS
DESCRIPTORS: ACHING
DESCRIPTORS: ACHING

## 2022-04-07 ASSESSMENT — PAIN DESCRIPTION - FREQUENCY
FREQUENCY: CONTINUOUS
FREQUENCY: CONTINUOUS

## 2022-04-07 ASSESSMENT — PAIN DESCRIPTION - LOCATION
LOCATION: FOOT

## 2022-04-07 NOTE — PLAN OF CARE
Nutrition Problem #1: Increased nutrient needs  Intervention: Food and/or Nutrient Delivery: Start Oral Nutrition Supplement,Modify Current Diet  Nutritional Goals: PO intakes are greater than 75% at meals

## 2022-04-07 NOTE — PROGRESS NOTES
Dr Jelena Ba to the patient room; plan of care discussed with the patient. To have PICC placed for IV ATB (Ancef)  for 30 days. Discharge coordinator and Lead RN notified of plan.

## 2022-04-07 NOTE — PROGRESS NOTES
Comprehensive Nutrition Assessment    Type and Reason for Visit:  Initial    Nutrition Recommendations/Plan:   1. Modified diet to ADULT DIET; Regular; 4 carb choices (60 gm/meal)  2. Start Kishan 2x/day  3. Monitor p.o intakes, wound healing status and labs    Nutrition Assessment:  Patient admission is related osteomyelitis of ankle and foot. Patient is status post second digit amputation (4/6) related to diabetic left foot ulcer down to the bone. Patient reports a good appetite but is still hungry after eating. Carbohydrate restrition increased to 4 carbs/meal instead of only 3 carbs/ meal. Will start Kishan 2x/day. Will monitor p.o intakes and labs. Malnutrition Assessment:  Malnutrition Status:  No malnutrition      Estimated Daily Nutrient Needs:  Energy (kcal):  1122-7195 kcal (16-18 kcal/kg); Weight Used for Energy Requirements:  Current     Protein (g):  105-113 gm (1.3-1.4 gm/kg); Weight Used for Protein Requirements:  Ideal          Nutrition Related Findings:  Poor dentition. S/P Left 2nd digit amputation (4/6)      Wounds:  Surgical Incision       Current Nutrition Therapies:    ADULT DIET; Regular; 4 carb choices (60 gm/meal)  ADULT ORAL NUTRITION SUPPLEMENT; Breakfast, Dinner; Wound Healing Oral Supplement    Anthropometric Measures:  · Height: 6' (182.9 cm)  · Current Body Weight: 243 lb (110.2 kg)   · Ideal Body Weight: 178 lbs; % Ideal Body Weight 136.5 %   · BMI: 32.9   · BMI Categories: Obese Class 1 (BMI 30.0-34. 9)       Nutrition Diagnosis:   · Increased nutrient needs related to increase demand for energy/nutrients as evidenced by wounds (status post left toe amputation)      Nutrition Interventions:   Food and/or Nutrient Delivery:  Start Oral Nutrition Supplement,Modify Current Diet  Nutrition Education/Counseling:  Education not indicated   Coordination of Nutrition Care:  Continue to monitor while inpatient    Goals:  PO intakes are greater than 75% at meals       Nutrition Monitoring and Evaluation:   Food/Nutrient Intake Outcomes:  Food and Nutrient Intake,Supplement Intake  Physical Signs/Symptoms Outcomes:  Biochemical Data,Fluid Status or Edema,Skin,Weight     Discharge Planning:    Continue current diet,Continue Oral Nutrition Supplement           Cezar SWIFT, RDN, LDN  Lead Clinical Dietitian  RD Office Phone [phone]

## 2022-04-07 NOTE — PROGRESS NOTES
Dr Juan Carlos Hillman to pt room, dressing changed POC discussed. Doppler preformed per Dr Juan Carlos Hillman.  No vascular referral needed

## 2022-04-07 NOTE — PROGRESS NOTES
Occupational Therapy   Occupational Therapy Initial Assessment  Date: 2022   Patient Name: Serena Merritt  MRN: [id number]     : 1960    Date of Service: 2022    Discharge Recommendations:  Patient would benefit from continued therapy after discharge Due to recent hospitalization and medical condition, pt would benefit from additional therapy at time of discharge to ensure safety. Please refer to the AM-PAC score for current functional status.            PRE-OP DIAGNOSIS:   1. S/p left 2nd digit arthroplasty  2. Osteomyelitis, left foot  3. Cellulitis, left foot  4. Diabetic foot ulcer down to level of bone, left foot     POST-OP DIAGNOSIS: Same as above.     PROCEDURE:   1. Staged amputation of second digit, left foot          RN reports patient is medically stable for therapy treatment this date. Chart reviewed prior to treatment and patient is agreeable for therapy. All lines intact and patient positioned comfortably at end of treatment. All patient needs addressed prior to ending therapy session. Assessment   Performance deficits / Impairments: Decreased functional mobility ; Decreased ADL status; Decreased strength;Decreased safe awareness;Decreased endurance;Decreased balance  Assessment: pt would benefit from continued OT treatment d/t deficits with mob/safety/ADL completion which are all limited by NWB status  Prognosis: Good  Decision Making: Medium Complexity  OT Education: OT Role;Plan of Care;Precautions; ADL Adaptive Strategies;Transfer Training;Energy Conservation; Family Education;Equipment  REQUIRES OT FOLLOW UP: Yes  Activity Tolerance  Activity Tolerance: Patient Tolerated treatment well  Safety Devices  Safety Devices in place: Yes  Type of devices: Call light within reach;Nurse notified;Gait belt;Patient at risk for falls; Left in chair           Patient Diagnosis(es): The encounter diagnosis was Osteomyelitis of second toe of left foot ([facility]      has a past medical history of Acquired trigger finger, CAD (coronary artery disease), Cervical spondylosis, CHF (congestive heart failure) ([facility]), Chronic back pain, Contusion of elbow, Contusion of knee, Contusion of shoulder region, Diabetic foot ulcer with osteomyelitis ([address] Diabetic ulcer of toe associated with type 2 diabetes mellitus, with fat layer exposed ([address] Hypercholesterolemia, Hyperlipemia, mixed, Hypertension, Ischemic cardiomyopathy, Non-pressure chronic ulcer of left lower leg with fat layer exposed ([address] Obesity, Obesity, Class III, BMI 40-49.9 (morbid obesity) ([address] Obstructive sleep apnea syndrome, Osteoarthritis of carpometacarpal (CMC) joint of thumb, Osteoarthritis of knee, Peripheral vascular disease ([address] Presence of coronary angioplasty implant and graft, Sleep apnea, Sprain of knee and leg, Sprain of shoulder and upper arm, Type II or unspecified type diabetes mellitus without mention of complication, not stated as uncontrolled, Ulcer of left foot, with fat layer exposed ([address] Unspecified sleep apnea, and Venous insufficiency of both lower extremities. has a past surgical history that includes Neck surgery (2010); [place] tooth extraction; Coronary artery bypass graft (2/26/07); Vein Surgery; other surgical history; Tonsillectomy (age 11); Carpal tunnel release (Right, 3/22/13); Cardiac surgery; Coronary artery bypass graft (2006); Colonoscopy (2020); arthroplasty (Left, 4/1/2022); and Toe amputation (Left, 4/6/2022).            Restrictions  Restrictions/Precautions  Restrictions/Precautions: General Precautions,Fall Risk  Position Activity Restriction  Other position/activity restrictions: NWB LLE, Dressing Left foot, IV left UE    Subjective   General  Chart Reviewed: Yes  Patient assessed for rehabilitation services?: Yes  Family / Caregiver Present: No  Pain Assessment  Pain Level: 8  Social/Functional History  Social/Functional History  Lives With: Spouse,Son  Type of Home: House  Home Layout: Two instructed on safety with NWB during functional mob/ADL tasks. Assist to pull pants up fully over hips following toileting  Tone RUE  RUE Tone: Normotonic  Tone LUE  LUE Tone: Normotonic  Coordination  Movements Are Fluid And Coordinated: Yes     Bed mobility  Rolling to Left: Independent  Rolling to Right: Independent  Supine to Sit: Independent  Sit to Supine: Independent  Scooting: Independent  Transfers  Sit to stand: Contact guard assistance  Stand to sit: Contact guard assistance  Transfer Comments: decreased eccentric control for stand to sit. Cued throughout multiple transfers during session on hand placement to push up when standing and reaching back when sitting and RW position.      Cognition  Overall Cognitive Status: WFL  Safety Judgement: Decreased awareness of need for safety  Problem Solving: Decreased awareness of errors;Assistance required to implement solutions;Assistance required to generate solutions;Assistance required to correct errors made  Insights: Decreased awareness of deficits  Cognition Comment: Decreased attention to safety  Perception  Overall Perceptual Status: WFL     Sensation  Overall Sensation Status: WFL (Patient reports WFL, but chart stated diabetic neuropathy)        LUE AROM (degrees)  LUE AROM : WFL  RUE AROM (degrees)  RUE AROM : WFL  LUE Strength  Gross LUE Strength: WFL  LUE Strength Comment: B UE's 4+/5  RUE Strength  Gross RUE Strength: WFL                   Plan   Plan  Times per week: 4-5x/week  Current Treatment Recommendations: Strengthening,Balance Training,Functional Mobility Training,Endurance Training,Safety Education & Training,Self-Care / ADL,Patient/Caregiver Education & Training,Positioning,Cognitive/Perceptual Training         Goals  Short term goals  Time Frame for Short term goals: by discharge, pt will  Short term goal 1: demo SBA with ADL transfers with approp AD/DME and good safety with NWB  Short term goal 2: demo SBA with functional mob in room distances with ADL completion with good safety, pacing and good adherance to NWB  Short term goal 3: demo I with UB ADLs and SBA with LB ADLs with AE/DME as needed and good safety  Short term goal 4: demo and verb good understanding of fall prevention techs, EC/WS techs, equip needs, NWB techs, and d/c recommendations  Short term goal 5: demo SBA with toileting routine with good safety, DME as needed  Patient Goals   Patient goals : to go home       Therapy Time   Individual Concurrent Group Co-treatment   Time In 0907         Time Out 0948         Minutes 41             treatment min: Filiberto 66, OT

## 2022-04-07 NOTE — PROGRESS NOTES
Physical Therapy    Facility/Department: [facility]  Initial Assessment    NAME: Alex Bosch  : 1960  MRN: [id number]    Date of Service: 2022    Discharge Recommendations:  Patient would benefit from continued therapy after discharge   Due to recent hospitalization and medical condition, pt would benefit from additional therapy at time of discharge to ensure safety. Please refer to the AM-PAC score for current functional status. PT Equipment Recommendations  Equipment Needed: Yes  Mobility Devices: Therisa Braun: Rolling    Assessment   Body structures, Functions, Activity limitations: Decreased functional mobility ; Decreased ADL status; Decreased safe awareness;Decreased endurance;Decreased balance  Assessment: Patient does decreased gait and balance due to NWB LLE resulting in increased fall risk. Patient will benefit from skilled PT to improve gait, transfers, balance, and strength. Prognosis: Good  Decision Making: Medium Complexity  PT Education: Goals;PT Role;Plan of Care;Transfer Training;General Safety;Gait Training;Weight-bearing Education;Disease Specific Education  Patient Education: Patient educated verbally, in writing, and demo on safe ambulation and stair negotiation with NWB LLE. Patient mildly receptive to education, fair+ follow through. Access Code: [email or web address]  URL: [email or web address]  Date: 2022  Prepared by: Marcy Valdez    Patient Education  Sit to Stand with Crutches with Surgery Precautions  Walking with Crutches: Non Weight-Bearing  Sit to Stand with [employer] - Non Weight Bearing  Walking with a Standard Walker - Non Weight Bearing    REQUIRES PT FOLLOW UP: Yes  Activity Tolerance  Activity Tolerance: Patient Tolerated treatment well       Patient Diagnosis(es): The encounter diagnosis was Osteomyelitis of second toe of left foot ([facility] [address]      has a past medical history of Acquired trigger finger, CAD (coronary artery disease), Cervical spondylosis, CHF (congestive heart failure) (HCC), Chronic back pain, Contusion of elbow, Contusion of knee, Contusion of shoulder region, Diabetic foot ulcer with osteomyelitis ([address] Diabetic ulcer of toe associated with type 2 diabetes mellitus, with fat layer exposed ([address] Hypercholesterolemia, Hyperlipemia, mixed, Hypertension, Ischemic cardiomyopathy, Non-pressure chronic ulcer of left lower leg with fat layer exposed ([address] Obesity, Obesity, Class III, BMI 40-49.9 (morbid obesity) ([address] Obstructive sleep apnea syndrome, Osteoarthritis of carpometacarpal (CMC) joint of thumb, Osteoarthritis of knee, Peripheral vascular disease ([address] Presence of coronary angioplasty implant and graft, Sleep apnea, Sprain of knee and leg, Sprain of shoulder and upper arm, Type II or unspecified type diabetes mellitus without mention of complication, not stated as uncontrolled, Ulcer of left foot, with fat layer exposed ([address] Unspecified sleep apnea, and Venous insufficiency of both lower extremities. has a past surgical history that includes Neck surgery (2010); [place] tooth extraction; Coronary artery bypass graft (2/26/07); Vein Surgery; other surgical history; Tonsillectomy (age 11); Carpal tunnel release (Right, 3/22/13); Cardiac surgery; Coronary artery bypass graft (2006); Colonoscopy (2020); arthroplasty (Left, 4/1/2022); and Toe amputation (Left, 4/6/2022).     Restrictions  Restrictions/Precautions  Restrictions/Precautions: General Precautions,Fall Risk  Position Activity Restriction  Other position/activity restrictions: NWB LLE, Dressing Left foot, IV left UE  Vision/Hearing  Vision: Impaired  Vision Exceptions:  (glasses for driving)  Hearing: Within functional limits     Subjective  General  Chart Reviewed: Yes  Patient assessed for rehabilitation services?: Yes  Additional Pertinent Hx: DM, CABG, Cervical Spondylosis, pain management for chronic back pain, CHF  Response To Previous Treatment: Not applicable  Family / Caregiver Present: No  Diagnosis: Osteomyelitis left foot: Hammer toe correction sx on 4/1, 2nd toe amp on 4/6; Right jallux and 2nd toe wound  Follows Commands: Within Functional Limits  General Comment  Comments: MJ, RN reports patient medically appropriate for PT. Subjective  Subjective: Patient agreeable to PT. Patient politely but firmly refused to go to gym to practice stairs (patient reports he has his own way of doing stairs at home and his son will help). Patient reports he also has chronic left hip pain and needs joint replaced. Pre Treatment Pain Screening  Intervention List: Patient able to continue with treatment    Orientation  Orientation  Overall Orientation Status: Within Normal Limits  Social/Functional History  Social/Functional History  Lives With: Spouse,Son  Type of Home: House  Home Layout: Two level,Bed/Bath upstairs,1/2 bath on main level  Home Access: Stairs to enter with rails  Entrance Stairs - Number of Steps: 3  Entrance Stairs - Rails: Right  Bathroom Shower/Tub: Tub/Shower unit  Bathroom Toilet: Standard  Bathroom Equipment: Shower chair  Home Equipment: Lift chair,Crutches  ADL Assistance: Independent  Homemaking Assistance: Independent  Ambulation Assistance: Independent  Transfer Assistance: Independent  Active : Yes  Occupation: On disability  Type of occupation:   Leisure & Hobbies: TV  Additional Comments: No recent falls  Cognition   Cognition  Overall Cognitive Status: WFL  Cognition Comment: Decreased attention to safety    Objective     Observation/Palpation  Posture: Good  Observation: Dressing Left foot, discoloration Bilateral shins    AROM RLE (degrees)  RLE AROM: WFL  AROM LLE (degrees)  LLE AROM : WFL  LLE General AROM: Did not assess ankle / foot / toes due to recent surgery.   Strength RLE  Comment: 5/5  Strength LLE  Comment: hip 4+/5, knee 5/5, ankle not assessed  Tone RLE  RLE Tone: Normotonic  Tone LLE  LLE Tone: Normotonic  Motor Control  Gross Motor?: WNL  Sensation  Overall Sensation Status: WFL (Patient reports WFL, but chart stated diabetic neuropathy)  Bed mobility  Rolling to Left: Independent  Rolling to Right: Independent  Supine to Sit: Independent  Sit to Supine: Independent  Scooting: Independent  Transfers  Sit to Stand: Minimal Assistance  Stand to sit: Minimal Assistance  Bed to Chair: Minimal assistance  Stand Pivot Transfers: Minimal Assistance  Comment: Patient educated to proper hand placement for transfers with follow through 80% of the time. Patient educated to maintain NWB with transfers, able to follow through 80% of time (20% of time placed small amout of weight on heel). Ambulation  Ambulation?: Yes  Ambulation 1  Surface: level tile  Device: Rolling Walker  Assistance: Minimal assistance  Quality of Gait: Patient able to maintain NWB LLE 90% of ambulation, occassional WB on left heels with verbal cues for correction. Patient with decreased attention to safety, required verbal cues to rest when getting fatigued and beginning to put foot down. Distance: 20 feet, 10 feet, 10 feet  Stairs/Curb  Stairs?: Yes  Stairs  Comment: Patient declined practicing stairs, PT demo's going up stairs backwards using RW and platform step. Reviewed with patient safety issues negotiating stairs NWB on LLE. Patient reports son will help him. Balance  Sitting - Static: Good  Sitting - Dynamic: Good  Standing - Static: Good;- (RW)  Standing - Dynamic: Fair;+ (RW)        Plan   Plan  Times per week: 1-2x/d, 5-6 d/wk  Current Treatment Recommendations: Strengthening,Balance Training,Functional Mobility Training,Transfer Training,Stair training,Gait Training,Endurance Training,Patient/Caregiver Education & [employer] Exercise Program,Safety Education & Training  Safety Devices  Type of devices:  All fall risk precautions in place,Gait belt,Nurse notified,Call light within reach,Left in chair    OutComes Score    AM-PAC Score  AM-PAC Inpatient Mobility Raw Score : 19 (04/07/22 1031)  AM-PAC Inpatient T-Scale Score : 45.44 (04/07/22 1031)  Mobility Inpatient CMS 0-100% Score: 41.77 (04/07/22 1031)  Mobility Inpatient CMS G-Code Modifier : CK (04/07/22 1031)          Goals  Short term goals  Time Frame for Short term goals: 12 visits  Short term goal 1: Patient will be indep with transfers. Short term goal 2: Patient will amb 40 feet with RW and NWB LLE. Short term goal 3: Patient will negotiate 3 stairs with rail and crutch (if patient agreeable to stairs). Short term goal 4: Patient will be given written education on NWB stair negotiation and verbalize understanding. Short term goal 5: Patient will be indep with HEP.   Patient Goals   Patient goals : Return home, control infection       Therapy Time   Individual Concurrent Group Co-treatment   Time In 0847         Time Out 0930         Minutes 43         Timed Code Treatment Minutes: [address] [facility] [address], PT

## 2022-04-07 NOTE — PROGRESS NOTES
BP Temp Temp src Pulse Resp SpO2 Weight   22 0641 137/66 98.2 °F (36.8 °C) Oral 79 18 98 % --   22 0423 -- -- -- -- -- -- 243 lb 12.8 oz (110.6 kg)   22 0419 (!) 103/45 98.2 °F (36.8 °C) Oral 81 18 96 % --     Average, Min, and Max for last 24 hours Vitals:  TEMPERATURE:  Temp  Av.9 °F (36.6 °C)  Min: 97 °F (36.1 °C)  Max: 98.8 °F (37.1 °C)    RESPIRATIONS RANGE: Resp  Avg: 15.1  Min: 0  Max: 24    PULSE RANGE: Pulse  Av.9  Min: 65  Max: 101    BLOOD PRESSURE RANGE:  Systolic (76OZO), XC , Min:102 , RRB:468   ; Diastolic (45XPD), NGD:11, Min:45, Max:85      PULSE OXIMETRY RANGE: SpO2  Av.7 %  Min: 94 %  Max: 100 %    I/O last 3 completed shifts:  In: -   Out: 2450 [Urine:2450]    CBC:  Recent Labs     22  1510   WBC 12.5*   HGB 12.5*   HCT 41.3      CRP 98.9*        BMP:  Recent Labs     22  1510 22  0545    137   K 3.8 3.5*    104   CO2 25 20   BUN 18 16   CREATININE 0.78 0.89   GLUCOSE 201* 178*   CALCIUM 9.8 9.1        Coags:  Recent Labs     22  0545   INR 1.1       Lab Results   Component Value Date    SEDRATE 85 (H) 2022     Recent Labs     22  1510   CRP 98.9*       Lower Extremity Physical Exam:   Vascular: DP and PT pulses are 1/4 palpable, bilaterally with audible waveforms on doppler. CFT <5 seconds to all digits. Hair growth is diminished to the level of the digits. Moderate non-pitting edema globally to left foot.       Neuro: Saph/sural/SP/DP/plantar sensation diminished to light touch.     Musculoskeletal: Muscle strength is 5/5 to all lower extremity muscle groups. Gross deformity is hammertoe noted to left 2nd digit and right 1,2 digit .     Dermatologic: S/p left 2nd digit amputation incision is intact. No signs of dehiscence, pinpoint sanguinous drainage noted, no acute infection at this time. Erythema noted globally to left foot with mild associated increase in warmth.  No fluctuance, crepitus, or induration. Interdigital maceration absent. Small dorsal PIPJ ulcers noted to right 1 and 2 digit. Base is fibro granular. There is no erythema and edema to the right foot.        Clinical Images:                Imaging:   XR FOOT LEFT (MIN 3 VIEWS)   Final Result   Postoperative changes from 2nd digit phalangeal amputation. XR FOOT LEFT (MIN 3 VIEWS)   Final Result   Postoperative changes in the left 2nd digit without erosion or destruction to   suggest osteomyelitis. There is overlying soft tissue edema. Cultures: left foot pending  Assessment   Lam Kearns is a 64 y.o. male with   1. S/p 2nd digit amputation, left foot (4/06/2022)  2. Diabetic foot ulcer down to bone, left foot  3. Osteomyelitis, left foot  4. Cellulitis, left foot  5. Diabetic foot ulcer down to subcutaneous layer, right foot  6. Hammertoe deformity, bilateral feet. Principal Problem:    Osteomyelitis of second toe of left foot (HCC)  Active Problems:    Peripheral vascular disease ([facility] [address]    Essential hypertension    Coronary artery disease involving coronary bypass graft of native heart without angina pectoris    Diabetic polyneuropathy associated with type 2 diabetes mellitus ([facility] [address]  Resolved Problems:    * No resolved hospital problems. *       Plan     · Patient examined and evaluated at bedside   · Treatment options discussed in detail with the patient  · We will continue to monitor response to IV antibiotics and watch for improvement in cellulitis of left foot. · Abx per ID: Ancef  · Dressing changed to Left foot: Adaptic, dsd  · Dressing changed to right foot: Silver cell, DSD   · Nonweightbearing to left lower extremity  · Discussed with Dr. Adwoa Abreu    Senior Resident Statement:  I have discussed the case, including pertinent history and exam findings with the resident. I agree with the assessment, plan and orders as documented above.        Electronically signed by Radha Chavez DPM on 4/7/2022 at 2:02 KAVITA Oakley   Podiatric Medicine & Surgery   4/7/2022 at 8:54 AM

## 2022-04-07 NOTE — PLAN OF CARE
Problem: Pain:  Goal: Pain level will decrease  Description: Pain level will decrease  4/7/2022 0215 by Adrianna Espitia RN  Outcome: Ongoing  Note: Patient has as needed pain control is very emotional with his pain levels status easily angered when educated on time frames and dosages. 4/6/2022 1415 by Noemi Kim RN  Outcome: Ongoing  4/6/2022 1409 by Noemi Kim RN  Outcome: Ongoing     Problem: Falls - Risk of:  Goal: Will remain free from falls  Description: Will remain free from falls  4/7/2022 0215 by Adrianna Espitia RN  Outcome: Ongoing  Note: Patient will be free from falls this shift and is aware of personal limits.

## 2022-04-07 NOTE — PROGRESS NOTES
[facility]  Office: [address], DO, Liz Gonzalez, DO, Srinivas Acevedo, DO, Ramy Red Blood, DO, Blake Adams MD, Keke Garza MD, Clarissa Montano MD, Alayna Gonzalez MD, Victorina Holbrook MD, Kiera Zayas MD, Deann Wilburn MD, Jasmyne Pozo, DO, Yordy Osuna, DO, Louann Doyle MD,  Huseyin Navarrete, DO, Julieth Cortes MD, Laine Espinoza MD, Kristopher Claros MD, Demond Barragan DO, Katarina Munoz MD, Elias Daniels MD, Harrison Paula, [facility], [facility], [facility], Fanny Parrish, CNP, Roque Kemp, CNP, Babita Copeland, CNP, Ari Arnold, CNP, Grant Lowery, CNP, Jf Mcclain PA-C, Katie Perez, DNP, Romulo Tolentino, [facility], Tosin Gaona, CNP, Victorina Andres, CNP, Marlen Miner, CNS, Aline Esteban, [facility], Gautam Caro, CNP, Toña Munoz, CNP, Melyssa Perkins, [facility]      Daily Progress Note     Admit Date: 4/5/2022  Bed/Room No.  2108/2108-01  Admitting Physician : Clarissa Montano MD  Code Status :[address] Day:  LOS: 2 days   Chief Complaint:     Chief Complaint   Patient presents with    Other     infection to recent partial amputation site      Principal Problem:    Osteomyelitis of second toe of left foot [facility])  Active Problems:    Peripheral vascular disease [facility])    Essential hypertension    Coronary artery disease involving coronary bypass graft of native heart without angina pectoris    Diabetic polyneuropathy associated with type 2 diabetes mellitus [facility])  Resolved Problems:    * No resolved hospital problems. *    Subjective : Interval History/Significant events :  04/07/22    Patient underwent surgery. Pain is controlled. Patient denies any fever, chills, nausea, vomiting. He is taking Percocet as needed for pain. Vitals - Stable afebrile  Labs -hypokalemia 3.5, glucose 170, leukocytosis 12.5. Nursing notes , Consults notes reviewed. Overnight events and updates discussed with Nursing staff .    Background History: Serena Merritt is 64 y.o. male  Who was admitted to the hospital on 4/5/2022 for treatment of Osteomyelitis of second toe of left foot ([address] Patient was sent to emergency room by his podiatrist for left second digit wound and concern for osteomyelitis. Patient reported that he has been dealing with this wound for last 2 months. He has been treated with oral antibiotic doxycycline and also had recent surgery. Patient continued to have redness and wound changes and was concern for osteomyelitis and was sent to ED. Patient reported he had dog bite in his bilateral hands multiple times with recent wounds 2 months ago and has been dealing with skin infections. He has underlying history of type 2 diabetes mellitus with diabetic polyneuropathy, CAD with CABG in 2007 with stenting post surgery with recent stent in September 2021. He denies smoking, alcohol use. Patient is a  by profession and has unable to perform his job for the past 2 months. Initial evaluation showed leukocytosis 12.5, recent wound culture shows MSSA and  group B strep. Patient was started on IV antibiotics. Podiatry was consulted and patient underwent left second digit amputation on 4/6/2022.     PMH:  Past Medical History:   Diagnosis Date    Acquired trigger finger 6/5/2018    CAD (coronary artery disease)     [facility]/ involving coronary bypass graft of native heart with unstable angina pectoris    Cervical spondylosis     CHF [facility]congestive heart failure) [facility])     Chronic back pain     on 11/19/18 pt states is presently in pain mgmnt    Contusion of elbow 6/5/2018    Contusion of knee 6/5/2018    Contusion of shoulder region 6/5/2018    Diabetic foot ulcer with osteomyelitis ([address] 1/9/2018    Diabetic ulcer of toe associated with type 2 diabetes mellitus, with fat layer exposed ([address] 1/31/2018    Hypercholesterolemia 12/5/2017    Hyperlipemia, mixed     Hypertension     Ischemic cardiomyopathy 5/7/2018    Non-pressure chronic ulcer of left lower leg with fat layer exposed ([facility] 6/9/2014    Obesity     Obesity, Class III, BMI 40-49.9 (morbid obesity) ([address] 4/6/2015    Obstructive sleep apnea syndrome     Osteoarthritis of carpometacarpal (CMC) joint of thumb 6/5/2018    Osteoarthritis of knee 6/5/2018    Peripheral vascular disease ([address]     with venous stasis and ulcerations. Dr Pitts Presence of coronary angioplasty implant and graft 9/11/2009    Sleep apnea     Dr Dimitri Ambriz of knee and leg 6/5/2018    Sprain of shoulder and upper arm 6/5/2018    Type II or unspecified type diabetes mellitus without mention of complication, not stated as uncontrolled     Ulcer of left foot, with fat layer exposed ([facility] 1/9/2018    Unspecified sleep apnea     Dr Malika Bhatti CPAP    Venous insufficiency of both lower extremities 1/31/2018      Allergies: Allergies   Allergen Reactions    Pcn [Penicillins] Other (See Comments)     Unknown rx    Penicillin G Rash      Medications :  carvedilol, 12.5 mg, Oral, BID WC  clopidogrel, 75 mg, Oral, Daily  gabapentin, 300 mg, Oral, TID  pantoprazole, 40 mg, Oral, QAM AC  topiramate, 400 mg, Oral, Daily  traZODone, 150 mg, Oral, QPM  sodium chloride flush, 5-40 mL, IntraVENous, 2 times per day  ceFAZolin, 2,000 mg, IntraVENous, Q8H  enoxaparin, 30 mg, SubCUTAneous, BID  insulin lispro, 0-6 Units, SubCUTAneous, TID WC  insulin lispro, 0-3 Units, SubCUTAneous, Nightly  morphine, 2 mg, IntraVENous, Once        Review of Systems   Review of Systems   Constitutional: Negative for activity change, appetite change, fatigue, fever and unexpected weight change. HENT: Negative for congestion, nosebleeds, rhinorrhea, sinus pressure, sneezing and voice change. Respiratory: Negative for cough, choking, chest tightness, shortness of breath and wheezing. Cardiovascular: Negative for chest pain, palpitations and leg swelling.    Gastrointestinal: Negative for abdominal pain, constipation, diarrhea, nausea and vomiting. Genitourinary: Negative for difficulty urinating, dysuria, frequency, penile discharge and testicular pain. Musculoskeletal: Positive for arthralgias. Negative for back pain. Skin: Positive for color change and wound. Negative for rash. Neurological: Negative for dizziness, weakness, light-headedness and headaches. Hematological: Does not bruise/bleed easily. Psychiatric/Behavioral: Negative for agitation, behavioral problems, confusion, self-injury, sleep disturbance and suicidal ideas. Objective :      Current Vitals : Temp: 98.2 °F (36.8 °C),  Pulse: 79, Resp: 18, BP: 137/66, SpO2: 98 %  Last 24 Hrs Vitals   Patient Vitals for the past 24 hrs:   BP Temp Temp src Pulse Resp SpO2 Weight   04/07/22 0641 137/66 98.2 °F (36.8 °C) Oral 79 18 98 % --   04/07/22 0423 -- -- -- -- -- -- 243 lb 12.8 oz (110.6 kg)   04/07/22 0419 (!) 103/45 98.2 °F (36.8 °C) Oral 81 18 96 % --   04/06/22 2306 136/71 98.3 °F (36.8 °C) Axillary 101 18 94 % --   04/06/22 1915 (!) 158/76 98.8 °F (37.1 °C) Oral 98 16 95 % --   04/06/22 1536 110/70 97.3 °F (36.3 °C) Oral 70 16 98 % --   04/06/22 1349 106/69 -- -- 68 -- 97 % --   04/06/22 1330 102/68 97.3 °F (36.3 °C) -- -- -- -- --   04/06/22 1315 103/60 -- -- 65 13 96 % --   04/06/22 1300 114/60 -- -- 67 14 96 % --   04/06/22 1245 109/62 -- -- 77 18 99 % --   04/06/22 1236 -- 97 °F (36.1 °C) Temporal 83 17 98 % --     Intake / output   04/05 1901 - 04/07 0700  In: -   Out: 2450 [Urine:2450]  Physical Exam:  Physical Exam  Vitals and nursing note reviewed. Constitutional:       General: He is not in acute distress. Appearance: He is not diaphoretic. HENT:      Head: Normocephalic and atraumatic. Nose:      Right Sinus: No maxillary sinus tenderness or frontal sinus tenderness. Left Sinus: No maxillary sinus tenderness or frontal sinus tenderness. Mouth/Throat:      Pharynx: No oropharyngeal exudate.    Eyes: General: No scleral icterus. Conjunctiva/sclera: Conjunctivae normal.      Pupils: Pupils are equal, round, and reactive to light. Neck:      Thyroid: No thyromegaly. Vascular: No JVD. Cardiovascular:      Rate and Rhythm: Normal rate and regular rhythm. Pulses:           Dorsalis pedis pulses are 2+ on the right side and 2+ on the left side. Heart sounds: Normal heart sounds. No murmur heard. Pulmonary:      Effort: Pulmonary effort is normal.      Breath sounds: Normal breath sounds. No wheezing or rales. Abdominal:      Palpations: Abdomen is soft. There is no mass. Tenderness: There is no abdominal tenderness. Musculoskeletal:      Cervical back: Full passive range of motion without pain and neck supple. Comments: Right foot superficial wounds. Left foot surgical wound with surrounding erythema   Lymphadenopathy:      Head:      Right side of head: No submandibular adenopathy. Left side of head: No submandibular adenopathy. Cervical: No cervical adenopathy. Skin:     General: Skin is warm. Neurological:      Mental Status: He is alert and oriented to person, place, and time. Motor: No tremor. Psychiatric:         Behavior: Behavior is cooperative. Laboratory findings:    Recent Labs     04/05/22  1510 04/06/22  0545   WBC 12.5*  --    HGB 12.5*  --    HCT 41.3  --      --    SEDRATE 32* 85*   INR  --  1.1     Recent Labs     04/05/22  1510 04/06/22  0545    137   K 3.8 3.5*    104   CO2 25 20   GLUCOSE 201* 178*   BUN 18 16   CREATININE 0.78 0.89   MG  --  1.9   CALCIUM 9.8 9.1     No results for input(s): PROT, LABALBU, LABA1C, V2PBRPB, H8WPSGC, FT4, TSH, AST, ALT, LDH, GGT, ALKPHOS, BILITOT, BILIDIR, AMMONIA, AMYLASE, LIPASE, LACTATE, CHOL, HDL, LDLCHOLESTEROL, CHOLHDLRATIO, TRIG, VLDL, BNP, TROPONINI, CKTOTAL, CKMB, CKMBINDEX, RF, ELIUD in the last 72 hours.        No results found for: Cresencio Davis, [address] [place] [address], BLOODU, BACTERIA, NITRU, WBCUA, LEUKOCYTESUR    Imaging / Clinical Data :-   XR FOOT LEFT (MIN 3 VIEWS)    Result Date: 4/5/2022  Postoperative changes in the left 2nd digit without erosion or destruction to suggest osteomyelitis. There is overlying soft tissue edema. Clinical Course : stable  Assessment and Plan  :        1. Left second toe diabetic wound infection with concern for osteomyelitis - s/p I&D -cultures MSSA, group B strep -empiric Ancef.  new cultures have been sent. s/p left second digit amputation 4/6/22. Will need IV antibiotics and PICC . 2. Type 2 diabetes mellitus -uncontrolled. Not on insulin last A1c 8.5. Hold Metformin and Tradjenta. Humalog for now. 3. Essential hypertension-well-controlled  4. CAD s/p CABG and stents -continue Plavix. Hold aspirin, continue Coreg. Not on statin. No allergies listed. Check lipid panel. 5. PVD -statin, Plavix. 6. Diabetic polyneuropathy-on gabapentin. Continue to monitor vitals , Intake / output ,  Cell count , HGB , Kidney function, oxygenation  as indicated . Plan and updates discussed with patient ,  answers  explained to satisfaction.    Plan discussed with Staff ZEYNEP RN     (Please note that portions of this note were completed with a voice recognition program. Efforts were made to edit the dictations but occasionally words are mis-transcribed.)      Chi Vasques MD  4/7/2022

## 2022-04-07 NOTE — PLAN OF CARE
Problem: Pain:  Goal: Pain level will decrease  Description: Pain level will decrease  Outcome: Ongoing  Goal: Control of acute pain  Description: Control of acute pain  Outcome: Ongoing  Goal: Control of chronic pain  Description: Control of chronic pain  Outcome: Ongoing     Problem: Falls - Risk of:  Goal: Will remain free from falls  Description: Will remain free from falls  Outcome: Ongoing  Goal: Absence of physical injury  Description: Absence of physical injury  Outcome: Ongoing     Problem: Skin Integrity:  Goal: Will show no infection signs and symptoms  Description: Will show no infection signs and symptoms  Outcome: Ongoing  Goal: Absence of new skin breakdown  Description: Absence of new skin breakdown  Outcome: Ongoing  Goal: Demonstration of wound healing without infection will improve  Description: Demonstration of wound healing without infection will improve  Outcome: Ongoing     Problem: SAFETY  Goal: Free from accidental physical injury  Outcome: Ongoing     Problem: KNOWLEDGE DEFICIT  Goal: Patient/S.O. demonstrates understanding of disease process, treatment plan, medications, and discharge instructions. Outcome: Ongoing     Problem: DISCHARGE BARRIERS  Goal: Patient's continuum of care needs are met  Outcome: Ongoing     Problem: Nutrition  Goal: Optimal nutrition therapy  Outcome: Ongoing     Problem:  Activity:  Goal: Ability to return to normal activity level will improve  Description: Ability to return to normal activity level will improve  Outcome: Ongoing     Problem: Health Behavior:  Goal: Identification of resources available to assist in meeting health care needs will improve  Description: Identification of resources available to assist in meeting health care needs will improve  Outcome: Ongoing     Problem: Safety:  Goal: Ability to remain free from injury will improve  Description: Ability to remain free from injury will improve  Outcome: Ongoing     Problem: Sensory:  Goal: General experience of comfort will improve  Description: General experience of comfort will improve  Outcome: Ongoing

## 2022-04-07 NOTE — PROGRESS NOTES
[facility]  Progress Note    Ofelia Gonzalez  MRN: [id number]  Date: 4/7/2022  LOS: 2     Reason for F/U :   Diabetic foot infection/osteomyelitis    Impression :   1. Left second toe ulceration with exposure to bone   · status post vision of the left middle phalanx and partial resection of the proximal phalanx of the second digit 4/1/2022  2. Left second toe amputation wound with purulent drainage expressed upon incision and disarticulation of the metatarsophalangeal joint of the left foot and findings consistent with osteomyelitis. · Status post amputation of the second digit of the left foot 4/6/2022  3. MSSA bacteremia related to the above infection  4. Coronary artery disease with associated ischemic cardiomyopathy  5. Diabetes mellitus type 2 with associated neuropathy  6. Peripheral arterial disease    Recommendations:   · The surgical cultures have grown out group B streptococcus and Staphylococcus aureus. · Blood culture with MSSA isolated related to the above infection  · The patient will require 4 weeks of intravenous antimicrobial therapy  · I have ordered a PICC line  · The patient is okay for discharge from an infectious disease standpoint once all arrangements have been made    Infection Control Recommendations:   Universal precautions    Discharge Planning:   Estimated Length of IV antimicrobials: 4 weeks  Patient will need PICC line Insertion  Patient will need: Home IV   Patient willneed outpatient wound care: Yes    Medical Decision making / Summary of Stay:   Ofelia Gonzalez is a 64y.o.-year-old male who was initially admitted on 4/5/2022. Vero Douglass has a history of coronary artery disease, congestive heart failure, ischemic cardiomyopathy, hypertension, hyperlipidemia, obstructive sleep apnea, diabetes mellitus type 2, peripheral vascular disease among other medical problems. The patient has had wounds of toes in both feet and he has been following at the wound care center.   He does have a left foot second toe ulceration for which she has undergone debridement procedures in the past and also has been treated with doxycycline. There was exposure of the bone in the left second toe and x-rays were done and the patient had surgery on 2022 underwent excision of the left middle phalanx second digit with partial resection of the proximal phalanx second digit and debridement of nonviable tissue. The patient was seen for postop office visit 22 and the patient was noted to have ascending cellulitis of the left foot with open wound at the second left toe and was sent into the hospital for admission for IV antibiotics. Patient was admitted started on IV antimicrobial therapy with vancomycin and was taken to the operating room today and had staged amputation of the second digit of the left foot     The prior surgical culture had MSSA, group B streptococcus isolated. The patient is currently on intravenous antimicrobial therapy with cefazolin    Current evaluation:2022    BP (!) 102/55   Pulse 67   Temp 98.8 °F (37.1 °C) (Oral)   Resp 18   Ht 6' (1.829 m)   Wt 243 lb 12.8 oz (110.6 kg)   SpO2 93%   BMI 33.07 kg/m²     Temperature Range: Temp: 98.8 °F (37.1 °C) Temp  Av.3 °F (36.8 °C)  Min: 97.3 °F (36.3 °C)  Max: 98.8 °F (37.1 °C)  The patient is seen and evaluated at bedside is awake and alert in no acute distress. No subjective fevers or chills. No abdominal pain nausea vomiting. He is tolerating the antimicrobial therapy well with no diarrhea. Review of Systems   Constitutional: Negative. Respiratory: Negative. Cardiovascular: Negative. Gastrointestinal: Negative. Genitourinary: Negative. Musculoskeletal: Negative. Pain in the foot   Allergic/Immunologic: Negative. Neurological: Negative. Physical Examination :     Physical Exam  Constitutional:       Appearance: He is well-developed. HENT:      Head: Normocephalic and atraumatic. Cardiovascular:      Rate and Rhythm: Normal rate. Heart sounds: Normal heart sounds. No friction rub. No gallop. Pulmonary:      Effort: Pulmonary effort is normal.      Breath sounds: Normal breath sounds. No wheezing. Abdominal:      General: Bowel sounds are normal.      Palpations: Abdomen is soft. There is no mass. Tenderness: There is no abdominal tenderness. Musculoskeletal:         General: Normal range of motion. Cervical back: Normal range of motion and neck supple. Lymphadenopathy:      Cervical: No cervical adenopathy. Skin:     General: Skin is warm and dry. Neurological:      Mental Status: He is alert and oriented to person, place, and time. Laboratory data:   I have independently reviewed the followinglabs:  CBC with Differential:   Recent Labs     04/05/22  1510   WBC 12.5*   HGB 12.5*   HCT 41.3      LYMPHOPCT 17*   MONOPCT 8     BMP:   Recent Labs     04/05/22  1510 04/06/22  0545    137   K 3.8 3.5*    104   CO2 25 20   BUN 18 16   CREATININE 0.78 0.89   MG  --  1.9     Hepatic Function Panel: No results for input(s): PROT, LABALBU, BILIDIR, IBILI, BILITOT, ALKPHOS, ALT, AST in the last 72 hours. No results found for: PROCAL  Lab Results   Component Value Date    CRP 98.9 04/05/2022    CRP 2.1 02/02/2018     Lab Results   Component Value Date    SEDRATE 85 (H) 04/06/2022         No results found for: DDIMER  No results found for: FERRITIN  No results found for: LDH  No results found for: FIBRINOGEN    Results in Past 30 Days  Result Component Current Result Ref Range Previous Result Ref Range   SARS-CoV-2, Rapid Not Detected (4/5/2022) Not Detected Not in Time Range      Lab Results   Component Value Date    COVID19 Not Detected 04/05/2022       No results for input(s): VANCOTROUGH in the last 72 hours.     Imaging Studies:   No new imaging    Cultures:     Culture, Anaerobic and Aerobic [[id number]] (Abnormal) Collected: 04/06/22 3692 Order Status: Completed Specimen: Swab from Toe Updated: 04/07/22 1452    Specimen Description . WOUND SECOND LEFT DIGIT    Special Requests SWAB    Direct Exam This specimen contains squamous epithelial cells and therefore is suboptimal for anaerobic culture as it is contaminated with surface material.  Poor specimen quality contributes to misdiagnosis and inappropriate antimicrobial therapy. Abnormal      MANY NEUTROPHILS Abnormal      FEW GRAM POSITIVE COCCI IN PAIRS Abnormal     Culture CULTURE IN PROGRESS   Culture, Anaerobic and Aerobic [[id number]] (Abnormal) Collected: 04/06/22 1204   Order Status: Completed Specimen: Bone from Toe Updated: 04/07/22 1443    Specimen Description . TOE    Direct Exam RARE NEUTROPHILS Abnormal      FEW GRAM POSITIVE COCCI IN PAIRS Abnormal     Culture CULTURE IN PROGRESS   Culture, Anaerobic and Aerobic [[id number]] (Abnormal) Collected: 04/05/22 1747   Order Status: Completed Specimen: Wound Updated: 04/07/22 1253    Specimen Description . WOUND LEFT 2ND DIGIT    Direct Exam RARE NEUTROPHILS Abnormal      FEW GRAM POSITIVE COCCI IN PAIRS Abnormal      RARE GRAM POSITIVE RODS Abnormal     Culture STREPTOCOCCI, BETA HEMOLYTIC GROUP B MODERATE GROWTH Abnormal      STAPHYLOCOCCUS AUREUS HEAVY GROWTH Abnormal      NORMAL SKIN SHOAIB   Culture, Blood 1 [[id number]] Collected: 04/05/22 1521   Order Status: Completed Specimen: Blood Updated: 04/06/22 2029    Specimen Description . BLOOD    Special Requests LT FA,12ML    Culture NO GROWTH 1 DAY   Culture, Blood 1 [[id number]] Collected: 04/05/22 1530   Order Status: Completed Specimen: Blood Updated: 04/06/22 1620    Specimen Description . BLOOD    Special Requests RT FA,12ML    Culture POSITIVE Blood Culture     DIRECT GRAM STAIN FROM BOTTLE: GRAM POSITIVE COCCI IN CLUSTERS     Staphylococcus aureus Detected: mecA/C and MREJ Not Detected Methodology- Polymerase Chain Reaction (PCR)     (NOTE) Direct Gram Stain from bottle and Polymerase Chain Reaction (PCR) results called to and read back by: Katie Brewer RN AT 1615 ON  4/6/22. Culture, Anaerobic and Aerobic [[id number]] Collected: 04/06/22 1203   Order Status: Canceled Specimen: Swab from Toe    COVID-19, Rapid [[id number]] Collected: 04/05/22 2115   Order Status: Completed Specimen: Nasopharyngeal Swab Updated: 04/05/22 2150    Specimen Description . NASOPHARYNGEAL SWAB    SARS-CoV-2, Rapid Not Detected    Comment:        Rapid NAAT:  The specimen is NEGATIVE for SARS-CoV-2, the novel coronavirus associated with   COVID-19.         The ID NOW COVID-19 assay is designed to detect the virus that causes COVID-19 in patients   with signs and symptoms of infection who are suspected of COVID-19. An individual without symptoms of COVID-19 and who is not shedding SARS-CoV-2 virus would   expect to have a negative (not detected) result in this assay. Negative results should be treated as presumptive and, if inconsistent with clinical signs   and symptoms or necessary for patient management,   should be tested with an alternative molecular assay.  Negative results do not preclude   SARS-CoV-2 infection and   should not be used as the sole basis for patient management decisions.         Fact sheet for Healthcare Providers: Sagar   Fact sheet for Patients: Sagar           Methodology: Isothermal Nucleic Acid Amplification             Medications:      carvedilol  12.5 mg Oral BID WC    clopidogrel  75 mg Oral Daily    gabapentin  300 mg Oral TID    pantoprazole  40 mg Oral QAM AC    topiramate  400 mg Oral Daily    traZODone  150 mg Oral QPM    sodium chloride flush  5-40 mL IntraVENous 2 times per day    ceFAZolin  2,000 mg IntraVENous Q8H    enoxaparin  30 mg SubCUTAneous BID    insulin lispro  0-6 Units SubCUTAneous TID WC    insulin lispro  0-3 Units SubCUTAneous Nightly    morphine  2 mg IntraVENous Once [facility]  Rosalinda Taylor MD  [employer] messaging  OFFICE: [phone]      Electronically signed by Rosalinda Taylor MD on 4/7/2022 at 2:59 PM  Thank you for allowing us to participate in the care of this patient. Please call with questions. This note iscreated with the assistance of a speech recognition program.  While intending to generate a document that actually reflects the content of the visit, the document can still have some errors including those of syntax andsound a like substitutions which may escape proof reading. In such instances, actual meaning can be extrapolated by contextual diversion.

## 2022-04-07 NOTE — CARE COORDINATION
Dr. Demi mera. Script written for IV Ancef and faxed to Roberto.  Needs PICC placed. Referral to [facility] with [facility] and verifying insurance. CALEB initiated. The Plan for Transition of Care is related to the following treatment goals: SN for IV Ancef    The Patient was provided with a choice of provider and agrees   with the discharge plan. [x] Yes [] No    Freedom of choice list was provided with basic dialogue that supports the patient's individualized plan of care/goals, treatment preferences and shares the quality data associated with the providers.  [x] Yes [] No

## 2022-04-07 NOTE — DISCHARGE INSTR - COC
Continuity of Care Form    Patient Name: Ofelia Gonzalez   :  1960  MRN:  [id number]    Admit date:  2022  Discharge date:  2022    Code Status Order: Full Code   Advance Directives:      Admitting Physician:  Jacinta Adams MD  PCP: Kasey Pennington DO    Discharging Nurse: [facility] Unit/Room#:   Discharging Unit Phone Number: [phone]    Emergency Contact:   Extended Emergency Contact Information  Primary Emergency Contact: Briseyda Olivas, [facility] Phone: [phone]  Relation: Child  Secondary Emergency Contact: Sera Ohara  Address: [address] [place], [place] [address] [facility]  Home Phone: [phone]  Work Phone: [phone]  Mobile Phone: [phone]  Relation: Spouse    Past Surgical History:  Past Surgical History:   Procedure Laterality Date    ARTHROPLASTY Left 2022    ARTHROPLASTY 2ND LEFT TOE performed by Brian Rivas DPM at [facility] Right 3/22/13    Dr. Vernon Bobby.     COLONOSCOPY      CORONARY ARTERY BYPASS GRAFT  07    [facility], Dr Humphrey Johnson (X 3)    CORONARY ARTERY BYPASS GRAFT  2006    NECK SURGERY  2010    cervical stenoses C5-C6-C7and partial T1 laminectomy with fusion of C6-7    OTHER SURGICAL HISTORY      wound care ulcers of legs (bilaterally) with Dr Iris Garnett Left 2022    TOE AMPUTATION, SECOND DIGIT performed by Brian Rivas DPM at [address]  age 11    VEIN SURGERY      closure of peripherator veins of legs, Dr Parnell Cough EXTRACTION         Immunization History:   Immunization History   Administered Date(s) Administered    AVAID-19, Shree Gutierrez, Primary or Immunocompromised, PF, 100mcg/0.5mL 2021, 2021, 2021, 2021, 01/15/2022, 01/15/2022    Influenza Virus Vaccine 2006    Td, unspecified formulation 2001    Tdap (Boostrix, Adacel) 2013, 2014, 2017, 2019       Active Problems:  Patient Active Problem List   Diagnosis Code    Hyperlipemia, mixed E78.2    Sleep apnea G47.30    Peripheral vascular disease ([facility]) I73.9    CAD (coronary artery disease) I25.10    CHF (congestive heart failure) ([facility]) I50.9    CTS (carpal tunnel syndrome) G56.00    Hypotension I95.9    Obesity, Class III, BMI 40-49.9 (morbid obesity) ([address] E66.01    Essential hypertension I10    DDD (degenerative disc disease), cervical M50.30    DJD (degenerative joint disease), cervical M47.812    Primary osteoarthritis involving multiple joints M89.49    Paresthesias in left hand R20.2    Uncontrolled type 2 diabetes mellitus without complication, without long-term current use of insulin [id number]    Coronary artery disease involving coronary bypass graft of native heart without angina pectoris I25.810    Sleep apnea G47.30    Cervical spondylosis M47.812    Obesity E66.9    Long term current use of antithrombotics/antiplatelets U14.41    H/O cervical spine surgery Z98.890    Long term (current) use of non-steroidal anti-inflammatories (nsaid) Z79.1    Chronic prescription opiate use Z79.891    Severe comorbid illness R69    Hx of cervical spine surgery Z98.890    Chronic neck pain M54.2, G89.29    Candidal balanitis B37.42    Cellulitis of third toe, left L03.032    Nail avulsion, toe, initial encounter S91.209A    Cellulitis of second toe, right L03.031    Abnormal nuclear stress test R94.39    Abnormal stress test R94.39    Hypercholesterolemia E78.00    Diabetic foot ulcer with osteomyelitis ([facility]) E11.621, E11.69, L97.509, M86.9    Ulcer of left foot, with fat layer exposed ([address] L97.522    Acquired trigger finger M65.30    Contusion of elbow S50.00XA    Contusion of shoulder region S40.019A    Contusion of knee S80.00XA    Degeneration of lumbar intervertebral disc M51.36    Diabetic ulcer of toe associated with type 2 diabetes mellitus, with fat layer exposed ([address] R48.163, L97.502    History of coronary artery bypass graft x 3 Z95.1 Ischemic cardiomyopathy I25.5    Osteoarthritis of knee M17.10    Osteoarthritis of carpometacarpal (CMC) joint of thumb M18.9    Sprain of knee and leg S83. 90XA    Sprain of shoulder and upper arm S43.409A    Presence of coronary angioplasty implant and graft Z95.5    Venous insufficiency of both lower extremities I87.2    Dog bite W54. 0XXA    Impingement syndrome of shoulder region M75.40    Bite wound of hand, left, subsequent encounter S61.452D    Bite wound of hand, right, subsequent encounter S61.451D    Open bite of left ear S01.352A    Open wound of nose due to dog bite S01.25XA, W54. 0XXA    Diabetic polyneuropathy associated with type 2 diabetes mellitus (HCC) E11.42    Chronic ulcer of toe, left, with necrosis of bone ([facility]) L97.524    Ulcer of toe of right foot, with fat layer exposed ([address] L97.512    Osteomyelitis of second toe of left foot ([facility]) M86.9       Isolation/Infection:   Isolation            No Isolation          Patient Infection Status       None to display            Nurse Assessment:  Last Vital Signs: /66   Pulse 79   Temp 98.2 °F (36.8 °C) (Oral)   Resp 18   Ht 6' (1.829 m)   Wt 243 lb 12.8 oz (110.6 kg)   SpO2 98%   BMI 33.07 kg/m²     Last documented pain score (0-10 scale): Pain Level: 8  Last Weight:   Wt Readings from Last 1 Encounters:   04/07/22 243 lb 12.8 oz (110.6 kg)     Mental Status:  oriented and alert    IV Access:  - PICC - site  R Basilic, insertion date: 4/8/2022    Nursing Mobility/ADLs:  Walking   Assisted  Transfer  Assisted  Bathing  Assisted  Dressing  Assisted  Toileting  Assisted  Feeding  Independent  Med Admin  Independent  Med Delivery   whole    Wound Care Documentation and Therapy:  Wound 02/28/22 Toe (Comment  which one) Right 2nd Toe #5 (Active)   Wound Etiology Diabetic Pate 1 04/05/22 1335   Dressing Status Clean;Dry; Intact 04/05/22 1931   Wound Cleansed Cleansed with saline 04/05/22 1335   Dressing/Treatment Ace wrap;Dry dressing 04/05/22 1931   Offloading for Diabetic Foot Ulcers Offloading ordered;Post op shoe 04/05/22 1335   Wound Length (cm) 1.9 cm 04/05/22 1335   Wound Width (cm) 1.9 cm 04/05/22 1335   Wound Depth (cm) 0.1 cm 04/05/22 1335   Wound Surface Area (cm^2) 3.61 cm^2 04/05/22 1335   Change in Wound Size % (l*w) -17.21 04/05/22 1335   Wound Volume (cm^3) 0.361 cm^3 04/05/22 1335   Wound Healing % -17 04/05/22 1335   Post-Procedure Length (cm) 1.9 cm 04/05/22 1335   Post-Procedure Width (cm) 1.9 cm 04/05/22 1335   Post-Procedure Depth (cm) 0.1 cm 04/05/22 1335   Post-Procedure Surface Area (cm^2) 3.61 cm^2 04/05/22 1335   Post-Procedure Volume (cm^3) 0.361 cm^3 04/05/22 1335   Wound Assessment Slough;Pink/red 04/05/22 1335   Drainage Amount None 04/05/22 1931   Drainage Description Serosanguinous 04/05/22 1335   Odor None 04/05/22 1335   Trudi-wound Assessment Blanchable erythema 04/05/22 1335   Margins Defined edges 04/05/22 1335   Wound Thickness Description not for Pressure Injury Full thickness 04/05/22 1335   Number of days: 38       Wound 03/09/22 Toe (Comment  which one) Right #6 right great toe (Active)   Wound Image   03/09/22 0922   Wound Etiology Diabetic Pate 1 04/05/22 1335   Dressing Status Clean;Dry; Intact 04/06/22 1945   Wound Cleansed Cleansed with saline 04/05/22 1335   Dressing/Treatment Ace wrap;Dry dressing 04/06/22 0802   Offloading for Diabetic Foot Ulcers Offloading ordered;Post op shoe 04/05/22 1335   Wound Length (cm) 1 cm 04/05/22 1335   Wound Width (cm) 1 cm 04/05/22 1335   Wound Depth (cm) 0.1 cm 04/05/22 1335   Wound Surface Area (cm^2) 1 cm^2 04/05/22 1335   Change in Wound Size % (l*w) -1.01 04/05/22 1335   Wound Volume (cm^3) 0.1 cm^3 04/05/22 1335   Wound Healing % -1 04/05/22 1335   Post-Procedure Length (cm) 1 cm 04/05/22 1335   Post-Procedure Width (cm) 1 cm 04/05/22 1335   Post-Procedure Depth (cm) 0.1 cm 04/05/22 1335   Post-Procedure Surface Area (cm^2) 1 cm^2 04/05/22 1335   Post-Procedure Volume (cm^3) 0.1 cm^3 04/05/22 1335   Wound Assessment [facility] 04/05/22 1335   Drainage Amount Moderate 04/06/22 1945   Drainage Description Serosanguinous 04/06/22 1945   Odor None 04/06/22 1945   Trudi-wound Assessment Blanchable erythema 04/05/22 1335   Margins Defined edges 04/05/22 1335   Wound Thickness Description not for Pressure Injury Full thickness 04/05/22 1335   Number of days: 28       Wound 03/09/22 Toe (Comment  which one) Left #8 left 2nd toe (Active)   Wound Image   03/09/22 0922   Wound Etiology Diabetic 04/06/22 0802   Dressing Status Clean;Dry; Intact 04/06/22 0802   Wound Cleansed Cleansed with saline 04/05/22 1335   Dressing/Treatment Ace wrap;Dry dressing 04/06/22 0802   Offloading for Diabetic Foot Ulcers Other (comment) 04/06/22 0802   Wound Length (cm) 4 cm 04/05/22 1335   Wound Width (cm) 2 cm 04/05/22 1335   Wound Depth (cm) 0.5 cm 04/05/22 1335   Wound Surface Area (cm^2) 8 cm^2 04/05/22 1335   Change in Wound Size % (l*w) -233.33 04/05/22 1335   Wound Volume (cm^3) 4 cm^3 04/05/22 1335   Wound Healing % -1567 04/05/22 1335   Post-Procedure Length (cm) 4 cm 04/05/22 1335   Post-Procedure Width (cm) 2 cm 04/05/22 1335   Post-Procedure Depth (cm) 0.5 cm 04/05/22 1335   Post-Procedure Surface Area (cm^2) 8 cm^2 04/05/22 1335   Post-Procedure Volume (cm^3) 4 cm^3 04/05/22 1335   Wound Assessment Other (Comment) 04/06/22 0802   Drainage Amount None 04/06/22 0802   Drainage Description Serosanguinous 04/05/22 1335   Odor None 04/06/22 0802   Trudi-wound Assessment Blanchable erythema; Maceration 04/05/22 1335   Margins Defined edges 04/05/22 1335   Wound Thickness Description not for Pressure Injury Full thickness 04/05/22 1335   Number of days: 28        Elimination:  Continence:    Bowel: Yes  Bladder: Yes  Urinary Catheter: None   Colostomy/Ileostomy/Ileal Conduit: No       Date of Last BM: 4/8/2022    Intake/Output Summary (Last 24 hours) at 4/7/2022 1022  Last data filed at 4/7/2022 0232  Gross per 24 hour   Intake --   Output 600 ml   Net -600 ml     I/O last 3 completed shifts:  In: -   Out: 2450 [Urine:2450]    Safety Concerns: At Risk for Falls    Impairments/Disabilities:      Amputation - 2nd digit of left foot    Nutrition Therapy:  Current Nutrition Therapy:   - Oral Diet:  General    Routes of Feeding: Oral  Liquids: No Restrictions  Daily Fluid Restriction: no  Last Modified Barium Swallow with Video (Video Swallowing Test): not done    Treatments at the Time of Hospital Discharge:   Respiratory Treatments: ***  Oxygen Therapy:  is not on home oxygen therapy. Ventilator:    - No ventilator support    Rehab Therapies: Physical Therapy and Occupational Therapy  Weight Bearing Status/Restrictions: Non-weight bearing on left leg  Other Medical Equipment (for information only, NOT a DME order):  walker  Other Treatments: Skilled nursing assessment  Med teaching and compliance  IV Ancef 2000mg every 8 hours through 5-3  PICC care per agency protocol    Patient's personal belongings (please select all that are sent with patient):  None    RN SIGNATURE:  Electronically signed by Anna Good RN on 4/8/22 at 12:55 PM EDT    CASE MANAGEMENT/SOCIAL WORK SECTION    Inpatient Status Date: ***    Readmission Risk Assessment Score:  Readmission Risk              Risk of Unplanned Readmission:  11           Discharging to Facility/ Agency   Name: [facility]  Address:  Phone: [phone]  Fax: [phone]      / signature: Electronically signed by Carlin Carnes RN on 4/7/22 at 3:45 PM EDT    PHYSICIAN SECTION    Prognosis: Good    Condition at Discharge: Stable    Rehab Potential (if transferring to Rehab): Good    Recommended Labs or Other Treatments After Discharge:   Dressing to left foot: adaptic, DSD.    Dressing to right foot: Saline wet to dry, DSD    CBC, BMP, CRP weekly, Schedule for follow-up visit in 4 weeks  Fax results to David Nickerson MD FAX: [phone]      Physician Certification: I certify the above information and transfer of Kelvin German  is necessary for the continuing treatment of the diagnosis listed and that he requires [address] for less 30 days.      Update Admission H&P: No change in H&P    PHYSICIAN SIGNATURE:  Electronically signed by Caroline Urbina MD on 4/7/22 at 3:20 PM EDT

## 2022-04-08 VITALS
HEART RATE: 70 BPM | OXYGEN SATURATION: 96 % | TEMPERATURE: 97.8 F | RESPIRATION RATE: 16 BRPM | SYSTOLIC BLOOD PRESSURE: 125 MMHG | BODY MASS INDEX: 33.89 KG/M2 | WEIGHT: 250.2 LBS | DIASTOLIC BLOOD PRESSURE: 60 MMHG | HEIGHT: 72 IN

## 2022-04-08 LAB
C-REACTIVE PROTEIN: 128.9 MG/L (ref 0–5)
CULTURE: ABNORMAL
DIRECT EXAM: ABNORMAL
GLUCOSE BLD-MCNC: 195 MG/DL (ref 75–110)
GLUCOSE BLD-MCNC: 234 MG/DL (ref 75–110)
Lab: ABNORMAL
SPECIMEN DESCRIPTION: ABNORMAL
SPECIMEN DESCRIPTION: ABNORMAL

## 2022-04-08 PROCEDURE — 6370000000 HC RX 637 (ALT 250 FOR IP)

## 2022-04-08 PROCEDURE — 2580000003 HC RX 258

## 2022-04-08 PROCEDURE — 86140 C-REACTIVE PROTEIN: CPT

## 2022-04-08 PROCEDURE — 99239 HOSP IP/OBS DSCHRG MGMT >30: CPT | Performed by: STUDENT IN AN ORGANIZED HEALTH CARE EDUCATION/TRAINING PROGRAM

## 2022-04-08 PROCEDURE — 97530 THERAPEUTIC ACTIVITIES: CPT

## 2022-04-08 PROCEDURE — 82947 ASSAY GLUCOSE BLOOD QUANT: CPT

## 2022-04-08 PROCEDURE — 6360000002 HC RX W HCPCS

## 2022-04-08 PROCEDURE — 36415 COLL VENOUS BLD VENIPUNCTURE: CPT

## 2022-04-08 PROCEDURE — 99232 SBSQ HOSP IP/OBS MODERATE 35: CPT | Performed by: INTERNAL MEDICINE

## 2022-04-08 RX ADMIN — SODIUM CHLORIDE 25 ML: 9 INJECTION, SOLUTION INTRAVENOUS at 13:23

## 2022-04-08 RX ADMIN — INSULIN LISPRO 2 UNITS: 100 INJECTION, SOLUTION INTRAVENOUS; SUBCUTANEOUS at 13:13

## 2022-04-08 RX ADMIN — HYDROCODONE BITARTRATE AND ACETAMINOPHEN 1 TABLET: 10; 325 TABLET ORAL at 06:08

## 2022-04-08 RX ADMIN — PANTOPRAZOLE SODIUM 40 MG: 40 TABLET, DELAYED RELEASE ORAL at 06:08

## 2022-04-08 RX ADMIN — CLOPIDOGREL BISULFATE 75 MG: 75 TABLET ORAL at 08:15

## 2022-04-08 RX ADMIN — POLYETHYLENE GLYCOL 3350 17 G: 17 POWDER, FOR SOLUTION ORAL at 08:20

## 2022-04-08 RX ADMIN — ENOXAPARIN SODIUM 30 MG: 30 INJECTION SUBCUTANEOUS at 08:15

## 2022-04-08 RX ADMIN — TOPIRAMATE 400 MG: 100 TABLET, FILM COATED ORAL at 08:16

## 2022-04-08 RX ADMIN — CEFAZOLIN SODIUM 2000 MG: 10 INJECTION, POWDER, FOR SOLUTION INTRAVENOUS at 13:24

## 2022-04-08 RX ADMIN — CEFAZOLIN SODIUM 2000 MG: 10 INJECTION, POWDER, FOR SOLUTION INTRAVENOUS at 03:55

## 2022-04-08 RX ADMIN — INSULIN LISPRO 1 UNITS: 100 INJECTION, SOLUTION INTRAVENOUS; SUBCUTANEOUS at 08:16

## 2022-04-08 RX ADMIN — HYDROCODONE BITARTRATE AND ACETAMINOPHEN 1 TABLET: 10; 325 TABLET ORAL at 12:02

## 2022-04-08 RX ADMIN — SODIUM CHLORIDE, PRESERVATIVE FREE 10 ML: 5 INJECTION INTRAVENOUS at 08:16

## 2022-04-08 RX ADMIN — CARVEDILOL 12.5 MG: 12.5 TABLET, FILM COATED ORAL at 08:15

## 2022-04-08 RX ADMIN — SODIUM CHLORIDE, PRESERVATIVE FREE 10 ML: 5 INJECTION INTRAVENOUS at 13:20

## 2022-04-08 RX ADMIN — HYDROCODONE BITARTRATE AND ACETAMINOPHEN 1 TABLET: 10; 325 TABLET ORAL at 02:12

## 2022-04-08 ASSESSMENT — PAIN SCALES - GENERAL
PAINLEVEL_OUTOF10: 7
PAINLEVEL_OUTOF10: 7
PAINLEVEL_OUTOF10: 8
PAINLEVEL_OUTOF10: 4

## 2022-04-08 ASSESSMENT — PAIN DESCRIPTION - PROGRESSION
CLINICAL_PROGRESSION: NOT CHANGED

## 2022-04-08 ASSESSMENT — PAIN DESCRIPTION - FREQUENCY
FREQUENCY: CONTINUOUS

## 2022-04-08 ASSESSMENT — PAIN DESCRIPTION - LOCATION
LOCATION: FOOT

## 2022-04-08 ASSESSMENT — PAIN DESCRIPTION - DIRECTION
RADIATING_TOWARDS: FOOT
RADIATING_TOWARDS: FOOT

## 2022-04-08 ASSESSMENT — ENCOUNTER SYMPTOMS
ALLERGIC/IMMUNOLOGIC NEGATIVE: 1
GASTROINTESTINAL NEGATIVE: 1
RESPIRATORY NEGATIVE: 1

## 2022-04-08 ASSESSMENT — PAIN - FUNCTIONAL ASSESSMENT
PAIN_FUNCTIONAL_ASSESSMENT: PREVENTS OR INTERFERES SOME ACTIVE ACTIVITIES AND ADLS

## 2022-04-08 ASSESSMENT — PAIN DESCRIPTION - PAIN TYPE
TYPE: ACUTE PAIN;CHRONIC PAIN;SURGICAL PAIN
TYPE: SURGICAL PAIN
TYPE: SURGICAL PAIN

## 2022-04-08 ASSESSMENT — PAIN DESCRIPTION - ONSET
ONSET: ON-GOING

## 2022-04-08 ASSESSMENT — PAIN DESCRIPTION - DESCRIPTORS
DESCRIPTORS: ACHING
DESCRIPTORS: ACHING
DESCRIPTORS: ACHING;DISCOMFORT

## 2022-04-08 ASSESSMENT — PAIN DESCRIPTION - ORIENTATION
ORIENTATION: LEFT;RIGHT
ORIENTATION: RIGHT;LEFT
ORIENTATION: LEFT;RIGHT

## 2022-04-08 NOTE — PROGRESS NOTES
[address]  Occupational Therapy Not Seen    DATE: 2022    NAME: Ofelia Gonzalez  MRN: [id number]   : 1960    Patient not seen this date for Occupational Therapy due to:      [] Cancel by RN or physician due to:    [] Hemodialysis    [] Critical Lab Value Level     [] Blood transfusion in progress    [] Acute or unstable cardiovascular status   _MAP < 55 or more than >115  _HR < 40 or > 130    [] Acute or unstable pulmonary status   -FiO2 > 60%   _RR < 5 or >40    _O2 sats < 85%    [] Strict Bedrest    [] Off Unit for surgery or procedure    [] Off Unit for testing       [] Pending imaging to R/O fracture    [] Refusal by Patient      [x] Other. Pt currently having PICC line placed. [] OT being discontinued at this time. Patient independent. No further needs. [] OT being discontinued at this time as the patient has been transferred to hospice care. No further needs.       NIKOLE Jorgensen Helical Rim Advancement Flap Text: The defect edges were debeveled with a #15c blade scalpel.  Given the location of the defect and the proximity to free margins (helical rim) a double helical rim advancement flap was deemed most appropriate.  Using a sterile surgical marker, the appropriate advancement flaps were drawn incorporating the defect and placing the expected incisions between the helical rim and antihelix where possible.  The area thus outlined was incised through and through with a #15 scalpel blade.  With a skin hook and iris scissors, the flaps were gently and sharply undermined and freed up.

## 2022-04-08 NOTE — PROGRESS NOTES
Patient was transferred from 2108 to 2023. Patient was upset when he was brought to room because it had a smaller tv and a longer walk to the bathroom. Patient is currently resting in his room. Will continue to monitor.

## 2022-04-08 NOTE — DISCHARGE SUMMARY
[facility]  Office: [address], DO, Mabel Hobson, DO, Shanelle Hernandez, DO, Taty Webster Blood, DO, Genna Serrano MD, Gilberto Chapa MD, Kesha Rodas MD, Ana Knott MD, Joseline Peace MD, Noe Alfaro MD, Chastity Mckeon MD, Mason Stafford, DO, Tani Dodson, DO, Yoselin De Jesus MD,  Rosalva Manning, DO, Ruben Estrada MD, Henrry Taylor MD, Yoseph Corbett MD, Addis Rasmussen, DO, Shy Kern MD, Berta Mcdaniels MD, Lindsay Ash, [facility], [facility], [facility], Nixon Kennedy, CNP, Babak Correa, CNP, Kristi Snowden, CNP, Mik Ferrara, CNP, Deejay Montague, CNP, MARIA VICTORIA GarciaC, Barbara Tello, DNP, Selina Collnis, CNP, Osei Daugherty, CNP, Nitish Rob, CNP, Sonia Graf, CNS, Feliz Hameed, DNP, Valery Thorpe, CNP, Myles Chavez, CNP, Aysha Lora, [facility]  [facility]    Discharge Summary     Patient ID: Leonarda Higgins  :  1960   MRN: [id number]     ACCOUNT:  [de-identified]   Patient's PCP: Dali Reddy DO  Admit Date: 2022   Discharge Date: 2022     Length of Stay: 3  Code Status:  Prior  Admitting Physician: Kesha Rodas MD  Discharge Physician: Henrry Taylor MD     Active Discharge Diagnoses:     Hospital Problem Lists:  Principal Problem:    Osteomyelitis of second toe of left foot [facility])  Active Problems:    Peripheral vascular disease ([facility]    Essential hypertension    Coronary artery disease involving coronary bypass graft of native heart without angina pectoris    Diabetic polyneuropathy associated with type 2 diabetes mellitus ([facility]  Resolved Problems:    * No resolved hospital problems.  *      Admission Condition:  poor     Discharged Condition: fair    Hospital Stay:     Hospital Course:  Leonarda Higgins is a 64 y.o. male who was admitted for the management of  Osteomyelitis of second toe of left foot ([facility] , presented to ER with Other (infection to recent partial amputation site )    79[facility] left second toe wound and concern for osteomyelitis. Patient has been dealing with the wound for last 2 months. Patient was on oral antibiotics outpatient. Patient continued to have redness and wound change and there was concern for osteomyelitis. Patient known medical history of type 2 diabetes mellitus with diabetic polyneuropathy, CAD with CABG history, recent stent in 2021,status post vision of the left middle phalanx and partial resection of the proximal phalanx of the second digit 4/1/2022    Patient had I&D done by podiatry, cultures grew MSSA, group B strep, was on empiric Ancef. Patient underwent left second digit amputation on 4/6. Infectious disease recommended cefazolin IV outpatient for 4 weeks. PICC line was placed.   Type 2 diabetes mellitus-A1c uncontrolled, follow-up with PCP, continue Metformin and Tradjenta  Essential hypertension-controlled    Significant therapeutic interventions: as above    Significant Diagnostic Studies:   Labs / Micro:  CBC:   Lab Results   Component Value Date    WBC 12.5 04/05/2022    RBC 4.50 04/05/2022    HGB 12.5 04/05/2022    HCT 41.3 04/05/2022    MCV 91.8 04/05/2022    MCH 27.8 04/05/2022    MCHC 30.3 04/05/2022    RDW 12.9 04/05/2022     04/05/2022     BMP:    Lab Results   Component Value Date    GLUCOSE 178 04/06/2022     04/06/2022    K 3.5 04/06/2022     04/06/2022    CO2 20 04/06/2022    ANIONGAP 13 04/06/2022    BUN 16 04/06/2022    CREATININE 0.89 04/06/2022    BUNCRER 18 04/06/2022    CALCIUM 9.1 04/06/2022    LABGLOM >60 04/06/2022    GFRAA >60 04/06/2022    GFR      04/06/2022     HFP:    Lab Results   Component Value Date    PROT 7.2 06/19/2021     CMP:    Lab Results   Component Value Date    GLUCOSE 178 04/06/2022     04/06/2022    K 3.5 04/06/2022     04/06/2022    CO2 20 04/06/2022    BUN 16 04/06/2022    CREATININE 0.89 04/06/2022    ANIONGAP 13 04/06/2022    ALKPHOS 71 06/19/2021    ALT 15 06/19/2021    AST 12 06/19/2021    BILITOT 0.28 06/19/2021    LABALBU 4.1 03/23/2022    ALBUMIN 1.4 06/19/2021    LABGLOM >60 04/06/2022    GFRAA >60 04/06/2022    GFR      04/06/2022    PROT 7.2 06/19/2021    CALCIUM 9.1 04/06/2022        Radiology:  XR FOOT LEFT (MIN 3 VIEWS)    Result Date: 4/6/2022  Postoperative changes from 2nd digit phalangeal amputation. XR FOOT LEFT (MIN 3 VIEWS)    Result Date: 4/5/2022  Postoperative changes in the left 2nd digit without erosion or destruction to suggest osteomyelitis. There is overlying soft tissue edema. Consultations:    Consults:     Final Specialist Recommendations/Findings:   IP CONSULT TO PODIATRY  PHARMACY TO DOSE VANCOMYCIN  IP CONSULT TO INTERNAL MEDICINE  IP CONSULT TO PODIATRY  IP CONSULT TO INFECTIOUS DISEASES      The patient was seen and examined on day of discharge and this discharge summary is in conjunction with any daily progress note from day of discharge. Discharge plan:     Disposition: Home    Physician Follow Up:      Pili Mullen MD  [address] [place] [address]  [phone]    In 4 weeks      [facility] specialty infusion  [id number] [id number] [place] [address]  [phone]    Schedule an appointment as soon as possible for a visit  As needed, Follow-up       Requiring Further Evaluation/Follow Up POST HOSPITALIZATION/Incidental Findings: coninue abx   Follow up with podiatry and ID    Diet: regular diet    Activity: As tolerated    Instructions to Patient: Cefazolin for 4 weeks    Discharge Medications:      Medication List      START taking these medications    ceFAZolin  infusion  Commonly known as: ANCEF  Infuse 2,000 mg intravenously every 8 hours for 26 days Through 5/3/2022 Compound per protocol        CHANGE how you take these medications    topiramate 200 MG tablet  Commonly known as: TOPAMAX  Take 1 tablet by mouth 2 times daily  What changed:   · how much to take  · when to take this        CONTINUE taking these medications    aspirin 325 MG tablet     carvedilol 12.5 MG tablet  Commonly known as: COREG  Take 1 tablet by mouth 2 times daily (with meals)     clopidogrel 75 MG tablet  Commonly known as: PLAVIX  Take 1 tablet by mouth daily     gabapentin 300 MG capsule  Commonly known as: NEURONTIN     GLUCOSAMINE CHOND MSM FORMULA PO     hydroCHLOROthiazide 12.5 MG capsule  Commonly known as: MICROZIDE  1 po qd     HYDROcodone-acetaminophen  MG per tablet  Commonly known as: NORCO     linagliptin 5 MG tablet  Commonly known as: Tradjenta  TAKE 1 TABLET BY MOUTH ONE TIME A DAY     meloxicam 15 MG tablet  Commonly known as: MOBIC  Take 1 tablet by mouth daily     metFORMIN 1000 MG tablet  Commonly known as: GLUCOPHAGE  Take 1 tablet by mouth 2 times daily (with meals)     MULTI VITAMIN DAILY PO     * NEEDLE (DISP) 18 G 18G X 1\" Misc  1 Syringe by Does not apply route every 30 days     * BD Disp Needles 18G X 1-1/2\" Misc  Generic drug: NEEDLE (DISP) 18 G  1 Device by Does not apply route every 30 days     * NEEDLE (DISP) 22 G 22G X 1-1/2\" Misc  1 Syringe by Does not apply route every 30 days     * NEEDLE (DISP) 22 G 22G X 1-1/2\" Misc  1 Device by Does not apply route every 30 days     nitroGLYCERIN 0.4 MG SL tablet  Commonly known as: NITROSTAT     omeprazole 20 MG delayed release capsule  Commonly known as: PRILOSEC  TAKE 1 CAPSULE DAILY     * Testosterone Cypionate 200 MG/ML Kit  Inject 200 mg into the muscle every 30 days for 30 days.  please include syringes     * testosterone cypionate 200 MG/ML injection  Commonly known as: DEPOTESTOTERONE CYPIONATE     tiZANidine 4 MG tablet  Commonly known as: ZANAFLEX  1 po qd     traZODone 150 MG tablet  Commonly known as: DESYREL  TAKE ONE TABLET BY MOUTH EVERY EVENING         * This list has 6 medication(s) that are the same as other medications prescribed for you. Read the directions carefully, and ask your doctor or other care provider to review them with you. STOP taking these medications    doxycycline hyclate 100 MG tablet  Commonly known as: VIBRA-TABS           Where to Get Your Medications      You can get these medications from any pharmacy    Bring a paper prescription for each of these medications  · ceFAZolin  infusion         Discharge Procedure Orders   DME Order for Zac Costa as OP   Order Comments: You must complete the order parameters below and add the medical necessity documentation for this DME in a separate note. Folding Walker with Wheels    Current patient weight: Weight: 243 lb 12.8 oz (110.6 kg)  Current patient height: Height: 6' (182.9 cm)  Diagnosis: osteomyelitis left foot  Duration: Purchase       Time Spent on discharge is  34 mins in patient examination, evaluation, counseling as well as medication reconciliation, prescriptions for required medications, discharge plan and follow up. Electronically signed by   Essence Gee MD  4/8/2022  5:45 PM      Thank you Dr. Zachariah Davidson DO for the opportunity to be involved in this patient's care.

## 2022-04-08 NOTE — PROGRESS NOTES
Physical Therapy  Facility/Department: [facility]  Daily Treatment Note  NAME: Maggy Loza  : 1960  MRN: [id number]    Date of Service: 2022    Discharge Recommendations:  Patient would benefit from continued therapy after discharge        Assessment   Body structures, Functions, Activity limitations: Decreased functional mobility ; Decreased ADL status; Decreased safe awareness;Decreased endurance;Decreased balance  Assessment: Pt states he is not in need of further PT intervention  Specific instructions for Next Treatment: Ambulation if pt amenable  Prognosis: Good  Other Comments  Comments: Pt issued educational material: enrgy conservation and safety w/ mobility     Patient Diagnosis(es): The primary encounter diagnosis was Osteomyelitis of second toe of left foot ([address] Diagnoses of Ulcer of toe of right foot, with fat layer exposed ([address] and Diabetic ulcer of toe of left foot associated with type 2 diabetes mellitus, with fat layer exposed ([address] were also pertinent to this visit.      has a past medical history of Acquired trigger finger, CAD (coronary artery disease), Cervical spondylosis, CHF (congestive heart failure) ([address] Chronic back pain, Contusion of elbow, Contusion of knee, Contusion of shoulder region, Diabetic foot ulcer with osteomyelitis ([address] Diabetic ulcer of toe associated with type 2 diabetes mellitus, with fat layer exposed ([address] Hypercholesterolemia, Hyperlipemia, mixed, Hypertension, Ischemic cardiomyopathy, Non-pressure chronic ulcer of left lower leg with fat layer exposed ([address] Obesity, Obesity, Class III, BMI 40-49.9 (morbid obesity) ([address] Obstructive sleep apnea syndrome, Osteoarthritis of carpometacarpal (CMC) joint of thumb, Osteoarthritis of knee, Peripheral vascular disease ([address] Presence of coronary angioplasty implant and graft, Sleep apnea, Sprain of knee and leg, Sprain of shoulder and upper arm, Type II or unspecified type diabetes mellitus without mention of complication, not stated as uncontrolled, Ulcer of left foot, with fat layer exposed ([address] Unspecified sleep apnea, and Venous insufficiency of both lower extremities. has a past surgical history that includes Neck surgery (2010); Mather tooth extraction; Coronary artery bypass graft (2/26/07); Vein Surgery; other surgical history; Tonsillectomy (age 11); Carpal tunnel release (Right, 3/22/13); Cardiac surgery; Coronary artery bypass graft (2006); Colonoscopy (2020); arthroplasty (Left, 4/1/2022); and Toe amputation (Left, 4/6/2022). Restrictions  Restrictions/Precautions  Restrictions/Precautions: General Precautions,Fall Risk  Position Activity Restriction  Other position/activity restrictions: NWB LLE, Dressing Left foot, IV left UE  Subjective   General  Chart Reviewed: Yes  Response To Previous Treatment: Patient with no complaints from previous session. Family / Caregiver Present: No  Subjective  Subjective: Pt states he is going home and does not need further gait training, amenable to educational material  General Comment  Comments: OK for PT per Jeanie Coe RN  Pain Screening  Patient Currently in Pain:  (Does not report pain)  Vital Signs  Patient Currently in Pain:  (Does not report pain)       Orientation  Orientation  Overall Orientation Status: Within Normal Limits  Cognition      Objective                                           G-Code     OutComes Score                                                     AM-PAC Score   No AMpac score as pt stated he didn't require further PT,and opted to not perform mobility activity          Goals  Short term goals  Time Frame for Short term goals: 12 visits  Short term goal 1: Patient will be indep with transfers. Short term goal 2: Patient will amb 40 feet with RW and NWB LLE. Short term goal 3: Patient will negotiate 3 stairs with rail and crutch (if patient agreeable to stairs).   Short term goal 4: Patient will be given written education on NWB stair negotiation and verbalize understanding. Short term goal 5: Patient will be indep with HEP.   Patient Goals   Patient goals : Return home, control infection    Plan    Plan  Times per week: 1-2x/d, 5-6 d/wk  Specific instructions for Next Treatment: Ambulation if pt amenable  Current Treatment Recommendations: Strengthening,Balance Training,Functional Mobility Training,Transfer Training,Stair training,Gait Training,Endurance Training,Patient/Caregiver Education & Training,Home Exercise Program,Safety Education & Training  Safety Devices  Type of devices: Left in bed,Call light within reach,Bed alarm in place  Restraints  Initially in place: No     Therapy Time   Individual Concurrent Group Co-treatment   Time In 1003         Time Out 1014         Minutes [address] [place], [place]

## 2022-04-08 NOTE — PROGRESS NOTES
[facility]  Progress Note    Dayne Nevarez  MRN: [id number]  Date: 4/8/2022  LOS: 3     Reason for F/U :   Diabetic foot infection/osteomyelitis    Impression :   1. Left second toe ulceration with exposure to bone   · status post vision of the left middle phalanx and partial resection of the proximal phalanx of the second digit 4/1/2022  2. Left second toe amputation wound with purulent drainage expressed upon incision and disarticulation of the metatarsophalangeal joint of the left foot and findings consistent with osteomyelitis. · Status post amputation of the second digit of the left foot 4/6/2022  · The surgical cultures have grown out group B streptococcus and Staphylococcus aureus  3. MSSA bacteremia related to the above infection  4. Coronary artery disease with associated ischemic cardiomyopathy  5. Diabetes mellitus type 2 with associated neuropathy  6. Peripheral arterial disease    Recommendations:   · Continue to venous antimicrobial therapy with Rocephin for 4 weeks   · He is awaiting a PICC line to be placed. · The plan is for discharge home today once all arrangements have been made    Infection Control Recommendations:   Universal precautions    Discharge Planning:   Estimated Length of IV antimicrobials: 4 weeks  Patient will need PICC line Insertion  Patient will need: Home IV   Patient willneed outpatient wound care: Yes    Medical Decision making / Summary of Stay:   Dayne Nevarez is a 64y.o.-year-old male who was initially admitted on 4/5/2022. Ros Yi has a history of coronary artery disease, congestive heart failure, ischemic cardiomyopathy, hypertension, hyperlipidemia, obstructive sleep apnea, diabetes mellitus type 2, peripheral vascular disease among other medical problems. The patient has had wounds of toes in both feet and he has been following at the wound care center.   He does have a left foot second toe ulceration for which she has undergone debridement procedures in the past and also has been treated with doxycycline. There was exposure of the bone in the left second toe and x-rays were done and the patient had surgery on 2022 underwent excision of the left middle phalanx second digit with partial resection of the proximal phalanx second digit and debridement of nonviable tissue. The patient was seen for postop office visit 22 and the patient was noted to have ascending cellulitis of the left foot with open wound at the second left toe and was sent into the hospital for admission for IV antibiotics. Patient was admitted started on IV antimicrobial therapy with vancomycin and was taken to the operating room today and had staged amputation of the second digit of the left foot     The prior surgical culture had MSSA, group B streptococcus isolated. The patient is currently on intravenous antimicrobial therapy with cefazolin    Current evaluation:2022    /60   Pulse 70   Temp 97.8 °F (36.6 °C) (Oral)   Resp 16   Ht 6' (1.829 m)   Wt 250 lb 3.2 oz (113.5 kg)   SpO2 96%   BMI 33.93 kg/m²     Temperature Range: Temp: 97.8 °F (36.6 °C) Temp  Av.4 °F (36.9 °C)  Min: 97.8 °F (36.6 °C)  Max: 99 °F (37.2 °C)  The patient is seen and evaluated at bedside and is awake and alert in no acute distress. No subjective fever or chills. No abdominal pain nausea vomiting or diarrhea. Review of Systems   Constitutional: Negative. Respiratory: Negative. Cardiovascular: Negative. Gastrointestinal: Negative. Genitourinary: Negative. Musculoskeletal: Negative. Pain in the foot   Allergic/Immunologic: Negative. Neurological: Negative. Physical Examination :     Physical Exam  Constitutional:       Appearance: He is well-developed. HENT:      Head: Normocephalic and atraumatic. Cardiovascular:      Rate and Rhythm: Normal rate. Heart sounds: Normal heart sounds. No friction rub. No gallop.     Pulmonary: Effort: Pulmonary effort is normal.      Breath sounds: Normal breath sounds. No wheezing. Abdominal:      General: Bowel sounds are normal.      Palpations: Abdomen is soft. There is no mass. Tenderness: There is no abdominal tenderness. Musculoskeletal:         General: Normal range of motion. Cervical back: Normal range of motion and neck supple. Lymphadenopathy:      Cervical: No cervical adenopathy. Skin:     General: Skin is warm and dry. Neurological:      Mental Status: He is alert and oriented to person, place, and time. Laboratory data:   I have independently reviewed the followinglabs:  CBC with Differential:   Recent Labs     04/05/22  1510   WBC 12.5*   HGB 12.5*   HCT 41.3      LYMPHOPCT 17*   MONOPCT 8     BMP:   Recent Labs     04/05/22  1510 04/06/22  0545    137   K 3.8 3.5*    104   CO2 25 20   BUN 18 16   CREATININE 0.78 0.89   MG  --  1.9     Hepatic Function Panel: No results for input(s): PROT, LABALBU, BILIDIR, IBILI, BILITOT, ALKPHOS, ALT, AST in the last 72 hours. No results found for: PROCAL  Lab Results   Component Value Date    CRP 98.9 04/05/2022    CRP 2.1 02/02/2018     Lab Results   Component Value Date    SEDRATE 85 (H) 04/06/2022         No results found for: DDIMER  No results found for: FERRITIN  No results found for: LDH  No results found for: FIBRINOGEN    Results in Past 30 Days  Result Component Current Result Ref Range Previous Result Ref Range   SARS-CoV-2, Rapid Not Detected (4/5/2022) Not Detected Not in Time Range      Lab Results   Component Value Date    COVID19 Not Detected 04/05/2022       No results for input(s): VANCOTROUGH in the last 72 hours. Imaging Studies:   No new imaging    Cultures:     Culture, Anaerobic and Aerobic [[id number]] (Abnormal) Collected: 04/06/22 1204   Order Status: Completed Specimen: Bone from Toe Updated: 04/08/22 1025    Specimen Description . TOE    Direct Exam RARE NEUTROPHILS Abnormal      FEW GRAM POSITIVE COCCI IN PAIRS Abnormal     Culture STREPTOCOCCI, BETA HEMOLYTIC GROUP B MODERATE GROWTH Abnormal      No anaerobic organisms isolated at 2 days. Culture, Anaerobic and Aerobic [[id number]] (Abnormal) Collected: 04/06/22 1412   Order Status: Completed Specimen: Swab from Toe Updated: 04/08/22 1014    Specimen Description . WOUND SECOND LEFT DIGIT    Special Requests SWAB    Direct Exam This specimen contains squamous epithelial cells and therefore is suboptimal for anaerobic culture as it is contaminated with surface material.  Poor specimen quality contributes to misdiagnosis and inappropriate antimicrobial therapy. Abnormal      MANY NEUTROPHILS Abnormal      FEW GRAM POSITIVE COCCI IN PAIRS Abnormal     Culture STREPTOCOCCI, BETA HEMOLYTIC GROUP B MODERATE GROWTH Abnormal      STAPHYLOCOCCUS AUREUS MODERATE GROWTH Abnormal      No anaerobic organisms isolated at 2 days. Culture, Anaerobic and Aerobic [[id number]] (Abnormal) Collected: 04/05/22 1747   Order Status: Completed Specimen: Wound Updated: 04/07/22 1253    Specimen Description . WOUND LEFT 2ND DIGIT    Direct Exam RARE NEUTROPHILS Abnormal      FEW GRAM POSITIVE COCCI IN PAIRS Abnormal      RARE GRAM POSITIVE RODS Abnormal     Culture STREPTOCOCCI, BETA HEMOLYTIC GROUP B MODERATE GROWTH Abnormal      STAPHYLOCOCCUS AUREUS HEAVY GROWTH Abnormal      NORMAL SKIN SHOAIB     Culture, Blood 1 [[id number]] (Abnormal)  Collected: 04/05/22 1530   Order Status: Completed Specimen: Blood Updated: 04/08/22 0846    Specimen Description . BLOOD    Special Requests RT FA,12ML    Culture POSITIVE Blood Culture     DIRECT GRAM STAIN FROM BOTTLE: GRAM POSITIVE COCCI IN CLUSTERS     Staphylococcus aureus Detected: mecA/C and MREJ Not Detected Methodology- Polymerase Chain Reaction (PCR)     STAPHYLOCOCCUS AUREUS This isolate is methicillin susceptible. Abnormal      (NOTE) Direct Gram Stain from bottle and Polymerase Chain Reaction (PCR) results called to and read back by: Omkar Balbuena RN AT 1615 ON  4/6/22. Staphylococcus aureus (4)    Antibiotic Interpretation Microscan Method Status    clindamycin Resistant >=8 BACTERIAL SUSCEPTIBILITY PANEL LIZETT Final    erythromycin Resistant >=8 BACTERIAL SUSCEPTIBILITY PANEL LIZETT Final    gentamicin Sensitive <=0.5 BACTERIAL SUSCEPTIBILITY PANEL LIZETT Final     Gentamicin is used only in combination with other active agents that test susceptible. levofloxacin Sensitive <=0.12 BACTERIAL SUSCEPTIBILITY PANEL LIZETT Final    oxacillin Sensitive 0.5 BACTERIAL SUSCEPTIBILITY PANEL LIZETT Final     This staph isolate may be susceptible to Penicillin. Please contact Microbiology for   confirmatory testing of susceptibility if Pencillin is a therapeutic consideration. tetracycline Resistant >=16 BACTERIAL SUSCEPTIBILITY PANEL LIZETT Final    trimethoprim-sulfamethoxazole Sensitive <=10 BACTERIAL SUSCEPTIBILITY PANEL LIZETT Final          Specimen Collected: 04/05/22 15:30 Last Resulted: 04/08/22 08:46             Culture, Blood 1 [[id number]] Collected: 04/05/22 1521   Order Status: Completed Specimen: Blood Updated: 04/07/22 2029    Specimen Description . BLOOD    Special Requests LT FA,12ML    Culture NO GROWTH 2 DAYS     COVID-19, Rapid [[id number]] Collected: 04/05/22 2115   Order Status: Completed Specimen: Nasopharyngeal Swab Updated: 04/05/22 2150    Specimen Description . NASOPHARYNGEAL SWAB    SARS-CoV-2, Rapid Not Detected    Comment:        Rapid NAAT:  The specimen is NEGATIVE for SARS-CoV-2, the novel coronavirus associated with   COVID-19.         The ID NOW COVID-19 assay is designed to detect the virus that causes COVID-19 in patients   with signs and symptoms of infection who are suspected of COVID-19. An individual without symptoms of COVID-19 and who is not shedding SARS-CoV-2 virus would   expect to have a negative (not detected) result in this assay.    Negative results should be treated as presumptive and, if inconsistent with clinical signs   and symptoms or necessary for patient management,   should be tested with an alternative molecular assay. Negative results do not preclude   SARS-CoV-2 infection and   should not be used as the sole basis for patient management decisions.         Fact sheet for Healthcare Providers: Sagar   Fact sheet for Patients: Sagar           Methodology: Isothermal Nucleic Acid Amplification           Medications:      carvedilol  12.5 mg Oral BID WC    clopidogrel  75 mg Oral Daily    gabapentin  300 mg Oral TID    pantoprazole  40 mg Oral QAM AC    topiramate  400 mg Oral Daily    traZODone  150 mg Oral QPM    sodium chloride flush  5-40 mL IntraVENous 2 times per day    ceFAZolin  2,000 mg IntraVENous Q8H    enoxaparin  30 mg SubCUTAneous BID    insulin lispro  0-6 Units SubCUTAneous TID WC    insulin lispro  0-3 Units SubCUTAneous Nightly    morphine  2 mg IntraVENous Once           [facility]  Aure Pulido MD  Perfect Serve messaging  OFFICE: [phone]      Electronically signed by Aure Pulido MD on 4/8/2022 at 10:41 AM  Thank you for allowing us to participate in the care of this patient. Please call with questions. This note iscreated with the assistance of a speech recognition program.  While intending to generate a document that actually reflects the content of the visit, the document can still have some errors including those of syntax andsound a like substitutions which may escape proof reading. In such instances, actual meaning can be extrapolated by contextual diversion.

## 2022-04-08 NOTE — PROGRESS NOTES
Progress Note  Podiatric Medicine and Surgery     Subjective     CC: s/p left 2nd digit amputation (DOS: 4/6/22), left foot wound    Patient seen and examined at bedside. No acute events overnight. Afebrile, vital signs stable   Reports pain controlled to left foot. Denies any N/V/SOB/CP    HPI :  Hannah Rashid is a 64 y.o. male seen at [address] for left foot wound. Patient was seen at Dr. Bhumi Angeles office this AM and was sent to ED due to infection of left foot. Patient is has had dorsal 2nd digit wound since beginning of February. He had a hammertoe correction surgery to relieve the pressure for the wound on 4/1/2022. Patient also has wound on the right hallux and 2nd digit. He admits diabetic neuropathy. Denies other pedal complaints. PCP is Seema Hameed DO       ROS: Denies N/V/F/C/SOB/CP. Otherwise negative except at stated in the HPI.      Medications:  Scheduled Meds:   carvedilol  12.5 mg Oral BID WC    clopidogrel  75 mg Oral Daily    gabapentin  300 mg Oral TID    pantoprazole  40 mg Oral QAM AC    topiramate  400 mg Oral Daily    traZODone  150 mg Oral QPM    sodium chloride flush  5-40 mL IntraVENous 2 times per day    ceFAZolin  2,000 mg IntraVENous Q8H    enoxaparin  30 mg SubCUTAneous BID    insulin lispro  0-6 Units SubCUTAneous TID     insulin lispro  0-3 Units SubCUTAneous Nightly    morphine  2 mg IntraVENous Once       Continuous Infusions:   sodium chloride      dextrose         PRN Meds:HYDROcodone-acetaminophen, sodium chloride flush, sodium chloride, potassium chloride **OR** potassium alternative oral replacement **OR** potassium chloride, magnesium sulfate, ondansetron **OR** ondansetron, polyethylene glycol, acetaminophen **OR** acetaminophen, glucagon (rDNA), dextrose, glucose, dextrose bolus (hypoglycemia) **OR** dextrose bolus (hypoglycemia)    Objective     Vitals:  Patient Vitals for the past 8 hrs:   Weight   04/08/22 0429 250 lb 3.2 oz (113.5 kg)     Average, Min, and Max for last 24 hours Vitals:  TEMPERATURE:  Temp  Av.5 °F (36.9 °C)  Min: 97.9 °F (36.6 °C)  Max: 99 °F (37.2 °C)    RESPIRATIONS RANGE: Resp  Av.5  Min: 18  Max: 20    PULSE RANGE: Pulse  Av  Min: 67  Max: 79    BLOOD PRESSURE RANGE:  Systolic (62RJP), BXN:539 , Min:102 , UWK:230   ; Diastolic (28XQT), RXW:87, Min:55, Max:66      PULSE OXIMETRY RANGE: SpO2  Av.5 %  Min: 93 %  Max: 98 %    I/O last 3 completed shifts: In: 1400 [P.O.:1400]  Out: 1650 [Urine:1650]    CBC:  Recent Labs     22  1510   WBC 12.5*   HGB 12.5*   HCT 41.3      CRP 98.9*        BMP:  Recent Labs     22  1510 22  0545    137   K 3.8 3.5*    104   CO2 25 20   BUN 18 16   CREATININE 0.78 0.89   GLUCOSE 201* 178*   CALCIUM 9.8 9.1        Coags:  Recent Labs     22  0545   INR 1.1       Lab Results   Component Value Date    SEDRATE 85 (H) 2022     Recent Labs     22  1510   CRP 98.9*       Lower Extremity Physical Exam:   Vascular: DP and PT pulses are 1/4 palpable, bilaterally with audible waveforms on doppler. CFT <5 seconds to all digits. Hair growth is diminished to the level of the digits. Moderate non-pitting edema globally to left foot.       Neuro: Saph/sural/SP/DP/plantar sensation diminished to light touch.     Musculoskeletal: Muscle strength is 5/5 to all lower extremity muscle groups. Gross deformity is hammertoe noted to left 2nd digit and right 1,2 digit .     Dermatologic: S/p left 2nd digit amputation incision is intact. No signs of dehiscence, pinpoint sanguinous drainage noted, no acute infection at this time. Erythema noted globally to left foot with mild associated increase in warmth. No fluctuance, crepitus, or induration. Interdigital maceration absent. Small dorsal PIPJ ulcers noted to right 1 and 2 digit. Base is fibro granular. There is no erythema and edema to the right foot.  Mild serosanguinous drainage surround the wound to the right 2nd digit.       Clinical Images:          Imaging:   XR FOOT LEFT (MIN 3 VIEWS)   Final Result   Postoperative changes from 2nd digit phalangeal amputation. XR FOOT LEFT (MIN 3 VIEWS)   Final Result   Postoperative changes in the left 2nd digit without erosion or destruction to   suggest osteomyelitis. There is overlying soft tissue edema. IR INSERT PICC VAD W SQ PORT >5 YEARS    (Results Pending)     Cultures: left foot pending  Assessment   Ofelia Gonzalez is a 64 y.o. male with   1. S/p 2nd digit amputation, left foot (4/06/2022)  2. Diabetic foot ulcer down to bone, left foot  3. Osteomyelitis, left foot  4. Cellulitis, left foot  5. Diabetic foot ulcer down to subcutaneous layer, right foot  6. Hammertoe deformity, bilateral feet. Principal Problem:    Osteomyelitis of second toe of left foot (HCC)  Active Problems:    Peripheral vascular disease ([facility] [address]    Essential hypertension    Coronary artery disease involving coronary bypass graft of native heart without angina pectoris    Diabetic polyneuropathy associated with type 2 diabetes mellitus ([facility] [address]  Resolved Problems:    * No resolved hospital problems. *       Plan     · Patient examined and evaluated at bedside   · Treatment options discussed in detail with the patient  · We will continue to monitor response to IV antibiotics and watch for improvement in cellulitis of left foot. · Abx per ID:  · Dressing changed to Left foot: betadine, Adaptic, dsd  · Dressing changed to right foot: betadine, saline wet to dry to wounds. · Nursing to perform daily dressing changes to include saline wet-to-dry to the wounds of the right foot. See notes today to the incision of the left foot. · Patient stable for discharge from podiatry perspective following antibiotic recommendations.   · Nonweightbearing to left lower extremity  · Discussed with Dr. Ash Dyer, DPM   Podiatric Medicine & Surgery   4/8/2022 at 5:54 AM

## 2022-04-10 LAB
CULTURE: NORMAL
Lab: NORMAL
SPECIMEN DESCRIPTION: NORMAL

## 2022-04-11 ENCOUNTER — CARE COORDINATION (OUTPATIENT)
Dept: CASE MANAGEMENT | Age: 62
End: 2022-04-11

## 2022-04-11 LAB
CULTURE: ABNORMAL
DIRECT EXAM: ABNORMAL
Lab: ABNORMAL
Lab: ABNORMAL
SPECIMEN DESCRIPTION: ABNORMAL
SPECIMEN DESCRIPTION: ABNORMAL
SURGICAL PATHOLOGY REPORT: NORMAL

## 2022-04-11 NOTE — CARE COORDINATION
[facility] Transitions Initial Follow Up Call    Call within 2 business days of discharge: Yes    Patient: Cristina Peng Patient : 1960   MRN: [id number]  Reason for Admission: osteomyelitis  Discharge Date: 22 RARS: Readmission Risk Score: 10.7 ( )      Last Discharge [facility]       Complaint Diagnosis Description Type Department Provider    22 Other Osteomyelitis of second toe of left foot ([facility] [address] . .. ED to Hosp-Admission (Discharged) (ADMITTED) Blessing Sidhu MD; Judi Yao. .. Spoke with: Juju 10: [address]    Non-face-to-face services provided:  Obtained and reviewed discharge summary and/or continuity of care documents  Reviewed and followed up on pending diagnostic tests and treatments-patient has an appointment with wound care tomorrow  Communication with home health agencies or other community services the patient is currently using-writer contacted Isabell spoke to Gilberto, explained patient's concerns and needs, will have VN contact patient  Assessment and support for treatment adherence and medication management-reviewed discharge instructions and medications, 1111F order, reviewed covid precautions and healthare decision makers, no c/o f/c, n/v, sob, cp, pain, s/s of infections, patient running low on dressings and normal saline, also wasnt sure when next VN visit is, writer contact LYDIA zuniga will have VN contact patient, patient has wound care appointment tomorrow, will call and schedule his own HFU with pcp, explained role of CTN, provided contact information, will follow//JU   Transitions of Care Initial Call    Was this an external facility discharge?  No Discharge Facility:     Challenges to be reviewed by the provider   Additional needs identified to be addressed with provider: No  none             Method of communication with provider : none      Advance Care Planning:   Does patient have an Advance Directive: reviewed and current, reviewed and needs to be updated, not on file; education provided, not on file, patient declined education, decision maker updated and referral to internal ACP facilitator. Was this a readmission? No  Patient stated reason for admission:   Patients top risk factors for readmission: functional physical ability    Care Transition Nurse (CTN) contacted the patient by telephone to perform post hospital discharge assessment. Verified name and  with patient as identifiers. Provided introduction to self, and explanation of the CTN role. CTN reviewed discharge instructions, medical action plan and red flags with patient who verbalized understanding. Patient given an opportunity to ask questions and does not have any further questions or concerns at this time. Were discharge instructions available to patient? Yes. Reviewed appropriate site of care based on symptoms and resources available to patient including: PCP  Specialist  Urgent care clinics  When to call 911. The patient agrees to contact the PCP office for questions related to their healthcare. Medication reconciliation was performed with patient, who verbalizes understanding of administration of home medications. Advised obtaining a 90-day supply of all daily and as-needed medications. Covid Risk Education     Educated patient about risk for severe COVID-19 due to risk factors according to CDC guidelines. CTN reviewed discharge instructions, medical action plan and red flag symptoms with the patient who verbalized understanding. Discussed COVID vaccination status: Yes. Education provided on COVID-19 vaccination as appropriate. Discussed exposure protocols and quarantine with CDC Guidelines. Patient was given an opportunity to verbalize any questions and concerns and agrees to contact CTN or health care provider for questions related to their healthcare. Reviewed and educated patient on any new and changed medications related to discharge diagnosis.      Was patient discharged with a pulse oximeter? No Discussed and confirmed pulse oximeter discharge instructions and when to notify provider or seek emergency care. CTN provided contact information. Plan for follow-up call in 3-5 days based on severity of symptoms and risk factors.   Plan for next call: routine follow up/check dressing supplies and hFU appt        Care Transitions 24 Hour Call    Schedule Follow Up Appointment with PCP: Kapil Gallardo you have any ongoing symptoms?: No  Do you have a copy of your discharge instructions?: Yes  Do you have all of your prescriptions and are they filled?: Yes  Have you scheduled your follow up appointment?:  (Comment: patient will mimi tomorrow to get appointment with pcp)  Were you discharged with any Home Care or Post Acute Services: Yes  Post Acute Services: Home Health  Care Transitions Interventions     Other Services:  (Comment: [place])          Follow Up  Future Appointments   Date Time Provider Benito Jaeger   4/12/2022  1:30 PM KEZIA MtzD CHINMAY [place]   5/24/2022  8:15 AM Rashel Allen DPM [facility] Tamara Jordan RN

## 2022-04-12 ENCOUNTER — HOSPITAL ENCOUNTER (OUTPATIENT)
Dept: WOUND CARE | Age: 62
Discharge: HOME | End: 2022-04-12
Payer: COMMERCIAL

## 2022-04-12 VITALS
DIASTOLIC BLOOD PRESSURE: 68 MMHG | BODY MASS INDEX: 33.86 KG/M2 | WEIGHT: 250 LBS | SYSTOLIC BLOOD PRESSURE: 144 MMHG | RESPIRATION RATE: 18 BRPM | TEMPERATURE: 97.3 F | HEART RATE: 76 BPM | HEIGHT: 72 IN

## 2022-04-12 DIAGNOSIS — S01.25XA OPEN WOUND OF NOSE DUE TO DOG BITE: ICD-10-CM

## 2022-04-12 DIAGNOSIS — S61.451D: ICD-10-CM

## 2022-04-12 DIAGNOSIS — M86.9 OSTEOMYELITIS OF SECOND TOE OF LEFT FOOT (HCC): ICD-10-CM

## 2022-04-12 DIAGNOSIS — L97.524 CHRONIC ULCER OF TOE, LEFT, WITH NECROSIS OF BONE (HCC): ICD-10-CM

## 2022-04-12 DIAGNOSIS — E11.42 DIABETIC POLYNEUROPATHY ASSOCIATED WITH TYPE 2 DIABETES MELLITUS (HCC): ICD-10-CM

## 2022-04-12 DIAGNOSIS — S61.452D: ICD-10-CM
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
## 2022-04-12 DIAGNOSIS — S01.352D: ICD-10-CM
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
## 2022-04-12 DIAGNOSIS — L97.512 ULCER OF TOE OF RIGHT FOOT, WITH FAT LAYER EXPOSED (HCC): ICD-10-CM

## 2022-04-12 PROCEDURE — 11042 DBRDMT SUBQ TIS 1ST 20SQCM/<: CPT

## 2022-04-12 RX ORDER — BETAMETHASONE DIPROPIONATE 0.05 %
OINTMENT (GRAM) TOPICAL ONCE
Status: CANCELLED | OUTPATIENT
Start: 2022-04-12 | End: 2022-04-12

## 2022-04-12 RX ORDER — GENTAMICIN SULFATE 1 MG/G
OINTMENT TOPICAL ONCE
Status: CANCELLED | OUTPATIENT
Start: 2022-04-12 | End: 2022-04-12

## 2022-04-12 RX ORDER — LIDOCAINE HYDROCHLORIDE 40 MG/ML
SOLUTION TOPICAL ONCE
Status: CANCELLED | OUTPATIENT
Start: 2022-04-12 | End: 2022-04-12

## 2022-04-12 RX ORDER — GINSENG 100 MG
CAPSULE ORAL ONCE
Status: CANCELLED | OUTPATIENT
Start: 2022-04-12 | End: 2022-04-12

## 2022-04-12 RX ORDER — LIDOCAINE 40 MG/G
CREAM TOPICAL ONCE
Status: CANCELLED | OUTPATIENT
Start: 2022-04-12 | End: 2022-04-12

## 2022-04-12 RX ORDER — LIDOCAINE 50 MG/G
OINTMENT TOPICAL ONCE
Status: CANCELLED | OUTPATIENT
Start: 2022-04-12 | End: 2022-04-12

## 2022-04-12 RX ORDER — LIDOCAINE HYDROCHLORIDE 20 MG/ML
JELLY TOPICAL ONCE
Status: DISCONTINUED | OUTPATIENT
Start: 2022-04-12 | End: 2022-04-13 | Stop reason: HOSPADM

## 2022-04-12 RX ORDER — LIDOCAINE HYDROCHLORIDE 20 MG/ML
JELLY TOPICAL ONCE
Status: CANCELLED | OUTPATIENT
Start: 2022-04-12 | End: 2022-04-12

## 2022-04-12 RX ORDER — BACITRACIN ZINC AND POLYMYXIN B SULFATE 500; 1000 [USP'U]/G; [USP'U]/G
OINTMENT TOPICAL ONCE
Status: CANCELLED | OUTPATIENT
Start: 2022-04-12 | End: 2022-04-12

## 2022-04-12 RX ORDER — CLOBETASOL PROPIONATE 0.5 MG/G
OINTMENT TOPICAL ONCE
Status: CANCELLED | OUTPATIENT
Start: 2022-04-12 | End: 2022-04-12

## 2022-04-12 RX ORDER — BACITRACIN, NEOMYCIN, POLYMYXIN B 400; 3.5; 5 [USP'U]/G; MG/G; [USP'U]/G
OINTMENT TOPICAL ONCE
Status: CANCELLED | OUTPATIENT
Start: 2022-04-12 | End: 2022-04-12

## 2022-04-12 ASSESSMENT — PAIN SCALES - GENERAL: PAINLEVEL_OUTOF10: 5

## 2022-04-12 ASSESSMENT — PAIN DESCRIPTION - FREQUENCY: FREQUENCY: CONTINUOUS

## 2022-04-12 ASSESSMENT — PAIN DESCRIPTION - LOCATION: LOCATION: TOE (COMMENT WHICH ONE)

## 2022-04-12 ASSESSMENT — PAIN DESCRIPTION - PROGRESSION: CLINICAL_PROGRESSION: NOT CHANGED

## 2022-04-12 ASSESSMENT — PAIN DESCRIPTION - ONSET: ONSET: ON-GOING

## 2022-04-12 ASSESSMENT — PAIN DESCRIPTION - PAIN TYPE: TYPE: ACUTE PAIN

## 2022-04-12 ASSESSMENT — PAIN DESCRIPTION - ORIENTATION: ORIENTATION: LEFT

## 2022-04-12 ASSESSMENT — PAIN DESCRIPTION - DESCRIPTORS: DESCRIPTORS: ACHING;DISCOMFORT

## 2022-04-12 NOTE — PROGRESS NOTES
[address]   Progress Note and Procedure Note      Maggy Loza  MEDICAL RECORD NUMBER:  [id number]  AGE: 64 y.o. GENDER: male  : 1960  EPISODE DATE:  2022    Subjective:     Chief Complaint   Patient presents with    Wound Check     Left 2nd toe         HISTORY of PRESENT ILLNESS HPI     Maggy Loza is a 64 y.o. male who presents today for wound/ulcer evaluation. S/P   2ND LEFT AMP X 6 DAYS. RIGHT TOE WOUNDS - BEEN APPLYING KAYCEE AND SSD TO RIGHT TOE WOUNDS. GETTING IV ANTIBIOTICS THRU PICC    Ulcer Identification:  Ulcer Type: diabetic  Contributing Factors: diabetes and chronic pressure       22 1204    Specimen Description . TOE    Special Requests . TOE   SECOND LEFT DIGIT    Direct Exam RARE NEUTROPHILS Abnormal     Direct Exam FEW GRAM POSITIVE COCCI IN PAIRS Abnormal     Culture STREPTOCOCCI, BETA HEMOLYTIC GROUP B MODERATE GROWTH Abnormal     Culture  Abnormal   STAPHYLOCOCCUS AUREUS MODERATE GROWTH This isolate is methicillin susceptible. For susceptibility, refer to previous culture. Culture No anaerobic organisms isolated at 5 days.                 PAST MEDICAL HISTORY        Diagnosis Date    Acquired trigger finger 2018    CAD (coronary artery disease)     NWOCC/ involving coronary bypass graft of native heart with unstable angina pectoris    Cervical spondylosis     CHF (congestive heart failure) (HCC)     Chronic back pain     on 18 pt states is presently in pain mgmnt    Contusion of elbow 2018    Contusion of knee 2018    Contusion of shoulder region 2018    Diabetic foot ulcer with osteomyelitis ([address] 2018    Diabetic ulcer of toe associated with type 2 diabetes mellitus, with fat layer exposed ([address] 2018    Hypercholesterolemia 2017    Hyperlipemia, mixed     Hypertension     Ischemic cardiomyopathy 2018    Non-pressure chronic ulcer of left lower leg with fat layer exposed ([facility] [address] 6/9/2014    Obesity     Obesity, Class III, BMI 40-49.9 (morbid obesity) ([facility] [address] 4/6/2015    Obstructive sleep apnea syndrome     Osteoarthritis of carpometacarpal (CMC) joint of thumb 6/5/2018    Osteoarthritis of knee 6/5/2018    Peripheral vascular disease ([facility] [address]     with venous stasis and ulcerations. Dr Cara Romano Presence of coronary angioplasty implant and graft 9/11/2009    Sleep apnea     Dr Onur Guallpa of knee and leg 6/5/2018    Sprain of shoulder and upper arm 6/5/2018    Type II or unspecified type diabetes mellitus without mention of complication, not stated as uncontrolled     Ulcer of left foot, with fat layer exposed ([facility] [address] 1/9/2018    Unspecified sleep apnea     Dr Potter Press CPAP    Venous insufficiency of both lower extremities 1/31/2018       PAST SURGICAL HISTORY    Past Surgical History:   Procedure Laterality Date    ARTHROPLASTY Left 4/1/2022    ARTHROPLASTY 2ND LEFT TOE performed by Westley Naranjo DPM at [address] Right 3/22/13    Dr. Rowena Angeles.     COLONOSCOPY  2020    CORONARY ARTERY BYPASS GRAFT  2/26/07    [facility], Dr Shannon Sage (X 3)    CORONARY ARTERY BYPASS GRAFT  2006    NECK SURGERY  2010    cervical stenoses C5-C6-C7and partial T1 laminectomy with fusion of C6-7    OTHER SURGICAL HISTORY      wound care ulcers of legs (bilaterally) with Dr Artis Sanchez Left 4/6/2022    TOE AMPUT[facility], SECOND [facility]IG[facility]  age 11    VEIN SURGERY      closure of peripherator veins of legs, Dr LAWSON Burris History   Problem Relation Age of Onset    Cancer Mother         lung    Diabetes Father     Heart Disease Father     High Blood Pressure Father     Diabetes Brother     Heart Disease Brother     High Blood Pressure Brother        SOCIAL HISTORY    Social History     Tobacco Use    Smoking status: Never Smoker    Smokeless tobacco: Never Used   Vaping Use    Vaping Use: Never used   Substance Use Topics    Alcohol use: Not Currently     Comment: rare social, non past 5 months    Drug use: No       ALLERGIES    Allergies   Allergen Reactions    Pcn [Penicillins] Other (See Comments)     Unknown rx    Penicillin G Rash       MEDICATIONS    Current Outpatient Medications on File Prior to Encounter   Medication Sig Dispense Refill    ceFAZolin (ANCEF) infusion Infuse 2,000 mg intravenously every 8 hours for 26 days Through 5/3/2022 Compound per protocol 156 g 0    metFORMIN (GLUCOPHAGE) 1000 MG tablet Take 1 tablet by mouth 2 times daily (with meals) 60 tablet 3    omeprazole (PRILOSEC) 20 MG delayed release capsule TAKE 1 CAPSULE DAILY 90 capsule 1    linagliptin (TRADJENTA) 5 MG tablet TAKE 1 TABLET BY MOUTH ONE TIME A DAY 30 tablet 5    gabapentin (NEURONTIN) 300 MG capsule Take 300 mg by mouth 3 times daily.  Misc Natural Products (GLUCOSAMINE CHOND MSM FORMULA PO) Take 2 tablets by mouth daily      HYDROcodone-acetaminophen (NORCO)  MG per tablet Take 1 tablet by mouth every 4-6 hours as needed for Pain.       meloxicam (MOBIC) 15 MG tablet Take 1 tablet by mouth daily 90 tablet 3    traZODone (DESYREL) 150 MG tablet TAKE ONE TABLET BY MOUTH EVERY EVENING 90 tablet 3    topiramate (TOPAMAX) 200 MG tablet Take 1 tablet by mouth 2 times daily (Patient taking differently: Take 400 mg by mouth daily ) 60 tablet 5    tiZANidine (ZANAFLEX) 4 MG tablet 1 po qd 14 tablet 3    carvedilol (COREG) 12.5 MG tablet Take 1 tablet by mouth 2 times daily (with meals) 180 tablet 3    clopidogrel (PLAVIX) 75 MG tablet Take 1 tablet by mouth daily 90 tablet 3    hydroCHLOROthiazide (MICROZIDE) 12.5 MG capsule 1 po qd 90 capsule 3    NEEDLE, DISP, 22 G 22G X 1-1/2\" MISC 1 Device by Does not apply route every 30 days 25 each 1    NEEDLE, DISP, 18 G (BD DISP NEEDLES) 18G X 1-1/2\" MISC 1 Device by Does not apply route every 30 days 25 each 1    testosterone cypionate (DEPOTESTOTERONE CYPIONATE) 200 MG/ML injection       Testosterone Cypionate 200 MG/ML KIT Inject 200 mg into the muscle every 30 days for 30 days. please include syringes 1 kit 5    NEEDLE, DISP, 18 G 18G X 1\" MISC 1 Syringe by Does not apply route every 30 days 25 each 0    NEEDLE, DISP, 22 G 22G X 1-1/2\" MISC 1 Syringe by Does not apply route every 30 days 25 each 0    Multiple Vitamin (MULTI VITAMIN DAILY PO) Take by mouth      nitroGLYCERIN (NITROSTAT) 0.4 MG SL tablet Place 0.4 mg under the tongue      aspirin 325 MG tablet Take 325 mg by mouth daily. No current facility-administered medications on file prior to encounter. REVIEW OF SYSTEMS    Review of Systems   Constitutional: Negative for chills and fever. Skin: Positive for wound. Neurological: Negative for numbness. Objective:      BP (!) 144/68   Pulse 76   Temp 97.3 °F (36.3 °C) (Tympanic)   Resp 18   Ht 6' (1.829 m)   Wt 250 lb (113.4 kg)   BMI 33.91 kg/m²     Wt Readings from Last 3 Encounters:   04/12/22 250 lb (113.4 kg)   04/08/22 250 lb 3.2 oz (113.5 kg)   04/05/22 248 lb (112.5 kg)       Physical Exam:  General:  Alert and oriented x3. In no acute distress. Lower Extremity Physical Exam:    Vascular: DP pulses are palpable, Bilateral. PT pulses are palpable, Bilateral. CFT <3 seconds to all digits, Bilateral.  No edema, Bilateral.  Hair growth is absent to the level of the digits, Bilateral.     Neuro: Saph/sural/SP/DP/plantar sensation intact to light touch. Musculoskeletal: EHL/FHL/GS/TA gross motor intact. Gross deformity is present, hammertoes bilateral.     Dermatologic: RIGHT TOE WOUNDS  INCISION LEFT FOOT    Wound 02/28/22 Toe (Comment  which one) Right 2nd Toe #5 (Active)   Wound Image   02/28/22 0912   Wound Etiology Diabetic Pate 1 04/12/22 1335   Dressing Status New drainage noted; Old drainage noted 04/12/22 6404 Wound Cleansed Cleansed with saline 04/12/22 1335   Dressing/Treatment Ace wrap;Dry dressing 04/05/22 1931   Offloading for Diabetic Foot Ulcers Offloading ordered;Post op shoe 04/12/22 1431   Wound Length (cm) 1 cm 04/12/22 1335   Wound Width (cm) 2 cm 04/12/22 1335   Wound Depth (cm) 0.1 cm 04/12/22 1335   Wound Surface Area (cm^2) 2 cm^2 04/12/22 1335   Change in Wound Size % (l*w) 35.06 04/12/22 1335   Wound Volume (cm^3) 0.2 cm^3 04/12/22 1335   Wound Healing % 35 04/12/22 1335   Post-Procedure Length (cm) 1.6 cm 04/12/22 1335   Post-Procedure Width (cm) 2.1 cm 04/12/22 1335   Post-Procedure Depth (cm) 0.3 cm 04/12/22 1335   Post-Procedure Surface Area (cm^2) 3.36 cm^2 04/12/22 1335   Post-Procedure Volume (cm^3) 1.008 cm^3 04/12/22 1335   Wound Assessment Slough;Pink/red 04/12/22 1335   Drainage Amount None 04/12/22 1335   Drainage Description Yellow 04/12/22 1335   Odor None 04/12/22 1335   Trudi-wound Assessment Hyperkeratosis (callous) 04/12/22 1335   Margins Defined edges 04/12/22 1335   Wound Thickness Description not for Pressure Injury Full thickness 04/12/22 1335   Number of days: 43       Wound 03/09/22 Toe (Comment  which one) Right #6 right great toe (Active)   Wound Image   03/09/22 0922   Wound Etiology Diabetic Pate 1 04/12/22 1335   Dressing Status New drainage noted; Old drainage noted 04/12/22 1335   Wound Cleansed Cleansed with saline 04/12/22 1335   Dressing/Treatment Ace wrap;Dry dressing 04/08/22 1212   Offloading for Diabetic Foot Ulcers Offloading ordered;Post op shoe 04/12/22 1431   Wound Length (cm) 1 cm 04/12/22 1335   Wound Width (cm) 1.6 cm 04/12/22 1335   Wound Depth (cm) 0.1 cm 04/12/22 1335   Wound Surface Area (cm^2) 1.6 cm^2 04/12/22 1335   Change in Wound Size % (l*w) -61.62 04/12/22 1335   Wound Volume (cm^3) 0.16 cm^3 04/12/22 1335   Wound Healing % -62 04/12/22 1335   Post-Procedure Length (cm) 1 cm 04/12/22 1335   Post-Procedure Width (cm) 1 cm 04/12/22 1335 Post-Procedure Depth (cm) 0.3 cm 04/12/22 1335   Post-Procedure Surface Area (cm^2) 1 cm^2 04/12/22 1335   Post-Procedure Volume (cm^3) 0.3 cm^3 04/12/22 1335   Wound Assessment [facility] 04/12/22 1335   Drainage Amount Moderate 04/12/22 1335   Drainage Description Yellow 04/12/22 1335   Odor None 04/12/22 1335   Trudi-wound Assessment Dry/flaky; Blanchable erythema 04/12/22 1335   Margins Defined edges 04/12/22 1335   Wound Thickness Description not for Pressure Injury Full thickness 04/12/22 1335   Number of days: 34      Procedure:  Wound Location: right TOES, INCISION LEFT FOOT    Lidocaine gel soaked gauze was applied at beginning of wound evaluation. The wound(s) was excisionally debrided sharply of fibrotic, necrotic, and hyperkeratotic tissue, including a layer of surrounding healthy tissue using curette. The wound was debrided down through and including subcutaneous. Percent of Wound Debrided: 100%    Total Surface Area Debrided:  (see above for sq cm)    Bleeding: Minimal    Patient tolerated procedure well and was given proper instruction. Assessment:      Active Hospital Problems    Diagnosis Date Noted    Osteomyelitis of second toe of left foot ([address] [M86.9] 04/05/2022    Ulcer of toe of right foot, with fat layer exposed ([address] [L97.512] 03/29/2022    Chronic ulcer of toe, left, with necrosis of bone ([address] [L97.524] 03/29/2022    Diabetic polyneuropathy associated with type 2 diabetes mellitus ([address] [E11.42] 03/29/2022    Peripheral vascular disease ([address] [I73.9]          Plan:   EXCISIONAL DEBRIDEMENT RIGHT WOUNDS - KAYCEE AND SSD QOD  SSD TO LEFT INCISION QD  CONT IV THRU PICC    [address] 1 WEEK, SEE ME IN 2 WEEKS. IF NO IMPROVEMENT, THEN WILL APPLY EPIFIX TO RIGHT WOUNDS.     Treatment Note please see attached Discharge Instructions

## 2022-04-15 ENCOUNTER — HOSPITAL ENCOUNTER (OUTPATIENT)
Age: 62
Setting detail: SPECIMEN
Discharge: HOME | End: 2022-04-15

## 2022-04-15 LAB
ABSOLUTE EOS #: 0.17 K/UL (ref 0–0.44)
ABSOLUTE IMMATURE GRANULOCYTE: 0.04 K/UL (ref 0–0.3)
ABSOLUTE LYMPH #: 2.34 K/UL (ref 1.1–3.7)
ABSOLUTE MONO #: 0.51 K/UL (ref 0.1–1.2)
ANION GAP SERPL CALCULATED.3IONS-SCNC: 11 MMOL/L (ref 9–17)
BASOPHILS # BLD: 0 % (ref 0–2)
BASOPHILS ABSOLUTE: 0.03 K/UL (ref 0–0.2)
BUN BLDV-MCNC: 16 MG/DL (ref 8–23)
C-REACTIVE PROTEIN: 4.2 MG/L (ref 0–5)
CALCIUM SERPL-MCNC: 9.3 MG/DL (ref 8.6–10.4)
CHLORIDE BLD-SCNC: 101 MMOL/L (ref 98–107)
CO2: 23 MMOL/L (ref 20–31)
CREAT SERPL-MCNC: 0.81 MG/DL (ref 0.7–1.2)
EOSINOPHILS RELATIVE PERCENT: 3 % (ref 1–4)
GFR AFRICAN AMERICAN: >60 ML/MIN
GFR NON-AFRICAN AMERICAN: >60 ML/MIN
GFR SERPL CREATININE-BSD FRML MDRD: ABNORMAL ML/MIN/{1.73_M2}
GLUCOSE BLD-MCNC: 246 MG/DL (ref 70–99)
HCT VFR BLD CALC: 36.4 % (ref 40.7–50.3)
HEMOGLOBIN: 11.5 G/DL (ref 13–17)
IMMATURE GRANULOCYTES: 1 %
LYMPHOCYTES # BLD: 34 % (ref 24–43)
MCH RBC QN AUTO: 27.9 PG (ref 25.2–33.5)
MCHC RBC AUTO-ENTMCNC: 31.6 G/DL (ref 28.4–34.8)
MCV RBC AUTO: 88.3 FL (ref 82.6–102.9)
MONOCYTES # BLD: 7 % (ref 3–12)
NRBC AUTOMATED: 0 PER 100 WBC
PDW BLD-RTO: 12.9 % (ref 11.8–14.4)
PLATELET # BLD: 271 K/UL (ref 138–453)
PMV BLD AUTO: 9.5 FL (ref 8.1–13.5)
POTASSIUM SERPL-SCNC: 4.2 MMOL/L (ref 3.7–5.3)
RBC # BLD: 4.12 M/UL (ref 4.21–5.77)
SEG NEUTROPHILS: 55 % (ref 36–65)
SEGMENTED NEUTROPHILS ABSOLUTE COUNT: 3.78 K/UL (ref 1.5–8.1)
SODIUM BLD-SCNC: 135 MMOL/L (ref 135–144)
WBC # BLD: 6.9 K/UL (ref 3.5–11.3)

## 2022-04-18 DIAGNOSIS — I10 ESSENTIAL HYPERTENSION: ICD-10-CM

## 2022-04-18 RX ORDER — HYDROCHLOROTHIAZIDE 12.5 MG/1
CAPSULE, GELATIN COATED ORAL
Qty: 90 CAPSULE | Refills: 3 | Status: SHIPPED | OUTPATIENT
Start: 2022-04-18

## 2022-04-18 NOTE — TELEPHONE ENCOUNTER
Elaina Renee is calling to request a refill on the following medication(s):    Last Visit Date (If Applicable):      Next Visit Date:    Visit date not found    Medication Request:  Requested Prescriptions     Pending Prescriptions Disp Refills    hydroCHLOROthiazide (MICROZIDE) 12.5 MG capsule 90 capsule 3     Si po qd

## 2022-04-19 ENCOUNTER — HOSPITAL ENCOUNTER (OUTPATIENT)
Dept: WOUND CARE | Age: 62
Discharge: HOME | End: 2022-04-19
Payer: COMMERCIAL

## 2022-04-19 VITALS
SYSTOLIC BLOOD PRESSURE: 126 MMHG | DIASTOLIC BLOOD PRESSURE: 56 MMHG | HEIGHT: 72 IN | WEIGHT: 250 LBS | BODY MASS INDEX: 33.86 KG/M2 | TEMPERATURE: 97.7 F | RESPIRATION RATE: 16 BRPM

## 2022-04-19 DIAGNOSIS — M86.9 OSTEOMYELITIS OF SECOND TOE OF LEFT FOOT (HCC): Primary | ICD-10-CM
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
## 2022-04-19 DIAGNOSIS — E11.42 DIABETIC POLYNEUROPATHY ASSOCIATED WITH TYPE 2 DIABETES MELLITUS (HCC): ICD-10-CM

## 2022-04-19 DIAGNOSIS — L97.512 ULCER OF TOE OF RIGHT FOOT, WITH FAT LAYER EXPOSED (HCC): ICD-10-CM

## 2022-04-19 DIAGNOSIS — L97.524 CHRONIC ULCER OF TOE, LEFT, WITH NECROSIS OF BONE (HCC): ICD-10-CM

## 2022-04-19 PROCEDURE — 11042 DBRDMT SUBQ TIS 1ST 20SQCM/<: CPT

## 2022-04-19 PROCEDURE — 11042 DBRDMT SUBQ TIS 1ST 20SQCM/<: CPT | Performed by: NURSE PRACTITIONER

## 2022-04-19 RX ORDER — LIDOCAINE HYDROCHLORIDE 20 MG/ML
JELLY TOPICAL ONCE
Status: COMPLETED | OUTPATIENT
Start: 2022-04-19 | End: 2022-04-19

## 2022-04-19 RX ORDER — LIDOCAINE 50 MG/G
OINTMENT TOPICAL ONCE
Status: CANCELLED | OUTPATIENT
Start: 2022-04-19 | End: 2022-04-19

## 2022-04-19 RX ORDER — LIDOCAINE HYDROCHLORIDE 40 MG/ML
SOLUTION TOPICAL ONCE
Status: CANCELLED | OUTPATIENT
Start: 2022-04-19 | End: 2022-04-19

## 2022-04-19 RX ORDER — BACITRACIN, NEOMYCIN, POLYMYXIN B 400; 3.5; 5 [USP'U]/G; MG/G; [USP'U]/G
OINTMENT TOPICAL ONCE
Status: CANCELLED | OUTPATIENT
Start: 2022-04-19 | End: 2022-04-19

## 2022-04-19 RX ORDER — LIDOCAINE HYDROCHLORIDE 20 MG/ML
JELLY TOPICAL ONCE
Status: CANCELLED | OUTPATIENT
Start: 2022-04-19 | End: 2022-04-19

## 2022-04-19 RX ORDER — BETAMETHASONE DIPROPIONATE 0.05 %
OINTMENT (GRAM) TOPICAL ONCE
Status: CANCELLED | OUTPATIENT
Start: 2022-04-19 | End: 2022-04-19

## 2022-04-19 RX ORDER — CLOBETASOL PROPIONATE 0.5 MG/G
OINTMENT TOPICAL ONCE
Status: CANCELLED | OUTPATIENT
Start: 2022-04-19 | End: 2022-04-19

## 2022-04-19 RX ORDER — BACITRACIN ZINC AND POLYMYXIN B SULFATE 500; 1000 [USP'U]/G; [USP'U]/G
OINTMENT TOPICAL ONCE
Status: CANCELLED | OUTPATIENT
Start: 2022-04-19 | End: 2022-04-19

## 2022-04-19 RX ORDER — GINSENG 100 MG
CAPSULE ORAL ONCE
Status: CANCELLED | OUTPATIENT
Start: 2022-04-19 | End: 2022-04-19

## 2022-04-19 RX ORDER — LIDOCAINE 40 MG/G
CREAM TOPICAL ONCE
Status: CANCELLED | OUTPATIENT
Start: 2022-04-19 | End: 2022-04-19

## 2022-04-19 RX ORDER — GENTAMICIN SULFATE 1 MG/G
OINTMENT TOPICAL ONCE
Status: CANCELLED | OUTPATIENT
Start: 2022-04-19 | End: 2022-04-19

## 2022-04-19 RX ADMIN — LIDOCAINE HYDROCHLORIDE 6 ML: 20 JELLY TOPICAL at 10:52

## 2022-04-19 ASSESSMENT — PAIN DESCRIPTION - PAIN TYPE: TYPE: ACUTE PAIN

## 2022-04-19 ASSESSMENT — PAIN DESCRIPTION - FREQUENCY: FREQUENCY: CONTINUOUS

## 2022-04-19 ASSESSMENT — PAIN SCALES - GENERAL: PAINLEVEL_OUTOF10: 5

## 2022-04-19 ASSESSMENT — PAIN DESCRIPTION - ONSET: ONSET: ON-GOING

## 2022-04-19 ASSESSMENT — PAIN DESCRIPTION - ORIENTATION: ORIENTATION: LEFT

## 2022-04-19 ASSESSMENT — PAIN - FUNCTIONAL ASSESSMENT: PAIN_FUNCTIONAL_ASSESSMENT: PREVENTS OR INTERFERES WITH MANY ACTIVE NOT PASSIVE ACTIVITIES

## 2022-04-19 ASSESSMENT — PAIN DESCRIPTION - LOCATION: LOCATION: TOE (COMMENT WHICH ONE)

## 2022-04-19 ASSESSMENT — PAIN DESCRIPTION - PROGRESSION: CLINICAL_PROGRESSION: NOT CHANGED

## 2022-04-19 NOTE — PROGRESS NOTES
with venous stasis and ulcerations. Dr Tiffany Maldonado Presence of coronary angioplasty implant and graft 9/11/2009    Sleep apnea     Dr Jenn Odonnell of knee and leg 6/5/2018    Sprain of shoulder and upper arm 6/5/2018    Type II or unspecified type diabetes mellitus without mention of complication, not stated as uncontrolled     Ulcer of left foot, with fat layer exposed ([address] 1/9/2018    Unspecified sleep apnea     Dr Yazmin Carlin CPAP    Venous insufficiency of both lower extremities 1/31/2018       PAST SURGICAL HISTORY    Past Surgical History:   Procedure Laterality Date    ARTHROPLASTY Left 4/1/2022    ARTHROPLASTY 2ND LEFT TOE performed by Brisa Ley DPM at [address] Right 3/22/13    Dr. Jamie Vicente.     COLONOSCOPY  2020    CORONARY ARTERY BYPASS GRAFT  2/26/07    [place], Dr Benavidez Many (X 3)    CORONARY ARTERY BYPASS GRAFT  2006    NECK SURGERY  2010    cervical stenoses C5-C6-C7and partial T1 laminectomy with fusion of C6-7    OTHER SURGICAL HISTORY      wound care ulcers of legs (bilaterally) with Dr Juana Mead Left 4/6/2022    TOE AMPUTATION, SECOND DIGIT performed by Birsa Ley DPM at [address]  age 11   [place]      closure of peripherator veins of legs, Dr Montez Carreon History   Problem Relation Age of Onset    Cancer Mother         lung    Diabetes Father     Heart Disease Father     High Blood Pressure Father     Diabetes Brother     Heart Disease Brother     High Blood Pressure Brother        SOCIAL HISTORY    Social History     Tobacco Use    Smoking status: Never Smoker    Smokeless tobacco: Never Used   Vaping Use    Vaping Use: Never used   Substance Use Topics    Alcohol use: Not Currently     Comment: rare social, non past 5 months    Drug use: No       ALLERGIES    Allergies   Allergen Reactions    Pcn [Penicillins] Other (See Comments)     Unknown rx    Penicillin G Rash       MEDICATIONS    Current Outpatient Medications on File Prior to Encounter   Medication Sig Dispense Refill    hydroCHLOROthiazide (MICROZIDE) 12.5 MG capsule 1 po qd 90 capsule 3    ceFAZolin (ANCEF) infusion Infuse 2,000 mg intravenously every 8 hours for 26 days Through 5/3/2022 Compound per protocol 156 g 0    metFORMIN (GLUCOPHAGE) 1000 MG tablet Take 1 tablet by mouth 2 times daily (with meals) 60 tablet 3    omeprazole (PRILOSEC) 20 MG delayed release capsule TAKE 1 CAPSULE DAILY 90 capsule 1    linagliptin (TRADJENTA) 5 MG tablet TAKE 1 TABLET BY MOUTH ONE TIME A DAY 30 tablet 5    gabapentin (NEURONTIN) 300 MG capsule Take 300 mg by mouth 3 times daily.  Misc Natural Products (GLUCOSAMINE CHOND MSM FORMULA PO) Take 2 tablets by mouth daily      HYDROcodone-acetaminophen (NORCO)  MG per tablet Take 1 tablet by mouth every 4-6 hours as needed for Pain.  meloxicam (MOBIC) 15 MG tablet Take 1 tablet by mouth daily 90 tablet 3    traZODone (DESYREL) 150 MG tablet TAKE ONE TABLET BY MOUTH EVERY EVENING 90 tablet 3    topiramate (TOPAMAX) 200 MG tablet Take 1 tablet by mouth 2 times daily (Patient taking differently: Take 400 mg by mouth daily ) 60 tablet 5    tiZANidine (ZANAFLEX) 4 MG tablet 1 po qd 14 tablet 3    carvedilol (COREG) 12.5 MG tablet Take 1 tablet by mouth 2 times daily (with meals) 180 tablet 3    clopidogrel (PLAVIX) 75 MG tablet Take 1 tablet by mouth daily 90 tablet 3    NEEDLE, DISP, 22 G 22G X 1-1/2\" MISC 1 Device by Does not apply route every 30 days 25 each 1    NEEDLE, DISP, 18 G (BD DISP NEEDLES) 18G X 1-1/2\" MISC 1 Device by Does not apply route every 30 days 25 each 1    testosterone cypionate (DEPOTESTOTERONE CYPIONATE) 200 MG/ML injection       Testosterone Cypionate 200 MG/ML KIT Inject 200 mg into the muscle every 30 days for 30 days.  please include syringes 1 kit 5    NEEDLE, DISP, 18 G 18G X 1\" MISC 1 Syringe by Does not apply route every 30 days 25 each 0    NEEDLE, DISP, 22 G 22G X 1-1/2\" MISC 1 Syringe by Does not apply route every 30 days 25 each 0    Multiple Vitamin (MULTI VITAMIN DAILY PO) Take by mouth      nitroGLYCERIN (NITROSTAT) 0.4 MG SL tablet Place 0.4 mg under the tongue      aspirin 325 MG tablet Take 325 mg by mouth daily. No current facility-administered medications on file prior to encounter. REVIEW OF SYSTEMS    Pertinent items are noted in HPI.      Objective:      BP (!) 126/56   Temp 97.7 °F (36.5 °C) (Tympanic)   Resp 16   Ht 6' (1.829 m)   Wt 250 lb (113.4 kg)   BMI 33.91 kg/m²     Wt Readings from Last 3 Encounters:   04/19/22 250 lb (113.4 kg)   04/12/22 250 lb (113.4 kg)   04/08/22 250 lb 3.2 oz (113.5 kg)       PHYSICAL EXAM    General Appearance: alert and oriented to person, place and time, well-developed and well-nourished, in no acute distress  Skin: warm and dry, no rash or erythema, right second and third toe wounds and left 2nd toe amp site   Head: normocephalic and atraumatic  Eyes: pupils equal, round, extraocular eye movements intact, and conjunctivae normal  Pulmonary/Chest: normal air movement, no respiratory distress  Extremities: no cyanosis and no clubbing   Musculoskeletal: no joint swelling or tenderness  Neurologic: coordination normal and speech normal      Assessment:     Problem List Items Addressed This Visit     Chronic ulcer of toe, left, with necrosis of bone ([address]    Relevant Orders    Initiate Outpatient Wound Care Protocol    Diabetic polyneuropathy associated with type 2 diabetes mellitus ([address]    Relevant Orders    Initiate Outpatient Wound Care Protocol    Dog bite    Relevant Orders    Initiate Outpatient Wound Care Protocol    Osteomyelitis of second toe of left foot ([address] - Primary    Relevant Orders    Initiate Outpatient Wound Care Protocol    Ulcer of toe of right foot, with fat layer exposed ([address]    Relevant Orders    Initiate Outpatient Wound Care Protocol           Procedure Note  Indications:  Based on my examination of this patient's wound(s)/ulcer(s) today, debridement is required to promote healing and evaluate the wound base. Performed by: JESICA Montiel CNP    Consent obtained:  Yes    Time out taken:  Yes    Pain Control: Anesthetic  Anesthetic: 2% Lidocaine Gel Topical       Debridement:Excisional Debridement    Using curette the wound(s)/ulcer(s) was/were sharply debrided down through and including the removal of subcutaneous tissue. Devitalized Tissue Debrided:  fibrin, biofilm and slough    Pre Debridement Measurements:  Are located in the [facility]  Documentation Flow Sheet    Wound/Ulcer #: 5 and 6    Post Debridement Measurements:  Wound/Ulcer Descriptions are Pre Debridement except measurements:    Wound 02/28/22 Toe (Comment  which one) Right 2nd Toe #5 (Active)   Wound Image   02/28/22 0912   Wound Etiology Diabetic Pate 1 04/19/22 1104   Dressing Status New drainage noted; Old drainage noted 04/19/22 1104   Wound Cleansed Cleansed with saline 04/19/22 1104   Dressing/Treatment Ace wrap;Dry dressing 04/05/22 1931   Offloading for Diabetic Foot Ulcers Offloading ordered;Post op shoe 04/19/22 1104   Wound Length (cm) 1.2 cm 04/19/22 1104   Wound Width (cm) 1.5 cm 04/19/22 1104   Wound Depth (cm) 0.1 cm 04/19/22 1104   Wound Surface Area (cm^2) 1.8 cm^2 04/19/22 1104   Change in Wound Size % (l*w) 41.56 04/19/22 1104   Wound Volume (cm^3) 0.18 cm^3 04/19/22 1104   Wound Healing % 42 04/19/22 1104   Post-Procedure Length (cm) 1.2 cm 04/19/22 1104   Post-Procedure Width (cm) 1.5 cm 04/19/22 1104   Post-Procedure Depth (cm) 0.1 cm 04/19/22 1104   Post-Procedure Surface Area (cm^2) 1.8 cm^2 04/19/22 1104   Post-Procedure Volume (cm^3) 0.18 cm^3 04/19/22 1104   Wound Assessment Slough;[place]/red 04/19/22 1104   Drainage Amount None 04/19/22 1104   Drainage Description Yellow 04/19/22 1104 Odor None 04/19/22 1104   Trudi-wound Assessment Hyperkeratosis (callous) 04/19/22 1104   Margins Defined edges 04/19/22 1104   Wound Thickness Description not for Pressure Injury Full thickness 04/19/22 1104   Number of days: 50       Wound 03/09/22 Toe (Comment  which one) Right #6 right great toe (Active)   Wound Image   03/09/22 0922   Wound Etiology Diabetic Pate 1 04/19/22 1104   Dressing Status New drainage noted; Old drainage noted 04/19/22 1104   Wound Cleansed Cleansed with saline 04/19/22 1104   Dressing/Treatment Ace wrap;Dry dressing 04/08/22 1212   Offloading for Diabetic Foot Ulcers Offloading ordered;Post op shoe 04/19/22 1104   Wound Length (cm) 0.7 cm 04/19/22 1104   Wound Width (cm) 0.7 cm 04/19/22 1104   Wound Depth (cm) 0.1 cm 04/19/22 1104   Wound Surface Area (cm^2) 0.49 cm^2 04/19/22 1104   Change in Wound Size % (l*w) 50.51 04/19/22 1104   Wound Volume (cm^3) 0.049 cm^3 04/19/22 1104   Wound Healing % 51 04/19/22 1104   Post-Procedure Length (cm) 0.7 cm 04/19/22 1104   Post-Procedure Width (cm) 0.7 cm 04/19/22 1104   Post-Procedure Depth (cm) 0.1 cm 04/19/22 1104   Post-Procedure Surface Area (cm^2) 0.49 cm^2 04/19/22 1104   Post-Procedure Volume (cm^3) 0.049 cm^3 04/19/22 1104   Wound Assessment Slough 04/19/22 1104   Drainage Amount Moderate 04/19/22 1104   Drainage Description Yellow 04/19/22 1104   Odor None 04/19/22 1104   Trudi-wound Assessment Dry/flaky; Blanchable erythema 04/19/22 1104   Margins Defined edges 04/19/22 1104   Wound Thickness Description not for Pressure Injury Full thickness 04/19/22 1104   Number of days: 41     Incision 04/12/22 Toe (Comment  which one) Left (Active)   Wound Image   04/06/22 1419   Dressing Status New drainage noted; Old drainage noted 04/19/22 1104   Incision Cleansed Cleansed with saline 04/19/22 1104   Dressing/Treatment Ace wrap;Dry dressing 04/08/22 1212   Incision Length (cm) 4.5 04/19/22 1104   Incision Width (cm) 1.3 cm 04/19/22 1104 Incision Depth (cm) 0.1 cm 04/19/22 1104   Closure Sutures 04/19/22 1104   Margins Approximated 04/19/22 1104   Incision Assessment Dry 04/19/22 1104   Drainage Amount Small 04/19/22 1104   Drainage Description Yellow;Serosanguinous 04/19/22 1104   Odor None 04/19/22 1104   Trudi-incision Assessment Maceration; Intact 04/19/22 1104   Number of days: 6       Percent of Wound(s)/Ulcer(s) Debrided: 100%    Total Surface Area Debrided:  2.29 sq cm     Estimated Blood Loss:  Minimal    Hemostasis Achieved:  by pressure    Procedural Pain:  0  / 10     Post Procedural Pain:  0 / 10     Response to treatment:  Well tolerated by patient. Plan:     Treatment Note please see Discharge Instructions    Written patient dismissal instructions given to patient and signed by patient or POA.            Electronically signed by JESICA Gordillo CNP on 4/19/2022 at 11:35 AM

## 2022-04-26 ENCOUNTER — HOSPITAL ENCOUNTER (OUTPATIENT)
Dept: WOUND CARE | Age: 62
Discharge: HOME | End: 2022-04-26
Payer: COMMERCIAL

## 2022-04-26 VITALS
HEART RATE: 68 BPM | RESPIRATION RATE: 16 BRPM | TEMPERATURE: 97.1 F | SYSTOLIC BLOOD PRESSURE: 124 MMHG | DIASTOLIC BLOOD PRESSURE: 72 MMHG

## 2022-04-26 DIAGNOSIS — M86.9 OSTEOMYELITIS OF SECOND TOE OF LEFT FOOT (HCC): Primary | ICD-10-CM

## 2022-04-26 DIAGNOSIS — E11.42 DIABETIC POLYNEUROPATHY ASSOCIATED WITH TYPE 2 DIABETES MELLITUS (HCC): ICD-10-CM

## 2022-04-26 DIAGNOSIS — L97.524 CHRONIC ULCER OF TOE, LEFT, WITH NECROSIS OF BONE (HCC): ICD-10-CM

## 2022-04-26 DIAGNOSIS — L97.512 ULCER OF TOE OF RIGHT FOOT, WITH FAT LAYER EXPOSED (HCC): ICD-10-CM
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
## 2022-04-26 PROCEDURE — 15275 SKIN SUB GRAFT FACE/NK/HF/G: CPT

## 2022-04-26 RX ORDER — BETAMETHASONE DIPROPIONATE 0.05 %
OINTMENT (GRAM) TOPICAL ONCE
Status: CANCELLED | OUTPATIENT
Start: 2022-04-26 | End: 2022-04-26

## 2022-04-26 RX ORDER — LIDOCAINE HYDROCHLORIDE 20 MG/ML
JELLY TOPICAL ONCE
Status: CANCELLED | OUTPATIENT
Start: 2022-04-26 | End: 2022-04-26

## 2022-04-26 RX ORDER — GINSENG 100 MG
CAPSULE ORAL ONCE
Status: CANCELLED | OUTPATIENT
Start: 2022-04-26 | End: 2022-04-26

## 2022-04-26 RX ORDER — LIDOCAINE HYDROCHLORIDE 20 MG/ML
JELLY TOPICAL ONCE
Status: COMPLETED | OUTPATIENT
Start: 2022-04-26 | End: 2022-04-26

## 2022-04-26 RX ORDER — BACITRACIN, NEOMYCIN, POLYMYXIN B 400; 3.5; 5 [USP'U]/G; MG/G; [USP'U]/G
OINTMENT TOPICAL ONCE
Status: CANCELLED | OUTPATIENT
Start: 2022-04-26 | End: 2022-04-26

## 2022-04-26 RX ORDER — LIDOCAINE 50 MG/G
OINTMENT TOPICAL ONCE
Status: CANCELLED | OUTPATIENT
Start: 2022-04-26 | End: 2022-04-26

## 2022-04-26 RX ORDER — LIDOCAINE HYDROCHLORIDE 40 MG/ML
SOLUTION TOPICAL ONCE
Status: CANCELLED | OUTPATIENT
Start: 2022-04-26 | End: 2022-04-26

## 2022-04-26 RX ORDER — MAGNESIUM CARB/ALUMINUM HYDROX 105-160MG
30 TABLET,CHEWABLE ORAL
Status: ACTIVE | OUTPATIENT
Start: 2022-04-26 | End: 2022-04-26

## 2022-04-26 RX ORDER — LIDOCAINE 40 MG/G
CREAM TOPICAL ONCE
Status: CANCELLED | OUTPATIENT
Start: 2022-04-26 | End: 2022-04-26

## 2022-04-26 RX ORDER — GENTAMICIN SULFATE 1 MG/G
OINTMENT TOPICAL ONCE
Status: CANCELLED | OUTPATIENT
Start: 2022-04-26 | End: 2022-04-26

## 2022-04-26 RX ORDER — CLOBETASOL PROPIONATE 0.5 MG/G
OINTMENT TOPICAL ONCE
Status: CANCELLED | OUTPATIENT
Start: 2022-04-26 | End: 2022-04-26

## 2022-04-26 RX ORDER — BACITRACIN ZINC AND POLYMYXIN B SULFATE 500; 1000 [USP'U]/G; [USP'U]/G
OINTMENT TOPICAL ONCE
Status: CANCELLED | OUTPATIENT
Start: 2022-04-26 | End: 2022-04-26

## 2022-04-26 RX ADMIN — LIDOCAINE HYDROCHLORIDE 6 ML: 20 JELLY TOPICAL at 13:53

## 2022-04-26 NOTE — PROGRESS NOTES
[address]   Progress Note and Procedure Note      Hannah Rashid  MEDICAL RECORD NUMBER:  [id number]  AGE: 64 y.o. GENDER: male  : 1960  EPISODE DATE:  2022    Subjective:     No chief complaint on file. HISTORY of PRESENT ILLNESS HPI     Hannah Rashid is a 64 y.o. male who presents today for wound/ulcer evaluation. S/P   2ND LEFT AMP X 3 WEEKS. RIGHT TOE WOUNDS - BEEN APPLYING KAYCEE AND SSD TO RIGHT TOE WOUNDS. GETTING IV ANTIBIOTICS THRU PICC    Ulcer Identification:  Ulcer Type: diabetic  Contributing Factors: diabetes and chronic pressure       22 1204    Specimen Description . TOE    Special Requests . TOE   SECOND LEFT DIGIT    Direct Exam RARE NEUTROPHILS Abnormal     Direct Exam FEW GRAM POSITIVE COCCI IN PAIRS Abnormal     Culture STREPTOCOCCI, BETA HEMOLYTIC GROUP B MODERATE GROWTH Abnormal     Culture  Abnormal   STAPHYLOCOCCUS AUREUS MODERATE GROWTH This isolate is methicillin susceptible. For susceptibility, refer to previous culture. Culture No anaerobic organisms isolated at 5 days.                 PAST MEDICAL HISTORY        Diagnosis Date    Acquired trigger finger 2018    CAD (coronary artery disease)     NWOCC/ involving coronary bypass graft of native heart with unstable angina pectoris    Cervical spondylosis     CHF (congestive heart failure) (HCC)     Chronic back pain     on 18 pt states is presently in pain mgmnt    Contusion of elbow 2018    Contusion of knee 2018    Contusion of shoulder region 2018    Diabetic foot ulcer with osteomyelitis ([address] 2018    Diabetic ulcer of toe associated with type 2 diabetes mellitus, with fat layer exposed ([address] 2018    Hypercholesterolemia 2017    Hyperlipemia, mixed     Hypertension     Ischemic cardiomyopathy 2018    Non-pressure chronic ulcer of left lower leg with fat layer exposed ([address] 2014    Obesity     Obesity, Used   Vaping Use    Vaping Use: Never used   Substance Use Topics    Alcohol use: Not Currently     Comment: rare social, non past 5 months    Drug use: No       ALLERGIES    Allergies   Allergen Reactions    Pcn [Penicillins] Other (See Comments)     Unknown rx    Penicillin G Rash       MEDICATIONS    Current Outpatient Medications on File Prior to Encounter   Medication Sig Dispense Refill    hydroCHLOROthiazide (MICROZIDE) 12.5 MG capsule 1 po qd 90 capsule 3    ceFAZolin (ANCEF) infusion Infuse 2,000 mg intravenously every 8 hours for 26 days Through 5/3/2022 Compound per protocol 156 g 0    metFORMIN (GLUCOPHAGE) 1000 MG tablet Take 1 tablet by mouth 2 times daily (with meals) 60 tablet 3    omeprazole (PRILOSEC) 20 MG delayed release capsule TAKE 1 CAPSULE DAILY 90 capsule 1    linagliptin (TRADJENTA) 5 MG tablet TAKE 1 TABLET BY MOUTH ONE TIME A DAY 30 tablet 5    gabapentin (NEURONTIN) 300 MG capsule Take 300 mg by mouth 3 times daily.  Misc Natural Products (GLUCOSAMINE CHOND MSM FORMULA PO) Take 2 tablets by mouth daily      HYDROcodone-acetaminophen (NORCO)  MG per tablet Take 1 tablet by mouth every 4-6 hours as needed for Pain.       meloxicam (MOBIC) 15 MG tablet Take 1 tablet by mouth daily 90 tablet 3    traZODone (DESYREL) 150 MG tablet TAKE ONE TABLET BY MOUTH EVERY EVENING 90 tablet 3    topiramate (TOPAMAX) 200 MG tablet Take 1 tablet by mouth 2 times daily (Patient taking differently: Take 400 mg by mouth daily ) 60 tablet 5    tiZANidine (ZANAFLEX) 4 MG tablet 1 po qd 14 tablet 3    carvedilol (COREG) 12.5 MG tablet Take 1 tablet by mouth 2 times daily (with meals) 180 tablet 3    clopidogrel (PLAVIX) 75 MG tablet Take 1 tablet by mouth daily 90 tablet 3    NEEDLE, DISP, 22 G 22G X 1-1/2\" MISC 1 Device by Does not apply route every 30 days 25 each 1    NEEDLE, DISP, 18 G (BD DISP NEEDLES) 18G X 1-1/2\" MISC 1 Device by Does not apply route every 30 days 25 each 1  testosterone cypionate (DEPOTESTOTERONE CYPIONATE) 200 MG/ML injection       Testosterone Cypionate 200 MG/ML KIT Inject 200 mg into the muscle every 30 days for 30 days. please include syringes 1 kit 5    NEEDLE, DISP, 18 G 18G X 1\" MISC 1 Syringe by Does not apply route every 30 days 25 each 0    NEEDLE, DISP, 22 G 22G X 1-1/2\" MISC 1 Syringe by Does not apply route every 30 days 25 each 0    Multiple Vitamin (MULTI VITAMIN DAILY PO) Take by mouth      nitroGLYCERIN (NITROSTAT) 0.4 MG SL tablet Place 0.4 mg under the tongue      aspirin 325 MG tablet Take 325 mg by mouth daily. No current facility-administered medications on file prior to encounter. REVIEW OF SYSTEMS    Review of Systems   Constitutional: Negative for chills and fever. Skin: Positive for wound. Neurological: Negative for numbness. Objective: There were no vitals taken for this visit. Wt Readings from Last 3 Encounters:   04/19/22 250 lb (113.4 kg)   04/12/22 250 lb (113.4 kg)   04/08/22 250 lb 3.2 oz (113.5 kg)       Physical Exam:  General:  Alert and oriented x3. In no acute distress. Lower Extremity Physical Exam:    Vascular: DP pulses are palpable, Bilateral. PT pulses are palpable, Bilateral. CFT <3 seconds to all digits, Bilateral.  No edema, Bilateral.  Hair growth is absent to the level of the digits, Bilateral.     Neuro: Saph/sural/SP/DP/plantar sensation intact to light touch. Musculoskeletal: EHL/FHL/GS/TA gross motor intact. Gross deformity is present, hammertoes bilateral.     Dermatologic: RIGHT TOE WOUNDS    INCISION LEFT FOOT - SUTURES TORN OUT FROM DISTAL ASPECT OF WOUND      Wound 02/28/22 Toe (Comment  which one) Right 2nd Toe #5 (Active)   Wound Image   02/28/22 0912   Wound Etiology Diabetic Pate 1 04/26/22 1342   Dressing Status New drainage noted; Old drainage noted 04/26/22 1342   Wound Cleansed Cleansed with saline 04/26/22 1342   Dressing/Treatment Ace wrap;Dry Area (cm^2) 0.5 cm^2 04/26/22 1342   Post-Procedure Volume (cm^3) 0.15 cm^3 04/26/22 1342   Wound Assessment Slough 04/19/22 1104   Drainage Amount Moderate 04/19/22 1104   Drainage Description Yellow 04/19/22 1104   Odor None 04/19/22 1104   Trudi-wound Assessment Dry/flaky; Blanchable erythema 04/19/22 1104   Margins Defined edges 04/19/22 1104   Wound Thickness Description not for Pressure Injury Full thickness 04/19/22 1104   Number of days: 48       Wound 04/26/22 Toe (Comment  which one) Left #4 Left 2nd toe amp site (Active)   Wound Etiology Non-Healing Surgical 04/26/22 1342   Dressing Status Old drainage noted;New drainage noted 04/26/22 1342   Wound Cleansed Cleansed with saline 04/26/22 1342   Wound Length (cm) 1.3 cm 04/26/22 1342   Wound Width (cm) 1.2 cm 04/26/22 1342   Wound Depth (cm) 0.4 cm 04/26/22 1342   Wound Surface Area (cm^2) 1.56 cm^2 04/26/22 1342   Wound Volume (cm^3) 0.624 cm^3 04/26/22 1342   Post-Procedure Length (cm) 1.3 cm 04/26/22 1342   Post-Procedure Width (cm) 1.2 cm 04/26/22 1342   Post-Procedure Depth (cm) 0.4 cm 04/26/22 1342   Post-Procedure Surface Area (cm^2) 1.56 cm^2 04/26/22 1342   Post-Procedure Volume (cm^3) 0.624 cm^3 04/26/22 1342   Wound Assessment Pink/red 04/26/22 1342   Drainage Amount Moderate 04/26/22 1342   Drainage Description Serosanguinous 04/26/22 1342   Odor None 04/26/22 1342   Trudi-wound Assessment Blanchable erythema 04/26/22 1342   Margins Attached edges 04/26/22 1342   Wound Thickness Description not for Pressure Injury Full thickness 04/26/22 1342   Number of days: 0         Procedure:  Wound Location: right TOES,WOUND DISTAL LEFT FOOT    Lidocaine gel soaked gauze was applied at beginning of wound evaluation. The wound(s) was excisionally debrided sharply of fibrotic, necrotic, and hyperkeratotic tissue, including a layer of surrounding healthy tissue using curette. The wound was debrided down through and including subcutaneous.     Percent of Wound Debrided: 100%    Total Surface Area Debrided:  (see above for sq cm)    Bleeding: Minimal    Patient tolerated procedure well and was given proper instruction. Skin Substitute Graft Procedure Note      Alcides Rubalcava  AGE: 64 y.o. GENDER: male  : 1960  TODAY'S DATE:  2022     Pre-procedural Diagnosis:   1. Osteomyelitis of second toe of left foot ([address]    2. Diabetic polyneuropathy associated with type 2 diabetes mellitus ([address]    3. Chronic ulcer of toe, left, with necrosis of bone ([address]    4. Ulcer of toe of right foot, with fat layer exposed ([address]    5. Dog bite, subsequent encounter        Wound 22 Toe (Comment  which one) Right 2nd Toe #5 (Active)   Wound Image   22 0912   Wound Etiology Diabetic Pate 1 22 1342   Dressing Status New drainage noted; Old drainage noted 22 134   Wound Cleansed Cleansed with saline 22   Dressing/Treatment Ace wrap;Dry dressing 22 1931   Offloading for Diabetic Foot Ulcers Offloading ordered;Post op shoe 22 134   Wound Length (cm) 1.2 cm 22   Wound Width (cm) 1.5 cm 22   Wound Depth (cm) 0.1 cm 22   Wound Surface Area (cm^2) 1.8 cm^2 22   Change in Wound Size % (l*w) 41.56 22 134   Wound Volume (cm^3) 0.18 cm^3 22 134   Wound Healing % 42 22   Post-Procedure Length (cm) 1.6 cm 22 1342   Post-Procedure Width (cm) 1.7 cm 22   Post-Procedure Depth (cm) 0.3 cm 22   Post-Procedure Surface Area (cm^2) 2.72 cm^2 22 134   Post-Procedure Volume (cm^3) 0.816 cm^3 22 1342   Wound Assessment Slough;Pink/red 04/26/22 1342   Drainage Amount None 22 1342   Drainage Description Yellow 22 1342   Odor None 22 1342   Trudi-wound Assessment Hyperkeratosis (callous); Non-blanchable erythema 22 1342   Margins Defined edges 22 1342   Wound Thickness Description not for Pressure Injury Full thickness 04/26/22 1342   Number of days: 57       Wound 03/09/22 Toe (Comment  which one) Right #6 right great toe (Active)   Wound Image   03/09/22 0922   Wound Etiology Diabetic Pate 1 04/26/22 1342   Dressing Status New drainage noted; Old drainage noted 04/26/22 1342   Wound Cleansed Cleansed with saline 04/26/22 1342   Dressing/Treatment Ace wrap;Dry dressing 04/08/22 1212   Offloading for Diabetic Foot Ulcers Offloading ordered;Post op shoe 04/19/22 1104   Wound Length (cm) 0 cm 04/26/22 1342   Wound Width (cm) 0 cm 04/26/22 1342   Wound Depth (cm) 0 cm 04/26/22 1342   Wound Surface Area (cm^2) 0 cm^2 04/26/22 1342   Change in Wound Size % (l*w) 100 04/26/22 1342   Wound Volume (cm^3) 0 cm^3 04/26/22 1342   Wound Healing % 100 04/26/22 1342   Post-Procedure Length (cm) 0.5 cm 04/26/22 1342   Post-Procedure Width (cm) 1 cm 04/26/22 1342   Post-Procedure Depth (cm) 0.3 cm 04/26/22 1342   Post-Procedure Surface Area (cm^2) 0.5 cm^2 04/26/22 1342   Post-Procedure Volume (cm^3) 0.15 cm^3 04/26/22 1342   Wound Assessment Slough 04/19/22 1104   Drainage Amount Moderate 04/19/22 1104   Drainage Description Yellow 04/19/22 1104   Odor None 04/19/22 1104   Trudi-wound Assessment Dry/flaky; Blanchable erythema 04/19/22 1104   Margins Defined edges 04/19/22 1104   Wound Thickness Description not for Pressure Injury Full thickness 04/19/22 1104   Number of days: 48       Wound 04/26/22 Toe (Comment  which one) Left #4 Left 2nd toe amp site (Active)   Wound Etiology Non-Healing Surgical 04/26/22 1342   Dressing Status Old drainage noted;New drainage noted 04/26/22 1342   Wound Cleansed Cleansed with saline 04/26/22 1342   Wound Length (cm) 1.3 cm 04/26/22 1342   Wound Width (cm) 1.2 cm 04/26/22 1342   Wound Depth (cm) 0.4 cm 04/26/22 1342   Wound Surface Area (cm^2) 1.56 cm^2 04/26/22 1342   Wound Volume (cm^3) 0.624 cm^3 04/26/22 1342   Post-Procedure Length (cm) 1.3 cm 04/26/22 1342   Post-Procedure Width (cm) 1.2 cm 04/26/22 1342 Post-Procedure Depth (cm) 0.4 cm 04/26/22 1342   Post-Procedure Surface Area (cm^2) 1.56 cm^2 04/26/22 1342   Post-Procedure Volume (cm^3) 0.624 cm^3 04/26/22 1342   Wound Assessment Pink/red 04/26/22 1342   Drainage Amount Moderate 04/26/22 1342   Drainage Description Serosanguinous 04/26/22 1342   Odor None 04/26/22 1342   Trudi-wound Assessment Blanchable erythema 04/26/22 1342   Margins Attached edges 04/26/22 1342   Wound Thickness Description not for Pressure Injury Full thickness 04/26/22 1342   Number of days: 0     Post-procedural Diagnosis: same  Wound Type:diabetic      The wound needs epithelialization and we are present today to perform Skin substitute skin grafting. Due to wound size, the procedure may need to be staged. Procedure Note:  Skin Substitute Skin Graft   Type:  Epifix  Performed by: Brian Rivas DPM  Assited by:  Anneliese Hutchins     Patient was brought into the operating room/treatment room [facility] The patient was positioned with the [facility] elevated 30 degrees. The Right  Was prepped and draped in the usual sterile manner. Ulcerations noted as stated above. No signs of infection seen. No purulence, erythema, or necrotic tissue. No anesthesia was needed since the patient is neuropathic. The ulceration(s) were prepped by debridement with a currette, tissue nipper, and a #15 blade. Debridement was performed through and including subcutaneous tissue. Bleeding was controlled with pressure. The wound bed was flushed with normal saline. Next, the entire skin substitute - Epifix 18 MM DISC was applied to the wound DORSAL 2ND RIGHT. The graft was fixated into place with steri-strips, covered with Adaptic/Mepitel, MINERAL OIL,  kerlix 4x4's and kerlix roll, cast padding and Coban. Anesthesia: None    Bleeding: Minimal    Hemostasis:   by pressure    Response to treatment:  Well tolerated by patient.       Patient was given post-procedural instruction not to remove the dressings until returning to the clinic. Instructed to call if any questions. Follow up:  One week for evaluation of skin grafts and dressing change        Electronically signed by Annalee Hutchison DPM on 4/26/2022 at 2:42 PM        Assessment:      NAVIN/Lorne [address] Problems    Diagnosis Date Noted    Osteomyelitis of second toe of left foot ([facility] [M86.9] 04/05/2022    Diabetic polyneuropathy associated with type 2 diabetes mellitus ([facility] [E11.42] 03/29/2022    Ulcer of toe of right foot, with [facility] layer exposed [facility] [L97.512] 03/29/2022    Peripheral vascular disease ([facility] [I73.9]          Plan:   EXCISIONAL DEBRIDEMENT RIGHT WOUNDS - ENDOFORM TO HALLUX AND EPIFIX TO 2ND RIGHT    EXCISIONAL DEBRIDEMENT LEFT FOOT WOUND - SSD TO LEFT WOUND QD    CONT IV THRU PICC    Gurmeet Garcia 426 1 WEEK      Treatment Note please see attached Discharge Instructions

## 2022-04-26 NOTE — PROGRESS NOTES
EpiFix Treatment Note    NAME:  Deepika Alaniz OF BIRTH:  1960  MEDICAL RECORD NUMBER:  [id number]  DATE:  4/26/2022    Goal:  Patient will receive safe and proper application of skin substitute. Patient will comply with caring for dressing, offloading and reporting complications.  [x]Expiration date checked immediately prior to use   [x] Package intact prior to use and no damage noted   [x] Transport temperature controlled and acceptable(ROOM AIR)     [x]  EpiFix was removed from protective sterile packaging by physician and applied to prepared ulcer bed.  [x] EpiFix was placed using embossment letting as a guide. (UP)     [x] Epifix was hydrated with sterile normal saline per physician(IF NEEDED)         [x] EpiFix was applied to location Right 2nd Toe and affixed with steri-strips by the physician.  [x] EpiFix was covered with non-adherent ulcer dressing per physician   [x] Applied Mineral oil soaked gauze over non-adherent.  [x] Applied dry gauze and/or roll gauze.  [x] Coban or ace wrap was applied to secure graft and decrease edema.  [x] Patient/caregiver was instructed not to remove dressing and to keep it clean and dry.  [x] Pt/family/caregiver was instructed on signs and symptoms of complications to report such as draining through dressing, dressing falling down/slipping, getting wet,or  severe   pain and tingling in toes   [x] Pt/family/caregiver was instructed on need for offloading and elevation of affected extremity and on use of prescribed offloading device.  [x] Record info in tissue log( sticker with patient label). Picture epifix sticker with patient label for that specific date in . Please add epifix application number to both stickers.  [x]  Guidelines followed   [x] EpiFix may be applied a total of 10 times per wound over a 12 week period. Additionally EpiFix may only be used every 12 months per wound.     Date of first application of EpiFix for this current wound is April 26, 2022.         Electronically signed by Reynaldo Olson RN on 4/26/2022 at 3:08 PM

## 2022-04-26 NOTE — PROGRESS NOTES
Physician Progress Note      PATIENT:               Haris Cherry  CSN #:                  [id number]  :                       1960  ADMIT DATE:       2022 2:59 PM  [address] [place] DATE:        2022 2:46 PM  RESPONDING  PROVIDER #:        Oleksandr Milligan MD          QUERY TEXT:    Pt admitted with Osteomyelitis. Pt noted to have elevated WBC and CRP. If   possible, please document in the progress notes and discharge summary if you   are evaluating and /or treating any of the following: The medical record reflects the following:  Risk Factors: Hx DM, wound on L foot for 2 months  Clinical Indicators: WBC 12.5; CRP 98.9; LA 1.2; Sed rate 32; HR ; BC+   staphylococcus aureus isolate is methicillin susceptible; Wound CX+   staphylococcus aureus isolate is methicillin susceptible; per OP note--> The   wound was then excised revealing the underlying purulent drainage and soft   bone. The proximal phalanx was noted to be soft and necrotic upon bone quality   testing consistent with osteomyelitis. Treatment: IV Ancef, IV Vanco; LA, CRP and Sed rate levels; OR for 2nd toe   amputation; monitoring, ID consult,  Options provided:  -- Sepsis, present on admission  -- Osteomyelitis without Sepsis  -- Other - I will add my own diagnosis  -- Disagree - Not applicable / Not valid  -- Disagree - Clinically unable to determine / Unknown  -- Refer to Clinical Documentation Reviewer    PROVIDER RESPONSE TEXT:    This patient had sepsis which was present on admission.     Query created by: Renzo Braga on 2022 3:36 PM      Electronically signed by:  Oleksandr Milligan MD 2022 4:42 PM

## 2022-04-26 NOTE — PLAN OF CARE
Problem: Chronic Conditions and Co-morbidities  Goal: Patient's chronic conditions and co-morbidity symptoms are monitored and maintained or improved  Outcome: Progressing     Problem: Discharge Planning  Goal: Discharge to home or other facility with appropriate resources  Outcome: Progressing     Problem: Wound:  Goal: Will show signs of wound healing; wound closure and no evidence of infection  Description: Will show signs of wound healing; wound closure and no evidence of infection  Outcome: Progressing     Problem: Falls - Risk of:  Goal: Will remain free from falls  Description: Will remain free from falls  Outcome: Progressing

## 2022-05-03 ENCOUNTER — HOSPITAL ENCOUNTER (OUTPATIENT)
Dept: WOUND CARE | Age: 62
Discharge: HOME | End: 2022-05-03
Payer: COMMERCIAL

## 2022-05-03 VITALS
TEMPERATURE: 97.2 F | WEIGHT: 250 LBS | HEIGHT: 72 IN | BODY MASS INDEX: 33.86 KG/M2 | DIASTOLIC BLOOD PRESSURE: 80 MMHG | HEART RATE: 67 BPM | SYSTOLIC BLOOD PRESSURE: 113 MMHG | RESPIRATION RATE: 18 BRPM

## 2022-05-03 DIAGNOSIS — L97.512 ULCER OF TOE OF RIGHT FOOT, WITH FAT LAYER EXPOSED (HCC): ICD-10-CM

## 2022-05-03 DIAGNOSIS — E11.42 DIABETIC POLYNEUROPATHY ASSOCIATED WITH TYPE 2 DIABETES MELLITUS (HCC): ICD-10-CM

## 2022-05-03 DIAGNOSIS — L97.524 CHRONIC ULCER OF TOE, LEFT, WITH NECROSIS OF BONE (HCC): ICD-10-CM

## 2022-05-03 DIAGNOSIS — M86.9 OSTEOMYELITIS OF SECOND TOE OF LEFT FOOT (HCC): Primary | ICD-10-CM
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
## 2022-05-03 PROCEDURE — 11042 DBRDMT SUBQ TIS 1ST 20SQCM/<: CPT

## 2022-05-03 PROCEDURE — 15275 SKIN SUB GRAFT FACE/NK/HF/G: CPT

## 2022-05-03 RX ORDER — GINSENG 100 MG
CAPSULE ORAL ONCE
Status: CANCELLED | OUTPATIENT
Start: 2022-05-03 | End: 2022-05-03

## 2022-05-03 RX ORDER — GENTAMICIN SULFATE 1 MG/G
OINTMENT TOPICAL ONCE
Status: CANCELLED | OUTPATIENT
Start: 2022-05-03 | End: 2022-05-03

## 2022-05-03 RX ORDER — LIDOCAINE 50 MG/G
OINTMENT TOPICAL ONCE
Status: CANCELLED | OUTPATIENT
Start: 2022-05-03 | End: 2022-05-03

## 2022-05-03 RX ORDER — BETAMETHASONE DIPROPIONATE 0.05 %
OINTMENT (GRAM) TOPICAL ONCE
Status: CANCELLED | OUTPATIENT
Start: 2022-05-03 | End: 2022-05-03

## 2022-05-03 RX ORDER — CLOBETASOL PROPIONATE 0.5 MG/G
OINTMENT TOPICAL ONCE
Status: CANCELLED | OUTPATIENT
Start: 2022-05-03 | End: 2022-05-03

## 2022-05-03 RX ORDER — LIDOCAINE HYDROCHLORIDE 20 MG/ML
JELLY TOPICAL ONCE
Status: CANCELLED | OUTPATIENT
Start: 2022-05-03 | End: 2022-05-03

## 2022-05-03 RX ORDER — BACITRACIN, NEOMYCIN, POLYMYXIN B 400; 3.5; 5 [USP'U]/G; MG/G; [USP'U]/G
OINTMENT TOPICAL ONCE
Status: CANCELLED | OUTPATIENT
Start: 2022-05-03 | End: 2022-05-03

## 2022-05-03 RX ORDER — MAGNESIUM CARB/ALUMINUM HYDROX 105-160MG
30 TABLET,CHEWABLE ORAL
Status: ACTIVE | OUTPATIENT
Start: 2022-05-03 | End: 2022-05-03

## 2022-05-03 RX ORDER — LIDOCAINE 40 MG/G
CREAM TOPICAL ONCE
Status: CANCELLED | OUTPATIENT
Start: 2022-05-03 | End: 2022-05-03

## 2022-05-03 RX ORDER — LIDOCAINE HYDROCHLORIDE 40 MG/ML
SOLUTION TOPICAL ONCE
Status: CANCELLED | OUTPATIENT
Start: 2022-05-03 | End: 2022-05-03

## 2022-05-03 RX ORDER — BACITRACIN ZINC AND POLYMYXIN B SULFATE 500; 1000 [USP'U]/G; [USP'U]/G
OINTMENT TOPICAL ONCE
Status: CANCELLED | OUTPATIENT
Start: 2022-05-03 | End: 2022-05-03

## 2022-05-03 RX ORDER — LIDOCAINE HYDROCHLORIDE 20 MG/ML
JELLY TOPICAL ONCE
Status: COMPLETED | OUTPATIENT
Start: 2022-05-03 | End: 2022-05-03

## 2022-05-03 RX ADMIN — LIDOCAINE HYDROCHLORIDE 6 ML: 20 JELLY TOPICAL at 13:48

## 2022-05-03 NOTE — PROGRESS NOTES
[address]   Progress Note and Procedure Note      Peggy Acosta  MEDICAL RECORD NUMBER:  [id number]  AGE: 64 y.o. GENDER: male  : 1960  EPISODE DATE:  5/3/2022    Subjective:     Chief Complaint   Patient presents with    Wound Check     left/right toes         HISTORY of PRESENT ILLNESS HPI     Peggy Acosta is a 64 y.o. male who presents today for wound/ulcer evaluation. S/P   2ND LEFT AMP X 4 WEEKS. BEEN CHANGING LEFT DRESSING WITH SSD    RIGHT TOE WOUNDS - EPIFIX X 1 2ND RIGHT, ENDOFORM RIGHT HALLUX. GETTING IV ANTIBIOTICS THRU PICC    Ulcer Identification:  Ulcer Type: diabetic  Contributing Factors: diabetes and chronic pressure       22 1204    Specimen Description . TOE    Special Requests . TOE   SECOND LEFT DIGIT    Direct Exam RARE NEUTROPHILS Abnormal     Direct Exam FEW GRAM POSITIVE COCCI IN PAIRS Abnormal     Culture STREPTOCOCCI, BETA HEMOLYTIC GROUP B MODERATE GROWTH Abnormal     Culture  Abnormal   STAPHYLOCOCCUS AUREUS MODERATE GROWTH This isolate is methicillin susceptible. For susceptibility, refer to previous culture. Culture No anaerobic organisms isolated at 5 days.                 PAST MEDICAL HISTORY        Diagnosis Date    Acquired trigger finger 2018    CAD (coronary artery disease)     NWOCC/ involving coronary bypass graft of native heart with unstable angina pectoris    Cervical spondylosis     CHF (congestive heart failure) ([facility])     Chronic back pain     on 18 pt states is presently in pain mgmnt    Contusion of elbow 2018    Contusion of knee 2018    Contusion of shoulder region 2018    Diabetic foot ulcer with osteomyelitis ([address] 2018    Diabetic ulcer of toe associated with type 2 diabetes mellitus, with fat layer exposed ([address] 2018    Hypercholesterolemia 2017    Hyperlipemia, mixed     Hypertension     Ischemic cardiomyopathy 2018    Non-pressure chronic ulcer of left lower leg with fat layer exposed ([facility] 6/9/2014    Obesity     Obesity, Class III, BMI 40-49.9 (morbid obesity) ([address] 4/6/2015    Obstructive sleep apnea syndrome     Osteoarthritis of carpometacarpal (CMC) joint of thumb 6/5/2018    Osteoarthritis of knee 6/5/2018    Peripheral vascular disease ([address]     with venous stasis and ulcerations. Dr Aidan Sun Presence of coronary angioplasty implant and graft 9/11/2009    Sleep apnea     Dr Terrell Horne of knee and leg 6/5/2018    Sprain of shoulder and upper arm 6/5/2018    Type II or unspecified type diabetes mellitus without mention of complication, not stated as uncontrolled     Ulcer of left foot, with fat layer exposed ([address] 1/9/2018    Unspecified sleep apnea     Dr Marie Every CPAP    Venous insufficiency of both lower extremities 1/31/2018       PAST SURGICAL HISTORY    Past Surgical History:   Procedure Laterality Date    ARTHROPLASTY Left 4/1/2022    ARTHROPLASTY 2ND LEFT TOE performed by Fady Templeton DPM at [address] Right 3/22/13    Dr. Jorge Vale.     COLONOSCOPY  2020    CORONARY ARTERY BYPASS GRAFT  2/26/07    Dr Aaliyah Stearns (X 3)    CORONARY ARTERY BYPASS GRAFT  2006    NECK SURGERY  2010    cervical stenoses C5-C6-C7and partial T1 laminectomy with fusion of C6-7    OTHER SURGICAL HISTORY      wound care ulcers of legs (bilaterally) with Dr Randolph Magdaleno Left 4/6/2022    TOE AMPUTATION, SECOND DIGIT performed by Fady Templeton DPM at [address]   age 11    VEIN SURGERY      closure of peripherator veins of legs, Dr LAWSON Dalton History   Problem Relation Age of Onset    Cancer Mother         lung    Diabetes Father     Heart Disease Father     High Blood Pressure Father     Diabetes Brother     Heart Disease Brother     High Blood Pressure Brother        SOCIAL HISTORY    Social History Tobacco Use    Smoking status: Never Smoker    Smokeless tobacco: Never Used   Vaping Use    Vaping Use: Never used   Substance Use Topics    Alcohol use: Not Currently     Comment: rare social, non past 5 months    Drug use: No       ALLERGIES    Allergies   Allergen Reactions    Pcn [Penicillins] Other (See Comments)     Unknown rx    Penicillin G Rash       MEDICATIONS    Current Outpatient Medications on File Prior to Encounter   Medication Sig Dispense Refill    hydroCHLOROthiazide (MICROZIDE) 12.5 MG capsule 1 po qd 90 capsule 3    ceFAZolin (ANCEF) infusion Infuse 2,000 mg intravenously every 8 hours for 26 days Through 5/3/2022 Compound per protocol 156 g 0    metFORMIN (GLUCOPHAGE) 1000 MG tablet Take 1 tablet by mouth 2 times daily (with meals) 60 tablet 3    omeprazole (PRILOSEC) 20 MG delayed release capsule TAKE 1 CAPSULE DAILY 90 capsule 1    linagliptin (TRADJENTA) 5 MG tablet TAKE 1 TABLET BY MOUTH ONE TIME A DAY 30 tablet 5    gabapentin (NEURONTIN) 300 MG capsule Take 300 mg by mouth 3 times daily.  Misc Natural Products (GLUCOSAMINE CHOND MSM FORMULA PO) Take 2 tablets by mouth daily      HYDROcodone-acetaminophen (NORCO)  MG per tablet Take 1 tablet by mouth every 4-6 hours as needed for Pain.       meloxicam (MOBIC) 15 MG tablet Take 1 tablet by mouth daily 90 tablet 3    traZODone (DESYREL) 150 MG tablet TAKE ONE TABLET BY MOUTH EVERY EVENING 90 tablet 3    topiramate (TOPAMAX) 200 MG tablet Take 1 tablet by mouth 2 times daily (Patient taking differently: Take 400 mg by mouth daily ) 60 tablet 5    tiZANidine (ZANAFLEX) 4 MG tablet 1 po qd 14 tablet 3    carvedilol (COREG) 12.5 MG tablet Take 1 tablet by mouth 2 times daily (with meals) 180 tablet 3    clopidogrel (PLAVIX) 75 MG tablet Take 1 tablet by mouth daily 90 tablet 3    NEEDLE, DISP, 22 G 22G X 1-1/2\" MISC 1 Device by Does not apply route every 30 days 25 each 1    NEEDLE, DISP, 18 G (BD DISP NEEDLES) 18G X 1-1/2\" MISC 1 Device by Does not apply route every 30 days 25 each 1    testosterone cypionate (DEPOTESTOTERONE CYPIONATE) 200 MG/ML injection       Testosterone Cypionate 200 MG/ML KIT Inject 200 mg into the muscle every 30 days for 30 days. please include syringes 1 kit 5    NEEDLE, DISP, 18 G 18G X 1\" MISC 1 Syringe by Does not apply route every 30 days 25 each 0    NEEDLE, DISP, 22 G 22G X 1-1/2\" MISC 1 Syringe by Does not apply route every 30 days 25 each 0    Multiple Vitamin (MULTI VITAMIN DAILY PO) Take by mouth      nitroGLYCERIN (NITROSTAT) 0.4 MG SL tablet Place 0.4 mg under the tongue      aspirin 325 MG tablet Take 325 mg by mouth daily. No current facility-administered medications on file prior to encounter. REVIEW OF SYSTEMS    Review of Systems   Constitutional: Negative for chills and fever. Skin: Positive for wound. Neurological: Negative for numbness. Objective:      /80   Pulse 67   Temp 97.2 °F (36.2 °C) (Tympanic)   Resp 18   Ht 6' (1.829 m)   Wt 250 lb (113.4 kg)   BMI 33.91 kg/m²     Wt Readings from Last 3 Encounters:   05/03/22 250 lb (113.4 kg)   04/19/22 250 lb (113.4 kg)   04/12/22 250 lb (113.4 kg)       Physical Exam:  General:  Alert and oriented x3. In no acute distress. Lower Extremity Physical Exam:    Vascular: DP pulses are palpable, Bilateral. PT pulses are palpable, Bilateral. CFT <3 seconds to all digits, Bilateral.  No edema, Bilateral.  Hair growth is absent to the level of the digits, Bilateral.     Neuro: Saph/sural/SP/DP/plantar sensation intact to light touch. Musculoskeletal: EHL/FHL/GS/TA gross motor intact. Gross deformity is present, hammertoes bilateral.     Dermatologic: RIGHT TOE WOUNDS, LEFT DISTAL FOOT WOUND    Wound 02/28/22 Toe (Comment  which one) Right 2nd Toe #5 (Active)   Wound Image   02/28/22 0912   Wound Etiology Diabetic Pate 1 05/03/22 1348   Dressing Status New drainage noted; Old drainage noted 05/03/22 1348   Wound Cleansed Cleansed with saline 05/03/22 1348   Dressing/Treatment Ace wrap;Dry dressing 04/05/22 1931   Offloading for Diabetic Foot Ulcers Offloading ordered;Post op shoe 05/03/22 1348   Wound Length (cm) 1.5 cm 05/03/22 1348   Wound Width (cm) 1.5 cm 05/03/22 1348   Wound Depth (cm) 0.1 cm 05/03/22 1348   Wound Surface Area (cm^2) 2.25 cm^2 05/03/22 1348   Change in Wound Size % (l*w) 26.95 05/03/22 1348   Wound Volume (cm^3) 0.225 cm^3 05/03/22 1348   Wound Healing % 27 05/03/22 1348   Post-Procedure Length (cm) 1.5 cm 05/03/22 1348   Post-Procedure Width (cm) 1.5 cm 05/03/22 1348   Post-Procedure Depth (cm) 0.3 cm 05/03/22 1348   Post-Procedure Surface Area (cm^2) 2.25 cm^2 05/03/22 1348   Post-Procedure Volume (cm^3) 0.675 cm^3 05/03/22 1348   Wound Assessment Slough;Pink/red 05/03/22 1348   Drainage Amount None 05/03/22 1348   Drainage Description Yellow 05/03/22 1348   Odor None 05/03/22 1348   Trudi-wound Assessment Hyperkeratosis (callous); Non-blanchable erythema 05/03/22 1348   Margins Defined edges 05/03/22 1348   Wound Thickness Description not for Pressure Injury Full thickness 05/03/22 1348   Number of days: 64       Wound 03/09/22 Toe (Comment  which one) Right #6 right great toe (Active)   Wound Image   03/09/22 0922   Wound Etiology Diabetic Pate 1 05/03/22 1348   Dressing Status New drainage noted; Old drainage noted 05/03/22 1348   Wound Cleansed Cleansed with saline 05/03/22 1348   Dressing/Treatment Ace wrap;Dry dressing 04/08/22 1212   Offloading for Diabetic Foot Ulcers Offloading ordered;Post op shoe 05/03/22 1348   Wound Length (cm) 0.3 cm 05/03/22 1348   Wound Width (cm) 0.2 cm 05/03/22 1348   Wound Depth (cm) 0.2 cm 05/03/22 1348   Wound Surface Area (cm^2) 0.06 cm^2 05/03/22 1348   Change in Wound Size % (l*w) 93.94 05/03/22 1348   Wound Volume (cm^3) 0.012 cm^3 05/03/22 1348   Wound Healing % 88 05/03/22 1348   Post-Procedure Length (cm) 0.6 cm 05/03/22 1348   Post-Procedure Width (cm) 0.6 cm 05/03/22 1348   Post-Procedure Depth (cm) 0.3 cm 05/03/22 1348   Post-Procedure Surface Area (cm^2) 0.36 cm^2 05/03/22 1348   Post-Procedure Volume (cm^3) 0.108 cm^3 05/03/22 1348   Wound Assessment [place]/red;Slough 05/03/22 1348   Drainage Amount Moderate 05/03/22 1348   Drainage Description Yellow 05/03/22 1348   Odor None 05/03/22 1348   Trudi-wound Assessment Dry/flaky; Blanchable erythema 05/03/22 1348   Margins Defined edges 05/03/22 1348   Wound Thickness Description not for Pressure Injury Full thickness 05/03/22 1348   Number of days: 55       Wound 04/26/22 Toe (Comment  which one) Left #4 Left 2nd toe amp site (Active)   Wound Etiology Non-Healing Surgical 05/03/22 1348   Dressing Status Old drainage noted;New drainage noted 05/03/22 1348   Wound Cleansed Cleansed with saline 05/03/22 1348   Wound Length (cm) 1.4 cm 05/03/22 1348   Wound Width (cm) 1 cm 05/03/22 1348   Wound Depth (cm) 0.2 cm 05/03/22 1348   Wound Surface Area (cm^2) 1.4 cm^2 05/03/22 1348   Change in Wound Size % (l*w) 10.26 05/03/22 1348   Wound Volume (cm^3) 0.28 cm^3 05/03/22 1348   Wound Healing % 55 05/03/22 1348   Post-Procedure Length (cm) 1.2 cm 05/03/22 1348   Post-Procedure Width (cm) 1 cm 05/03/22 1348   Post-Procedure Depth (cm) 0.6 cm 05/03/22 1348   Post-Procedure Surface Area (cm^2) 1.2 cm^2 05/03/22 1348   Post-Procedure Volume (cm^3) 0.72 cm^3 05/03/22 1348   Wound Assessment Slough 05/03/22 1348   Drainage Amount Moderate 05/03/22 1348   Drainage Description Serosanguinous 05/03/22 1348   Odor None 05/03/22 1348   Trudi-wound Assessment Blanchable erythema 05/03/22 1348   Margins Attached edges 05/03/22 1348   Wound Thickness Description not for Pressure Injury Full thickness 05/03/22 1348   Number of days: 7              Procedure:  Wound Location: right TOES,WOUND DISTAL LEFT FOOT    Lidocaine gel soaked gauze was applied at beginning of wound evaluation.   The wound(s) was excisionally debrided sharply of fibrotic, necrotic, and hyperkeratotic tissue, including a layer of surrounding healthy tissue using curette. The wound was debrided down through and including subcutaneous. Percent of Wound Debrided: 100%    Total Surface Area Debrided:  (see above for sq cm)    Bleeding: Minimal    Patient tolerated procedure well and was given proper instruction. Skin Substitute Graft Procedure Note      Sailaja Martinez  AGE: 64 y.o. GENDER: male  : 1960  TODAY'S DATE:  5/3/2022     Pre-procedural Diagnosis:   1. Osteomyelitis of second toe of left foot ([address]    2. Diabetic polyneuropathy associated with type 2 diabetes mellitus ([address]    3. Chronic ulcer of toe, left, with necrosis of bone ([address]    4. Ulcer of toe of right foot, with fat layer exposed ([address]    5. Dog bite, subsequent encounter        Wound 22 Toe (Comment  which one) Right 2nd Toe #5 (Active)   Wound Image   22 0912   Wound Etiology Diabetic Pate 1 22 1342   Dressing Status New drainage noted; Old drainage noted 22 1342   Wound Cleansed Cleansed with saline 22 134   Dressing/Treatment Ace wrap;Dry dressing 22 193   Offloading for Diabetic Foot Ulcers Offloading ordered;Post op shoe 22 1342   Wound Length (cm) 1.2 cm 22 1342   Wound Width (cm) 1.5 cm 22 1342   Wound Depth (cm) 0.1 cm 22 1342   Wound Surface Area (cm^2) 1.8 cm^2 22 134   Change in Wound Size % (l*w) 41.56 22 1342   Wound Volume (cm^3) 0.18 cm^3 22 1342   Wound Healing % 42 22 1342   Post-Procedure Length (cm) 1.6 cm 22 1342   Post-Procedure Width (cm) 1.7 cm 22 1342   Post-Procedure Depth (cm) 0.3 cm 22 1342   Post-Procedure Surface Area (cm^2) 2.72 cm^2 22 1342   Post-Procedure Volume (cm^3) 0.816 cm^3 22 1342   Wound Assessment Slough;Pink/red 22 1342   Drainage Amount None 22 134   Drainage Description Yellow 22 1342   Odor None 04/26/22 1342   Trudi-wound Assessment Hyperkeratosis (callous); Non-blanchable erythema 04/26/22 1342   Margins Defined edges 04/26/22 1342   Wound Thickness Description not for Pressure Injury Full thickness 04/26/22 1342   Number of days: 57       Wound 03/09/22 Toe (Comment  which one) Right #6 right great toe (Active)   Wound Image   03/09/22 0922   Wound Etiology Diabetic Pate 1 04/26/22 1342   Dressing Status New drainage noted; Old drainage noted 04/26/22 1342   Wound Cleansed Cleansed with saline 04/26/22 1342   Dressing/Treatment Ace wrap;Dry dressing 04/08/22 1212   Offloading for Diabetic Foot Ulcers Offloading ordered;Post op shoe 04/19/22 1104   Wound Length (cm) 0 cm 04/26/22 1342   Wound Width (cm) 0 cm 04/26/22 1342   Wound Depth (cm) 0 cm 04/26/22 1342   Wound Surface Area (cm^2) 0 cm^2 04/26/22 1342   Change in Wound Size % (l*w) 100 04/26/22 1342   Wound Volume (cm^3) 0 cm^3 04/26/22 1342   Wound Healing % 100 04/26/22 1342   Post-Procedure Length (cm) 0.5 cm 04/26/22 1342   Post-Procedure Width (cm) 1 cm 04/26/22 1342   Post-Procedure Depth (cm) 0.3 cm 04/26/22 1342   Post-Procedure Surface Area (cm^2) 0.5 cm^2 04/26/22 1342   Post-Procedure Volume (cm^3) 0.15 cm^3 04/26/22 1342   Wound Assessment Slough 04/19/22 1104   Drainage Amount Moderate 04/19/22 1104   Drainage Description Yellow 04/19/22 1104   Odor None 04/19/22 1104   Trudi-wound Assessment Dry/flaky; Blanchable erythema 04/19/22 1104   Margins Defined edges 04/19/22 1104   Wound Thickness Description not for Pressure Injury Full thickness 04/19/22 1104   Number of days: 48       Wound 04/26/22 Toe (Comment  which one) Left #4 Left 2nd toe amp site (Active)   Wound Etiology Non-Healing Surgical 04/26/22 1342   Dressing Status Old drainage noted;New drainage noted 04/26/22 1342   Wound Cleansed Cleansed with saline 04/26/22 1342   Wound Length (cm) 1.3 cm 04/26/22 1342   Wound Width (cm) 1.2 cm 04/26/22 1342   Wound Depth (cm) 0.4 cm 04/26/22 1342   Wound Surface Area (cm^2) 1.56 cm^2 04/26/22 1342   Wound Volume (cm^3) 0.624 cm^3 04/26/22 1342   Post-Procedure Length (cm) 1.3 cm 04/26/22 1342   Post-Procedure Width (cm) 1.2 cm 04/26/22 1342   Post-Procedure Depth (cm) 0.4 cm 04/26/22 1342   Post-Procedure Surface Area (cm^2) 1.56 cm^2 04/26/22 1342   Post-Procedure Volume (cm^3) 0.624 cm^3 04/26/22 1342   Wound Assessment Pink/red 04/26/22 1342   Drainage Amount Moderate 04/26/22 1342   Drainage Description Serosanguinous 04/26/22 1342   Odor None 04/26/22 1342   Trudi-wound Assessment Blanchable erythema 04/26/22 1342   Margins Attached edges 04/26/22 1342   Wound Thickness Description not for Pressure Injury Full thickness 04/26/22 1342   Number of days: 0     Post-procedural Diagnosis: same  Wound Type:diabetic      The wound needs epithelialization and we are present today to perform Skin substitute skin grafting. Due to wound size, the procedure may need to be staged. Procedure Note:  Skin Substitute Skin Graft   Type:  Epifix  Performed by: Irving Sahni DPM  Assited by:  Mary Ann Logan     Patient was brought into the operating room/treatment room and remained on the [facility]. The patient was positioned with the [facility] elevated 30 degrees. The Right  2ND Was prepped and draped in the usual sterile manner. Ulcerations noted as stated above. No signs of infection seen. No purulence, erythema, or necrotic tissue. No anesthesia was needed since the patient is neuropathic. The ulceration(s) were prepped by debridement with a currette, tissue nipper, and a #15 blade. Debridement was performed through and including subcutaneous tissue. Bleeding was controlled with pressure. The wound bed was flushed with normal saline. Next, the entire skin substitute - 2ND Epifix 18 MM DISC was applied to the wound DORSAL 2ND RIGHT.  The graft was fixated into place with steri-strips, covered with Adaptic/Mepitel, MINERAL OIL,  kerlix 4x4's and kerlix roll, cast padding and Coban. Anesthesia: None    Bleeding: Minimal    Hemostasis:   by pressure    Response to treatment:  Well tolerated by patient. Patient was given post-procedural instruction not to remove the dressings until returning to the clinic. Instructed to call if any questions. Follow up: One week for evaluation of skin grafts and dressing change      Procedure:  Wound Location: left DISTAL WOUND    Lidocaine gel soaked gauze was applied at beginning of wound evaluation. The wound(s) was excisionally debrided sharply of fibrotic, necrotic, and hyperkeratotic tissue, including a layer of surrounding healthy tissue using curette. The wound was debrided down through and including subcutaneous. DEEPENING WOUND    Percent of Wound Debrided: 100%    Total Surface Area Debrided:  (see above for sq cm)    Bleeding: Minimal    Patient tolerated procedure well and was given proper instruction. Assessment:      Active Hospital Problems    Diagnosis Date Noted    Osteomyelitis of second toe of left foot ([facility] [M86.9] 04/05/2022    Diabetic polyneuropathy associated with type 2 diabetes mellitus ([facility] [E11.42] 03/29/2022    Ulcer of toe of right foot, with fat layer exposed [facility]) [L97.512] 03/29/2022    Chronic ulcer of toe, left, with necrosis of bone ([facility] [L97.524] 03/29/2022    Peripheral vascular disease ([facility] [I73.9]          Plan:   EXCISIONAL DEBRIDEMENT RIGHT WOUNDS - ENDOFORM TO HALLUX AND EPIFIX TO 2ND RIGHT    EXCISIONAL DEBRIDEMENT LEFT FOOT WOUND - ENDOFORM TO LEFT WOUND     CONT IV THRU [facility]    Gurmeet Garcia 426 1 WEEK, SEE ME IN 2 WEEKS - I WILL SCHEDULE HIM FOR STRAVIX LEFT FOOT AND THERASKIN TO RIGHT FOOT WOUNDS.     Treatment Note please see attached Discharge Instructions

## 2022-05-05 ENCOUNTER — OFFICE VISIT (OUTPATIENT)
Dept: INFECTIOUS DISEASES | Age: 62
End: 2022-05-05
Payer: COMMERCIAL

## 2022-05-05 VITALS
SYSTOLIC BLOOD PRESSURE: 114 MMHG | WEIGHT: 253.6 LBS | OXYGEN SATURATION: 94 % | HEIGHT: 73 IN | TEMPERATURE: 97.3 F | DIASTOLIC BLOOD PRESSURE: 65 MMHG | BODY MASS INDEX: 33.61 KG/M2 | HEART RATE: 70 BPM

## 2022-05-05 DIAGNOSIS — E11.621 DIABETIC FOOT ULCER WITH OSTEOMYELITIS (HCC): ICD-10-CM

## 2022-05-05 DIAGNOSIS — M86.9 OSTEOMYELITIS OF SECOND TOE OF LEFT FOOT (HCC): Primary | ICD-10-CM

## 2022-05-05 DIAGNOSIS — L97.509 DIABETIC FOOT ULCER WITH OSTEOMYELITIS (HCC): ICD-10-CM

## 2022-05-05 DIAGNOSIS — E11.69 DIABETIC FOOT ULCER WITH OSTEOMYELITIS (HCC): ICD-10-CM

## 2022-05-05 DIAGNOSIS — M86.9 DIABETIC FOOT ULCER WITH OSTEOMYELITIS (HCC): ICD-10-CM

## 2022-05-05 PROCEDURE — 99214 OFFICE O/P EST MOD 30 MIN: CPT | Performed by: INTERNAL MEDICINE

## 2022-05-05 PROCEDURE — 3052F HG A1C>EQUAL 8.0%<EQUAL 9.0%: CPT | Performed by: INTERNAL MEDICINE

## 2022-05-05 RX ORDER — CEFADROXIL 500 MG/1
500 CAPSULE ORAL 2 TIMES DAILY
Qty: 28 CAPSULE | Refills: 1 | Status: SHIPPED | OUTPATIENT
Start: 2022-05-05 | End: 2022-05-19

## 2022-05-05 NOTE — PROGRESS NOTES
[facility]  Office Progress Note  Today's Date and Time: 5/5/2022, 11:17 AM    Impression:     1. Osteomyelitis of second toe of left foot ([address]    2. Diabetic foot ulcer with osteomyelitis ([address]         Recommendations   · There are still some signs of infection at the soft tissues at the amputation site of the left second toe. · I will resume the antimicrobial therapy with Duricef 500 mg twice a day for 14 days. · The patient will follow-up with me thereafter and we will assess his progress. I have ordered the following medications/ labs:  No orders of the defined types were placed in this encounter. Orders Placed This Encounter   Medications    cefadroxil (DURICEF) 500 MG capsule     Sig: Take 1 capsule by mouth 2 times daily for 14 days     Dispense:  28 capsule     Refill:  1       Chief complaint/reason for consultation:     Chief Complaint   Patient presents with    Follow-Up from [facility]         History of Present Illness:   Cayla Macedo is a 64y.o.-year-old male who I am seeing in follow-up and he was hospitalized with a left second toe ulceration with exposure of bone underwent partial resection of the proximal phalanx of the second digit on 4/1/2022 as well as left second toe amputation and did have some postoperative infection. The patient underwent amputation of the second digit on 4/6/2022 and the surgical cultures did grow out Staph aureus and group B streptococcus. The patient also did have MSSA sepsis related to the above infection and he was discharged on intravenous antimicrobial therapy with Rocephin for 4 weeks via a PICC line. The patient comes for follow-up today and has completed the IV antimicrobial therapy but there is still concerns of infection at the amputation site. He does not report any subjective fevers or chills no cough or shortness of breath no chest pains or palpitations.   No abdominal pain nausea vomiting or diarrhea. I have personally reviewedthe past medical history, medications, social history, and I have updated the database accordingly. Past Medical History:     Past Medical History:   Diagnosis Date    Acquired trigger finger 6/5/2018    CAD (coronary artery disease)     [facility]/ involving coronary bypass graft of native heart with unstable angina pectoris    Cervical spondylosis     [facility]est[facility]     Chronic back pain     on 11/19/18 pt states is presently in pain mgmnt    Contusion of elbow 6/5/2018    Contusion of knee 6/5/2018    Contusion of shoulder region 6/5/2018    Diabetic foot ulcer with osteomyelitis ([address] 1/9/2018    Diabetic ulcer of toe associated with type 2 diabetes mellitus, with fat layer exposed ([address] 1/31/2018    Hypercholesterolemia 12/5/2017    Hyperlipemia, mixed     Hypertension     Ischemic cardiomyopathy 5/7/2018    Non-pressure chronic ulcer of left lower leg with fat layer exposed ([address] 6/9/2014    Obesity     Obesity, Class III, BMI 40-49.9 (morbid obesity) ([address] 4/6/2015    Obstructive sleep apnea syndrome     Osteoarthritis of carpometacarpal (CMC) joint of thumb 6/5/2018    Osteoarthritis of knee 6/5/2018    Peripheral vascular disease ([address]     with venous stasis and ulcerations.  Dr Nikole Sheikh Presence of coronary angioplasty implant and graft 9/11/2009    Sleep apnea     Dr Kendall Guzman of knee and leg 6/5/2018    Sprain of shoulder and upper arm 6/5/2018    Type II or unspecified type diabetes mellitus without mention of complication, not stated as uncontrolled     Ulcer of left foot, with fat layer exposed ([address] 1/9/2018    Unspecified sleep apnea     Dr Sandra Duran CPAP    Venous insufficiency of both lower extremities 1/31/2018     Medications:     Current Outpatient Medications   Medication Sig Dispense Refill    fentanyl (DURAGESIC) Place 25 patches onto the skin every 72 hours Removes one every 72 hours and has a count of ten      cefadroxil (DURICEF) 500 MG capsule Take 1 capsule by mouth 2 times daily for 14 days 28 capsule 1    hydroCHLOROthiazide (MICROZIDE) 12.5 MG capsule 1 po qd 90 capsule 3    omeprazole (PRILOSEC) 20 MG delayed release capsule TAKE 1 CAPSULE DAILY 90 capsule 1    linagliptin (TRADJENTA) 5 MG tablet TAKE 1 TABLET BY MOUTH ONE TIME A DAY 30 tablet 5    gabapentin (NEURONTIN) 300 MG capsule Take 300 mg by mouth 3 times daily.  Misc Natural Products (GLUCOSAMINE CHOND MSM FORMULA PO) Take 2 tablets by mouth daily      HYDROcodone-acetaminophen (NORCO)  MG per tablet Take 1 tablet by mouth every 4-6 hours as needed for Pain.  meloxicam (MOBIC) 15 MG tablet Take 1 tablet by mouth daily 90 tablet 3    traZODone (DESYREL) 150 MG tablet TAKE ONE TABLET BY MOUTH EVERY EVENING 90 tablet 3    tiZANidine (ZANAFLEX) 4 MG tablet 1 po qd 14 tablet 3    NEEDLE, DISP, 18 G (BD DISP NEEDLES) 18G X 1-1/2\" MISC 1 Device by Does not apply route every 30 days 25 each 1    testosterone cypionate (DEPOTESTOTERONE CYPIONATE) 200 MG/ML injection       NEEDLE, DISP, 18 G 18G X 1\" MISC 1 Syringe by Does not apply route every 30 days 25 each 0    Multiple Vitamin (MULTI VITAMIN DAILY PO) Take by mouth      nitroGLYCERIN (NITROSTAT) 0.4 MG SL tablet Place 0.4 mg under the tongue      aspirin 325 MG tablet Take 325 mg by mouth daily.         metFORMIN (GLUCOPHAGE) 1000 MG tablet Take 1 tablet by mouth 2 times daily (with meals) 60 tablet 3    topiramate (TOPAMAX) 200 MG tablet Take 1 tablet by mouth 2 times daily (Patient taking differently: Take 400 mg by mouth daily ) 60 tablet 5    carvedilol (COREG) 12.5 MG tablet Take 1 tablet by mouth 2 times daily (with meals) 180 tablet 3    clopidogrel (PLAVIX) 75 MG tablet Take 1 tablet by mouth daily 90 tablet 3    NEEDLE, DISP, 22 G 22G X 1-1/2\" MISC 1 Device by Does not apply route every 30 days 25 each 1    Testosterone Cypionate 200 MG/ML KIT Inject 200 mg into the muscle every 30 days for 30 days. please include syringes 1 kit 5    NEEDLE, DISP, 22 G 22G X 1-1/2\" MISC 1 Syringe by Does not apply route every 30 days 25 each 0     No current facility-administered medications for this visit. Allergies:   Pcn [penicillins] and Penicillin g     Review of Systems:   Review of Systems   Constitutional: Negative. Respiratory: Negative. Cardiovascular: Negative. Gastrointestinal: Negative. Genitourinary: Negative. Musculoskeletal: Negative. Skin: Positive for wound. Allergic/Immunologic: Negative. Neurological: Negative. Physical Examination :   /65 (Site: Left Upper Arm, Position: Sitting, Cuff Size: Medium Adult)   Pulse 70   Temp 97.3 °F (36.3 °C)   Ht 6' 1\" (1.854 m)   Wt 253 lb 9.6 oz (115 kg)   SpO2 94%   BMI 33.46 kg/m²     Physical Exam  Constitutional:       Appearance: He is well-developed. HENT:      Head: Normocephalic and atraumatic. Cardiovascular:      Rate and Rhythm: Normal rate. Heart sounds: Normal heart sounds. No friction rub. No gallop. Pulmonary:      Effort: Pulmonary effort is normal.      Breath sounds: Normal breath sounds. No wheezing. Abdominal:      General: Bowel sounds are normal.      Palpations: Abdomen is soft. There is no mass. Tenderness: There is no abdominal tenderness. Musculoskeletal:         General: Normal range of motion. Cervical back: Normal range of motion and neck supple. Lymphadenopathy:      Cervical: No cervical adenopathy. Skin:     General: Skin is warm and dry. Comments: There is still some erythema and some areas of drainage along the incision at the right second toe amputation site   Neurological:      Mental Status: He is alert and oriented to person, place, and time.          Laboratory studies :  Medical Decision Making:   I have independently reviewed the following labs:  CBC with Differential:  CBC auto differential  Order: [id number]   Ref Range & Units 4/29/22 1230   White Blood Cells 4.0 - 11.0 X10E9/L 5.5    RBC count 4.10 - 5.70 X10E12/L 3.92 Low     Hemoglobin 13.0 - 17.0 g/dL 11.1 Low     Hematocrit 39 - 49 % 34. 0 Low     MCV 80 - 100 fL 87    MCH 27 - 34 pg 28.3    MCHC 32 - 36 g/dL 32.6    RDW 11.5 - 15.0 % 14.6    Platelets 689 - 662 Q40O7/I 192    MPV 7 - 12 fL 7.4    % neutrophils % 52.5    % lymphocytes % 33.9    % monocytes % 9.3    % eosinophils % 3.7    % Basophils % 0.6    Neutrophils Absolute (A) 1.5 - 6.6 X10E9/L 2.9    Lymphocytes Absolute 1.0 - 3.5 X10E9/L 1.9    Monocytes Absolute 0 - 0.9 X10E9/L 0.5    Eosinophils Absolute 0.0 - 0.4 X10E9/L 0.2    Basophils Absolute 0.0 - 0.2 X10E9/L 0.0    Resulting [facility]   Specimen Collected: 04/29/22 12:30 Last Resulted: 04/29/22 20:09   Received From: [address] [place] [address] [employer]  Result Received: 05/02/22 10:41       Lab Results   Component Value Date    WBC 6.9 04/15/2022    WBC 12.5 04/05/2022    HGB 11.5 04/15/2022    HGB 12.5 04/05/2022    HCT 36.4 04/15/2022    HCT 41.3 04/05/2022     04/15/2022     04/05/2022    LYMPHOPCT 34 04/15/2022    LYMPHOPCT 17 04/05/2022    MONOPCT 7 04/15/2022    MONOPCT 8 04/05/2022       BMP:  Basic Metabolic Panel  Order: [id number]   Ref Range & Units 4/29/22 1230   Sodium 134 - 146 mmol/L 140    Potassium, Bld 3.5 - 5.0 mmol/L 3.8    Chloride 98 - 109 mmol/L 107    CO2 22 - 32 mmol/L 23    Anion gap 5 - 15 mmol/L 10    BUN 5 - 27 mg/dL 17    Creatinine 0.60 - 1.30 mg/dL 0.78    Comment: METHOD TRACEABLE TO IDMS STANDARD   Glucose 65 - 99 mg/dL 189 High     Calcium 8.5 - 10.5 mg/dL 8.2 Low     GFR MDRD Non Af Amer >59 ml/min/1.73sq.m >60    GFR MDRD Af Amer >59 ml/min/1.73sq.m >60    Resulting Agency  [facility]   Specimen Collected: 04/29/22 12:30 Last Resulted: 04/29/22 20:15   Received From: [facility] [employer]  Result Received: 05/02/22 10:41       Lab Results   Component Value Date     04/15/2022     04/06/2022    K 4.2 04/15/2022    K 3.5 04/06/2022     04/15/2022     04/06/2022    CO2 23 04/15/2022    CO2 20 04/06/2022    BUN 16 04/15/2022    BUN 16 04/06/2022    CREATININE 0.81 04/15/2022    CREATININE 0.89 04/06/2022    MG 1.9 04/06/2022       Hepatic Function Panel:   Lab Results   Component Value Date    PROT 7.2 06/19/2021    PROT 7.4 06/01/2020    LABALBU 4.1 03/23/2022    LABALBU 4.2 06/19/2021    BILIDIR 0.09 05/12/2018    IBILI 0.23 05/12/2018    BILITOT 0.28 06/19/2021    BILITOT 0.28 06/01/2020    ALKPHOS 71 06/19/2021    ALKPHOS 66 06/01/2020    ALT 15 06/19/2021    ALT 16 06/01/2020    AST 12 06/19/2021    AST 13 06/01/2020       Lab Results   Component Value Date    CRP 4.2 04/15/2022    .9 04/08/2022   C-reactive protein  Order: [id number]   Ref Range & Units 4/29/22 1230   CRP 0.000 - 0.744 mg/dL 0.1    Resulting [address] [facility]   Specimen Collected: 04/29/22 12:30 Last Resulted: 04/29/22 20:15   Received From: [address] [place] [address] [employer]  Result Received: 05/02/22 10:41       Lab Results   Component Value Date    SEDRATE 85 04/06/2022    SEDRATE 32 04/05/2022         Imaging Studies:       Cultures:      4/6/22 1412   Specimen Description . WOUND SECOND LEFT DIGIT    Special Requests SWAB    Direct Exam  Abnormal   This specimen contains squamous epithelial cells and therefore is suboptimal for anaerobic culture as it is contaminated with surface material.  Poor specimen quality contributes to misdiagnosis and inappropriate antimicrobial therapy. Direct Exam MANY NEUTROPHILS Abnormal     Direct Exam FEW GRAM POSITIVE COCCI IN PAIRS Abnormal     Culture STREPTOCOCCI, BETA HEMOLYTIC GROUP B MODERATE GROWTH Abnormal     Culture  Abnormal   STAPHYLOCOCCUS AUREUS MODERATE GROWTH This isolate is methicillin susceptible. For susceptibility, refer to previous culture.    Culture No anaerobic organisms isolated at 5 days. Thank you for allowing us to participate in the care of this patient. Pleasecall with questions. Dyllan Gorman MD  [employer] messaging: [phone]    This note is created with the assistance of a speech recognition program.  While intending to generate a document that actually reflects the content ofthe visit, the document can still have some errors including those of syntax and sound a like substitutions which may escape proof reading. It such instances, actual meaning can be extrapolated by contextual diversion.

## 2022-05-10 ENCOUNTER — HOSPITAL ENCOUNTER (OUTPATIENT)
Dept: WOUND CARE | Age: 62
Discharge: HOME | End: 2022-05-10
Payer: COMMERCIAL

## 2022-05-10 VITALS
BODY MASS INDEX: 33.53 KG/M2 | WEIGHT: 253 LBS | HEIGHT: 73 IN | SYSTOLIC BLOOD PRESSURE: 162 MMHG | HEART RATE: 73 BPM | TEMPERATURE: 97.7 F | DIASTOLIC BLOOD PRESSURE: 73 MMHG

## 2022-05-10 DIAGNOSIS — L97.524 CHRONIC ULCER OF TOE, LEFT, WITH NECROSIS OF BONE (HCC): ICD-10-CM

## 2022-05-10 DIAGNOSIS — L97.512 ULCER OF TOE OF RIGHT FOOT, WITH FAT LAYER EXPOSED (HCC): ICD-10-CM

## 2022-05-10 DIAGNOSIS — M86.9 OSTEOMYELITIS OF SECOND TOE OF LEFT FOOT (HCC): Primary | ICD-10-CM

## 2022-05-10 DIAGNOSIS — E11.42 DIABETIC POLYNEUROPATHY ASSOCIATED WITH TYPE 2 DIABETES MELLITUS (HCC): ICD-10-CM

## 2022-05-10 DIAGNOSIS — E11.69 TYPE 2 DIABETES MELLITUS WITH OTHER SPECIFIED COMPLICATION, WITHOUT LONG-TERM CURRENT USE OF INSULIN (HCC): ICD-10-CM
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
## 2022-05-10 PROCEDURE — 11042 DBRDMT SUBQ TIS 1ST 20SQCM/<: CPT | Performed by: NURSE PRACTITIONER

## 2022-05-10 PROCEDURE — 15275 SKIN SUB GRAFT FACE/NK/HF/G: CPT | Performed by: NURSE PRACTITIONER

## 2022-05-10 PROCEDURE — 11042 DBRDMT SUBQ TIS 1ST 20SQCM/<: CPT

## 2022-05-10 PROCEDURE — 15275 SKIN SUB GRAFT FACE/NK/HF/G: CPT

## 2022-05-10 RX ORDER — LIDOCAINE HYDROCHLORIDE 20 MG/ML
JELLY TOPICAL ONCE
Status: CANCELLED | OUTPATIENT
Start: 2022-05-10 | End: 2022-05-10

## 2022-05-10 RX ORDER — BACITRACIN ZINC AND POLYMYXIN B SULFATE 500; 1000 [USP'U]/G; [USP'U]/G
OINTMENT TOPICAL ONCE
Status: CANCELLED | OUTPATIENT
Start: 2022-05-10 | End: 2022-05-10

## 2022-05-10 RX ORDER — BETAMETHASONE DIPROPIONATE 0.05 %
OINTMENT (GRAM) TOPICAL ONCE
Status: CANCELLED | OUTPATIENT
Start: 2022-05-10 | End: 2022-05-10

## 2022-05-10 RX ORDER — GENTAMICIN SULFATE 1 MG/G
OINTMENT TOPICAL ONCE
Status: CANCELLED | OUTPATIENT
Start: 2022-05-10 | End: 2022-05-10

## 2022-05-10 RX ORDER — LIDOCAINE HYDROCHLORIDE 20 MG/ML
JELLY TOPICAL ONCE
Status: COMPLETED | OUTPATIENT
Start: 2022-05-10 | End: 2022-05-10

## 2022-05-10 RX ORDER — BACITRACIN, NEOMYCIN, POLYMYXIN B 400; 3.5; 5 [USP'U]/G; MG/G; [USP'U]/G
OINTMENT TOPICAL ONCE
Status: CANCELLED | OUTPATIENT
Start: 2022-05-10 | End: 2022-05-10

## 2022-05-10 RX ORDER — LIDOCAINE 50 MG/G
OINTMENT TOPICAL ONCE
Status: CANCELLED | OUTPATIENT
Start: 2022-05-10 | End: 2022-05-10

## 2022-05-10 RX ORDER — LIDOCAINE 40 MG/G
CREAM TOPICAL ONCE
Status: CANCELLED | OUTPATIENT
Start: 2022-05-10 | End: 2022-05-10

## 2022-05-10 RX ORDER — GINSENG 100 MG
CAPSULE ORAL ONCE
Status: CANCELLED | OUTPATIENT
Start: 2022-05-10 | End: 2022-05-10

## 2022-05-10 RX ORDER — LIDOCAINE HYDROCHLORIDE 40 MG/ML
SOLUTION TOPICAL ONCE
Status: CANCELLED | OUTPATIENT
Start: 2022-05-10 | End: 2022-05-10

## 2022-05-10 RX ORDER — CLOBETASOL PROPIONATE 0.5 MG/G
OINTMENT TOPICAL ONCE
Status: CANCELLED | OUTPATIENT
Start: 2022-05-10 | End: 2022-05-10

## 2022-05-10 RX ADMIN — LIDOCAINE HYDROCHLORIDE 6 ML: 20 JELLY TOPICAL at 09:32

## 2022-05-10 NOTE — PROGRESS NOTES
EpiFix Treatment Note    NAME:  Yvette Avalos OF BIRTH:  1960  MEDICAL RECORD NUMBER:  [id number]  DATE:  5/10/2022    Goal:  Patient will receive safe and proper application of skin substitute. Patient will comply with caring for dressing, offloading and reporting complications.  [x]Expiration date checked immediately prior to use   [x] Package intact prior to use and no damage noted   [x] Transport temperature controlled and acceptable(ROOM AIR)     [x]  EpiFix was removed from protective sterile packaging by physician and applied to prepared ulcer bed.  [x] EpiFix was placed using embossment letting as a guide. (UP)     [x] Epifix was hydrated with sterile normal saline per physician(IF NEEDED)         [x] EpiFix was applied to location Right 2nd Toe and affixed with steri-strips by the physician.  [x] EpiFix was covered with non-adherent ulcer dressing per physician   [x] Applied silvercel over non-adherent.  [x] Applied dry gauze and/or roll gauze.  [x] Coban or ace wrap was applied to secure graft and decrease edema.  [x] Patient/caregiver was instructed not to remove dressing and to keep it clean and dry.  [x] Pt/family/caregiver was instructed on signs and symptoms of complications to report such as draining through dressing, dressing falling down/slipping, getting wet,or  severe   pain and tingling in toes   [x] Pt/family/caregiver was instructed on need for offloading and elevation of affected extremity and on use of prescribed offloading device.  [x] Record info in tissue log( sticker with patient label). Picture epifix sticker with patient label for that specific date in . Please add epifix application number to both stickers.  [x]  Guidelines followed   [x] EpiFix may be applied a total of 10 times per wound over a 12 week period. Additionally EpiFix may only be used every 12 months per wound.     Date of first application of EpiFix for this current wound is April 26, 2022.         Electronically signed by Pablo Oliver RN on 5/10/2022 at 10:46 AM

## 2022-05-10 NOTE — TELEPHONE ENCOUNTER
Alcides Rubalcava is calling to request a refill on the following medication(s):    Last Visit Date (If Applicable):  4/54/0495    Next Visit Date:    Visit date not found    Medication Request:  Requested Prescriptions     Pending Prescriptions Disp Refills    linagliptin (TRADJENTA) 5 MG tablet [Pharmacy Med Name: TRADJENTA 5MG TABS] 90 tablet 3     Sig: TAKE 1 TABLET BY MOUTH ONE TIME A DAY

## 2022-05-10 NOTE — PROGRESS NOTES
[address]   Progress Note and Procedure Note      Shira Reed  MEDICAL RECORD NUMBER:  [id number]  AGE: 64 y.o. GENDER: male  : 1960  EPISODE DATE:  5/10/2022    Subjective:     Chief Complaint   Patient presents with    Wound Check     ble         HISTORY of PRESENT ILLNESS HPI     Shira Reed is a 64 y.o. male who presents today for wound/ulcer evaluation. History of Wound Context: here to follow up on right second toe ulceration that is improving with Epifix application. Will place application # 3 today. Also has wound o left second toe amputation site. PICC was removed and now taking oral duricef. Saw ID 22,will follow up 22. Plan for surgery next week 22 with Dr Kristina Montez.    Wound/Ulcer Pain Timing/Severity: none  Quality of pain: N/A  Severity:  0 / 10   Modifying Factors: None  Associated Signs/Symptoms: none    Ulcer Identification:  Ulcer Type: diabetic  Contributing Factors: diabetes, poor glucose control, chronic pressure, decreased mobility and obesity    Wound: N/A        PAST MEDICAL HISTORY        Diagnosis Date    Acquired trigger finger 2018    CAD (coronary artery disease)     NWOCC/ involving coronary bypass graft of native heart with unstable angina pectoris    Cervical spondylosis     CHF (congestive heart failure) ([facility])     Chronic back pain     on 18 pt states is presently in pain mgmnt    Contusion of elbow 2018    Contusion of knee 2018    Contusion of shoulder region 2018    Diabetic foot ulcer with osteomyelitis ([address] 2018    Diabetic ulcer of toe associated with type 2 diabetes mellitus, with fat layer exposed ([address] 2018    Hypercholesterolemia 2017    Hyperlipemia, mixed     Hypertension     Ischemic cardiomyopathy 2018    Non-pressure chronic ulcer of left lower leg with fat layer exposed ([address] 2014    Obesity     Obesity, Class III, BMI 40-49.9 (morbid obesity) ([address] 4/6/2015    Obstructive sleep apnea syndrome     Osteoarthritis of carpometacarpal [place]) joint of thumb 6/5/2018    Osteoarthritis of knee 6/5/2018    Peripheral vascular disease [facility]     with venous stasis and ulcerations. Dr Jensen Silverman Presence of coronary angioplasty implant and graft 9/11/2009    Sleep apnea     Dr Yolette Saavedra of knee and leg 6/5/2018    Sprain of shoulder and upper arm 6/5/2018    Type II or unspecified type diabetes mellitus without mention of complication, not stated as uncontrolled     Ulcer of left foot, with fat layer exposed ([address] 1/9/2018    Unspecified sleep apnea     Dr Salome Jacobson CPAP    Venous insufficiency of both lower extremities 1/31/2018       PAST SURGICAL HISTORY    Past Surgical History:   Procedure Laterality Date    ARTHROPLASTY Left 4/1/2022    ARTHROPLASTY 2ND LEFT TOE performed by Nabor Crowell DPM at [address] Right 3/22/13    Dr. Chela Schrader.     COLONOSCOPY  2020    CORONARY ARTERY BYPASS GRAFT  2/26/07    [facility] (X 3)    CORONARY ARTERY BYPASS GRAFT  2006    NECK SURGERY  2010    cervical stenoses C5-C6-C7and partial T1 laminectomy with fusion of C6-7    OTHER SURGICAL HISTORY      wound care ulcers of legs (bilaterally) with Dr Gonzales Salas Left 4/6/2022    TOE AMPUTATION, SECOND DIGIT performed by Nabor Crowell DPM at [address] [facility]  age 11   [place]      closure of peripherator veins of legs, Dr LAWSON Elizalde History   Problem Relation Age of Onset    Cancer Mother         lung    Diabetes Father     Heart Disease Father     High Blood Pressure Father     Diabetes Brother     Heart Disease Brother     High Blood Pressure Brother        SOCIAL HISTORY    Social History     Tobacco Use    Smoking status: Never Smoker    Smokeless tobacco: Never Used   Vaping Use    Vaping Use: Never used Substance Use Topics    Alcohol use: Not Currently     Comment: rare social, non past 5 months    Drug use: No       ALLERGIES    Allergies   Allergen Reactions    Pcn [Penicillins] Other (See Comments)     Unknown rx    Penicillin G Rash       MEDICATIONS    Current Outpatient Medications on File Prior to Encounter   Medication Sig Dispense Refill    fentanyl (DURAGESIC) Place 25 patches onto the skin every 72 hours Removes one every 72 hours and has a count of ten      cefadroxil (DURICEF) 500 MG capsule Take 1 capsule by mouth 2 times daily for 14 days 28 capsule 1    hydroCHLOROthiazide (MICROZIDE) 12.5 MG capsule 1 po qd 90 capsule 3    metFORMIN (GLUCOPHAGE) 1000 MG tablet Take 1 tablet by mouth 2 times daily (with meals) 60 tablet 3    omeprazole (PRILOSEC) 20 MG delayed release capsule TAKE 1 CAPSULE DAILY 90 capsule 1    linagliptin (TRADJENTA) 5 MG tablet TAKE 1 TABLET BY MOUTH ONE TIME A DAY 30 tablet 5    gabapentin (NEURONTIN) 300 MG capsule Take 300 mg by mouth 3 times daily.  Misc Natural Products (GLUCOSAMINE CHOND MSM FORMULA PO) Take 2 tablets by mouth daily      HYDROcodone-acetaminophen (NORCO)  MG per tablet Take 1 tablet by mouth every 4-6 hours as needed for Pain.       meloxicam (MOBIC) 15 MG tablet Take 1 tablet by mouth daily 90 tablet 3    traZODone (DESYREL) 150 MG tablet TAKE ONE TABLET BY MOUTH EVERY EVENING 90 tablet 3    topiramate (TOPAMAX) 200 MG tablet Take 1 tablet by mouth 2 times daily (Patient taking differently: Take 400 mg by mouth daily ) 60 tablet 5    tiZANidine (ZANAFLEX) 4 MG tablet 1 po qd 14 tablet 3    carvedilol (COREG) 12.5 MG tablet Take 1 tablet by mouth 2 times daily (with meals) 180 tablet 3    clopidogrel (PLAVIX) 75 MG tablet Take 1 tablet by mouth daily 90 tablet 3    NEEDLE, DISP, 22 G 22G X 1-1/2\" MISC 1 Device by Does not apply route every 30 days 25 each 1    NEEDLE, DISP, 18 G (BD DISP NEEDLES) 18G X 1-1/2\" MISC 1 Device by Does not apply route every 30 days 25 each 1    testosterone cypionate (DEPOTESTOTERONE CYPIONATE) 200 MG/ML injection       Testosterone Cypionate 200 MG/ML KIT Inject 200 mg into the muscle every 30 days for 30 days. please include syringes 1 kit 5    NEEDLE, DISP, 18 G 18G X 1\" MISC 1 Syringe by Does not apply route every 30 days 25 each 0    NEEDLE, DISP, 22 G 22G X 1-1/2\" MISC 1 Syringe by Does not apply route every 30 days 25 each 0    Multiple Vitamin (MULTI VITAMIN DAILY PO) Take by mouth      nitroGLYCERIN (NITROSTAT) 0.4 MG SL tablet Place 0.4 mg under the tongue      aspirin 325 MG tablet Take 325 mg by mouth daily. No current facility-administered medications on file prior to encounter. REVIEW OF SYSTEMS    Pertinent items are noted in HPI.      Objective:      BP (!) 162/73   Pulse 73   Temp 97.7 °F (36.5 °C) (Tympanic)   Ht 6' 1\" (1.854 m)   Wt 253 lb (114.8 kg)   BMI 33.38 kg/m²     Wt Readings from Last 3 Encounters:   05/10/22 253 lb (114.8 kg)   05/05/22 253 lb 9.6 oz (115 kg)   05/03/22 250 lb (113.4 kg)       PHYSICAL EXAM    General Appearance: alert and oriented to person, place and time, well-developed and obese, in no acute distress  Skin: warm and dry, no rash or erythema, right second toe ulcer and left second toe amputation site wound  Head: normocephalic and atraumatic  Eyes: pupils equal, round, extraocular eye movements intact, and conjunctivae normal  Pulmonary/Chest: normal air movement, no respiratory distress  Extremities: no cyanosis and no clubbing   Musculoskeletal: no joint swelling or tenderness  Neurologic: gait, coordination normal and speech normal      Assessment:     Problem List Items Addressed This Visit     Chronic ulcer of toe, left, with necrosis of bone ([address]    Relevant Orders    Initiate Outpatient Wound Care Protocol    Diabetic polyneuropathy associated with type 2 diabetes mellitus ([facility]    Relevant Orders    Initiate Outpatient Wound Care Protocol    Dog bite    Relevant Orders    Initiate Outpatient Wound Care Protocol    Osteomyelitis of second toe of left foot (HCC) - Primary    Relevant Orders    Initiate Outpatient Wound Care Protocol    Ulcer of toe of right foot, with fat layer exposed ([address]    Relevant Orders    Initiate Outpatient Wound Care Protocol           Procedure Note  Indications:  Based on my examination of this patient's wound(s)/ulcer(s) today, debridement is required to promote healing and evaluate the wound base. Performed by: JESICA Berrios - CNP    Consent obtained:  Yes    Time out taken:  Yes    Pain Control: Anesthetic  Anesthetic: 2% Lidocaine Gel Topical     Debridement:Excisional Debridement    Using curette the wound(s)/ulcer(s) was/were sharply debrided down through and including the removal of subcutaneous tissue. Devitalized Tissue Debrided:  fibrin, biofilm and slough    Pre Debridement Measurements:  Are located in the [place]  Documentation Flow Sheet    Wound/Ulcer #: 4      Post Debridement Measurements:  Wound/Ulcer Descriptions are Pre Debridement except measurements:    Wound 02/28/22 Toe (Comment  which one) Right 2nd Toe #5 (Active)   Wound Image   02/28/22 0912   Wound Etiology Diabetic Pate 1 05/10/22 0925   Dressing Status New drainage noted; Old drainage noted 05/10/22 0925   Wound Cleansed Cleansed with saline 05/10/22 0925   Dressing/Treatment Other (comment) 03/09/22 1005   Offloading for Diabetic Foot Ulcers Offloading ordered;Post op shoe 05/10/22 0925   Wound Length (cm) 0 cm 05/10/22 1008   Wound Width (cm) 0 cm 05/10/22 1008   Wound Depth (cm) 0 cm 05/10/22 1008   Wound Surface Area (cm^2) 0 cm^2 05/10/22 1008   Change in Wound Size % (l*w) 100 05/10/22 1008   Wound Volume (cm^3) 0 cm^3 05/10/22 1008   Wound Healing % 100 05/10/22 1008   Post-Procedure Length (cm) 1.5 cm 05/10/22 0925   Post-Procedure Width (cm) 1.6 cm 05/10/22 0925   Post-Procedure Depth (cm) 0.1 cm 05/10/22 0925   Post-Procedure Surface Area (cm^2) 2.4 cm^2 05/10/22 0925   Post-Procedure Volume (cm^3) 0.24 cm^3 05/10/22 0925   Wound Assessment Slough;Pink/red 05/10/22 0925   Drainage Amount None 05/10/22 0925   Drainage Description Yellow 05/10/22 0925   Odor None 05/10/22 0925   Trudi-wound Assessment Hyperkeratosis (callous) 05/10/22 0925   Margins Defined edges 05/10/22 0925   Wound Thickness Description not for Pressure Injury Full thickness 05/10/22 0925   Number of days: 71       Wound 03/09/22 Toe (Comment  which one) Right #6 right great toe (Active)   Wound Image   03/09/22 0922   Wound Etiology Diabetic Pate 1 05/10/22 0925   Dressing Status New drainage noted; Old drainage noted 05/10/22 0925   Wound Cleansed Cleansed with saline 05/10/22 0925   Dressing/Treatment Other (comment) 03/09/22 1005   Offloading for Diabetic Foot Ulcers Offloading ordered;Post op shoe 05/10/22 0925   Wound Length (cm) 0 cm 05/10/22 0925   Wound Width (cm) 0 cm 05/10/22 0925   Wound Depth (cm) 0 cm 05/10/22 0925   Wound Surface Area (cm^2) 0 cm^2 05/10/22 0925   Change in Wound Size % (l*w) 100 05/10/22 0925   Wound Volume (cm^3) 0 cm^3 05/10/22 0925   Wound Healing % 100 05/10/22 0925   Post-Procedure Length (cm) 0 cm 05/10/22 0925   Post-Procedure Width (cm) 0 cm 05/10/22 0925   Post-Procedure Depth (cm) 0 cm 05/10/22 0925   Post-Procedure Surface Area (cm^2) 0 cm^2 05/10/22 0925   Post-Procedure Volume (cm^3) 0 cm^3 05/10/22 0925   Wound Assessment [place]/red;Slough 05/10/22 0925   Drainage Amount Moderate 05/10/22 0925   Drainage Description Yellow 05/10/22 0925   Odor None 05/10/22 0925   Trudi-wound Assessment Dry/flaky; Blanchable erythema 05/10/22 0925   Margins Defined edges 05/10/22 0925   Wound Thickness Description not for Pressure Injury Full thickness 05/10/22 0925   Number of days: 61       Wound 04/26/22 Toe (Comment  which one) Left #4 Left 2nd toe amp site (Active)   Wound Etiology Non-Healing Surgical 05/10/22 0925   Dressing Status Old drainage noted;New drainage noted 05/10/22 0925   Wound Cleansed Cleansed with saline 05/10/22 0925   Offloading for Diabetic Foot Ulcers Offloading ordered;Post op shoe 05/10/22 0925   Wound Length (cm) 1.6 cm 05/10/22 0925   Wound Width (cm) 1 cm 05/10/22 0925   Wound Depth (cm) 0.3 cm 05/10/22 0925   Wound Surface Area (cm^2) 1.6 cm^2 05/10/22 0925   Change in Wound Size % (l*w) -2.56 05/10/22 0925   Wound Volume (cm^3) 0.48 cm^3 05/10/22 0925   Wound Healing % 23 05/10/22 0925   Post-Procedure Length (cm) 1.6 cm 05/10/22 0925   Post-Procedure Width (cm) 1 cm 05/10/22 0925   Post-Procedure Depth (cm) 0.2 cm 05/10/22 0925   Post-Procedure Surface Area (cm^2) 1.6 cm^2 05/10/22 0925   Post-Procedure Volume (cm^3) 0.32 cm^3 05/10/22 0925   Wound Assessment [facility] 05/10/22 0925   Drainage Amount Moderate 05/10/22 0925   Drainage Description Yellow 05/10/22 0925   Odor None 05/10/22 0925   Trudi-wound Assessment Blanchable erythema 05/10/22 0925   Margins Defined edges; Attached edges 05/10/22 0925   Wound Thickness Description not for Pressure Injury Full thickness 05/10/22 0925   Number of days: 14          Percent of Wound(s)/Ulcer(s) Debrided: 100%    Total Surface Area Debrided:  1.60 sq cm     Diabetic/Pressure/Non Pressure Ulcers only:  Ulcer: Diabetic ulcer, fat layer exposed    Estimated Blood Loss:  Minimal    Hemostasis Achieved:  by pressure    Procedural Pain:  0  / 10     Post Procedural Pain:  0 / 10     Response to treatment:  Well tolerated by patient. Skin Substitute Graft Procedure Note      Warren Pack  AGE: 64 y.o. GENDER: male  : 1960  TODAY'S DATE:  5/10/2022     Pre-procedural Diagnosis:   1. Osteomyelitis of second toe of left foot ([address]    2. Diabetic polyneuropathy associated with type 2 diabetes mellitus ([address]    3. Chronic ulcer of toe, left, with necrosis of bone ([address]    4. Ulcer of toe of right foot, with fat layer exposed ([address]    5. Dog bite, subsequent encounter      Post-procedural Diagnosis: same  Wound Type:diabetic  Pre-procedure measurements:    Wound 02/28/22 Toe (Comment  which one) Right 2nd Toe #5 (Active)   Wound Image   02/28/22 0912   Wound Etiology Diabetic Pate 1 05/10/22 0925   Dressing Status New drainage noted; Old drainage noted 05/10/22 0925   Wound Cleansed Cleansed with saline 05/10/22 0925   Dressing/Treatment Other (comment) 03/09/22 1005   Offloading for Diabetic Foot Ulcers Offloading ordered;Post op shoe 05/10/22 0925   Wound Length (cm) 0 cm 05/10/22 1008   Wound Width (cm) 0 cm 05/10/22 1008   Wound Depth (cm) 0 cm 05/10/22 1008   Wound Surface Area (cm^2) 0 cm^2 05/10/22 1008   Change in Wound Size % (l*w) 100 05/10/22 1008   Wound Volume (cm^3) 0 cm^3 05/10/22 1008   Wound Healing % 100 05/10/22 1008   Post-Procedure Length (cm) 1.5 cm 05/10/22 0925   Post-Procedure Width (cm) 1.6 cm 05/10/22 0925   Post-Procedure Depth (cm) 0.1 cm 05/10/22 0925   Post-Procedure Surface Area (cm^2) 2.4 cm^2 05/10/22 0925   Post-Procedure Volume (cm^3) 0.24 cm^3 05/10/22 0925   Wound Assessment Slough;Pink/red 05/10/22 0925   Drainage Amount None 05/10/22 0925   Drainage Description Yellow 05/10/22 0925   Odor None 05/10/22 0925   Trudi-wound Assessment Hyperkeratosis (callous) 05/10/22 0925   Margins Defined edges 05/10/22 0925   Wound Thickness Description not for Pressure Injury Full thickness 05/10/22 0925   Number of days: 71       Wound 03/09/22 Toe (Comment  which one) Right #6 right great toe (Active)   Wound Image   03/09/22 0922   Wound Etiology Diabetic Pate 1 05/10/22 0925   Dressing Status New drainage noted; Old drainage noted 05/10/22 0925   Wound Cleansed Cleansed with saline 05/10/22 0925   Dressing/Treatment Other (comment) 03/09/22 1005   Offloading for Diabetic Foot Ulcers Offloading ordered;Post op shoe 05/10/22 0925   Wound Length (cm) 0 cm 05/10/22 0925   Wound Width (cm) 0 cm 05/10/22 0925   Wound Depth (cm) 0 cm 05/10/22 0925   Wound Surface Area (cm^2) 0 cm^2 05/10/22 0925   Change in Wound Size % (l*w) 100 05/10/22 0925   Wound Volume (cm^3) 0 cm^3 05/10/22 0925   Wound Healing % 100 05/10/22 0925   Post-Procedure Length (cm) 0 cm 05/10/22 0925   Post-Procedure Width (cm) 0 cm 05/10/22 0925   Post-Procedure Depth (cm) 0 cm 05/10/22 0925   Post-Procedure Surface Area (cm^2) 0 cm^2 05/10/22 0925   Post-Procedure Volume (cm^3) 0 cm^3 05/10/22 0925   Wound Assessment [place]/red;Slough 05/10/22 0925   Drainage Amount Moderate 05/10/22 0925   Drainage Description Yellow 05/10/22 0925   Odor None 05/10/22 0925   Trudi-wound Assessment Dry/flaky; Blanchable erythema 05/10/22 0925   Margins Defined edges 05/10/22 0925   Wound Thickness Description not for Pressure Injury Full thickness 05/10/22 0925   Number of days: 61       Wound 04/26/22 Toe (Comment  which one) Left #4 Left 2nd toe amp site (Active)   Wound Etiology Non-Healing Surgical 05/10/22 0925   Dressing Status Old drainage noted;New drainage noted 05/10/22 0925   Wound Cleansed Cleansed with saline 05/10/22 0925   Offloading for Diabetic Foot Ulcers Offloading ordered;Post op shoe 05/10/22 0925   Wound Length (cm) 1.6 cm 05/10/22 0925   Wound Width (cm) 1 cm 05/10/22 0925   Wound Depth (cm) 0.3 cm 05/10/22 0925   Wound Surface Area (cm^2) 1.6 cm^2 05/10/22 0925   Change in Wound Size % (l*w) -2.56 05/10/22 0925   Wound Volume (cm^3) 0.48 cm^3 05/10/22 0925   Wound Healing % 23 05/10/22 0925   Post-Procedure Length (cm) 1.6 cm 05/10/22 0925   Post-Procedure Width (cm) 1 cm 05/10/22 0925   Post-Procedure Depth (cm) 0.2 cm 05/10/22 0925   Post-Procedure Surface Area (cm^2) 1.6 cm^2 05/10/22 0925   Post-Procedure Volume (cm^3) 0.32 cm^3 05/10/22 0925   Wound Assessment [facility] 05/10/22 0925   Drainage Amount Moderate 05/10/22 0925   Drainage Description Yellow 05/10/22 0925   Odor None 05/10/22 0925   Trudi-wound Assessment Blanchable erythema 05/10/22 0925   Margins Defined edges; Attached edges 05/10/22 0925   Wound Thickness Description not for Pressure Injury Full thickness 05/10/22 0925   Number of days: 14         The wound needs epithelialization and we are present today to perform Skin substitute skin grafting. Due to wound size, the procedure may need to be staged. Procedure Note:  Skin Substitute Skin Graft   Type:  Epifix #3, 18 mm disc  Performed by:  JESICA Corona CNP  Assited by:  none     Patient was brought into the treatment room  The Right second toe was prepped and draped in the usual sterile manner. Ulcerations noted as stated above. No signs of infection seen. No purulence, erythema, or necrotic tissue. The ulceration(s) were prepped by debridement with a currette. Debridement was performed through and including subcutaneous tissue. Bleeding was controlled with pressure. The wound bed was flushed with normal saline. Next, the entire 100% skin substitute - Epifix was applied to the wound, none was discarded. The graft was fixated into place with nonadherent layer, silvercel and dry dressing. Anesthesia: Topical    Bleeding: Minimal    Hemostasis:   by pressure    Response to treatment:  Well tolerated by patient. Patient was given post-procedural instruction not to remove the dressings until returning to the clinic. Instructed to call if any questions. Follow up: One week for evaluation of skin grafts and dressing change      Electronically signed by JESICA Corona CNP on 5/10/2022 at 10:12 AM    Plan:     Treatment Note please see Discharge Instructions    Written patient dismissal instructions given to patient and signed by patient or POA.            Electronically signed by JESICA Corona CNP on 5/10/2022 at 10:12 AM

## 2022-05-12 ENCOUNTER — TELEMEDICINE (OUTPATIENT)
Dept: FAMILY MEDICINE CLINIC | Age: 62
End: 2022-05-12
Payer: COMMERCIAL

## 2022-05-12 DIAGNOSIS — E11.69 TYPE 2 DIABETES MELLITUS WITH OTHER SPECIFIED COMPLICATION, WITHOUT LONG-TERM CURRENT USE OF INSULIN (HCC): Primary | ICD-10-CM

## 2022-05-12 DIAGNOSIS — I10 ESSENTIAL HYPERTENSION: ICD-10-CM

## 2022-05-12 DIAGNOSIS — G89.4 CHRONIC PAIN SYNDROME: ICD-10-CM

## 2022-05-12 PROCEDURE — 99214 OFFICE O/P EST MOD 30 MIN: CPT | Performed by: FAMILY MEDICINE

## 2022-05-12 PROCEDURE — 3052F HG A1C>EQUAL 8.0%<EQUAL 9.0%: CPT | Performed by: FAMILY MEDICINE

## 2022-05-12 RX ORDER — TRAZODONE HYDROCHLORIDE 150 MG/1
TABLET ORAL
Qty: 90 TABLET | Refills: 3 | Status: SHIPPED | OUTPATIENT
Start: 2022-05-12

## 2022-05-12 ASSESSMENT — PATIENT HEALTH QUESTIONNAIRE - PHQ9
SUM OF ALL RESPONSES TO PHQ QUESTIONS 1-9: 0
1. LITTLE INTEREST OR PLEASURE IN DOING THINGS: 0
SUM OF ALL RESPONSES TO PHQ9 QUESTIONS 1 & 2: 0
SUM OF ALL RESPONSES TO PHQ QUESTIONS 1-9: 0
2. FEELING DOWN, DEPRESSED OR HOPELESS: 0
SUM OF ALL RESPONSES TO PHQ QUESTIONS 1-9: 0
SUM OF ALL RESPONSES TO PHQ QUESTIONS 1-9: 0

## 2022-05-12 ASSESSMENT — ENCOUNTER SYMPTOMS
RESPIRATORY NEGATIVE: 1
GASTROINTESTINAL NEGATIVE: 1
ALLERGIC/IMMUNOLOGIC NEGATIVE: 1

## 2022-05-12 NOTE — PROGRESS NOTES
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 Authorizing Provider   dapagliflozin (FARXIGA) 10 MG tablet Take 1 tablet by mouth every morning Yes Seema Hameed DO   traZODone (DESYREL) 150 MG tablet TAKE ONE TABLET BY MOUTH EVERY EVENING Yes Seema Hameed DO   linagliptin (TRADJENTA) 5 MG tablet TAKE 1 TABLET BY MOUTH ONE TIME A DAY  Seema Hameed DO   fentanyl (DURAGESIC) Place 25 patches onto the skin every 72 hours Removes one every 72 hours and has a count of ten  Historical Provider, MD   cefadroxil (DURICEF) 500 MG capsule Take 1 capsule by mouth 2 times daily for 14 days  Rufus Munroe MD   hydroCHLOROthiazide (MICROZIDE) 12.5 MG capsule 1 po qd  Seema Hameed DO   metFORMIN (GLUCOPHAGE) 1000 MG tablet Take 1 tablet by mouth 2 times daily (with meals)  Seema Hameed DO   omeprazole (PRILOSEC) 20 MG delayed release capsule TAKE 1 CAPSULE DAILY  Seema Hameed DO   gabapentin (NEURONTIN) 300 MG capsule Take 300 mg by mouth 3 times daily. Historical Provider, MD   Misc Natural Products (GLUCOSAMINE CHOND MSM FORMULA PO) Take 2 tablets by mouth daily  Historical Provider, MD   HYDROcodone-acetaminophen (NORCO)  MG per tablet Take 1 tablet by mouth every 4-6 hours as needed for Pain.   Historical Provider, MD   meloxicam (MOBIC) 15 MG tablet Take 1 tablet by mouth daily  Seema Hameed DO   topiramate (TOPAMAX) 200 MG tablet Take 1 tablet by mouth 2 times daily  Patient taking differently: Take 400 mg by mouth daily   Seema Hameed DO   tiZANidine (ZANAFLEX) 4 MG tablet 1 po qd  Seema Hameed DO   carvedilol (COREG) 12.5 MG tablet Take 1 tablet by mouth 2 times daily (with meals)  Seema Hameed DO   clopidogrel (PLAVIX) 75 MG tablet Take 1 tablet by mouth daily  Seema Hameed DO   NEEDLE, DISP, 22 G 22G X 1-1/2\" MISC 1 Device by Does not apply route every 30 days  Seema Hameed DO   NEEDLE, DISP, 18 G (BD DISP NEEDLES) 18G X 1-1/2\" MISC 1 Device by Does not apply route every 30 days  Seema Hameed DO   testosterone cypionate (DEPOTESTOTERONE CYPIONATE) 200 MG/ML injection   Historical Provider, MD   Testosterone Cypionate 200 MG/ML KIT Inject 200 mg into the muscle every 30 days for 30 days. please include syringes  Seema Hameed DO   NEEDLE, DISP, 18 G 18G X 1\" MISC 1 Syringe by Does not apply route every 30 days  Seema Hameed DO   NEEDLE, DISP, 22 G 22G X 1-1/2\" MISC 1 Syringe by Does not apply route every 30 days  Seema Hameed DO   Multiple Vitamin (MULTI VITAMIN DAILY PO) Take by mouth  Historical Provider, MD   nitroGLYCERIN (NITROSTAT) 0.4 MG SL tablet Place 0.4 mg under the tongue  Historical Provider, MD   aspirin 325 MG tablet Take 325 mg by mouth daily.     Historical Provider, MD       Social History     Tobacco Use    Smoking status: Never Smoker    Smokeless tobacco: Never Used   Vaping Use    Vaping Use: Never used   Substance Use Topics    Alcohol use: Not Currently     Comment: rare social, non past 5 months    Drug use: No        Allergies   Allergen Reactions    Pcn [Penicillins] Other (See Comments)     Unknown rx    Penicillin G Rash   ,   Past Medical History:   Diagnosis Date    Acquired trigger finger 6/5/2018    CAD (coronary artery disease)     [facility]/ involving coronary bypass graft of native heart with unstable angina pectoris    Cervical spondylosis     CHF (congestive heart failure) (HCC)     Chronic back pain     on 11/19/18 pt states is presently in pain mgmnt    Contusion of elbow 6/5/2018    Contusion of knee 6/5/2018    Contusion of shoulder region 6/5/2018    Diabetic foot ulcer with osteomyelitis ([address] 1/9/2018    Diabetic ulcer of toe associated with type 2 diabetes mellitus, with fat layer exposed ([address] 1/31/2018    Hypercholesterolemia 12/5/2017    Hyperlipemia, mixed     Hypertension     Ischemic cardiomyopathy 5/7/2018    Non-pressure chronic ulcer of left lower leg with fat layer exposed ([address] 6/9/2014    Obesity     Obesity, Class III, BMI 40-49.9 (morbid obesity) ([address] 4/6/2015    Obstructive sleep apnea syndrome     Osteoarthritis of carpometacarpal (CMC) joint of thumb 6/5/2018    Osteoarthritis of knee 6/5/2018    Peripheral vascular disease ([address]     with venous stasis and ulcerations. Dr Shai Hurtado Presence of coronary angioplasty implant and graft 9/11/2009    Sleep apnea     Dr Nikky Gordon of knee and leg 6/5/2018    Sprain of shoulder and upper arm 6/5/2018    Type II or unspecified type diabetes mellitus without mention of complication, not stated as uncontrolled     Ulcer of left foot, with fat layer exposed ([address] 1/9/2018    Unspecified sleep apnea     Dr Stephan Gutierrez CPAP    Venous insufficiency of both lower extremities 1/31/2018   ,   Past Surgical History:   Procedure Laterality Date    ARTHROPLASTY Left 4/1/2022    ARTHROPLASTY 2ND LEFT TOE performed by Hakeem Cole DPM at [address] Right 3/22/13    Dr. Deepali Ascencio.     COLONOSCOPY  2020    CORONARY ARTERY BYPASS GRAFT  2/26/07    [facility], Dr Brook Farah (X 3)    CORONARY ARTERY BYPASS GRAFT  2006    NECK SURGERY  2010    cervical stenoses C5-C6-C7and partial T1 laminectomy with fusion of C6-7    OTHER SURGICAL HISTORY      wound care ulcers of legs (bilaterally) with Dr Kayla Campos Left 4/6/2022 TOE AMPUTATION, SECOND DIGIT performed by Brian Rivas DPM at [address] [facility]  age 11    VEIN SURGERY      closure of peripherator veins of legs, Dr Siria Smith     ,   Social History     Tobacco Use    Smoking status: Never Smoker    Smokeless tobacco: Never Used   Vaping Use    Vaping Use: Never used   Substance Use Topics    Alcohol use: Not Currently     Comment: rare social, non past 5 months    Drug use: No   ,   Family History   Problem Relation Age of Onset    Cancer Mother         lung    Diabetes Father     Heart Disease Father     High Blood Pressure Father     Diabetes Brother     Heart Disease Brother     High Blood Pressure Brother    ,   Immunization History   Administered Date(s) Administered    COVID-19, Shree Gutierrez, Primary or Immunocompromised, PF, 100mcg/0.5mL 03/11/2021, 03/11/2021, 04/08/2021, 04/08/2021, 01/15/2022, 01/15/2022    Influenza Virus Vaccine 11/22/2006    Td, unspecified formulation 05/18/2001    Tdap (Boostrix, Adacel) 02/22/2013, 12/06/2014, 04/02/2017, 12/03/2019       PHYSICAL EXAMINATION:  [ INSTRUCTIONS:  \"[x]\" Indicates a positive item  \"[]\" Indicates a negative item  -- DELETE ALL ITEMS NOT EXAMINED]  Vital Signs: (As obtained by patient/caregiver or practitioner observation)    Blood pressure-  Heart rate-    Respiratory rate-    Temperature-  Pulse oximetry-     Constitutional: [x] Appears well-developed and well-nourished [x] No apparent distress      [] Abnormal-   Mental status  [x] Alert and awake  [x] Oriented to person/place/time [x]Able to follow commands      Eyes:  EOM    [x]  Normal  [] Abnormal-  Sclera  [x]  Normal  [] Abnormal -         Discharge [x]  None visible  [] Abnormal -    HENT:   [x] Normocephalic, atraumatic.   [] Abnormal   [x] Mouth/Throat: Mucous membranes are moist.     External Ears [x] Normal  [] Abnormal-     Neck: [x] No visualized mass     Pulmonary/Chest: [x] Respiratory effort normal.  [x] No visualized signs of difficulty breathing or respiratory distress        [] Abnormal-      Musculoskeletal:   [x] Normal gait with no signs of ataxia         [x] Normal range of motion of neck        [] Abnormal-       Neurological:        [x] No Facial Asymmetry (Cranial nerve 7 motor function) (limited exam to video visit)          [x] No gaze palsy        [] Abnormal-         Skin:        [x] No significant exanthematous lesions or discoloration noted on facial skin         [] Abnormal-            Psychiatric:       [x] Normal Affect [x] No Hallucinations        [] Abnormal-     Other pertinent observable physical exam findings-     ASSESSMENT/PLAN:   Diagnosis Orders   1. Type 2 diabetes mellitus with other specified complication, without long-term current use of insulin ([facility]     2. Essential hypertension     3. Chronic pain syndrome        No orders of the defined types were placed in this encounter. Requested Prescriptions     Signed Prescriptions Disp Refills    dapagliflozin (FARXIGA) 10 MG tablet 90 tablet 3     Sig: Take 1 tablet by mouth every morning    traZODone (DESYREL) 150 MG tablet 90 tablet 3     Sig: TAKE ONE TABLET BY MOUTH EVERY EVENING   Try and get [place] covered for him  He needs to work on diet and trying to get sugars down as well follow-up with all specialist repeat urine    Return if symptoms worsen or fail to improve. No orders of the defined types were placed in this encounter. Requested Prescriptions     Signed Prescriptions Disp Refills    dapagliflozin (FARXIGA) 10 MG tablet 90 tablet 3     Sig: Take 1 tablet by mouth every morning    traZODone (DESYREL) 150 MG tablet 90 tablet 3     Sig: TAKE ONE TABLET BY MOUTH EVERY EVENING       Ofelia Gonzalez is a 64 y.o. male being evaluated by a Virtual Visit (video visit) encounter to address concerns as mentioned above. A caregiver was present when appropriate.  Due to this being a TeleHealth encounter (During [facility] public health emergency), evaluation of the following organ systems was limited: Vitals/Constitutional/EENT/Resp/CV/GI//MS/Neuro/Skin/Heme-Lymph-Imm. Pursuant to the emergency declaration under the [address] [place] [address], [address] [place] [address] authority and the [employer] Act, this Virtual Visit was conducted with patient's (and/or legal guardian's) consent, to reduce the patient's risk of exposure to COVID-19 and provide necessary medical care. The patient (and/or legal guardian) has also been advised to contact this office for worsening conditions or problems, and seek emergency medical treatment and/or call 911 if deemed necessary. Patient identification was verified at the start of the visit: Yes    Total time spent on this encounter: Not billed by time    Services were provided through a video synchronous discussion virtually to substitute for in-person clinic visit. Patient and provider were located at their individual homes. --Geoffrey Sesay DO on 5/12/2022 at 12:19 PM    An electronic signature was used to authenticate this note.

## 2022-05-18 ENCOUNTER — HOSPITAL ENCOUNTER (OUTPATIENT)
Age: 62
Setting detail: OUTPATIENT SURGERY
Discharge: HOME | End: 2022-05-18
Attending: PODIATRIST | Admitting: PODIATRIST
Payer: COMMERCIAL

## 2022-05-18 VITALS
OXYGEN SATURATION: 97 % | RESPIRATION RATE: 14 BRPM | DIASTOLIC BLOOD PRESSURE: 68 MMHG | WEIGHT: 253.2 LBS | HEIGHT: 72 IN | BODY MASS INDEX: 34.29 KG/M2 | HEART RATE: 68 BPM | SYSTOLIC BLOOD PRESSURE: 127 MMHG | TEMPERATURE: 97.7 F

## 2022-05-18 PROCEDURE — 6370000000 HC RX 637 (ALT 250 FOR IP): Performed by: PODIATRIST

## 2022-05-18 PROCEDURE — 3600000012 HC SURGERY LEVEL 2 ADDTL 15MIN: Performed by: PODIATRIST

## 2022-05-18 PROCEDURE — 7100000011 HC PHASE II RECOVERY - ADDTL 15 MIN: Performed by: PODIATRIST

## 2022-05-18 PROCEDURE — 7100000010 HC PHASE II RECOVERY - FIRST 15 MIN: Performed by: PODIATRIST

## 2022-05-18 PROCEDURE — 3600000002 HC SURGERY LEVEL 2 BASE: Performed by: PODIATRIST

## 2022-05-18 PROCEDURE — 2500000003 HC RX 250 WO HCPCS: Performed by: PODIATRIST

## 2022-05-18 PROCEDURE — 2709999900 HC NON-CHARGEABLE SUPPLY: Performed by: PODIATRIST

## 2022-05-18 DEVICE — THERASKIN 2.5X2.5CM 6SQ CM: Type: IMPLANTABLE DEVICE | Site: FOOT | Status: FUNCTIONAL

## 2022-05-18 DEVICE — ALLOGRAFT TISS CRYOPRESERVED 2X2 CM STRAVIX: Type: IMPLANTABLE DEVICE | Site: FOOT | Status: FUNCTIONAL

## 2022-05-18 RX ORDER — MAGNESIUM CARB/ALUMINUM HYDROX 105-160MG
TABLET,CHEWABLE ORAL PRN
Status: DISCONTINUED | OUTPATIENT
Start: 2022-05-18 | End: 2022-05-18 | Stop reason: ALTCHOICE

## 2022-05-18 RX ORDER — LIDOCAINE HYDROCHLORIDE 20 MG/ML
INJECTION, SOLUTION EPIDURAL; INFILTRATION; INTRACAUDAL; PERINEURAL PRN
Status: DISCONTINUED | OUTPATIENT
Start: 2022-05-18 | End: 2022-05-18 | Stop reason: ALTCHOICE

## 2022-05-18 RX ORDER — DOXYCYCLINE HYCLATE 100 MG
100 TABLET ORAL 2 TIMES DAILY
Qty: 20 TABLET | Refills: 0 | Status: SHIPPED | OUTPATIENT
Start: 2022-05-18 | End: 2022-05-28

## 2022-05-18 ASSESSMENT — PAIN DESCRIPTION - DESCRIPTORS: DESCRIPTORS: ACHING

## 2022-05-18 ASSESSMENT — PAIN - FUNCTIONAL ASSESSMENT: PAIN_FUNCTIONAL_ASSESSMENT: 0-10

## 2022-05-18 NOTE — OP NOTE
Operative Note      Patient: Rupinder Diamond  YOB: 1960  MRN: [id number]    Date of Procedure: 5/18/2022    Pre-Op Diagnosis:   1. Diabetic ulceration of toe of right foot, down to level of subcutaneous layer   2. Diabetic ulceration of left foot, down to muscle left foot  4. DM 2 with polyneuropathy    Post-Op Diagnosis: Same       Procedures:    1. Surgical wound prep and debridement, left foot  2. Surgical wound prep and debridement, right foot  3. Application of skin substitute graft, left foot (Stravix 2.0 x 2.0 cm)  4. Application of skin substitute graft, right foot (Theraskin 2.5 x 2.5 cm2)    Surgeon(s): Misti Baca DPM    Assistant:   Resident: Yane Bonilla DPM    Anesthesia: Local    Hemostasis: Via direct pressure    Estimated Blood Loss (mL): Minimal    MATERIALS: 4-0 Nylon  Implant Name Type Inv. Item Serial No.  Lot No. LRB No. Used Action   THERASKIN 2.5X2.5CM 6SQ CM - [id number]  THERASKIN 2.5X2.5CM 6SQ CM [id number] [facility] SKIN WND ALLOGRFT [facility]-  Right 1 Implanted   ALLOGRAFT TISS CRYOPRESERVED 2X2 CM STRAVIX - [id number]  ALLOGRAFT TISS CRYOPRESERVED 2X2 CM STRAVIX  [employer] [facility]- [id number] Left 1 Implanted     INJECTABLES: 10 cc of 1% Lidocaine plain to right foot    Complications: None    Specimens:   * No specimens in log *    Implants:  * No implants in log *      Drains: None    Findings: Nonhealing ulceration down to level of bone, right foot. Nonhealing ulceration down to level of subcutaneous layer, left foot. Findings consistent with pre-operative diagnoses. Detailed Description of Procedure:     INDICATIONS FOR PROCEDURE:  Patient is a 64year-old  male well known to Dr. Ni Chávez with a chief complaint of chronic nonhealing wounds to the bilateral foot. Patient has been undergoing conservative wound care treatment with serial debridements but granulation tissue formation has plateaued.  The wound bed is fibro-granular and is now at a point where graft application is necessary to augment further healing of the wound. The patient has elected to undergo surgical treatment. In pre-op all risks and rewards of the aforementioned procedure were discussed at length prior to the patient signing the consent form which was witnessed by a nurse and placed in his chart. The bilateral feet were marked, labs and images reviewed, NPO status confirmed. No guarantees were given or implied. PROCEDURE IN DETAIL: The patient was brought into the operating room and placed on the operating table in the supine position. A timeout was performed confirming the patient identity, correct site, correct procedure, allergies, and preoperative antibiotics. The surgical site was then scrubbed, prepped, and draped in the usual aseptic manner. Attention was directed to the right foot where a wound with exposed tendon and capsule was noted to the 2nd digit of the foot. Measurements of the wound are 1.2 cm x 1.3 cm x 0.3 cm. The wound was debrided with the use of a curette to remove any potential biofilm, fibrotic tissue, and promote an environment conducive to graft application. Upon debridement sanguineous fluid was expressed from wound. No purulent drainage, increased warmth, erythema, malodor, other signs concerning for infection. The wound measured 1.2 cm x 1.3 cm x 0.3 cm post debridement. Adequate bleeding of the wound bed was noted. Next a 2.5 cm² Theraskin graft was placed in the previously noted site. The 2.5 cm²  graft was then removed from the package and the entire graft was placed across the wound. The graft was anchored into placed with 4-0 Nylon, mineral oil soaked gauze, and Steri-strips. Attention was then directed to the left foot where a wound with exposed muscle was present to the 2nd interspace of left foot. Measurements of the wound are 1.2 cm x 1.0 cm x 1.0 cm.  The wound was debrided with the use of a curette to remove any potential biofilm, fibrotic tissue, and promote an environment conducive to graft application. Upon debridement sanguineous fluid was expressed from wound. No purulent drainage, increased warmth, erythema, malodor, other signs concerning for infection. The wound measured 1.2 cm x 1.0 cm x 1.0 cm post debridement. Adequate bleeding of the wound bed was noted. Next a 2.0 cm^2 Stravix graft was placed in the previously noted site. The 2.0 cm²  graft was then removed from the package and the entire graft was placed across the wound. The graft was anchored into placed with 4-0 Nylon, adaptic, mineral oil soaked gauze, and Steri-strips. Dressings were then applied to bilateral feet consisting of gauze 4 x 4's Kerlix and Ace. The patient tolerated the above procedure and anesthesia well without complications. The patient was transported from the operating room to the PACU with vital signs stable and vascular status intact to the bilateral feet. Patient will be WBAT in surgical shoe to bilateral lower extremities. Dressings to remain clean, dry, intact until follow up outpatient with Dr. Dior Mayfield.     Electronically signed by Rashid Garcia DPM on 5/18/2022 at 10:41 AM

## 2022-05-19 ENCOUNTER — TELEPHONE (OUTPATIENT)
Dept: FAMILY MEDICINE CLINIC | Age: 62
End: 2022-05-19

## 2022-05-19 RX ORDER — FLUCONAZOLE 100 MG/1
200 TABLET ORAL DAILY
Qty: 14 TABLET | Refills: 0 | Status: SHIPPED | OUTPATIENT
Start: 2022-05-19 | End: 2022-05-26

## 2022-05-19 NOTE — TELEPHONE ENCOUNTER
Patient called in stating that he is on antibiotics and he stated he now has a years infection and needs some medication to up clear it up.       Please advise

## 2022-05-24 ENCOUNTER — HOSPITAL ENCOUNTER (OUTPATIENT)
Dept: WOUND CARE | Age: 62
Discharge: HOME | End: 2022-05-24
Payer: COMMERCIAL

## 2022-05-24 VITALS
DIASTOLIC BLOOD PRESSURE: 76 MMHG | RESPIRATION RATE: 18 BRPM | TEMPERATURE: 98.8 F | HEART RATE: 74 BPM | HEIGHT: 72 IN | BODY MASS INDEX: 34.27 KG/M2 | WEIGHT: 253 LBS | SYSTOLIC BLOOD PRESSURE: 130 MMHG

## 2022-05-24 DIAGNOSIS — L97.524 CHRONIC ULCER OF TOE, LEFT, WITH NECROSIS OF BONE (HCC): ICD-10-CM

## 2022-05-24 DIAGNOSIS — M86.9 OSTEOMYELITIS OF SECOND TOE OF LEFT FOOT (HCC): Primary | ICD-10-CM

## 2022-05-24 DIAGNOSIS — L97.512 ULCER OF TOE OF RIGHT FOOT, WITH FAT LAYER EXPOSED (HCC): ICD-10-CM

## 2022-05-24 DIAGNOSIS — E11.42 DIABETIC POLYNEUROPATHY ASSOCIATED WITH TYPE 2 DIABETES MELLITUS (HCC): ICD-10-CM
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
## 2022-05-24 PROCEDURE — 99213 OFFICE O/P EST LOW 20 MIN: CPT

## 2022-05-24 PROCEDURE — 6370000000 HC RX 637 (ALT 250 FOR IP): Performed by: PODIATRIST

## 2022-05-24 RX ORDER — GENTAMICIN SULFATE 1 MG/G
OINTMENT TOPICAL ONCE
Status: CANCELLED | OUTPATIENT
Start: 2022-05-24 | End: 2022-05-24

## 2022-05-24 RX ORDER — BACITRACIN ZINC AND POLYMYXIN B SULFATE 500; 1000 [USP'U]/G; [USP'U]/G
OINTMENT TOPICAL ONCE
Status: CANCELLED | OUTPATIENT
Start: 2022-05-24 | End: 2022-05-24

## 2022-05-24 RX ORDER — BACITRACIN, NEOMYCIN, POLYMYXIN B 400; 3.5; 5 [USP'U]/G; MG/G; [USP'U]/G
OINTMENT TOPICAL ONCE
Status: CANCELLED | OUTPATIENT
Start: 2022-05-24 | End: 2022-05-24

## 2022-05-24 RX ORDER — LIDOCAINE HYDROCHLORIDE 20 MG/ML
JELLY TOPICAL ONCE
Status: CANCELLED | OUTPATIENT
Start: 2022-05-24 | End: 2022-05-24

## 2022-05-24 RX ORDER — LIDOCAINE 40 MG/G
CREAM TOPICAL ONCE
Status: CANCELLED | OUTPATIENT
Start: 2022-05-24 | End: 2022-05-24

## 2022-05-24 RX ORDER — CLOBETASOL PROPIONATE 0.5 MG/G
OINTMENT TOPICAL ONCE
Status: CANCELLED | OUTPATIENT
Start: 2022-05-24 | End: 2022-05-24

## 2022-05-24 RX ORDER — BETAMETHASONE DIPROPIONATE 0.05 %
OINTMENT (GRAM) TOPICAL ONCE
Status: CANCELLED | OUTPATIENT
Start: 2022-05-24 | End: 2022-05-24

## 2022-05-24 RX ORDER — MAGNESIUM CARB/ALUMINUM HYDROX 105-160MG
30 TABLET,CHEWABLE ORAL
Status: DISPENSED | OUTPATIENT
Start: 2022-05-24 | End: 2022-05-24

## 2022-05-24 RX ORDER — LIDOCAINE 50 MG/G
OINTMENT TOPICAL ONCE
Status: CANCELLED | OUTPATIENT
Start: 2022-05-24 | End: 2022-05-24

## 2022-05-24 RX ORDER — LIDOCAINE HYDROCHLORIDE 20 MG/ML
JELLY TOPICAL ONCE
Status: COMPLETED | OUTPATIENT
Start: 2022-05-24 | End: 2022-05-24

## 2022-05-24 RX ORDER — GINSENG 100 MG
CAPSULE ORAL ONCE
Status: CANCELLED | OUTPATIENT
Start: 2022-05-24 | End: 2022-05-24

## 2022-05-24 RX ORDER — LIDOCAINE HYDROCHLORIDE 40 MG/ML
SOLUTION TOPICAL ONCE
Status: CANCELLED | OUTPATIENT
Start: 2022-05-24 | End: 2022-05-24

## 2022-05-24 RX ADMIN — LIDOCAINE HYDROCHLORIDE 5 ML: 20 JELLY TOPICAL at 13:12

## 2022-05-24 ASSESSMENT — PAIN SCALES - GENERAL: PAINLEVEL_OUTOF10: 0

## 2022-05-24 NOTE — PROGRESS NOTES
[address]   Progress Note and Procedure Note      Cayla Macedo  MEDICAL RECORD NUMBER:  [id number]  AGE: 64 y.o. GENDER: male  : 1960  EPISODE DATE:  2022    Subjective:     Chief Complaint   Patient presents with    Wound Check     Right 2nd toe, right great toe, left 2nd toe         HISTORY of PRESENT ILLNESS HPI     Cayla Macedo is a 64 y.o. male who presents today for wound/ulcer evaluation. S/P   2ND LEFT stravix graft x 1 week, 2nd right theraskin graft x 1 week      TAKING DOXY    Ulcer Identification:  Ulcer Type: diabetic  Contributing Factors: diabetes and chronic pressure       22 1204    Specimen Description . TOE    Special Requests . TOE   SECOND LEFT DIGIT    Direct Exam RARE NEUTROPHILS Abnormal     Direct Exam FEW GRAM POSITIVE COCCI IN PAIRS Abnormal     Culture STREPTOCOCCI, BETA HEMOLYTIC GROUP B MODERATE GROWTH Abnormal     Culture  Abnormal   STAPHYLOCOCCUS AUREUS MODERATE GROWTH This isolate is methicillin susceptible. For susceptibility, refer to previous culture. Culture No anaerobic organisms isolated at 5 days.                 PAST MEDICAL HISTORY        Diagnosis Date    Acquired trigger finger 2018    CAD (coronary artery disease)     NWOCC/ involving coronary bypass graft of native heart with unstable angina pectoris    Cervical spondylosis     [facility]congest[facility]     Chronic back pain     on 18 pt states is presently in pain mgmnt    Contusion of elbow 2018    Contusion of knee 2018    Contusion of shoulder region 2018    Diabetic foot ulcer with osteomyelitis ([address] 2018    Diabetic ulcer of toe associated with type 2 diabetes mellitus, with fat layer exposed ([address] 2018    Hypercholesterolemia 2017    Hyperlipemia, mixed     Hypertension     Ischemic cardiomyopathy 2018    Non-pressure chronic ulcer of left lower leg with fat layer exposed ([address] 2014    Obesity     Obesity, Class III, BMI 40-49.9 (morbid obesity) ([facility] 4/6/2015    Obstructive sleep apnea syndrome     Osteoarthritis of carpometacarpal (CMC) joint of thumb 6/5/2018    Osteoarthritis of knee 6/5/2018    Peripheral vascular disease ([address]     with venous stasis and ulcerations. Dr Cayla Mahoney Presence of coronary angioplasty implant and graft 9/11/2009    Sleep apnea     Dr Aviva Vick of knee and leg 6/5/2018    Sprain of shoulder and upper arm 6/5/2018    Type II or unspecified type diabetes mellitus without mention of complication, not stated as uncontrolled     Ulcer of left foot, with fat layer exposed ([facility] 1/9/2018    Unspecified sleep apnea     Dr Arvin Orozco CPAP    Venous insufficiency of both lower extremities 1/31/2018       PAST SURGICAL HISTORY    Past Surgical History:   Procedure Laterality Date    ARTHROPLASTY Left 4/1/2022    ARTHROPLASTY 2ND LEFT TOE performed by Carmelo Echeverria DPM at [address] Right 3/22/13    Dr. Denise Adamson.     COLONOSCOPY  2020    CORONARY ARTERY BYPASS GRAFT  2/26/07    [facility], Dr Dimitri Larkin (X 3)    CORONARY ARTERY BYPASS GRAFT  2006    NECK SURGERY  2010    cervical stenoses C5-C6-C7and partial T1 laminectomy with fusion of C6-7    OTHER SURGICAL HISTORY      wound care ulcers of legs (bilaterally) with Dr Brent Hawley Bilateral 5/18/2022    STRAVIX LEFT FOOT, THERASKIN RIGHT FOOT performed by Carmelo Echeverria DPM at [address] TOE AMPUTATION Left 4/6/2022    TOE AMPUTATION, SECOND DIGIT performed by Carmelo Echeverria DPM at [address]   age 11    VEIN SURGERY      closure of peripherator veins of legs, Dr LAWSON Ramirez History   Problem Relation Age of Onset    Cancer Mother         lung    Diabetes Father     Heart Disease Father     High Blood Pressure Father     Diabetes Brother     Heart Disease Brother     High Blood Pressure Brother        SOCIAL HISTORY    Social History     Tobacco Use    Smoking status: Never Smoker    Smokeless tobacco: Never Used   Vaping Use    Vaping Use: Never used   Substance Use Topics    Alcohol use: Not Currently     Comment: rare social, non past 5 months    Drug use: No       ALLERGIES    Allergies   Allergen Reactions    Pcn [Penicillins] Other (See Comments)     Unknown rx    Penicillin G Rash       MEDICATIONS    Current Outpatient Medications on File Prior to Encounter   Medication Sig Dispense Refill    fluconazole (DIFLUCAN) 100 MG tablet Take 2 tablets by mouth daily for 7 days (Patient not taking: Reported on 5/24/2022) 14 tablet 0    doxycycline hyclate (VIBRA-TABS) 100 MG tablet Take 1 tablet by mouth 2 times daily for 10 days 20 tablet 0    dapagliflozin (FARXIGA) 10 MG tablet Take 1 tablet by mouth every morning 90 tablet 3    traZODone (DESYREL) 150 MG tablet TAKE ONE TABLET BY MOUTH EVERY EVENING 90 tablet 3    linagliptin (TRADJENTA) 5 MG tablet TAKE 1 TABLET BY MOUTH ONE TIME A DAY 90 tablet 3    fentanyl (DURAGESIC) Place 25 patches onto the skin every 72 hours Removes one every 72 hours and has a count of ten      hydroCHLOROthiazide (MICROZIDE) 12.5 MG capsule 1 po qd 90 capsule 3    metFORMIN (GLUCOPHAGE) 1000 MG tablet Take 1 tablet by mouth 2 times daily (with meals) 60 tablet 3    omeprazole (PRILOSEC) 20 MG delayed release capsule TAKE 1 CAPSULE DAILY 90 capsule 1    gabapentin (NEURONTIN) 300 MG capsule Take 900 mg by mouth nightly.  Misc Natural Products (GLUCOSAMINE CHOND MSM FORMULA PO) Take 2 tablets by mouth daily      HYDROcodone-acetaminophen (NORCO)  MG per tablet Take 1 tablet by mouth every 4-6 hours as needed for Pain.       meloxicam (MOBIC) 15 MG tablet Take 1 tablet by mouth daily 90 tablet 3    topiramate (TOPAMAX) 200 MG tablet Take 1 tablet by mouth 2 times daily (Patient taking differently: Take 400 mg by mouth daily ) 60 tablet 5    tiZANidine (ZANAFLEX) 4 MG tablet 1 po qd 14 tablet 3    carvedilol (COREG) 12.5 MG tablet Take 1 tablet by mouth 2 times daily (with meals) 180 tablet 3    clopidogrel (PLAVIX) 75 MG tablet Take 1 tablet by mouth daily 90 tablet 3    NEEDLE, DISP, 22 G 22G X 1-1/2\" MISC 1 Device by Does not apply route every 30 days 25 each 1    NEEDLE, DISP, 18 G (BD DISP NEEDLES) 18G X 1-1/2\" MISC 1 Device by Does not apply route every 30 days 25 each 1    testosterone cypionate (DEPOTESTOTERONE CYPIONATE) 200 MG/ML injection       Testosterone Cypionate 200 MG/ML KIT Inject 200 mg into the muscle every 30 days for 30 days. please include syringes 1 kit 5    NEEDLE, DISP, 18 G 18G X 1\" MISC 1 Syringe by Does not apply route every 30 days 25 each 0    NEEDLE, DISP, 22 G 22G X 1-1/2\" MISC 1 Syringe by Does not apply route every 30 days 25 each 0    Multiple Vitamin (MULTI VITAMIN DAILY PO) Take by mouth      nitroGLYCERIN (NITROSTAT) 0.4 MG SL tablet Place 0.4 mg under the tongue      aspirin 325 MG tablet Take 325 mg by mouth daily. No current facility-administered medications on file prior to encounter. REVIEW OF SYSTEMS    Review of Systems   Constitutional: Negative for chills and fever. Skin: Positive for wound. Neurological: Negative for numbness. Objective:      /76   Pulse 74   Temp 98.8 °F (37.1 °C) (Tympanic)   Resp 18   Ht 6' (1.829 m)   Wt 253 lb (114.8 kg)   BMI 34.31 kg/m²     Wt Readings from Last 3 Encounters:   05/24/22 253 lb (114.8 kg)   05/18/22 253 lb 3.2 oz (114.9 kg)   05/10/22 253 lb (114.8 kg)       Physical Exam:  General:  Alert and oriented x3. In no acute distress.      Lower Extremity Physical Exam:    Vascular: DP pulses are palpable, Bilateral. PT pulses are palpable, Bilateral. CFT <3 seconds to all digits, Bilateral.  No edema, Bilateral.  Hair growth is absent to the level of the digits, Bilateral.     Neuro: Saph/sural/SP/DP/plantar sensation intact to light touch. Musculoskeletal: EHL/FHL/GS/TA gross motor intact. Gross deformity is present, hammertoes bilateral.     Dermatologic: clean wounds bilateral feet with grafts and sutures intact    Wound 02/28/22 Toe (Comment  which one) Right 2nd Toe #5 (Active)   Wound Image   02/28/22 0912   Wound Etiology Diabetic Pate 1 05/24/22 1256   Dressing Status New drainage noted; Old drainage noted 05/24/22 1256   Wound Cleansed Cleansed with saline 05/24/22 1256   Dressing/Treatment Other (comment) 03/09/22 1005   Offloading for Diabetic Foot Ulcers Offloading ordered;Post op shoe 05/10/22 0925   Wound Length (cm) 1.2 cm 05/24/22 1256   Wound Width (cm) 1.7 cm 05/24/22 1256   Wound Depth (cm) 0.1 cm 05/24/22 1256   Wound Surface Area (cm^2) 2.04 cm^2 05/24/22 1256   Change in Wound Size % (l*w) 33.77 05/24/22 1256   Wound Volume (cm^3) 0.204 cm^3 05/24/22 1256   Wound Healing % 34 05/24/22 1256   Post-Procedure Length (cm) 1.2 cm 05/24/22 1256   Post-Procedure Width (cm) 1.7 cm 05/24/22 1256   Post-Procedure Depth (cm) 0.1 cm 05/24/22 1256   Post-Procedure Surface Area (cm^2) 2.04 cm^2 05/24/22 1256   Post-Procedure Volume (cm^3) 0.204 cm^3 05/24/22 1256   Wound Assessment Eschar dry 05/24/22 1256   Drainage Amount Moderate 05/24/22 1256   Drainage Description Yellow 05/24/22 1256   Odor None 05/24/22 1256   Trudi-wound Assessment Blanchable erythema 05/24/22 1256   Margins Defined edges 05/24/22 1256   Wound Thickness Description not for Pressure Injury Full thickness 05/24/22 1256   Number of days: 85       Wound 04/26/22 Toe (Comment  which one) Left #4 Left 2nd toe amp site (Active)   Wound Etiology Non-Healing Surgical 05/24/22 1256   Dressing Status Old drainage noted;New drainage noted 05/24/22 1256   Wound Cleansed Cleansed with saline 05/24/22 1256   Offloading for Diabetic Foot Ulcers Offloading ordered;Post op shoe 05/10/22 1041   Wound Length (cm) 1.8 cm 05/24/22 1256   Wound Width (cm) 1 cm 05/24/22 1256   Wound Depth (cm) 0.1 cm 05/24/22 1256   Wound Surface Area (cm^2) 1.8 cm^2 05/24/22 1256   Change in Wound Size % (l*w) -15.38 05/24/22 1256   Wound Volume (cm^3) 0.18 cm^3 05/24/22 1256   Wound Healing % 71 05/24/22 1256   Post-Procedure Length (cm) 1.8 cm 05/24/22 1256   Post-Procedure Width (cm) 1 cm 05/24/22 1256   Post-Procedure Depth (cm) 0.1 cm 05/24/22 1256   Post-Procedure Surface Area (cm^2) 1.8 cm^2 05/24/22 1256   Post-Procedure Volume (cm^3) 0.18 cm^3 05/24/22 1256   Wound Assessment Dry; Other (Comment) 05/24/22 1256   Drainage Amount Moderate 05/24/22 1256   Drainage Description Yellow 05/10/22 0925   Odor None 05/24/22 1256   Trudi-wound Assessment Blanchable erythema 05/24/22 1256   Margins Defined edges; Attached edges 05/24/22 1256   Wound Thickness Description not for Pressure Injury Full thickness 05/24/22 1256   Number of days: 28         E11.42, L97.522. D05.250          Plan:   MINERAL OIL DRESSING WITH ADAPTIC AND STERIS BILATERAL    FINISH DOXY    [address] 1 WEEK.     Treatment Note please see attached Discharge Instructions

## 2022-05-26 DIAGNOSIS — E78.2 HYPERLIPEMIA, MIXED: ICD-10-CM

## 2022-05-26 RX ORDER — CARVEDILOL 12.5 MG/1
12.5 TABLET ORAL 2 TIMES DAILY WITH MEALS
Qty: 180 TABLET | Refills: 3 | Status: SHIPPED | OUTPATIENT
Start: 2022-05-26 | End: 2022-08-24

## 2022-05-26 NOTE — TELEPHONE ENCOUNTER
Last visit: 5/12/2022  Last Med refill: 12/22/2021  Does patient have enough medication for 72 hours:     Next Visit Date:  Future Appointments   Date Time Provider Benito Heide   5/31/2022  2:00 PM KEZIA Kiran WND [facility]   6/9/2022 10:30 AM Boris Andujar MD INFT DISEASE [facility]   6/9/2022 11:30 AM Yeimy Henning MD Resp Spec [facility]   6/16/2022  8:30 AM Florence Gilliam DPM [facility] [address] Maintenance   Topic Date Due    Pneumococcal 0-64 years Vaccine (1 - PCV) Never done    Colorectal Cancer Screen  Never done    Shingles vaccine (1 of 2) Never done    Diabetic retinal exam  09/28/2016    Diabetic microalbuminuria test  10/07/2020    Lipids  06/19/2022    Prostate Specific Antigen (PSA) Screening or Monitoring  06/19/2022    Flu vaccine (Season Ended) 09/01/2022    A1C test (Diabetic or Prediabetic)  03/23/2023    Diabetic foot exam  04/05/2023    Depression Screen  05/12/2023    DTaP/Tdap/Td vaccine (5 - Td or Tdap) 12/03/2029    COVID-19 Vaccine  Completed    Hepatitis C screen  Completed    HIV screen  Completed    Hepatitis A vaccine  Aged Out    Hib vaccine  Aged Out    Meningococcal (ACWY) vaccine  Aged Out       Hemoglobin A1C (%)   Date Value   03/23/2022 8.5 (H)   06/01/2020 7.2   10/07/2019 5.8             ( goal A1C is < 7)   Microalb/Crt.  Ratio (mcg/mg creat)   Date Value   10/07/2019 CANNOT BE CALCULATED     LDL Cholesterol (mg/dL)   Date Value   06/19/2021 71   06/01/2020 77       (goal LDL is <100)   AST (U/L)   Date Value   06/19/2021 12     ALT (U/L)   Date Value   06/19/2021 15     BUN (mg/dL)   Date Value   04/15/2022 16     BP Readings from Last 3 Encounters:   05/24/22 130/76   05/18/22 127/68   05/10/22 (!) 162/73          (goal 120/80)    All Future Testing planned in [facility]  Lab Frequency Next Occurrence   MRI LUMBAR SPINE W WO CONTRAST Once 08/06/2021   Hemoglobin A1C Once 03/21/2022   Albumin Once 03/21/2022 Prealbumin Once 03/21/2022   Transferrin Once 03/21/2022   Renal Function Panel Once 04/29/2022               Patient Active Problem List:     Hyperlipemia, mixed     Sleep apnea     Peripheral vascular disease (HCC)     CAD (coronary artery disease)     CHF (congestive heart failure) (HCC)     CTS (carpal tunnel syndrome)     Hypotension     Obesity, Class III, BMI 40-49.9 (morbid obesity) ([address]     Essential hypertension     DDD (degenerative disc disease), cervical     DJD (degenerative joint disease), cervical     Primary osteoarthritis involving multiple joints     Paresthesias in left hand     Uncontrolled type 2 diabetes mellitus without complication, without long-term current use of insulin     Coronary artery disease involving coronary bypass graft of native heart without angina pectoris     Sleep apnea     Cervical spondylosis     Obesity     Long term current use of antithrombotics/antiplatelets     H/O cervical spine surgery     Long term (current) use of non-steroidal anti-inflammatories (nsaid)     Chronic prescription opiate use     Severe comorbid illness     Hx of cervical spine surgery     Chronic neck pain     Candidal balanitis     Cellulitis of third toe, left     Nail avulsion, toe, initial encounter     Cellulitis of second toe, right     Abnormal nuclear stress test     Abnormal stress test     Hypercholesterolemia     Diabetic foot ulcer with osteomyelitis ([address]     Ulcer of left foot, with fat layer exposed ([address]     Acquired trigger finger     Contusion of elbow     Contusion of shoulder region     Contusion of knee     Degeneration of lumbar intervertebral disc     Diabetic ulcer of toe associated with type 2 diabetes mellitus, with fat layer exposed ([address]     History of coronary artery bypass graft x 3     Ischemic cardiomyopathy     Osteoarthritis of knee     Osteoarthritis of carpometacarpal (CMC) joint of thumb     Sprain of knee and leg     Sprain of shoulder and upper arm Presence of coronary angioplasty implant and graft     Venous insufficiency of both lower extremities     Dog bite     Impingement syndrome of shoulder region     Bite wound of hand, left, subsequent encounter     Bite wound of hand, right, subsequent encounter     Open bite of left ear     Open wound of nose due to dog bite     Diabetic polyneuropathy associated with type 2 diabetes mellitus (HCC)     Chronic ulcer of toe, left, with necrosis of bone ([address]     Ulcer of toe of right foot, with fat layer exposed ([address]     Osteomyelitis of second toe of left foot ([address]

## 2022-05-31 ENCOUNTER — HOSPITAL ENCOUNTER (OUTPATIENT)
Dept: WOUND CARE | Age: 62
Discharge: HOME | End: 2022-05-31
Payer: COMMERCIAL

## 2022-05-31 VITALS
WEIGHT: 253 LBS | SYSTOLIC BLOOD PRESSURE: 151 MMHG | HEART RATE: 80 BPM | TEMPERATURE: 99.1 F | HEIGHT: 72 IN | DIASTOLIC BLOOD PRESSURE: 83 MMHG | BODY MASS INDEX: 34.27 KG/M2

## 2022-05-31 DIAGNOSIS — L97.524 CHRONIC ULCER OF TOE, LEFT, WITH NECROSIS OF BONE (HCC): ICD-10-CM

## 2022-05-31 DIAGNOSIS — M86.9 OSTEOMYELITIS OF SECOND TOE OF LEFT FOOT (HCC): Primary | ICD-10-CM
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
## 2022-05-31 DIAGNOSIS — L97.512 ULCER OF TOE OF RIGHT FOOT, WITH FAT LAYER EXPOSED (HCC): ICD-10-CM

## 2022-05-31 DIAGNOSIS — E11.42 DIABETIC POLYNEUROPATHY ASSOCIATED WITH TYPE 2 DIABETES MELLITUS (HCC): ICD-10-CM

## 2022-05-31 PROCEDURE — 99213 OFFICE O/P EST LOW 20 MIN: CPT

## 2022-05-31 RX ORDER — BETAMETHASONE DIPROPIONATE 0.05 %
OINTMENT (GRAM) TOPICAL ONCE
Status: CANCELLED | OUTPATIENT
Start: 2022-05-31 | End: 2022-05-31

## 2022-05-31 RX ORDER — LIDOCAINE 50 MG/G
OINTMENT TOPICAL ONCE
Status: CANCELLED | OUTPATIENT
Start: 2022-05-31 | End: 2022-05-31

## 2022-05-31 RX ORDER — BACITRACIN ZINC AND POLYMYXIN B SULFATE 500; 1000 [USP'U]/G; [USP'U]/G
OINTMENT TOPICAL ONCE
Status: CANCELLED | OUTPATIENT
Start: 2022-05-31 | End: 2022-05-31

## 2022-05-31 RX ORDER — LIDOCAINE 40 MG/G
CREAM TOPICAL ONCE
Status: CANCELLED | OUTPATIENT
Start: 2022-05-31 | End: 2022-05-31

## 2022-05-31 RX ORDER — GINSENG 100 MG
CAPSULE ORAL ONCE
Status: CANCELLED | OUTPATIENT
Start: 2022-05-31 | End: 2022-05-31

## 2022-05-31 RX ORDER — GENTAMICIN SULFATE 1 MG/G
OINTMENT TOPICAL ONCE
Status: CANCELLED | OUTPATIENT
Start: 2022-05-31 | End: 2022-05-31

## 2022-05-31 RX ORDER — LIDOCAINE HYDROCHLORIDE 20 MG/ML
JELLY TOPICAL ONCE
Status: CANCELLED | OUTPATIENT
Start: 2022-05-31 | End: 2022-05-31

## 2022-05-31 RX ORDER — LIDOCAINE HYDROCHLORIDE 40 MG/ML
SOLUTION TOPICAL ONCE
Status: CANCELLED | OUTPATIENT
Start: 2022-05-31 | End: 2022-05-31

## 2022-05-31 RX ORDER — MAGNESIUM CARB/ALUMINUM HYDROX 105-160MG
30 TABLET,CHEWABLE ORAL
Status: ACTIVE | OUTPATIENT
Start: 2022-05-31 | End: 2022-05-31

## 2022-05-31 RX ORDER — BACITRACIN, NEOMYCIN, POLYMYXIN B 400; 3.5; 5 [USP'U]/G; MG/G; [USP'U]/G
OINTMENT TOPICAL ONCE
Status: CANCELLED | OUTPATIENT
Start: 2022-05-31 | End: 2022-05-31

## 2022-05-31 RX ORDER — CLOBETASOL PROPIONATE 0.5 MG/G
OINTMENT TOPICAL ONCE
Status: CANCELLED | OUTPATIENT
Start: 2022-05-31 | End: 2022-05-31

## 2022-05-31 RX ORDER — LIDOCAINE HYDROCHLORIDE 20 MG/ML
JELLY TOPICAL ONCE
Status: DISCONTINUED | OUTPATIENT
Start: 2022-05-31 | End: 2022-06-01 | Stop reason: HOSPADM

## 2022-05-31 NOTE — PROGRESS NOTES
[address]   Progress Note and Procedure Note      Elaina Renee  MEDICAL RECORD NUMBER:  [id number]  AGE: 64 y.o. GENDER: male  : 1960  EPISODE DATE:  2022    Subjective:     No chief complaint on file. HISTORY of PRESENT ILLNESS HPI     Elaina Renee is a 64 y.o. male who presents today for wound/ulcer evaluation. S/P   2ND LEFT stravix graft x 2 week, 2nd right theraskin graft x 2 week          Ulcer Identification:  Ulcer Type: diabetic  Contributing Factors: diabetes and chronic pressure       22 1204    Specimen Description . TOE    Special Requests . TOE   SECOND LEFT DIGIT    Direct Exam RARE NEUTROPHILS Abnormal     Direct Exam FEW GRAM POSITIVE COCCI IN PAIRS Abnormal     Culture STREPTOCOCCI, BETA HEMOLYTIC GROUP B MODERATE GROWTH Abnormal     Culture  Abnormal   STAPHYLOCOCCUS AUREUS MODERATE GROWTH This isolate is methicillin susceptible. For susceptibility, refer to previous culture. Culture No anaerobic organisms isolated at 5 days.                 PAST MEDICAL HISTORY        Diagnosis Date    Acquired trigger finger 2018    CAD (coronary artery disease)     NWOCC/ involving coronary bypass graft of native heart with unstable angina pectoris    Cervical spondylosis     CHF [facility]congestive heart failure) ([facility])     Chronic back pain     on 18 pt states is presently in pain mgmnt    Contusion of elbow 2018    Contusion of knee 2018    Contusion of shoulder region 2018    Diabetic foot ulcer with osteomyelitis ([address] 2018    Diabetic ulcer of toe associated with type 2 diabetes mellitus, with fat layer exposed ([address] 2018    Hypercholesterolemia 2017    Hyperlipemia, mixed     Hypertension     Ischemic cardiomyopathy 2018    Non-pressure chronic ulcer of left lower leg with fat layer exposed ([address] 2014    Obesity     Obesity, Class III, BMI 40-49.9 (morbid obesity) ([address] 2015    Obstructive sleep apnea syndrome     Osteoarthritis of carpometacarpal Daniels) joint of thumb 6/5/2018    Osteoarthritis of knee 6/5/2018    Peripheral vascular disease ([address]     with venous stasis and ulcerations. Dr Aidan Sun Presence of coronary angioplasty implant and graft 9/11/2009    Sleep apnea     Dr Terrell Horne of knee and leg 6/5/2018    Sprain of shoulder and upper arm 6/5/2018    Type II or unspecified type diabetes mellitus without mention of complication, not stated as uncontrolled     Ulcer of left foot, with fat layer exposed ([address] 1/9/2018    Unspecified sleep apnea     Dr Marie Every CPAP    Venous insufficiency of both lower extremities 1/31/2018       PAST SURGICAL HISTORY    Past Surgical History:   Procedure Laterality Date    ARTHROPLASTY Left 4/1/2022    ARTHROPLASTY 2ND LEFT TOE performed by Fady Templeton DPM at [address] Right 3/22/13    Dr. Jorge Vale.     COLONOSCOPY  2020    CORONARY ARTERY BYPASS GRAFT  2/26/07     [facility]'sDr Aaliyah (X 3)    CORONARY ARTERY BYPASS GRAFT  2006    NECK SURGERY  2010    cervical stenoses C5-C6-C7and partial T1 laminectomy with fusion of C6-7    OTHER SURGICAL HISTORY      wound care ulcers of legs (bilaterally) with Dr Ev Tineo Bilateral 5/18/2022    STRAVIX LEFT FOOT, THERASKIN RIGHT FOOT performed by Fady Templeton DPM at [facility] TOE AMPUTATION Left 4/6/2022    TOE AMPUTATION, SECOND DIGIT performed by Fady Templeton DPM at [address]  age 11    VEIN SURGERY      closure of peripherator veins of legs, Dr LAWSON Dalton History   Problem Relation Age of Onset    Cancer Mother         lung    Diabetes Father     Heart Disease Father     High Blood Pressure Father     Diabetes Brother     Heart Disease Brother     High Blood Pressure Brother        SOCIAL HISTORY    Social History Tobacco Use    Smoking status: Never Smoker    Smokeless tobacco: Never Used   Vaping Use    Vaping Use: Never used   Substance Use Topics    Alcohol use: Not Currently     Comment: rare social, non past 5 months    Drug use: No       ALLERGIES    Allergies   Allergen Reactions    Pcn [Penicillins] Other (See Comments)     Unknown rx    Penicillin G Rash       MEDICATIONS    Current Outpatient Medications on File Prior to Encounter   Medication Sig Dispense Refill    carvedilol (COREG) 12.5 MG tablet Take 1 tablet by mouth 2 times daily (with meals) 180 tablet 3    dapagliflozin (FARXIGA) 10 MG tablet Take 1 tablet by mouth every morning 90 tablet 3    traZODone (DESYREL) 150 MG tablet TAKE ONE TABLET BY MOUTH EVERY EVENING 90 tablet 3    linagliptin (TRADJENTA) 5 MG tablet TAKE 1 TABLET BY MOUTH ONE TIME A DAY 90 tablet 3    fentanyl (DURAGESIC) Place 25 patches onto the skin every 72 hours Removes one every 72 hours and has a count of ten      hydroCHLOROthiazide (MICROZIDE) 12.5 MG capsule 1 po qd 90 capsule 3    metFORMIN (GLUCOPHAGE) 1000 MG tablet Take 1 tablet by mouth 2 times daily (with meals) 60 tablet 3    omeprazole (PRILOSEC) 20 MG delayed release capsule TAKE 1 CAPSULE DAILY 90 capsule 1    gabapentin (NEURONTIN) 300 MG capsule Take 900 mg by mouth nightly.  Misc Natural Products (GLUCOSAMINE CHOND MSM FORMULA PO) Take 2 tablets by mouth daily      HYDROcodone-acetaminophen (NORCO)  MG per tablet Take 1 tablet by mouth every 4-6 hours as needed for Pain.       meloxicam (MOBIC) 15 MG tablet Take 1 tablet by mouth daily 90 tablet 3    topiramate (TOPAMAX) 200 MG tablet Take 1 tablet by mouth 2 times daily (Patient taking differently: Take 400 mg by mouth daily ) 60 tablet 5    tiZANidine (ZANAFLEX) 4 MG tablet 1 po qd 14 tablet 3    clopidogrel (PLAVIX) 75 MG tablet Take 1 tablet by mouth daily 90 tablet 3    NEEDLE, DISP, 22 G 22G X 1-1/2\" MISC 1 Device by Does not apply route every 30 days 25 each 1    NEEDLE, DISP, 18 G (BD DISP NEEDLES) 18G X 1-1/2\" MISC 1 Device by Does not apply route every 30 days 25 each 1    testosterone cypionate (DEPOTESTOTERONE CYPIONATE) 200 MG/ML injection       Testosterone Cypionate 200 MG/ML KIT Inject 200 mg into the muscle every 30 days for 30 days. please include syringes 1 kit 5    NEEDLE, DISP, 18 G 18G X 1\" MISC 1 Syringe by Does not apply route every 30 days 25 each 0    NEEDLE, DISP, 22 G 22G X 1-1/2\" MISC 1 Syringe by Does not apply route every 30 days 25 each 0    Multiple Vitamin (MULTI VITAMIN DAILY PO) Take by mouth      nitroGLYCERIN (NITROSTAT) 0.4 MG SL tablet Place 0.4 mg under the tongue      aspirin 325 MG tablet Take 325 mg by mouth daily. No current facility-administered medications on file prior to encounter. REVIEW OF SYSTEMS    Review of Systems   Constitutional: Negative for chills and fever. Skin: Positive for wound. Neurological: Negative for numbness. Objective: There were no vitals taken for this visit. Wt Readings from Last 3 Encounters:   05/24/22 253 lb (114.8 kg)   05/18/22 253 lb 3.2 oz (114.9 kg)   05/10/22 253 lb (114.8 kg)       Physical Exam:  General:  Alert and oriented x3. In no acute distress. Lower Extremity Physical Exam:    Vascular: DP pulses are palpable, Bilateral. PT pulses are palpable, Bilateral. CFT <3 seconds to all digits, Bilateral.  No edema, Bilateral.  Hair growth is absent to the level of the digits, Bilateral.     Neuro: Saph/sural/SP/DP/plantar sensation intact to light touch. Musculoskeletal: EHL/FHL/GS/TA gross motor intact. Gross deformity is present, hammertoes bilateral.     Dermatologic: clean wounds bilateral feet with grafts t      Wound 02/28/22 Toe (Comment  which one) Right 2nd Toe #5 (Active)   Wound Image   02/28/22 0912   Wound Etiology Diabetic Pate 1 05/31/22 1403   Dressing Status New drainage noted; Old drainage noted 05/31/22 1403   Wound Cleansed Cleansed with saline 05/31/22 1403   Dressing/Treatment Other (comment) 03/09/22 1005   Offloading for Diabetic Foot Ulcers Offloading ordered;Post op shoe 05/24/22 1352   Wound Length (cm) 1.2 cm 05/31/22 1403   Wound Width (cm) 1.6 cm 05/31/22 1403   Wound Depth (cm) 0.1 cm 05/31/22 1403   Wound Surface Area (cm^2) 1.92 cm^2 05/31/22 1403   Change in Wound Size % (l*w) 37.66 05/31/22 1403   Wound Volume (cm^3) 0.192 cm^3 05/31/22 1403   Wound Healing % 38 05/31/22 1403   Post-Procedure Length (cm) 1.3 cm 05/31/22 1403   Post-Procedure Width (cm) 1.3 cm 05/31/22 1403   Post-Procedure Depth (cm) 0.1 cm 05/31/22 1403   Post-Procedure Surface Area (cm^2) 1.69 cm^2 05/31/22 1403   Post-Procedure Volume (cm^3) 0.169 cm^3 05/31/22 1403   Wound Assessment Slough;Pink/red;Eschar dry 05/31/22 1403   Drainage Amount Moderate 05/31/22 1403   Drainage Description Yellow 05/31/22 1403   Odor None 05/31/22 1403   Trudi-wound Assessment Dry/flaky; Blanchable erythema 05/31/22 1403   Margins Defined edges 05/31/22 1403   Wound Thickness Description not for Pressure Injury Full thickness 05/31/22 1403   Number of days: 92       Wound 04/26/22 Toe (Comment  which one) Left #4 Left 2nd toe amp site (Active)   Wound Etiology Non-Healing Surgical 05/31/22 1403   Dressing Status Old drainage noted;New drainage noted 05/31/22 1403   Wound Cleansed Cleansed with saline 05/31/22 1403   Offloading for Diabetic Foot Ulcers Offloading ordered;Post op shoe 05/24/22 1352   Wound Length (cm) 2 cm 05/31/22 1403   Wound Width (cm) 1.1 cm 05/31/22 1403   Wound Depth (cm) 0.1 cm 05/31/22 1403   Wound Surface Area (cm^2) 2.2 cm^2 05/31/22 1403   Change in Wound Size % (l*w) -41.03 05/31/22 1403   Wound Volume (cm^3) 0.22 cm^3 05/31/22 1403   Wound Healing % 65 05/31/22 1403   Post-Procedure Length (cm) 1.5 cm 05/31/22 1403   Post-Procedure Width (cm) 1 cm 05/31/22 1403   Post-Procedure Depth (cm) 0.1 cm 05/31/22 1403   Post-Procedure Surface Area (cm^2) 1.5 cm^2 05/31/22 1403   Post-Procedure Volume (cm^3) 0.15 cm^3 05/31/22 1403   Wound Assessment Pink/red;Slough;Eschar dry 05/31/22 1403   Drainage Amount Moderate 05/31/22 1403   Drainage Description Yellow 05/31/22 1403   Odor None 05/31/22 1403   Trudi-wound Assessment Blanchable erythema;Dry/flaky 05/31/22 1403   Margins Defined edges 05/31/22 1403   Wound Thickness Description not for Pressure Injury Full thickness 05/31/22 1403   Number of days: 35                E11.42, L97.522. O45.807          Plan:       SUTURE REMOVAL - GRAFTS ARE  INCORPORATING WELL. MINERAL OIL DRESSING WITH ADAPTIC AND STERIS BILATERAL    [address] 1 WEEK.     Treatment Note please see attached Discharge Instructions

## 2022-06-07 ENCOUNTER — HOSPITAL ENCOUNTER (OUTPATIENT)
Dept: WOUND CARE | Age: 62
Discharge: HOME | End: 2022-06-07
Payer: COMMERCIAL

## 2022-06-07 VITALS
TEMPERATURE: 98.7 F | BODY MASS INDEX: 34.27 KG/M2 | HEART RATE: 76 BPM | DIASTOLIC BLOOD PRESSURE: 66 MMHG | SYSTOLIC BLOOD PRESSURE: 117 MMHG | WEIGHT: 253 LBS | HEIGHT: 72 IN | RESPIRATION RATE: 16 BRPM
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
## 2022-06-07 DIAGNOSIS — L97.512 ULCER OF TOE OF RIGHT FOOT, WITH FAT LAYER EXPOSED (HCC): ICD-10-CM

## 2022-06-07 DIAGNOSIS — L97.524 CHRONIC ULCER OF TOE, LEFT, WITH NECROSIS OF BONE (HCC): ICD-10-CM

## 2022-06-07 DIAGNOSIS — M86.9 OSTEOMYELITIS OF SECOND TOE OF LEFT FOOT (HCC): Primary | ICD-10-CM

## 2022-06-07 DIAGNOSIS — E11.42 DIABETIC POLYNEUROPATHY ASSOCIATED WITH TYPE 2 DIABETES MELLITUS (HCC): ICD-10-CM

## 2022-06-07 PROCEDURE — 11042 DBRDMT SUBQ TIS 1ST 20SQCM/<: CPT

## 2022-06-07 RX ORDER — LIDOCAINE 50 MG/G
OINTMENT TOPICAL ONCE
Status: CANCELLED | OUTPATIENT
Start: 2022-06-07 | End: 2022-06-07

## 2022-06-07 RX ORDER — MAGNESIUM CARB/ALUMINUM HYDROX 105-160MG
30 TABLET,CHEWABLE ORAL
Status: ACTIVE | OUTPATIENT
Start: 2022-06-07 | End: 2022-06-07

## 2022-06-07 RX ORDER — LIDOCAINE 40 MG/G
CREAM TOPICAL ONCE
Status: CANCELLED | OUTPATIENT
Start: 2022-06-07 | End: 2022-06-07

## 2022-06-07 RX ORDER — LIDOCAINE HYDROCHLORIDE 20 MG/ML
JELLY TOPICAL ONCE
Status: CANCELLED | OUTPATIENT
Start: 2022-06-07 | End: 2022-06-07

## 2022-06-07 RX ORDER — GINSENG 100 MG
CAPSULE ORAL ONCE
Status: CANCELLED | OUTPATIENT
Start: 2022-06-07 | End: 2022-06-07

## 2022-06-07 RX ORDER — CLOBETASOL PROPIONATE 0.5 MG/G
OINTMENT TOPICAL ONCE
Status: CANCELLED | OUTPATIENT
Start: 2022-06-07 | End: 2022-06-07

## 2022-06-07 RX ORDER — BETAMETHASONE DIPROPIONATE 0.05 %
OINTMENT (GRAM) TOPICAL ONCE
Status: CANCELLED | OUTPATIENT
Start: 2022-06-07 | End: 2022-06-07

## 2022-06-07 RX ORDER — LIDOCAINE HYDROCHLORIDE 20 MG/ML
JELLY TOPICAL ONCE
Status: COMPLETED | OUTPATIENT
Start: 2022-06-07 | End: 2022-06-07

## 2022-06-07 RX ORDER — BACITRACIN, NEOMYCIN, POLYMYXIN B 400; 3.5; 5 [USP'U]/G; MG/G; [USP'U]/G
OINTMENT TOPICAL ONCE
Status: CANCELLED | OUTPATIENT
Start: 2022-06-07 | End: 2022-06-07

## 2022-06-07 RX ORDER — BACITRACIN ZINC AND POLYMYXIN B SULFATE 500; 1000 [USP'U]/G; [USP'U]/G
OINTMENT TOPICAL ONCE
Status: CANCELLED | OUTPATIENT
Start: 2022-06-07 | End: 2022-06-07

## 2022-06-07 RX ORDER — LIDOCAINE HYDROCHLORIDE 40 MG/ML
SOLUTION TOPICAL ONCE
Status: CANCELLED | OUTPATIENT
Start: 2022-06-07 | End: 2022-06-07

## 2022-06-07 RX ORDER — GENTAMICIN SULFATE 1 MG/G
OINTMENT TOPICAL ONCE
Status: CANCELLED | OUTPATIENT
Start: 2022-06-07 | End: 2022-06-07

## 2022-06-07 RX ADMIN — LIDOCAINE HYDROCHLORIDE 6 ML: 20 JELLY TOPICAL at 13:26

## 2022-06-07 NOTE — PROGRESS NOTES
[address]   Progress Note and Procedure Note      Carri Mahoney  MEDICAL RECORD NUMBER:  [id number]  AGE: 64 y.o. GENDER: male  : 1960  EPISODE DATE:  2022    Subjective:     Chief Complaint   Patient presents with    Wound Check     feet bilaterally         HISTORY of PRESENT ILLNESS HPI     Carri Mahoney is a 64 y.o. male who presents today for wound/ulcer evaluation. History of Wound Context: PT BANGED HER RIGHT SHIN ON A STEP WHEN SHE MISSTEPPED AND FELL FORWARD ONE WEEK AGO. CAME TO [facility] ER AND HAS BEEN APPLYING CITLALY AND DSD. Wound/Ulcer Pain Timing/Severity: intermittent  Quality of pain: burning  Severity:  2 / 10   Associated Signs/Symptoms: edema    Ulcer Identification:  Ulcer Type: venous and traumatic    Contributing Factors: edema    PAST MEDICAL HISTORY        Diagnosis Date    Acquired trigger finger 2018    CAD (coronary artery disease)     NWOCC/ involving coronary bypass graft of native heart with unstable angina pectoris    Cervical spondylosis     CHF (congestive heart failure) ([facility])     Chronic back pain     on 18 pt states is presently in pain mgmnt    Contusion of elbow 2018    Contusion of knee 2018    Contusion of shoulder region 2018    Diabetic foot ulcer with osteomyelitis ([address] 2018    Diabetic ulcer of toe associated with type 2 diabetes mellitus, with fat layer exposed ([address] 2018    Hypercholesterolemia 2017    Hyperlipemia, mixed     Hypertension     Ischemic cardiomyopathy 2018    Non-pressure chronic ulcer of left lower leg with fat layer exposed ([address] 2014    Obesity     Obesity, Class III, BMI 40-49.9 (morbid obesity) ([address] 2015    Obstructive sleep apnea syndrome     Osteoarthritis of carpometacarpal (CMC) joint of thumb 2018    Osteoarthritis of knee 2018    Peripheral vascular disease ([address]     with venous stasis and ulcerations.  Dr Aidan Sun Presence of coronary angioplasty implant and graft 9/11/2009    Sleep apnea     Dr Nikky Gordon of knee and leg 6/5/2018    Sprain of shoulder and upper arm 6/5/2018    Type II or unspecified type diabetes mellitus without mention of complication, not stated as uncontrolled     Ulcer of left foot, with fat layer exposed ([address] 1/9/2018    Unspecified sleep apnea     Dr Stephan Gutierrez CPAP    Venous insufficiency of both lower extremities 1/31/2018       PAST SURGICAL HISTORY    Past Surgical History:   Procedure Laterality Date    ARTHROPLASTY Left 4/1/2022    ARTHROPLASTY 2ND LEFT TOE performed by Hakeem Cole DPM at [address] 3/22/13    Dr. Deepali Ascencio.  COLONOSCOPY  2020    CORONARY ARTERY BYPASS GRAFT  2/26/07    [place], Dr Brook Farah (X 3)    CORONARY ARTERY BYPASS GRAFT  2006    NECK SURGERY  2010    cervical stenoses C5-C6-C7and partial T1 laminectomy with fusion of C6-7    OTHER SURGICAL HISTORY      wound care ulcers of legs (bilaterally) with Dr Christiane Vila Bilateral 5/18/2022    STRAVIX LEFT FOOT, THERASKIN RIGHT FOOT performed by Hakeem Cole DPM at [address] TOE AMPUTATION Left 4/6/2022    TOE AMPUTATION, SECOND DIGIT performed by Hakeem Cole DPM at [address]  age 11   [place]      closure of peripherator veins of legs, Dr LAWSON Raygoza Aas History   Problem Relation Age of Onset    Cancer Mother         lung    Diabetes Father     Heart Disease Father     High Blood Pressure Father     Diabetes Brother     Heart Disease Brother     High Blood Pressure Brother        SOCIAL HISTORY    Social History     Tobacco Use    Smoking status: Never Smoker    Smokeless tobacco: Never Used   Vaping Use    Vaping Use: Never used   Substance Use Topics    Alcohol use: Not Currently     Comment: rare social, non past 5 months    Drug use:  No ALLERGIES    Allergies   Allergen Reactions    Pcn [Penicillins] Other (See Comments)     Unknown rx    Penicillin G Rash       MEDICATIONS    Current Outpatient Medications on File Prior to Encounter   Medication Sig Dispense Refill    carvedilol (COREG) 12.5 MG tablet Take 1 tablet by mouth 2 times daily (with meals) 180 tablet 3    dapagliflozin (FARXIGA) 10 MG tablet Take 1 tablet by mouth every morning 90 tablet 3    traZODone (DESYREL) 150 MG tablet TAKE ONE TABLET BY MOUTH EVERY EVENING 90 tablet 3    linagliptin (TRADJENTA) 5 MG tablet TAKE 1 TABLET BY MOUTH ONE TIME A DAY 90 tablet 3    fentanyl (DURAGESIC) Place 25 patches onto the skin every 72 hours Removes one every 72 hours and has a count of ten      hydroCHLOROthiazide (MICROZIDE) 12.5 MG capsule 1 po qd 90 capsule 3    metFORMIN (GLUCOPHAGE) 1000 MG tablet Take 1 tablet by mouth 2 times daily (with meals) 60 tablet 3    omeprazole (PRILOSEC) 20 MG delayed release capsule TAKE 1 CAPSULE DAILY 90 capsule 1    gabapentin (NEURONTIN) 300 MG capsule Take 900 mg by mouth nightly.  Misc Natural Products (GLUCOSAMINE CHOND MSM FORMULA PO) Take 2 tablets by mouth daily      HYDROcodone-acetaminophen (NORCO)  MG per tablet Take 1 tablet by mouth every 4-6 hours as needed for Pain.       meloxicam (MOBIC) 15 MG tablet Take 1 tablet by mouth daily 90 tablet 3    topiramate (TOPAMAX) 200 MG tablet Take 1 tablet by mouth 2 times daily (Patient taking differently: Take 400 mg by mouth daily ) 60 tablet 5    tiZANidine (ZANAFLEX) 4 MG tablet 1 po qd 14 tablet 3    clopidogrel (PLAVIX) 75 MG tablet Take 1 tablet by mouth daily 90 tablet 3    NEEDLE, DISP, 22 G 22G X 1-1/2\" MISC 1 Device by Does not apply route every 30 days 25 each 1    NEEDLE, DISP, 18 G (BD DISP NEEDLES) 18G X 1-1/2\" MISC 1 Device by Does not apply route every 30 days 25 each 1    testosterone cypionate (DEPOTESTOTERONE CYPIONATE) 200 MG/ML injection       Testosterone Cypionate 200 MG/ML KIT Inject 200 mg into the muscle every 30 days for 30 days. please include syringes 1 kit 5    NEEDLE, DISP, 18 G 18G X 1\" MISC 1 Syringe by Does not apply route every 30 days 25 each 0    NEEDLE, DISP, 22 G 22G X 1-1/2\" MISC 1 Syringe by Does not apply route every 30 days 25 each 0    Multiple Vitamin (MULTI VITAMIN DAILY PO) Take by mouth      nitroGLYCERIN (NITROSTAT) 0.4 MG SL tablet Place 0.4 mg under the tongue      aspirin 325 MG tablet Take 325 mg by mouth daily. No current facility-administered medications on file prior to encounter. REVIEW OF SYSTEMS    Pertinent items are noted in HPI.     Objective:      /66   Pulse 76   Temp 98.7 °F (37.1 °C) (Tympanic)   Resp 16   Ht 6' (1.829 m)   Wt 253 lb (114.8 kg)   BMI 34.31 kg/m²     Wt Readings from Last 3 Encounters:   06/07/22 253 lb (114.8 kg)   05/31/22 253 lb (114.8 kg)   05/24/22 253 lb (114.8 kg)       PHYSICAL EXAM    Wound:         Wound Description: RIGHT PRETIBIA    Integument:  Open lesions present, Right LEG       Vascular:  DP/PT pulses palpable 2/4, Bilateral.    CFT <4 seconds to digits 1-5, Bilateral .   Hair growth present to level of digits, Bilateral.  Edema present, Bilateral. SIIG VARICOSE VEINS NOTED BILATERAL  Erythema absent, Bilateral.     Neurological:  Sensation present to light touch to level of digits, Bilateral.       Musculoskeletal:  Muscle strength 5/5 all LE groups tested, Bilateral.       Assessment:        Active Hospital Problems    Diagnosis Date Noted    Diabetic polyneuropathy associated with type 2 diabetes mellitus ([address] [E11.42] 03/29/2022    Ulcer of toe of right foot, with fat layer exposed ([address] [L97.512] 03/29/2022    Ulcer of left foot, with fat layer exposed ([address] [L97.522] 01/09/2018    Peripheral vascular disease ([address] [I73.9]         Procedure Note  Indications:  Based on my examination of this patient's wound(s)/ulcer(s) today, debridement is required to promote healing and evaluate the wound base. Performed by: Shannon Thomas DPM    Consent obtained:  Yes    Time out taken:  Yes    Pain Control:         Debridement: Excisional Debridement    Using tissue nippers the wound(s)/ulcer(s) was/were sharply debrided down through and including the removal of subcutaneous tissue. Devitalized Tissue Debrided:  fibrin and biofilm    Culture Obtained:  No    Pre Debridement Measurements:  Are located in the [place]  Documentation Flow Sheet    Wound/Ulcer #: 1    Post Debridement Measurements:  Wound/Ulcer Descriptions are Pre Debridement except measurements:    Wound 02/28/22 Toe (Comment  which one) Right 2nd Toe #5 (Active)   Wound Etiology Diabetic Pate 1 05/31/22 1403   Dressing Status New drainage noted; Old drainage noted 05/31/22 1403   Wound Cleansed Cleansed with saline 05/31/22 1403   Offloading for Diabetic Foot Ulcers Offloading ordered;Post op shoe 05/31/22 1403   Wound Length (cm) 1.2 cm 05/31/22 1403   Wound Width (cm) 1.6 cm 05/31/22 1403   Wound Depth (cm) 0.1 cm 05/31/22 1403   Wound Surface Area (cm^2) 1.92 cm^2 05/31/22 1403   Change in Wound Size % (l*w) 37.66 05/31/22 1403   Wound Volume (cm^3) 0.192 cm^3 05/31/22 1403   Wound Healing % 38 05/31/22 1403   Post-Procedure Length (cm) 1.3 cm 05/31/22 1403   Post-Procedure Width (cm) 1.3 cm 05/31/22 1403   Post-Procedure Depth (cm) 0.1 cm 05/31/22 1403   Post-Procedure Surface Area (cm^2) 1.69 cm^2 05/31/22 1403   Post-Procedure Volume (cm^3) 0.169 cm^3 05/31/22 1403   Wound Assessment Slough;Pink/red;Eschar dry 05/31/22 1403   Drainage Amount Moderate 05/31/22 1403   Drainage Description Yellow 05/31/22 1403   Odor None 05/31/22 1403   Trudi-wound Assessment Dry/flaky; Blanchable erythema 05/31/22 1403   Margins Defined edges 05/31/22 1403   Wound Thickness Description not for Pressure Injury Full thickness 05/31/22 1403   Number of days: 99       Wound 04/26/22 Toe (Comment  which one) Left #4 Left 2nd toe amp site (Active)   Wound Etiology Non-Healing Surgical 05/31/22 1403   Dressing Status Old drainage noted;New drainage noted 05/31/22 1403   Wound Cleansed Cleansed with saline 05/31/22 1403   Offloading for Diabetic Foot Ulcers Offloading ordered;Post op shoe 05/31/22 1403   Wound Length (cm) 2 cm 05/31/22 1403   Wound Width (cm) 1.1 cm 05/31/22 1403   Wound Depth (cm) 0.1 cm 05/31/22 1403   Wound Surface Area (cm^2) 2.2 cm^2 05/31/22 1403   Change in Wound Size % (l*w) -41.03 05/31/22 1403   Wound Volume (cm^3) 0.22 cm^3 05/31/22 1403   Wound Healing % 65 05/31/22 1403   Post-Procedure Length (cm) 1.5 cm 05/31/22 1403   Post-Procedure Width (cm) 1 cm 05/31/22 1403   Post-Procedure Depth (cm) 0.1 cm 05/31/22 1403   Post-Procedure Surface Area (cm^2) 1.5 cm^2 05/31/22 1403   Post-Procedure Volume (cm^3) 0.15 cm^3 05/31/22 1403   Wound Assessment Pink/red;Slough;Eschar dry 05/31/22 1403   Drainage Amount Moderate 05/31/22 1403   Drainage Description Yellow 05/31/22 1403   Odor None 05/31/22 1403   Trudi-wound Assessment Blanchable erythema;Dry/flaky 05/31/22 1403   Margins Defined edges 05/31/22 1403   Wound Thickness Description not for Pressure Injury Full thickness 05/31/22 1403   Number of days: 42          Percent of Wound(s)/Ulcer(s) Debrided: 100%    Estimated Blood Loss:  Minimal    Hemostasis Achieved:  by pressure    Procedural Pain:  3  / 10     Post Procedural Pain:  3 / 10     Response to treatment:  Well tolerated by patient. Plan:   EXCISIONAL DEBRIDEMENT WOUND - Chelsea HUERTA. MAINTAIN INTACT    [address] 1 WEEK     Treatment Note please see attached Discharge Instructions    Written patient dismissal instructions given to patient and signed by patient or POA.

## 2022-06-07 NOTE — PROGRESS NOTES
[address]   Progress Note and Procedure Note      Jose Cruz Oliveira  MEDICAL RECORD NUMBER:  [id number]  AGE: 64 y.o. GENDER: male  : 1960  EPISODE DATE:  2022    Subjective:     No chief complaint on file. HISTORY of PRESENT ILLNESS HPI     Jose Cruz Oliveira is a 64 y.o. male who presents today for wound/ulcer evaluation. S/P   2ND LEFT stravix graft x 3 week, 2nd right theraskin graft x 3 week          Ulcer Identification:  Ulcer Type: diabetic  Contributing Factors: diabetes and chronic pressure       22 1204    Specimen Description . TOE    Special Requests . TOE   SECOND LEFT DIGIT    Direct Exam RARE NEUTROPHILS Abnormal     Direct Exam FEW GRAM POSITIVE COCCI IN PAIRS Abnormal     Culture STREPTOCOCCI, BETA HEMOLYTIC GROUP B MODERATE GROWTH Abnormal     Culture  Abnormal   STAPHYLOCOCCUS AUREUS MODERATE GROWTH This isolate is methicillin susceptible. For susceptibility, refer to previous culture. Culture No anaerobic organisms isolated at 5 days.                 PAST MEDICAL HISTORY        Diagnosis Date    Acquired trigger finger 2018    CAD (coronary artery disease)     NWOCC/ involving coronary bypass graft of native heart with unstable angina pectoris    Cervical spondylosis     [facility]congest[facility] failure[facility]     Chronic back pain     on 18 pt states is presently in pain mgmnt    Contusion of elbow 2018    Contusion of knee 2018    Contusion of shoulder region 2018    Diabetic foot ulcer with osteomyelitis ([address] 2018    Diabetic ulcer of toe associated with type 2 diabetes mellitus, with fat layer exposed ([address] 2018    Hypercholesterolemia 2017    Hyperlipemia, mixed     Hypertension     Ischemic cardiomyopathy 2018    Non-pressure chronic ulcer of left lower leg with fat layer exposed ([address] 2014    Obesity     Obesity, Class III, BMI 40-49.9 (morbid obesity) ([address] 2015    Obstructive sleep apnea syndrome     Osteoarthritis of carpometacarpal [place]) joint of thumb 6/5/2018    Osteoarthritis of knee 6/5/2018    Peripheral vascular disease [facility]     with venous stasis and ulcerations. Dr Kalen Faye Presence of coronary angioplasty implant and graft 9/11/2009    Sleep apnea     Dr Flores Ros of knee and leg 6/5/2018    Sprain of shoulder and upper arm 6/5/2018    Type II or unspecified type diabetes mellitus without mention of complication, not stated as uncontrolled     Ulcer of left foot, with fat layer exposed ([address] 1/9/2018    Unspecified sleep apnea     Dr Jordan Hager CPAP    Venous insufficiency of both lower extremities 1/31/2018       PAST SURGICAL HISTORY    Past Surgical History:   Procedure Laterality Date    ARTHROPLASTY Left 4/1/2022    ARTHROPLASTY 2ND LEFT TOE performed by Darwin Ponce DPM at [address] Right 3/22/13    Dr. Delmer Emmanuel.     COLONOSCOPY  2020    CORONARY ARTERY BYPASS GRAFT  2/26/07     TOMA's, Dr Roxann Silverio (X 3)    CORONARY ARTERY BYPASS GRAFT  2006    NECK SURGERY  2010    cervical stenoses C5-C6-C7and partial T1 laminectomy with fusion of C6-7    OTHER SURGICAL HISTORY      wound care ulcers of legs (bilaterally) with Dr Renita Woodruff Bilateral 5/18/2022    STRAVIX LEFT FOOT, THERASKIN RIGHT FOOT performed by Darwin Ponce DPM at [facility] TOE AMPUTATION Left 4/6/2022    TOE AMPUTATION, SECOND DIGIT performed by Darwin Ponce DPM at [facility]  age 11    VEIN SURGERY      closure of peripherator veins of legs, Dr S Mirna Goodell History   Problem Relation Age of Onset    Cancer Mother         lung    Diabetes Father     Heart Disease Father     High Blood Pressure Father     Diabetes Brother     Heart Disease Brother     High Blood Pressure Brother        SOCIAL HISTORY    Social History Tobacco Use    Smoking status: Never Smoker    Smokeless tobacco: Never Used   Vaping Use    Vaping Use: Never used   Substance Use Topics    Alcohol use: Not Currently     Comment: rare social, non past 5 months    Drug use: No       ALLERGIES    Allergies   Allergen Reactions    Pcn [Penicillins] Other (See Comments)     Unknown rx    Penicillin G Rash       MEDICATIONS    Current Outpatient Medications on File Prior to Encounter   Medication Sig Dispense Refill    carvedilol (COREG) 12.5 MG tablet Take 1 tablet by mouth 2 times daily (with meals) 180 tablet 3    dapagliflozin (FARXIGA) 10 MG tablet Take 1 tablet by mouth every morning 90 tablet 3    traZODone (DESYREL) 150 MG tablet TAKE ONE TABLET BY MOUTH EVERY EVENING 90 tablet 3    linagliptin (TRADJENTA) 5 MG tablet TAKE 1 TABLET BY MOUTH ONE TIME A DAY 90 tablet 3    fentanyl (DURAGESIC) Place 25 patches onto the skin every 72 hours Removes one every 72 hours and has a count of ten      hydroCHLOROthiazide (MICROZIDE) 12.5 MG capsule 1 po qd 90 capsule 3    metFORMIN (GLUCOPHAGE) 1000 MG tablet Take 1 tablet by mouth 2 times daily (with meals) 60 tablet 3    omeprazole (PRILOSEC) 20 MG delayed release capsule TAKE 1 CAPSULE DAILY 90 capsule 1    gabapentin (NEURONTIN) 300 MG capsule Take 900 mg by mouth nightly.  Misc Natural Products (GLUCOSAMINE CHOND MSM FORMULA PO) Take 2 tablets by mouth daily      HYDROcodone-acetaminophen (NORCO)  MG per tablet Take 1 tablet by mouth every 4-6 hours as needed for Pain.       meloxicam (MOBIC) 15 MG tablet Take 1 tablet by mouth daily 90 tablet 3    topiramate (TOPAMAX) 200 MG tablet Take 1 tablet by mouth 2 times daily (Patient taking differently: Take 400 mg by mouth daily ) 60 tablet 5    tiZANidine (ZANAFLEX) 4 MG tablet 1 po qd 14 tablet 3    clopidogrel (PLAVIX) 75 MG tablet Take 1 tablet by mouth daily 90 tablet 3    NEEDLE, DISP, 22 G 22G X 1-1/2\" MISC 1 Device by Does not apply route every 30 days 25 each 1    NEEDLE, DISP, 18 G (BD DISP NEEDLES) 18G X 1-1/2\" MISC 1 Device by Does not apply route every 30 days 25 each 1    testosterone cypionate (DEPOTESTOTERONE CYPIONATE) 200 MG/ML injection       Testosterone Cypionate 200 MG/ML KIT Inject 200 mg into the muscle every 30 days for 30 days. please include syringes 1 kit 5    NEEDLE, DISP, 18 G 18G X 1\" MISC 1 Syringe by Does not apply route every 30 days 25 each 0    NEEDLE, DISP, 22 G 22G X 1-1/2\" MISC 1 Syringe by Does not apply route every 30 days 25 each 0    Multiple Vitamin (MULTI VITAMIN DAILY PO) Take by mouth      nitroGLYCERIN (NITROSTAT) 0.4 MG SL tablet Place 0.4 mg under the tongue      aspirin 325 MG tablet Take 325 mg by mouth daily. No current facility-administered medications on file prior to encounter. REVIEW OF SYSTEMS    Review of Systems   Constitutional: Negative for chills and fever. Skin: Positive for wound. Neurological: Negative for numbness. Objective: There were no vitals taken for this visit. Wt Readings from Last 3 Encounters:   05/31/22 253 lb (114.8 kg)   05/24/22 253 lb (114.8 kg)   05/18/22 253 lb 3.2 oz (114.9 kg)       Physical Exam:  General:  Alert and oriented x3. In no acute distress. Lower Extremity Physical Exam:    Vascular: DP pulses are palpable, Bilateral. PT pulses are palpable, Bilateral. CFT <3 seconds to all digits, Bilateral.  No edema, Bilateral.  Hair growth is absent to the level of the digits, Bilateral.     Neuro: Saph/sural/SP/DP/plantar sensation intact to light touch. Musculoskeletal: EHL/FHL/GS/TA gross motor intact. Gross deformity is present, hammertoes bilateral.     Dermatologic: clean wounds bilateral feet with grafts     Procedure: EXCISIONAL DEBRIDEMENT WOUNDS BILATERAL    Wound Location: left AND RIGHT WOUNDS    Lidocaine gel soaked gauze was applied at beginning of wound evaluation.   The wound(s) was excisionally debrided sharply of fibrotic, necrotic, and hyperkeratotic tissue, including a layer of surrounding healthy tissue using curette. The wound was debrided down through and including subcutaneous. Percent of Wound Debrided: 100%    Total Surface Area Debrided:  (see above for sq cm)    Bleeding: Minimal    Patient tolerated procedure well and was given proper instruction. E11.42, L19.622. W99.516          Plan:   EXCISIONAL DEBRIDEMENT WOUNDS - REMOVE SMALL AMOUNTS OF REMAINING GRAFTS THE REST OF  GRAFTS ARE  INCORPORATING WELL. MINERAL OIL DRESSING WITH ADAPTIC AND STERIS BILATERAL    [address] 1 WEEK.     Treatment Note please see attached Discharge Instructions

## 2022-06-08 RX ORDER — CLOPIDOGREL BISULFATE 75 MG/1
75 TABLET ORAL DAILY
Qty: 90 TABLET | Refills: 3 | Status: SHIPPED | OUTPATIENT
Start: 2022-06-08 | End: 2022-09-06

## 2022-06-08 NOTE — TELEPHONE ENCOUNTER
Pat Board is calling to request a refill on the following medication(s):    Last Visit Date (If Applicable):  7/78/9229    Next Visit Date:    Visit date not found    Medication Request:  Requested Prescriptions     Pending Prescriptions Disp Refills    clopidogrel (PLAVIX) 75 MG tablet 90 tablet 3     Sig: Take 1 tablet by mouth daily

## 2022-06-09 ENCOUNTER — OFFICE VISIT (OUTPATIENT)
Dept: PULMONOLOGY | Age: 62
End: 2022-06-09
Payer: COMMERCIAL

## 2022-06-09 ENCOUNTER — HOSPITAL ENCOUNTER (OUTPATIENT)
Dept: MRI IMAGING | Age: 62
Discharge: HOME | End: 2022-06-11
Payer: COMMERCIAL

## 2022-06-09 ENCOUNTER — OFFICE VISIT (OUTPATIENT)
Dept: INFECTIOUS DISEASES | Age: 62
End: 2022-06-09
Payer: COMMERCIAL

## 2022-06-09 VITALS
SYSTOLIC BLOOD PRESSURE: 93 MMHG | DIASTOLIC BLOOD PRESSURE: 56 MMHG | OXYGEN SATURATION: 98 % | HEART RATE: 68 BPM | WEIGHT: 249 LBS | TEMPERATURE: 97 F | BODY MASS INDEX: 33.72 KG/M2 | HEIGHT: 72 IN

## 2022-06-09 VITALS
HEIGHT: 72 IN | DIASTOLIC BLOOD PRESSURE: 58 MMHG | SYSTOLIC BLOOD PRESSURE: 104 MMHG | TEMPERATURE: 97 F | WEIGHT: 249 LBS | OXYGEN SATURATION: 98 % | BODY MASS INDEX: 33.72 KG/M2

## 2022-06-09 DIAGNOSIS — E11.69 DIABETIC FOOT ULCER WITH OSTEOMYELITIS (HCC): ICD-10-CM

## 2022-06-09 DIAGNOSIS — M86.9 OSTEOMYELITIS OF SECOND TOE OF LEFT FOOT (HCC): Primary | ICD-10-CM

## 2022-06-09 DIAGNOSIS — M86.9 DIABETIC FOOT ULCER WITH OSTEOMYELITIS (HCC): ICD-10-CM

## 2022-06-09 DIAGNOSIS — M25.552 PAIN IN JOINT INVOLVING LEFT PELVIC REGION AND THIGH: ICD-10-CM

## 2022-06-09 DIAGNOSIS — G47.30 SLEEP APNEA, UNSPECIFIED TYPE: Primary | ICD-10-CM

## 2022-06-09 DIAGNOSIS — T14.8XXA NON-HEALING NON-SURGICAL WOUND: ICD-10-CM

## 2022-06-09 DIAGNOSIS — L97.509 DIABETIC FOOT ULCER WITH OSTEOMYELITIS (HCC): ICD-10-CM

## 2022-06-09 DIAGNOSIS — E11.621 DIABETIC FOOT ULCER WITH OSTEOMYELITIS (HCC): ICD-10-CM

## 2022-06-09 PROCEDURE — 99213 OFFICE O/P EST LOW 20 MIN: CPT | Performed by: INTERNAL MEDICINE

## 2022-06-09 PROCEDURE — 3052F HG A1C>EQUAL 8.0%<EQUAL 9.0%: CPT | Performed by: INTERNAL MEDICINE

## 2022-06-09 PROCEDURE — 73721 MRI JNT OF LWR EXTRE W/O DYE: CPT

## 2022-06-09 PROCEDURE — 99214 OFFICE O/P EST MOD 30 MIN: CPT | Performed by: INTERNAL MEDICINE

## 2022-06-09 RX ORDER — DIAZEPAM 5 MG/1
5 TABLET ORAL ONCE
COMMUNITY
Start: 2022-06-02 | End: 2022-07-14

## 2022-06-09 ASSESSMENT — SLEEP AND FATIGUE QUESTIONNAIRES
HOW LIKELY ARE YOU TO NOD OFF OR FALL ASLEEP IN A CAR, WHILE STOPPED FOR A FEW MINUTES IN TRAFFIC: 0
HOW LIKELY ARE YOU TO NOD OFF OR FALL ASLEEP WHILE SITTING QUIETLY AFTER LUNCH WITHOUT ALCOHOL: 1
HOW LIKELY ARE YOU TO NOD OFF OR FALL ASLEEP WHILE WATCHING TV: 0
HOW LIKELY ARE YOU TO NOD OFF OR FALL ASLEEP WHILE SITTING INACTIVE IN A PUBLIC PLACE: 0
HOW LIKELY ARE YOU TO NOD OFF OR FALL ASLEEP WHEN YOU ARE A PASSENGER IN A CAR FOR AN HOUR WITHOUT A BREAK: 1
HOW LIKELY ARE YOU TO NOD OFF OR FALL ASLEEP WHILE SITTING AND TALKING TO SOMEONE: 0
ESS TOTAL SCORE: 4
HOW LIKELY ARE YOU TO NOD OFF OR FALL ASLEEP WHILE SITTING AND READING: 0
HOW LIKELY ARE YOU TO NOD OFF OR FALL ASLEEP WHILE LYING DOWN TO REST IN THE AFTERNOON WHEN CIRCUMSTANCES PERMIT: 2

## 2022-06-09 NOTE — PROGRESS NOTES
[facility]  Office Progress Note  Today's Date and Time: 6/9/2022, 11:01 AM    Impression:     1. Osteomyelitis of second toe of left foot ([facility]    2. Diabetic foot ulcer with osteomyelitis ([facility] [address]         Recommendations   Prior osteomyelitis of the left foot second toe has been treated adequately with toe amputation and did have a nonhealing wound which has undergone debridement and grafting and is now closing  The right foot second toe dorsal ulceration for which she continues with local wound care and also has undergone grafting  On reviewing the pictures there are no findings to suggest residual infection at this time  I would continue local wound care and infectious disease wise the patient does not need to follow-up with me but if any questions arise the patient can be sent to see me in the office. Will continue to follow with Dr. Bishop Rivers from podiatry who also was doing the wound care. I have ordered the following medications/ labs:  No orders of the defined types were placed in this encounter. No orders of the defined types were placed in this encounter. Chief complaint/reason for consultation:     Chief Complaint   Patient presents with    Osteomyelitis      follow up - second toe of left foot & second toe of right foot         History of Present Illness:   Glen Rey is a 64y.o.-year-old male who I am seeing in the office in follow-up. He has a history of a left second toe ulceration with exposure of bone for which he underwent partial resection of the proximal phalanx of the second digit on 4/1/2022 and he did subsequently undergo a amputation of the second digit 4/6/22 and the surgical cultures grew Staph aureus and group B streptococcus. He did have MSSA sepsis at that time and was discharged on IV antimicrobial therapy with Rocephin which he took for 4 weeks.     He was seen in the office in early May and there was concern for residual infection at the amputation site the left second toe and he was placed on oral antibiotics with Duricef for 14 days at that time. This that time the patient has undergone further wound care and at the eye of Dr. Jeana Lake. He did undergo left foot and right foot wound prepped with application of skin substitute graft in the right and left foot STRAVIX and TheraSkin respectively. Has continued with weekly wound care and the grafts have been felt to be incorporating well    Comes in today and is otherwise doing well does not report any new issues or concerns. No fevers or chills no cough or shortness of breath no chest pains or palpitations. He does have dressings in place which were not removed and we were able to review the pictures from the wound care center. I have personally reviewedthe past medical history, medications, social history, and I have updated the database accordingly.   Past Medical History:     Past Medical History:   Diagnosis Date    Acquired trigger finger 6/5/2018    CAD (coronary artery disease)     NWOCC/ involving coronary bypass graft of native heart with unstable angina pectoris    Cervical spondylosis     CHF (congestive heart failure) ([facility])     Chronic back pain     on 11/19/18 pt states is presently in pain mgmnt    Contusion of elbow 6/5/2018    Contusion of knee 6/5/2018    Contusion of shoulder region 6/5/2018    Diabetic foot ulcer with osteomyelitis ([address] 1/9/2018    Diabetic ulcer of toe associated with type 2 diabetes mellitus, with fat layer exposed ([address] 1/31/2018    Hypercholesterolemia 12/5/2017    Hyperlipemia, mixed     Hypertension     Ischemic cardiomyopathy 5/7/2018    Non-pressure chronic ulcer of left lower leg with fat layer exposed ([address] 6/9/2014    Obesity     Obesity, Class III, BMI 40-49.9 (morbid obesity) ([address] 4/6/2015    Obstructive sleep apnea syndrome     Osteoarthritis of carpometacarpal (CMC) joint of thumb 6/5/2018    Osteoarthritis of knee 6/5/2018    Peripheral vascular disease ([facility] [address] [facility]     with venous stasis and ulcerations. Dr Min Sharp    Presence of coronary angioplasty implant and graft 9/11/2009    Sleep apnea     Dr Stephanie Torres of knee and leg 6/5/2018    Sprain of shoulder and upper arm 6/5/2018    Type II or unspecified type diabetes mellitus without mention of complication, not stated as uncontrolled     Ulcer of left foot, with fat layer exposed ([facility] [facility] 1/9/2018    Unspecified sleep apnea     Dr Hernández Ashley CPAP    Venous insufficiency of both lower extremities 1/31/2018     Medications:     Current Outpatient Medications   Medication Sig Dispense Refill    diazePAM (VALIUM) 5 MG tablet       clopidogrel (PLAVIX) 75 MG tablet Take 1 tablet by mouth daily 90 tablet 3    carvedilol (COREG) 12.5 MG tablet Take 1 tablet by mouth 2 times daily (with meals) 180 tablet 3    dapagliflozin (FARXIGA) 10 MG tablet Take 1 tablet by mouth every morning 90 tablet 3    traZODone (DESYREL) 150 MG tablet TAKE ONE TABLET BY MOUTH EVERY EVENING 90 tablet 3    linagliptin (TRADJENTA) 5 MG tablet TAKE 1 TABLET BY MOUTH ONE TIME A DAY 90 tablet 3    hydroCHLOROthiazide (MICROZIDE) 12.5 MG capsule 1 po qd 90 capsule 3    metFORMIN (GLUCOPHAGE) 1000 MG tablet Take 1 tablet by mouth 2 times daily (with meals) 60 tablet 3    omeprazole (PRILOSEC) 20 MG delayed release capsule TAKE 1 CAPSULE DAILY 90 capsule 1    gabapentin (NEURONTIN) 300 MG capsule Take 900 mg by mouth nightly.        HYDROcodone-acetaminophen (NORCO)  MG per tablet Take 1 tablet by mouth every 4-6 hours as needed for Pain.      meloxicam (MOBIC) 15 MG tablet Take 1 tablet by mouth daily 90 tablet 3    tiZANidine (ZANAFLEX) 4 MG tablet 1 po qd 14 tablet 3    NEEDLE, DISP, 18 G (BD DISP NEEDLES) 18G X 1-1/2\" MISC 1 Device by Does not apply route every 30 days 25 each 1    Multiple Vitamin (MULTI VITAMIN DAILY PO) Take by mouth      nitroGLYCERIN (NITROSTAT) 0.4 MG SL tablet Place 0.4 mg under the tongue      aspirin 325 MG tablet Take 325 mg by mouth daily. Misc Natural Products (GLUCOSAMINE CHOND MSM FORMULA PO) Take 2 tablets by mouth daily       No current facility-administered medications for this visit. Allergies:   Pcn [penicillins] and Penicillin g     Review of Systems:   Review of Systems   Constitutional: Negative. HENT: Negative. Respiratory: Negative. Cardiovascular: Negative. Gastrointestinal: Negative. Genitourinary: Negative. Musculoskeletal: Negative. Neurological: Negative. Psychiatric/Behavioral: Negative. Physical Examination :   BP (!) 93/56 (Site: Left Upper Arm, Position: Sitting, Cuff Size: Medium Adult)   Pulse 68   Temp 97 °F (36.1 °C) (Temporal)   Ht 6' (1.829 m)   Wt 249 lb (112.9 kg)   SpO2 98%   BMI 33.77 kg/m²     Physical Exam  Constitutional:       Appearance: He is well-developed. HENT:      Head: Normocephalic and atraumatic. Cardiovascular:      Rate and Rhythm: Normal rate. Heart sounds: Normal heart sounds. No friction rub. No gallop. Pulmonary:      Effort: Pulmonary effort is normal.      Breath sounds: Normal breath sounds. No wheezing. Abdominal:      General: Bowel sounds are normal.      Palpations: Abdomen is soft. There is no mass. Tenderness: There is no abdominal tenderness. Musculoskeletal:         General: Normal range of motion. Cervical back: Normal range of motion and neck supple. Comments: Dressings in the right and left foot which were not removed but the pictures have been inserted above. Lymphadenopathy:      Cervical: No cervical adenopathy. Skin:     General: Skin is warm and dry. Neurological:      Mental Status: He is alert and oriented to person, place, and time.                Laboratory studies :  Medical Decision Making:   I have independently reviewed the following labs:  CBC with Differential:  Lab Results   Component Value Date    WBC 6.9 04/15/2022    WBC 12.5 04/05/2022    HGB 11.5 04/15/2022    HGB 12.5 04/05/2022    HCT 36.4 04/15/2022    HCT 41.3 04/05/2022     04/15/2022     04/05/2022    LYMPHOPCT 34 04/15/2022    LYMPHOPCT 17 04/05/2022    MONOPCT 7 04/15/2022    MONOPCT 8 04/05/2022       BMP:  Lab Results   Component Value Date     04/15/2022     04/06/2022    K 4.2 04/15/2022    K 3.5 04/06/2022     04/15/2022     04/06/2022    CO2 23 04/15/2022    CO2 20 04/06/2022    BUN 16 04/15/2022    BUN 16 04/06/2022    CREATININE 0.81 04/15/2022    CREATININE 0.89 04/06/2022    MG 1.9 04/06/2022       Hepatic Function Panel:   Lab Results   Component Value Date    PROT 7.2 06/19/2021    PROT 7.4 06/01/2020    LABALBU 4.1 03/23/2022    LABALBU 4.2 06/19/2021    BILIDIR 0.09 05/12/2018    IBILI 0.23 05/12/2018    BILITOT 0.28 06/19/2021    BILITOT 0.28 06/01/2020    ALKPHOS 71 06/19/2021    ALKPHOS 66 06/01/2020    ALT 15 06/19/2021    ALT 16 06/01/2020    AST 12 06/19/2021    AST 13 06/01/2020       Lab Results   Component Value Date    CRP 4.2 04/15/2022    .9 04/08/2022     Lab Results   Component Value Date    SEDRATE 85 04/06/2022    SEDRATE 32 04/05/2022       Thank you for allowing us to participate in the care of this patient. Pleasecall with questions. Subha Guzman MD  [employer] messaging: [phone]    This note is created with the assistance of a speech recognition program.  While intending to generate a document that actually reflects the content ofthe visit, the document can still have some errors including those of syntax and sound a like substitutions which may escape proof reading. It such instances, actual meaning can be extrapolated by contextual diversion.

## 2022-06-09 NOTE — PROGRESS NOTES
Alcides Matah  6/9/2022    Chief Complaint   Patient presents with    1 Year Follow Up     Sleep Apnea    Patient is known to have obstructive sleep apnea syndrome which is being treated with BiPAP. He uses the BiPAP regularly for 6 hours every night. Unfortunately his sleep is very much disturbed because of arthritis in the hip and cellulitis of the both lower limbs. This keeps on waking him up during the night this is disturbing his sleep. According the patient his BiPAP is very effective in controlling his symptoms otherwise. He feels tired and fatigued during the daytime and he attributes this all due to the pain induced frequent awakenings. He is very happy with BiPAP. He did require a new supplies for which a prescription was written. Is known to have systemic hypertension that is under good control. He has no history of angina or coronary artery disease or periodic or any PND. Sugar is also stable. He does have peripheral vascular disease secondary to hypertension and diabetes. He also has hyperlipidemia. Patient also cervical spondylosis which also causes pain and interferes with his sleep. He have had COVID-vaccine. An Merlin Sleepiness Scale given to the patient in the office revealed a score of 4. [facility] [place]  .   Sleep Medicine 6/9/2022 4/15/2021 10/12/2020 9/17/2020 12/2/2019 7/8/2019 8/13/2018   Sitting and reading 0 0 0 0 0 1 0   Watching TV 0 0 0 0 1 1 0   Sitting, inactive in a public place (e.g. a theatre or a meeting) 0 0 0 0 1 0 1   As a passenger in a car for an hour without a break 1 0 0 (No Data) 1 0 0   Lying down to rest in the afternoon when circumstances permit 2 0 3 1 2 3 3   Sitting and talking to someone 0 0 0 0 0 0 0   Sitting quietly after a lunch without alcohol 1 0 0 0 1 3 0   In a car, while stopped for a few minutes in traffic 0 0 0 0 0 0 0   Total score 4 0 3 - 6 8 4                   Review of Systems -as assistant were conductive for all other system including upper and lower extremities and no additional information was obtained. General ROS: negative for - chills, fatigue, fever or weight loss  ENT ROS: negative for - headaches, oral lesions or sore throat  Cardiovascular ROS: no chest pain , orthopnea or pnd   Gastrointestinal ROS: no abdominal pain, change in bowel habits, or black or bloody stools  Skin - no rash   Neuro - no blurry vision , no loc . No focal weakness   msk - no jt tenderness or swelling    Vascular - no claudication , rest completed and negative   Lymphatic - complete and negative   Hematology - oncology - complete and negative   Allergy immunology - complete and negative    no burning or hematuria       LUNG CANCER SCREENING     1. CRITERIA MET    []     CT ORDERED  []      2. CRITERIA NOT MET   [x]      3. REFUSED                    []        REASON CRITERIA NOT MET     1. SMOKING LESS THAN 30 PY  []      2. AGE LESS THAN 55 or GREATER 77 YEARS  []      3. QUIT SMOKING 15 YEARS OR GREATER   [x]      4. RECENT CT WITH IN 11 MONTHS    []      5. LIFE EXPECTANCY < 5 YEARS   []      6. SIGNS  AND SYMPTOMS OF LUNG CANCER   []           Immunization   Immunization History   Administered Date(s) Administered    COVID-19, Sanya Soares, Primary or Immunocompromised, PF, 100mcg/0.5mL 03/11/2021, 03/11/2021, 04/08/2021, 04/08/2021, 01/15/2022, 01/15/2022    Influenza Virus Vaccine 11/22/2006    Td, unspecified formulation 05/18/2001    Tdap (Boostrix, Adacel) 02/22/2013, 12/06/2014, 04/02/2017, 12/03/2019        Pneumococcal Vaccine refused vaccination for influenza and Pneumovax.   [] Up to date    [] Indicated   [x] Refused  [] Contraindicated       Influenza Vaccine   [] Up to date    [] Indicated   [x] Refused  [] Contraindicated       PAST MEDICAL HISTORY:         Diagnosis Date    Acquired trigger finger 6/5/2018    CAD (coronary artery disease)     [facility]/ involving coronary bypass graft of native heart with unstable angina pectoris    Cervical spondylosis     CH[facility]congest[facility] heart failure[facility]     Chronic back pain     on 11/19/18 pt states is presently in pain mgmnt    Contusion of elbow 6/5/2018    Contusion of knee 6/5/2018    Contusion of shoulder region 6/5/2018    Diabetic foot ulcer with osteomyelitis ([address] 1/9/2018    Diabetic ulcer of toe associated with type 2 diabetes mellitus, with fat layer exposed ([address] 1/31/2018    Hypercholesterolemia 12/5/2017    Hyperlipemia, mixed     Hypertension     Ischemic cardiomyopathy 5/7/2018    Non-pressure chronic ulcer of left lower leg with fat layer exposed ([address] 6/9/2014    Obesity     Obesity, Class III, BMI 40-49.9 (morbid obesity) ([address] 4/6/2015    Obstructive sleep apnea syndrome     Osteoarthritis of carpometacarpal (CMC) joint of thumb 6/5/2018    Osteoarthritis of knee 6/5/2018    Peripheral vascular disease ([address]     with venous stasis and ulcerations. Dr Daija Bojorquez Presence of coronary angioplasty implant and graft 9/11/2009    Sleep apnea     Dr Junior Pizarro of knee and leg 6/5/2018    Sprain of shoulder and upper arm 6/5/2018    Type II or unspecified type diabetes mellitus without mention of complication, not stated as uncontrolled     Ulcer of left foot, with fat layer exposed ([address] 1/9/2018    Unspecified sleep apnea     Dr Monserrat Chan CPAP    Venous insufficiency of both lower extremities 1/31/2018       Family History:       Problem Relation Age of Onset    Cancer Mother         lung    Diabetes Father     Heart Disease Father     High Blood Pressure Father     Diabetes Brother     Heart Disease Brother     High Blood Pressure Brother        SURGICAL HISTORY:   Past Surgical History:   Procedure Laterality Date    ARTHROPLASTY Left 4/1/2022    ARTHROPLASTY 2ND LEFT TOE performed by Wiley Cruz DPM at [address] Right 3/22/13    Dr. Bobbi Mcmillan.     COLONOSCOPY  2020    [facility]    CORONARY ARTERY BYPASS GRAFT  2006    NECK SURGERY  2010    cervical stenoses C5-C6-C7and partial T1 laminectomy with fusion of C6-7    OTHER SURGICAL HISTORY      wound care ulcers of legs (bilaterally) with Dr Krysten Reynolds Bilateral 5/18/2022    STRAVIX LEFT FOOT, THERASKIN RIGHT FOOT performed by Mai Casillas DPM at [address] [facility] TOE AMPUTATION Left 4/6/2022    TOE AMPUTATION, SECOND DIGIT performed by Mai Casillas DPM at [address] [facility]  age 11   [place]      closure of peripherator veins of legs, Dr Alcides Frances                Not in a hospital admission. Allergies   Allergen Reactions    Pcn [Penicillins] Other (See Comments)     Unknown rx    Penicillin G Rash     Social History     Tobacco Use   Smoking Status Never Smoker   Smokeless Tobacco Never Used     Prior to Admission medications    Medication Sig Start Date End Date Taking?  Authorizing Provider   diazePAM (VALIUM) 5 MG tablet  6/2/22  Yes Historical Provider, MD   clopidogrel (PLAVIX) 75 MG tablet Take 1 tablet by mouth daily 6/8/22 9/6/22 Yes Seema Hameed, DO   carvedilol (COREG) 12.5 MG tablet Take 1 tablet by mouth 2 times daily (with meals) 5/26/22 8/24/22 Yes Seema Hameed DO   dapagliflozin (FARXIGA) 10 MG tablet Take 1 tablet by mouth every morning 5/12/22  Yes Seema Hameed DO   traZODone (DESYREL) 150 MG tablet TAKE ONE TABLET BY MOUTH EVERY EVENING 5/12/22  Yes Seema Hameed DO   linagliptin (TRADJENTA) 5 MG tablet TAKE 1 TABLET BY MOUTH ONE TIME A DAY 5/10/22  Yes Seema Hameed DO   hydroCHLOROthiazide (MICROZIDE) 12.5 MG capsule 1 po qd 4/18/22  Yes Seema Hameed DO   metFORMIN (GLUCOPHAGE) 1000 MG tablet Take 1 tablet by mouth 2 times daily (with meals) 3/31/22 6/9/22 Yes Seema Hameed DO   omeprazole (PRILOSEC) 20 MG delayed release capsule TAKE 1 CAPSULE DAILY 3/31/22  Yes Seema Hameed DO gabapentin (NEURONTIN) 300 MG capsule Take 900 mg by mouth nightly. Yes Historical Provider, MD   Misc Natural Products (GLUCOSAMINE CHOND MSM FORMULA PO) Take 2 tablets by mouth daily   Yes Historical Provider, MD   HYDROcodone-acetaminophen (NORCO)  MG per tablet Take 1 tablet by mouth every 4-6 hours as needed for Pain. Yes Historical Provider, MD   meloxicam (MOBIC) 15 MG tablet Take 1 tablet by mouth daily 3/10/22 6/9/22 Yes Seema Hameed DO   tiZANidine (ZANAFLEX) 4 MG tablet 1 po qd 1/20/22  Yes Seema Hameed DO   NEEDLE, DISP, 18 G (BD DISP NEEDLES) 18G X 1-1/2\" MISC 1 Device by Does not apply route every 30 days 6/29/21  Yes Seema Hameed DO   Multiple Vitamin (MULTI VITAMIN DAILY PO) Take by mouth   Yes Historical Provider, MD   nitroGLYCERIN (NITROSTAT) 0.4 MG SL tablet Place 0.4 mg under the tongue   Yes Historical Provider, MD   aspirin 325 MG tablet Take 325 mg by mouth daily. Yes Historical Provider, MD         Physical Exam  General Appearance:    Alert, cooperative, no distress, appears stated age, Overweight, obese. No respiratory distress. He is seeing looks like his age. Head:    Normocephalic, without obvious abnormality, atraumatic   Eye examination revealed no sign and jaundice. No Alesia's s. No Sinus Tenderness Is Noted     Does reveal no polyps    Throat examination unremarkable.          :    Neck:   Supple, symmetrical, trachea midline, no adenopathy;     thyroid:  no enlargement/tenderness/nodules; no carotid    bruit or JVD. Short fat neck    Back:     Symmetric, no curvature, ROM normal, no CVA tenderness   Lungs:       Air entry on both sides because of obesity. No rales or rhonchi notable upper friction rub audible.   Percussion note is impaired due to obesity    Chest Wall:    No tenderness or deformity      Heart:    Regular rate and rhythm, S1 and S2 normal, no murmur, rub        or gallop no rvh                           Abdomen: Pulses:                                            Lymph nodes:                    Neurologic:                  Soft, non-tender, bowel sounds active all four quadrants,     no masses, no organomegaly         2+ and symmetric all extremities            Cervical, supraclavicular not enlarged or matted or tender      CNII-XII intact, normal strength 5/5 . Sensation grossly normal  and reflexes normal 2+  throughout     Clubbing No  Lower ext edema No1+   [x] , 2 +  [] , 3+   [] bilateral cellulitis being evaluated by the ID service upper ext edema No       Musculoskeletal - no joint swelling or tenderness or synovitis               BP (!) 104/58   Temp 97 °F (36.1 °C)   Ht 6' (1.829 m)   Wt 249 lb (112.9 kg)   SpO2 98%   BMI 33.77 kg/m²             Assessment  Systemic hypertension   obstructive sleep apnea syndrome  Obesity  Diabetes  Arthritis of the hip joints more on the left and right  Cellulitis of both lower limbs over the feet and lower legs  Plan:  Sleep apnea responding well to the use of BiPAP. However because the pain he continues to have very frequent awakening at night which does interfere with the quality of sleep. However patient is very satisfied with the use of BiPAP however. I did give him a prescription for BiPAP supplies. Was encouraged to lose weight. A suggested to him that he should consult orthopedics for looking into his hip problems. He may need hip replacement surgery. Continue the treatment of diabetes as before seems to be under good control    Continue treatment of hypertension which is also under good control. I encouraged him to lose weight. Continue to follow-up with the ID for the lower leg cellulitis and involvement of the toes and feet. He already had COVID-vaccine.     Dictated with Dr. Anika Hyde

## 2022-06-13 ENCOUNTER — TELEPHONE (OUTPATIENT)
Dept: FAMILY MEDICINE CLINIC | Age: 62
End: 2022-06-13

## 2022-06-13 DIAGNOSIS — M25.552 HIP PAIN, LEFT: Primary | ICD-10-CM

## 2022-06-13 DIAGNOSIS — S72.002A CLOSED FRACTURE OF LEFT HIP, INITIAL ENCOUNTER (HCC): Primary | ICD-10-CM

## 2022-06-13 NOTE — TELEPHONE ENCOUNTER
Patient stated he asked 2 months ago for a referral to Dr Luz Maria Newell. He needs referral put in asap for left hip fracture. Referral pended.

## 2022-06-14 ENCOUNTER — HOSPITAL ENCOUNTER (OUTPATIENT)
Dept: WOUND CARE | Age: 62
Discharge: HOME | End: 2022-06-14
Payer: COMMERCIAL

## 2022-06-14 ENCOUNTER — TELEPHONE (OUTPATIENT)
Dept: FAMILY MEDICINE CLINIC | Age: 62
End: 2022-06-14

## 2022-06-14 VITALS
SYSTOLIC BLOOD PRESSURE: 140 MMHG | BODY MASS INDEX: 33.72 KG/M2 | TEMPERATURE: 97.5 F | WEIGHT: 249 LBS | DIASTOLIC BLOOD PRESSURE: 65 MMHG | RESPIRATION RATE: 18 BRPM | HEIGHT: 72 IN | HEART RATE: 72 BPM

## 2022-06-14 DIAGNOSIS — E78.2 HYPERLIPEMIA, MIXED: Primary | ICD-10-CM

## 2022-06-14 DIAGNOSIS — D50.8 IRON DEFICIENCY ANEMIA SECONDARY TO INADEQUATE DIETARY IRON INTAKE: ICD-10-CM

## 2022-06-14 DIAGNOSIS — M86.9 OSTEOMYELITIS OF SECOND TOE OF LEFT FOOT (HCC): Primary | ICD-10-CM

## 2022-06-14 DIAGNOSIS — I10 ESSENTIAL HYPERTENSION: ICD-10-CM

## 2022-06-14 DIAGNOSIS — N42.9 PROSTATE DISORDER: ICD-10-CM

## 2022-06-14 DIAGNOSIS — L97.524 CHRONIC ULCER OF TOE, LEFT, WITH NECROSIS OF BONE (HCC): ICD-10-CM

## 2022-06-14 DIAGNOSIS — F40.232 FEAR OF OTHER MEDICAL CARE: Primary | ICD-10-CM

## 2022-06-14 DIAGNOSIS — I73.9 PERIPHERAL VASCULAR DISEASE (HCC): ICD-10-CM

## 2022-06-14 DIAGNOSIS — I50.9 CONGESTIVE HEART FAILURE, UNSPECIFIED HF CHRONICITY, UNSPECIFIED HEART FAILURE TYPE (HCC): Primary | ICD-10-CM

## 2022-06-14 DIAGNOSIS — L03.115 CELLULITIS OF RIGHT LOWER EXTREMITY: ICD-10-CM

## 2022-06-14 DIAGNOSIS — L97.512 ULCER OF TOE OF RIGHT FOOT, WITH FAT LAYER EXPOSED (HCC): ICD-10-CM

## 2022-06-14 DIAGNOSIS — E11.42 DIABETIC POLYNEUROPATHY ASSOCIATED WITH TYPE 2 DIABETES MELLITUS (HCC): ICD-10-CM

## 2022-06-14 DIAGNOSIS — F40.232 FEAR OF OTHER MEDICAL CARE: ICD-10-CM
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
## 2022-06-14 DIAGNOSIS — E11.69 TYPE 2 DIABETES MELLITUS WITH OTHER SPECIFIED COMPLICATION, WITHOUT LONG-TERM CURRENT USE OF INSULIN (HCC): ICD-10-CM

## 2022-06-14 DIAGNOSIS — R53.83 FATIGUE, UNSPECIFIED TYPE: ICD-10-CM

## 2022-06-14 PROBLEM — L03.90 CELLULITIS: Status: ACTIVE | Noted: 2022-06-14

## 2022-06-14 PROCEDURE — 87070 CULTURE OTHR SPECIMN AEROBIC: CPT

## 2022-06-14 PROCEDURE — 11042 DBRDMT SUBQ TIS 1ST 20SQCM/<: CPT

## 2022-06-14 PROCEDURE — 87205 SMEAR GRAM STAIN: CPT

## 2022-06-14 RX ORDER — DOXYCYCLINE HYCLATE 100 MG
100 TABLET ORAL 2 TIMES DAILY
Qty: 20 TABLET | Refills: 0 | Status: SHIPPED | OUTPATIENT
Start: 2022-06-14 | End: 2022-06-24

## 2022-06-14 RX ORDER — LIDOCAINE HYDROCHLORIDE 20 MG/ML
JELLY TOPICAL ONCE
Status: CANCELLED | OUTPATIENT
Start: 2022-06-14 | End: 2022-06-14

## 2022-06-14 RX ORDER — BACITRACIN, NEOMYCIN, POLYMYXIN B 400; 3.5; 5 [USP'U]/G; MG/G; [USP'U]/G
OINTMENT TOPICAL ONCE
Status: CANCELLED | OUTPATIENT
Start: 2022-06-14 | End: 2022-06-14

## 2022-06-14 RX ORDER — DIAZEPAM 10 MG/1
TABLET ORAL
Qty: 2 TABLET | Refills: 0 | Status: SHIPPED | OUTPATIENT
Start: 2022-06-14 | End: 2022-06-15

## 2022-06-14 RX ORDER — BETAMETHASONE DIPROPIONATE 0.05 %
OINTMENT (GRAM) TOPICAL ONCE
Status: CANCELLED | OUTPATIENT
Start: 2022-06-14 | End: 2022-06-14

## 2022-06-14 RX ORDER — GENTAMICIN SULFATE 1 MG/G
OINTMENT TOPICAL ONCE
Status: CANCELLED | OUTPATIENT
Start: 2022-06-14 | End: 2022-06-14

## 2022-06-14 RX ORDER — CLOBETASOL PROPIONATE 0.5 MG/G
OINTMENT TOPICAL ONCE
Status: CANCELLED | OUTPATIENT
Start: 2022-06-14 | End: 2022-06-14

## 2022-06-14 RX ORDER — DIAZEPAM 10 MG/1
TABLET ORAL
Qty: 2 TABLET | Refills: 0 | Status: SHIPPED | OUTPATIENT
Start: 2022-06-14 | End: 2022-06-14 | Stop reason: SDUPTHER

## 2022-06-14 RX ORDER — LIDOCAINE 50 MG/G
OINTMENT TOPICAL ONCE
Status: CANCELLED | OUTPATIENT
Start: 2022-06-14 | End: 2022-06-14

## 2022-06-14 RX ORDER — LIDOCAINE HYDROCHLORIDE 40 MG/ML
SOLUTION TOPICAL ONCE
Status: CANCELLED | OUTPATIENT
Start: 2022-06-14 | End: 2022-06-14

## 2022-06-14 RX ORDER — LIDOCAINE 40 MG/G
CREAM TOPICAL ONCE
Status: CANCELLED | OUTPATIENT
Start: 2022-06-14 | End: 2022-06-14

## 2022-06-14 RX ORDER — LIDOCAINE HYDROCHLORIDE 20 MG/ML
JELLY TOPICAL ONCE
Status: COMPLETED | OUTPATIENT
Start: 2022-06-14 | End: 2022-06-14

## 2022-06-14 RX ORDER — GINSENG 100 MG
CAPSULE ORAL ONCE
Status: CANCELLED | OUTPATIENT
Start: 2022-06-14 | End: 2022-06-14

## 2022-06-14 RX ORDER — BACITRACIN ZINC AND POLYMYXIN B SULFATE 500; 1000 [USP'U]/G; [USP'U]/G
OINTMENT TOPICAL ONCE
Status: CANCELLED | OUTPATIENT
Start: 2022-06-14 | End: 2022-06-14

## 2022-06-14 RX ADMIN — LIDOCAINE HYDROCHLORIDE 6 ML: 20 JELLY TOPICAL at 13:17

## 2022-06-14 NOTE — TELEPHONE ENCOUNTER
Patient would like to know is he due for any labs , if so would like to get the order put in for Eve Pencil .  Please advise Thank you

## 2022-06-14 NOTE — TELEPHONE ENCOUNTER
I ordered all of his labs Dr. Don Lopez informed me he was going to be ordering them so I attached what else he needed  He does need to be fasting though

## 2022-06-14 NOTE — PROGRESS NOTES
[address]   Progress Note and Procedure Note      Cate Carranza  MEDICAL RECORD NUMBER:  [id number]  AGE: 64 y.o. GENDER: male  : 1960  EPISODE DATE:  2022    Subjective:     Chief Complaint   Patient presents with    Wound Check     left and right foot         HISTORY of PRESENT ILLNESS HPI     Cate Carranza is a 64 y.o. male who presents today for wound/ulcer evaluation. S/P   2ND LEFT stravix graft x 4 week, 2nd right theraskin graft x 4 week        Ulcer Identification:  Ulcer Type: diabetic  Contributing Factors: diabetes and chronic pressure       22 1204    Specimen Description . TOE    Special Requests . TOE   SECOND LEFT DIGIT    Direct Exam RARE NEUTROPHILS Abnormal     Direct Exam FEW GRAM POSITIVE COCCI IN PAIRS Abnormal     Culture STREPTOCOCCI, BETA HEMOLYTIC GROUP B MODERATE GROWTH Abnormal     Culture  Abnormal   STAPHYLOCOCCUS AUREUS MODERATE GROWTH This isolate is methicillin susceptible. For susceptibility, refer to previous culture. Culture No anaerobic organisms isolated at 5 days.                 PAST MEDICAL HISTORY        Diagnosis Date    Acquired trigger finger 2018    CAD (coronary artery disease)     [facility]/ involving coronary bypass graft of native heart with unstable angina pectoris    Cervical spondylosis     [facility]cong[facility])     Chronic back pain     on 18 pt states is presently in pain mgmnt    Contusion of elbow 2018    Contusion of knee 2018    Contusion of shoulder region 2018    Diabetic foot ulcer with osteomyelitis ([address] 2018    Diabetic ulcer of toe associated with type 2 diabetes mellitus, with fat layer exposed ([address] 2018    Hypercholesterolemia 2017    Hyperlipemia, mixed     Hypertension     Ischemic cardiomyopathy 2018    Non-pressure chronic ulcer of left lower leg with fat layer exposed ([address] 2014    Obesity     Obesity, Class III, BMI 40-49.9 (morbid obesity) [facility] 4/6/2015    Obstructive sleep apnea syndrome     Osteoarthritis of carpometacarpal [place]) joint of thumb 6/5/2018    Osteoarthritis of knee 6/5/2018    Peripheral vascular disease ([address]     with venous stasis and ulcerations. Dr Claribel Doss Presence of coronary angioplasty implant and graft 9/11/2009    Sleep apnea     Dr Singh Huntington of knee and leg 6/5/2018    Sprain of shoulder and upper arm 6/5/2018    Type II or unspecified type diabetes mellitus without mention of complication, not stated as uncontrolled     Ulcer of left foot, with fat layer exposed ([facility] 1/9/2018    Unspecified sleep apnea     Dr Daria Griffith CPAP    Venous insufficiency of both lower extremities 1/31/2018       PAST SURGICAL HISTORY    Past Surgical History:   Procedure Laterality Date    ARTHROPLASTY Left 4/1/2022    ARTHROPLASTY 2ND LEFT TOE performed by Bob Stein DPM at [address] Right 3/22/13    Dr. Rachel Gallardo.     COLONOSCOPY  2020    CORONARY ARTERY BYPASS GRAFT  2/26/07    [facility], Dr Marylu Martin (X 3)    CORONARY ARTERY BYPASS GRAFT  2006    NECK SURGERY  2010    cervical stenoses C5-C6-C7and partial T1 laminectomy with fusion of C6-7    OTHER SURGICAL HISTORY      wound care ulcers of legs (bilaterally) with Dr Katy Michel Bilateral 5/18/2022    STRAVIX LEFT FOOT, THERASKIN RIGHT FOOT performed by Bob Stein DPM at [address] [place] [address] TOE AMPUTATION Left 4/6/2022    TOE AMPUTATION, SECOND DIGIT performed by Bob Stein DPM at [facility]  age 11    VEIN SURGERY      closure of peripherator veins of legs, Dr LAWSON Mitchell History   Problem Relation Age of Onset    Cancer Mother         lung    Diabetes Father     Heart Disease Father     High Blood Pressure Father     Diabetes Brother     Heart Disease Brother     High Blood Pressure Brother SOCIAL HISTORY    Social History     Tobacco Use    Smoking status: Never Smoker    Smokeless tobacco: Never Used   Vaping Use    Vaping Use: Never used   Substance Use Topics    Alcohol use: Not Currently     Comment: rare social, non past 5 months    Drug use: No       ALLERGIES    Allergies   Allergen Reactions    Pcn [Penicillins] Other (See Comments)     Unknown rx    Penicillin G Rash       MEDICATIONS    Current Outpatient Medications on File Prior to Encounter   Medication Sig Dispense Refill    diazePAM (VALIUM) 5 MG tablet       clopidogrel (PLAVIX) 75 MG tablet Take 1 tablet by mouth daily 90 tablet 3    carvedilol (COREG) 12.5 MG tablet Take 1 tablet by mouth 2 times daily (with meals) 180 tablet 3    dapagliflozin (FARXIGA) 10 MG tablet Take 1 tablet by mouth every morning 90 tablet 3    traZODone (DESYREL) 150 MG tablet TAKE ONE TABLET BY MOUTH EVERY EVENING 90 tablet 3    linagliptin (TRADJENTA) 5 MG tablet TAKE 1 TABLET BY MOUTH ONE TIME A DAY 90 tablet 3    hydroCHLOROthiazide (MICROZIDE) 12.5 MG capsule 1 po qd 90 capsule 3    metFORMIN (GLUCOPHAGE) 1000 MG tablet Take 1 tablet by mouth 2 times daily (with meals) 60 tablet 3    omeprazole (PRILOSEC) 20 MG delayed release capsule TAKE 1 CAPSULE DAILY 90 capsule 1    gabapentin (NEURONTIN) 300 MG capsule Take 900 mg by mouth nightly.  Misc Natural Products (GLUCOSAMINE CHOND MSM FORMULA PO) Take 2 tablets by mouth daily      HYDROcodone-acetaminophen (NORCO)  MG per tablet Take 1 tablet by mouth every 4-6 hours as needed for Pain.       meloxicam (MOBIC) 15 MG tablet Take 1 tablet by mouth daily 90 tablet 3    tiZANidine (ZANAFLEX) 4 MG tablet 1 po qd 14 tablet 3    NEEDLE, DISP, 18 G (BD DISP NEEDLES) 18G X 1-1/2\" MISC 1 Device by Does not apply route every 30 days 25 each 1    Multiple Vitamin (MULTI VITAMIN DAILY PO) Take by mouth      nitroGLYCERIN (NITROSTAT) 0.4 MG SL tablet Place 0.4 mg under the tongue  aspirin 325 MG tablet Take 325 mg by mouth daily. No current facility-administered medications on file prior to encounter. REVIEW OF SYSTEMS    Review of Systems   Constitutional: Negative for chills and fever. Skin: Positive for wound. Neurological: Negative for numbness. Objective:      BP (!) 140/65   Pulse 72   Temp 97.5 °F (36.4 °C) (Tympanic)   Resp 18   Ht 6' (1.829 m)   Wt 249 lb (112.9 kg)   BMI 33.77 kg/m²     Wt Readings from Last 3 Encounters:   06/14/22 249 lb (112.9 kg)   06/09/22 249 lb (112.9 kg)   06/09/22 249 lb (112.9 kg)       Physical Exam:  General:  Alert and oriented x3. In no acute distress. Lower Extremity Physical Exam:    Vascular: DP pulses are palpable, Bilateral. PT pulses are palpable, Bilateral. CFT <3 seconds to all digits, Bilateral.  No edema, Bilateral.  Hair growth is absent to the level of the digits, Bilateral.     Neuro: Saph/sural/SP/DP/plantar sensation intact to light touch. Musculoskeletal: EHL/FHL/GS/TA gross motor intact. Gross deformity is present, hammertoes bilateral.     Dermatologic: clean wounds bilateral feet with grafts     Procedure: EXCISIONAL DEBRIDEMENT WOUNDS BILATERAL    Wound Location: left AND RIGHT WOUNDS    Lidocaine gel soaked gauze was applied at beginning of wound evaluation. The wound(s) was excisionally debrided sharply of fibrotic, necrotic, and hyperkeratotic tissue, including a layer of surrounding healthy tissue using curette. The wound was debrided down through and including subcutaneous. Percent of Wound Debrided: 100%    Total Surface Area Debrided:  (see above for sq cm)    Bleeding: Minimal    Patient tolerated procedure well and was given proper instruction. RIGHT 2ND WOUND IS SIG WORSENED WITH REDNESS, SWELLING, NO PURULENCE        E11.42, L97.522.  L97.512, L03.9    Wound 02/28/22 Toe (Comment  which one) Right 2nd Toe #5 (Active)   Wound Image   06/07/22 1319   Wound Etiology Diabetic Pate 1 06/14/22 1307   Dressing Status New drainage noted; Old drainage noted 06/14/22 1307   Wound Cleansed Cleansed with saline 06/14/22 1307   Dressing/Treatment Other (comment) 03/09/22 1005   Offloading for Diabetic Foot Ulcers Offloading ordered;Post op shoe 06/14/22 1307   Wound Length (cm) 1 cm 06/14/22 1307   Wound Width (cm) 1.6 cm 06/14/22 1307   Wound Depth (cm) 0.1 cm 06/14/22 1307   Wound Surface Area (cm^2) 1.6 cm^2 06/14/22 1307   Change in Wound Size % (l*w) 48.05 06/14/22 1307   Wound Volume (cm^3) 0.16 cm^3 06/14/22 1307   Wound Healing % 48 06/14/22 1307   Post-Procedure Length (cm) 1 cm 06/14/22 1307   Post-Procedure Width (cm) 1.5 cm 06/14/22 1307   Post-Procedure Depth (cm) 0.3 cm 06/14/22 1307   Post-Procedure Surface Area (cm^2) 1.5 cm^2 06/14/22 1307   Post-Procedure Volume (cm^3) 0.45 cm^3 06/14/22 1307   Wound Assessment Pink/red;Slough 06/14/22 1307   Drainage Amount Moderate 06/14/22 1307   Drainage Description Yellow 06/14/22 1307   Odor None 06/14/22 1307   Trudi-wound Assessment Dry/flaky; Blanchable erythema 06/14/22 1307   Margins Defined edges 06/14/22 1307   Wound Thickness Description not for Pressure Injury Full thickness 06/14/22 1307   Number of days: 106       Wound 04/26/22 Toe (Comment  which one) Left #4 Left 2nd toe amp site (Active)   Wound Image   06/07/22 1319   Wound Etiology Non-Healing Surgical 06/14/22 1307   Dressing Status Old drainage noted;New drainage noted 06/14/22 1307   Wound Cleansed Cleansed with saline 06/14/22 1307   Offloading for Diabetic Foot Ulcers Offloading ordered;Post op shoe 06/14/22 1307   Wound Length (cm) 0.9 cm 06/14/22 1307   Wound Width (cm) 0.5 cm 06/14/22 1307   Wound Depth (cm) 0.1 cm 06/14/22 1307   Wound Surface Area (cm^2) 0.45 cm^2 06/14/22 1307   Change in Wound Size % (l*w) 71.15 06/14/22 1307   Wound Volume (cm^3) 0.045 cm^3 06/14/22 1307   Wound Healing % 93 06/14/22 1307   Post-Procedure Length (cm) 0.7 cm 06/14/22 1307   Post-Procedure Width (cm) 0.5 cm 06/14/22 1307   Post-Procedure Depth (cm) 0.4 cm 06/14/22 1307   Post-Procedure Surface Area (cm^2) 0.35 cm^2 06/14/22 1307   Post-Procedure Volume (cm^3) 0.14 cm^3 06/14/22 1307   Wound Assessment Slough 06/14/22 1307   Drainage Amount Moderate 06/14/22 1307   Drainage Description Yellow 06/14/22 1307   Odor None 06/14/22 1307   Trudi-wound Assessment Blanchable erythema;Dry/flaky 06/14/22 1307   Margins Defined edges 06/14/22 1307   Wound Thickness Description not for Pressure Injury Full thickness 06/14/22 1307   Number of days: 49          Plan:   RX DOXY  SCHEDULE FOR ARTHROPLASTY 2ND RIGHT AND EPIFIX LEFT  EXCISIONAL DEBRIDEMENT OF BOTH WOUNDS THROUGH SUB Q TISSUE - NO BONE PALPATED IN 2ND RIGHT- SALINE WET-TO-DRY TO RIGHT 2ND, KAYCEE TO LEFT FOOT  RX XRS TOMORROW WHEN HE GETS HIS CT SCAN FOR POSSIBLE LEFT HIP FRACTURE    Gurmeet Garcia 426 1 WEEK.     Treatment Note please see attached Discharge Instructions

## 2022-06-15 ENCOUNTER — HOSPITAL ENCOUNTER (OUTPATIENT)
Age: 62
Discharge: HOME | End: 2022-06-15
Payer: COMMERCIAL

## 2022-06-15 ENCOUNTER — HOSPITAL ENCOUNTER (OUTPATIENT)
Dept: GENERAL RADIOLOGY | Age: 62
Discharge: HOME | End: 2022-06-17
Payer: COMMERCIAL

## 2022-06-15 ENCOUNTER — HOSPITAL ENCOUNTER (OUTPATIENT)
Dept: CT IMAGING | Age: 62
Discharge: HOME | End: 2022-06-17
Payer: COMMERCIAL

## 2022-06-15 ENCOUNTER — HOSPITAL ENCOUNTER (OUTPATIENT)
Age: 62
Discharge: HOME | End: 2022-06-17
Payer: COMMERCIAL

## 2022-06-15 DIAGNOSIS — E11.69 TYPE 2 DIABETES MELLITUS WITH OTHER SPECIFIED COMPLICATION, WITHOUT LONG-TERM CURRENT USE OF INSULIN (HCC): ICD-10-CM

## 2022-06-15 DIAGNOSIS — D50.8 IRON DEFICIENCY ANEMIA SECONDARY TO INADEQUATE DIETARY IRON INTAKE: ICD-10-CM

## 2022-06-15 DIAGNOSIS — N42.9 PROSTATE DISORDER: ICD-10-CM

## 2022-06-15 DIAGNOSIS — E78.2 HYPERLIPEMIA, MIXED: ICD-10-CM

## 2022-06-15 DIAGNOSIS — R53.83 FATIGUE, UNSPECIFIED TYPE: ICD-10-CM

## 2022-06-15 DIAGNOSIS — M25.552 LEFT HIP PAIN: ICD-10-CM

## 2022-06-15 DIAGNOSIS — I10 ESSENTIAL HYPERTENSION: ICD-10-CM

## 2022-06-15 DIAGNOSIS — I50.9 CONGESTIVE HEART FAILURE, UNSPECIFIED HF CHRONICITY, UNSPECIFIED HEART FAILURE TYPE (HCC): ICD-10-CM

## 2022-06-15 DIAGNOSIS — I73.9 PERIPHERAL VASCULAR DISEASE (HCC): ICD-10-CM

## 2022-06-15 DIAGNOSIS — L97.512 ULCER OF TOE OF RIGHT FOOT, WITH FAT LAYER EXPOSED (HCC): ICD-10-CM

## 2022-06-15 LAB
ABSOLUTE EOS #: 0.09 K/UL (ref 0–0.44)
ABSOLUTE EOS #: 0.1 K/UL (ref 0–0.44)
ABSOLUTE IMMATURE GRANULOCYTE: <0.03 K/UL (ref 0–0.3)
ABSOLUTE IMMATURE GRANULOCYTE: <0.03 K/UL (ref 0–0.3)
ABSOLUTE LYMPH #: 1.68 K/UL (ref 1.1–3.7)
ABSOLUTE LYMPH #: 1.71 K/UL (ref 1.1–3.7)
ABSOLUTE MONO #: 0.5 K/UL (ref 0.1–1.2)
ABSOLUTE MONO #: 0.52 K/UL (ref 0.1–1.2)
ALBUMIN SERPL-MCNC: 4.4 G/DL (ref 3.5–5.2)
ALBUMIN/GLOBULIN RATIO: 1.4 (ref 1–2.5)
ALP BLD-CCNC: 91 U/L (ref 40–129)
ALT SERPL-CCNC: 13 U/L (ref 5–41)
ANION GAP SERPL CALCULATED.3IONS-SCNC: 16 MMOL/L (ref 9–17)
AST SERPL-CCNC: 11 U/L
BASOPHILS # BLD: 1 % (ref 0–2)
BASOPHILS # BLD: 1 % (ref 0–2)
BASOPHILS ABSOLUTE: 0.03 K/UL (ref 0–0.2)
BASOPHILS ABSOLUTE: 0.03 K/UL (ref 0–0.2)
BILIRUB SERPL-MCNC: 0.37 MG/DL (ref 0.3–1.2)
BUN BLDV-MCNC: 16 MG/DL (ref 8–23)
C-REACTIVE PROTEIN: 3.8 MG/L (ref 0–5)
CALCIUM SERPL-MCNC: 9.7 MG/DL (ref 8.6–10.4)
CHLORIDE BLD-SCNC: 106 MMOL/L (ref 98–107)
CHOLESTEROL/HDL RATIO: 2.7
CHOLESTEROL: 107 MG/DL
CO2: 19 MMOL/L (ref 20–31)
CREAT SERPL-MCNC: 0.79 MG/DL (ref 0.7–1.2)
EOSINOPHILS RELATIVE PERCENT: 1 % (ref 1–4)
EOSINOPHILS RELATIVE PERCENT: 2 % (ref 1–4)
ESTIMATED AVERAGE GLUCOSE: 189 MG/DL
ESTIMATED AVERAGE GLUCOSE: 192 MG/DL
GFR AFRICAN AMERICAN: >60 ML/MIN
GFR NON-AFRICAN AMERICAN: >60 ML/MIN
GFR SERPL CREATININE-BSD FRML MDRD: ABNORMAL ML/MIN/{1.73_M2}
GLUCOSE BLD-MCNC: 159 MG/DL (ref 70–99)
HBA1C MFR BLD: 8.2 % (ref 4–6)
HBA1C MFR BLD: 8.3 % (ref 4–6)
HCT VFR BLD CALC: 38.8 % (ref 40.7–50.3)
HCT VFR BLD CALC: 39.2 % (ref 40.7–50.3)
HDLC SERPL-MCNC: 40 MG/DL
HEMOGLOBIN: 12.3 G/DL (ref 13–17)
HEMOGLOBIN: 12.3 G/DL (ref 13–17)
IMMATURE GRANULOCYTES: 0 %
IMMATURE GRANULOCYTES: 0 %
IRON SATURATION: 13 % (ref 20–55)
IRON: 38 UG/DL (ref 59–158)
LDL CHOLESTEROL: 52 MG/DL (ref 0–130)
LYMPHOCYTES # BLD: 26 % (ref 24–43)
LYMPHOCYTES # BLD: 26 % (ref 24–43)
MCH RBC QN AUTO: 27.7 PG (ref 25.2–33.5)
MCH RBC QN AUTO: 28 PG (ref 25.2–33.5)
MCHC RBC AUTO-ENTMCNC: 31.4 G/DL (ref 28.4–34.8)
MCHC RBC AUTO-ENTMCNC: 31.7 G/DL (ref 28.4–34.8)
MCV RBC AUTO: 88.2 FL (ref 82.6–102.9)
MCV RBC AUTO: 88.3 FL (ref 82.6–102.9)
MONOCYTES # BLD: 8 % (ref 3–12)
MONOCYTES # BLD: 8 % (ref 3–12)
NRBC AUTOMATED: 0 PER 100 WBC
NRBC AUTOMATED: 0 PER 100 WBC
PDW BLD-RTO: 13.5 % (ref 11.8–14.4)
PDW BLD-RTO: 13.6 % (ref 11.8–14.4)
PLATELET # BLD: 214 K/UL (ref 138–453)
PLATELET # BLD: 225 K/UL (ref 138–453)
PMV BLD AUTO: 9.8 FL (ref 8.1–13.5)
PMV BLD AUTO: 9.9 FL (ref 8.1–13.5)
POTASSIUM SERPL-SCNC: 3.7 MMOL/L (ref 3.7–5.3)
PROSTATE SPECIFIC ANTIGEN: 0.96 NG/ML
RBC # BLD: 4.4 M/UL (ref 4.21–5.77)
RBC # BLD: 4.44 M/UL (ref 4.21–5.77)
RHEUMATOID FACTOR: <10 IU/ML
SEDIMENTATION RATE, ERYTHROCYTE: 31 MM/HR (ref 0–20)
SEG NEUTROPHILS: 63 % (ref 36–65)
SEG NEUTROPHILS: 64 % (ref 36–65)
SEGMENTED NEUTROPHILS ABSOLUTE COUNT: 4.05 K/UL (ref 1.5–8.1)
SEGMENTED NEUTROPHILS ABSOLUTE COUNT: 4.2 K/UL (ref 1.5–8.1)
SEX HORMONE BINDING GLOBULIN: 30 NMOL/L (ref 11–80)
SODIUM BLD-SCNC: 141 MMOL/L (ref 135–144)
TESTOSTERONE FREE-NONMALE: 24.3 PG/ML (ref 47–244)
TESTOSTERONE TOTAL: 124 NG/DL (ref 220–1000)
THYROXINE, FREE: 1.27 NG/DL (ref 0.93–1.7)
TOTAL IRON BINDING CAPACITY: 290 UG/DL (ref 250–450)
TOTAL PROTEIN: 7.6 G/DL (ref 6.4–8.3)
TRIGL SERPL-MCNC: 76 MG/DL
TSH SERPL DL<=0.05 MIU/L-ACNC: 1.06 UIU/ML (ref 0.3–5)
UNSATURATED IRON BINDING CAPACITY: 252 UG/DL (ref 112–347)
WBC # BLD: 6.4 K/UL (ref 3.5–11.3)
WBC # BLD: 6.6 K/UL (ref 3.5–11.3)

## 2022-06-15 PROCEDURE — 84443 ASSAY THYROID STIM HORMONE: CPT

## 2022-06-15 PROCEDURE — 73630 X-RAY EXAM OF FOOT: CPT

## 2022-06-15 PROCEDURE — 83036 HEMOGLOBIN GLYCOSYLATED A1C: CPT

## 2022-06-15 PROCEDURE — 82746 ASSAY OF FOLIC ACID SERUM: CPT

## 2022-06-15 PROCEDURE — 83540 ASSAY OF IRON: CPT

## 2022-06-15 PROCEDURE — 82607 VITAMIN B-12: CPT

## 2022-06-15 PROCEDURE — 85025 COMPLETE CBC W/AUTO DIFF WBC: CPT

## 2022-06-15 PROCEDURE — 86200 CCP ANTIBODY: CPT

## 2022-06-15 PROCEDURE — 36415 COLL VENOUS BLD VENIPUNCTURE: CPT

## 2022-06-15 PROCEDURE — 73700 CT LOWER EXTREMITY W/O DYE: CPT

## 2022-06-15 PROCEDURE — 84439 ASSAY OF FREE THYROXINE: CPT

## 2022-06-15 PROCEDURE — 85652 RBC SED RATE AUTOMATED: CPT

## 2022-06-15 PROCEDURE — 84153 ASSAY OF PSA TOTAL: CPT

## 2022-06-15 PROCEDURE — 76376 3D RENDER W/INTRP POSTPROCES: CPT

## 2022-06-15 PROCEDURE — 83550 IRON BINDING TEST: CPT

## 2022-06-15 PROCEDURE — 80061 LIPID PANEL: CPT

## 2022-06-15 PROCEDURE — 80053 COMPREHEN METABOLIC PANEL: CPT

## 2022-06-15 PROCEDURE — 84270 ASSAY OF SEX HORMONE GLOBUL: CPT

## 2022-06-15 PROCEDURE — 84403 ASSAY OF TOTAL TESTOSTERONE: CPT

## 2022-06-15 PROCEDURE — 86140 C-REACTIVE PROTEIN: CPT

## 2022-06-15 PROCEDURE — 86431 RHEUMATOID FACTOR QUANT: CPT

## 2022-06-16 ENCOUNTER — ANESTHESIA EVENT (OUTPATIENT)
Dept: OPERATING ROOM | Age: 62
End: 2022-06-16
Payer: COMMERCIAL

## 2022-06-16 LAB
CCP IGG ANTIBODIES: 3.2 U/ML (ref 0–7)
CULTURE: ABNORMAL
DIRECT EXAM: ABNORMAL
DIRECT EXAM: ABNORMAL
SPECIMEN DESCRIPTION: ABNORMAL

## 2022-06-17 ENCOUNTER — HOSPITAL ENCOUNTER (OUTPATIENT)
Age: 62
Setting detail: OBSERVATION
Discharge: HOME | End: 2022-06-18
Attending: PODIATRIST | Admitting: PODIATRIST
Payer: COMMERCIAL

## 2022-06-17 ENCOUNTER — ANESTHESIA (OUTPATIENT)
Dept: OPERATING ROOM | Age: 62
End: 2022-06-17
Payer: COMMERCIAL

## 2022-06-17 DIAGNOSIS — G89.18 ACUTE POST-OPERATIVE PAIN: Primary | ICD-10-CM

## 2022-06-17 DIAGNOSIS — M20.61 DEFORMITY OF TOE OF RIGHT FOOT: ICD-10-CM

## 2022-06-17 PROBLEM — L97.512 CHRONIC ULCER OF RIGHT FOOT WITH FAT LAYER EXPOSED (HCC): Status: ACTIVE | Noted: 2022-06-17

## 2022-06-17 LAB
ABSOLUTE EOS #: <0.03 K/UL (ref 0–0.44)
ABSOLUTE IMMATURE GRANULOCYTE: 0.03 K/UL (ref 0–0.3)
ABSOLUTE LYMPH #: 1.02 K/UL (ref 1.1–3.7)
ABSOLUTE MONO #: 0.14 K/UL (ref 0.1–1.2)
ANION GAP SERPL CALCULATED.3IONS-SCNC: 9 MMOL/L (ref 9–17)
BASOPHILS # BLD: 0 % (ref 0–2)
BASOPHILS ABSOLUTE: <0.03 K/UL (ref 0–0.2)
BUN BLDV-MCNC: 21 MG/DL (ref 8–23)
BUN/CREAT BLD: 23 (ref 9–20)
CALCIUM SERPL-MCNC: 9 MG/DL (ref 8.6–10.4)
CHLORIDE BLD-SCNC: 104 MMOL/L (ref 98–107)
CO2: 25 MMOL/L (ref 20–31)
CREAT SERPL-MCNC: 0.9 MG/DL (ref 0.7–1.2)
EOSINOPHILS RELATIVE PERCENT: 0 % (ref 1–4)
FOLATE: >20 NG/ML
GFR AFRICAN AMERICAN: >60 ML/MIN
GFR NON-AFRICAN AMERICAN: >60 ML/MIN
GFR SERPL CREATININE-BSD FRML MDRD: ABNORMAL ML/MIN/{1.73_M2}
GLUCOSE BLD-MCNC: 181 MG/DL (ref 75–110)
GLUCOSE BLD-MCNC: 202 MG/DL (ref 75–110)
GLUCOSE BLD-MCNC: 206 MG/DL (ref 75–110)
GLUCOSE BLD-MCNC: 218 MG/DL (ref 75–110)
GLUCOSE BLD-MCNC: 247 MG/DL (ref 70–99)
HCT VFR BLD CALC: 39.5 % (ref 40.7–50.3)
HEMOGLOBIN: 12.4 G/DL (ref 13–17)
IMMATURE GRANULOCYTES: 1 %
LYMPHOCYTES # BLD: 18 % (ref 24–43)
MCH RBC QN AUTO: 27.8 PG (ref 25.2–33.5)
MCHC RBC AUTO-ENTMCNC: 31.4 G/DL (ref 28.4–34.8)
MCV RBC AUTO: 88.6 FL (ref 82.6–102.9)
MONOCYTES # BLD: 3 % (ref 3–12)
NRBC AUTOMATED: 0 PER 100 WBC
PDW BLD-RTO: 13.6 % (ref 11.8–14.4)
PLATELET # BLD: 190 K/UL (ref 138–453)
PMV BLD AUTO: 8.8 FL (ref 8.1–13.5)
POTASSIUM SERPL-SCNC: 4.1 MMOL/L (ref 3.7–5.3)
RBC # BLD: 4.46 M/UL (ref 4.21–5.77)
SARS-COV-2, RAPID: NOT DETECTED
SEG NEUTROPHILS: 78 % (ref 36–65)
SEGMENTED NEUTROPHILS ABSOLUTE COUNT: 4.36 K/UL (ref 1.5–8.1)
SODIUM BLD-SCNC: 138 MMOL/L (ref 135–144)
SPECIMEN DESCRIPTION: NORMAL
VITAMIN B-12: 321 PG/ML (ref 232–1245)
WBC # BLD: 5.6 K/UL (ref 3.5–11.3)

## 2022-06-17 PROCEDURE — 3600000012 HC SURGERY LEVEL 2 ADDTL 15MIN: Performed by: PODIATRIST

## 2022-06-17 PROCEDURE — 87075 CULTR BACTERIA EXCEPT BLOOD: CPT

## 2022-06-17 PROCEDURE — 7100000001 HC PACU RECOVERY - ADDTL 15 MIN: Performed by: PODIATRIST

## 2022-06-17 PROCEDURE — 88311 DECALCIFY TISSUE: CPT

## 2022-06-17 PROCEDURE — 87205 SMEAR GRAM STAIN: CPT

## 2022-06-17 PROCEDURE — 87176 TISSUE HOMOGENIZATION CULTR: CPT

## 2022-06-17 PROCEDURE — 85025 COMPLETE CBC W/AUTO DIFF WBC: CPT

## 2022-06-17 PROCEDURE — 6360000002 HC RX W HCPCS: Performed by: NURSE ANESTHETIST, CERTIFIED REGISTERED

## 2022-06-17 PROCEDURE — 2580000003 HC RX 258: Performed by: PODIATRIST

## 2022-06-17 PROCEDURE — 6370000000 HC RX 637 (ALT 250 FOR IP): Performed by: ANESTHESIOLOGY

## 2022-06-17 PROCEDURE — 3700000000 HC ANESTHESIA ATTENDED CARE: Performed by: PODIATRIST

## 2022-06-17 PROCEDURE — 7100000000 HC PACU RECOVERY - FIRST 15 MIN: Performed by: PODIATRIST

## 2022-06-17 PROCEDURE — 2580000003 HC RX 258: Performed by: ANESTHESIOLOGY

## 2022-06-17 PROCEDURE — 2500000003 HC RX 250 WO HCPCS: Performed by: NURSE ANESTHETIST, CERTIFIED REGISTERED

## 2022-06-17 PROCEDURE — 82947 ASSAY GLUCOSE BLOOD QUANT: CPT

## 2022-06-17 PROCEDURE — 87635 SARS-COV-2 COVID-19 AMP PRB: CPT

## 2022-06-17 PROCEDURE — 3600000002 HC SURGERY LEVEL 2 BASE: Performed by: PODIATRIST

## 2022-06-17 PROCEDURE — 88304 TISSUE EXAM BY PATHOLOGIST: CPT

## 2022-06-17 PROCEDURE — 2709999900 HC NON-CHARGEABLE SUPPLY: Performed by: PODIATRIST

## 2022-06-17 PROCEDURE — 36415 COLL VENOUS BLD VENIPUNCTURE: CPT

## 2022-06-17 PROCEDURE — 6360000002 HC RX W HCPCS: Performed by: ANESTHESIOLOGY

## 2022-06-17 PROCEDURE — G0378 HOSPITAL OBSERVATION PER HR: HCPCS

## 2022-06-17 PROCEDURE — 6360000002 HC RX W HCPCS: Performed by: PODIATRIST

## 2022-06-17 PROCEDURE — 3700000001 HC ADD 15 MINUTES (ANESTHESIA): Performed by: PODIATRIST

## 2022-06-17 PROCEDURE — 80048 BASIC METABOLIC PNL TOTAL CA: CPT

## 2022-06-17 PROCEDURE — 87070 CULTURE OTHR SPECIMN AEROBIC: CPT

## 2022-06-17 PROCEDURE — 6370000000 HC RX 637 (ALT 250 FOR IP): Performed by: PODIATRIST

## 2022-06-17 PROCEDURE — 99213 OFFICE O/P EST LOW 20 MIN: CPT | Performed by: NURSE PRACTITIONER

## 2022-06-17 DEVICE — EPIFIX 2X2CM 4SQ CM: Type: IMPLANTABLE DEVICE | Site: FOOT | Status: FUNCTIONAL

## 2022-06-17 RX ORDER — DEXAMETHASONE SODIUM PHOSPHATE 10 MG/ML
INJECTION, SOLUTION INTRAMUSCULAR; INTRAVENOUS PRN
Status: DISCONTINUED | OUTPATIENT
Start: 2022-06-17 | End: 2022-06-17 | Stop reason: SDUPTHER

## 2022-06-17 RX ORDER — LIDOCAINE HYDROCHLORIDE 10 MG/ML
1 INJECTION, SOLUTION EPIDURAL; INFILTRATION; INTRACAUDAL; PERINEURAL
Status: DISCONTINUED | OUTPATIENT
Start: 2022-06-17 | End: 2022-06-17 | Stop reason: HOSPADM

## 2022-06-17 RX ORDER — ONDANSETRON 4 MG/1
4 TABLET, ORALLY DISINTEGRATING ORAL EVERY 8 HOURS PRN
Status: DISCONTINUED | OUTPATIENT
Start: 2022-06-17 | End: 2022-06-18 | Stop reason: HOSPADM

## 2022-06-17 RX ORDER — FENTANYL CITRATE 50 UG/ML
25 INJECTION, SOLUTION INTRAMUSCULAR; INTRAVENOUS EVERY 5 MIN PRN
Status: DISCONTINUED | OUTPATIENT
Start: 2022-06-17 | End: 2022-06-17 | Stop reason: HOSPADM

## 2022-06-17 RX ORDER — POLYETHYLENE GLYCOL 3350 17 G/17G
17 POWDER, FOR SOLUTION ORAL DAILY PRN
Status: DISCONTINUED | OUTPATIENT
Start: 2022-06-17 | End: 2022-06-18 | Stop reason: HOSPADM

## 2022-06-17 RX ORDER — OXYCODONE HYDROCHLORIDE 5 MG/1
10 TABLET ORAL EVERY 4 HOURS PRN
Status: DISCONTINUED | OUTPATIENT
Start: 2022-06-17 | End: 2022-06-18 | Stop reason: HOSPADM

## 2022-06-17 RX ORDER — OXYCODONE HYDROCHLORIDE 5 MG/1
5 TABLET ORAL EVERY 4 HOURS PRN
Status: DISCONTINUED | OUTPATIENT
Start: 2022-06-17 | End: 2022-06-18 | Stop reason: HOSPADM

## 2022-06-17 RX ORDER — DIAZEPAM 2 MG/1
5 TABLET ORAL ONCE
Status: DISCONTINUED | OUTPATIENT
Start: 2022-06-17 | End: 2022-06-18 | Stop reason: HOSPADM

## 2022-06-17 RX ORDER — DEXTROSE MONOHYDRATE 50 MG/ML
100 INJECTION, SOLUTION INTRAVENOUS PRN
Status: DISCONTINUED | OUTPATIENT
Start: 2022-06-17 | End: 2022-06-18 | Stop reason: HOSPADM

## 2022-06-17 RX ORDER — ALOGLIPTIN 25 MG/1
25 TABLET, FILM COATED ORAL DAILY
Status: DISCONTINUED | OUTPATIENT
Start: 2022-06-17 | End: 2022-06-18 | Stop reason: HOSPADM

## 2022-06-17 RX ORDER — SODIUM CHLORIDE 9 MG/ML
INJECTION, SOLUTION INTRAVENOUS PRN
Status: DISCONTINUED | OUTPATIENT
Start: 2022-06-17 | End: 2022-06-17 | Stop reason: HOSPADM

## 2022-06-17 RX ORDER — ONDANSETRON 2 MG/ML
4 INJECTION INTRAMUSCULAR; INTRAVENOUS
Status: DISCONTINUED | OUTPATIENT
Start: 2022-06-17 | End: 2022-06-17 | Stop reason: HOSPADM

## 2022-06-17 RX ORDER — FENTANYL CITRATE 50 UG/ML
50 INJECTION, SOLUTION INTRAMUSCULAR; INTRAVENOUS EVERY 5 MIN PRN
Status: DISCONTINUED | OUTPATIENT
Start: 2022-06-17 | End: 2022-06-17 | Stop reason: HOSPADM

## 2022-06-17 RX ORDER — SODIUM CHLORIDE, SODIUM LACTATE, POTASSIUM CHLORIDE, CALCIUM CHLORIDE 600; 310; 30; 20 MG/100ML; MG/100ML; MG/100ML; MG/100ML
INJECTION, SOLUTION INTRAVENOUS CONTINUOUS
Status: DISCONTINUED | OUTPATIENT
Start: 2022-06-17 | End: 2022-06-17

## 2022-06-17 RX ORDER — ONDANSETRON 2 MG/ML
4 INJECTION INTRAMUSCULAR; INTRAVENOUS EVERY 6 HOURS PRN
Status: DISCONTINUED | OUTPATIENT
Start: 2022-06-17 | End: 2022-06-18 | Stop reason: HOSPADM

## 2022-06-17 RX ORDER — FENTANYL CITRATE 50 UG/ML
INJECTION, SOLUTION INTRAMUSCULAR; INTRAVENOUS PRN
Status: DISCONTINUED | OUTPATIENT
Start: 2022-06-17 | End: 2022-06-17 | Stop reason: SDUPTHER

## 2022-06-17 RX ORDER — PANTOPRAZOLE SODIUM 40 MG/1
40 TABLET, DELAYED RELEASE ORAL
Status: DISCONTINUED | OUTPATIENT
Start: 2022-06-18 | End: 2022-06-18 | Stop reason: HOSPADM

## 2022-06-17 RX ORDER — SODIUM CHLORIDE 0.9 % (FLUSH) 0.9 %
5-40 SYRINGE (ML) INJECTION EVERY 12 HOURS SCHEDULED
Status: DISCONTINUED | OUTPATIENT
Start: 2022-06-17 | End: 2022-06-18 | Stop reason: HOSPADM

## 2022-06-17 RX ORDER — SODIUM CHLORIDE 0.9 % (FLUSH) 0.9 %
5-40 SYRINGE (ML) INJECTION EVERY 12 HOURS SCHEDULED
Status: DISCONTINUED | OUTPATIENT
Start: 2022-06-17 | End: 2022-06-17 | Stop reason: HOSPADM

## 2022-06-17 RX ORDER — TIZANIDINE 4 MG/1
4 TABLET ORAL DAILY
Status: DISCONTINUED | OUTPATIENT
Start: 2022-06-17 | End: 2022-06-18 | Stop reason: HOSPADM

## 2022-06-17 RX ORDER — LIDOCAINE HYDROCHLORIDE 20 MG/ML
INJECTION, SOLUTION EPIDURAL; INFILTRATION; INTRACAUDAL; PERINEURAL PRN
Status: DISCONTINUED | OUTPATIENT
Start: 2022-06-17 | End: 2022-06-17 | Stop reason: SDUPTHER

## 2022-06-17 RX ORDER — OXYCODONE HYDROCHLORIDE 5 MG/1
5 TABLET ORAL
Status: COMPLETED | OUTPATIENT
Start: 2022-06-17 | End: 2022-06-17

## 2022-06-17 RX ORDER — SODIUM CHLORIDE 9 MG/ML
INJECTION, SOLUTION INTRAVENOUS PRN
Status: DISCONTINUED | OUTPATIENT
Start: 2022-06-17 | End: 2022-06-18 | Stop reason: HOSPADM

## 2022-06-17 RX ORDER — HYDROCHLOROTHIAZIDE 12.5 MG/1
12.5 CAPSULE, GELATIN COATED ORAL DAILY
Status: DISCONTINUED | OUTPATIENT
Start: 2022-06-17 | End: 2022-06-18 | Stop reason: HOSPADM

## 2022-06-17 RX ORDER — ASPIRIN 325 MG
325 TABLET ORAL DAILY
Status: DISCONTINUED | OUTPATIENT
Start: 2022-06-17 | End: 2022-06-18 | Stop reason: HOSPADM

## 2022-06-17 RX ORDER — TRAZODONE HYDROCHLORIDE 50 MG/1
150 TABLET ORAL EVERY EVENING
Status: DISCONTINUED | OUTPATIENT
Start: 2022-06-17 | End: 2022-06-18 | Stop reason: HOSPADM

## 2022-06-17 RX ORDER — SODIUM CHLORIDE 0.9 % (FLUSH) 0.9 %
5-40 SYRINGE (ML) INJECTION PRN
Status: DISCONTINUED | OUTPATIENT
Start: 2022-06-17 | End: 2022-06-17 | Stop reason: HOSPADM

## 2022-06-17 RX ORDER — ONDANSETRON 2 MG/ML
INJECTION INTRAMUSCULAR; INTRAVENOUS PRN
Status: DISCONTINUED | OUTPATIENT
Start: 2022-06-17 | End: 2022-06-17 | Stop reason: SDUPTHER

## 2022-06-17 RX ORDER — SODIUM CHLORIDE 0.9 % (FLUSH) 0.9 %
5-40 SYRINGE (ML) INJECTION PRN
Status: DISCONTINUED | OUTPATIENT
Start: 2022-06-17 | End: 2022-06-18 | Stop reason: HOSPADM

## 2022-06-17 RX ORDER — CLOPIDOGREL BISULFATE 75 MG/1
75 TABLET ORAL DAILY
Status: DISCONTINUED | OUTPATIENT
Start: 2022-06-17 | End: 2022-06-18 | Stop reason: HOSPADM

## 2022-06-17 RX ORDER — SODIUM CHLORIDE 9 MG/ML
INJECTION, SOLUTION INTRAVENOUS CONTINUOUS
Status: DISCONTINUED | OUTPATIENT
Start: 2022-06-17 | End: 2022-06-17

## 2022-06-17 RX ORDER — PROPOFOL 10 MG/ML
INJECTION, EMULSION INTRAVENOUS PRN
Status: DISCONTINUED | OUTPATIENT
Start: 2022-06-17 | End: 2022-06-17 | Stop reason: SDUPTHER

## 2022-06-17 RX ORDER — ACETAMINOPHEN 325 MG/1
650 TABLET ORAL EVERY 6 HOURS PRN
Status: DISCONTINUED | OUTPATIENT
Start: 2022-06-17 | End: 2022-06-18 | Stop reason: HOSPADM

## 2022-06-17 RX ORDER — CARVEDILOL 12.5 MG/1
12.5 TABLET ORAL 2 TIMES DAILY WITH MEALS
Status: DISCONTINUED | OUTPATIENT
Start: 2022-06-17 | End: 2022-06-18 | Stop reason: HOSPADM

## 2022-06-17 RX ORDER — GABAPENTIN 300 MG/1
900 CAPSULE ORAL NIGHTLY
Status: DISCONTINUED | OUTPATIENT
Start: 2022-06-17 | End: 2022-06-18 | Stop reason: HOSPADM

## 2022-06-17 RX ADMIN — SODIUM CHLORIDE, POTASSIUM CHLORIDE, SODIUM LACTATE AND CALCIUM CHLORIDE: 600; 310; 30; 20 INJECTION, SOLUTION INTRAVENOUS at 06:46

## 2022-06-17 RX ADMIN — Medication 50 MCG: at 07:50

## 2022-06-17 RX ADMIN — OXYCODONE 10 MG: 5 TABLET ORAL at 23:47

## 2022-06-17 RX ADMIN — PROPOFOL 140 MG: 10 INJECTION, EMULSION INTRAVENOUS at 07:50

## 2022-06-17 RX ADMIN — ALOGLIPTIN 25 MG: 25 TABLET, FILM COATED ORAL at 14:27

## 2022-06-17 RX ADMIN — HYDROCHLOROTHIAZIDE 12.5 MG: 12.5 CAPSULE ORAL at 14:28

## 2022-06-17 RX ADMIN — GABAPENTIN 900 MG: 300 CAPSULE ORAL at 21:55

## 2022-06-17 RX ADMIN — OXYCODONE 5 MG: 5 TABLET ORAL at 10:27

## 2022-06-17 RX ADMIN — LIDOCAINE HYDROCHLORIDE 100 MG: 20 INJECTION, SOLUTION EPIDURAL; INFILTRATION; INTRACAUDAL; PERINEURAL at 07:50

## 2022-06-17 RX ADMIN — OXYCODONE 10 MG: 5 TABLET ORAL at 14:32

## 2022-06-17 RX ADMIN — VANCOMYCIN HYDROCHLORIDE 1500 MG: 5 INJECTION, POWDER, LYOPHILIZED, FOR SOLUTION INTRAVENOUS at 08:00

## 2022-06-17 RX ADMIN — SODIUM CHLORIDE: 9 INJECTION, SOLUTION INTRAVENOUS at 20:49

## 2022-06-17 RX ADMIN — OXYCODONE 10 MG: 5 TABLET ORAL at 19:27

## 2022-06-17 RX ADMIN — SODIUM CHLORIDE, PRESERVATIVE FREE 10 ML: 5 INJECTION INTRAVENOUS at 11:18

## 2022-06-17 RX ADMIN — FENTANYL CITRATE 50 MCG: 50 INJECTION, SOLUTION INTRAMUSCULAR; INTRAVENOUS at 09:32

## 2022-06-17 RX ADMIN — CLOPIDOGREL BISULFATE 75 MG: 75 TABLET ORAL at 14:28

## 2022-06-17 RX ADMIN — ONDANSETRON 4 MG: 2 INJECTION INTRAMUSCULAR; INTRAVENOUS at 08:32

## 2022-06-17 RX ADMIN — SODIUM CHLORIDE, POTASSIUM CHLORIDE, SODIUM LACTATE AND CALCIUM CHLORIDE: 600; 310; 30; 20 INJECTION, SOLUTION INTRAVENOUS at 11:21

## 2022-06-17 RX ADMIN — ASPIRIN 325 MG: 325 TABLET ORAL at 14:28

## 2022-06-17 RX ADMIN — DEXAMETHASONE SODIUM PHOSPHATE 4 MG: 10 INJECTION, SOLUTION INTRAMUSCULAR; INTRAVENOUS at 08:00

## 2022-06-17 RX ADMIN — VANCOMYCIN HYDROCHLORIDE 1250 MG: 5 INJECTION, POWDER, LYOPHILIZED, FOR SOLUTION INTRAVENOUS at 20:50

## 2022-06-17 RX ADMIN — TIZANIDINE 4 MG: 4 TABLET ORAL at 14:28

## 2022-06-17 RX ADMIN — Medication 50 MCG: at 08:20

## 2022-06-17 RX ADMIN — TRAZODONE HYDROCHLORIDE 150 MG: 50 TABLET ORAL at 21:56

## 2022-06-17 RX ADMIN — CARVEDILOL 12.5 MG: 12.5 TABLET, FILM COATED ORAL at 14:28

## 2022-06-17 ASSESSMENT — PAIN SCALES - GENERAL
PAINLEVEL_OUTOF10: 8
PAINLEVEL_OUTOF10: 4

## 2022-06-17 ASSESSMENT — ENCOUNTER SYMPTOMS
VOMITING: 0
CHEST TIGHTNESS: 0
SORE THROAT: 0
NAUSEA: 0
SHORTNESS OF BREATH: 0
COUGH: 0
DIARRHEA: 0
FACIAL SWELLING: 0
CONSTIPATION: 0
SINUS PAIN: 0
COLOR CHANGE: 1
SINUS PRESSURE: 0
PHOTOPHOBIA: 0
WHEEZING: 0
ABDOMINAL PAIN: 0

## 2022-06-17 ASSESSMENT — PAIN DESCRIPTION - LOCATION
LOCATION: HIP;TOE (COMMENT WHICH ONE)
LOCATION: HIP

## 2022-06-17 ASSESSMENT — PAIN - FUNCTIONAL ASSESSMENT
PAIN_FUNCTIONAL_ASSESSMENT: PREVENTS OR INTERFERES SOME ACTIVE ACTIVITIES AND ADLS
PAIN_FUNCTIONAL_ASSESSMENT: 0-10
PAIN_FUNCTIONAL_ASSESSMENT: PREVENTS OR INTERFERES SOME ACTIVE ACTIVITIES AND ADLS

## 2022-06-17 ASSESSMENT — PAIN DESCRIPTION - ORIENTATION
ORIENTATION: LEFT
ORIENTATION: LEFT
ORIENTATION: RIGHT;LEFT
ORIENTATION: LEFT

## 2022-06-17 ASSESSMENT — PAIN DESCRIPTION - FREQUENCY
FREQUENCY: CONTINUOUS

## 2022-06-17 ASSESSMENT — PAIN DESCRIPTION - DESCRIPTORS
DESCRIPTORS: ACHING
DESCRIPTORS: THROBBING

## 2022-06-17 ASSESSMENT — PAIN DESCRIPTION - PAIN TYPE
TYPE: CHRONIC PAIN
TYPE: CHRONIC PAIN
TYPE: CHRONIC PAIN;SURGICAL PAIN

## 2022-06-17 ASSESSMENT — PAIN DESCRIPTION - ONSET
ONSET: ON-GOING
ONSET: PROGRESSIVE
ONSET: ON-GOING

## 2022-06-17 NOTE — CONSULTS
[facility]  Office: [address], DO, Christian Dooleybrandin, DO, [place], DO, Jani Cox Blood, DO, Trevin Brunson MD, Jaden Ray MD, Bret Lugo MD, Yolette Reed MD, Kristal Scott MD, Brittaney Gold MD, Romulo Dodge MD, Summer Wang, DO, Jadene Boast, MD,  Lara Sullivan DO, Alexei Menezes MD, Cris Flanagan MD, Barry Reid DO, Mildred Parker MD, Melissa Hewitt MD, Chintan Benites DO, Philippe Watts MD, Rakan Medina MD, Alix Ley [facility], [facility], CNP, Herlinda Higgins, CNP, Claudette Orona, CNP, Ariana Van, CNP, Elvia Doan, CNP, Ashtyn Jackson PA-C, Brenda Terrell DNP, Mari Grace, CNP, Jose Arita, CNP, Daniel Mazariegos CNP, Teodoro Soria, CNS, Dee Thornton, [facility], Debra Arriaza, CNP, Troy Curtis, CNP, Ronny Covarrubias, [address] [place], [place] [address] / HISTORY AND PHYSICAL EXAMINATION            Date:   6/17/2022  Patient name:  Richa Shafer  Date of admission:  6/17/2022  6:13 AM  MRN:   [id number]  Account:  [de-identified]  YOB: 1960  PCP:    Ashli Johnson DO  Room:   2023/2023-01  Code Status:    Full Code    Physician Requesting Consult: Julia Ureña DPM    Reason for Consult: Medical management during hospitalization    Chief Complaint:     Postop    History Obtained From:     patient    History of Present Illness:     Patient reported to the hospital for scheduled toe amputation due to nonhealing diabetic foot wound. Patient has been under the care of podiatry as an outpatient for some time and decision was made to proceed with amputation. Patient seen postoperatively on consultation for management of chronic medical conditions. Patient has a known history of CAD, HTN, hyperlipidemia, diabetes with PVD. Patient has been under the care of his primary care provider for many years.   Patient is highly annoyed with continued hospitalization and does not wish to participate in a full medical exam with this provider. He denies any acute abnormalities and reports that his chronic medical problems will be managed by his PCP on an outpatient basis. Internal medicine recommends restarting his home medication regimen. We agree with the need for overnight antibiotic usage and transitioning to an outpatient regimen tomorrow. Patient's lab work from 48 hours ago was unremarkable and we recommend this be repeated. Past Medical History:     Past Medical History:   Diagnosis Date    Acquired trigger finger 6/5/2018    CAD (coronary artery disease)     NWOCC/ involving coronary bypass graft of native heart with unstable angina pectoris    Cervical spondylosis     CH[facility]congest[facility] heart failure) ([facility])     Chronic back pain     on 11/19/18 pt states is presently in pain mgmnt    Contusion of elbow 6/5/2018    Contusion of knee 6/5/2018    Contusion of shoulder region 6/5/2018    Diabetic foot ulcer with osteomyelitis ([address] 1/9/2018    Diabetic ulcer of toe associated with type 2 diabetes mellitus, with fat layer exposed ([address] 1/31/2018    Hypercholesterolemia 12/5/2017    Hyperlipemia, mixed     Hypertension     Ischemic cardiomyopathy 5/7/2018    Non-pressure chronic ulcer of left lower leg with fat layer exposed ([address] 6/9/2014    Obesity     Obesity, Class III, BMI 40-49.9 (morbid obesity) ([address] 4/6/2015    Obstructive sleep apnea syndrome     Osteoarthritis of carpometacarpal (CMC) joint of thumb 6/5/2018    Osteoarthritis of knee 6/5/2018    Peripheral vascular disease ([address]     with venous stasis and ulcerations.  Dr Kya Reece Presence of coronary angioplasty implant and graft 9/11/2009    Sleep apnea     Dr Slava Souza of knee and leg 6/5/2018    Sprain of shoulder and upper arm 6/5/2018    Type II or unspecified type diabetes mellitus without mention of complication, not stated as uncontrolled     Ulcer of left foot, with fat layer exposed ([facility] 1/9/2018    Unspecified sleep apnea     Dr Varsha Rubalcava CPAP    Venous insufficiency of both lower extremities 1/31/2018        Past Surgical History:     Past Surgical History:   Procedure Laterality Date    ARTHROPLASTY Left 4/1/2022    ARTHROPLASTY 2ND LEFT TOE performed by Miguel Feliciano DPM at [address] [facility] [place] Bilateral 6/17/2022    ARTHROPLASTY 2ND DIGIT RIGHT APPLICATION EPIFIX LEFT FOOT performed by Miguel Feliciano DPM at [address] [facility] [place]  09/2021    stent    [address] Right 3/22/13    Dr. Lawerence Hamman.  COLONOSCOPY  2020    CORONARY ARTERY BYPASS GRAFT  2/26/07    [facility], Dr Manju Avina (X 3)    CORONARY ARTERY BYPASS GRAFT  2006    NECK SURGERY  2010    cervical stenoses C5-C6-C7and partial T1 laminectomy with fusion of C6-7    OTHER SURGICAL HISTORY      wound care ulcers of legs (bilaterally) with Dr Dave Rojo Bilateral 5/18/2022    STRAVIX LEFT FOOT, THERASKIN RIGHT FOOT performed by Miguel Feliciano DPM at [address] [facility] TOE AMPUTATION Left 4/6/2022    TOE AMPUTATION, SECOND DIGIT performed by Miguel Feliciano DPM at [address]  age 11   [place]      closure of peripherator veins of legs, Dr Hannah York          Medications Prior to Admission:     Prior to Admission medications    Medication Sig Start Date End Date Taking? Authorizing Provider   doxycycline hyclate (VIBRA-TABS) 100 MG tablet Take 1 tablet by mouth 2 times daily for 10 days 6/14/22 6/24/22  Miguel Feliciano DPM   diazePAM (VALIUM) 5 MG tablet Take 5 mg by mouth once.   6/2/22   Historical Provider, MD   clopidogrel (PLAVIX) 75 MG tablet Take 1 tablet by mouth daily 6/8/22 9/6/22  Seema Hameed DO   carvedilol (COREG) 12.5 MG tablet Take 1 tablet by mouth 2 times daily (with meals) 5/26/22 8/24/22  Seema Hameed DO   dapagliflozin (FARXIGA) 10 MG tablet Take 1 tablet by mouth every morning 5/12/22   Seema ONEILL Yoseph,    traZODone (DESYREL) 150 MG tablet TAKE ONE TABLET BY MOUTH EVERY EVENING 5/12/22   Seema Hameed DO   linagliptin (TRADJENTA) 5 MG tablet TAKE 1 TABLET BY MOUTH ONE TIME A DAY 5/10/22   Seema Hameed, DO   hydroCHLOROthiazide (MICROZIDE) 12.5 MG capsule 1 po qd 4/18/22   Seema Hameed, DO   metFORMIN (GLUCOPHAGE) 1000 MG tablet Take 1 tablet by mouth 2 times daily (with meals) 3/31/22 6/9/22  Seema Hameed, DO   omeprazole (PRILOSEC) 20 MG delayed release capsule TAKE 1 CAPSULE DAILY 3/31/22   Seema Hameed, DO   gabapentin (NEURONTIN) 300 MG capsule Take 900 mg by mouth nightly. Historical Provider, MD   Misc Natural Products (GLUCOSAMINE CHOND MSM FORMULA PO) Take 2 tablets by mouth daily    Historical Provider, MD   HYDROcodone-acetaminophen (NORCO)  MG per tablet Take 1 tablet by mouth every 4-6 hours as needed for Pain. Historical Provider, MD   meloxicam (MOBIC) 15 MG tablet Take 1 tablet by mouth daily 3/10/22 6/9/22  Seema Hameed, DO   tiZANidine (ZANAFLEX) 4 MG tablet 1 po qd 1/20/22   Seema Hameed DO   NEEDLE, DISP, 18 G (BD DISP NEEDLES) 18G X 1-1/2\" MISC 1 Device by Does not apply route every 30 days 6/29/21   Seema Hameed, DO   Multiple Vitamin (MULTI VITAMIN DAILY PO) Take by mouth    Historical Provider, MD   nitroGLYCERIN (NITROSTAT) 0.4 MG SL tablet Place 0.4 mg under the tongue    Historical Provider, MD   aspirin 325 MG tablet Take 325 mg by mouth daily. Historical Provider, MD        Allergies:     Pcn [penicillins] and Penicillin g    Social History:     Tobacco:    reports that he has never smoked. He has never used smokeless tobacco.  Alcohol:      reports previous alcohol use. Drug Use:  reports no history of drug use.     Family History:     Family History   Problem Relation Age of Onset    Cancer Mother         lung    Diabetes Father     Heart Disease Father     High Blood Pressure Father     Diabetes Brother     Heart Disease Brother     High Blood Pressure Brother        Review of Systems:     Positive and Negative as described in HPI. Review of Systems   Constitutional: Negative for activity change, chills, fatigue and fever. HENT: Negative for sinus pressure and sinus pain. Eyes: Negative for photophobia and visual disturbance. Respiratory: Negative for cough, shortness of breath and wheezing. Cardiovascular: Negative. Negative for chest pain, palpitations and leg swelling. Gastrointestinal: Negative for abdominal pain, constipation, diarrhea, nausea and vomiting. Endocrine: Negative for cold intolerance, heat intolerance and polyuria. Genitourinary: Negative for difficulty urinating and urgency. Musculoskeletal: Negative for arthralgias and myalgias. Skin: Positive for wound. Neurological: Negative for dizziness, syncope, weakness and light-headedness. Hematological: Negative for adenopathy. Does not bruise/bleed easily. Psychiatric/Behavioral: Negative for agitation and confusion. The patient is not nervous/anxious. Physical Exam:     /70   Pulse 76   Temp 97.5 °F (36.4 °C) (Axillary)   Resp 16   Ht 6' (1.829 m)   Wt 249 lb (112.9 kg)   SpO2 94%   BMI 33.77 kg/m²   Temp (24hrs), Av.1 °F (36.2 °C), Min:96.8 °F (36 °C), Max:97.5 °F (36.4 °C)    Recent Labs     22  0635 22  0844 22  1142   POCGLU 202* 181* 206*       Intake/Output Summary (Last 24 hours) at 2022 1431  Last data filed at 2022 1312  Gross per 24 hour   Intake 630 ml   Output 1370 ml   Net -740 ml       Physical Exam  Constitutional:       Appearance: Normal appearance. He is normal weight. HENT:      Head: Normocephalic and atraumatic. Nose: Nose normal.      Mouth/Throat:      Mouth: Mucous membranes are moist.   Eyes:      Extraocular Movements: Extraocular movements intact. Conjunctiva/sclera: Conjunctivae normal.      Pupils: Pupils are equal, round, and reactive to light.    Cardiovascular: Rate and Rhythm: Normal rate and regular rhythm. Pulmonary:      Effort: Pulmonary effort is normal. No respiratory distress. Breath sounds: Normal breath sounds. Abdominal:      General: Abdomen is flat. Musculoskeletal:         General: Normal range of motion. Cervical back: Normal range of motion and neck supple. Comments: Dressings applied to bilateral lower extremities    Skin:     General: Skin is warm and dry. Capillary Refill: Capillary refill takes less than 2 seconds. Neurological:      Mental Status: He is alert. Psychiatric:         Mood and Affect: Affect is angry. Investigations:      Laboratory Testing:  Recent Results (from the past 24 hour(s))   POC Glucose Fingerstick    Collection Time: 06/17/22  6:35 AM   Result Value Ref Range    POC Glucose 202 (H) 75 - 110 mg/dL   POC Glucose Fingerstick    Collection Time: 06/17/22  8:44 AM   Result Value Ref Range    POC Glucose 181 (H) 75 - 110 mg/dL   POC Glucose Fingerstick    Collection Time: 06/17/22 11:42 AM   Result Value Ref Range    POC Glucose 206 (H) 75 - 110 mg/dL       Imaging/Diagonstics:  XR FOOT RIGHT (MIN 3 VIEWS)    Result Date: 6/15/2022  Subtle ulceration medial distal 2nd toe, with associated findings but no clear cut radiographic osteomyelitis. Additional degenerative and other findings, as above. RECOMMENDATION: If there is strong clinical concern for osteomyelitis, consider MRI. CT HIP LEFT WO CONTRAST    Result Date: 6/15/2022  Possible fracture seen on MRI not visualized on CT. Advanced osteoarthritis of the left hip with large subchondral cysts, bone-on-bone apposition and sclerosis. Difficult to exclude superimposed osseous erosions. Correlate with joint aspiration if clinical concern for infectious or inflammatory arthropathy. Moderate to severe osteoarthritis of the right hip.        Assessment :      Hospital Problems           Last Modified POA    * (Principal) Chronic ulcer of right foot with fat layer exposed ([address] 6/17/2022 Yes    Peripheral vascular disease ([address] 6/17/2022 Yes    Overview Signed 5/18/2012 12:39 PM by Osmany Thomas DO     with venous stasis and ulcerations. Dr Denis Reed         CAD (coronary artery disease) 6/17/2022 Yes    Overview Signed 5/18/2012 12:58 PM by Osmany Thomas DO     [facility]         CHF (congestive heart failure) ([address] 6/17/2022 Yes    Overview Signed 9/12/2016  9:41 AM by Osmnay Thomas DO     Left ventricle, improved as noted per cardiology. Essential hypertension 6/17/2022 Yes    Type 2 diabetes mellitus with circulatory disorder, without long-term current use of insulin ([address] 6/17/2022 Yes    Diabetic polyneuropathy associated with type 2 diabetes mellitus ([address] 6/17/2022 Yes          Plan:     1. Chronic ulcer of right foot with exposed fat layer and a personal history of PVD from type 2 diabetes with polyneuropathy  1. Agree with continued IV antibiotics  2. Postoperative wound care per primary  3. Pain management as ordered  4. We will restart gabapentin  2. Personal history of CHF and CAD  1. Continue aspirin, Plavix, HCTZ  3. Type 2 diabetes  1.  Continue Tradjenta      Consultations:   IP CONSULT TO HOSPITALIST  PHARMACY TO DOSE VANCOMYCIN      JESICA Pearce NP  6/17/2022  2:31 PM    Copy sent to Dr. Dolores Guthrie DO

## 2022-06-17 NOTE — H&P
[facility] History and Physical  Podiatric Medicine and Surgery     Subjective     Chief Complaint: right foot wound    HPI:  Susan Martinez is a 64 y.o. male seen at [address] for right foot wound. Patient has had 2nd digit wound of right foot since February. Patient elects to undergo surgical treatment of 2nd digit arthoplasty to offload the wound for optimal healing. He denies other complaints. PCP is Seema Hameed DO    ROS:   Review of Systems   Constitutional: Negative for activity change, chills and fever. HENT: Negative for facial swelling and sore throat. Eyes: Negative for photophobia. Respiratory: Negative for chest tightness and shortness of breath. Cardiovascular: Negative for chest pain, palpitations and leg swelling. Gastrointestinal: Negative for abdominal pain, diarrhea, nausea and vomiting. Musculoskeletal: Positive for gait problem and myalgias (right hip). Negative for arthralgias and joint swelling. Skin: Positive for color change and wound (right foot). Neurological: Negative for weakness and headaches. Psychiatric/Behavioral: Negative for agitation and behavioral problems.        Past Medical History   has a past medical history of Acquired trigger finger, CAD (coronary artery disease), Cervical spondylosis, CHF (congestive heart failure) ([address] Chronic back pain, Contusion of elbow, Contusion of knee, Contusion of shoulder region, Diabetic foot ulcer with osteomyelitis ([address] Diabetic ulcer of toe associated with type 2 diabetes mellitus, with fat layer exposed ([address] Hypercholesterolemia, Hyperlipemia, mixed, Hypertension, Ischemic cardiomyopathy, Non-pressure chronic ulcer of left lower leg with fat layer exposed ([address] Obesity, Obesity, Class III, BMI 40-49.9 (morbid obesity) ([address] Obstructive sleep apnea syndrome, Osteoarthritis of carpometacarpal (CMC) joint of thumb, Osteoarthritis of knee, Peripheral vascular disease ([address] Presence of coronary angioplasty implant and graft, Sleep apnea, Sprain of knee and leg, Sprain of shoulder and upper arm, Type II or unspecified type diabetes mellitus without mention of complication, not stated as uncontrolled, Ulcer of left foot, with fat layer exposed ([address] Unspecified sleep apnea, and Venous insufficiency of both lower extremities. Past Surgical History   has a past surgical history that includes Neck surgery (2010); [place] tooth extraction; Coronary artery bypass graft (2/26/07); Vein Surgery; other surgical history; Tonsillectomy (age 11); Carpal tunnel release (Right, 3/22/13); Cardiac surgery; Coronary artery bypass graft (2006); Colonoscopy (2020); arthroplasty (Left, 4/1/2022); Toe amputation (Left, 4/6/2022); Pressure ulcer debridement (Bilateral, 5/18/2022); and Cardiac catheterization (09/2021). Medications  Prior to Admission medications    Medication Sig Start Date End Date Taking? Authorizing Provider   doxycycline hyclate (VIBRA-TABS) 100 MG tablet Take 1 tablet by mouth 2 times daily for 10 days 6/14/22 6/24/22  Carley Ormond, DPM   diazePAM (VALIUM) 5 MG tablet Take 5 mg by mouth once.   6/2/22   Historical Provider, MD   clopidogrel (PLAVIX) 75 MG tablet Take 1 tablet by mouth daily 6/8/22 9/6/22  Seema Hameed DO   carvedilol (COREG) 12.5 MG tablet Take 1 tablet by mouth 2 times daily (with meals) 5/26/22 8/24/22  Seema Hameed DO   dapagliflozin (FARXIGA) 10 MG tablet Take 1 tablet by mouth every morning 5/12/22   Seema Hameed DO   traZODone (DESYREL) 150 MG tablet TAKE ONE TABLET BY MOUTH EVERY EVENING 5/12/22   Seema Hameed DO   linagliptin (TRADJENTA) 5 MG tablet TAKE 1 TABLET BY MOUTH ONE TIME A DAY 5/10/22   Seema Hameed DO   hydroCHLOROthiazide (MICROZIDE) 12.5 MG capsule 1 po qd 4/18/22   Seema Hameed DO   metFORMIN (GLUCOPHAGE) 1000 MG tablet Take 1 tablet by mouth 2 times daily (with meals) 3/31/22 6/9/22  Seema Hameed DO   omeprazole (PRILOSEC) 20 MG delayed release capsule TAKE 1 CAPSULE DAILY 3/31/22   Seema Hameed DO   gabapentin (NEURONTIN) 300 MG capsule Take 900 mg by mouth nightly. Historical Provider, MD   Misc Natural Products (GLUCOSAMINE CHOND MSM FORMULA PO) Take 2 tablets by mouth daily    Historical Provider, MD   HYDROcodone-acetaminophen (NORCO)  MG per tablet Take 1 tablet by mouth every 4-6 hours as needed for Pain. Historical Provider, MD   meloxicam (MOBIC) 15 MG tablet Take 1 tablet by mouth daily 3/10/22 6/9/22  Seema Hameed DO   tiZANidine (ZANAFLEX) 4 MG tablet 1 po qd 1/20/22   Seema Hameed DO   NEEDLE, DISP, 18 G (BD DISP NEEDLES) 18G X 1-1/2\" MISC 1 Device by Does not apply route every 30 days 6/29/21   Seema Hameed DO   Multiple Vitamin (MULTI VITAMIN DAILY PO) Take by mouth    Historical Provider, MD   nitroGLYCERIN (NITROSTAT) 0.4 MG SL tablet Place 0.4 mg under the tongue    Historical Provider, MD   aspirin 325 MG tablet Take 325 mg by mouth daily. Historical Provider, MD    Scheduled Meds:   sodium chloride flush  5-40 mL IntraVENous 2 times per day     Continuous Infusions:   sodium chloride      lactated ringers 125 mL/hr at 06/17/22 0646    sodium chloride       PRN Meds:.lidocaine PF, sodium chloride flush, sodium chloride    Allergies  is allergic to pcn [penicillins] and penicillin g. Family History  family history includes Cancer in his mother; Diabetes in his brother and father; Heart Disease in his brother and father; High Blood Pressure in his brother and father. Social History   reports that he has never smoked. He has never used smokeless tobacco.   reports previous alcohol use. reports no history of drug use.       Objective     Vitals:  Patient Vitals for the past 8 hrs:   BP Temp Temp src Pulse Resp SpO2 Height Weight   06/17/22 0633 -- 97.3 °F (36.3 °C) Temporal -- -- -- 6' (1.829 m) 249 lb (112.9 kg)   06/17/22 0622 115/64 -- -- 71 18 97 % -- --     Average, Min, and Max for last 24 hours Vitals:  TEMPERATURE:  Temp  Av.3 °F (36.3 °C)  Min: 97.3 °F (36.3 °C)  Max: 97.3 °F (36.3 °C)    RESPIRATIONS RANGE: Resp  Av  Min: 18  Max: 18    PULSE RANGE: Pulse  Av  Min: 71  Max: 71    BLOOD PRESSURE RANGE:  Systolic (02CGX), EBQ:617 , Min:115 , ZPB:789   ; Diastolic (50ZXU), TAX:93, Min:64, Max:64      PULSE OXIMETRY RANGE: SpO2  Av %  Min: 97 %  Max: 97 %  I&O:  No intake/output data recorded. CBC:  Recent Labs     06/15/22  1111 06/15/22  1112   WBC 6.6 6.4   HGB 12.3* 12.3*   HCT 38.8* 39.2*    225   CRP 3.8  --         BMP:  Recent Labs     06/15/22  1112      K 3.7      CO2 19*   BUN 16   CREATININE 0.79   GLUCOSE 159*   CALCIUM 9.7        Coags:  Recent Labs     06/15/22  1112   PROT 7.6       Lab Results   Component Value Date    LABA1C 8.3 (H) 06/15/2022     Lab Results   Component Value Date    SEDRATE 31 (H) 06/15/2022      Lab Results   Component Value Date    CRP 3.8 06/15/2022        Physical Exam:    General: A&Ox3, NAD  Heart: Regular rate and rhythm. Lungs: Equal air entry. No increased effort. Abdomen: Soft, non-tender to palpation. Not distended. Lower Extremity Physical Exam:  Dressing remains intact for surgery. Passive motor function intact. Cap refill is less than 3 seconds. Clinical Images:  none    Imaging:   No orders to display         Assessment     Alex Bosch is a 64 y.o. male with   1. Type 2 diabetic foot wound, right foot  2. Type 2 diabetes with peripheral neuropathy    Active Problems:    * No active hospital problems. *  Resolved Problems:    * No resolved hospital problems. *       Plan     · Patient examined and evaluated at bedside   · Treatment options discussed in detail with the patient  · Patient elects to undergo 2nd digit arthoplasty.    · Patient is NPO  · Consent is signed  · Patient took blood thinner this AM.     Jolanta Yoder DPM   Podiatric Medicine & Surgery   2022 at 7:15 AM

## 2022-06-17 NOTE — PROGRESS NOTES
Pt admitted to room 2023 from PACU  Patient alert and oriented x3  Oriented to room and call light/tv controls. Bed in lowest position, wheels locked, 2/4 side rails up  Call light in reach, room free of clutter, adequate lighting provided.

## 2022-06-17 NOTE — BRIEF OP NOTE
PODIATRY BRIEF OP NOTE    PATIENT NAME: Renetta Hebert DATE: 1960  -  64 y.o. male  MRN: [id number]  DATE: 6/17/2022  BILLING #: [id number]    Surgeon(s): Alix Castellanos DPM     ASSISTANTS: Haley Hernández DPM PGY-1    PRE-OP DIAGNOSIS:   Diabetic foot ulcer to subcutaneous layer, right foot and SEVERELY CONTRACTED 2ND RIGHT AT PIPJ  Diabetic foot ulcer to subcutaneous layer, left foot    POST-OP DIAGNOSIS: Same as above. PROCEDURE:   2nd digit arthroplasty, right foot  Debridement of wound to subcutaneous layer, 0.6x0.5x0.5cm, right foot  DEBRIDEMENT OF WOUND TO PRE FOR GRAFT AND Application of epifix graft, left foot     ANESTHESIA: MAC    HEMOSTASIS: Pneumatic ankle tourniquet @ 225 mmHg for 30 minutes. ESTIMATED BLOOD LOSS: Less than 5cc. MATERIALS:   Implant Name Type Inv. Item Serial No.  Lot No. LRB No. Used Action   EPIFIX 2X2CM [id number] CM - [id number]  EPIFIX 2X2CM 4SQ CM  [employer]- [id number] 1 Implanted       INJECTABLES: none    SPECIMEN:   ID Type Source Tests Collected by Time Destination   1 : BONE CULTURE RIGHT SECOND TOE Bone Toe CULTURE, ANAEROBIC AND AEROBIC Alix Castellanos DPM 6/17/2022 0816    A : BONE BIOPSY RIGHT SECOND TOE Bone Toe SURGICAL PATHOLOGY Alix Castellanos DPM 6/17/2022 7398        COMPLICATIONS: none    FINDINGS: right 2nd digit was reduced to rectus position. Left foot wound was debrided without complication. The patient was counseled at length about the risks of frances Covid-19 during their perioperative period and any recovery window from their procedure. The patient was made aware that frances Covid-19  may worsen their prognosis for recovering from their procedure  and lend to a higher morbidity and/or mortality risk. All material risks, benefits, and reasonable alternatives including postponing the procedure were discussed. The patient does wish to proceed with the procedure at this time.     Haley Hernández DPM   Podiatric Medicine & Surgery   6/17/2022 at 8:54 AM

## 2022-06-17 NOTE — H&P
Admit History and Physical  Podiatric Medicine and Surgery     Subjective     Chief Complaint: right foot wound    HPI:  Shira Reed is a 64 y.o. male seen at [address] for right foot wound. Patient has had 2nd digit wound of right foot since February. Patient elects to undergo surgical treatment of 2nd digit arthoplasty to offload the wound for optimal healing. He is admitted under observation unit for overnight antibiotic treatment. PCP is Seema Hameed DO    ROS:   Review of Systems   Constitutional: Negative for activity change, chills and fever. HENT: Negative for facial swelling and sore throat. Eyes: Negative for photophobia. Respiratory: Negative for chest tightness and shortness of breath. Cardiovascular: Negative for chest pain, palpitations and leg swelling. Gastrointestinal: Negative for abdominal pain, diarrhea, nausea and vomiting. Musculoskeletal: Positive for gait problem and myalgias (right hip). Negative for arthralgias and joint swelling. Skin: Positive for color change and wound (right foot). Neurological: Negative for weakness and headaches. Psychiatric/Behavioral: Negative for agitation and behavioral problems.        Past Medical History   has a past medical history of Acquired trigger finger, CAD (coronary artery disease), Cervical spondylosis, CHF (congestive heart failure) ([address] Chronic back pain, Contusion of elbow, Contusion of knee, Contusion of shoulder region, Diabetic foot ulcer with osteomyelitis ([address] Diabetic ulcer of toe associated with type 2 diabetes mellitus, with fat layer exposed ([address] Hypercholesterolemia, Hyperlipemia, mixed, Hypertension, Ischemic cardiomyopathy, Non-pressure chronic ulcer of left lower leg with fat layer exposed ([address] Obesity, Obesity, Class III, BMI 40-49.9 (morbid obesity) ([address] Obstructive sleep apnea syndrome, Osteoarthritis of carpometacarpal (CMC) joint of thumb, Osteoarthritis of knee, Peripheral vascular disease [facility]), Presence of coronary angioplasty implant and graft, Sleep apnea, Sprain of knee and leg, Sprain of shoulder and upper arm, Type II or unspecified type diabetes mellitus without mention of complication, not stated as uncontrolled, Ulcer of left foot, with fat layer exposed ([address] Unspecified sleep apnea, and Venous insufficiency of both lower extremities. Past Surgical History   has a past surgical history that includes Neck surgery (2010); [place] tooth extraction; Coronary artery bypass graft (2/26/07); Vein Surgery; other surgical history; Tonsillectomy (age 11); Carpal tunnel release (Right, 3/22/13); Cardiac surgery; Coronary artery bypass graft (2006); Colonoscopy (2020); arthroplasty (Left, 4/1/2022); Toe amputation (Left, 4/6/2022); Pressure ulcer debridement (Bilateral, 5/18/2022); and Cardiac catheterization (09/2021). Medications  Prior to Admission medications    Medication Sig Start Date End Date Taking? Authorizing Provider   doxycycline hyclate (VIBRA-TABS) 100 MG tablet Take 1 tablet by mouth 2 times daily for 10 days 6/14/22 6/24/22  Slade Santos DPM   diazePAM (VALIUM) 5 MG tablet Take 5 mg by mouth once.   6/2/22   Historical Provider, MD   clopidogrel (PLAVIX) 75 MG tablet Take 1 tablet by mouth daily 6/8/22 9/6/22  Seema Hameed,    carvedilol (COREG) 12.5 MG tablet Take 1 tablet by mouth 2 times daily (with meals) 5/26/22 8/24/22  Seema Hameed DO   dapagliflozin (FARXIGA) 10 MG tablet Take 1 tablet by mouth every morning 5/12/22   Seema Hameed DO   traZODone (DESYREL) 150 MG tablet TAKE ONE TABLET BY MOUTH EVERY EVENING 5/12/22   Seema Hameed, DO   linagliptin (TRADJENTA) 5 MG tablet TAKE 1 TABLET BY MOUTH ONE TIME A DAY 5/10/22   Seema Hameed,    hydroCHLOROthiazide (MICROZIDE) 12.5 MG capsule 1 po qd 4/18/22   Seema Hameed, DO   metFORMIN (GLUCOPHAGE) 1000 MG tablet Take 1 tablet by mouth 2 times daily (with meals) 3/31/22 6/9/22  Seema Hameed,    omeprazole (PRILOSEC) 20 MG delayed release capsule TAKE 1 CAPSULE DAILY 3/31/22   Seema Hameed DO   gabapentin (NEURONTIN) 300 MG capsule Take 900 mg by mouth nightly. Historical Provider, MD   Misc Natural Products (GLUCOSAMINE CHOND MSM FORMULA PO) Take 2 tablets by mouth daily    Historical Provider, MD   HYDROcodone-acetaminophen (NORCO)  MG per tablet Take 1 tablet by mouth every 4-6 hours as needed for Pain. Historical Provider, MD   meloxicam (MOBIC) 15 MG tablet Take 1 tablet by mouth daily 3/10/22 6/9/22  Seema Hameed DO   tiZANidine (ZANAFLEX) 4 MG tablet 1 po qd 1/20/22   Seema Hameed DO   NEEDLE, DISP, 18 G (BD DISP NEEDLES) 18G X 1-1/2\" MISC 1 Device by Does not apply route every 30 days 6/29/21   Seema Hameed DO   Multiple Vitamin (MULTI VITAMIN DAILY PO) Take by mouth    Historical Provider, MD   nitroGLYCERIN (NITROSTAT) 0.4 MG SL tablet Place 0.4 mg under the tongue    Historical Provider, MD   aspirin 325 MG tablet Take 325 mg by mouth daily.       Historical Provider, MD    Scheduled Meds:   sodium chloride flush  5-40 mL IntraVENous 2 times per day    sodium chloride flush  5-40 mL IntraVENous 2 times per day    aspirin  325 mg Oral Daily    carvedilol  12.5 mg Oral BID WC    clopidogrel  75 mg Oral Daily    empagliflozin  10 mg Oral QAM    diazePAM  5 mg Oral Once    gabapentin  900 mg Oral Nightly    hydroCHLOROthiazide  12.5 mg Oral Daily    linagliptin  5 mg Oral Daily    [START ON 6/18/2022] pantoprazole  40 mg Oral QAM AC    tiZANidine  4 mg Oral Daily    traZODone  1 tablet Oral QPM     Continuous Infusions:   sodium chloride      lactated ringers 125 mL/hr at 06/17/22 0743    sodium chloride      sodium chloride      dextrose       PRN Meds:.lidocaine PF, sodium chloride flush, sodium chloride, fentanNYL, fentanNYL, oxyCODONE, ondansetron, sodium chloride flush, sodium chloride, ondansetron **OR** ondansetron, polyethylene glycol, acetaminophen **OR** acetaminophen, oxyCODONE **OR** oxyCODONE, glucose, dextrose bolus **OR** dextrose bolus, glucagon (rDNA), dextrose    Allergies  is allergic to pcn [penicillins] and penicillin g. Family History  family history includes Cancer in his mother; Diabetes in his brother and father; Heart Disease in his brother and father; High Blood Pressure in his brother and father. Social History   reports that he has never smoked. He has never used smokeless tobacco.   reports previous alcohol use. reports no history of drug use. Objective     Vitals:  Patient Vitals for the past 8 hrs:   BP Temp Temp src Pulse Resp SpO2 Height Weight   22 0930 124/68 -- -- 72 (!) 9 98 % -- --   22 0915 116/62 -- -- 63 13 99 % -- --   22 0900 114/63 -- -- 72 13 99 % -- --   22 0845 (!) 109/57 -- -- 66 13 99 % -- --   22 0841 121/67 96.8 °F (36 °C) Temporal 70 13 99 % -- --   22 0633 -- 97.3 °F (36.3 °C) Temporal -- -- -- 6' (1.829 m) 249 lb (112.9 kg)   22 0622 115/64 -- -- 71 18 97 % -- --     Average, Min, and Max for last 24 hours Vitals:  TEMPERATURE:  Temp  Av.1 °F (36.2 °C)  Min: 96.8 °F (36 °C)  Max: 97.3 °F (36.3 °C)    RESPIRATIONS RANGE: Resp  Av.2  Min: 9  Max: 18    PULSE RANGE: Pulse  Av  Min: 63  Max: 72    BLOOD PRESSURE RANGE:  Systolic (02WHF), PJH:349 , Min:109 , RJL:691   ; Diastolic (42QJF), OC, Min:57, Max:68      PULSE OXIMETRY RANGE: SpO2  Av.5 %  Min: 97 %  Max: 99 %  I&O:  No intake/output data recorded.     CBC:  Recent Labs     06/15/22  1111 06/15/22  1112   WBC 6.6 6.4   HGB 12.3* 12.3*   HCT 38.8* 39.2*    225   CRP 3.8  --         BMP:  Recent Labs     06/15/22  1112      K 3.7      CO2 19*   BUN 16   CREATININE 0.79   GLUCOSE 159*   CALCIUM 9.7        Coags:  Recent Labs     06/15/22  1112   PROT 7.6       Lab Results   Component Value Date    LABA1C 8.3 (H) 06/15/2022     Lab Results   Component Value Date    SEDRATE 31 (H) 06/15/2022 Lab Results   Component Value Date    CRP 3.8 06/15/2022        Physical Exam:    General: A&Ox3, NAD  Heart: Regular rate and rhythm. Lungs: Equal air entry. No increased effort. Abdomen: Soft, non-tender to palpation. Not distended. Lower Extremity Physical Exam:  Dressing remains intact post surgery. Passive motor function intact. Cap refill is less than 3 seconds. Clinical Images:  none    Imaging:   No orders to display         Assessment     Charley Burgos is a 64 y.o. male with   1. Type 2 diabetic foot wound down to subcutaneous layer, right foot  2. Status post 2nd digit PIPJ arthroplasty , right foot. Status post wound bed preparation and graft application, left foot. 3. Type 2 diabetic foot wound down to subcutaneous layer , left foot  4. Type 2 diabetes with peripheral neuropathy    Principal Problem:    Chronic ulcer of right foot with fat layer exposed ([address]  Resolved Problems:    * No resolved hospital problems. *       Plan     · Patient examined and evaluated at bedside   · Treatment options discussed in detail with the patient. · Patient is status post 2nd digit PIPJ arthroplasty , right foot. Status post wound bed preparation and graft application, left foot. Patient will admit overnight for observation. Patient can be discharged tomorrow. · Patient will leave dressing intact until his appointment with Dr. Srini Dunaway on next Tuesday. · Antibiotic: vanco  · Medicine consulted for medical management.         DVT ppx:  Plavix  Diet: carb control   Activity: WBAT to Right lower extremity  Pain Control: panel ordered      Erik Boast, DPM   Podiatric Medicine & Surgery   6/17/2022 at 10:18 AM

## 2022-06-17 NOTE — ANESTHESIA POSTPROCEDURE EVALUATION
Department of Anesthesiology  Postprocedure Note    Patient: Wade Carey  MRN: [id number]  YOB: 1960  Date of evaluation: 6/17/2022  Time:  12:19 PM     Procedure Summary     Date: 06/17/22 Room / Location: [facility] OR [address] [place] [address] - INPATIENT    Anesthesia Start: 4548 Anesthesia Stop: 8873    Procedure: ARTHROPLASTY 2ND DIGIT RIGHT APPLICATION EPIFIX LEFT FOOT (Bilateral Foot) Diagnosis:       Deformity of toe of right foot      (DX CONTRACTED 2ND DIGIT RIGHT)    Surgeons: Mai Casillas DPM Responsible Provider: Sammie Hathaway MD    Anesthesia Type: general ASA Status: 4          Anesthesia Type: No value filed. Arielle Phase I: Arielle Score: 9    Arielle Phase II:      Last vitals: Reviewed and per EMR flowsheets.        Anesthesia Post Evaluation    Complications: no

## 2022-06-17 NOTE — PLAN OF CARE
Problem: Chronic Conditions and Co-morbidities  Goal: Patient's chronic conditions and co-morbidity symptoms are monitored and maintained or improved  Outcome: Progressing  Flowsheets (Taken 6/17/2022 1121)  Care Plan - Patient's Chronic Conditions and Co-Morbidity Symptoms are Monitored and Maintained or Improved:   Monitor and assess patient's chronic conditions and comorbid symptoms for stability, deterioration, or improvement   Collaborate with multidisciplinary team to address chronic and comorbid conditions and prevent exacerbation or deterioration   Update acute care plan with appropriate goals if chronic or comorbid symptoms are exacerbated and prevent overall improvement and discharge     Problem: Discharge Planning  Goal: Discharge to home or other facility with appropriate resources  Outcome: Progressing  Flowsheets (Taken 6/17/2022 1108)  Discharge to home or other facility with appropriate resources:   Identify barriers to discharge with patient and caregiver   Identify discharge learning needs (meds, wound care, etc)   Refer to discharge planning if patient needs post-hospital services based on physician order or complex needs related to functional status, cognitive ability or social support system   Arrange for needed discharge resources and transportation as appropriate     Problem: Pain  Goal: Verbalizes/displays adequate comfort level or baseline comfort level  Outcome: Progressing  Flowsheets (Taken 6/17/2022 1948)  Verbalizes/displays adequate comfort level or baseline comfort level:   Encourage patient to monitor pain and request assistance   Assess pain using appropriate pain scale   Administer analgesics based on type and severity of pain and evaluate response   Implement non-pharmacological measures as appropriate and evaluate response   Consider cultural and social influences on pain and pain management   Notify Licensed Independent Practitioner if interventions unsuccessful or patient reports new pain  Note: Pain level assessment complete.    Patient educated on pain scale and control interventions  PRN pain medication given per patient request  Patient instructed to call out with new onset of pain or unrelieved pain       Problem: Safety - Adult  Goal: Free from fall injury  Outcome: Progressing     Problem: ABCDS Injury Assessment  Goal: Absence of physical injury  Outcome: Progressing

## 2022-06-17 NOTE — PROGRESS NOTES
Per [facility] approved formulary policy, SGLT2 Inhibitors are not stocked or supplied for our inpatients. Please note that the  Empagliflozin (Jardiance) is non-formulary and has been discontinued while inpatient. If you feel the patient needs to continue their home therapy during the inpatient stay, the patient may bring their medication bottle for verification and administration pursuant to our home medication use policy. You may continue this at discharge. Please contact the pharmacy with any questions or concerns. Thank you.   Hipolito Christian, [facility], RPfrandy/PharmD 6/17/2022 12:07 PM

## 2022-06-17 NOTE — ANESTHESIA PRE PROCEDURE
Department of Anesthesiology  Preprocedure Note       Name:  Kelvin German   Age:  64 y.o.  :  1960                                          MRN:  [id number]         Date:  2022      Surgeon: Emily Avery): Miguel Feliciano DPM    Procedure: Procedure(s):  ARTHROPLASTY 2ND DIGIT RIGHT    Medications prior to admission:   Prior to Admission medications    Medication Sig Start Date End Date Taking? Authorizing Provider   doxycycline hyclate (VIBRA-TABS) 100 MG tablet Take 1 tablet by mouth 2 times daily for 10 days 22  Miguel Feliciano DPM   diazePAM (VALIUM) 5 MG tablet Take 5 mg by mouth once. 22   Historical Provider, MD   clopidogrel (PLAVIX) 75 MG tablet Take 1 tablet by mouth daily 22  Seema Hameed,    carvedilol (COREG) 12.5 MG tablet Take 1 tablet by mouth 2 times daily (with meals) 22  Seema Hameed,    dapagliflozin (FARXIGA) 10 MG tablet Take 1 tablet by mouth every morning 22   Seema Hameed, DO   traZODone (DESYREL) 150 MG tablet TAKE ONE TABLET BY MOUTH EVERY EVENING 22   Seema Hameed, DO   linagliptin (TRADJENTA) 5 MG tablet TAKE 1 TABLET BY MOUTH ONE TIME A DAY 5/10/22   Seema Hameed, DO   hydroCHLOROthiazide (MICROZIDE) 12.5 MG capsule 1 po qd 22   Seema Hameed, DO   metFORMIN (GLUCOPHAGE) 1000 MG tablet Take 1 tablet by mouth 2 times daily (with meals) 3/31/22 6/9/22  Seema Hameed, DO   omeprazole (PRILOSEC) 20 MG delayed release capsule TAKE 1 CAPSULE DAILY 3/31/22   Seema Hameed, DO   gabapentin (NEURONTIN) 300 MG capsule Take 900 mg by mouth nightly. Historical Provider, MD   Misc Natural Products (GLUCOSAMINE CHOND MSM FORMULA PO) Take 2 tablets by mouth daily    Historical Provider, MD   HYDROcodone-acetaminophen (NORCO)  MG per tablet Take 1 tablet by mouth every 4-6 hours as needed for Pain.     Historical Provider, MD   meloxicam (MOBIC) 15 MG tablet Take 1 tablet by mouth daily 3/10/22 6/9/22  Seema Hameed, DO tiZANidine (ZANAFLEX) 4 MG tablet 1 po qd 1/20/22   Seema Hameed, DO   NEEDLE, DISP, 18 G (BD DISP NEEDLES) 18G X 1-1/2\" MISC 1 Device by Does not apply route every 30 days 6/29/21   Seema Hameed DO   Multiple Vitamin (MULTI VITAMIN DAILY PO) Take by mouth    Historical Provider, MD   nitroGLYCERIN (NITROSTAT) 0.4 MG SL tablet Place 0.4 mg under the tongue    Historical Provider, MD   aspirin 325 MG tablet Take 325 mg by mouth daily. Historical Provider, MD       Current medications:    Current Facility-Administered Medications   Medication Dose Route Frequency Provider Last Rate Last Admin    lidocaine PF 1 % injection 1 mL  1 mL IntraDERmal Once PRN Davey Curl, DO        0.9 % sodium chloride infusion   IntraVENous Continuous Mina Brown, DO        lactated ringers infusion   IntraVENous Continuous Davey Curl,  mL/hr at 06/17/22 0646 New Bag at 06/17/22 0646    sodium chloride flush 0.9 % injection 5-40 mL  5-40 mL IntraVENous 2 times per day Mina Brown, DO        sodium chloride flush 0.9 % injection 5-40 mL  5-40 mL IntraVENous PRN Davey Curl, DO        0.9 % sodium chloride infusion   IntraVENous PRN Davey Curl, DO           Allergies:     Allergies   Allergen Reactions    Pcn [Penicillins] Other (See Comments)     Unknown rx    Penicillin G Rash       Problem List:    Patient Active Problem List   Diagnosis Code    Hyperlipemia, mixed E78.2    Sleep apnea G47.30    Peripheral vascular disease ([facility]) I73.9    CAD (coronary artery disease) I25.10    CHF (congestive heart failure) ([facility]) I50.9    CTS (carpal tunnel syndrome) G56.00    Hypotension I95.9    Obesity, Class III, BMI 40-49.9 (morbid obesity) ([facility] E66.01    Essential hypertension I10    DDD (degenerative disc disease), cervical M50.30    DJD (degenerative joint disease), cervical M47.812    Primary osteoarthritis involving multiple joints M89.49    Paresthesias in left hand R20.2    Uncontrolled type 2 diabetes mellitus without complication, without long-term current use of insulin [id number]    Coronary artery disease involving coronary bypass graft of native heart without angina pectoris I25.810    Sleep apnea G47.30    Cervical spondylosis M47.812    Obesity E66.9    Long term current use of antithrombotics/antiplatelets P57.07    H/O cervical spine surgery Z98.890    Long term (current) use of non-steroidal anti-inflammatories (nsaid) Z79.1    Chronic prescription opiate use Z79.891    Severe comorbid illness R69    Hx of cervical spine surgery Z98.890    Chronic neck pain M54.2, G89.29    Candidal balanitis B37.42    Cellulitis of third toe, left L03.032    Nail avulsion, toe, initial encounter S91.209A    Cellulitis of second toe, right L03.031    Abnormal nuclear stress test R94.39    Abnormal stress test R94.39    Hypercholesterolemia E78.00    Diabetic foot ulcer with osteomyelitis (HCC) E11.621, E11.69, L97.509, M86.9    Ulcer of left foot, with fat layer exposed ([address] L97.522    Acquired trigger finger M65.30    Contusion of elbow S50.00XA    Contusion of shoulder region S40.019A    Contusion of knee S80.00XA    Degeneration of lumbar intervertebral disc M51.36    Diabetic ulcer of toe associated with type 2 diabetes mellitus, with fat layer exposed ([address] U86.936, L97.502    History of coronary artery bypass graft x 3 Z95.1    Ischemic cardiomyopathy I25.5    Osteoarthritis of knee M17.10    Osteoarthritis of carpometacarpal (CMC) joint of thumb M18.9    Sprain of knee and leg S83. 90XA    Sprain of shoulder and upper arm S43.409A    Presence of coronary angioplasty implant and graft Z95.5    Venous insufficiency of both lower extremities I87.2    Dog bite W54. 0XXA    Impingement syndrome of shoulder region M75.40    Bite wound of hand, left, subsequent encounter S61.452D    Bite wound of hand, right, subsequent encounter S61.451D    Open bite of left ear S01.352A    Open wound of nose due to dog bite S01.25XA, W54. 0XXA    Diabetic polyneuropathy associated with type 2 diabetes mellitus ([address] E11.42    Chronic ulcer of toe, left, with necrosis of bone ([address] L97.524    Ulcer of toe of right foot, with fat layer exposed ([address] L97.512    Osteomyelitis of second toe of left foot ([address] M86.9    Cellulitis L03.90       Past Medical History:        Diagnosis Date    Acquired trigger finger 6/5/2018    CAD (coronary artery disease)     NWOCC/ involving coronary bypass graft of native heart with unstable angina pectoris    Cervical spondylosis     CHF (congestive heart failure) ([address]     Chronic back pain     on 11/19/18 pt states is presently in pain mgmnt    Contusion of elbow 6/5/2018    Contusion of knee 6/5/2018    Contusion of shoulder region 6/5/2018    Diabetic foot ulcer with osteomyelitis ([address] 1/9/2018    Diabetic ulcer of toe associated with type 2 diabetes mellitus, with fat layer exposed ([address] 1/31/2018    Hypercholesterolemia 12/5/2017    Hyperlipemia, mixed     Hypertension     Ischemic cardiomyopathy 5/7/2018    Non-pressure chronic ulcer of left lower leg with fat layer exposed ([address] 6/9/2014    Obesity     Obesity, Class III, BMI 40-49.9 (morbid obesity) ([address] 4/6/2015    Obstructive sleep apnea syndrome     Osteoarthritis of carpometacarpal (CMC) joint of thumb 6/5/2018    Osteoarthritis of knee 6/5/2018    Peripheral vascular disease ([address]     with venous stasis and ulcerations.  Dr Ramirez Presence of coronary angioplasty implant and graft 9/11/2009    Sleep apnea     Dr Js Fairbanks of knee and leg 6/5/2018    Sprain of shoulder and upper arm 6/5/2018    Type II or unspecified type diabetes mellitus without mention of complication, not stated as uncontrolled     Ulcer of left foot, with fat layer exposed ([address] 1/9/2018    Unspecified sleep apnea     Dr Price Spence CPAP    Venous insufficiency of both lower extremities 1/31/2018       Past Surgical History:        Procedure Laterality Date    ARTHROPLASTY Left 4/1/2022    ARTHROPLASTY 2ND LEFT TOE performed by Brian Rivas DPM at [address] [place] [address] [place]  09/2021    stent    [address] Right 3/22/13    Dr. Vernon Bobby.     COLONOSCOPY  2020    CORONARY ARTERY BYPASS GRAFT  2/26/07    [place], Dr Humphrey Johnson (X 3)    CORONARY ARTERY BYPASS GRAFT  2006    NECK SURGERY  2010    cervical stenoses C5-C6-C7and partial T1 laminectomy with fusion of C6-7    OTHER SURGICAL HISTORY      wound care ulcers of legs (bilaterally) with Dr Valentino Bras Bilateral 5/18/2022    STRAVIX LEFT FOOT, THERASKIN RIGHT FOOT performed by Brian Rivas DPM at [address] [place] [address] Drive TOE AMPUTATION Left 4/6/2022    TOE AMPUTATION, SECOND DIGIT performed by Brian Rivas DPM at [place] [address]  age 11   [place]      closure of peripherator veins of legs, Dr Darryle Merritts History:    Social History     Tobacco Use    Smoking status: Never Smoker    Smokeless tobacco: Never Used   Substance Use Topics    Alcohol use: Not Currently     Comment: rare social, non past 5 months                                Counseling given: Not Answered      Vital Signs (Current):   Vitals:    06/17/22 0622 06/17/22 0633   BP: 115/64    Pulse: 71    Resp: 18    Temp:  97.3 °F (36.3 °C)   TempSrc:  Temporal   SpO2: 97%    Weight:  249 lb (112.9 kg)   Height:  6' (1.829 m)                                              BP Readings from Last 3 Encounters:   06/17/22 115/64   06/14/22 (!) 140/65   06/09/22 (!) 104/58       NPO Status: Time of last liquid consumption: 0530                        Time of last solid consumption: 1930                        Date of last liquid consumption: 06/17/22                        Date of last solid food consumption: 06/16/22    BMI:   Wt Readings from Last 3 Encounters:   06/17/22 249 lb (112.9 kg)   06/14/22 249 lb (112.9 kg)   06/09/22 249 lb (112.9 kg)     Body mass index is 33.77 kg/m². CBC:   Lab Results   Component Value Date    WBC 6.4 06/15/2022    RBC 4.44 06/15/2022    HGB 12.3 06/15/2022    HCT 39.2 06/15/2022    MCV 88.3 06/15/2022    RDW 13.5 06/15/2022     06/15/2022       CMP:   Lab Results   Component Value Date     06/15/2022    K 3.7 06/15/2022     06/15/2022    CO2 19 06/15/2022    BUN 16 06/15/2022    CREATININE 0.79 06/15/2022    GFRAA >60 06/15/2022    LABGLOM >60 06/15/2022    GLUCOSE 159 06/15/2022    PROT 7.6 06/15/2022    CALCIUM 9.7 06/15/2022    BILITOT 0.37 06/15/2022    ALKPHOS 91 06/15/2022    AST 11 06/15/2022    ALT 13 06/15/2022       POC Tests:   Recent Labs     06/17/22  0635   POCGLU 202*       Coags:   Lab Results   Component Value Date    PROTIME 14.2 04/06/2022    INR 1.1 04/06/2022    APTT 28.2 03/31/2022       HCG (If Applicable): No results found for: PREGTESTUR, PREGSERUM, HCG, HCGQUANT     ABGs: No results found for: PHART, PO2ART, NBA8EGN, BEU7ITN, BEART, T3CDSCDP     Type & Screen (If Applicable):  No results found for: LABABO, LABRH    Drug/Infectious Status (If Applicable):  Lab Results   Component Value Date    HEPCAB NONREACTIVE 09/12/2016       COVID-19 Screening (If Applicable):   Lab Results   Component Value Date    COVID19 Not Detected 04/05/2022           Anesthesia Evaluation    Airway: Mallampati: I  TM distance: >3 FB   Neck ROM: full  Mouth opening: > = 3 FB   Dental:          Pulmonary:   (+) sleep apnea:                             Cardiovascular:    (+) CABG/stent:, CHF:,     (-)  angina                Neuro/Psych:               GI/Hepatic/Renal:             Endo/Other:    (+) Diabetes, . Abdominal:             Vascular:           Other Findings:           Anesthesia Plan      general     ASA 4                                   Shadi Jimenez MD 6/17/2022

## 2022-06-17 NOTE — OP NOTE
PODIATRY OP NOTE    PATIENT NAME: Nicci Monroy DATE: 1960  -  64 y.o. male  MRN: [id number]  DATE: 6/17/2022  BILLING #: [id number]    Surgeon(s): Mildred Saavedra DPM     ASSISTANTS: Beryr Rdz DPM PGY-1    PRE-OP DIAGNOSIS:   1. Diabetic foot ulcer to subcutaneous layer, right foot   2. Serverely contracted 2nd PIPJ, right foot  3. Diabetic foot ulcer to subcutaneous layer, left foot    POST-OP DIAGNOSIS: Same as above. PROCEDURE:   1. 2nd digit arthroplasty, right foot  2. Debridement of wound and wound bed preparation , 0.6x0.5x0.5cm, right foot  3. Application of epifix graft, left foot     ANESTHESIA: MAC    HEMOSTASIS: Pneumatic ankle tourniquet @ 225 mmHg for 30 minutes. ESTIMATED BLOOD LOSS: Less than 5cc. MATERIALS:   Implant Name Type Inv. Item Serial No.  Lot No. LRB No. Used Action   EPIFIX 2X2CM 4SQ CM - [id number]  EPIFIX 2X2CM 4SQ CM  [employer]- [id number] Left 1 Implanted       INJECTABLES: none    SPECIMEN:   ID Type Source Tests Collected by Time Destination   1 : BONE CULTURE RIGHT SECOND TOE Bone Toe CULTURE, ANAEROBIC AND AEROBIC Mildred Saavedra DPM 6/17/2022 0816    A : BONE BIOPSY RIGHT SECOND TOE Bone Toe SURGICAL PATHOLOGY Mildred Saavedra DPM 6/17/2022 9111        COMPLICATIONS: none    FINDINGS: right 2nd digit was reduced to rectus position. Left foot wound was debrided without complication. INDICATION FOR PROCEDURE: The patient is a 64year-old male who has had a progressive hammertoe deformity of the  2nd digit of the right foot which caused a chronic ulcer for 4 months. Patient is status post 2nd digit amputation to the left foot. There is a small wound to the incision site as well. Patient has been going to wound care but granulation tissue formation has plateaued.  The wound bed is fibro-granular and is now at a point where graft application is necessary to augment further healing of the wound The patient has failed multiple conservative treatment modalities. Pain has progressed to the point of limiting ambulation, difficulty with shoe gear, and negatively affecting activities of daily living. The patient elects to undergo surgical correction at this time. All risks and benefits were discussed in extensive detail with the patient. No guarantees were given or implied. Consent obtained and placed in the chart. COVID testing is negative. PROCEDURE IN DETAIL: Under mild sedation the patient was transported from pre-op to the operating room and placed on the operating table in the supine position with a safety strap across the lap. A well-padded pneumatic tourniquet was applied to the right ankle. After adequate sedation by the Anesthesia team, the foot was prepped and draped in the usual aseptic fashion. A timeout was performed confirming the correct patient, correct site, correct procedure, antibiotics, everyone in the room was in agreement. The foot was then exsanguinated with an Esmarch bandage. The pneumatic ankle tourniquet was inflated to 250 mmHg where it would remain inflated for 30 minutes throughout the procedure. Attention was then directed to the right 2nd digit where a wound down to subcutaneous layer was noted. A 15 blade was used to make a linear incision over the dorsal aspect of the digit. The incision was deepened using sharp dissection until the extensor tendon and PIPJ were identified. The extensor tend was transected at the level of the PIPJ. Then extensor tendon, capsule, and periosteum were reflected revealing the head of the proximal phalanx. The head of the proximal phalanx was resected using a sagital saw and placed on the back table for pathology and micro spiceman . Immediately the contracture was noted to be reduced. Anatomic alignment of the 2nd digit was noted. The surgical site was irrigated with copious amounts of saline and the extensor tendon was repaired using 3-0 vicryl.  The surgical incision was then closed with 4-0 prolene. Anatomic alignment of the right 2nd digit was noted. Dressings were then applied consisting of adaptic, gauze 4 x 4's Kerlix and Ace. Attention was directed to the left foot where a wound with subcutaneous layer was noted to the distal aspect of the foot. The wound was debrided with the use of a curette to remove any potential biofilm, fibrotic tissue, and promote an environment conducive to graft application. No purulent drainage, increased warmth, erythema, malodor, other signs concerning for infection. The wound measured 0.6 cm x 0.5 cm x 0.5 cm post debridement. Adequate bleeding of the wound bed was noted. Next a 2 cm² Epifix graft was placed to the wound. The graft was anchored into placed with adaptic and Steri-strips. Dressings were then applied consisting of gauze 4 x 4's Kerlix and Ace. The patient tolerated the above procedure and anesthesia well without complications. The patient was transported from the operating room to the PACU with vital signs stable and vascular status intact to the bilateral foot. The patient was counseled at length about the risks of frances Covid-19 during their perioperative period and any recovery window from their procedure. The patient was made aware that frances Covid-19  may worsen their prognosis for recovering from their procedure  and lend to a higher morbidity and/or mortality risk. All material risks, benefits, and reasonable alternatives including postponing the procedure were discussed. The patient does wish to proceed with the procedure at this time.     Jeanie Hinton DPM   Podiatric Medicine & Surgery   6/17/2022 at 9:03 AM

## 2022-06-17 NOTE — PROGRESS NOTES
[address] [facility] Pharmacokinetic Monitoring Service - Vancomycin     Peggy Acosta is a 64 y.o. male starting on vancomycin therapy for soft tissue infection . Pharmacy consulted by Dr. Joceline Cintron DPM  for monitoring and adjustment. Target Concentration: Goal trough of 10-15 mg/L and AUC/LIZETT <500 mg*hr/L    Additional Antimicrobials: none    Pertinent Laboratory Values: Wt Readings from Last 1 Encounters:   06/17/22 249 lb (112.9 kg)     Temp Readings from Last 1 Encounters:   06/17/22 97.5 °F (36.4 °C) (Axillary)     Estimated Creatinine Clearance: 127 mL/min (based on SCr of 0.79 mg/dL). Recent Labs     06/15/22  1111 06/15/22  1112   CREATININE  --  0.79   WBC 6.6 6.4     Procalcitonin: none      MRSA Nasal Swab: N/A. Non-respiratory infection. Plan:  Dosing recommendations based on Bayesian software  Start vancomycin . Patient received a 1500mg dose pre-op.  Maintenance dose of 1250mg IV q 12 hours   Anticipated AUC of 453 and trough concentration of 14 ug/ml  at steady state  Renal labs as indicated   Vancomycin concentration ordered for 6/19 @ 0600   Pharmacy will continue to monitor patient and adjust therapy as indicated    Thank you for the consult,  AMANDA Lundy [facility]  6/17/2022 11:57 AM

## 2022-06-18 VITALS
HEIGHT: 72 IN | OXYGEN SATURATION: 98 % | HEART RATE: 59 BPM | SYSTOLIC BLOOD PRESSURE: 128 MMHG | DIASTOLIC BLOOD PRESSURE: 63 MMHG | WEIGHT: 249 LBS | TEMPERATURE: 97.9 F | BODY MASS INDEX: 33.72 KG/M2 | RESPIRATION RATE: 16 BRPM

## 2022-06-18 LAB
ABSOLUTE EOS #: 0.12 K/UL (ref 0–0.44)
ABSOLUTE IMMATURE GRANULOCYTE: 0.02 K/UL (ref 0–0.3)
ABSOLUTE LYMPH #: 2.81 K/UL (ref 1.1–3.7)
ABSOLUTE MONO #: 0.67 K/UL (ref 0.1–1.2)
ANION GAP SERPL CALCULATED.3IONS-SCNC: 10 MMOL/L (ref 9–17)
BASOPHILS # BLD: 0 % (ref 0–2)
BASOPHILS ABSOLUTE: 0.03 K/UL (ref 0–0.2)
BUN BLDV-MCNC: 17 MG/DL (ref 8–23)
BUN/CREAT BLD: 20 (ref 9–20)
CALCIUM SERPL-MCNC: 9.4 MG/DL (ref 8.6–10.4)
CHLORIDE BLD-SCNC: 108 MMOL/L (ref 98–107)
CO2: 25 MMOL/L (ref 20–31)
CREAT SERPL-MCNC: 0.87 MG/DL (ref 0.7–1.2)
EOSINOPHILS RELATIVE PERCENT: 2 % (ref 1–4)
GFR AFRICAN AMERICAN: >60 ML/MIN
GFR NON-AFRICAN AMERICAN: >60 ML/MIN
GFR SERPL CREATININE-BSD FRML MDRD: ABNORMAL ML/MIN/{1.73_M2}
GLUCOSE BLD-MCNC: 167 MG/DL (ref 70–99)
HCT VFR BLD CALC: 39.1 % (ref 40.7–50.3)
HEMOGLOBIN: 12 G/DL (ref 13–17)
IMMATURE GRANULOCYTES: 0 %
LYMPHOCYTES # BLD: 36 % (ref 24–43)
MCH RBC QN AUTO: 27.3 PG (ref 25.2–33.5)
MCHC RBC AUTO-ENTMCNC: 30.7 G/DL (ref 28.4–34.8)
MCV RBC AUTO: 89.1 FL (ref 82.6–102.9)
MONOCYTES # BLD: 9 % (ref 3–12)
NRBC AUTOMATED: 0 PER 100 WBC
PDW BLD-RTO: 13.7 % (ref 11.8–14.4)
PLATELET # BLD: 197 K/UL (ref 138–453)
PMV BLD AUTO: 9.1 FL (ref 8.1–13.5)
POTASSIUM SERPL-SCNC: 3.7 MMOL/L (ref 3.7–5.3)
RBC # BLD: 4.39 M/UL (ref 4.21–5.77)
SEG NEUTROPHILS: 53 % (ref 36–65)
SEGMENTED NEUTROPHILS ABSOLUTE COUNT: 4.07 K/UL (ref 1.5–8.1)
SODIUM BLD-SCNC: 143 MMOL/L (ref 135–144)
WBC # BLD: 7.7 K/UL (ref 3.5–11.3)

## 2022-06-18 PROCEDURE — 85025 COMPLETE CBC W/AUTO DIFF WBC: CPT

## 2022-06-18 PROCEDURE — 99225 PR SBSQ OBSERVATION CARE/DAY 25 MINUTES: CPT | Performed by: FAMILY MEDICINE

## 2022-06-18 PROCEDURE — 80048 BASIC METABOLIC PNL TOTAL CA: CPT

## 2022-06-18 PROCEDURE — 2580000003 HC RX 258: Performed by: PODIATRIST

## 2022-06-18 PROCEDURE — 36415 COLL VENOUS BLD VENIPUNCTURE: CPT

## 2022-06-18 PROCEDURE — 6360000002 HC RX W HCPCS: Performed by: PODIATRIST

## 2022-06-18 PROCEDURE — G0378 HOSPITAL OBSERVATION PER HR: HCPCS

## 2022-06-18 PROCEDURE — 6370000000 HC RX 637 (ALT 250 FOR IP): Performed by: PODIATRIST

## 2022-06-18 RX ORDER — DOXYCYCLINE HYCLATE 100 MG
100 TABLET ORAL 2 TIMES DAILY
Qty: 20 TABLET | Refills: 0 | Status: SHIPPED | OUTPATIENT
Start: 2022-06-18 | End: 2022-06-28

## 2022-06-18 RX ADMIN — ALOGLIPTIN 25 MG: 25 TABLET, FILM COATED ORAL at 09:54

## 2022-06-18 RX ADMIN — OXYCODONE 10 MG: 5 TABLET ORAL at 14:22

## 2022-06-18 RX ADMIN — ASPIRIN 325 MG: 325 TABLET ORAL at 09:54

## 2022-06-18 RX ADMIN — OXYCODONE 10 MG: 5 TABLET ORAL at 10:03

## 2022-06-18 RX ADMIN — VANCOMYCIN HYDROCHLORIDE 1250 MG: 5 INJECTION, POWDER, LYOPHILIZED, FOR SOLUTION INTRAVENOUS at 10:12

## 2022-06-18 RX ADMIN — PANTOPRAZOLE SODIUM 40 MG: 40 TABLET, DELAYED RELEASE ORAL at 06:02

## 2022-06-18 RX ADMIN — SODIUM CHLORIDE 25 ML/HR: 9 INJECTION, SOLUTION INTRAVENOUS at 09:53

## 2022-06-18 RX ADMIN — SODIUM CHLORIDE, PRESERVATIVE FREE 10 ML: 5 INJECTION INTRAVENOUS at 10:11

## 2022-06-18 RX ADMIN — HYDROCHLOROTHIAZIDE 12.5 MG: 12.5 CAPSULE ORAL at 09:54

## 2022-06-18 RX ADMIN — OXYCODONE 10 MG: 5 TABLET ORAL at 06:02

## 2022-06-18 RX ADMIN — CLOPIDOGREL BISULFATE 75 MG: 75 TABLET ORAL at 09:54

## 2022-06-18 ASSESSMENT — PAIN DESCRIPTION - DESCRIPTORS
DESCRIPTORS: ACHING
DESCRIPTORS: ACHING

## 2022-06-18 ASSESSMENT — ENCOUNTER SYMPTOMS
VOMITING: 0
RHINORRHEA: 0
SHORTNESS OF BREATH: 0
DIARRHEA: 0
BACK PAIN: 0
VOICE CHANGE: 0
SINUS PRESSURE: 0
ABDOMINAL PAIN: 0
CHOKING: 0
CHEST TIGHTNESS: 0
CONSTIPATION: 0
COUGH: 0
WHEEZING: 0
NAUSEA: 0

## 2022-06-18 ASSESSMENT — PAIN SCALES - GENERAL
PAINLEVEL_OUTOF10: 7
PAINLEVEL_OUTOF10: 10
PAINLEVEL_OUTOF10: 7
PAINLEVEL_OUTOF10: 8
PAINLEVEL_OUTOF10: 5

## 2022-06-18 ASSESSMENT — PAIN DESCRIPTION - ORIENTATION
ORIENTATION: LEFT
ORIENTATION: LEFT

## 2022-06-18 ASSESSMENT — PAIN DESCRIPTION - LOCATION
LOCATION: HIP
LOCATION: HIP

## 2022-06-18 ASSESSMENT — PAIN - FUNCTIONAL ASSESSMENT
PAIN_FUNCTIONAL_ASSESSMENT: PREVENTS OR INTERFERES SOME ACTIVE ACTIVITIES AND ADLS
PAIN_FUNCTIONAL_ASSESSMENT: PREVENTS OR INTERFERES SOME ACTIVE ACTIVITIES AND ADLS

## 2022-06-18 ASSESSMENT — PAIN DESCRIPTION - FREQUENCY
FREQUENCY: CONTINUOUS
FREQUENCY: CONTINUOUS

## 2022-06-18 ASSESSMENT — PAIN DESCRIPTION - PAIN TYPE
TYPE: CHRONIC PAIN
TYPE: CHRONIC PAIN

## 2022-06-18 ASSESSMENT — PAIN DESCRIPTION - ONSET
ONSET: ON-GOING
ONSET: ON-GOING

## 2022-06-18 NOTE — PLAN OF CARE
Problem: Chronic Conditions and Co-morbidities  Goal: Patient's chronic conditions and co-morbidity symptoms are monitored and maintained or improved  6/18/2022 1628 by Nadira Doan RN  Outcome: Adequate for Discharge  Flowsheets (Taken 6/18/2022 1013)  Care Plan - Patient's Chronic Conditions and Co-Morbidity Symptoms are Monitored and Maintained or Improved:   Monitor and assess patient's chronic conditions and comorbid symptoms for stability, deterioration, or improvement   Collaborate with multidisciplinary team to address chronic and comorbid conditions and prevent exacerbation or deterioration   Update acute care plan with appropriate goals if chronic or comorbid symptoms are exacerbated and prevent overall improvement and discharge     Problem: Discharge Planning  Goal: Discharge to home or other facility with appropriate resources  6/18/2022 1628 by Nadira Doan RN  Outcome: Adequate for Discharge  Flowsheets (Taken 6/18/2022 1013)  Discharge to home or other facility with appropriate resources:   Identify barriers to discharge with patient and caregiver   Arrange for needed discharge resources and transportation as appropriate   Identify discharge learning needs (meds, wound care, etc)   Refer to discharge planning if patient needs post-hospital services based on physician order or complex needs related to functional status, cognitive ability or social support system    Problem: Pain  Goal: Verbalizes/displays adequate comfort level or baseline comfort level  6/18/2022 1628 by Nadira Doan RN  Outcome: Adequate for Discharge     Problem: Safety - Adult  Goal: Free from fall injury  6/18/2022 1628 by Nadira Doan RN  Outcome: Adequate for Discharge  Flowsheets (Taken 6/18/2022 1612)  Free From Fall Injury: Instruct family/caregiver on patient safety     Problem: ABCDS Injury Assessment  Goal: Absence of physical injury  6/18/2022 1628 by Nadira Doan RN  Outcome: Adequate for Discharge  Flowsheets (Taken 6/18/2022 1612)  Absence of Physical Injury: Implement safety measures based on patient assessment     Problem: Metabolic/Fluid and Electrolytes - Adult  Goal: Electrolytes maintained within normal limits  6/18/2022 1628 by Aldona Hamman, RN  Outcome: Adequate for Discharge  Flowsheets (Taken 6/18/2022 1013)  Electrolytes maintained within normal limits: Monitor labs and assess patient for signs and symptoms of electrolyte imbalances  Goal: Hemodynamic stability and optimal renal function maintained  6/18/2022 1628 by Aldona Hamman, RN  Outcome: Adequate for Discharge  Flowsheets (Taken 6/18/2022 1013)  Hemodynamic stability and optimal renal function maintained: Monitor labs and assess for signs and symptoms of volume excess or deficit     Problem: Neurosensory - Adult  Goal: Achieves maximal functionality and self care  6/18/2022 1628 by Aldona Hamman, RN  Outcome: Adequate for Discharge  Flowsheets (Taken 6/18/2022 1013)  Achieves maximal functionality and self care: Encourage and assist patient to increase activity and self care with guidance from physical therapy/occupational therapy     Problem: Skin/Tissue Integrity - Adult  Goal: Incisions, wounds, or drain sites healing without S/S of infection  6/18/2022 1628 by Aldona Hamman, RN  Outcome: Adequate for Discharge  Flowsheets (Taken 6/18/2022 1613)  Incisions, Wounds, or Drain Sites Healing Without Sign and Symptoms of Infection: TWICE DAILY: Assess and document dressing/incision, wound bed, drain sites and surrounding tissue     Problem: Musculoskeletal - Adult  Goal: Return mobility to safest level of function  6/18/2022 1628 by Aldona Hamman, RN  Outcome: Adequate for Discharge  Flowsheets (Taken 6/18/2022 1013)  Return Mobility to Safest Level of Function:   Assess patient stability and activity tolerance for standing, transferring and ambulating with or without assistive devices   Assist with transfers and ambulation using safe patient handling equipment as needed   Instruct patient/family in ordered activity level   Ensure adequate protection for wounds/incisions during mobilization  Goal: Return ADL status to a safe level of function  6/18/2022 1628 by Cira Ladd RN  Outcome: Adequate for Discharge  Flowsheets (Taken 6/18/2022 1013)  Return ADL Status to a Safe Level of Function:   Administer medication as ordered   Assess activities of daily living deficits and provide assistive devices as needed   Assist and instruct patient to increase activity and self care as tolerated     Problem: Infection - Adult  Goal: Absence of infection at discharge  6/18/2022 1628 by Cira Ladd RN  Outcome: Adequate for Discharge  Flowsheets (Taken 6/18/2022 1013)  Absence of infection at discharge:   Assess and monitor for signs and symptoms of infection   Monitor lab/diagnostic results   Monitor all insertion sites i.e., indwelling lines, tubes and drains   Administer medications as ordered   Instruct and encourage patient and family to use good hand hygiene technique

## 2022-06-18 NOTE — PLAN OF CARE
Problem: Chronic Conditions and Co-morbidities  Goal: Patient's chronic conditions and co-morbidity symptoms are monitored and maintained or improved  6/18/2022 0306 by Trell Bryan RN  Outcome: Progressing  Flowsheets (Taken 6/17/2022 2000)  Care Plan - Patient's Chronic Conditions and Co-Morbidity Symptoms are Monitored and Maintained or Improved:   Monitor and assess patient's chronic conditions and comorbid symptoms for stability, deterioration, or improvement   Collaborate with multidisciplinary team to address chronic and comorbid conditions and prevent exacerbation or deterioration     Problem: Discharge Planning  Goal: Discharge to home or other facility with appropriate resources  6/18/2022 0306 by Trell Bryan RN  Outcome: Progressing  Flowsheets (Taken 6/17/2022 2000)  Discharge to home or other facility with appropriate resources:   Identify barriers to discharge with patient and caregiver   Arrange for needed discharge resources and transportation as appropriate   Identify discharge learning needs (meds, wound care, etc)     Problem: Pain  Goal: Verbalizes/displays adequate comfort level or baseline comfort level  6/18/2022 0306 by Trell Bryan RN  Outcome: Progressing  Flowsheets (Taken 6/17/2022 1948 by Cora Wolff RN)  Verbalizes/displays adequate comfort level or baseline comfort level:   Encourage patient to monitor pain and request assistance   Assess pain using appropriate pain scale   Administer analgesics based on type and severity of pain and evaluate response   Implement non-pharmacological measures as appropriate and evaluate response   Consider cultural and social influences on pain and pain management   Notify Licensed Independent Practitioner if interventions unsuccessful or patient reports new pain     Problem: Safety - Adult  Goal: Free from fall injury  6/18/2022 0306 by Trell Bryan RN  Outcome: Progressing  Flowsheets (Taken 6/18/2022 0223)  Free From Fall Injury: Instruct family/caregiver on patient safety     Problem: ABCDS Injury Assessment  Goal: Absence of physical injury  6/18/2022 0306 by Willie Pfeiffer RN  Outcome: Progressing  Flowsheets (Taken 6/18/2022 0223)  Absence of Physical Injury: Implement safety measures based on patient assessment     Problem: Metabolic/Fluid and Electrolytes - Adult  Goal: Electrolytes maintained within normal limits  Outcome: Progressing  Flowsheets (Taken 6/18/2022 0306)  Electrolytes maintained within normal limits: Monitor labs and assess patient for signs and symptoms of electrolyte imbalances     Problem: Metabolic/Fluid and Electrolytes - Adult  Goal: Hemodynamic stability and optimal renal function maintained  Outcome: Progressing  Flowsheets (Taken 6/18/2022 0306)  Hemodynamic stability and optimal renal function maintained: Monitor labs and assess for signs and symptoms of volume excess or deficit     Problem: Neurosensory - Adult  Goal: Achieves maximal functionality and self care  Outcome: Progressing  Flowsheets (Taken 6/18/2022 0306)  Achieves maximal functionality and self care: Encourage and assist patient to increase activity and self care with guidance from physical therapy/occupational therapy     Problem: Skin/Tissue Integrity - Adult  Goal: Incisions, wounds, or drain sites healing without S/S of infection  Outcome: Progressing  Flowsheets (Taken 6/18/2022 0306)  Incisions, Wounds, or Drain Sites Healing Without Sign and Symptoms of Infection: TWICE DAILY: Assess and document dressing/incision, wound bed, drain sites and surrounding tissue     Problem: Musculoskeletal - Adult  Goal: Return mobility to safest level of function  Outcome: Progressing  Flowsheets (Taken 6/18/2022 0306)  Return Mobility to Safest Level of Function:   Assess patient stability and activity tolerance for standing, transferring and ambulating with or without assistive devices   Assist with transfers and ambulation using safe patient handling equipment as needed   Ensure adequate protection for wounds/incisions during mobilization   Obtain physical therapy/occupational therapy consults as needed   Instruct patient/family in ordered activity level     Problem: Musculoskeletal - Adult  Goal: Return ADL status to a safe level of function  Outcome: Progressing  Flowsheets (Taken 6/18/2022 0306)  Return ADL Status to a Safe Level of Function:   Administer medication as ordered   Assess activities of daily living deficits and provide assistive devices as needed   Obtain physical therapy/occupational therapy consults as needed   Assist and instruct patient to increase activity and self care as tolerated     Problem: Infection - Adult  Goal: Absence of infection at discharge  Outcome: Progressing  Flowsheets (Taken 6/18/2022 0306)  Absence of infection at discharge:   Assess and monitor for signs and symptoms of infection   Monitor lab/diagnostic results   Monitor all insertion sites i.e., indwelling lines, tubes and drains   Instruct and encourage patient and family to use good hand hygiene technique   Identify and instruct in appropriate isolation precautions for identified infection/condition

## 2022-06-18 NOTE — CONSULTS
[facility] Pharmacokinetic Monitoring Service - Vancomycin     Frankie Arreguin is a 64 y.o. male starting on vancomycin therapy for soft tissue infection . Pharmacy consulted by Dr. Cherri Nieto DPM  for monitoring and adjustment. Target Concentration: Goal trough of 10-15 mg/L and AUC/LIZETT <500 mg*hr/L    Additional Antimicrobials: none    Pertinent Laboratory Values: Wt Readings from Last 1 Encounters:   06/17/22 249 lb (112.9 kg)     Temp Readings from Last 1 Encounters:   06/18/22 97.9 °F (36.6 °C) (Oral)     Estimated Creatinine Clearance: 116 mL/min (based on SCr of 0.87 mg/dL). Recent Labs     06/17/22  1453 06/18/22  0602   CREATININE 0.90 0.87   WBC 5.6 7.7     Procalcitonin: none      MRSA Nasal Swab: N/A. Non-respiratory infection. Plan:  Dosing recommendations based on Bayesian software  Current dosing is therapeutic at 1250 mg q12h. Continue current dosing.   Renal labs as indicated   Vancomycin concentration ordered for 6/19 @ 0600   Pharmacy will continue to monitor patient and adjust therapy as indicated    Thank you for the consult,  Xuan Solis, [facility]  6/18/2022 1:42 PM

## 2022-06-18 NOTE — FLOWSHEET NOTE
[facility] NOTE    Room # 2023/2023-01   Name: Claude Cedar            Pentecostalism: [address]     Reason for visit: Routine    I visited the patient. Admit Date & Time: 6/17/2022  6:13 AM    Assessment:  Claude Cedar is a 64 y.o. male in the hospital because ulcer on foot. Upon entering the room pt was awake and alert      Intervention:  I introduced myself and my title as  I offered space for pt  to express feelings, needs, and concerns and provided a ministry presence. Outcome:  Pt expressed gratitude for visit    Plan:  Chaplains will remain available to offer spiritual and emotional support as needed. Electronically signed by Chapin White on 6/18/2022 at 11:15 AM.  May       06/18/22 1112   Encounter Summary   Encounter Overview/Reason  Initial Encounter   Service Provided For: Patient   Referral/Consult From: [facility]   Support System Spouse; Children   Last Encounter  06/18/22   Complexity of Encounter Low   Begin Time 1015   End Time  1020   Total Time Calculated 5 min   Encounter    Type Initial Screen/Assessment   Assessment/Intervention/Outcome   Assessment Calm   Intervention Active listening;Sustaining Presence/Ministry of presence   Outcome Expressed Gratitude;Receptive

## 2022-06-18 NOTE — PROGRESS NOTES
Pt discharged to home with belongings via brother. Discharge instructions given. AVS understood and signed. Pt denies having any further questions at this time. Locked up personal items given to patient at discharge. Patient/family state they have everything they were admitted with. Patient instructed to  doxy from home pharmacy and start tonight. Patient is to take home dose of Norco for pain.

## 2022-06-18 NOTE — DISCHARGE SUMMARY
Discharge Summary  Podiatric Medicine and Surgery    Patient Identification     Hannah Rashid is a 64 y.o. male  :  1960  MRN: [id number]  Acct: [de-identified]     Admit date: 2022  Discharge date and time: No discharge date for patient encounter.      Admitting Physician: Bob Stein DPM   Discharge Physician: Bob Stein DPM    Admission Diagnoses: Deformity of toe of right foot [M20.61]  Chronic ulcer of right foot with fat layer exposed ([address] [L97.512]  Discharge Diagnoses:   Patient Active Problem List   Diagnosis    Hyperlipemia, mixed    Sleep apnea    Peripheral vascular disease ([address]    CAD (coronary artery disease)    CHF [facility]congestive heart failure) ([facility]    CTS (carpal tunnel syndrome)    Hypotension    Obesity, Class III, BMI 40-49.9 (morbid obesity) ([address]    Essential hypertension    DDD (degenerative disc disease), cervical    DJD (degenerative joint disease), cervical    Primary osteoarthritis involving multiple joints    Paresthesias in left hand    Type 2 diabetes mellitus with circulatory disorder, without long-term current use of insulin ([address]    Coronary artery disease involving coronary bypass graft of native heart without angina pectoris    Sleep apnea    Cervical spondylosis    Obesity    Long term current use of antithrombotics/antiplatelets    H/O cervical spine surgery    Long term (current) use of non-steroidal anti-inflammatories (nsaid)    Chronic prescription opiate use    Severe comorbid illness    Hx of cervical spine surgery    Chronic neck pain    Candidal balanitis    Cellulitis of third toe, left    Nail avulsion, toe, initial encounter    Cellulitis of second toe, right    Abnormal nuclear stress test    Abnormal stress test    Hypercholesterolemia    Diabetic foot ulcer with osteomyelitis ([address]    Ulcer of left foot, with fat layer exposed ([address]    Acquired trigger finger    Contusion of elbow    Contusion of shoulder region    Contusion of knee    Degeneration of lumbar intervertebral disc    Diabetic ulcer of toe associated with type 2 diabetes mellitus, with fat layer exposed ([address]    History of coronary artery bypass graft x 3    Ischemic cardiomyopathy    Osteoarthritis of knee    Osteoarthritis of carpometacarpal (CMC) joint of thumb    Sprain of knee and leg    Sprain of shoulder and upper arm    Presence of coronary angioplasty implant and graft    Venous insufficiency of both lower extremities    Dog bite    Impingement syndrome of shoulder region    Bite wound of hand, left, subsequent encounter    Bite wound of hand, right, subsequent encounter    Open bite of left ear    Open wound of nose due to dog bite    Diabetic polyneuropathy associated with type 2 diabetes mellitus ([address]    Chronic ulcer of toe, left, with necrosis of bone (HCC)    Ulcer of toe of right foot, with fat layer exposed ([address]    Osteomyelitis of second toe of left foot ([address]    Cellulitis    Chronic ulcer of right foot with fat layer exposed ([address]       Admission Condition: fair  Discharged Condition: fair    Hospital Course     Brief Inpatient Course: Admitted on 6/17/2022  6:13 AM for Deformity of toe of right foot [M20.61]  Chronic ulcer of right foot with fat layer exposed ([address] [L97.512]. -Patient had a right foot hammertoe repair and left foot graft application on 1/24/1540.  -He was then admitted for IV antibiotics. -Patient is stable for discharge today on oral Doxycycline.      Consults: CM    Patient Instructions     Medications:      Medication List      CONTINUE taking these medications    aspirin 325 MG tablet     BD Disp Needles 18G X 1-1/2\" Misc  Generic drug: NEEDLE (DISP) 18 G  1 Device by Does not apply route every 30 days     carvedilol 12.5 MG tablet  Commonly known as: COREG  Take 1 tablet by mouth 2 times daily (with meals)     clopidogrel 75 MG tablet  Commonly known as: PLAVIX  Take 1 tablet by mouth daily dapagliflozin 10 MG tablet  Commonly known as: Farxiga  Take 1 tablet by mouth every morning     diazePAM 5 MG tablet  Commonly known as: VALIUM     doxycycline hyclate 100 MG tablet  Commonly known as: VIBRA-TABS  Take 1 tablet by mouth 2 times daily for 10 days     gabapentin 300 MG capsule  Commonly known as: NEURONTIN     GLUCOSAMINE CHOND MSM FORMULA PO     hydroCHLOROthiazide 12.5 MG capsule  Commonly known as: MICROZIDE  1 po qd     HYDROcodone-acetaminophen  MG per tablet  Commonly known as: NORCO     linagliptin 5 MG tablet  Commonly known as: Tradjenta  TAKE 1 TABLET BY MOUTH ONE TIME A DAY     meloxicam 15 MG tablet  Commonly known as: MOBIC  Take 1 tablet by mouth daily     metFORMIN 1000 MG tablet  Commonly known as: GLUCOPHAGE  Take 1 tablet by mouth 2 times daily (with meals)     MULTI VITAMIN DAILY PO     nitroGLYCERIN 0.4 MG SL tablet  Commonly known as: NITROSTAT     omeprazole 20 MG delayed release capsule  Commonly known as: PRILOSEC  TAKE 1 CAPSULE DAILY     tiZANidine 4 MG tablet  Commonly known as: ZANAFLEX  1 po qd     traZODone 150 MG tablet  Commonly known as: DESYREL  TAKE ONE TABLET BY MOUTH EVERY EVENING            Activity:  Weight bearing to heel touch only in a surgical shoe. Diet: The patient is asked to make an attempt to improve diet and exercise patterns to aid in medical management of this problem. Disposition: home    Wound Care: keep wound clean and dry    Follow-up: Follow up with Dr. Summer Whatley.     PETROS JoeM  Podiatric Medicine & Surgery

## 2022-06-18 NOTE — PROGRESS NOTES
[facility]  Office: [address], DO, Charity Simple, DO, Chapo Be, DO, Elizabeth Curtisjaen Blood, DO, Quinton Schwartz MD, Nakia Mccain MD, Kimmie Garcia MD, Charlotte Redd MD, Edward Camacho MD, Lazarus Gastelum MD, Mary Ann Barnes MD, Briana Hicks, DO, Doug Cruz MD,  Qiana Eaton MD, Chris Ford MD, Myles Rudd, DO, Abdulaziz Bruno MD, Leah Ferrara MD, Zhang Stearns DO, Mook Patel MD, Alyson Pang MD, Nancy Valdes, [facility], [facility], [facility], Crissy Fan CNP, Rianna Orozco CNP, Mali Gonzalez, CNP, Alix Herrera, CNP, Brown Marcial PA-C, Guero Duvall, [facility], Eldon Fuens, CNP, Lavelle Taylor CNP, Felisa Stafford CNP, Martin Decker, CNS, Ashley Carbajal, [facility], Libby Walker CNP, Marlene Hurst [facility], [facility]      Daily Progress Note     Admit Date: 6/17/2022  Bed/Room No.  2023/2023-01  Admitting Physician : Alix Castellanos DPM  Code Status :Full Code  Hospital Day:  LOS: 0 days   Chief Complaint:     Diabetic foot infection Right     Principal Problem:    Chronic ulcer of right foot with fat layer exposed [facility])  Active Problems:    Peripheral vascular disease ([facility]    CAD (coronary artery disease)    CHF (congestive heart failure) [facility])    Essential hypertension    Type 2 diabetes mellitus with circulatory disorder, without long-term current use of insulin ([facility]    Diabetic polyneuropathy associated with type 2 diabetes mellitus ([facility]  Resolved Problems:    * No resolved hospital problems. *    Subjective : Interval History/Significant events :  06/18/22    Patient is on IV antibiotics. He reports pain is controlled. Patient is eating and drinking well. Denies any chills, nausea, vomiting. Vitals - Stable afebrile  Labs -blood sugar controlled. Nursing notes , Consults notes reviewed. Overnight events and updates discussed with Nursing staff .    Background History:         Shira Reed is 64 y.o. male  Who was admitted to the hospital on 6/17/2022 for treatment of Chronic ulcer of right foot with fat layer exposed ([address] Patient was admitted for nonhealing diabetic right foot wound. Patient was seen by podiatrist as outpatient and recommended surgery. Patient had right second digit arthroplasty, debridement of wound and application of epi fix graft left foot. He was treated with IV antibiotics in the hospital.  Following for medical management. Patient has underlying history of type 2 diabetes mellitus, CAD with CABG in 2007, with stenting post surgery with recent stents in September 2021. Patient is a  by profession and has been off work for last 4 months. He had amputation of left foot  second digit about 2 months ago. PMH:  Past Medical History:   Diagnosis Date    Acquired trigger finger 6/5/2018    CAD (coronary artery disease)     NWOCC/ involving coronary bypass graft of native heart with unstable angina pectoris    Cervical spondylosis     CHF (congestive heart failure) ([facility])     Chronic back pain     on 11/19/18 pt states is presently in pain mgmnt    Contusion of elbow 6/5/2018    Contusion of knee 6/5/2018    Contusion of shoulder region 6/5/2018    Diabetic foot ulcer with osteomyelitis ([address] 1/9/2018    Diabetic ulcer of toe associated with type 2 diabetes mellitus, with fat layer exposed ([address] 1/31/2018    Hypercholesterolemia 12/5/2017    Hyperlipemia, mixed     Hypertension     Ischemic cardiomyopathy 5/7/2018    Non-pressure chronic ulcer of left lower leg with fat layer exposed ([address] 6/9/2014    Obesity     Obesity, Class III, BMI 40-49.9 (morbid obesity) ([address] 4/6/2015    Obstructive sleep apnea syndrome     Osteoarthritis of carpometacarpal (CMC) joint of thumb 6/5/2018    Osteoarthritis of knee 6/5/2018    Peripheral vascular disease ([address]     with venous stasis and ulcerations.  Dr Axel Pallas Presence of coronary angioplasty implant and graft 9/11/2009    Sleep apnea     Dr Yolette Saavedra of knee and leg 6/5/2018    Sprain of shoulder and upper arm 6/5/2018    Type II or unspecified type diabetes mellitus without mention of complication, not stated as uncontrolled     Ulcer of left foot, with fat layer exposed ([facility] 1/9/2018    Unspecified sleep apnea     Dr Salome Jacobson CPAP    Venous insufficiency of both lower extremities 1/31/2018      Allergies: Allergies   Allergen Reactions    Pcn [Penicillins] Other (See Comments)     Unknown rx    Penicillin G Rash      Medications :  sodium chloride flush, 5-40 mL, IntraVENous, 2 times per day  aspirin, 325 mg, Oral, Daily  carvedilol, 12.5 mg, Oral, BID WC  clopidogrel, 75 mg, Oral, Daily  diazePAM, 5 mg, Oral, Once  gabapentin, 900 mg, Oral, Nightly  hydroCHLOROthiazide, 12.5 mg, Oral, Daily  pantoprazole, 40 mg, Oral, QAM AC  tiZANidine, 4 mg, Oral, Daily  traZODone, 150 mg, Oral, QPM  alogliptin, 25 mg, Oral, Daily  vancomycin (VANCOCIN) intermittent dosing (placeholder), , Other, RX Placeholder  vancomycin, 1,250 mg, IntraVENous, Q12H        Review of Systems   Review of Systems   Constitutional: Negative for activity change, appetite change, fatigue, fever and unexpected weight change. HENT: Negative for congestion, nosebleeds, rhinorrhea, sinus pressure, sneezing and voice change. Respiratory: Negative for cough, choking, chest tightness, shortness of breath and wheezing. Cardiovascular: Negative for chest pain, palpitations and leg swelling. Gastrointestinal: Negative for abdominal pain, constipation, diarrhea, nausea and vomiting. Genitourinary: Negative for difficulty urinating, dysuria, frequency, penile discharge and testicular pain. Musculoskeletal: Negative for back pain. Skin: Negative for rash. Neurological: Negative for dizziness, weakness, light-headedness and headaches. Hematological: Does not bruise/bleed easily.    Psychiatric/Behavioral: Negative for agitation, behavioral problems, confusion, self-injury, sleep disturbance and suicidal ideas. Objective :      Current Vitals : Temp: 97.9 °F (36.6 °C),  Heart Rate: 59, Resp: 16, BP: 128/63, SpO2: 98 %  Last 24 Hrs Vitals   Patient Vitals for the past 24 hrs:   BP Temp Temp src Pulse Resp SpO2   06/18/22 1156 128/63 97.9 °F (36.6 °C) Oral 59 16 98 %   06/18/22 0736 (!) 109/59 98 °F (36.7 °C) Oral 59 16 99 %   06/18/22 0602 -- -- -- -- 18 --   06/18/22 0338 116/64 97.7 °F (36.5 °C) Oral 56 17 98 %   06/17/22 2350 121/60 97.3 °F (36.3 °C) Oral 58 17 96 %   06/17/22 2347 -- -- -- -- 16 --   06/17/22 1923 122/66 97.3 °F (36.3 °C) Oral 69 17 99 %   06/17/22 1501 132/68 97.3 °F (36.3 °C) Oral 60 16 99 %     Intake / output   06/16 1901 - 06/18 0700  In: 2424.5 [P.O.:390; I.V.:1534.5]  Out: 4595 [Urine:4575]  Physical Exam:  Physical Exam  Vitals and nursing note reviewed. Constitutional:       General: He is not in acute distress. Appearance: He is not diaphoretic. HENT:      Head: Normocephalic and atraumatic. Nose:      Right Sinus: No maxillary sinus tenderness or frontal sinus tenderness. Left Sinus: No maxillary sinus tenderness or frontal sinus tenderness. Mouth/Throat:      Pharynx: No oropharyngeal exudate. Eyes:      General: No scleral icterus. Conjunctiva/sclera: Conjunctivae normal.      Pupils: Pupils are equal, round, and reactive to light. Neck:      Thyroid: No thyromegaly. Vascular: No JVD. Cardiovascular:      Rate and Rhythm: Normal rate and regular rhythm. Pulses:           Dorsalis pedis pulses are 2+ on the right side and 2+ on the left side. Heart sounds: Normal heart sounds. No murmur heard. Pulmonary:      Effort: Pulmonary effort is normal.      Breath sounds: Normal breath sounds. No wheezing or rales. Chest:      Comments: Sternotomy scar  Abdominal:      Palpations: Abdomen is soft. There is no mass.       Tenderness: inflammatory arthropathy. Moderate to severe osteoarthritis of the right hip. Clinical Course : stable  Assessment and Plan  :        1. Nonhealing diabetic right foot wound s/p debridement and secondary arthroplasty -currently on antibiotics. No prior history of MRSA. Can be discharged on oral antibiotic Keflex /cefuroxime as indicated. 2. Type 2 diabetes mellitus-uncontrolled. On metformin, Tradjenta at home. 3. Essential hypertension-well-controlled  4. CAD s/p CABG and stents-continue Plavix, aspirin 325 mg daily. 5. Peripheral vascular disease-statin, Plavix, aspirin. Not on statins. Disposition per podiatry. Continue to monitor vitals , Intake / output ,  Cell count , HGB , Kidney function, oxygenation  as indicated . Plan and updates discussed with patient ,  answers  explained to satisfaction.    Plan discussed with Staff  RN     (Please note that portions of this note were completed with a voice recognition program. Efforts were made to edit the dictations but occasionally words are mis-transcribed.)      Rita Vazquez MD  6/18/2022

## 2022-06-20 ENCOUNTER — CARE COORDINATION (OUTPATIENT)
Dept: CASE MANAGEMENT | Age: 62
End: 2022-06-20

## 2022-06-20 DIAGNOSIS — L97.522 ULCER OF LEFT FOOT, WITH FAT LAYER EXPOSED (HCC): Primary | ICD-10-CM

## 2022-06-20 LAB — SURGICAL PATHOLOGY REPORT: NORMAL

## 2022-06-20 NOTE — CARE COORDINATION
[facility] Transitions Initial Follow Up Call    Call within 2 business days of discharge: Yes    Patient: Rupinder Diamond Patient : 1960   MRN: [id number]  Reason for Admission: ARTHROPLASTY 2ND DIGIT RIGHT APPLICATION EPIFIX LEFT FOOT  Discharge Date: 22 RARS: Readmission Risk Score: 10.7 ( )      Last Discharge [facility]       Complaint Diagnosis Description Type Department Provider    22  Acute post-operative pain . .. Admission (Discharged) Aly Dickson DPM           Spoke with: Sam 8: Mann Cardona    Was able to contact [facility] for initial transitional outreach. He said that he was doing \"fine\". He said that the dressing were dry and intact. He said that he is sore, but no increased pain. No fear/chills and is eating and no diarrhea from the antibiotics. He stated that he had all his medications and will be seeing the podiatrist tomorrow. He does not have a PCP appointment until . He had no question/concerns or needs at this time. He did say that he will hopefully be returning to work on . Non-face-to-face services provided:  Obtained and reviewed discharge summary and/or continuity of care documents  Assessment and support for treatment adherence and medication management-reviewed     Transitions of Care Initial Call    Was this an external facility discharge? No Discharge Facility: [facility]    Challenges to be reviewed by the provider   Additional needs identified to be addressed with provider: No  none             Method of communication with provider : none    Advance Care Planning:   Does patient have an Advance Directive: stated he did not have ACP doc, but declinied referral to  for ACP education and paperwork. Care Transition Nurse contacted the patient by telephone to perform post hospital discharge assessment. Verified name and  with patient as identifiers. Provided introduction to self, and explanation of the CTN role.      CTN reviewed discharge instructions, medical action plan and red flags with patient who verbalized understanding. Patient given an opportunity to ask questions and does not have any further questions or concerns at this time. Were discharge instructions available to patient? Yes. Reviewed appropriate site of care based on symptoms and resources available to patient including: PCP  Specialist  When to call 911. The patient agrees to contact the PCP office for questions related to their healthcare. Medication reconciliation was performed with patient, who verbalizes understanding of administration of home medications. s.     Was patient discharged with a pulse oximeter? no    CTN provided contact information. Plan for follow-up call in 3-5 days based on severity of symptoms and risk factors.   Plan for next call: symptom management-routine follow up on foot surgery, FU on podiatry appointment        Care Transitions 24 Hour Call    Do you have a copy of your discharge instructions?: Yes  Do you have all of your prescriptions and are they filled?: Yes  Have you been contacted by a [address]?: No  Have you scheduled your follow up appointment?: Yes  How are you going to get to your appointment?: Car - family or friend to transport  [address] [place] [address] Road you feel like you have everything you need to keep you well at home?: Yes  Care Transitions Interventions     Other Services:  (Comment: [place])          Follow Up  Future Appointments   Date Time Provider Benito Jaeger   6/21/2022  1:15 PM KEZIA Bah [facility]   7/14/2022  8:30 AM DO MINE Velarde [facility]   7/14/2022 10:15 AM KEZIA Worrell [facility]   12/8/2022  9:45 AM Jaden Macias MD Resp Spec Martin Das RN

## 2022-06-21 ENCOUNTER — HOSPITAL ENCOUNTER (OUTPATIENT)
Dept: WOUND CARE | Age: 62
Discharge: HOME | End: 2022-06-21
Payer: COMMERCIAL

## 2022-06-21 VITALS
BODY MASS INDEX: 33.72 KG/M2 | DIASTOLIC BLOOD PRESSURE: 50 MMHG | SYSTOLIC BLOOD PRESSURE: 102 MMHG | WEIGHT: 249 LBS | HEIGHT: 72 IN | TEMPERATURE: 98.5 F | HEART RATE: 67 BPM

## 2022-06-21 DIAGNOSIS — M86.9 OSTEOMYELITIS OF SECOND TOE OF LEFT FOOT (HCC): Primary | ICD-10-CM
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
## 2022-06-21 DIAGNOSIS — L97.524 CHRONIC ULCER OF TOE, LEFT, WITH NECROSIS OF BONE (HCC): ICD-10-CM

## 2022-06-21 DIAGNOSIS — L97.512 ULCER OF TOE OF RIGHT FOOT, WITH FAT LAYER EXPOSED (HCC): ICD-10-CM
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
## 2022-06-21 DIAGNOSIS — E11.42 DIABETIC POLYNEUROPATHY ASSOCIATED WITH TYPE 2 DIABETES MELLITUS (HCC): ICD-10-CM

## 2022-06-21 PROCEDURE — 99213 OFFICE O/P EST LOW 20 MIN: CPT

## 2022-06-21 RX ORDER — LIDOCAINE HYDROCHLORIDE 20 MG/ML
JELLY TOPICAL ONCE
Status: COMPLETED | OUTPATIENT
Start: 2022-06-21 | End: 2022-06-21

## 2022-06-21 RX ORDER — LIDOCAINE 50 MG/G
OINTMENT TOPICAL ONCE
Status: CANCELLED | OUTPATIENT
Start: 2022-06-21 | End: 2022-06-21

## 2022-06-21 RX ORDER — BACITRACIN, NEOMYCIN, POLYMYXIN B 400; 3.5; 5 [USP'U]/G; MG/G; [USP'U]/G
OINTMENT TOPICAL ONCE
Status: CANCELLED | OUTPATIENT
Start: 2022-06-21 | End: 2022-06-21

## 2022-06-21 RX ORDER — LIDOCAINE 40 MG/G
CREAM TOPICAL ONCE
Status: CANCELLED | OUTPATIENT
Start: 2022-06-21 | End: 2022-06-21

## 2022-06-21 RX ORDER — LIDOCAINE HYDROCHLORIDE 20 MG/ML
JELLY TOPICAL ONCE
Status: CANCELLED | OUTPATIENT
Start: 2022-06-21 | End: 2022-06-21

## 2022-06-21 RX ORDER — MAGNESIUM CARB/ALUMINUM HYDROX 105-160MG
30 TABLET,CHEWABLE ORAL
Status: ACTIVE | OUTPATIENT
Start: 2022-06-21 | End: 2022-06-21

## 2022-06-21 RX ORDER — LIDOCAINE HYDROCHLORIDE 40 MG/ML
SOLUTION TOPICAL ONCE
Status: CANCELLED | OUTPATIENT
Start: 2022-06-21 | End: 2022-06-21

## 2022-06-21 RX ORDER — BACITRACIN ZINC AND POLYMYXIN B SULFATE 500; 1000 [USP'U]/G; [USP'U]/G
OINTMENT TOPICAL ONCE
Status: CANCELLED | OUTPATIENT
Start: 2022-06-21 | End: 2022-06-21

## 2022-06-21 RX ORDER — GINSENG 100 MG
CAPSULE ORAL ONCE
Status: CANCELLED | OUTPATIENT
Start: 2022-06-21 | End: 2022-06-21

## 2022-06-21 RX ORDER — CLOBETASOL PROPIONATE 0.5 MG/G
OINTMENT TOPICAL ONCE
Status: CANCELLED | OUTPATIENT
Start: 2022-06-21 | End: 2022-06-21

## 2022-06-21 RX ORDER — BETAMETHASONE DIPROPIONATE 0.05 %
OINTMENT (GRAM) TOPICAL ONCE
Status: CANCELLED | OUTPATIENT
Start: 2022-06-21 | End: 2022-06-21

## 2022-06-21 RX ORDER — GENTAMICIN SULFATE 1 MG/G
OINTMENT TOPICAL ONCE
Status: CANCELLED | OUTPATIENT
Start: 2022-06-21 | End: 2022-06-21

## 2022-06-21 RX ADMIN — LIDOCAINE HYDROCHLORIDE 6 ML: 20 JELLY TOPICAL at 13:16

## 2022-06-21 ASSESSMENT — PAIN SCALES - GENERAL: PAINLEVEL_OUTOF10: 2

## 2022-06-21 ASSESSMENT — PAIN DESCRIPTION - DESCRIPTORS: DESCRIPTORS: ACHING

## 2022-06-21 ASSESSMENT — PAIN DESCRIPTION - LOCATION: LOCATION: TOE (COMMENT WHICH ONE)

## 2022-06-21 NOTE — PROGRESS NOTES
[address]   Progress Note and Procedure Note      Fernando Szymanski  MEDICAL RECORD NUMBER:  [id number]  AGE: 64 y.o. GENDER: male  : 1960  EPISODE DATE:  2022    Subjective:     No chief complaint on file. HISTORY of PRESENT ILLNESS HPI     Fernando Szymanski is a 64 y.o. male who presents today for wound/ulcer evaluation. S/P    LEFT stravix graft x 4 week, EPIFIX X 5 DAYS, 2nd right S/P ARTHROPLASTY X 5 DAYS. TAKING HIS DOXY    BONE WAS NOT OSTEOMYELITIC PER PATH, NO GROWTH OF BACTERIA       Ulcer Identification:  Ulcer Type: diabetic  Contributing Factors: diabetes and chronic pressure       22 1204    Specimen Description . TOE    Special Requests . TOE   SECOND LEFT DIGIT    Direct Exam RARE NEUTROPHILS Abnormal     Direct Exam FEW GRAM POSITIVE COCCI IN PAIRS Abnormal     Culture STREPTOCOCCI, BETA HEMOLYTIC GROUP B MODERATE GROWTH Abnormal     Culture  Abnormal   STAPHYLOCOCCUS AUREUS MODERATE GROWTH This isolate is methicillin susceptible. For susceptibility, refer to previous culture. Culture No anaerobic organisms isolated at 5 days.                 PAST MEDICAL HISTORY        Diagnosis Date    Acquired trigger finger 2018    CAD (coronary artery disease)     NWOCC/ involving coronary bypass graft of native heart with unstable angina pectoris    Cervical spondylosis     CH[facility]estive [facility] failure) [facility]     Chronic back pain     on 18 pt states is presently in pain mgmnt    Contusion of elbow 2018    Contusion of knee 2018    Contusion of shoulder region 2018    Diabetic foot ulcer with osteomyelitis ([address] 2018    Diabetic ulcer of toe associated with type 2 diabetes mellitus, with fat layer exposed ([address] 2018    Hypercholesterolemia 2017    Hyperlipemia, mixed     Hypertension     Ischemic cardiomyopathy 2018    Non-pressure chronic ulcer of left lower leg with fat layer exposed ([address] 6/9/2014    Obesity     Obesity, Class III, BMI 40-49.9 (morbid obesity) ([facility] 4/6/2015    Obstructive sleep apnea syndrome     Osteoarthritis of carpometacarpal (CMC) joint of thumb 6/5/2018    Osteoarthritis of knee 6/5/2018    Peripheral vascular disease ([facility]     with venous stasis and ulcerations. Dr Huber Trent Presence of coronary angioplasty implant and graft 9/11/2009    Sleep apnea     Dr Star Mosquera of knee and leg 6/5/2018    Sprain of shoulder and upper arm 6/5/2018    Type II or unspecified type diabetes mellitus without mention of complication, not stated as uncontrolled     Ulcer of left foot, with fat layer exposed ([facility] 1/9/2018    Unspecified sleep apnea     Dr Mook Limon CPAP    Venous insufficiency of both lower extremities 1/31/2018       PAST SURGICAL HISTORY    Past Surgical History:   Procedure Laterality Date    ARTHROPLASTY Left 4/1/2022    ARTHROPLASTY 2ND LEFT TOE performed by Luz Maria Ventura DPM at [facility] Bilateral 6/17/2022    ARTHROPLASTY 2ND DIGIT RIGHT APPLICATION EPIFIX LEFT FOOT performed by Luz Maria Ventura DPM at [facility]  09/2021    stent    [facility] Right 3/22/13    Dr. Keyshawn Ledezma.     COLONOSCOPY  2020    CORONARY ART[facility]RA[facility]  [facility]    CORONARY ARTERY BYPASS GRAFT  2006    NECK SURGERY  2010    cervical stenoses C5-C6-C7and partial T1 laminectomy with fusion of C6-7    OTHER SURGICAL HISTORY      wound care ulcers of legs (bilaterally) with Dr Svetlana Dyer Bilateral 5/18/2022    STRAVIX LEFT FOOT, THERASKIN RIGHT FOOT performed by Luz Maria Ventura DPM at [facility] TOE AMPUTATION Left 4/6/2022    TOE AMPUTATION, SECOND DIGIT performed by Luz Maria Ventura DPM at [place] [address].  age 11   [place]      closure of peripherator veins of legs, Dr Katya Cannon HISTORY    Family History   Problem Relation Age of Onset    Cancer Mother         lung    Diabetes Father     Heart Disease Father     High Blood Pressure Father     Diabetes Brother     Heart Disease Brother     High Blood Pressure Brother        SOCIAL HISTORY    Social History     Tobacco Use    Smoking status: Never Smoker    Smokeless tobacco: Never Used   Vaping Use    Vaping Use: Never used   Substance Use Topics    Alcohol use: Not Currently     Comment: rare social, non past 5 months    Drug use: No       ALLERGIES    Allergies   Allergen Reactions    Pcn [Penicillins] Other (See Comments)     Unknown rx    Penicillin G Rash       MEDICATIONS    Current Outpatient Medications on File Prior to Encounter   Medication Sig Dispense Refill    doxycycline hyclate (VIBRA-TABS) 100 MG tablet Take 1 tablet by mouth 2 times daily for 10 days 20 tablet 0    doxycycline hyclate (VIBRA-TABS) 100 MG tablet Take 1 tablet by mouth 2 times daily for 10 days 20 tablet 0    diazePAM (VALIUM) 5 MG tablet Take 5 mg by mouth once. (Patient not taking: Reported on 6/20/2022)      clopidogrel (PLAVIX) 75 MG tablet Take 1 tablet by mouth daily 90 tablet 3    carvedilol (COREG) 12.5 MG tablet Take 1 tablet by mouth 2 times daily (with meals) 180 tablet 3    dapagliflozin (FARXIGA) 10 MG tablet Take 1 tablet by mouth every morning 90 tablet 3    traZODone (DESYREL) 150 MG tablet TAKE ONE TABLET BY MOUTH EVERY EVENING 90 tablet 3    linagliptin (TRADJENTA) 5 MG tablet TAKE 1 TABLET BY MOUTH ONE TIME A DAY 90 tablet 3    hydroCHLOROthiazide (MICROZIDE) 12.5 MG capsule 1 po qd 90 capsule 3    metFORMIN (GLUCOPHAGE) 1000 MG tablet Take 1 tablet by mouth 2 times daily (with meals) 60 tablet 3    omeprazole (PRILOSEC) 20 MG delayed release capsule TAKE 1 CAPSULE DAILY 90 capsule 1    gabapentin (NEURONTIN) 300 MG capsule Take 900 mg by mouth nightly.        Misc Natural Products (GLUCOSAMINE CHOND MSM FORMULA PO) Take 2 tablets by mouth daily      HYDROcodone-acetaminophen (NORCO)  MG per tablet Take 1 tablet by mouth every 4-6 hours as needed for Pain.  meloxicam (MOBIC) 15 MG tablet Take 1 tablet by mouth daily 90 tablet 3    tiZANidine (ZANAFLEX) 4 MG tablet 1 po qd 14 tablet 3    NEEDLE, DISP, 18 G (BD DISP NEEDLES) 18G X 1-1/2\" MISC 1 Device by Does not apply route every 30 days 25 each 1    Multiple Vitamin (MULTI VITAMIN DAILY PO) Take by mouth      nitroGLYCERIN (NITROSTAT) 0.4 MG SL tablet Place 0.4 mg under the tongue      aspirin 325 MG tablet Take 325 mg by mouth daily. No current facility-administered medications on file prior to encounter. REVIEW OF SYSTEMS    Review of Systems   Constitutional: Negative for chills and fever. Skin: Positive for wound. Neurological: Negative for numbness. Objective: There were no vitals taken for this visit. Wt Readings from Last 3 Encounters:   06/17/22 249 lb (112.9 kg)   06/14/22 249 lb (112.9 kg)   06/09/22 249 lb (112.9 kg)       Physical Exam:  General:  Alert and oriented x3. In no acute distress. Lower Extremity Physical Exam:    Vascular: DP pulses are palpable, Bilateral. PT pulses are palpable, Bilateral. CFT <3 seconds to all digits, Bilateral.  No edema, Bilateral.  Hair growth is absent to the level of the digits, Bilateral.     Neuro: Saph/sural/SP/DP/plantar sensation intact to light touch. Musculoskeletal: EHL/FHL/GS/TA gross motor intact.  Gross deformity is present, hammertoes bilateral.     Dermatologic: clean wounds bilateral feet with graft LEFT, INCISION AND SUTURES INTACT 2ND RIGHT       Wound 04/26/22 Toe (Comment  which one) Left #4 Left 2nd toe amp site (Active)   Wound Image   06/07/22 1319   Wound Etiology Non-Healing Surgical 06/21/22 1305   Dressing Status Old drainage noted;New drainage noted 06/21/22 1305   Wound Cleansed Cleansed with saline 06/21/22 1305   Offloading for Diabetic Foot Ulcers Offloading ordered;Post op shoe 06/21/22 1305   Wound Length (cm) 0.5 cm 06/21/22 1305   Wound Width (cm) 0.6 cm 06/21/22 1305   Wound Depth (cm) 0.3 cm 06/21/22 1305   Wound Surface Area (cm^2) 0.3 cm^2 06/21/22 1305   Change in Wound Size % (l*w) 80.77 06/21/22 1305   Wound Volume (cm^3) 0.09 cm^3 06/21/22 1305   Wound Healing % 86 06/21/22 1305   Post-Procedure Length (cm) 0.5 cm 06/21/22 1305   Post-Procedure Width (cm) 0.6 cm 06/21/22 1305   Post-Procedure Depth (cm) 0.3 cm 06/21/22 1305   Post-Procedure Surface Area (cm^2) 0.3 cm^2 06/21/22 1305   Post-Procedure Volume (cm^3) 0.09 cm^3 06/21/22 1305   Wound Assessment [facility] 06/21/22 1305   Drainage Amount Moderate 06/21/22 1305   Drainage Description Yellow 06/21/22 1305   Odor None 06/21/22 1305   Trudi-wound Assessment Dry/flaky 06/21/22 1305   Margins Defined edges 06/21/22 1305   Wound Thickness Description not for Pressure Injury Full thickness 06/21/22 1305   Number of days: 56           E11.42, L97.522. L97.512, L03.9      Plan:   CONT DOXY    MINERAL OIL DRESSING TO LEFT FOOT  SALINE WET TO DRY TO 2ND RIGHT    EXTEND PT'S RTW BY ONE WEEK BECAUSE SUTURES WON'T BE READY TO COME OUT BEFORE 7/1/22. WORK NOTE GIVEN BTW 7/8/22     Gurmeet Garcia 426 1 WEEK.     Treatment Note please see attached Discharge Instructions

## 2022-06-22 LAB
CULTURE: ABNORMAL
CULTURE: ABNORMAL
DIRECT EXAM: ABNORMAL
DIRECT EXAM: ABNORMAL
SPECIMEN DESCRIPTION: ABNORMAL

## 2022-06-23 ENCOUNTER — CARE COORDINATION (OUTPATIENT)
Dept: CASE MANAGEMENT | Age: 62
End: 2022-06-23

## 2022-06-23 DIAGNOSIS — E34.9 HYPOTESTOSTERONISM: Primary | ICD-10-CM

## 2022-06-23 RX ORDER — TESTOSTERONE CYPIONATE 200 MG/ML
200 INJECTION INTRAMUSCULAR
Qty: 1 ML | Refills: 5 | Status: SHIPPED | OUTPATIENT
Start: 2022-06-23 | End: 2022-08-19 | Stop reason: SDUPTHER

## 2022-06-23 NOTE — CARE COORDINATION
[facility] 45 Transitions Follow Up Call    2022    Patient: Elaina Renee  Patient : 1960   MRN: [id number]  Reason for Admission: ARTHROPLASTY 2ND DIGIT RIGHT APPLICATION EPIFIX LEFT FOOT  Discharge Date: 22 RARS: Readmission Risk Score: 10.7 ( )         Spoke with: Elaina Renee  ]  Was able to contact [facility] for transitional outreach. He stated that he was doing \"good\". He saw his podiatrist the  and no new medications were started and his wounds are doing good. He denied any fevers/chills, N/V, fatigue, dizziness and he has a little pain at the surgical site. He is taking it easy and elevating his feet. He said that he should be able to return to work on . He had no further questions or concerns. Care Transitions Follow Up Call    Needs to be reviewed by the provider   Additional needs identified to be addressed with provider: No  none             Method of communication with provider : none      Care Transition Nurse contacted the patient by telephone to follow up after admission on 22. Verified name and  with patient as identifiers. Addressed changes since last contact: none  Discussed follow-up appointments. CTN reviewed discharge instructions, medical action plan and red flags with patient and discussed any barriers to care and/or understanding of plan of care after discharge. Discussed appropriate site of care based on symptoms and resources available to patient including: PCP  Specialist  When to call 911. The patient agrees to contact the PCP office for questions related to their healthcare. Patients top risk factors for readmission: medical condition-DM/Ulcer  Interventions to address risk factors: Obtained and reviewed discharge summary and/or continuity of care documents      Non-[facility] follow up appointment(s):     CTN provided contact information for future needs.  Plan for follow-up call in 7-10 days based on severity of symptoms and risk factors. Plan for next call: symptom management-routine follow up on post op complication foot wounds            Care Transitions Subsequent and Final Call    Subsequent and Final Calls  Do you have any ongoing symptoms?: Yes  Onset of Patient-reported symptoms: In the past 7 days  Patient-reported symptoms: Pain  Have your medications changed?: No  Do you have any questions related to your medications?: No  Do you currently have any active services?: No  Are you currently active with any services?: Home Health  Do you have any needs or concerns that I can assist you with?: No  Care Transitions Interventions     Other Services:  (Comment: [place])   Other Interventions:            Follow Up  Future Appointments   Date Time Provider Benito Jaeger   6/28/2022  1:15 PM KEZIA [facility]   7/14/2022  8:30 AM DO MINE [facility]   7/14/2022 10:15 AM Deirdre Godfrey DPM [facility]   12/8/2022  9:45 AM Enedelia Davis MD Resp Spec Leslie Shay RN

## 2022-06-28 ENCOUNTER — HOSPITAL ENCOUNTER (OUTPATIENT)
Dept: WOUND CARE | Age: 62
Discharge: HOME | End: 2022-06-28
Payer: COMMERCIAL

## 2022-06-28 VITALS
SYSTOLIC BLOOD PRESSURE: 152 MMHG | BODY MASS INDEX: 33.72 KG/M2 | HEIGHT: 72 IN | DIASTOLIC BLOOD PRESSURE: 60 MMHG | HEART RATE: 65 BPM | TEMPERATURE: 97.6 F | WEIGHT: 249 LBS

## 2022-06-28 DIAGNOSIS — L97.524 CHRONIC ULCER OF TOE, LEFT, WITH NECROSIS OF BONE (HCC): ICD-10-CM

## 2022-06-28 DIAGNOSIS — L97.512 ULCER OF TOE OF RIGHT FOOT, WITH FAT LAYER EXPOSED (HCC): ICD-10-CM

## 2022-06-28 DIAGNOSIS — E11.42 DIABETIC POLYNEUROPATHY ASSOCIATED WITH TYPE 2 DIABETES MELLITUS (HCC): ICD-10-CM
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
## 2022-06-28 DIAGNOSIS — M86.9 OSTEOMYELITIS OF SECOND TOE OF LEFT FOOT (HCC): Primary | ICD-10-CM

## 2022-06-28 PROCEDURE — 11042 DBRDMT SUBQ TIS 1ST 20SQCM/<: CPT

## 2022-06-28 RX ORDER — LIDOCAINE 40 MG/G
CREAM TOPICAL ONCE
Status: CANCELLED | OUTPATIENT
Start: 2022-06-28 | End: 2022-06-28

## 2022-06-28 RX ORDER — GINSENG 100 MG
CAPSULE ORAL ONCE
Status: CANCELLED | OUTPATIENT
Start: 2022-06-28 | End: 2022-06-28

## 2022-06-28 RX ORDER — LIDOCAINE 50 MG/G
OINTMENT TOPICAL ONCE
Status: CANCELLED | OUTPATIENT
Start: 2022-06-28 | End: 2022-06-28

## 2022-06-28 RX ORDER — LIDOCAINE HYDROCHLORIDE 40 MG/ML
SOLUTION TOPICAL ONCE
Status: CANCELLED | OUTPATIENT
Start: 2022-06-28 | End: 2022-06-28

## 2022-06-28 RX ORDER — BACITRACIN ZINC AND POLYMYXIN B SULFATE 500; 1000 [USP'U]/G; [USP'U]/G
OINTMENT TOPICAL ONCE
Status: CANCELLED | OUTPATIENT
Start: 2022-06-28 | End: 2022-06-28

## 2022-06-28 RX ORDER — CLOBETASOL PROPIONATE 0.5 MG/G
OINTMENT TOPICAL ONCE
Status: CANCELLED | OUTPATIENT
Start: 2022-06-28 | End: 2022-06-28

## 2022-06-28 RX ORDER — GENTAMICIN SULFATE 1 MG/G
OINTMENT TOPICAL ONCE
Status: CANCELLED | OUTPATIENT
Start: 2022-06-28 | End: 2022-06-28

## 2022-06-28 RX ORDER — LIDOCAINE HYDROCHLORIDE 20 MG/ML
JELLY TOPICAL ONCE
Status: CANCELLED | OUTPATIENT
Start: 2022-06-28 | End: 2022-06-28

## 2022-06-28 RX ORDER — BETAMETHASONE DIPROPIONATE 0.05 %
OINTMENT (GRAM) TOPICAL ONCE
Status: CANCELLED | OUTPATIENT
Start: 2022-06-28 | End: 2022-06-28

## 2022-06-28 RX ORDER — MAGNESIUM CARB/ALUMINUM HYDROX 105-160MG
30 TABLET,CHEWABLE ORAL
Status: ACTIVE | OUTPATIENT
Start: 2022-06-28 | End: 2022-06-28

## 2022-06-28 RX ORDER — BACITRACIN, NEOMYCIN, POLYMYXIN B 400; 3.5; 5 [USP'U]/G; MG/G; [USP'U]/G
OINTMENT TOPICAL ONCE
Status: CANCELLED | OUTPATIENT
Start: 2022-06-28 | End: 2022-06-28

## 2022-06-28 NOTE — PROGRESS NOTES
[address]   Progress Note and Procedure Note      Leonarda Higgins  MEDICAL RECORD NUMBER:  [id number]  AGE: 64 y.o. GENDER: male  : 1960  EPISODE DATE:  2022    Subjective:     Chief Complaint   Patient presents with    Wound Check     BLE         HISTORY of PRESENT ILLNESS HPI     Leonarda Higgins is a 64 y.o. male who presents today for wound/ulcer evaluation. S/P    LEFT stravix graft x 5 week, EPIFIX X 12 DAYS, 2nd right S/P ARTHROPLASTY X 12 DAYS. TAKING HIS DOXY    BONE WAS NOT OSTEOMYELITIC PER PATH, NO GROWTH OF BACTERIA       Ulcer Identification:  Ulcer Type: diabetic  Contributing Factors: diabetes and chronic pressure       22 1204    Specimen Description . TOE    Special Requests . TOE   SECOND LEFT DIGIT    Direct Exam RARE NEUTROPHILS Abnormal     Direct Exam FEW GRAM POSITIVE COCCI IN PAIRS Abnormal     Culture STREPTOCOCCI, BETA HEMOLYTIC GROUP B MODERATE GROWTH Abnormal     Culture  Abnormal   STAPHYLOCOCCUS AUREUS MODERATE GROWTH This isolate is methicillin susceptible. For susceptibility, refer to previous culture. Culture No anaerobic organisms isolated at 5 days.                 PAST MEDICAL HISTORY        Diagnosis Date    Acquired trigger finger 2018    CAD (coronary artery disease)     NWOCC/ involving coronary bypass graft of native heart with unstable angina pectoris    Cervical spondylosis     [facility]est[facility] failure[facility]     Chronic back pain     on 18 pt states is presently in pain mgmnt    Contusion of elbow 2018    Contusion of knee 2018    Contusion of shoulder region 2018    Diabetic foot ulcer with osteomyelitis ([address] 2018    Diabetic ulcer of toe associated with type 2 diabetes mellitus, with fat layer exposed ([address] 2018    Hypercholesterolemia 2017    Hyperlipemia, mixed     Hypertension     Ischemic cardiomyopathy 2018    Non-pressure chronic ulcer of left lower leg with fat layer exposed ([address] 6/9/2014    Obesity     Obesity, Class III, BMI 40-49.9 (morbid obesity) ([address] 4/6/2015    Obstructive sleep apnea syndrome     Osteoarthritis of carpometacarpal (CMC) joint of thumb 6/5/2018    Osteoarthritis of knee 6/5/2018    Peripheral vascular disease ([address]     with venous stasis and ulcerations. Dr Kalen Faye Presence of coronary angioplasty implant and graft 9/11/2009    Sleep apnea     Dr Mark Diaz of knee and leg 6/5/2018    Sprain of shoulder and upper arm 6/5/2018    Type II or unspecified type diabetes mellitus without mention of complication, not stated as uncontrolled     Ulcer of left foot, with fat layer exposed ([facility] 1/9/2018    Unspecified sleep apnea     Dr Jordan Hager CPAP    Venous insufficiency of both lower extremities 1/31/2018       PAST SURGICAL HISTORY    Past Surgical History:   Procedure Laterality Date    ARTHROPLASTY Left 4/1/2022    [facility] [address] [facility] B[facility] 6/17/2022    ARTHROPLASTY 2ND DIGIT RIGHT APPLICATION EPIFIX LEFT FOOT performed by Darwin Ponce DPM at [address] [facility]  09/2021    stent    [facility] Right 3/22/13    Dr. Delmer Emmanuel.     COLONOSCOPY  2020    CORONARY ARTERY BYPASS GRAFT  2/26/07    [facility], Dr Roxann Silverio (X 3)    CORONARY ARTERY BYPASS GRAFT  2006    NECK SURGERY  2010    cervical stenoses C5-C6-C7and partial T1 laminectomy with fusion of C6-7    OTHER SURGICAL HISTORY      wound care ulcers of legs (bilaterally) with Dr Renita Woodruff Bilateral 5/18/2022    STRAVIX LEFT FOOT, THERASKIN RIGHT FOOT performed by Darwin Ponce DPM at [facility] TOE AMPUTATION Left 4/6/2022    TOE AMPUTATION, SECOND DIGIT performed by Darwin Ponce DPM at [address]  age 11   [place]      closure of peripherator veins of legs, Dr Regino Junior EXTRACTION         FAMILY HISTORY    Family History   Problem Relation Age of Onset    Cancer Mother         lung    Diabetes Father     Heart Disease Father     High Blood Pressure Father     Diabetes Brother     Heart Disease Brother     High Blood Pressure Brother        SOCIAL HISTORY    Social History     Tobacco Use    Smoking status: Never Smoker    Smokeless tobacco: Never Used   Vaping Use    Vaping Use: Never used   Substance Use Topics    Alcohol use: Not Currently     Comment: rare social, non past 5 months    Drug use: No       ALLERGIES    Allergies   Allergen Reactions    Pcn [Penicillins] Other (See Comments)     Unknown rx    Penicillin G Rash       MEDICATIONS    Current Outpatient Medications on File Prior to Encounter   Medication Sig Dispense Refill    testosterone cypionate (DEPOTESTOTERONE CYPIONATE) 200 MG/ML injection Inject 1 mL into the muscle every 30 days for 30 days. Dispense with 3 cc syringe 18g needle and 22 1/2 g needle each month 1 mL 5    doxycycline hyclate (VIBRA-TABS) 100 MG tablet Take 1 tablet by mouth 2 times daily for 10 days 20 tablet 0    diazePAM (VALIUM) 5 MG tablet Take 5 mg by mouth once.   (Patient not taking: Reported on 6/20/2022)      clopidogrel (PLAVIX) 75 MG tablet Take 1 tablet by mouth daily 90 tablet 3    carvedilol (COREG) 12.5 MG tablet Take 1 tablet by mouth 2 times daily (with meals) 180 tablet 3    dapagliflozin (FARXIGA) 10 MG tablet Take 1 tablet by mouth every morning 90 tablet 3    traZODone (DESYREL) 150 MG tablet TAKE ONE TABLET BY MOUTH EVERY EVENING 90 tablet 3    linagliptin (TRADJENTA) 5 MG tablet TAKE 1 TABLET BY MOUTH ONE TIME A DAY 90 tablet 3    hydroCHLOROthiazide (MICROZIDE) 12.5 MG capsule 1 po qd 90 capsule 3    metFORMIN (GLUCOPHAGE) 1000 MG tablet Take 1 tablet by mouth 2 times daily (with meals) 60 tablet 3    omeprazole (PRILOSEC) 20 MG delayed release capsule TAKE 1 CAPSULE DAILY 90 capsule 1    gabapentin (NEURONTIN) 300 MG capsule Take 900 mg by mouth nightly.  Misc Natural Products (GLUCOSAMINE CHOND MSM FORMULA PO) Take 2 tablets by mouth daily      HYDROcodone-acetaminophen (NORCO)  MG per tablet Take 1 tablet by mouth every 4-6 hours as needed for Pain.  meloxicam (MOBIC) 15 MG tablet Take 1 tablet by mouth daily 90 tablet 3    tiZANidine (ZANAFLEX) 4 MG tablet 1 po qd 14 tablet 3    NEEDLE, DISP, 18 G (BD DISP NEEDLES) 18G X 1-1/2\" MISC 1 Device by Does not apply route every 30 days 25 each 1    Multiple Vitamin (MULTI VITAMIN DAILY PO) Take by mouth      nitroGLYCERIN (NITROSTAT) 0.4 MG SL tablet Place 0.4 mg under the tongue      aspirin 325 MG tablet Take 325 mg by mouth daily. No current facility-administered medications on file prior to encounter. REVIEW OF SYSTEMS    Review of Systems   Constitutional: Negative for chills and fever. Skin: Positive for wound. Neurological: Negative for numbness. Objective:      BP (!) 152/60   Pulse 65   Temp 97.6 °F (36.4 °C) (Tympanic)   Ht 6' (1.829 m)   Wt 249 lb (112.9 kg)   BMI 33.77 kg/m²     Wt Readings from Last 3 Encounters:   06/28/22 249 lb (112.9 kg)   06/21/22 249 lb (112.9 kg)   06/17/22 249 lb (112.9 kg)       Physical Exam:  General:  Alert and oriented x3. In no acute distress. Lower Extremity Physical Exam:    Vascular: DP pulses are palpable, Bilateral. PT pulses are palpable, Bilateral. CFT <3 seconds to all digits, Bilateral.  No edema, Bilateral.  Hair growth is absent to the level of the digits, Bilateral.     Neuro: Saph/sural/SP/DP/plantar sensation intact to light touch. Musculoskeletal: EHL/FHL/GS/TA gross motor intact.  Gross deformity is present, hammertoes bilateral.     Dermatologic: clean wounds bilateral feet      Wound 04/26/22 Toe (Comment  which one) Left #4 Left 2nd toe amp site (Active)   Wound Image   06/07/22 1319   Wound Etiology Non-Healing Surgical 06/28/22 1312 Dressing Status Old drainage noted;New drainage noted 06/28/22 1312   Wound Cleansed Cleansed with saline 06/28/22 1312   Offloading for Diabetic Foot Ulcers Offloading ordered;Post op shoe 06/28/22 1312   Wound Length (cm) 0 cm 06/28/22 1312   Wound Width (cm) 0 cm 06/28/22 1312   Wound Depth (cm) 0 cm 06/28/22 1312   Wound Surface Area (cm^2) 0 cm^2 06/28/22 1312   Change in Wound Size % (l*w) 100 06/28/22 1312   Wound Volume (cm^3) 0 cm^3 06/28/22 1312   Wound Healing % 100 06/28/22 1312   Post-Procedure Length (cm) 0 cm 06/28/22 1312   Post-Procedure Width (cm) 0 cm 06/28/22 1312   Post-Procedure Depth (cm) 0 cm 06/28/22 1312   Post-Procedure Surface Area (cm^2) 0 cm^2 06/28/22 1312   Post-Procedure Volume (cm^3) 0 cm^3 06/28/22 1312   Wound Assessment Slough 06/21/22 1305   Drainage Amount Moderate 06/21/22 1305   Drainage Description Yellow 06/21/22 1305   Odor None 06/21/22 1305   Trudi-wound Assessment Dry/flaky 06/28/22 1312   Margins Defined edges 06/21/22 1305   Wound Thickness Description not for Pressure Injury Full thickness 06/21/22 1305   Number of days: 63       Wound 06/28/22 Toe (Comment  which one) Right #2 2nd Toe (Active)   Wound Etiology Non-Healing Surgical 06/28/22 1312   Dressing Status Old drainage noted;New drainage noted 06/28/22 1312   Wound Cleansed Cleansed with saline 06/28/22 1312   Wound Length (cm) 1 cm 06/28/22 1312   Wound Width (cm) 1 cm 06/28/22 1312   Wound Depth (cm) 0.3 cm 06/28/22 1312   Wound Surface Area (cm^2) 1 cm^2 06/28/22 1312   Wound Volume (cm^3) 0.3 cm^3 06/28/22 1312   Post-Procedure Length (cm) 1 cm 06/28/22 1312   Post-Procedure Width (cm) 1 cm 06/28/22 1312   Post-Procedure Depth (cm) 0.3 cm 06/28/22 1312   Post-Procedure Surface Area (cm^2) 1 cm^2 06/28/22 1312   Post-Procedure Volume (cm^3) 0.3 cm^3 06/28/22 1312   Wound Assessment Pink/red 06/28/22 1312   Drainage Amount Moderate 06/28/22 1312   Drainage Description Serosanguinous 06/28/22 1312   Odor None 06/28/22 1312   Trudi-wound Assessment Blanchable erythema 06/28/22 1312   Margins Attached edges 06/28/22 1312   Wound Thickness Description not for Pressure Injury Full thickness 06/28/22 1312   Number of days: 0             E11.42, L97.522. L97.512, L03.9      Plan:   SUTURES REMOVED 2ND RIGHT AND EXCISIONAL DEBRIDEMENT OF WOUND THRU SUB Q TISSUE WITH SOFT TISSUE NIPPERS    MINERAL OIL DRESSING TO LEFT FOOT  KAYCEE AND SALINE WET TO DRY TO 2ND RIGHT - CHANGE QOD    CONT DOXY    [address] 1 WEEK.     Treatment Note please see attached Discharge Instructions

## 2022-06-30 ENCOUNTER — CARE COORDINATION (OUTPATIENT)
Dept: CASE MANAGEMENT | Age: 62
End: 2022-06-30

## 2022-06-30 NOTE — CARE COORDINATION
[place] [id number] Transitions Follow Up Call    2022    Patient: Hannah Rashid  Patient : 1960   MRN: [id number]  Reason for Admission: ARTHROPLASTY 2ND DIGIT RIGHT APPLICATION EPIFIX LEFT FOOT  Discharge Date: 22 RARS: Readmission Risk Score: 10.7 ( )         Spoke with: Hannah Rashid    Was able to contact [facility] for transitional outreach. He stated that he was doing good. He said that he had his stitches removed and he was told it was healing. No fever/chills or s/s of infections. He will be seeing the podiatrist next Tuesday and is hoping to be able to return to work next Friday. No questions or concerns. Care Transitions Follow Up Call          Needs to be reviewed by the provider    Additional needs identified to be addressed with provider: No  none                 Method of communication with provider : none        Care Transition Nurse contacted the patient by telephone to follow up after admission on 22. Verified name and  with patient as identifiers.     Addressed changes since last contact: none  Discussed follow-up appointments.     CTN reviewed discharge instructions, medical action plan and red flags with patient and discussed any barriers to care and/or understanding of plan of care after discharge. Discussed appropriate site of care based on symptoms and resources available to patient including: PCP  Specialist  When to call 911. The patient agrees to contact the PCP office for questions related to their healthcare.      Patients top risk factors for readmission: medical condition-DM/Ulcer  Interventions to address risk factors: Obtained and reviewed discharge summary and/or continuity of care documents        Non-[facility] follow up appointment(s):      CTN provided contact information for future needs. Plan for follow-up call in 7-10 days based on severity of symptoms and risk factors.   Plan for next call: symptom management-routine follow up on post op complication foot wounds Final call      Care Transitions Subsequent and Final Call    Subsequent and Final Calls  Do you have any ongoing symptoms?: No  Have your medications changed?: No  Do you have any questions related to your medications?: No  Do you currently have any active services?: No  Care Transitions Interventions     Other Services:  (Comment: Isabell)   Other Interventions:            Follow Up  Future Appointments   Date Time Provider Benito Jaeger   7/5/2022  1:15 PM KEZIA Banks WND [facility] AmayaKarin Karen   7/14/2022  8:30 AM DO MINE Ferrer [facility]   7/14/2022 10:15 AM Marguerite Diane DPM [facility]   12/8/2022  9:45 AM Alli Gardner MD Resp Spec Shital Lizarraga RN

## 2022-07-01 ENCOUNTER — TELEPHONE (OUTPATIENT)
Dept: FAMILY MEDICINE CLINIC | Age: 62
End: 2022-07-01

## 2022-07-01 NOTE — TELEPHONE ENCOUNTER
Patient called in stating that his testosterone needs a PA his pharmancy has sent a fax over regarding this and the patient is calling to follow up on this .     Please advise

## 2022-07-05 ENCOUNTER — HOSPITAL ENCOUNTER (OUTPATIENT)
Dept: WOUND CARE | Age: 62
Discharge: HOME | End: 2022-07-05
Payer: COMMERCIAL

## 2022-07-05 DIAGNOSIS — E11.42 DIABETIC POLYNEUROPATHY ASSOCIATED WITH TYPE 2 DIABETES MELLITUS (HCC): ICD-10-CM

## 2022-07-05 DIAGNOSIS — M86.9 OSTEOMYELITIS OF SECOND TOE OF LEFT FOOT (HCC): Primary | ICD-10-CM

## 2022-07-05 DIAGNOSIS — L97.512 ULCER OF TOE OF RIGHT FOOT, WITH FAT LAYER EXPOSED (HCC): ICD-10-CM
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
## 2022-07-05 DIAGNOSIS — M62.838 MUSCLE SPASMS OF NECK: ICD-10-CM

## 2022-07-05 DIAGNOSIS — L97.524 CHRONIC ULCER OF TOE, LEFT, WITH NECROSIS OF BONE (HCC): ICD-10-CM
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
## 2022-07-05 PROCEDURE — 11042 DBRDMT SUBQ TIS 1ST 20SQCM/<: CPT

## 2022-07-05 RX ORDER — CLOBETASOL PROPIONATE 0.5 MG/G
OINTMENT TOPICAL ONCE
Status: CANCELLED | OUTPATIENT
Start: 2022-07-05 | End: 2022-07-05

## 2022-07-05 RX ORDER — LIDOCAINE HYDROCHLORIDE 20 MG/ML
JELLY TOPICAL ONCE
Status: CANCELLED | OUTPATIENT
Start: 2022-07-05 | End: 2022-07-05

## 2022-07-05 RX ORDER — GINSENG 100 MG
CAPSULE ORAL ONCE
Status: CANCELLED | OUTPATIENT
Start: 2022-07-05 | End: 2022-07-05

## 2022-07-05 RX ORDER — DOXYCYCLINE HYCLATE 100 MG
100 TABLET ORAL 2 TIMES DAILY
Qty: 20 TABLET | Refills: 0 | Status: SHIPPED | OUTPATIENT
Start: 2022-07-05 | End: 2022-07-15

## 2022-07-05 RX ORDER — BACITRACIN ZINC AND POLYMYXIN B SULFATE 500; 1000 [USP'U]/G; [USP'U]/G
OINTMENT TOPICAL ONCE
Status: CANCELLED | OUTPATIENT
Start: 2022-07-05 | End: 2022-07-05

## 2022-07-05 RX ORDER — GENTAMICIN SULFATE 1 MG/G
OINTMENT TOPICAL ONCE
Status: CANCELLED | OUTPATIENT
Start: 2022-07-05 | End: 2022-07-05

## 2022-07-05 RX ORDER — LIDOCAINE HYDROCHLORIDE 20 MG/ML
JELLY TOPICAL ONCE
Status: COMPLETED | OUTPATIENT
Start: 2022-07-05 | End: 2022-07-05

## 2022-07-05 RX ORDER — LIDOCAINE 50 MG/G
OINTMENT TOPICAL ONCE
Status: CANCELLED | OUTPATIENT
Start: 2022-07-05 | End: 2022-07-05

## 2022-07-05 RX ORDER — BETAMETHASONE DIPROPIONATE 0.05 %
OINTMENT (GRAM) TOPICAL ONCE
Status: CANCELLED | OUTPATIENT
Start: 2022-07-05 | End: 2022-07-05

## 2022-07-05 RX ORDER — LIDOCAINE 40 MG/G
CREAM TOPICAL ONCE
Status: CANCELLED | OUTPATIENT
Start: 2022-07-05 | End: 2022-07-05

## 2022-07-05 RX ORDER — TIZANIDINE 4 MG/1
TABLET ORAL
Qty: 14 TABLET | Refills: 3 | Status: SHIPPED | OUTPATIENT
Start: 2022-07-05

## 2022-07-05 RX ORDER — BACITRACIN, NEOMYCIN, POLYMYXIN B 400; 3.5; 5 [USP'U]/G; MG/G; [USP'U]/G
OINTMENT TOPICAL ONCE
Status: CANCELLED | OUTPATIENT
Start: 2022-07-05 | End: 2022-07-05

## 2022-07-05 RX ORDER — LIDOCAINE HYDROCHLORIDE 40 MG/ML
SOLUTION TOPICAL ONCE
Status: CANCELLED | OUTPATIENT
Start: 2022-07-05 | End: 2022-07-05

## 2022-07-05 RX ADMIN — LIDOCAINE HYDROCHLORIDE 6 ML: 20 JELLY TOPICAL at 13:07

## 2022-07-05 NOTE — PROGRESS NOTES
[address]   Progress Note and Procedure Note      Mario Aguilar  MEDICAL RECORD NUMBER:  [id number]  AGE: 64 y.o. GENDER: male  : 1960  EPISODE DATE:  2022    Subjective:     Chief Complaint   Patient presents with    Wound Check     ble         HISTORY of PRESENT ILLNESS HPI     Mario Aguilar is a 64 y.o. male who presents today for wound/ulcer evaluation. S/P    LEFT stravix graft x 6 week, EPIFIX X 19 DAYS, 2nd right S/P ARTHROPLASTY X 19 DAYS. BONE WAS NOT OSTEOMYELITIC PER PATH, NO GROWTH OF BACTERIA       Ulcer Identification:  Ulcer Type: diabetic  Contributing Factors: diabetes and chronic pressure       22 1204    Specimen Description . TOE    Special Requests . TOE   SECOND LEFT DIGIT    Direct Exam RARE NEUTROPHILS Abnormal     Direct Exam FEW GRAM POSITIVE COCCI IN PAIRS Abnormal     Culture STREPTOCOCCI, BETA HEMOLYTIC GROUP B MODERATE GROWTH Abnormal     Culture  Abnormal   STAPHYLOCOCCUS AUREUS MODERATE GROWTH This isolate is methicillin susceptible. For susceptibility, refer to previous culture. Culture No anaerobic organisms isolated at 5 days.                 PAST MEDICAL HISTORY        Diagnosis Date    Acquired trigger finger 2018    CAD (coronary artery disease)     NWOCC/ involving coronary bypass graft of native heart with unstable angina pectoris    Cervical spondylosis     [facility]est[facility]     Chronic back pain     on 18 pt states is presently in pain mgmnt    Contusion of elbow 2018    Contusion of knee 2018    Contusion of shoulder region 2018    Diabetic foot ulcer with osteomyelitis ([facility] [address] 2018    Diabetic ulcer of toe associated with type 2 diabetes mellitus, with fat layer exposed ([facility] [address] 2018    Hypercholesterolemia 2017    Hyperlipemia, mixed     Hypertension     Ischemic cardiomyopathy 2018    Non-pressure chronic ulcer of left lower leg with fat layer exposed ([facility] [address] 6/9/2014    Obesity     Obesity, Class III, BMI 40-49.9 (morbid obesity) ([address] 4/6/2015    Obstructive sleep apnea syndrome     Osteoarthritis of carpometacarpal (CMC) joint of thumb 6/5/2018    Osteoarthritis of knee 6/5/2018    Peripheral vascular disease ([facility] [address]     with venous stasis and ulcerations. Dr Geovanna Cage Presence of coronary angioplasty implant and graft 9/11/2009    Sleep apnea     Dr Howe of knee and leg 6/5/2018    Sprain of shoulder and upper arm 6/5/2018    Type II or unspecified type diabetes mellitus without mention of complication, not stated as uncontrolled     Ulcer of left foot, with fat layer exposed ([facility] [address] 1/9/2018    Unspecified sleep apnea     Dr Maite Mendoza CPAP    Venous insufficiency of both lower extremities 1/31/2018       PAST SURGICAL HISTORY    Past Surgical History:   Procedure Laterality Date    ARTHROPLASTY Left 4/1/2022    ARTHROPLASTY 2ND LEFT TOE performed by Jen Arriaza DPM at [address] [facility] Bilateral 6/17/2022    ARTHROPLASTY 2ND DIGIT RIGHT APPLICATION EPIFIX LEFT FOOT performed by Jen Arriaza DPM at [address] [facility]  09/2021    stent    [facility] Right 3/22/13    Dr. Za Vitale.     COLONOSCOPY  2020    CORONARY ARTERY BYPASS GRAFT  2/26/07    [facility], Dr Amanda Gibbons (X 3)    CORONARY ARTERY BYPASS GRAFT  2006    NECK SURGERY  2010    cervical stenoses C5-C6-C7and partial T1 laminectomy with fusion of C6-7    OTHER SURGICAL HISTORY      wound care ulcers of legs (bilaterally) with Dr Estrella Subramanian Bilateral 5/18/2022    STRAVIX LEFT FOOT, THERASKIN RIGHT FOOT performed by Jen Arriaza DPM at [facility] TOE AMPUTATION Left 4/6/2022    TOE AMPUTATION, SECOND DIGIT performed by Jen Arriaza DPM at [address]  age 11   [place]      closure of peripherator veins of legs, Dr Luzma Ashley HISTORY    Family History   Problem Relation Age of Onset    Cancer Mother         lung    Diabetes Father     Heart Disease Father     High Blood Pressure Father     Diabetes Brother     Heart Disease Brother     High Blood Pressure Brother        SOCIAL HISTORY    Social History     Tobacco Use    Smoking status: Never Smoker    Smokeless tobacco: Never Used   Vaping Use    Vaping Use: Never used   Substance Use Topics    Alcohol use: Not Currently     Comment: rare social, non past 5 months    Drug use: No       ALLERGIES    Allergies   Allergen Reactions    Pcn [Penicillins] Other (See Comments)     Unknown rx    Penicillin G Rash       MEDICATIONS    Current Outpatient Medications on File Prior to Encounter   Medication Sig Dispense Refill    testosterone cypionate (DEPOTESTOTERONE CYPIONATE) 200 MG/ML injection Inject 1 mL into the muscle every 30 days for 30 days. Dispense with 3 cc syringe 18g needle and 22 1/2 g needle each month 1 mL 5    diazePAM (VALIUM) 5 MG tablet Take 5 mg by mouth once. (Patient not taking: Reported on 6/20/2022)      clopidogrel (PLAVIX) 75 MG tablet Take 1 tablet by mouth daily 90 tablet 3    carvedilol (COREG) 12.5 MG tablet Take 1 tablet by mouth 2 times daily (with meals) 180 tablet 3    dapagliflozin (FARXIGA) 10 MG tablet Take 1 tablet by mouth every morning 90 tablet 3    traZODone (DESYREL) 150 MG tablet TAKE ONE TABLET BY MOUTH EVERY EVENING 90 tablet 3    linagliptin (TRADJENTA) 5 MG tablet TAKE 1 TABLET BY MOUTH ONE TIME A DAY 90 tablet 3    hydroCHLOROthiazide (MICROZIDE) 12.5 MG capsule 1 po qd 90 capsule 3    metFORMIN (GLUCOPHAGE) 1000 MG tablet Take 1 tablet by mouth 2 times daily (with meals) 60 tablet 3    omeprazole (PRILOSEC) 20 MG delayed release capsule TAKE 1 CAPSULE DAILY 90 capsule 1    gabapentin (NEURONTIN) 300 MG capsule Take 900 mg by mouth nightly.        Misc Natural Products (GLUCOSAMINE CHOND MSM FORMULA PO) Take 2 tablets by mouth daily      HYDROcodone-acetaminophen (NORCO)  MG per tablet Take 1 tablet by mouth every 4-6 hours as needed for Pain.  meloxicam (MOBIC) 15 MG tablet Take 1 tablet by mouth daily 90 tablet 3    NEEDLE, DISP, 18 G (BD DISP NEEDLES) 18G X 1-1/2\" MISC 1 Device by Does not apply route every 30 days 25 each 1    Multiple Vitamin (MULTI VITAMIN DAILY PO) Take by mouth      nitroGLYCERIN (NITROSTAT) 0.4 MG SL tablet Place 0.4 mg under the tongue      aspirin 325 MG tablet Take 325 mg by mouth daily. No current facility-administered medications on file prior to encounter. REVIEW OF SYSTEMS    Review of Systems   Constitutional: Negative for chills and fever. Skin: Positive for wound. Neurological: Negative for numbness. Objective: There were no vitals taken for this visit. Wt Readings from Last 3 Encounters:   06/28/22 249 lb (112.9 kg)   06/21/22 249 lb (112.9 kg)   06/17/22 249 lb (112.9 kg)       Physical Exam:  General:  Alert and oriented x3. In no acute distress. Lower Extremity Physical Exam:    Vascular: DP pulses are palpable, Bilateral. PT pulses are palpable, Bilateral. CFT <3 seconds to all digits, Bilateral.  No edema, Bilateral.  Hair growth is absent to the level of the digits, Bilateral.     Neuro: Saph/sural/SP/DP/plantar sensation intact to light touch. Musculoskeletal: EHL/FHL/GS/TA gross motor intact.  Gross deformity is present, hammertoes bilateral.     Dermatologic: clean wounds bilateral feet       Wound 04/26/22 Toe (Comment  which one) Left #4 Left 2nd toe amp site (Active)   Wound Image   06/07/22 1319   Wound Etiology Non-Healing Surgical 07/05/22 1303   Dressing Status Old drainage noted 07/05/22 1303   Wound Cleansed Cleansed with saline 07/05/22 1303   Offloading for Diabetic Foot Ulcers Offloading ordered;Post op shoe 07/05/22 1303   Wound Length (cm) 0 cm 07/05/22 1303   Wound Width (cm) 0 cm 07/05/22 1303   Wound Depth (cm) 0 cm 07/05/22 1303   Wound Surface Area (cm^2) 0 cm^2 07/05/22 1303   Change in Wound Size % (l*w) 100 07/05/22 1303   Wound Volume (cm^3) 0 cm^3 07/05/22 1303   Wound Healing % 100 07/05/22 1303   Post-Procedure Length (cm) 0.5 cm 07/05/22 1303   Post-Procedure Width (cm) 0.4 cm 07/05/22 1303   Post-Procedure Depth (cm) 0.3 cm 07/05/22 1303   Post-Procedure Surface Area (cm^2) 0.2 cm^2 07/05/22 1303   Post-Procedure Volume (cm^3) 0.06 cm^3 07/05/22 1303   Wound Assessment [facility] 06/21/22 1305   Drainage Amount Moderate 06/21/22 1305   Drainage Description Yellow 06/21/22 1305   Odor None 06/21/22 1305   Trudi-wound Assessment Dry/flaky; Intact 07/05/22 1303   Margins Defined edges 06/21/22 1305   Wound Thickness Description not for Pressure Injury Full thickness 06/21/22 1305   Number of days: 70       Wound 06/28/22 Toe (Comment  which one) Right #2 2nd Toe (Active)   Wound Etiology Non-Healing Surgical 07/05/22 1303   Dressing Status Old drainage noted;New drainage noted 07/05/22 1303   Wound Cleansed Cleansed with saline 07/05/22 1303   Offloading for Diabetic Foot Ulcers Offloading ordered;Post op shoe 07/05/22 1303   Wound Length (cm) 0.5 cm 07/05/22 1303   Wound Width (cm) 0.4 cm 07/05/22 1303   Wound Depth (cm) 0.3 cm 07/05/22 1303   Wound Surface Area (cm^2) 0.2 cm^2 07/05/22 1303   Change in Wound Size % (l*w) 80 07/05/22 1303   Wound Volume (cm^3) 0.06 cm^3 07/05/22 1303   Wound Healing % 80 07/05/22 1303   Post-Procedure Length (cm) 0.7 cm 07/05/22 1303   Post-Procedure Width (cm) 1.2 cm 07/05/22 1303   Post-Procedure Depth (cm) 0.3 cm 07/05/22 1303   Post-Procedure Surface Area (cm^2) 0.84 cm^2 07/05/22 1303   Post-Procedure Volume (cm^3) 0.252 cm^3 07/05/22 1303   Wound Assessment Pink/red 07/05/22 1303   Drainage Amount Moderate 07/05/22 1303   Drainage Description Serosanguinous 07/05/22 1303   Odor None 07/05/22 1303   Trudi-wound Assessment Blanchable erythema 07/05/22 1303   Margins Attached edges 07/05/22 1303   Wound Thickness Description not for Pressure Injury Full thickness 07/05/22 1303   Number of days: 7        Procedure:  Wound Location: left and right FEETE    Lidocaine gel soaked gauze was applied at beginning of wound evaluation. The wound(s) was excisionally debrided sharply of fibrotic, necrotic, and hyperkeratotic tissue, including a layer of surrounding healthy tissue using curette. The wound was debrided down through and including subcutaneous. Percent of Wound Debrided: 100%    Total Surface Area Debrided:  (see above for sq cm)    Bleeding: Minimal    Patient tolerated procedure well and was given proper instruction. Post debridement wound description:  clean       E11.42, L97.522. L97.512, L03.9      Plan:    EXCISIONAL DEBRIDEMENT OF WOUNDS THRU SUB Q TISSUE WITH CURRETTE - SIG IMPROVEMENT NOTED    MINERAL OIL DRESSING TO LEFT FOOT  KAYCEE AND SALINE WET TO DRY TO 2ND RIGHT - CHANGE QOD    RX DOXY TO KEEP ON HAND - PT DRIVES TRUCKS TO [address] [place] [address]. WILL GET LARGER, MESH SNEAKERS TO ACCOMMODATE LIGHT DRESSINGS BILATERAL FEET    BTW 7/8/22    Gurmeet Garcia 426 1 WEEK - TO BE SEEN WEEKLY UNTIL HEALED, BUT \"HE NEEDS TO GO BACK TO WORK\".     Treatment Note please see attached Discharge Instructions

## 2022-07-05 NOTE — TELEPHONE ENCOUNTER
----- Message from Joyce Paredes sent at 7/5/2022  9:35 AM EDT -----  Subject: Refill Request    QUESTIONS  Name of Medication? tiZANidine (ZANAFLEX) 4 MG tablet  Patient-reported dosage and instructions? taking 4 mg 1 time during the   day and 1 at bedtime  How many days do you have left? 1  Preferred Pharmacy? [facility] [id number]  Pharmacy phone number (if available)? [phone]  Additional Information for Provider? refill at 30 day supply  ---------------------------------------------------------------------------  --------------  CALL BACK INFO  What is the best way for the office to contact you? OK to leave message on   voicemail  Preferred Call Back Phone Number? [phone]  ---------------------------------------------------------------------------  --------------  SCRIPT ANSWERS  Relationship to Patient?  Self

## 2022-07-06 NOTE — TELEPHONE ENCOUNTER
Sloan Gregg is calling to request a refill on the following medication(s):    Last Visit Date (If Applicable):  0/75/7472    Next Visit Date:    7/14/2022    Medication Request:  Requested Prescriptions     Pending Prescriptions Disp Refills    metFORMIN (GLUCOPHAGE) 1000 MG tablet 60 tablet 3     Sig: Take 1 tablet by mouth 2 times daily (with meals)

## 2022-07-07 ENCOUNTER — CARE COORDINATION (OUTPATIENT)
Dept: CASE MANAGEMENT | Age: 62
End: 2022-07-07

## 2022-07-07 NOTE — CARE COORDINATION
[facility] Transitions Follow Up Call    2022    Patient: Glen Rey  Patient : 1960   MRN: [id number]  Reason for Admission: ARTHROPLASTY 2ND DIGIT RIGHT APPLICATION EPIFIX LEFT FOOT  Discharge Date: 22 RARS: Readmission Risk Score: 10.7 ( )         Spoke with: Glen Rey     Was able to contact Mary Lang for final outreach. He stated that he was released to return to work. He said that he still is having some issues with the Rt foot but will be following up with podiatry. He said that he was out getting new diabetic shoes that the podiatrist wanted him to have. He denied any s/s of infection. No questions or concerns. Informed of final outreach. Care Transitions Follow Up Call             Needs to be reviewed by the provider     Additional needs identified to be addressed with provider: No  none                 Method of communication with provider : none        Care Transition Nurse contacted the patient by telephone to follow up after admission on 22. Verified name and  with patient as identifiers.     Addressed changes since last contact: none  Discussed follow-up appointments.     CTN reviewed discharge instructions, medical action plan and red flags with patient and discussed any barriers to care and/or understanding of plan of care after discharge. Discussed appropriate site of care based on symptoms and resources available to patient including: PCP  Specialist  When to call 911. The patient agrees to contact the PCP office for questions related to their healthcare.      Patients top risk factors for readmission: medical condition-DM/Ulcer  Interventions to address risk factors: Obtained and reviewed discharge summary and/or continuity of care documents        [facility] follow [facility] appointment(s):      CTN provided contact information for future needs. No further outreaches needed. Final call episode ended.         Care Transitions Subsequent and Final Call    Subsequent and Final Calls  Do you have any ongoing symptoms?: No  Have your medications changed?: No  Do you have any questions related to your medications?: No  Do you currently have any active services?: No  Are you currently active with any services?: Home Health  Do you have any needs or concerns that I can assist you with?: No  Care Transitions Interventions     Other Services:  (Comment: Isabell)   Other Interventions:            Follow Up  Future Appointments   Date Time Provider Benito Jaeger   7/12/2022 12:45 PM KEZIA WhiteD [place] [facility]   7/14/2022  8:30 AM DO MINE Brunner [facility]   7/14/2022 10:15 AM Liza Chung DPM [facility]   12/8/2022  9:45 AM Claudell Rhodes, MD Resp Spec Oscar Wright RN

## 2022-07-12 ENCOUNTER — HOSPITAL ENCOUNTER (OUTPATIENT)
Dept: WOUND CARE | Age: 62
Discharge: HOME | End: 2022-07-12
Payer: COMMERCIAL

## 2022-07-12 DIAGNOSIS — L97.524 CHRONIC ULCER OF TOE, LEFT, WITH NECROSIS OF BONE (HCC): ICD-10-CM
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
## 2022-07-12 DIAGNOSIS — E11.42 DIABETIC POLYNEUROPATHY ASSOCIATED WITH TYPE 2 DIABETES MELLITUS (HCC): ICD-10-CM

## 2022-07-12 DIAGNOSIS — M86.9 OSTEOMYELITIS OF SECOND TOE OF LEFT FOOT (HCC): Primary | ICD-10-CM

## 2022-07-12 DIAGNOSIS — L97.512 ULCER OF TOE OF RIGHT FOOT, WITH FAT LAYER EXPOSED (HCC): ICD-10-CM

## 2022-07-12 PROCEDURE — 11042 DBRDMT SUBQ TIS 1ST 20SQCM/<: CPT

## 2022-07-12 RX ORDER — LIDOCAINE 40 MG/G
CREAM TOPICAL ONCE
Status: CANCELLED | OUTPATIENT
Start: 2022-07-12 | End: 2022-07-12

## 2022-07-12 RX ORDER — LIDOCAINE HYDROCHLORIDE 40 MG/ML
SOLUTION TOPICAL ONCE
Status: CANCELLED | OUTPATIENT
Start: 2022-07-12 | End: 2022-07-12

## 2022-07-12 RX ORDER — GINSENG 100 MG
CAPSULE ORAL ONCE
Status: CANCELLED | OUTPATIENT
Start: 2022-07-12 | End: 2022-07-12

## 2022-07-12 RX ORDER — BACITRACIN ZINC AND POLYMYXIN B SULFATE 500; 1000 [USP'U]/G; [USP'U]/G
OINTMENT TOPICAL ONCE
Status: CANCELLED | OUTPATIENT
Start: 2022-07-12 | End: 2022-07-12

## 2022-07-12 RX ORDER — DOXYCYCLINE HYCLATE 100 MG/1
100 CAPSULE ORAL 2 TIMES DAILY
COMMUNITY
Start: 2022-07-05 | End: 2022-08-23

## 2022-07-12 RX ORDER — BETAMETHASONE DIPROPIONATE 0.05 %
OINTMENT (GRAM) TOPICAL ONCE
Status: CANCELLED | OUTPATIENT
Start: 2022-07-12 | End: 2022-07-12

## 2022-07-12 RX ORDER — LIDOCAINE 50 MG/G
OINTMENT TOPICAL ONCE
Status: CANCELLED | OUTPATIENT
Start: 2022-07-12 | End: 2022-07-12

## 2022-07-12 RX ORDER — LIDOCAINE HYDROCHLORIDE 20 MG/ML
JELLY TOPICAL ONCE
Status: CANCELLED | OUTPATIENT
Start: 2022-07-12 | End: 2022-07-12

## 2022-07-12 RX ORDER — GENTAMICIN SULFATE 1 MG/G
OINTMENT TOPICAL ONCE
Status: CANCELLED | OUTPATIENT
Start: 2022-07-12 | End: 2022-07-12

## 2022-07-12 RX ORDER — CLOBETASOL PROPIONATE 0.5 MG/G
OINTMENT TOPICAL ONCE
Status: CANCELLED | OUTPATIENT
Start: 2022-07-12 | End: 2022-07-12

## 2022-07-12 RX ORDER — LIDOCAINE HYDROCHLORIDE 20 MG/ML
JELLY TOPICAL ONCE
Status: COMPLETED | OUTPATIENT
Start: 2022-07-12 | End: 2022-07-12

## 2022-07-12 RX ORDER — BACITRACIN, NEOMYCIN, POLYMYXIN B 400; 3.5; 5 [USP'U]/G; MG/G; [USP'U]/G
OINTMENT TOPICAL ONCE
Status: CANCELLED | OUTPATIENT
Start: 2022-07-12 | End: 2022-07-12

## 2022-07-12 RX ADMIN — LIDOCAINE HYDROCHLORIDE 6 ML: 20 JELLY TOPICAL at 13:03

## 2022-07-12 NOTE — PROGRESS NOTES
[address]   Progress Note and Procedure Note      Arsh García  MEDICAL RECORD NUMBER:  [id number]  AGE: 64 y.o. GENDER: male  : 1960  EPISODE DATE:  2022    Subjective:     Chief Complaint   Patient presents with    Wound Check     Feet         HISTORY of PRESENT ILLNESS HPI     Arsh García is a 64 y.o. male who presents today for wound/ulcer evaluation. S/P    LEFT stravix graft x 7 week, EPIFIX X 26 DAYS, 2nd right S/P ARTHROPLASTY X 26 DAYS. PT HAS NOT YET RTW. HAS NEW EXTRA DEPTH SHOES (CANDIE)    BONE WAS NOT OSTEOMYELITIC PER PATH, NO GROWTH OF BACTERIA       Ulcer Identification:  Ulcer Type: diabetic  Contributing Factors: diabetes and chronic pressure       22 1204    Specimen Description . TOE    Special Requests . TOE   SECOND LEFT DIGIT    Direct Exam RARE NEUTROPHILS Abnormal     Direct Exam FEW GRAM POSITIVE COCCI IN PAIRS Abnormal     Culture STREPTOCOCCI, BETA HEMOLYTIC GROUP B MODERATE GROWTH Abnormal     Culture  Abnormal   STAPHYLOCOCCUS AUREUS MODERATE GROWTH This isolate is methicillin susceptible. For susceptibility, refer to previous culture. Culture No anaerobic organisms isolated at 5 days.                 PAST MEDICAL HISTORY        Diagnosis Date    Acquired trigger finger 2018    CAD (coronary artery disease)     NWOCC/ involving coronary bypass graft of native heart with unstable angina pectoris    Cervical spondylosis     [facility]cong[facility] failure[facility])     Chronic back pain     on 18 pt states is presently in pain mgmnt    Contusion of elbow 2018    Contusion of knee 2018    Contusion of shoulder region 2018    Diabetic foot ulcer with osteomyelitis ([address] 2018    Diabetic ulcer of toe associated with type 2 diabetes mellitus, with fat layer exposed ([address] 2018    Hypercholesterolemia 2017    Hyperlipemia, mixed     Hypertension     Ischemic cardiomyopathy 2018    Non-pressure chronic ulcer of left lower leg with fat layer exposed ([address] 6/9/2014    Obesity     Obesity, Class III, BMI 40-49.9 (morbid obesity) ([address] 4/6/2015    Obstructive sleep apnea syndrome     Osteoarthritis of carpometacarpal (CMC) joint of thumb 6/5/2018    Osteoarthritis of knee 6/5/2018    Peripheral vascular disease ([address]     with venous stasis and ulcerations. Dr Mitzy Ortega Presence of coronary angioplasty implant and graft 9/11/2009    Sleep apnea     Dr Yun Cleaves of knee and leg 6/5/2018    Sprain of shoulder and upper arm 6/5/2018    Type II or unspecified type diabetes mellitus without mention of complication, not stated as uncontrolled     Ulcer of left foot, with fat layer exposed ([address] 1/9/2018    Unspecified sleep apnea     Dr Anderson Poles CPAP    Venous insufficiency of both lower extremities 1/31/2018       PAST SURGICAL HISTORY    Past Surgical History:   Procedure Laterality Date    ARTHROPLASTY Left 4/1/2022    ARTHROPLASTY 2ND LEFT TOE performed by Vinh Clement DPM at [address] [facility] Bilateral 6/17/2022    ARTHROPLASTY 2ND DIGIT RIGHT APPLICATION EPIFIX LEFT FOOT performed by Vinh Clement DPM at [address]  09/2021    stent    [address] [facility] Right 3/22/13    Dr. Vaishnavi Duff.     COLONOSCOPY  2020    CORONARY ARTERY BYPASS GRAFT  2/26/07    [facility], Dr Bong Faustin (X 3)    CORONARY ARTERY BYPASS GRAFT  2006    NECK SURGERY  2010    cervical stenoses C5-C6-C7and partial T1 laminectomy with fusion of C6-7    OTHER SURGICAL HISTORY      wound care ulcers of legs (bilaterally) with Dr Doni Haines Bilateral 5/18/2022    STRAVIX LEFT FOOT, THERASKIN RIGHT FOOT performed by Vinh Clement DPM at [facility] TOE AMPUTATION Left 4/6/2022    TOE AMPUTATION, SECOND DIGIT performed by Vinh Clement DPM at [address]  age 11   [place]      closure of peripherator veins of omeprazole (PRILOSEC) 20 MG delayed release capsule TAKE 1 CAPSULE DAILY 90 capsule 1    gabapentin (NEURONTIN) 300 MG capsule Take 900 mg by mouth nightly.  Misc Natural Products (GLUCOSAMINE CHOND MSM FORMULA PO) Take 2 tablets by mouth daily      HYDROcodone-acetaminophen (NORCO)  MG per tablet Take 1 tablet by mouth every 4-6 hours as needed for Pain.  meloxicam (MOBIC) 15 MG tablet Take 1 tablet by mouth daily 90 tablet 3    NEEDLE, DISP, 18 G (BD DISP NEEDLES) 18G X 1-1/2\" MISC 1 Device by Does not apply route every 30 days 25 each 1    Multiple Vitamin (MULTI VITAMIN DAILY PO) Take by mouth      nitroGLYCERIN (NITROSTAT) 0.4 MG SL tablet Place 0.4 mg under the tongue      aspirin 325 MG tablet Take 325 mg by mouth daily. No current facility-administered medications on file prior to encounter. REVIEW OF SYSTEMS    Review of Systems   Constitutional: Negative for chills and fever. Skin: Positive for wound. Neurological: Negative for numbness. Objective: There were no vitals taken for this visit. Wt Readings from Last 3 Encounters:   06/28/22 249 lb (112.9 kg)   06/21/22 249 lb (112.9 kg)   06/17/22 249 lb (112.9 kg)       Physical Exam:  General:  Alert and oriented x3. In no acute distress. Lower Extremity Physical Exam:    Vascular: DP pulses are palpable, Bilateral. PT pulses are palpable, Bilateral. CFT <3 seconds to all digits, Bilateral.  No edema, Bilateral.  Hair growth is absent to the level of the digits, Bilateral.     Neuro: Saph/sural/SP/DP/plantar sensation intact to light touch. Musculoskeletal: EHL/FHL/GS/TA gross motor intact.  Gross deformity is present, hammertoes bilateral.     Dermatologic: clean wounds bilateral feet      Wound 04/26/22 Toe (Comment  which one) Left #4 Left 2nd toe amp site (Active)   Wound Image   06/07/22 1319   Wound Etiology Non-Healing Surgical 07/12/22 1258   Dressing Status Old drainage noted 07/12/22 1258   Wound Cleansed Cleansed with saline 07/12/22 1258   Offloading for Diabetic Foot Ulcers Offloading ordered;Post op shoe 07/12/22 1258   Wound Length (cm) 0.3 cm 07/12/22 1258   Wound Width (cm) 0.3 cm 07/12/22 1258   Wound Depth (cm) 0.3 cm 07/12/22 1258   Wound Surface Area (cm^2) 0.09 cm^2 07/12/22 1258   Change in Wound Size % (l*w) 94.23 07/12/22 1258   Wound Volume (cm^3) 0.027 cm^3 07/12/22 1258   Wound Healing % 96 07/12/22 1258   Post-Procedure Length (cm) 0.3 cm 07/12/22 1258   Post-Procedure Width (cm) 0.3 cm 07/12/22 1258   Post-Procedure Depth (cm) 0.3 cm 07/12/22 1258   Post-Procedure Surface Area (cm^2) 0.09 cm^2 07/12/22 1258   Post-Procedure Volume (cm^3) 0.027 cm^3 07/12/22 1258   Wound Assessment Easley/red;Slough 07/12/22 1258   Drainage Amount Moderate 07/12/22 1258   Drainage Description Serosanguinous 07/12/22 1258   Odor None 07/12/22 1258   Trudi-wound Assessment Dry/flaky; Intact 07/12/22 1258   Margins Defined edges 07/12/22 1258   Wound Thickness Description not for Pressure Injury Full thickness 07/12/22 1258   Number of days: 77       Wound 06/28/22 Toe (Comment  which one) Right #2 2nd Toe (Active)   Wound Etiology Non-Healing Surgical 07/12/22 1258   Dressing Status Old drainage noted;New drainage noted 07/12/22 1258   Wound Cleansed Cleansed with saline 07/12/22 1258   Offloading for Diabetic Foot Ulcers Offloading ordered;Post op shoe 07/12/22 1258   Wound Length (cm) 0.6 cm 07/12/22 1258   Wound Width (cm) 1 cm 07/12/22 1258   Wound Depth (cm) 0.1 cm 07/12/22 1258   Wound Surface Area (cm^2) 0.6 cm^2 07/12/22 1258   Change in Wound Size % (l*w) 40 07/12/22 1258   Wound Volume (cm^3) 0.06 cm^3 07/12/22 1258   Wound Healing % 80 07/12/22 1258   Post-Procedure Length (cm) 0.6 cm 07/12/22 1258   Post-Procedure Width (cm) 1.1 cm 07/12/22 1258   Post-Procedure Depth (cm) 0.3 cm 07/12/22 1258   Post-Procedure Surface Area (cm^2) 0.66 cm^2 07/12/22 1258   Post-Procedure Volume (cm^3) 0.198 cm^3 07/12/22 1258   Wound Assessment Pace/red;Slough 07/12/22 1258   Drainage Amount Moderate 07/12/22 1258   Drainage Description Serosanguinous 07/12/22 1258   Odor None 07/12/22 1258   Trudi-wound Assessment Blanchable erythema 07/12/22 1258   Margins Attached edges 07/12/22 1258   Wound Thickness Description not for Pressure Injury Full thickness 07/12/22 1258   Number of days: 14             Procedure:  Wound Location: left and right FEET    Lidocaine gel soaked gauze was applied at beginning of wound evaluation. The wound(s) was excisionally debrided sharply of fibrotic, necrotic, and hyperkeratotic tissue, including a layer of surrounding healthy tissue using curette. The wound was debrided down through and including subcutaneous. Percent of Wound Debrided: 100%    Total Surface Area Debrided:  (see above for sq cm)    Bleeding: Minimal    Patient tolerated procedure well and was given proper instruction. Post debridement wound description:  clean       E11.42, L97.522.  L97.512, L03.9      Plan:    EXCISIONAL DEBRIDEMENT OF WOUNDS THRU SUB Q TISSUE WITH CURRETTE - SIG IMPROVEMENT NOTED    MINERAL OIL DRESSING TO LEFT FOOT  KAYCEE AND SALINE WET TO DRY TO 2ND RIGHT - CHANGE QOD    Gurmeet Garcia 426 1 WEEK - TO BE SEEN WEEKLY UNTIL HEALED    Treatment Note please see attached Discharge Instructions

## 2022-07-14 ENCOUNTER — TELEMEDICINE (OUTPATIENT)
Dept: FAMILY MEDICINE CLINIC | Age: 62
End: 2022-07-14
Payer: COMMERCIAL

## 2022-07-14 DIAGNOSIS — E66.01 OBESITY, CLASS III, BMI 40-49.9 (MORBID OBESITY) (HCC): ICD-10-CM

## 2022-07-14 DIAGNOSIS — L65.9 HAIR LOSS: Primary | ICD-10-CM

## 2022-07-14 DIAGNOSIS — R23.2 HOT FLASH IN MALE: ICD-10-CM

## 2022-07-14 DIAGNOSIS — M15.9 PRIMARY OSTEOARTHRITIS INVOLVING MULTIPLE JOINTS: ICD-10-CM

## 2022-07-14 PROCEDURE — 99214 OFFICE O/P EST MOD 30 MIN: CPT | Performed by: FAMILY MEDICINE

## 2022-07-14 RX ORDER — TIZANIDINE 2 MG/1
2 TABLET ORAL 3 TIMES DAILY
Qty: 90 TABLET | Refills: 0 | Status: SHIPPED | OUTPATIENT
Start: 2022-07-14 | End: 2022-08-19 | Stop reason: SDUPTHER

## 2022-07-14 RX ORDER — PHENTERMINE HYDROCHLORIDE 37.5 MG/1
37.5 TABLET ORAL
Qty: 30 TABLET | Refills: 0 | Status: SHIPPED | OUTPATIENT
Start: 2022-07-14 | End: 2022-08-23 | Stop reason: SDUPTHER

## 2022-07-14 ASSESSMENT — PATIENT HEALTH QUESTIONNAIRE - PHQ9
2. FEELING DOWN, DEPRESSED OR HOPELESS: 0
SUM OF ALL RESPONSES TO PHQ QUESTIONS 1-9: 0
1. LITTLE INTEREST OR PLEASURE IN DOING THINGS: 0
SUM OF ALL RESPONSES TO PHQ QUESTIONS 1-9: 0
SUM OF ALL RESPONSES TO PHQ9 QUESTIONS 1 & 2: 0
SUM OF ALL RESPONSES TO PHQ QUESTIONS 1-9: 0
SUM OF ALL RESPONSES TO PHQ QUESTIONS 1-9: 0

## 2022-07-14 NOTE — DISCHARGE INSTRUCTIONS
Aly Ashley -Phone: [phone] Fax: [phone]             Visit  Discharge Instructions / Physician Orders     DATE: 7/19/2022     Home Care: N/A     SUPPLIES ORDERED THRU: N/A     Wound Location: Toes     Cleanse with:      Dressing Orders: Right 2nd Toe: Blessing to wound smaller than wound edges-wet with saline, Bandaid                                 Left Wound: Mineral Oil on Bandaid      Frequency: Every Other Day      Additional Orders: Increase protein to diet (meat, cheese, eggs, fish, peanut butter, nuts and beans)  Multivitamin daily  ELEVATE LEGS AS MUCH AS POSSIBLE  2/10/2022-Culture Taken   2/14/22- antibiotic at pharmacy   2/28/22-X-rays ordered. 3/21/22-A1C and Nutritional labs ordered  3/29/22- X-Ray Ordered  6/14/2022-Culture Taken   6/15/2022- X-Ray Right Toe  6/17/22- Procedure on right 2nd toe      Cleared to return to work 7/8/2022     [facility] 2 weeks with Dr. Elvia Espinosa        (Please note your next appointment above and if you are unable to keep, kindly give a 24 hour notice. Thank you.)     If you experience any of the following, please call the [address] [place] [address] during business hours:  [phone]  Your Phone call may be forwarded to [address] during business hours that [phone] is closed. * Increase in Pain  * Temperature over 101  * Increase in drainage from your wound  * Drainage with a foul odor  * Bleeding  * Increase in swelling  * Need for compression bandage changes due to slippage, breakthrough drainage. If you need medical attention outside of the business hours of the [address] [place] [address] please contact your PCP or go to the nearest emergency room. The information contained in the After Visit Summary has been reviewed with me, the patient and/or responsible adult, by my health care provider(s). I had the opportunity to ask questions regarding this information.  I have elected to receive;      []After Visit Summary  [x]Comprehensive Discharge Instruction        Patient signature______________________________________Date:________  Electronically signed by Eric Serrato RN on 7/19/2022 at 12:57 PM   Electronically signed by Abraham Tran DPM on 7/19/2022 at 1:07 PM

## 2022-07-14 NOTE — PROGRESS NOTES
2022    TELEHEALTH EVALUATION -- Audio/Visual (During WDBNN-22 public health emergency)    HPI:    Yanick Pérez (:  1960) has requested an audio/video evaluation for the following concern(s):    Seen today stating he has been experiencing problems with hair loss which has been significant and getting worse and also is having a lot of hot flashes in addition he has low testosterone levels  He continues to have ongoing terrible problems with his hip and is needing a hip replacement but has no time to take off to get it done  He would like to go back on medication to help with weight loss he has gained weight when he had to be sedentary secondary to his foot and his sugars have gone up a bit    Review of Systems    Prior to Visit Medications    Medication Sig Taking? Authorizing Provider   doxycycline hyclate (VIBRAMYCIN) 100 MG capsule Take 100 mg by mouth in the morning and at bedtime  Historical Provider, MD   metFORMIN (GLUCOPHAGE) 1000 MG tablet Take 1 tablet by mouth 2 times daily (with meals)  Seema Hameed DO   tiZANidine (ZANAFLEX) 4 MG tablet 1 po qd  Seema Hameed DO   doxycycline hyclate (VIBRA-TABS) 100 MG tablet Take 1 tablet by mouth 2 times daily for 10 days  Rosemary Wilcox DPM   testosterone cypionate (DEPOTESTOTERONE CYPIONATE) 200 MG/ML injection Inject 1 mL into the muscle every 30 days for 30 days. Dispense with 3 cc syringe 18g needle and 22 1/2 g needle each month  Seema Hameed DO   diazePAM (VALIUM) 5 MG tablet Take 5 mg by mouth once.    Patient not taking: Reported on 2022  Historical Provider, MD   clopidogrel (PLAVIX) 75 MG tablet Take 1 tablet by mouth daily  Seema Hameed DO   carvedilol (COREG) 12.5 MG tablet Take 1 tablet by mouth 2 times daily (with meals)  Seema Hameed DO   dapagliflozin (FARXIGA) 10 MG tablet Take 1 tablet by mouth every morning  Seema Hameed DO   traZODone (DESYREL) 150 MG tablet TAKE ONE TABLET BY MOUTH EVERY EVENING  Seema Hameed DO linagliptin (TRADJENTA) 5 MG tablet TAKE 1 TABLET BY MOUTH ONE TIME A DAY  Seema Hameed DO   hydroCHLOROthiazide (MICROZIDE) 12.5 MG capsule 1 po qd  Seema Hameed DO   omeprazole (PRILOSEC) 20 MG delayed release capsule TAKE 1 CAPSULE DAILY  Seema Hameed DO   gabapentin (NEURONTIN) 300 MG capsule Take 900 mg by mouth nightly. Historical Provider, MD   Misc Natural Products (GLUCOSAMINE CHOND MSM FORMULA PO) Take 2 tablets by mouth daily  Historical Provider, MD   HYDROcodone-acetaminophen (NORCO)  MG per tablet Take 1 tablet by mouth every 4-6 hours as needed for Pain. Historical Provider, MD   meloxicam (MOBIC) 15 MG tablet Take 1 tablet by mouth daily  Seema Hameed DO   NEEDLE, DISP, 18 G (BD DISP NEEDLES) 18G X 1-1/2\" MISC 1 Device by Does not apply route every 30 days  Seema Hameed DO   Multiple Vitamin (MULTI VITAMIN DAILY PO) Take by mouth  Historical Provider, MD   nitroGLYCERIN (NITROSTAT) 0.4 MG SL tablet Place 0.4 mg under the tongue  Historical Provider, MD   aspirin 325 MG tablet Take 325 mg by mouth daily.     Historical Provider, MD       Social History     Tobacco Use    Smoking status: Never Smoker    Smokeless tobacco: Never Used   Vaping Use    Vaping Use: Never used   Substance Use Topics    Alcohol use: Not Currently     Comment: rare social, non past 5 months    Drug use: No        Allergies   Allergen Reactions    Pcn [Penicillins] Other (See Comments)     Unknown rx    Penicillin G Rash   ,   Past Medical History:   Diagnosis Date    Acquired trigger finger 6/5/2018    CAD (coronary artery disease)     NWOCC/ involving coronary bypass graft of native heart with unstable angina pectoris    Cervical spondylosis     CH[facility]congest[facility] failure) ([facility])     Chronic back pain     on 11/19/18 pt states is presently in pain mgmnt    Contusion of elbow 6/5/2018    Contusion of knee 6/5/2018    Contusion of shoulder region 6/5/2018    Diabetic foot ulcer with osteomyelitis ([address] 1/9/2018    Diabetic ulcer of toe associated with type 2 diabetes mellitus, with fat layer exposed ([address] 1/31/2018    Hypercholesterolemia 12/5/2017    Hyperlipemia, mixed     Hypertension     Ischemic cardiomyopathy 5/7/2018    Non-pressure chronic ulcer of left lower leg with fat layer exposed ([address] 6/9/2014    Obesity     Obesity, Class III, BMI 40-49.9 (morbid obesity) ([address] 4/6/2015    Obstructive sleep apnea syndrome     Osteoarthritis of carpometacarpal (CMC) joint of thumb 6/5/2018    Osteoarthritis of knee 6/5/2018    Peripheral vascular disease ([address]     with venous stasis and ulcerations. Dr Lynne De Luna Presence of coronary angioplasty implant and graft 9/11/2009    Sleep apnea     Dr Cathie Hansen of knee and leg 6/5/2018    Sprain of shoulder and upper arm 6/5/2018    Type II or unspecified type diabetes mellitus without mention of complication, not stated as uncontrolled     Ulcer of left foot, with fat layer exposed ([address] 1/9/2018    Unspecified sleep apnea     Dr Leann Maravilla CPAP    Venous insufficiency of both lower extremities 1/31/2018   ,   Past Surgical History:   Procedure Laterality Date    ARTHROPLASTY Left 4/1/2022    ARTHROPLASTY 2ND LEFT TOE performed by Ky Tucker DPM at [address] [facility] Bilateral 6/17/2022    ARTHROPLASTY 2ND DIGIT RIGHT APPLICATION EPIFIX LEFT FOOT performed by Ky Tucker DPM at [address] [facility]  09/2021    stent    [facility] Right 3/22/13    Dr. Nate Maurer.     COLONOSCOPY  2020    CORONARY ARTERY BYPASS GRAFT  2/26/07    [facility], Dr Leoncio Rodriguez (X 3)    CORONARY ARTERY BYPASS GRAFT  2006    NECK SURGERY  2010    cervical stenoses C5-C6-C7and partial T1 laminectomy with fusion of C6-7    OTHER SURGICAL HISTORY      wound care ulcers of legs (bilaterally) with Dr Belkis Koroma Bilateral 5/18/2022    STRAVIX LEFT FOOT, THERASKIN RIGHT FOOT performed by Joby Murray DPM at [facility] TOE AMPUTATION Left 4/6/2022    TOE AMPUTATION, SECOND DIGIT performed by Joby Murray DPM at [address]   age 11    VEIN SURGERY      closure of peripherator veins of legs, Dr Lina Reddy     ,   Social History     Tobacco Use    Smoking status: Never Smoker    Smokeless tobacco: Never Used   Vaping Use    Vaping Use: Never used   Substance Use Topics    Alcohol use: Not Currently     Comment: rare social, non past 5 months    Drug use: No   ,   Family History   Problem Relation Age of Onset    Cancer Mother         lung    Diabetes Father     Heart Disease Father     High Blood Pressure Father     Diabetes Brother     Heart Disease Brother     High Blood Pressure Brother        PHYSICAL EXAMINATION:  [ INSTRUCTIONS:  \"[x]\" Indicates a positive item  \"[]\" Indicates a negative item  -- DELETE ALL ITEMS NOT EXAMINED]  Vital Signs: (As obtained by patient/caregiver or practitioner observation)    Blood pressure-  Heart rate-    Respiratory rate-    Temperature-  Pulse oximetry-     Constitutional: [x] Appears well-developed and well-nourished [x] No apparent distress      [] Abnormal-   Mental status  [x] Alert and awake  [x] Oriented to person/place/time [x]Able to follow commands      Eyes:  EOM    [x]  Normal  [] Abnormal-  Sclera  [x]  Normal  [] Abnormal -         Discharge [x]  None visible  [] Abnormal -    HENT:   [x] Normocephalic, atraumatic.   [] Abnormal   [x] Mouth/Throat: Mucous membranes are moist.     External Ears [x] Normal  [] Abnormal-     Neck: [x] No visualized mass     Pulmonary/Chest: [x] Respiratory effort normal.  [x] No visualized signs of difficulty breathing or respiratory distress        [] Abnormal-      Musculoskeletal:   [x] Normal gait with no signs of ataxia         [x] Normal range of motion of neck        [] Abnormal-       Neurological:        [x] No Facial Asymmetry (Cranial nerve 7 motor function) (limited exam to video visit)          [x] No gaze palsy        [] Abnormal-         Skin:        [x] No significant exanthematous lesions or discoloration noted on facial skin         [] Abnormal-            Psychiatric:       [x] Normal Affect [x] No Hallucinations        [] Abnormal-     Other pertinent observable physical exam findings-     ASSESSMENT/PLAN:   Diagnosis Orders   1. Hair loss     2. Hot flash in male     3. Obesity, Class III, BMI 40-49.9 (morbid obesity) ([facility]     4. Primary osteoarthritis involving multiple joints        Plan  Fu with ortho    Fu for shots   Diet   Fu 4 weeks  Return if symptoms worsen or fail to improve. Danielle Morrow is a 64 y.o. male being evaluated by a Virtual Visit (video visit) encounter to address concerns as mentioned above. A caregiver was present when appropriate. Due to this being a TeleHealth encounter (During ZJFFF-71 public health emergency), evaluation of the following organ systems was limited: Vitals/Constitutional/EENT/Resp/CV/GI//MS/Neuro/Skin/Heme-Lymph-Imm. Pursuant to the emergency declaration under the [address] [place] [address], [address] [place] [address] authority and the [employer] Act, this Virtual Visit was conducted with patient's (and/or legal guardian's) consent, to reduce the patient's risk of exposure to COVID-19 and provide necessary medical care. The patient (and/or legal guardian) has also been advised to contact this office for worsening conditions or problems, and seek emergency medical treatment and/or call 911 if deemed necessary. Patient identification was verified at the start of the visit: Yes    Total time spent on this encounter: Not billed by time    Services were provided through a video synchronous discussion virtually to substitute for in-person clinic visit. Patient and provider were located at their individual homes.     --Vernon Hameed,  on 7/14/2022 at 8:56 AM    An electronic signature was used to authenticate this note.

## 2022-07-14 NOTE — TELEPHONE ENCOUNTER
Patient called and stated that he was unable to talk about his tizanidine 4mg. Patient would like to change the dose to 2 mg and take it TID.  1 in the morning and 2 at night. Please advise.   Writer pended the new script and waiting on approval.

## 2022-07-19 ENCOUNTER — HOSPITAL ENCOUNTER (OUTPATIENT)
Dept: WOUND CARE | Age: 62
Discharge: HOME | End: 2022-07-19
Admitting: PODIATRIST
Payer: COMMERCIAL

## 2022-07-19 DIAGNOSIS — L97.524 CHRONIC ULCER OF TOE, LEFT, WITH NECROSIS OF BONE (HCC): ICD-10-CM

## 2022-07-19 DIAGNOSIS — E11.42 DIABETIC POLYNEUROPATHY ASSOCIATED WITH TYPE 2 DIABETES MELLITUS (HCC): ICD-10-CM

## 2022-07-19 DIAGNOSIS — L97.512 ULCER OF TOE OF RIGHT FOOT, WITH FAT LAYER EXPOSED (HCC): ICD-10-CM
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
## 2022-07-19 DIAGNOSIS — M86.9 OSTEOMYELITIS OF SECOND TOE OF LEFT FOOT (HCC): Primary | ICD-10-CM

## 2022-07-19 PROCEDURE — 11042 DBRDMT SUBQ TIS 1ST 20SQCM/<: CPT

## 2022-07-19 RX ORDER — DOXYCYCLINE HYCLATE 100 MG
100 TABLET ORAL 2 TIMES DAILY
Qty: 28 TABLET | Refills: 0 | Status: SHIPPED | OUTPATIENT
Start: 2022-07-19 | End: 2022-08-02

## 2022-07-19 RX ORDER — LIDOCAINE 40 MG/G
CREAM TOPICAL ONCE
Status: CANCELLED | OUTPATIENT
Start: 2022-07-19 | End: 2022-07-19

## 2022-07-19 RX ORDER — GENTAMICIN SULFATE 1 MG/G
OINTMENT TOPICAL ONCE
Status: CANCELLED | OUTPATIENT
Start: 2022-07-19 | End: 2022-07-19

## 2022-07-19 RX ORDER — GINSENG 100 MG
CAPSULE ORAL ONCE
Status: CANCELLED | OUTPATIENT
Start: 2022-07-19 | End: 2022-07-19

## 2022-07-19 RX ORDER — LIDOCAINE 50 MG/G
OINTMENT TOPICAL ONCE
Status: CANCELLED | OUTPATIENT
Start: 2022-07-19 | End: 2022-07-19

## 2022-07-19 RX ORDER — BACITRACIN ZINC AND POLYMYXIN B SULFATE 500; 1000 [USP'U]/G; [USP'U]/G
OINTMENT TOPICAL ONCE
Status: CANCELLED | OUTPATIENT
Start: 2022-07-19 | End: 2022-07-19

## 2022-07-19 RX ORDER — LIDOCAINE HYDROCHLORIDE 20 MG/ML
JELLY TOPICAL ONCE
Status: CANCELLED | OUTPATIENT
Start: 2022-07-19 | End: 2022-07-19

## 2022-07-19 RX ORDER — CLOBETASOL PROPIONATE 0.5 MG/G
OINTMENT TOPICAL ONCE
Status: CANCELLED | OUTPATIENT
Start: 2022-07-19 | End: 2022-07-19

## 2022-07-19 RX ORDER — LIDOCAINE HYDROCHLORIDE 20 MG/ML
JELLY TOPICAL ONCE
Status: COMPLETED | OUTPATIENT
Start: 2022-07-19 | End: 2022-07-19

## 2022-07-19 RX ORDER — MAGNESIUM CARB/ALUMINUM HYDROX 105-160MG
30 TABLET,CHEWABLE ORAL
Status: ACTIVE | OUTPATIENT
Start: 2022-07-19 | End: 2022-07-19

## 2022-07-19 RX ORDER — LIDOCAINE HYDROCHLORIDE 40 MG/ML
SOLUTION TOPICAL ONCE
Status: CANCELLED | OUTPATIENT
Start: 2022-07-19 | End: 2022-07-19

## 2022-07-19 RX ORDER — BACITRACIN, NEOMYCIN, POLYMYXIN B 400; 3.5; 5 [USP'U]/G; MG/G; [USP'U]/G
OINTMENT TOPICAL ONCE
Status: CANCELLED | OUTPATIENT
Start: 2022-07-19 | End: 2022-07-19

## 2022-07-19 RX ORDER — BETAMETHASONE DIPROPIONATE 0.05 %
OINTMENT (GRAM) TOPICAL ONCE
Status: CANCELLED | OUTPATIENT
Start: 2022-07-19 | End: 2022-07-19

## 2022-07-19 RX ORDER — HYDROCODONE BITARTRATE 15 MG/1
15 CAPSULE, EXTENDED RELEASE ORAL 2 TIMES DAILY
COMMUNITY
Start: 2022-07-15

## 2022-07-19 RX ADMIN — LIDOCAINE HYDROCHLORIDE 6 ML: 20 JELLY TOPICAL at 12:55

## 2022-07-19 NOTE — PLAN OF CARE
Problem: Chronic Conditions and Co-morbidities  Goal: Patient's chronic conditions and co-morbidity symptoms are monitored and maintained or improved  Outcome: Progressing Towards Goal     Problem: Discharge Planning  Goal: Discharge to home or other facility with appropriate resources  Outcome: Progressing Towards Goal     Problem: Wound:  Goal: Will show signs of wound healing; wound closure and no evidence of infection  Description: Will show signs of wound healing; wound closure and no evidence of infection  Outcome: Progressing Towards Goal     Problem: Falls - Risk of:  Goal: Will remain free from falls  Description: Will remain free from falls  Outcome: Progressing Towards Goal

## 2022-07-19 NOTE — TELEPHONE ENCOUNTER
Mary Beth Juarez is calling to request a refill on the following medication(s):    Last Visit Date (If Applicable):  4/24/5592    Next Visit Date:    8/11/2022    Medication Request:  Requested Prescriptions     Pending Prescriptions Disp Refills    topiramate (TOPAMAX) 200 MG tablet 60 tablet 5     Sig: Take 1 tablet by mouth 2 times daily

## 2022-07-19 NOTE — PROGRESS NOTES
[address]   Progress Note and Procedure Note      Heriberto Coy  MEDICAL RECORD NUMBER:  [id number]  AGE: 64 y.o. GENDER: male  : 1960  EPISODE DATE:  2022    Subjective:     Chief Complaint   Patient presents with    Wound Check         HISTORY of PRESENT ILLNESS HPI     Heriberto Coy is a 64 y.o. male who presents today for wound/ulcer evaluation. S/P    LEFT stravix graft x 8 week, EPIFIX X 35 DAYS, 2nd right S/P ARTHROPLASTY X 35 DAYS. PT IS BTW. HAS NEW EXTRA DEPTH SHOES (CANDIE). MINERAL OIL LEFT AND KAYCEE/SSD RIGHT    BONE WAS NOT OSTEOMYELITIC PER PATH, NO GROWTH OF BACTERIA       Ulcer Identification:  Ulcer Type: diabetic  Contributing Factors: diabetes and chronic pressure       22 1204    Specimen Description . TOE    Special Requests . TOE   SECOND LEFT DIGIT    Direct Exam RARE NEUTROPHILS Abnormal     Direct Exam FEW GRAM POSITIVE COCCI IN PAIRS Abnormal     Culture STREPTOCOCCI, BETA HEMOLYTIC GROUP B MODERATE GROWTH Abnormal     Culture  Abnormal   STAPHYLOCOCCUS AUREUS MODERATE GROWTH This isolate is methicillin susceptible. For susceptibility, refer to previous culture. Culture No anaerobic organisms isolated at 5 days.                 PAST MEDICAL HISTORY        Diagnosis Date    Acquired trigger finger 2018    CAD (coronary artery disease)     NWOCC/ involving coronary bypass graft of native heart with unstable angina pectoris    Cervical spondylosis     CHF (congestive heart failure) ([facility])     Chronic back pain     on 18 pt states is presently in pain mgmnt    Contusion of elbow 2018    Contusion of knee 2018    Contusion of shoulder region 2018    Diabetic foot ulcer with osteomyelitis ([facility] 2018    Diabetic ulcer of toe associated with type 2 diabetes mellitus, with fat layer exposed ([address] 2018    Hypercholesterolemia 2017    Hyperlipemia, mixed     Hypertension     Ischemic cardiomyopathy 2018 Non-pressure chronic ulcer of left lower leg with fat layer exposed ([address] 6/9/2014    Obesity     Obesity, Class III, BMI 40-49.9 (morbid obesity) ([address] 4/6/2015    Obstructive sleep apnea syndrome     Osteoarthritis of carpometacarpal (CMC) joint of thumb 6/5/2018    Osteoarthritis of knee 6/5/2018    Peripheral vascular disease ([address]     with venous stasis and ulcerations. Dr Jazmin Sanchez    Presence of coronary angioplasty implant and graft 9/11/2009    Sleep apnea     Dr Nicolette Padilla of knee and leg 6/5/2018    Sprain of shoulder and upper arm 6/5/2018    Type II or unspecified type diabetes mellitus without mention of complication, not stated as uncontrolled     Ulcer of left foot, with fat layer exposed ([address] 1/9/2018    Unspecified sleep apnea     Dr Shy Garcia CPAP    Venous insufficiency of both lower extremities 1/31/2018       PAST SURGICAL HISTORY    Past Surgical History:   Procedure Laterality Date    ARTHROPLASTY Left 4/1/2022    ARTHROPLASTY 2ND LEFT TOE performed by Niraj Yao DPM at [address] Bilateral 6/17/2022    ARTHROPLASTY 2ND DIGIT RIGHT APPLICATION EPIFIX LEFT FOOT performed by Niraj Yao DPM at [address]  09/2021    stent     [address] Right 3/22/13    Dr. Aravind Perry.     COLONOSCOPY  2020    CORONARY ARTERY BYPASS GRAFT  2/26/07    [facility] Dr Talamantes Senior (X 3)    CORONARY ARTERY BYPASS GRAFT  2006    NECK SURGERY  2010    cervical stenoses C5-C6-C7and partial T1 laminectomy with fusion of C6-7    OTHER SURGICAL HISTORY      wound care ulcers of legs (bilaterally) with Dr Fabrice Huitron Bilateral 5/18/2022    STRAVIX LEFT FOOT, THERASKIN RIGHT FOOT performed by Niraj Yao DPM at [address] [facility] Left 4/6/2022    TOE AMPUTATION, SECOND DIGIT performed by Niraj Yao DPM at [address]  age 11    VEIN SURGERY      closure of peripherator veins of legs, Dr Elisa Pandey TOOTH EXTRACTION         FAMILY HISTORY    Family History   Problem Relation Age of Onset    Cancer Mother         lung    Diabetes Father     Heart Disease Father     High Blood Pressure Father     Diabetes Brother     Heart Disease Brother     High Blood Pressure Brother        SOCIAL HISTORY    Social History     Tobacco Use    Smoking status: Never    Smokeless tobacco: Never   Vaping Use    Vaping Use: Never used   Substance Use Topics    Alcohol use: Not Currently     Comment: rare social, non past 5 months    Drug use: No       ALLERGIES    Allergies   Allergen Reactions    Pcn [Penicillins] Other (See Comments)     Unknown rx    Penicillin G Rash       MEDICATIONS    Current Outpatient Medications on File Prior to Encounter   Medication Sig Dispense Refill    HYDROcodone Bitartrate ER 15 MG CP12 Take 15 mg by mouth in the morning and at bedtime. phentermine (ADIPEX-P) 37.5 MG tablet Take 1 tablet by mouth every morning (before breakfast) for 30 days. 30 tablet 0    tiZANidine (ZANAFLEX) 2 MG tablet Take 1 tablet by mouth 3 times daily Patient to take 1 tablet in the morning and 2 at bedtime. 90 tablet 0    doxycycline hyclate (VIBRAMYCIN) 100 MG capsule Take 100 mg by mouth in the morning and at bedtime      metFORMIN (GLUCOPHAGE) 1000 MG tablet Take 1 tablet by mouth 2 times daily (with meals) 60 tablet 3    tiZANidine (ZANAFLEX) 4 MG tablet 1 po qd 14 tablet 3    testosterone cypionate (DEPOTESTOTERONE CYPIONATE) 200 MG/ML injection Inject 1 mL into the muscle every 30 days for 30 days.  Dispense with 3 cc syringe 18g needle and 22 1/2 g needle each month 1 mL 5    clopidogrel (PLAVIX) 75 MG tablet Take 1 tablet by mouth daily 90 tablet 3    carvedilol (COREG) 12.5 MG tablet Take 1 tablet by mouth 2 times daily (with meals) 180 tablet 3    dapagliflozin (FARXIGA) 10 MG tablet Take 1 tablet by mouth every morning 90 tablet 3    traZODone (DESYREL) 150 MG tablet TAKE ONE TABLET BY MOUTH EVERY EVENING 90 tablet 3    linagliptin (TRADJENTA) 5 MG tablet TAKE 1 TABLET BY MOUTH ONE TIME A DAY 90 tablet 3    hydroCHLOROthiazide (MICROZIDE) 12.5 MG capsule 1 po qd 90 capsule 3    omeprazole (PRILOSEC) 20 MG delayed release capsule TAKE 1 CAPSULE DAILY 90 capsule 1    gabapentin (NEURONTIN) 300 MG capsule Take 900 mg by mouth nightly. Misc Natural Products (GLUCOSAMINE CHOND MSM FORMULA PO) Take 2 tablets by mouth daily      HYDROcodone-acetaminophen (NORCO)  MG per tablet Take 1 tablet by mouth every 4-6 hours as needed for Pain.      meloxicam (MOBIC) 15 MG tablet Take 1 tablet by mouth daily 90 tablet 3    NEEDLE, DISP, 18 G (BD DISP NEEDLES) 18G X 1-1/2\" MISC 1 Device by Does not apply route every 30 days 25 each 1    Multiple Vitamin (MULTI VITAMIN DAILY PO) Take by mouth      nitroGLYCERIN (NITROSTAT) 0.4 MG SL tablet Place 0.4 mg under the tongue      aspirin 325 MG tablet Take 325 mg by mouth daily. No current facility-administered medications on file prior to encounter. REVIEW OF SYSTEMS    Review of Systems   Constitutional:  Negative for chills and fever. Skin:  Positive for wound. Neurological:  Negative for numbness. Objective: There were no vitals taken for this visit. Wt Readings from Last 3 Encounters:   06/28/22 249 lb (112.9 kg)   06/21/22 249 lb (112.9 kg)   06/17/22 249 lb (112.9 kg)       Physical Exam:  General:  Alert and oriented x3. In no acute distress. Lower Extremity Physical Exam:    Vascular: DP pulses are palpable, Bilateral. PT pulses are palpable, Bilateral. CFT <3 seconds to all digits, Bilateral.  No edema, Bilateral.  Hair growth is absent to the level of the digits, Bilateral.     Neuro: Saph/sural/SP/DP/plantar sensation intact to light touch. Musculoskeletal: EHL/FHL/GS/TA gross motor intact.  Gross deformity is present, hammertoes bilateral.     Dermatologic: clean wounds bilateral feet         Wound 04/26/22 Toe (Comment  which one) Left #4 Left 2nd toe amp site (Active)   Wound Image   06/07/22 1319   Wound Etiology Non-Healing Surgical 07/12/22 1258   Dressing Status Old drainage noted 07/12/22 1258   Wound Cleansed Cleansed with saline 07/12/22 1258   Offloading for Diabetic Foot Ulcers Offloading ordered;Diabetic shoes/inserts 07/19/22 1315   Wound Length (cm) 0 cm 07/19/22 1256   Wound Width (cm) 0 cm 07/19/22 1256   Wound Depth (cm) 0 cm 07/19/22 1256   Wound Surface Area (cm^2) 0 cm^2 07/19/22 1256   Change in Wound Size % (l*w) 100 07/19/22 1256   Wound Volume (cm^3) 0 cm^3 07/19/22 1256   Wound Healing % 100 07/19/22 1256   Post-Procedure Length (cm) 0.2 cm 07/19/22 1256   Post-Procedure Width (cm) 0.2 cm 07/19/22 1256   Post-Procedure Depth (cm) 0.2 cm 07/19/22 1256   Post-Procedure Surface Area (cm^2) 0.04 cm^2 07/19/22 1256   Post-Procedure Volume (cm^3) 0.008 cm^3 07/19/22 1256   Wound Assessment [place]/red;Slough 07/12/22 1258   Drainage Amount Moderate 07/12/22 1258   Drainage Description Serosanguinous 07/12/22 1258   Odor None 07/12/22 1258   Trudi-wound Assessment Dry/flaky; Intact 07/12/22 1258   Margins Defined edges 07/12/22 1258   Wound Thickness Description not for Pressure Injury Full thickness 07/12/22 1258   Number of days: 84       Wound 06/28/22 Toe (Comment  which one) Right #2 2nd Toe (Active)   Wound Etiology Non-Healing Surgical 07/19/22 1256   Dressing Status Old drainage noted;New drainage noted 07/19/22 1256   Wound Cleansed Cleansed with saline 07/19/22 1256   Offloading for Diabetic Foot Ulcers Offloading ordered;Diabetic shoes/inserts 07/19/22 1315   Wound Length (cm) 0.6 cm 07/19/22 1256   Wound Width (cm) 0.3 cm 07/19/22 1256   Wound Depth (cm) 0.1 cm 07/19/22 1256   Wound Surface Area (cm^2) 0.18 cm^2 07/19/22 1256   Change in Wound Size % (l*w) 82 07/19/22 1256   Wound Volume (cm^3) 0.018 cm^3 07/19/22 1256   Wound Healing % 94 07/19/22 1256   Post-Procedure Length (cm) 0.6 cm 07/19/22 1256   Post-Procedure Width (cm) 1 cm 07/19/22 1256   Post-Procedure Depth (cm) 0.3 cm 07/19/22 1256   Post-Procedure Surface Area (cm^2) 0.6 cm^2 07/19/22 1256   Post-Procedure Volume (cm^3) 0.18 cm^3 07/19/22 1256   Wound Assessment [place]/red;Slough 07/12/22 1258   Drainage Amount Moderate 07/12/22 1258   Drainage Description Serosanguinous 07/12/22 1258   Odor None 07/12/22 1258   Trudi-wound Assessment Blanchable erythema 07/12/22 1258   Margins Attached edges 07/12/22 1258   Wound Thickness Description not for Pressure Injury Full thickness 07/12/22 1258   Number of days: 21         Procedure:  Wound Location: left and right FEET    Lidocaine gel soaked gauze was applied at beginning of wound evaluation. The wound(s) was excisionally debrided sharply of fibrotic, necrotic, and hyperkeratotic tissue, including a layer of surrounding healthy tissue using curette. The wound was debrided down through and including subcutaneous. Percent of Wound Debrided: 100%    Total Surface Area Debrided:  (see above for sq cm)    Bleeding: Minimal    Patient tolerated procedure well and was given proper instruction. Post debridement wound description:  clean       E11.42, L97.522.  L97.512, L03.9      Plan:    EXCISIONAL DEBRIDEMENT OF WOUNDS THRU SUB Q TISSUE WITH CURRETTE - SIG IMPROVEMENT NOTED    MINERAL OIL DRESSING TO LEFT FOOT  KAYCEE AND SALINE WET TO DRY TO 2ND RIGHT - CHANGE QOD    Gurmeet Garcia [address] 2 WEEKS - TO BE SEEN WEEKLY UNTIL HEALED    Treatment Note please see attached Discharge Instructions

## 2022-07-22 ENCOUNTER — TELEPHONE (OUTPATIENT)
Dept: FAMILY MEDICINE CLINIC | Age: 62
End: 2022-07-22

## 2022-07-23 RX ORDER — FLUCONAZOLE 150 MG/1
150 TABLET ORAL DAILY
Qty: 14 TABLET | Refills: 0 | Status: SHIPPED | OUTPATIENT
Start: 2022-07-23 | End: 2022-10-07 | Stop reason: SDUPTHER

## 2022-07-28 RX ORDER — ATORVASTATIN CALCIUM 40 MG/1
40 TABLET, FILM COATED ORAL DAILY
Qty: 90 TABLET | Refills: 3 | Status: SHIPPED | OUTPATIENT
Start: 2022-07-28 | End: 2022-10-26

## 2022-08-02 ENCOUNTER — HOSPITAL ENCOUNTER (OUTPATIENT)
Dept: WOUND CARE | Age: 62
Discharge: HOME | End: 2022-08-02
Payer: COMMERCIAL

## 2022-08-02 VITALS
HEIGHT: 72 IN | SYSTOLIC BLOOD PRESSURE: 146 MMHG | WEIGHT: 249 LBS | TEMPERATURE: 99.5 F | HEART RATE: 75 BPM | BODY MASS INDEX: 33.72 KG/M2 | RESPIRATION RATE: 17 BRPM | DIASTOLIC BLOOD PRESSURE: 7 MMHG

## 2022-08-02 DIAGNOSIS — E11.42 DIABETIC POLYNEUROPATHY ASSOCIATED WITH TYPE 2 DIABETES MELLITUS (HCC): ICD-10-CM

## 2022-08-02 DIAGNOSIS — L97.512 ULCER OF TOE OF RIGHT FOOT, WITH FAT LAYER EXPOSED (HCC): ICD-10-CM
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
## 2022-08-02 DIAGNOSIS — L97.524 CHRONIC ULCER OF TOE, LEFT, WITH NECROSIS OF BONE (HCC): ICD-10-CM

## 2022-08-02 DIAGNOSIS — M86.9 OSTEOMYELITIS OF SECOND TOE OF LEFT FOOT (HCC): Primary | ICD-10-CM

## 2022-08-02 PROCEDURE — 11042 DBRDMT SUBQ TIS 1ST 20SQCM/<: CPT

## 2022-08-02 RX ORDER — LIDOCAINE HYDROCHLORIDE 20 MG/ML
JELLY TOPICAL ONCE
Status: COMPLETED | OUTPATIENT
Start: 2022-08-02 | End: 2022-08-02

## 2022-08-02 RX ORDER — LIDOCAINE HYDROCHLORIDE 20 MG/ML
JELLY TOPICAL ONCE
Status: CANCELLED | OUTPATIENT
Start: 2022-08-02 | End: 2022-08-02

## 2022-08-02 RX ORDER — BACITRACIN ZINC AND POLYMYXIN B SULFATE 500; 1000 [USP'U]/G; [USP'U]/G
OINTMENT TOPICAL ONCE
Status: CANCELLED | OUTPATIENT
Start: 2022-08-02 | End: 2022-08-02

## 2022-08-02 RX ORDER — BACITRACIN, NEOMYCIN, POLYMYXIN B 400; 3.5; 5 [USP'U]/G; MG/G; [USP'U]/G
OINTMENT TOPICAL ONCE
Status: CANCELLED | OUTPATIENT
Start: 2022-08-02 | End: 2022-08-02

## 2022-08-02 RX ORDER — GENTAMICIN SULFATE 1 MG/G
OINTMENT TOPICAL ONCE
Status: CANCELLED | OUTPATIENT
Start: 2022-08-02 | End: 2022-08-02

## 2022-08-02 RX ORDER — LIDOCAINE HYDROCHLORIDE 40 MG/ML
SOLUTION TOPICAL ONCE
Status: CANCELLED | OUTPATIENT
Start: 2022-08-02 | End: 2022-08-02

## 2022-08-02 RX ORDER — LIDOCAINE 50 MG/G
OINTMENT TOPICAL ONCE
Status: CANCELLED | OUTPATIENT
Start: 2022-08-02 | End: 2022-08-02

## 2022-08-02 RX ORDER — LIDOCAINE 40 MG/G
CREAM TOPICAL ONCE
Status: CANCELLED | OUTPATIENT
Start: 2022-08-02 | End: 2022-08-02

## 2022-08-02 RX ORDER — BETAMETHASONE DIPROPIONATE 0.05 %
OINTMENT (GRAM) TOPICAL ONCE
Status: CANCELLED | OUTPATIENT
Start: 2022-08-02 | End: 2022-08-02

## 2022-08-02 RX ORDER — GINSENG 100 MG
CAPSULE ORAL ONCE
Status: CANCELLED | OUTPATIENT
Start: 2022-08-02 | End: 2022-08-02

## 2022-08-02 RX ORDER — CLOBETASOL PROPIONATE 0.5 MG/G
OINTMENT TOPICAL ONCE
Status: CANCELLED | OUTPATIENT
Start: 2022-08-02 | End: 2022-08-02

## 2022-08-02 RX ADMIN — LIDOCAINE HYDROCHLORIDE 6 ML: 20 JELLY TOPICAL at 13:00

## 2022-08-02 NOTE — PLAN OF CARE
Problem: Chronic Conditions and Co-morbidities  Goal: Patient's chronic conditions and co-morbidity symptoms are monitored and maintained or improved  Outcome: Progressing     Problem: Discharge Planning  Goal: Discharge to home or other facility with appropriate resources  Outcome: Progressing     Problem: Safety - Adult  Goal: Free from fall injury  Outcome: Progressing     Problem: Wound:  Goal: Will show signs of wound healing; wound closure and no evidence of infection  Description: Will show signs of wound healing; wound closure and no evidence of infection  Outcome: Progressing     Problem: Falls - Risk of:  Goal: Will remain free from falls  Description: Will remain free from falls  Outcome: Progressing

## 2022-08-02 NOTE — PROGRESS NOTES
[address]   Progress Note and Procedure Note      Corey Puentes  MEDICAL RECORD NUMBER:  [id number]  AGE: 64 y.o. GENDER: male  : 1960  EPISODE DATE:  2022    Subjective:     Chief Complaint   Patient presents with    Wound Check     Left and right toes         HISTORY of PRESENT ILLNESS HPI     Corey Puentes is a 64 y.o. male who presents today for wound/ulcer evaluation. S/P    LEFT stravix graft , EPIFIX , 2nd right S/P ARTHROPLASTY . PT IS BTW. HAS EXTRA DEPTH SHOES (CANDIE). MINERAL OIL LEFT AND KAYCEE/SSD RIGHT    BONE WAS NOT OSTEOMYELITIC PER PATH, NO GROWTH OF BACTERIA       Ulcer Identification:  Ulcer Type: diabetic  Contributing Factors: diabetes and chronic pressure       22 1204    Specimen Description . TOE    Special Requests . TOE   SECOND LEFT DIGIT    Direct Exam RARE NEUTROPHILS Abnormal     Direct Exam FEW GRAM POSITIVE COCCI IN PAIRS Abnormal     Culture STREPTOCOCCI, BETA HEMOLYTIC GROUP B MODERATE GROWTH Abnormal     Culture  Abnormal   STAPHYLOCOCCUS AUREUS MODERATE GROWTH This isolate is methicillin susceptible. For susceptibility, refer to previous culture. Culture No anaerobic organisms isolated at 5 days.                 PAST MEDICAL HISTORY        Diagnosis Date    Acquired trigger finger 2018    CAD (coronary artery disease)     NWOCC/ involving coronary bypass graft of native heart with unstable angina pectoris    Cervical spondylosis     CHF (congestive heart failure) ([facility])     Chronic back pain     on 18 pt states is presently in pain mgmnt    Contusion of elbow 2018    Contusion of knee 2018    Contusion of shoulder region 2018    Diabetic foot ulcer with osteomyelitis ([address] 2018    Diabetic ulcer of toe associated with type 2 diabetes mellitus, with fat layer exposed ([address] 2018    Hypercholesterolemia 2017    Hyperlipemia, mixed     Hypertension     Ischemic cardiomyopathy 2018 Non-pressure chronic ulcer of left lower leg with fat layer exposed ([address] 6/9/2014    Obesity     Obesity, Class III, BMI 40-49.9 (morbid obesity) ([address] 4/6/2015    Obstructive sleep apnea syndrome     Osteoarthritis of carpometacarpal (CMC) joint of thumb 6/5/2018    Osteoarthritis of knee 6/5/2018    Peripheral vascular disease ([address]     with venous stasis and ulcerations. Dr Josefina Taylor    Presence of coronary angioplasty implant and graft 9/11/2009    Sleep apnea     Dr Jacqueline Avalos of knee and leg 6/5/2018    Sprain of shoulder and upper arm 6/5/2018    Type II or unspecified type diabetes mellitus without mention of complication, not stated as uncontrolled     Ulcer of left foot, with fat layer exposed ([address] 1/9/2018    Unspecified sleep apnea     Dr Nila Treviño CPAP    Venous insufficiency of both lower extremities 1/31/2018       PAST SURGICAL HISTORY    Past Surgical History:   Procedure Laterality Date    ARTHROPLASTY Left 4/1/2022    ARTHROPLASTY 2ND LEFT TOE performed by Remigio Rolle DPM at [facility] Bilateral 6/17/2022    ARTHROPLASTY 2ND DIGIT RIGHT APPLICATION EPIFIX LEFT FOOT performed by Remigio Rolle DPM at [address] [place]  09/2021    stent     [address] Right 3/22/13    Dr. Armond Boeck.     COLONOSCOPY  2020    CORONARY ARTERY BYPASS GRAFT  2/26/07    [facility], Dr Teresa Biggs (X 3)    CORONARY ARTERY BYPASS GRAFT  2006    NECK SURGERY  2010    cervical stenoses C5-C6-C7and partial T1 laminectomy with fusion of C6-7    OTHER SURGICAL HISTORY      wound care ulcers of legs (bilaterally) with Dr Beverly Reyes Bilateral 5/18/2022    STRAVIX LEFT FOOT, THERASKIN RIGHT FOOT performed by Remigio Rolle DPM at [address] Left 4/6/2022    TOE AMPUTATION, SECOND DIGIT performed by Remigio Rolle DPM at [address] [place] [address] Dr  age 11    VEIN SURGERY      closure of peripherator veins of legs, Dr Tamia Tam TOOTH EXTRACTION         FAMILY HISTORY    Family History   Problem Relation Age of Onset    Cancer Mother         lung    Diabetes Father     Heart Disease Father     High Blood Pressure Father     Diabetes Brother     Heart Disease Brother     High Blood Pressure Brother        SOCIAL HISTORY    Social History     Tobacco Use    Smoking status: Never    Smokeless tobacco: Never   Vaping Use    Vaping Use: Never used   Substance Use Topics    Alcohol use: Not Currently     Comment: rare social, non past 5 months    Drug use: No       ALLERGIES    Allergies   Allergen Reactions    Pcn [Penicillins] Other (See Comments)     Unknown rx    Penicillin G Rash       MEDICATIONS    Current Outpatient Medications on File Prior to Encounter   Medication Sig Dispense Refill    atorvastatin (LIPITOR) 40 MG tablet Take 1 tablet by mouth in the morning. 90 tablet 3    fluconazole (DIFLUCAN) 150 MG tablet Take 1 tablet by mouth in the morning for 14 doses. 14 tablet 0    topiramate (TOPAMAX) 200 MG tablet Take 1 tablet by mouth in the morning and 1 tablet before bedtime. 60 tablet 5    HYDROcodone Bitartrate ER 15 MG CP12 Take 15 mg by mouth in the morning and at bedtime. doxycycline hyclate (VIBRA-TABS) 100 MG tablet Take 1 tablet by mouth in the morning and 1 tablet before bedtime. Do all this for 14 days. 28 tablet 0    phentermine (ADIPEX-P) 37.5 MG tablet Take 1 tablet by mouth every morning (before breakfast) for 30 days. 30 tablet 0    tiZANidine (ZANAFLEX) 2 MG tablet Take 1 tablet by mouth 3 times daily Patient to take 1 tablet in the morning and 2 at bedtime.  90 tablet 0    doxycycline hyclate (VIBRAMYCIN) 100 MG capsule Take 100 mg by mouth in the morning and at bedtime      metFORMIN (GLUCOPHAGE) 1000 MG tablet Take 1 tablet by mouth 2 times daily (with meals) 60 tablet 3    tiZANidine (ZANAFLEX) 4 MG tablet 1 po qd 14 tablet 3    testosterone cypionate (DEPOTESTOTERONE CYPIONATE) 200 MG/ML injection Inject 1 mL into the muscle every 30 days for 30 days. Dispense with 3 cc syringe 18g needle and 22 1/2 g needle each month 1 mL 5    clopidogrel (PLAVIX) 75 MG tablet Take 1 tablet by mouth daily 90 tablet 3    carvedilol (COREG) 12.5 MG tablet Take 1 tablet by mouth 2 times daily (with meals) 180 tablet 3    dapagliflozin (FARXIGA) 10 MG tablet Take 1 tablet by mouth every morning 90 tablet 3    traZODone (DESYREL) 150 MG tablet TAKE ONE TABLET BY MOUTH EVERY EVENING 90 tablet 3    linagliptin (TRADJENTA) 5 MG tablet TAKE 1 TABLET BY MOUTH ONE TIME A DAY 90 tablet 3    hydroCHLOROthiazide (MICROZIDE) 12.5 MG capsule 1 po qd 90 capsule 3    omeprazole (PRILOSEC) 20 MG delayed release capsule TAKE 1 CAPSULE DAILY 90 capsule 1    gabapentin (NEURONTIN) 300 MG capsule Take 900 mg by mouth nightly. Misc Natural Products (GLUCOSAMINE CHOND MSM FORMULA PO) Take 2 tablets by mouth daily      HYDROcodone-acetaminophen (NORCO)  MG per tablet Take 1 tablet by mouth every 4-6 hours as needed for Pain.      meloxicam (MOBIC) 15 MG tablet Take 1 tablet by mouth daily 90 tablet 3    NEEDLE, DISP, 18 G (BD DISP NEEDLES) 18G X 1-1/2\" MISC 1 Device by Does not apply route every 30 days 25 each 1    Multiple Vitamin (MULTI VITAMIN DAILY PO) Take by mouth      nitroGLYCERIN (NITROSTAT) 0.4 MG SL tablet Place 0.4 mg under the tongue      aspirin 325 MG tablet Take 325 mg by mouth daily. No current facility-administered medications on file prior to encounter. REVIEW OF SYSTEMS    Review of Systems   Constitutional:  Negative for chills and fever. Skin:  Positive for wound. Neurological:  Negative for numbness.        Objective:      BP (!) 146/7   Pulse 75   Temp 99.5 °F (37.5 °C) (Tympanic)   Resp 17   Ht 6' (1.829 m)   Wt 249 lb (112.9 kg)   BMI 33.77 kg/m²     Wt Readings from Last 3 Encounters:   08/02/22 249 lb (112.9 kg)   06/28/22 249 lb (112.9 kg)   06/21/22 249 lb (112.9 kg)       Physical Exam:  General:  Alert and oriented x3. In no acute distress. Lower Extremity Physical Exam:    Vascular: DP pulses are palpable, Bilateral. PT pulses are palpable, Bilateral. CFT <3 seconds to all digits, Bilateral.  No edema, Bilateral.  Hair growth is absent to the level of the digits, Bilateral.     Neuro: Saph/sural/SP/DP/plantar sensation intact to light touch. Musculoskeletal: EHL/FHL/GS/TA gross motor intact. Gross deformity is present, hammertoes bilateral.     Dermatologic: clean wound RIGHT FOOT - LEFT FOOT HEALED         Wound 04/26/22 Toe (Comment  which one) Left #4 Left 2nd toe amp site (Active)   Wound Image   06/07/22 1319   Wound Etiology Non-Healing Surgical 07/12/22 1258   Dressing Status Old drainage noted 07/12/22 1258   Wound Cleansed Cleansed with saline 07/12/22 1258   Offloading for Diabetic Foot Ulcers Offloading ordered;Diabetic shoes/inserts 07/19/22 1315   Wound Length (cm) 0 cm 08/02/22 1257   Wound Width (cm) 0 cm 08/02/22 1257   Wound Depth (cm) 0 cm 08/02/22 1257   Wound Surface Area (cm^2) 0 cm^2 08/02/22 1257   Change in Wound Size % (l*w) 100 08/02/22 1257   Wound Volume (cm^3) 0 cm^3 08/02/22 1257   Wound Healing % 100 08/02/22 1257   Post-Procedure Length (cm) 0 cm 08/02/22 1257   Post-Procedure Width (cm) 0 cm 08/02/22 1257   Post-Procedure Depth (cm) 0 cm 08/02/22 1257   Post-Procedure Surface Area (cm^2) 0 cm^2 08/02/22 1257   Post-Procedure Volume (cm^3) 0 cm^3 08/02/22 1257   Wound Assessment [place]/red;Slough 07/12/22 1258   Drainage Amount Moderate 07/12/22 1258   Drainage Description Serosanguinous 07/12/22 1258   Odor None 07/12/22 1258   Trudi-wound Assessment Dry/flaky; Intact 07/12/22 1258   Margins Defined edges 07/12/22 1258   Wound Thickness Description not for Pressure Injury Full thickness 07/12/22 1258   Number of days: 98       Wound 06/28/22 Toe (Comment  which one) Right #2 2nd Toe (Active)   Wound Etiology Non-Healing Surgical 08/02/22 1257   Dressing Status Old drainage noted;New drainage noted 08/02/22 1257   Wound Cleansed Cleansed with saline 08/02/22 1257   Offloading for Diabetic Foot Ulcers Offloading ordered;Diabetic shoes/inserts 08/02/22 1344   Wound Length (cm) 0.4 cm 08/02/22 1257   Wound Width (cm) 0.3 cm 08/02/22 1257   Wound Depth (cm) 0.1 cm 08/02/22 1257   Wound Surface Area (cm^2) 0.12 cm^2 08/02/22 1257   Change in Wound Size % (l*w) 88 08/02/22 1257   Wound Volume (cm^3) 0.012 cm^3 08/02/22 1257   Wound Healing % 96 08/02/22 1257   Post-Procedure Length (cm) 0.5 cm 08/02/22 1257   Post-Procedure Width (cm) 0.8 cm 08/02/22 1257   Post-Procedure Depth (cm) 0.3 cm 08/02/22 1257   Post-Procedure Surface Area (cm^2) 0.4 cm^2 08/02/22 1257   Post-Procedure Volume (cm^3) 0.12 cm^3 08/02/22 1257   Wound Assessment Pink/red 08/02/22 1257   Drainage Amount Moderate 08/02/22 1257   Drainage Description Serosanguinous 08/02/22 1257   Odor None 08/02/22 1257   Trudi-wound Assessment Blanchable erythema 08/02/22 1257   Margins Defined edges 08/02/22 1257   Wound Thickness Description not for Pressure Injury Full thickness 08/02/22 1257   Number of days: 35       Procedure:  Wound Location: RIGHT FEET    Lidocaine gel soaked gauze was applied at beginning of wound evaluation. The wound(s) was excisionally debrided sharply of fibrotic, necrotic, and hyperkeratotic tissue, including a layer of surrounding healthy tissue using curette. The wound was debrided down through and including subcutaneous. Percent of Wound Debrided: 100%    Total Surface Area Debrided:  (see above for sq cm)    Bleeding: Minimal    Patient tolerated procedure well and was given proper instruction. Post debridement wound description:  clean       E11.42, L97.522.  L97.512, L03.9      Plan:    EXCISIONAL DEBRIDEMENT OF WOUND THRU SUB Q TISSUE WITH CURRETTE - SIG IMPROVEMENT NOTED      KAYCEE AND SALINE WET TO DRY TO 2ND RIGHT - CHANGE QOD    [address] 2 WEEKS     Treatment Note please see attached Discharge Instructions

## 2022-08-08 DIAGNOSIS — R10.10 UPPER ABDOMINAL PAIN: ICD-10-CM

## 2022-08-08 RX ORDER — OMEPRAZOLE 20 MG/1
CAPSULE, DELAYED RELEASE ORAL
Qty: 90 CAPSULE | Refills: 1 | Status: SHIPPED | OUTPATIENT
Start: 2022-08-08 | End: 2022-09-27

## 2022-08-08 NOTE — TELEPHONE ENCOUNTER
Arsh García is calling to request a refill on the following medication(s):    Last Visit Date (If Applicable):  2/31/8895    Next Visit Date:    8/23/2022    Medication Request:  Requested Prescriptions     Pending Prescriptions Disp Refills    omeprazole (PRILOSEC) 20 MG delayed release capsule 90 capsule 1     Sig: TAKE 1 CAPSULE DAILY

## 2022-08-09 ENCOUNTER — TELEPHONE (OUTPATIENT)
Dept: FAMILY MEDICINE CLINIC | Age: 62
End: 2022-08-09

## 2022-08-09 NOTE — TELEPHONE ENCOUNTER
----- Message from Sanjay Pagan sent at 8/9/2022  8:58 AM EDT -----  Subject: Message to Provider    QUESTIONS  Information for Provider? Pt called in and said his appt is supposed to be   a vv on 8/23. Pt wants to know can it be changed to a vv pleas call him   and let him know if this is possible.  ---------------------------------------------------------------------------  --------------  Lamar Hughes INFO  [phone]; OK to leave message on voicemail  ---------------------------------------------------------------------------  --------------  SCRIPT ANSWERS  Relationship to Patient?  Self

## 2022-08-09 NOTE — TELEPHONE ENCOUNTER
Patient called in wanting to know if his apt for 8/23/22 can be changed to a vv if possible.       Please advise

## 2022-08-15 NOTE — DISCHARGE INSTRUCTIONS
Aly Ashley -Phone: [phone] Fax: [phone]             Visit  Discharge Instructions / Physician Orders     DATE: 8/15/2022     Home Care: N/A     SUPPLIES ORDERED THRU: N/A     Wound Location: Toes     Cleanse with:     Dressing Orders: Right 2nd Toe: Blessing to wound smaller than wound edges-wet with saline, Bandaid                             Frequency: Every Other Day     Additional Orders: Increase protein to diet (meat, cheese, eggs, fish, peanut butter, nuts and beans)  Multivitamin daily  ELEVATE LEGS AS MUCH AS POSSIBLE  2/10/2022-Culture Taken  2/14/22- antibiotic at pharmacy  2/28/22-X-rays ordered. 3/21/22-A1C and Nutritional labs ordered  3/29/22- X-Ray Ordered  6/14/2022-Culture Taken  6/15/2022- X-Ray Right Toe  6/17/22- Procedure on right 2nd toe     Cleared to return to work 7/8/2022     Your next appointment with [employer] is in 2 weeks with Dr. Antonio Em        (Please note your next appointment above and if you are unable to keep, kindly give a 24 hour notice. Thank you.)     If you experience any of the following, [facility] call [facility] during business hours:  [phone]  Your Phone call may be forwarded to [email or web address] during business hours that [phone] is closed. * Increase in Pain  * Temperature over 101  * Increase in drainage from your wound  * Drainage with a foul odor  * Bleeding  * Increase in swelling  * Need for compression bandage changes due to slippage, breakthrough drainage. If you need medical attention outside of the business hours of the [facility] please contact your PCP or go to the nearest emergency room. The information contained in the After Visit Summary has been reviewed with me, the patient and/or responsible adult, by my health care provider(s). I had the opportunity to ask questions regarding this information.  I have elected to receive;      []After Visit Summary  [x]Comprehensive

## 2022-08-16 ENCOUNTER — HOSPITAL ENCOUNTER (OUTPATIENT)
Dept: WOUND CARE | Age: 62
Discharge: HOME | End: 2022-08-16
Payer: COMMERCIAL

## 2022-08-16 VITALS
WEIGHT: 249 LBS | RESPIRATION RATE: 18 BRPM | HEIGHT: 72 IN | HEART RATE: 94 BPM | DIASTOLIC BLOOD PRESSURE: 83 MMHG | BODY MASS INDEX: 33.72 KG/M2 | TEMPERATURE: 97.9 F | SYSTOLIC BLOOD PRESSURE: 148 MMHG

## 2022-08-16 DIAGNOSIS — L97.524 CHRONIC ULCER OF TOE, LEFT, WITH NECROSIS OF BONE (HCC): ICD-10-CM

## 2022-08-16 DIAGNOSIS — E11.42 DIABETIC POLYNEUROPATHY ASSOCIATED WITH TYPE 2 DIABETES MELLITUS (HCC): ICD-10-CM

## 2022-08-16 DIAGNOSIS — M86.9 OSTEOMYELITIS OF SECOND TOE OF LEFT FOOT (HCC): Primary | ICD-10-CM

## 2022-08-16 DIAGNOSIS — L97.512 ULCER OF TOE OF RIGHT FOOT, WITH FAT LAYER EXPOSED (HCC): ICD-10-CM
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
## 2022-08-16 PROCEDURE — 11042 DBRDMT SUBQ TIS 1ST 20SQCM/<: CPT

## 2022-08-16 RX ORDER — LIDOCAINE HYDROCHLORIDE 20 MG/ML
JELLY TOPICAL ONCE
Status: CANCELLED | OUTPATIENT
Start: 2022-08-16 | End: 2022-08-16

## 2022-08-16 RX ORDER — BACITRACIN, NEOMYCIN, POLYMYXIN B 400; 3.5; 5 [USP'U]/G; MG/G; [USP'U]/G
OINTMENT TOPICAL ONCE
Status: CANCELLED | OUTPATIENT
Start: 2022-08-16 | End: 2022-08-16

## 2022-08-16 RX ORDER — LIDOCAINE HYDROCHLORIDE 20 MG/ML
JELLY TOPICAL ONCE
Status: COMPLETED | OUTPATIENT
Start: 2022-08-16 | End: 2022-08-16

## 2022-08-16 RX ORDER — GINSENG 100 MG
CAPSULE ORAL ONCE
Status: CANCELLED | OUTPATIENT
Start: 2022-08-16 | End: 2022-08-16

## 2022-08-16 RX ORDER — LIDOCAINE 40 MG/G
CREAM TOPICAL ONCE
Status: CANCELLED | OUTPATIENT
Start: 2022-08-16 | End: 2022-08-16

## 2022-08-16 RX ORDER — CLOBETASOL PROPIONATE 0.5 MG/G
OINTMENT TOPICAL ONCE
Status: CANCELLED | OUTPATIENT
Start: 2022-08-16 | End: 2022-08-16

## 2022-08-16 RX ORDER — LIDOCAINE HYDROCHLORIDE 40 MG/ML
SOLUTION TOPICAL ONCE
Status: CANCELLED | OUTPATIENT
Start: 2022-08-16 | End: 2022-08-16

## 2022-08-16 RX ORDER — GENTAMICIN SULFATE 1 MG/G
OINTMENT TOPICAL ONCE
Status: CANCELLED | OUTPATIENT
Start: 2022-08-16 | End: 2022-08-16

## 2022-08-16 RX ORDER — BETAMETHASONE DIPROPIONATE 0.05 %
OINTMENT (GRAM) TOPICAL ONCE
Status: CANCELLED | OUTPATIENT
Start: 2022-08-16 | End: 2022-08-16

## 2022-08-16 RX ORDER — LIDOCAINE 50 MG/G
OINTMENT TOPICAL ONCE
Status: CANCELLED | OUTPATIENT
Start: 2022-08-16 | End: 2022-08-16

## 2022-08-16 RX ORDER — BACITRACIN ZINC AND POLYMYXIN B SULFATE 500; 1000 [USP'U]/G; [USP'U]/G
OINTMENT TOPICAL ONCE
Status: CANCELLED | OUTPATIENT
Start: 2022-08-16 | End: 2022-08-16

## 2022-08-16 RX ADMIN — LIDOCAINE HYDROCHLORIDE 6 ML: 20 JELLY TOPICAL at 12:54

## 2022-08-16 NOTE — PROGRESS NOTES
[address]   Progress Note and Procedure Note      Raman Navas  MEDICAL RECORD NUMBER:  [id number]  AGE: 64 y.o. GENDER: male  : 1960  EPISODE DATE:  2022    Subjective:     Chief Complaint   Patient presents with    Wound Check     Left 2nd toe amp site, right 2nd toe         HISTORY of PRESENT ILLNESS HPI     Raman Navas is a 64 y.o. male who presents today for wound/ulcer evaluation. S/P    LEFT stravix graft , EPIFIX , 2nd right S/P ARTHROPLASTY 22 . PT IS BTW. HAS EXTRA DEPTH SHOES (CANDIE). KAYCEE/SSD RIGHT QOD    BONE WAS NOT OSTEOMYELITIC PER PATH, NO GROWTH OF BACTERIA       Ulcer Identification:  Ulcer Type: diabetic  Contributing Factors: diabetes and chronic pressure       22 1204    Specimen Description . TOE    Special Requests . TOE   SECOND LEFT DIGIT    Direct Exam RARE NEUTROPHILS Abnormal     Direct Exam FEW GRAM POSITIVE COCCI IN PAIRS Abnormal     Culture STREPTOCOCCI, BETA HEMOLYTIC GROUP B MODERATE GROWTH Abnormal     Culture  Abnormal   STAPHYLOCOCCUS AUREUS MODERATE GROWTH This isolate is methicillin susceptible. For susceptibility, refer to previous culture. Culture No anaerobic organisms isolated at 5 days.                 PAST MEDICAL HISTORY        Diagnosis Date    Acquired trigger finger 2018    CAD (coronary artery disease)     NWOCC/ involving coronary bypass graft of native heart with unstable angina pectoris    Cervical spondylosis     CHF (congestive heart failure) ([facility])     Chronic back pain     on 18 pt states is presently in pain mgmnt    Contusion of elbow 2018    Contusion of knee 2018    Contusion of shoulder region 2018    Diabetic foot ulcer with osteomyelitis [facility] 2018    Diabetic ulcer of toe associated with type 2 diabetes mellitus, with fat layer exposed ([address] 2018    Hypercholesterolemia 2017    Hyperlipemia, mixed     Hypertension     Ischemic cardiomyopathy 2018 Non-pressure chronic ulcer of left lower leg with fat layer exposed ([address] 6/9/2014    Obesity     Obesity, Class III, BMI 40-49.9 (morbid obesity) ([address] 4/6/2015    Obstructive sleep apnea syndrome     Osteoarthritis of carpometacarpal (CMC) joint of thumb 6/5/2018    Osteoarthritis of knee 6/5/2018    Peripheral vascular disease ([address]     with venous stasis and ulcerations. Dr Elizabeth Morris    Presence of coronary angioplasty implant and graft 9/11/2009    Sleep apnea     Dr Lizarraga  of knee and leg 6/5/2018    Sprain of shoulder and upper arm 6/5/2018    Type II or unspecified type diabetes mellitus without mention of complication, not stated as uncontrolled     Ulcer of left foot, with fat layer exposed ([address] 1/9/2018    Unspecified sleep apnea     Dr Janice Harris CPAP    Venous insufficiency of both lower extremities 1/31/2018       PAST SURGICAL HISTORY    Past Surgical History:   Procedure Laterality Date    ARTHROPLASTY Left 4/1/2022    ARTHROPLASTY 2ND LEFT TOE performed by Iline Mini, DPM at [facility] Bilateral 6/17/2022    ARTHROPLASTY 2ND DIGIT RIGHT APPLICATION EPIFIX LEFT FOOT performed by Iline Mini, DPM at [facility]  09/2021    stent     [address] Right 3/22/13    Dr. Saadia Gilman.     COLONOSCOPY  2020    CORONARY ARTERY BYPASS GRAFT  2/26/07    [facility], Dr Guido Narvaez (X 3)    CORONARY ARTERY BYPASS GRAFT  2006    NECK SURGERY  2010    cervical stenoses C5-C6-C7and partial T1 laminectomy with fusion of C6-7    OTHER SURGICAL HISTORY      wound care ulcers of legs (bilaterally) with Dr Julianna Alvarez Bilateral 5/18/2022    STRAVIX LEFT FOOT, THERASKIN RIGHT FOOT performed by Iline Mini, DPM at [facility] Left 4/6/2022    TOE AMPUTATION, SECOND DIGIT performed by Iline Mini, DPM at [address]  age 11    VEIN SURGERY      closure of peripherator veins of legs, Dr Hanane Cespedes WISDOM TOOTH EXTRACTION         FAMILY HISTORY    Family History   Problem Relation Age of Onset    Cancer Mother         lung    Diabetes Father     Heart Disease Father     High Blood Pressure Father     Diabetes Brother     Heart Disease Brother     High Blood Pressure Brother        SOCIAL HISTORY    Social History     Tobacco Use    Smoking status: Never    Smokeless tobacco: Never   Vaping Use    Vaping Use: Never used   Substance Use Topics    Alcohol use: Not Currently     Comment: rare social, non past 5 months    Drug use: No       ALLERGIES    Allergies   Allergen Reactions    Pcn [Penicillins] Other (See Comments)     Unknown rx    Penicillin G Rash       MEDICATIONS    Current Outpatient Medications on File Prior to Encounter   Medication Sig Dispense Refill    omeprazole (PRILOSEC) 20 MG delayed release capsule TAKE 1 CAPSULE DAILY 90 capsule 1    atorvastatin (LIPITOR) 40 MG tablet Take 1 tablet by mouth in the morning. 90 tablet 3    topiramate (TOPAMAX) 200 MG tablet Take 1 tablet by mouth in the morning and 1 tablet before bedtime. 60 tablet 5    HYDROcodone Bitartrate ER 15 MG CP12 Take 15 mg by mouth in the morning and at bedtime. tiZANidine (ZANAFLEX) 2 MG tablet Take 1 tablet by mouth 3 times daily Patient to take 1 tablet in the morning and 2 at bedtime. 90 tablet 0    doxycycline hyclate (VIBRAMYCIN) 100 MG capsule Take 100 mg by mouth in the morning and at bedtime      metFORMIN (GLUCOPHAGE) 1000 MG tablet Take 1 tablet by mouth 2 times daily (with meals) 60 tablet 3    tiZANidine (ZANAFLEX) 4 MG tablet 1 po qd 14 tablet 3    testosterone cypionate (DEPOTESTOTERONE CYPIONATE) 200 MG/ML injection Inject 1 mL into the muscle every 30 days for 30 days.  Dispense with 3 cc syringe 18g needle and 22 1/2 g needle each month 1 mL 5    clopidogrel (PLAVIX) 75 MG tablet Take 1 tablet by mouth daily 90 tablet 3    carvedilol (COREG) 12.5 MG tablet Take 1 tablet by mouth 2 times daily (with meals) 180 tablet 3    dapagliflozin (FARXIGA) 10 MG tablet Take 1 tablet by mouth every morning 90 tablet 3    traZODone (DESYREL) 150 MG tablet TAKE ONE TABLET BY MOUTH EVERY EVENING 90 tablet 3    linagliptin (TRADJENTA) 5 MG tablet TAKE 1 TABLET BY MOUTH ONE TIME A DAY 90 tablet 3    hydroCHLOROthiazide (MICROZIDE) 12.5 MG capsule 1 po qd 90 capsule 3    gabapentin (NEURONTIN) 300 MG capsule Take 900 mg by mouth nightly. Misc Natural Products (GLUCOSAMINE CHOND MSM FORMULA PO) Take 2 tablets by mouth daily      HYDROcodone-acetaminophen (NORCO)  MG per tablet Take 1 tablet by mouth every 4-6 hours as needed for Pain.      meloxicam (MOBIC) 15 MG tablet Take 1 tablet by mouth daily 90 tablet 3    NEEDLE, DISP, 18 G (BD DISP NEEDLES) 18G X 1-1/2\" MISC 1 Device by Does not apply route every 30 days 25 each 1    Multiple Vitamin (MULTI VITAMIN DAILY PO) Take by mouth      nitroGLYCERIN (NITROSTAT) 0.4 MG SL tablet Place 0.4 mg under the tongue      aspirin 325 MG tablet Take 325 mg by mouth daily. No current facility-administered medications on file prior to encounter. REVIEW OF SYSTEMS    Review of Systems   Constitutional:  Negative for chills and fever. Skin:  Positive for wound. Neurological:  Negative for numbness. Objective:      BP (!) 148/83   Pulse 94   Temp 97.9 °F (36.6 °C) (Tympanic)   Resp 18   Ht 6' (1.829 m)   Wt 249 lb (112.9 kg)   BMI 33.77 kg/m²     Wt Readings from Last 3 Encounters:   08/16/22 249 lb (112.9 kg)   08/02/22 249 lb (112.9 kg)   06/28/22 249 lb (112.9 kg)       Physical Exam:  General:  Alert and oriented x3. In no acute distress. Lower Extremity Physical Exam:    Vascular: DP pulses are palpable, Bilateral. PT pulses are palpable, Bilateral. CFT <3 seconds to all digits, Bilateral.  No edema, Bilateral.  Hair growth is absent to the level of the digits, Bilateral.     Neuro: Saph/sural/SP/DP/plantar sensation intact to light touch. Musculoskeletal: EHL/FHL/GS/TA gross motor intact. Gross deformity is present, hammertoes bilateral.     Dermatologic: clean wound RIGHT FOOT - LEFT FOOT HEALED      Wound 06/28/22 Toe (Comment  which one) Right #2 2nd Toe (Active)   Wound Image   08/16/22 1251   Wound Etiology Non-Healing Surgical 08/16/22 1251   Dressing Status Old drainage noted;New drainage noted 08/16/22 1251   Wound Cleansed Cleansed with saline 08/16/22 1251   Offloading for Diabetic Foot Ulcers Offloading ordered;Diabetic shoes/inserts 08/16/22 1251   Wound Length (cm) 0.5 cm 08/16/22 1251   Wound Width (cm) 0.5 cm 08/16/22 1251   Wound Depth (cm) 0.1 cm 08/16/22 1251   Wound Surface Area (cm^2) 0.25 cm^2 08/16/22 1251   Change in Wound Size % (l*w) 75 08/16/22 1251   Wound Volume (cm^3) 0.025 cm^3 08/16/22 1251   Wound Healing % 92 08/16/22 1251   Post-Procedure Length (cm) 0.6 cm 08/16/22 1251   Post-Procedure Width (cm) 1.2 cm 08/16/22 1251   Post-Procedure Depth (cm) 0.3 cm 08/16/22 1251   Post-Procedure Surface Area (cm^2) 0.72 cm^2 08/16/22 1251   Post-Procedure Volume (cm^3) 0.216 cm^3 08/16/22 1251   Wound Assessment Pink/red 08/16/22 1251   Drainage Amount Moderate 08/16/22 1251   Drainage Description Serosanguinous 08/16/22 1251   Odor None 08/16/22 1251   Trudi-wound Assessment Blanchable erythema 08/16/22 1251   Margins Defined edges 08/16/22 1251   Wound Thickness Description not for Pressure Injury Full thickness 08/16/22 1251   Number of days: 49            Procedure:  Wound Location: RIGHT     Lidocaine gel soaked gauze was applied at beginning of wound evaluation. The wound(s) was excisionally debrided sharply of fibrotic, necrotic, and hyperkeratotic tissue, including a layer of surrounding healthy tissue using curette. The wound was debrided down through and including subcutaneous.  PDS SUTURE REMOVED FROM WOUND SITE    Percent of Wound Debrided: 100%    Total Surface Area Debrided:  (see above for sq cm)    Bleeding: Minimal    Patient tolerated procedure well and was given proper instruction. Post debridement wound description:  clean       E11.42, L97.522.  L97.512, L03.9      Plan:    EXCISIONAL DEBRIDEMENT OF WOUND THRU SUB Q TISSUE WITH CURRETTE - SIG IMPROVEMENT NOTED      KAYCEE AND SALINE WET TO DRY TO 2ND RIGHT - CHANGE QOD    [address] 2 WEEKS     Treatment Note please see attached Discharge Instructions

## 2022-08-19 DIAGNOSIS — E34.9 HYPOTESTOSTERONISM: ICD-10-CM

## 2022-08-19 RX ORDER — TESTOSTERONE CYPIONATE 200 MG/ML
200 INJECTION INTRAMUSCULAR
Qty: 1 ML | Refills: 5 | Status: SHIPPED | OUTPATIENT
Start: 2022-08-19 | End: 2022-09-18

## 2022-08-19 RX ORDER — TIZANIDINE 2 MG/1
2 TABLET ORAL 3 TIMES DAILY
Qty: 90 TABLET | Refills: 0 | Status: SHIPPED | OUTPATIENT
Start: 2022-08-19 | End: 2022-10-05 | Stop reason: SDUPTHER

## 2022-08-19 ASSESSMENT — ENCOUNTER SYMPTOMS
RESPIRATORY NEGATIVE: 1
GASTROINTESTINAL NEGATIVE: 1

## 2022-08-19 NOTE — TELEPHONE ENCOUNTER
Mario Aguilar is calling to request a refill on the following medication(s):    Last Visit Date (If Applicable):  9/37/8560    Next Visit Date:    8/23/2022    Medication Request:  Requested Prescriptions     Pending Prescriptions Disp Refills    testosterone cypionate (DEPOTESTOTERONE CYPIONATE) 200 MG/ML injection 1 mL 5     Sig: Inject 1 mL into the muscle every 30 days for 30 days. Dispense with 3 cc syringe 18g needle and 22 1/2 g needle each month    tiZANidine (ZANAFLEX) 2 MG tablet 90 tablet 0     Sig: Take 1 tablet by mouth 3 times daily Patient to take 1 tablet in the morning and 2 at bedtime.

## 2022-08-23 ENCOUNTER — TELEMEDICINE (OUTPATIENT)
Dept: FAMILY MEDICINE CLINIC | Age: 62
End: 2022-08-23
Payer: COMMERCIAL

## 2022-08-23 DIAGNOSIS — E11.69 TYPE 2 DIABETES MELLITUS WITH OTHER SPECIFIED COMPLICATION, WITHOUT LONG-TERM CURRENT USE OF INSULIN (HCC): Primary | ICD-10-CM

## 2022-08-23 DIAGNOSIS — E66.01 OBESITY, CLASS III, BMI 40-49.9 (MORBID OBESITY) (HCC): ICD-10-CM

## 2022-08-23 PROCEDURE — 3052F HG A1C>EQUAL 8.0%<EQUAL 9.0%: CPT | Performed by: FAMILY MEDICINE

## 2022-08-23 PROCEDURE — 99213 OFFICE O/P EST LOW 20 MIN: CPT | Performed by: FAMILY MEDICINE

## 2022-08-23 RX ORDER — PHENTERMINE HYDROCHLORIDE 37.5 MG/1
37.5 TABLET ORAL
Qty: 30 TABLET | Refills: 0 | Status: SHIPPED | OUTPATIENT
Start: 2022-08-23 | End: 2022-09-27 | Stop reason: SDUPTHER

## 2022-08-23 ASSESSMENT — PATIENT HEALTH QUESTIONNAIRE - PHQ9
SUM OF ALL RESPONSES TO PHQ QUESTIONS 1-9: 0
1. LITTLE INTEREST OR PLEASURE IN DOING THINGS: 0
SUM OF ALL RESPONSES TO PHQ QUESTIONS 1-9: 0
SUM OF ALL RESPONSES TO PHQ9 QUESTIONS 1 & 2: 0
2. FEELING DOWN, DEPRESSED OR HOPELESS: 0
SUM OF ALL RESPONSES TO PHQ QUESTIONS 1-9: 0
SUM OF ALL RESPONSES TO PHQ QUESTIONS 1-9: 0

## 2022-08-23 NOTE — PROGRESS NOTES
2022    TELEHEALTH EVALUATION -- Audio/Visual (During VRXDK-30 public health emergency)    HPI:    Chuck Pierce (:  1960) has requested an audio/video evaluation for the following concern(s):    He is being seen today for follow-up on diabetes and weight reduction he is due for hemoglobin A1c has been trying really hard to get his numbers down so that it is in good control to have hip replacement surgery in December. He is trying actively to lose weight as well    Review of Systems    Prior to Visit Medications    Medication Sig Taking? Authorizing Provider   phentermine (ADIPEX-P) 37.5 MG tablet Take 1 tablet by mouth every morning (before breakfast) for 30 days. Yes Seema Hameed, DO   testosterone cypionate (DEPOTESTOTERONE CYPIONATE) 200 MG/ML injection Inject 1 mL into the muscle every 30 days for 30 days. Dispense with 3 cc syringe 18g needle and 22 1/2 g needle each month  Seema Hameed DO   tiZANidine (ZANAFLEX) 2 MG tablet Take 1 tablet by mouth 3 times daily Patient to take 1 tablet in the morning and 2 at bedtime. Seema Hameed DO   omeprazole (PRILOSEC) 20 MG delayed release capsule TAKE 1 CAPSULE DAILY  Seema Hameed DO   atorvastatin (LIPITOR) 40 MG tablet Take 1 tablet by mouth in the morning. Seema Hameed DO   topiramate (TOPAMAX) 200 MG tablet Take 1 tablet by mouth in the morning and 1 tablet before bedtime. Seema Hameed DO   HYDROcodone Bitartrate ER 15 MG CP12 Take 15 mg by mouth in the morning and at bedtime.   Historical Provider, MD   doxycycline hyclate (VIBRAMYCIN) 100 MG capsule Take 100 mg by mouth in the morning and at bedtime  Historical Provider, MD   metFORMIN (GLUCOPHAGE) 1000 MG tablet Take 1 tablet by mouth 2 times daily (with meals)  Seema Hameed DO   tiZANidine (ZANAFLEX) 4 MG tablet 1 po qd  Seema Hameed DO   clopidogrel (PLAVIX) 75 MG tablet Take 1 tablet by mouth daily  Seema Hameed DO   carvedilol (COREG) 12.5 MG tablet Take 1 tablet by mouth 2 6/5/2018    Contusion of knee 6/5/2018    Contusion of shoulder region 6/5/2018    Diabetic foot ulcer with osteomyelitis ([address] 1/9/2018    Diabetic ulcer of toe associated with type 2 diabetes mellitus, with fat layer exposed ([address] 1/31/2018    Hypercholesterolemia 12/5/2017    Hyperlipemia, mixed     Hypertension     Ischemic cardiomyopathy 5/7/2018    Non-pressure chronic ulcer of left lower leg with fat layer exposed ([address] 6/9/2014    Obesity     Obesity, Class III, BMI 40-49.9 (morbid obesity) ([address] 4/6/2015    Obstructive sleep apnea syndrome     Osteoarthritis of carpometacarpal (CMC) joint of thumb 6/5/2018    Osteoarthritis of knee 6/5/2018    Peripheral vascular disease ([address]     with venous stasis and ulcerations. Dr Freeman Solid    Presence of coronary angioplasty implant and graft 9/11/2009    Sleep apnea     Dr Yarbrough Pear of knee and leg 6/5/2018    Sprain of shoulder and upper arm 6/5/2018    Type II or unspecified type diabetes mellitus without mention of complication, not stated as uncontrolled     Ulcer of left foot, with fat layer exposed ([address] 1/9/2018    Unspecified sleep apnea     Dr Sonny Odonnell CPAP    Venous insufficiency of both lower extremities 1/31/2018   ,   Past Surgical History:   Procedure Laterality Date    ARTHROPLASTY Left 4/1/2022    ARTHROPLASTY 2ND LEFT TOE performed by Sheeba Florian DPM at [address] [place] [address] Bilateral 6/17/2022    ARTHROPLASTY 2ND DIGIT RIGHT APPLICATION EPIFIX LEFT FOOT performed by Sheeba Florian DPM at [place] [address]  09/2021    stent     [address] Right 3/22/13    Dr. Garcia Pontiff.     COLONOSCOPY  2020    CORONARY ARTERY BYPASS GRAFT  2/26/07    Dr Julio Stearns (X 3)    CORONARY ARTERY BYPASS GRAFT  2006    NECK SURGERY  2010    cervical stenoses C5-C6-C7and partial T1 laminectomy with fusion of C6-7    OTHER SURGICAL HISTORY      wound care ulcers of legs (bilaterally) with Dr Roxy Tyler ULCER DEBRIDEMENT Bilateral 5/18/2022    STRAVIX LEFT FOOT, THERASKIN RIGHT FOOT performed by Boris Acosta DPM at [address] Left 4/6/2022    TOE AMPUTATION, SECOND DIGIT performed by Boris Acosta DPM at [place] [address]  age 11    VEIN SURGERY      closure of peripherator veins of legs, Dr Dorothy Rosales     ,   Social History     Tobacco Use    Smoking status: Never    Smokeless tobacco: Never   Vaping Use    Vaping Use: Never used   Substance Use Topics    Alcohol use: Not Currently     Comment: rare social, non past 5 months    Drug use: No   ,   Family History   Problem Relation Age of Onset    Cancer Mother         lung    Diabetes Father     Heart Disease Father     High Blood Pressure Father     Diabetes Brother     Heart Disease Brother     High Blood Pressure Brother    ,   Immunization History   Administered Date(s) Administered    COVID-19, MODERNA BLUE border, Primary or Immunocompromised, (age 12y+), IM, 100 mcg/0.5mL 03/11/2021, 03/11/2021, 04/08/2021, 04/08/2021, 01/15/2022, 01/15/2022    Influenza Virus Vaccine 11/22/2006    Td, unspecified formulation 05/18/2001    Tdap (Boostrix, Adacel) 02/22/2013, 12/06/2014, 04/02/2017, 12/03/2019       PHYSICAL EXAMINATION:  [ INSTRUCTIONS:  \"[x]\" Indicates a positive item  \"[]\" Indicates a negative item  -- DELETE ALL ITEMS NOT EXAMINED]  Vital Signs: (As obtained by patient/caregiver or practitioner observation)    Blood pressure-  Heart rate-    Respiratory rate-    Temperature-  Pulse oximetry-     Constitutional: [x] Appears well-developed and well-nourished [x] No apparent distress      [] Abnormal-   Mental status  [x] Alert and awake  [x] Oriented to person/place/time [x]Able to follow commands      Eyes:  EOM    [x]  Normal  [] Abnormal-  Sclera  [x]  Normal  [] Abnormal -         Discharge [x]  None visible  [] Abnormal -    HENT:   [x] Normocephalic, atraumatic.   [] Abnormal   [x] Mouth/Throat: Mucous membranes are moist.     External Ears [x] Normal  [] Abnormal-     Neck: [x] No visualized mass     Pulmonary/Chest: [x] Respiratory effort normal.  [x] No visualized signs of difficulty breathing or respiratory distress        [] Abnormal-      Musculoskeletal:   [x] Normal gait with no signs of ataxia         [x] Normal range of motion of neck        [] Abnormal-       Neurological:        [x] No Facial Asymmetry (Cranial nerve 7 motor function) (limited exam to video visit)          [x] No gaze palsy        [] Abnormal-         Skin:        [x] No significant exanthematous lesions or discoloration noted on facial skin         [] Abnormal-            Psychiatric:       [x] Normal Affect [x] No Hallucinations        [] Abnormal-     Other pertinent observable physical exam findings-     ASSESSMENT/PLAN:   Diagnosis Orders   1. Type 2 diabetes mellitus with other specified complication, without long-term current use of insulin ([facility])  Hemoglobin A1C      2. Obesity, Class III, BMI 40-49.9 (morbid obesity) ([facility])  phentermine (ADIPEX-P) 37.5 MG tablet         Orders Placed This Encounter   Procedures    Hemoglobin A1C     Requested Prescriptions     Signed Prescriptions Disp Refills    phentermine (ADIPEX-P) 37.5 MG tablet 30 tablet 0     Sig: Take 1 tablet by mouth every morning (before breakfast) for 30 days. No follow-ups on file. Juan Perera is a 64 y.o. male being evaluated by a Virtual Visit (video visit) encounter to address concerns as mentioned above. A caregiver was present when appropriate. Due to this being a TeleHealth encounter (During BGYOX-65 public health emergency), evaluation of the following organ systems was limited: Vitals/Constitutional/EENT/Resp/CV/GI//MS/Neuro/Skin/Heme-Lymph-Imm.   Pursuant to the emergency declaration under the [facility], [address] [place] [address] authority and the [employer] and [employer] Act, this Virtual Visit was conducted with patient's (and/or legal guardian's) consent, to reduce the patient's risk of exposure to COVID-19 and provide necessary medical care. The patient (and/or legal guardian) has also been advised to contact this office for worsening conditions or problems, and seek emergency medical treatment and/or call 911 if deemed necessary. Patient identification was verified at the start of the visit: Yes    Total time spent on this encounter: Not billed by time    Services were provided through a video synchronous discussion virtually to substitute for in-person clinic visit. Patient and provider were located at their individual homes. --Vallarie Babinski, DO on 8/23/2022 at 1:26 PM    An electronic signature was used to authenticate this note.

## 2022-08-26 ENCOUNTER — HOSPITAL ENCOUNTER (OUTPATIENT)
Age: 62
Discharge: HOME | End: 2022-08-26
Payer: COMMERCIAL

## 2022-08-26 DIAGNOSIS — E11.69 TYPE 2 DIABETES MELLITUS WITH OTHER SPECIFIED COMPLICATION, WITHOUT LONG-TERM CURRENT USE OF INSULIN (HCC): ICD-10-CM

## 2022-08-26 LAB
ESTIMATED AVERAGE GLUCOSE: 171 MG/DL
HBA1C MFR BLD: 7.6 % (ref 4–6)

## 2022-08-26 PROCEDURE — 83036 HEMOGLOBIN GLYCOSYLATED A1C: CPT

## 2022-08-26 PROCEDURE — 36415 COLL VENOUS BLD VENIPUNCTURE: CPT

## 2022-08-26 NOTE — DISCHARGE INSTRUCTIONS
[address] [facility],5Th Floor -Phone: [phone] Fax: [phone]             Visit  Discharge Instructions / Physician Orders     DATE: 8/30/2022     Home Care: N/A     SUPPLIES ORDERED THRU: N/A     Wound Location: Right Medial Lower Leg     Cleanse with: saline     Dressing Orders: Right 2nd Toe:  Bandaid                                 Right Medial Lower Leg: Silvercel to wound, Zinc Unna Boot-keep dry and intact                                Left 2nd toe amp site: Blessing-moisten with saline, dry dressing                         Frequency: Daily     Additional Orders: Increase protein to diet (meat, cheese, eggs, fish, peanut butter, nuts and beans)  Multivitamin daily  ELEVATE LEGS AS MUCH AS POSSIBLE  2/10/2022-Culture Taken  2/14/22- antibiotic at pharmacy  2/28/22-X-rays ordered. 3/21/22-A1C and Nutritional labs ordered  3/29/22- X-Ray Ordered  6/14/2022-Culture Taken  6/15/2022- X-Ray Right Toe  6/17/22- Procedure on right 2nd toe  8/30/22- Juxta Lite Ordered Right Leg     Cleared to return to work 7/8/2022     Your next appointment with [address] [place] [address] is in 1 week with Dr. Olivia Morton        (Please note your next appointment above and if you are unable to keep, kindly give a 24 hour notice. Thank you.)     If you experience any of the following, please call the [address] [place] [address] during business hours:  [phone]  Your Phone call may be forwarded to [address] during business hours that [phone] is closed. * Increase in Pain  * Temperature over 101  * Increase in drainage from your wound  * Drainage with a foul odor  * Bleeding  * Increase in swelling  * Need for compression bandage changes due to slippage, breakthrough drainage. If you need medical attention outside of the business hours of the [address] [place] [address] please contact your PCP or go to the nearest emergency room.      The information contained in the After Visit Summary has been reviewed with me, the

## 2022-08-30 ENCOUNTER — HOSPITAL ENCOUNTER (OUTPATIENT)
Dept: WOUND CARE | Age: 62
Discharge: HOME | End: 2022-08-30
Payer: COMMERCIAL

## 2022-08-30 VITALS
DIASTOLIC BLOOD PRESSURE: 66 MMHG | RESPIRATION RATE: 16 BRPM | HEART RATE: 82 BPM | SYSTOLIC BLOOD PRESSURE: 130 MMHG | TEMPERATURE: 98.6 F
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
## 2022-08-30 DIAGNOSIS — L97.512 ULCER OF TOE OF RIGHT FOOT, WITH FAT LAYER EXPOSED (HCC): ICD-10-CM

## 2022-08-30 DIAGNOSIS — L97.511 RIGHT FOOT ULCER, LIMITED TO BREAKDOWN OF SKIN (HCC): ICD-10-CM

## 2022-08-30 DIAGNOSIS — L97.812 ULCER OF RIGHT PRETIBIAL REGION, WITH FAT LAYER EXPOSED (HCC): ICD-10-CM

## 2022-08-30 DIAGNOSIS — M86.9 OSTEOMYELITIS OF SECOND TOE OF LEFT FOOT (HCC): Primary | ICD-10-CM

## 2022-08-30 DIAGNOSIS — E11.42 DIABETIC POLYNEUROPATHY ASSOCIATED WITH TYPE 2 DIABETES MELLITUS (HCC): ICD-10-CM

## 2022-08-30 DIAGNOSIS — L97.524 CHRONIC ULCER OF TOE, LEFT, WITH NECROSIS OF BONE (HCC): ICD-10-CM

## 2022-08-30 PROCEDURE — 11042 DBRDMT SUBQ TIS 1ST 20SQCM/<: CPT

## 2022-08-30 RX ORDER — LIDOCAINE HYDROCHLORIDE 40 MG/ML
SOLUTION TOPICAL ONCE
Status: CANCELLED | OUTPATIENT
Start: 2022-08-30 | End: 2022-08-30

## 2022-08-30 RX ORDER — LIDOCAINE 50 MG/G
OINTMENT TOPICAL ONCE
Status: CANCELLED | OUTPATIENT
Start: 2022-08-30 | End: 2022-08-30

## 2022-08-30 RX ORDER — LIDOCAINE 40 MG/G
CREAM TOPICAL ONCE
Status: CANCELLED | OUTPATIENT
Start: 2022-08-30 | End: 2022-08-30

## 2022-08-30 RX ORDER — LIDOCAINE HYDROCHLORIDE 20 MG/ML
JELLY TOPICAL ONCE
Status: CANCELLED | OUTPATIENT
Start: 2022-08-30 | End: 2022-08-30

## 2022-08-30 RX ORDER — CLOBETASOL PROPIONATE 0.5 MG/G
OINTMENT TOPICAL ONCE
Status: CANCELLED | OUTPATIENT
Start: 2022-08-30 | End: 2022-08-30

## 2022-08-30 RX ORDER — GENTAMICIN SULFATE 1 MG/G
OINTMENT TOPICAL ONCE
Status: CANCELLED | OUTPATIENT
Start: 2022-08-30 | End: 2022-08-30

## 2022-08-30 RX ORDER — BACITRACIN ZINC AND POLYMYXIN B SULFATE 500; 1000 [USP'U]/G; [USP'U]/G
OINTMENT TOPICAL ONCE
Status: CANCELLED | OUTPATIENT
Start: 2022-08-30 | End: 2022-08-30

## 2022-08-30 RX ORDER — LIDOCAINE HYDROCHLORIDE 20 MG/ML
JELLY TOPICAL ONCE
Status: COMPLETED | OUTPATIENT
Start: 2022-08-30 | End: 2022-08-30

## 2022-08-30 RX ORDER — GINSENG 100 MG
CAPSULE ORAL ONCE
Status: CANCELLED | OUTPATIENT
Start: 2022-08-30 | End: 2022-08-30

## 2022-08-30 RX ORDER — BACITRACIN, NEOMYCIN, POLYMYXIN B 400; 3.5; 5 [USP'U]/G; MG/G; [USP'U]/G
OINTMENT TOPICAL ONCE
Status: CANCELLED | OUTPATIENT
Start: 2022-08-30 | End: 2022-08-30

## 2022-08-30 RX ORDER — BETAMETHASONE DIPROPIONATE 0.05 %
OINTMENT (GRAM) TOPICAL ONCE
Status: CANCELLED | OUTPATIENT
Start: 2022-08-30 | End: 2022-08-30

## 2022-08-30 RX ADMIN — LIDOCAINE HYDROCHLORIDE 6 ML: 20 JELLY TOPICAL at 13:28

## 2022-08-30 NOTE — PROGRESS NOTES
[address]   Progress Note and Procedure Note      Juan Perera  MEDICAL RECORD NUMBER:  [id number]  AGE: 64 y.o. GENDER: male  : 1960  EPISODE DATE:  2022    Subjective:     Chief Complaint   Patient presents with    Wound Check     Right leg         HISTORY of PRESENT ILLNESS HPI     Juan Perera is a 64 y.o. male who presents today for wound/ulcer evaluation. S/P    LEFT stravix graft , EPIFIX , 2nd right S/P ARTHROPLASTY 22 . PT IS BTW. HAS EXTRA DEPTH SHOES (CANDIE). KAYCEE/SSD RIGHT QOD. HAS A NEW WOUND RIGHT LEG (HAS OPENED UP MANY TIMES IN THE PAST) AND WIFE THINKS THE LEFT FOOT HAS RE-OPENED        Ulcer Identification:  Ulcer Type: diabetic  Contributing Factors: diabetes and chronic pressure       22 1204    Specimen Description . TOE    Special Requests . TOE   SECOND LEFT DIGIT    Direct Exam RARE NEUTROPHILS Abnormal     Direct Exam FEW GRAM POSITIVE COCCI IN PAIRS Abnormal     Culture STREPTOCOCCI, BETA HEMOLYTIC GROUP B MODERATE GROWTH Abnormal     Culture  Abnormal   STAPHYLOCOCCUS AUREUS MODERATE GROWTH This isolate is methicillin susceptible. For susceptibility, refer to previous culture. Culture No anaerobic organisms isolated at 5 days.                 PAST MEDICAL HISTORY        Diagnosis Date    Acquired trigger finger 2018    CAD (coronary artery disease)     NWOCC/ involving coronary bypass graft of native heart with unstable angina pectoris    Cervical spondylosis     CHF (congestive heart failure) ([facility])     Chronic back pain     on 18 pt states is presently in pain mgmnt    Contusion of elbow 2018    Contusion of knee 2018    Contusion of shoulder region 2018    Diabetic foot ulcer with osteomyelitis ([address] 2018    Diabetic ulcer of toe associated with type 2 diabetes mellitus, with fat layer exposed ([address] 2018    Hypercholesterolemia 2017    Hyperlipemia, mixed     Hypertension     Ischemic cardiomyopathy 5/7/2018    Non-pressure chronic ulcer of left lower leg with fat layer exposed ([address] 6/9/2014    Obesity     Obesity, Class III, BMI 40-49.9 (morbid obesity) ([address] 4/6/2015    Obstructive sleep apnea syndrome     Osteoarthritis of carpometacarpal (CMC) joint of thumb 6/5/2018    Osteoarthritis of knee 6/5/2018    Peripheral vascular disease ([address]     with venous stasis and ulcerations. Dr Rohini Bailey    Presence of coronary angioplasty implant and graft 9/11/2009    Sleep apnea     Dr Verma Arms of knee and leg 6/5/2018    Sprain of shoulder and upper arm 6/5/2018    Type II or unspecified type diabetes mellitus without mention of complication, not stated as uncontrolled     Ulcer of left foot, with fat layer exposed ([address] 1/9/2018    Unspecified sleep apnea     Dr Gonzalez Loren CPAP    Venous insufficiency of both lower extremities 1/31/2018       PAST SURGICAL HISTORY    Past Surgical History:   Procedure Laterality Date    ARTHROPLASTY Left 4/1/2022    ARTHROPLASTY 2ND LEFT TOE performed by Isaiah Owusu DPM at [facility] Bilateral 6/17/2022    ARTHROPLASTY 2ND DIGIT RIGHT APPLICATION EPIFIX LEFT FOOT performed by Isaiah Owusu DPM at [facility]  09/2021    stent     [address] Right 3/22/13    Dr. Isatu Prieto.     COLONOSCOPY  2020    [facility]    CORONARY ARTERY BYPASS GRAFT  2006    NECK SURGERY  2010    cervical stenoses C5-C6-C7and partial T1 laminectomy with fusion of C6-7    OTHER SURGICAL HISTORY      wound care ulcers of legs (bilaterally) with Dr Nadege Rod Bilateral 5/18/2022    STRAVIX LEFT FOOT, THERASKIN RIGHT FOOT performed by Isaiah Owusu DPM at [facility] Left 4/6/2022    TOE AMPUTATION, SECOND DIGIT performed by Isaiah Owusu DPM at [address] [facility]  age 11    VEIN SURGERY      closure of peripherator veins of legs, Dr Lito Cotter History   Problem Relation Age of Onset    Cancer Mother         lung    Diabetes Father     Heart Disease Father     High Blood Pressure Father     Diabetes Brother     Heart Disease Brother     High Blood Pressure Brother        SOCIAL HISTORY    Social History     Tobacco Use    Smoking status: Never    Smokeless tobacco: Never   Vaping Use    Vaping Use: Never used   Substance Use Topics    Alcohol use: Not Currently     Comment: rare social, non past 5 months    Drug use: No       ALLERGIES    Allergies   Allergen Reactions    Pcn [Penicillins] Other (See Comments)     Unknown rx    Penicillin G Rash       MEDICATIONS    Current Outpatient Medications on File Prior to Encounter   Medication Sig Dispense Refill    phentermine (ADIPEX-P) 37.5 MG tablet Take 1 tablet by mouth every morning (before breakfast) for 30 days. 30 tablet 0    testosterone cypionate (DEPOTESTOTERONE CYPIONATE) 200 MG/ML injection Inject 1 mL into the muscle every 30 days for 30 days. Dispense with 3 cc syringe 18g needle and 22 1/2 g needle each month 1 mL 5    tiZANidine (ZANAFLEX) 2 MG tablet Take 1 tablet by mouth 3 times daily Patient to take 1 tablet in the morning and 2 at bedtime. 90 tablet 0    omeprazole (PRILOSEC) 20 MG delayed release capsule TAKE 1 CAPSULE DAILY 90 capsule 1    atorvastatin (LIPITOR) 40 MG tablet Take 1 tablet by mouth in the morning. 90 tablet 3    topiramate (TOPAMAX) 200 MG tablet Take 1 tablet by mouth in the morning and 1 tablet before bedtime. 60 tablet 5    HYDROcodone Bitartrate ER 15 MG CP12 Take 15 mg by mouth in the morning and at bedtime.       metFORMIN (GLUCOPHAGE) 1000 MG tablet Take 1 tablet by mouth 2 times daily (with meals) 60 tablet 3    tiZANidine (ZANAFLEX) 4 MG tablet 1 po qd 14 tablet 3    clopidogrel (PLAVIX) 75 MG tablet Take 1 tablet by mouth daily 90 tablet 3    carvedilol (COREG) 12.5 MG tablet Take 1 tablet by mouth 2 times daily (with meals) 180 tablet 3    dapagliflozin (FARXIGA) 10 MG tablet Take 1 tablet by mouth every morning 90 tablet 3    traZODone (DESYREL) 150 MG tablet TAKE ONE TABLET BY MOUTH EVERY EVENING 90 tablet 3    linagliptin (TRADJENTA) 5 MG tablet TAKE 1 TABLET BY MOUTH ONE TIME A DAY 90 tablet 3    hydroCHLOROthiazide (MICROZIDE) 12.5 MG capsule 1 po qd 90 capsule 3    gabapentin (NEURONTIN) 300 MG capsule Take 900 mg by mouth nightly. Misc Natural Products (GLUCOSAMINE CHOND MSM FORMULA PO) Take 2 tablets by mouth daily      HYDROcodone-acetaminophen (NORCO)  MG per tablet Take 1 tablet by mouth every 4-6 hours as needed for Pain.      meloxicam (MOBIC) 15 MG tablet Take 1 tablet by mouth daily 90 tablet 3    NEEDLE, DISP, 18 G (BD DISP NEEDLES) 18G X 1-1/2\" MISC 1 Device by Does not apply route every 30 days 25 each 1    Multiple Vitamin (MULTI VITAMIN DAILY PO) Take by mouth      nitroGLYCERIN (NITROSTAT) 0.4 MG SL tablet Place 0.4 mg under the tongue      aspirin 325 MG tablet Take 325 mg by mouth daily. No current facility-administered medications on file prior to encounter. REVIEW OF SYSTEMS    Review of Systems   Constitutional:  Negative for chills and fever. Skin:  Positive for wound. Neurological:  Negative for numbness. Objective:      /66   Pulse 82   Temp 98.6 °F (37 °C)   Resp 16     Wt Readings from Last 3 Encounters:   08/16/22 249 lb (112.9 kg)   08/02/22 249 lb (112.9 kg)   06/28/22 249 lb (112.9 kg)       Physical Exam:  General:  Alert and oriented x3. In no acute distress. Lower Extremity Physical Exam:    Vascular: DP pulses are palpable, Bilateral. PT pulses are palpable, Bilateral. CFT <3 seconds to all digits, Bilateral.  3/7 PITTING edema RIGHT. Hair growth is absent to the level of the digits, Bilateral.     Neuro: Saph/sural/SP/DP/plantar sensation intact to light touch.      Musculoskeletal: EHL/FHL/GS/TA gross motor intact. Gross deformity is present, hammertoes bilateral.     Dermatologic: clean wound BILATERAL FEET AND RIGHT LEG        Wound 04/26/22 Toe (Comment  which one) Left #4 Left 2nd toe amp site (Healed 8/2/22) (Reopen 8/30/22) (Active)   Wound Image   06/07/22 1319   Wound Etiology Non-Healing Surgical 08/30/22 1255   Dressing Status Old drainage noted 08/30/22 1255   Wound Cleansed Cleansed with saline 08/30/22 1255   Offloading for Diabetic Foot Ulcers Offloading ordered;Diabetic shoes/inserts 07/19/22 1315   Wound Length (cm) 0.4 cm 08/30/22 1255   Wound Width (cm) 0.5 cm 08/30/22 1255   Wound Depth (cm) 0.3 cm 08/30/22 1255   Wound Surface Area (cm^2) 0.2 cm^2 08/30/22 1255   Change in Wound Size % (l*w) 87.18 08/30/22 1255   Wound Volume (cm^3) 0.06 cm^3 08/30/22 1255   Wound Healing % 90 08/30/22 1255   Post-Procedure Length (cm) 0.4 cm 08/30/22 1255   Post-Procedure Width (cm) 0.5 cm 08/30/22 1255   Post-Procedure Depth (cm) 0.3 cm 08/30/22 1255   Post-Procedure Surface Area (cm^2) 0.2 cm^2 08/30/22 1255   Post-Procedure Volume (cm^3) 0.06 cm^3 08/30/22 1255   Wound Assessment [place]/red;Slough 08/30/22 1255   Drainage Amount Moderate 08/30/22 1255   Drainage Description Serosanguinous 08/30/22 1255   Odor None 08/30/22 1255   Trudi-wound Assessment Dry/flaky; Intact 08/30/22 1255   Margins Defined edges 08/30/22 1255   Wound Thickness Description not for Pressure Injury Full thickness 08/30/22 1255   Number of days: 126       Wound 06/28/22 Toe (Comment  which one) Right #2 2nd Toe (Active)   Wound Image   08/16/22 1251   Wound Etiology Non-Healing Surgical 08/30/22 1255   Dressing Status Old drainage noted;New drainage noted 08/30/22 1255   Wound Cleansed Cleansed with saline 08/30/22 1255   Offloading for Diabetic Foot Ulcers Offloading ordered;Diabetic shoes/inserts 08/16/22 1251   Wound Length (cm) 0 cm 08/30/22 1255   Wound Width (cm) 0 cm 08/30/22 1255   Wound Depth (cm) 0 cm 08/30/22 1255   Wound Surface Area (cm^2) 0 cm^2 08/30/22 1255   Change in Wound Size % (l*w) 100 08/30/22 1255   Wound Volume (cm^3) 0 cm^3 08/30/22 1255   Wound Healing % 100 08/30/22 1255   Post-Procedure Length (cm) 0.2 cm 08/30/22 1255   Post-Procedure Width (cm) 0.2 cm 08/30/22 1255   Post-Procedure Depth (cm) 0.1 cm 08/30/22 1255   Post-Procedure Surface Area (cm^2) 0.04 cm^2 08/30/22 1255   Post-Procedure Volume (cm^3) 0.004 cm^3 08/30/22 1255   Wound Assessment Pink/red 08/30/22 1255   Drainage Amount Moderate 08/30/22 1255   Drainage Description Serosanguinous 08/30/22 1255   Odor None 08/30/22 1255   Trudi-wound Assessment Blanchable erythema 08/30/22 1255   Margins Defined edges 08/30/22 1255   Wound Thickness Description not for Pressure Injury Full thickness 08/30/22 1255   Number of days: 63       Wound 08/30/22 Pretibial Right;Medial #3 Pretibial (Active)   Wound Length (cm) 1.6 cm 08/30/22 1304   Wound Width (cm) 3 cm 08/30/22 1304   Wound Depth (cm) 0.1 cm 08/30/22 1304   Wound Surface Area (cm^2) 4.8 cm^2 08/30/22 1304   Wound Volume (cm^3) 0.48 cm^3 08/30/22 1304   Post-Procedure Length (cm) 1.6 cm 08/30/22 1304   Post-Procedure Width (cm) 3 cm 08/30/22 1304   Post-Procedure Depth (cm) 0.1 cm 08/30/22 1304   Post-Procedure Surface Area (cm^2) 4.8 cm^2 08/30/22 1304   Post-Procedure Volume (cm^3) 0.48 cm^3 08/30/22 1304   Wound Assessment Pink/red 08/30/22 1304   Drainage Amount Moderate 08/30/22 1304   Drainage Description Serosanguinous 08/30/22 1304   Odor None 08/30/22 1304   Trudi-wound Assessment Blanchable erythema 08/30/22 1304   Margins Defined edges 08/30/22 1304   Wound Thickness Description not for Pressure Injury Full thickness 08/30/22 1304   Number of days: 0          Procedure:  Wound Location: RIGHT LEG, BILATERAL FEET     Lidocaine gel soaked gauze was applied at beginning of wound evaluation.   The wound(s) was excisionally debrided sharply of fibrotic, necrotic, and hyperkeratotic tissue, including a layer of surrounding healthy tissue using curette. The wound was debrided down through and including subcutaneous. Percent of Wound Debrided: 100%    Total Surface Area Debrided:  (see above for sq cm)    Bleeding: Minimal    Patient tolerated procedure well and was given proper instruction. Post debridement wound description:  clean       E11.42, L97.522. L97.511, L03.9, L97.812, I89.0      Plan:    EXCISIONAL DEBRIDEMENT OF WOUND STHRU SUB Q TISSUE WITH CURRETTE - SIG IMPROVEMENT NOTED RIGHT FOOT.       KAYCEE TO LEFT FOOT  - CHANGE QOD  SILVERCELL WITH UNNA RIGHT LEG    RX JUXTALITE RIGHT - PT IS A  AND THE LEG NEEDS COMPRESSION      Gurmeet Garcia 426 1 WEEK, SEE ME IN 2 WEEKS     Treatment Note please see attached Discharge Instructions

## 2022-08-30 NOTE — PROGRESS NOTES
Compression [address] for [address]ression Stockings:     [employer]  [address] p: [phone] f: [phone]     Ordering Center:     [facility]  Nai Copeland [address] [facility]  [phone]  WOUND CARE Dept: [address] [place] [address] Avenue NUMBER [phone]    Patient Information:      Tatianapollodilip Jenkinsangelia  [address] [facility]   [phone]   : 1960  AGE: 64 y.o. GENDER: male   TODAYS DATE:  2022    Insurance:      PRIMARY INSURANCE:  Plan: BCBS OUT OF STATE  Coverage: BCBS  Effective Date: 3/15/2022  Group Number: [unfilled]  Subscriber Number: [id number] - (Joel [employer]BS)    Payer/Plan Subscr  Sex Relation Sub. Ins. ID Effective Group Num   1. [facility] 1960 Male Self [id number]* 3/15/22 [id number]                                    [id number]       Patient Information:      Diagnoses     Codes Comments   Osteomyelitis of second toe of left foot ([address]  - Primary M86.9    Diabetic polyneuropathy associated with type 2 diabetes mellitus ([address]  E11.42    Chronic ulcer of toe, left, with necrosis of bone (HCC)  L97.524    Ulcer of toe of right foot, with fat layer exposed ([address]  L97.512    Dog bite, subsequent encounter  W54. 0XXD    Right foot ulcer, limited to breakdown of skin ([address]  L97.511    Ulcer of right pretibial region, with fat layer exposed ([address]  L97.812          Wound 22 Toe (Comment  which one) Left #4 Left 2nd toe amp site (Healed 22) (Reopen 22) (Active)   Wound Image   22 1319   Wound Etiology Non-Healing Surgical 22 1255   Dressing Status Old drainage noted 22 1255   Wound Cleansed Cleansed with saline 22 1255   Offloading for Diabetic Foot Ulcers Offloading ordered;Diabetic shoes/inserts 22 1255   Wound Length (cm) 0.4 cm 22 1255   Wound Width (cm) 0.5 cm 22 1255   Wound Depth (cm) 0.3 cm 22 1255   Wound Surface Area (cm^2) 0.2 cm^2 08/30/22 1255   Change in Wound Size % (l*w) 87.18 08/30/22 1255   Wound Volume (cm^3) 0.06 cm^3 08/30/22 1255   Wound Healing % 90 08/30/22 1255   Post-Procedure Length (cm) 0.4 cm 08/30/22 1255   Post-Procedure Width (cm) 0.5 cm 08/30/22 1255   Post-Procedure Depth (cm) 0.3 cm 08/30/22 1255   Post-Procedure Surface Area (cm^2) 0.2 cm^2 08/30/22 1255   Post-Procedure Volume (cm^3) 0.06 cm^3 08/30/22 1255   Wound Assessment [place]/red;Slough 08/30/22 1255   Drainage Amount Moderate 08/30/22 1255   Drainage Description Serosanguinous 08/30/22 1255   Odor None 08/30/22 1255   Trudi-wound Assessment Dry/flaky; Intact 08/30/22 1255   Margins Defined edges 08/30/22 1255   Wound Thickness Description not for Pressure Injury Full thickness 08/30/22 1255   Number of days: 126       Wound 06/28/22 Toe (Comment  which one) Right #2 2nd Toe (Active)   Wound Image   08/16/22 1251   Wound Etiology Non-Healing Surgical 08/30/22 1255   Dressing Status Old drainage noted;New drainage noted 08/30/22 1255   Wound Cleansed Cleansed with saline 08/30/22 1255   Offloading for Diabetic Foot Ulcers Offloading ordered;Diabetic shoes/inserts 08/30/22 1255   Wound Length (cm) 0 cm 08/30/22 1255   Wound Width (cm) 0 cm 08/30/22 1255   Wound Depth (cm) 0 cm 08/30/22 1255   Wound Surface Area (cm^2) 0 cm^2 08/30/22 1255   Change in Wound Size % (l*w) 100 08/30/22 1255   Wound Volume (cm^3) 0 cm^3 08/30/22 1255   Wound Healing % 100 08/30/22 1255   Post-Procedure Length (cm) 0.2 cm 08/30/22 1255   Post-Procedure Width (cm) 0.2 cm 08/30/22 1255   Post-Procedure Depth (cm) 0.1 cm 08/30/22 1255   Post-Procedure Surface Area (cm^2) 0.04 cm^2 08/30/22 1255   Post-Procedure Volume (cm^3) 0.004 cm^3 08/30/22 1255   Wound Assessment Pink/red 08/30/22 1255   Drainage Amount Moderate 08/30/22 1255   Drainage Description Serosanguinous 08/30/22 1255   Odor None 08/30/22 1255   Trudi-wound Assessment Blanchable erythema 08/30/22 1255 Margins Defined edges 08/30/22 1255   Wound Thickness Description not for Pressure Injury Full thickness 08/30/22 1255   Number of days: 63       Wound 08/30/22 Pretibial Right #3 (Active)   Wound Etiology Venous 08/30/22 1304   Wound Cleansed Cleansed with saline 08/30/22 1304   Wound Length (cm) 1.6 cm 08/30/22 1304   Wound Width (cm) 3 cm 08/30/22 1304   Wound Depth (cm) 0.1 cm 08/30/22 1304   Wound Surface Area (cm^2) 4.8 cm^2 08/30/22 1304   Wound Volume (cm^3) 0.48 cm^3 08/30/22 1304   Post-Procedure Length (cm) 1.6 cm 08/30/22 1304   Post-Procedure Width (cm) 3 cm 08/30/22 1304   Post-Procedure Depth (cm) 0.1 cm 08/30/22 1304   Post-Procedure Surface Area (cm^2) 4.8 cm^2 08/30/22 1304   Post-Procedure Volume (cm^3) 0.48 cm^3 08/30/22 1304   Wound Assessment Pink/red 08/30/22 1304   Drainage Amount Moderate 08/30/22 1304   Drainage Description Serosanguinous 08/30/22 1304   Odor None 08/30/22 1304   Trudi-wound Assessment Blanchable erythema 08/30/22 1304   Margins Defined edges 08/30/22 1304   Wound Thickness Description not for Pressure Injury Full thickness 08/30/22 1304   Number of days: 0       Right Leg Measurements: (ALL measurements are in cm)  Right Leg Edema Point of Measurement  Great toe to forefoot: 10 cm  Heel to ankle: 10 cm  Heel to calf: 30  Leg circumference: 38 cm  Ankle circumference: 24.4 cm  Foot circumference: 24.5 cm  Compression Therapy: Compression not ordered  Floor to knee: 45 cm    Left Leg Measurements: (ALL measurements are in cm)       Supplies Requested :     Medicare Requirements  Patient must have a qualifying Active Venus Ulcer if ordering Bilateral Compression Wounds MUST be present on both legs for Medicare Coverage. The patient can Not be on home health or have had a Medicare part A stay in the past 24 hours.     Patient Wound(s) Debrided: [x] Yes if yes please add date 8/30/2022   [] No    Debribement Type: Excisional/Sharp    Patient currently being seen by Home Health: [] Yes   [x] No     Compression Type: Circaid Juxtalite, HD, 30-40 mm/Hg, RIGHT lower leg     Provider Information:      PROVIDER: Dr. Yuri Domingo DPM  NPI: [id number]

## 2022-08-30 NOTE — PROGRESS NOTES
[place] Application   Below Knee    NAME:  Toan Chavez OF BIRTH:  1960  MEDICAL RECORD NUMBER:  [id number]  DATE:  8/30/2022    [x] Applied moisturizing agent to dry skin as needed. [x] Appied primary and secondary dressing as ordered    [x] Applied Unna roll from toes to knee overlapping each time. [x] Applied ace wrap or coban from toes to below the knee. [x] Secured with tape and/or metal clips covered with tape. [x] Instructed patient/caregiver to keep dressing dry and intact. DO NOT REMOVE DRESSING. [x] Instructed pt/family/caregiver to report excessive draining, loose bandage, wet dressing, severe pain or tingling in toes. [x] Applied [place] dressing below the knee to Right lower leg(s)       Unna Boot(s) were applied per  Guidelines.      Electronically signed by Berto Payne RN on 8/30/2022 at 1:32 PM

## 2022-09-02 NOTE — DISCHARGE INSTRUCTIONS
[address] [facility],[address] -Phone: [phone] Fax: [phone]             Visit  Discharge Instructions / Physician Orders     DATE: 9/6/2022     Home Care: N/A     SUPPLIES ORDERED THRU: N/A     Wound Location: Right Medial Lower Leg     Cleanse with: saline     Dressing Orders: Right 2nd Toe:  Bandaid                                 Right Medial Lower Leg: Silvercel to wound, Zinc Unna Boot-keep dry and intact                                Left 2nd toe amp site:  dry dressing                         Frequency: Daily     Additional Orders: Increase protein to diet (meat, cheese, eggs, fish, peanut butter, nuts and beans)  Multivitamin daily  ELEVATE LEGS AS MUCH AS POSSIBLE  2/10/2022-Culture Taken  2/14/22- antibiotic at pharmacy  2/28/22-X-rays ordered. 3/21/22-A1C and Nutritional labs ordered  3/29/22- X-Ray Ordered  6/14/2022-Culture Taken  6/15/2022- X-Ray Right Toe  6/17/22- Procedure on right 2nd toe  8/30/22- Juxta Lite Ordered Right Leg     Cleared to return to work 7/8/2022     Your next appointment with [address] [place] [address] is in 1 week with Dr. Rich Crane        (Please note your next appointment above and if you are unable to keep, kindly give a 24 hour notice. Thank you.)     If you experience any of the following, please call the [address] [place] [address] during business hours:  [phone]  Your Phone call may be forwarded to [address] [facility] during business hours that [facility] [address] is closed. * Increase in Pain  * Temperature over 101  * Increase in drainage from your wound  * Drainage with a foul odor  * Bleeding  * Increase in swelling  * Need for compression bandage changes due to slippage, breakthrough drainage. If you need medical attention outside of the business hours of the [address] [place] [address] please contact your PCP or go to the nearest emergency room.      The information contained in the After Visit Summary has been reviewed with me, the patient and/or responsible adult, by my health care provider(s). I had the opportunity to ask questions regarding this information.  I have elected to receive;      []After Visit Summary  [x]Comprehensive Discharge Instruction        Patient signature______________________________________Date:________  Electronically signed by Prateek Flores RN on 9/6/2022 at 1:09 PM  Electronically signed by JESICA Encinas CNP on 9/6/2022 at 1:02 PM

## 2022-09-06 ENCOUNTER — HOSPITAL ENCOUNTER (OUTPATIENT)
Dept: WOUND CARE | Age: 62
Discharge: HOME | End: 2022-09-06
Payer: COMMERCIAL

## 2022-09-06 VITALS
BODY MASS INDEX: 33.72 KG/M2 | DIASTOLIC BLOOD PRESSURE: 106 MMHG | SYSTOLIC BLOOD PRESSURE: 136 MMHG | WEIGHT: 249 LBS | HEART RATE: 89 BPM | HEIGHT: 72 IN | RESPIRATION RATE: 18 BRPM | TEMPERATURE: 97.7 F

## 2022-09-06 DIAGNOSIS — E11.42 DIABETIC POLYNEUROPATHY ASSOCIATED WITH TYPE 2 DIABETES MELLITUS (HCC): ICD-10-CM

## 2022-09-06 DIAGNOSIS — M86.9 OSTEOMYELITIS OF SECOND TOE OF LEFT FOOT (HCC): Primary | ICD-10-CM

## 2022-09-06 DIAGNOSIS — L97.524 CHRONIC ULCER OF TOE, LEFT, WITH NECROSIS OF BONE (HCC): ICD-10-CM
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
## 2022-09-06 DIAGNOSIS — L97.512 ULCER OF TOE OF RIGHT FOOT, WITH FAT LAYER EXPOSED (HCC): ICD-10-CM

## 2022-09-06 PROCEDURE — 29580 STRAPPING UNNA BOOT: CPT

## 2022-09-06 PROCEDURE — 99212 OFFICE O/P EST SF 10 MIN: CPT

## 2022-09-06 RX ORDER — LIDOCAINE 50 MG/G
OINTMENT TOPICAL ONCE
Status: CANCELLED | OUTPATIENT
Start: 2022-09-06 | End: 2022-09-06

## 2022-09-06 RX ORDER — LIDOCAINE HYDROCHLORIDE 20 MG/ML
JELLY TOPICAL ONCE
Status: CANCELLED | OUTPATIENT
Start: 2022-09-06 | End: 2022-09-06

## 2022-09-06 RX ORDER — LIDOCAINE HYDROCHLORIDE 20 MG/ML
JELLY TOPICAL ONCE
Status: COMPLETED | OUTPATIENT
Start: 2022-09-06 | End: 2022-09-06

## 2022-09-06 RX ORDER — GINSENG 100 MG
CAPSULE ORAL ONCE
Status: CANCELLED | OUTPATIENT
Start: 2022-09-06 | End: 2022-09-06

## 2022-09-06 RX ORDER — BETAMETHASONE DIPROPIONATE 0.05 %
OINTMENT (GRAM) TOPICAL ONCE
Status: CANCELLED | OUTPATIENT
Start: 2022-09-06 | End: 2022-09-06

## 2022-09-06 RX ORDER — BACITRACIN, NEOMYCIN, POLYMYXIN B 400; 3.5; 5 [USP'U]/G; MG/G; [USP'U]/G
OINTMENT TOPICAL ONCE
Status: CANCELLED | OUTPATIENT
Start: 2022-09-06 | End: 2022-09-06

## 2022-09-06 RX ORDER — GENTAMICIN SULFATE 1 MG/G
OINTMENT TOPICAL ONCE
Status: CANCELLED | OUTPATIENT
Start: 2022-09-06 | End: 2022-09-06

## 2022-09-06 RX ORDER — CLOBETASOL PROPIONATE 0.5 MG/G
OINTMENT TOPICAL ONCE
Status: CANCELLED | OUTPATIENT
Start: 2022-09-06 | End: 2022-09-06

## 2022-09-06 RX ORDER — LIDOCAINE HYDROCHLORIDE 40 MG/ML
SOLUTION TOPICAL ONCE
Status: CANCELLED | OUTPATIENT
Start: 2022-09-06 | End: 2022-09-06

## 2022-09-06 RX ORDER — BACITRACIN ZINC AND POLYMYXIN B SULFATE 500; 1000 [USP'U]/G; [USP'U]/G
OINTMENT TOPICAL ONCE
Status: CANCELLED | OUTPATIENT
Start: 2022-09-06 | End: 2022-09-06

## 2022-09-06 RX ORDER — LIDOCAINE 40 MG/G
CREAM TOPICAL ONCE
Status: CANCELLED | OUTPATIENT
Start: 2022-09-06 | End: 2022-09-06

## 2022-09-06 RX ADMIN — LIDOCAINE HYDROCHLORIDE 6 ML: 20 JELLY TOPICAL at 12:59

## 2022-09-06 NOTE — PROGRESS NOTES
angioplasty implant and graft 9/11/2009    Sleep apnea     Dr Lance All of knee and leg 6/5/2018    Sprain of shoulder and upper arm 6/5/2018    Type II or unspecified type diabetes mellitus without mention of complication, not stated as uncontrolled     Ulcer of left foot, with fat layer exposed ([address] 1/9/2018    Unspecified sleep apnea     Dr Rachel Apodaca CPAP    Venous insufficiency of both lower extremities 1/31/2018       PAST SURGICAL HISTORY    Past Surgical History:   Procedure Laterality Date    ARTHROPLASTY Left 4/1/2022    ARTHROPLASTY 2ND LEFT TOE performed by Osbaldo Alvarenga DPM at [facility] Bilateral 6/17/2022    ARTHROPLASTY 2ND DIGIT RIGHT APPLICATION EPIFIX LEFT FOOT performed by Osbaldo Alvarenga DPM at [facility]  09/2021    stent     [address] Right 3/22/13    Dr. Darlene Johnson.     COLONOSCOPY  2020    CORONARY ARTERY BYPASS GRAFT  2/26/07    [facility]'s, Dr Marysol Mchugh (X 3)    CORONARY ARTERY BYPASS GRAFT  2006    NECK SURGERY  2010    cervical stenoses C5-C6-C7and partial T1 laminectomy with fusion of C6-7    OTHER SURGICAL HISTORY      wound care ulcers of legs (bilaterally) with Dr Johan Tran Bilateral 5/18/2022    STRAVIX LEFT FOOT, THERASKIN RIGHT FOOT performed by Osbaldo Alvarenga DPM at [address] Left 4/6/2022    TOE AMPUTATION, SECOND DIGIT performed by Osbaldo Alvarenga DPM at [facility] [facility]  age 11    VEIN SURGERY      closure of peripherator veins of legs, Dr De La Vega [place] History   Problem Relation Age of Onset    Cancer Mother         lung    Diabetes Father     Heart Disease Father     High Blood Pressure Father     Diabetes Brother     Heart Disease Brother     High Blood Pressure Brother        SOCIAL HISTORY    Social History     Tobacco Use    Smoking status: Never    Smokeless tobacco: Never   Vaping Use    Vaping Use: Never used   Substance Use Topics    Alcohol use: Not Currently     Comment: rare social, non past 5 months    Drug use: No       ALLERGIES    Allergies   Allergen Reactions    Pcn [Penicillins] Other (See Comments)     Unknown rx    Penicillin G Rash       MEDICATIONS    Current Outpatient Medications on File Prior to Encounter   Medication Sig Dispense Refill    phentermine (ADIPEX-P) 37.5 MG tablet Take 1 tablet by mouth every morning (before breakfast) for 30 days. 30 tablet 0    testosterone cypionate (DEPOTESTOTERONE CYPIONATE) 200 MG/ML injection Inject 1 mL into the muscle every 30 days for 30 days. Dispense with 3 cc syringe 18g needle and 22 1/2 g needle each month 1 mL 5    tiZANidine (ZANAFLEX) 2 MG tablet Take 1 tablet by mouth 3 times daily Patient to take 1 tablet in the morning and 2 at bedtime. 90 tablet 0    omeprazole (PRILOSEC) 20 MG delayed release capsule TAKE 1 CAPSULE DAILY 90 capsule 1    atorvastatin (LIPITOR) 40 MG tablet Take 1 tablet by mouth in the morning. 90 tablet 3    topiramate (TOPAMAX) 200 MG tablet Take 1 tablet by mouth in the morning and 1 tablet before bedtime. 60 tablet 5    HYDROcodone Bitartrate ER 15 MG CP12 Take 15 mg by mouth in the morning and at bedtime.       metFORMIN (GLUCOPHAGE) 1000 MG tablet Take 1 tablet by mouth 2 times daily (with meals) 60 tablet 3    tiZANidine (ZANAFLEX) 4 MG tablet 1 po qd 14 tablet 3    clopidogrel (PLAVIX) 75 MG tablet Take 1 tablet by mouth daily 90 tablet 3    carvedilol (COREG) 12.5 MG tablet Take 1 tablet by mouth 2 times daily (with meals) 180 tablet 3    dapagliflozin (FARXIGA) 10 MG tablet Take 1 tablet by mouth every morning 90 tablet 3    traZODone (DESYREL) 150 MG tablet TAKE ONE TABLET BY MOUTH EVERY EVENING 90 tablet 3    linagliptin (TRADJENTA) 5 MG tablet TAKE 1 TABLET BY MOUTH ONE TIME A DAY 90 tablet 3    hydroCHLOROthiazide (MICROZIDE) 12.5 MG capsule 1 po qd 90 capsule 3    gabapentin (NEURONTIN) 300 MG capsule Take 900 no clubbing or edema   Musculoskeletal: no joint swelling, deformity or tenderness  Neurologic: gait, coordination normal and speech normal      Assessment:     Problem List Items Addressed This Visit          Endocrine    Diabetic polyneuropathy associated with type 2 diabetes mellitus ([address]    Relevant Orders    Initiate Outpatient Wound Care Protocol       Other    Dog bite    Relevant Orders    Initiate Outpatient Wound Care Protocol    Chronic ulcer of toe, left, with necrosis of bone ([address]    Relevant Orders    Initiate Outpatient Wound Care Protocol    Ulcer of toe of right foot, with fat layer exposed ([address]    Relevant Orders    Initiate Outpatient Wound Care Protocol    Osteomyelitis of second toe of left foot ([address] - Primary    Relevant Orders    Initiate Outpatient Wound Care Protocol        Procedure Note  Indications:  Based on my examination of this patient's wound(s)/ulcer(s) today, debridement is not required to promote healing and evaluate the wound base.         Wound 04/26/22 Toe (Comment  which one) Left #4 Left 2nd toe amp site (Healed 8/2/22) (Reopen 8/30/22) (Active)   Wound Image   06/07/22 1319   Wound Etiology Non-Healing Surgical 08/30/22 1255   Dressing Status Old drainage noted 08/30/22 1255   Wound Cleansed Cleansed with saline 08/30/22 1255   Offloading for Diabetic Foot Ulcers Offloading ordered;Diabetic shoes/inserts 08/30/22 1255   Wound Length (cm) 0 cm 09/06/22 1300   Wound Width (cm) 0 cm 09/06/22 1300   Wound Depth (cm) 0 cm 09/06/22 1300   Wound Surface Area (cm^2) 0 cm^2 09/06/22 1300   Change in Wound Size % (l*w) 100 09/06/22 1300   Wound Volume (cm^3) 0 cm^3 09/06/22 1300   Wound Healing % 100 09/06/22 1300   Post-Procedure Length (cm) 0 cm 09/06/22 1300   Post-Procedure Width (cm) 0 cm 09/06/22 1300   Post-Procedure Depth (cm) 0 cm 09/06/22 1300   Post-Procedure Surface Area (cm^2) 0 cm^2 09/06/22 1300   Post-Procedure Volume (cm^3) 0 cm^3 09/06/22 1300   Wound Assessment Pink/red;Slough 08/30/22 1255   Drainage Amount Moderate 08/30/22 1255   Drainage Description Serosanguinous 08/30/22 1255   Odor None 08/30/22 1255   Trudi-wound Assessment Dry/flaky; Intact 08/30/22 1255   Margins Defined edges 08/30/22 1255   Wound Thickness Description not for Pressure Injury Full thickness 08/30/22 1255   Number of days: 133       Wound 06/28/22 Toe (Comment  which one) Right #2 2nd Toe (Active)   Wound Image   08/16/22 1251   Wound Etiology Non-Healing Surgical 08/30/22 1255   Dressing Status Old drainage noted;New drainage noted 08/30/22 1255   Wound Cleansed Cleansed with saline 08/30/22 1255   Offloading for Diabetic Foot Ulcers Offloading ordered;Diabetic shoes/inserts 08/30/22 1255   Wound Length (cm) 0 cm 09/06/22 1300   Wound Width (cm) 0 cm 09/06/22 1300   Wound Depth (cm) 0 cm 09/06/22 1300   Wound Surface Area (cm^2) 0 cm^2 09/06/22 1300   Change in Wound Size % (l*w) 100 09/06/22 1300   Wound Volume (cm^3) 0 cm^3 09/06/22 1300   Wound Healing % 100 09/06/22 1300   Post-Procedure Length (cm) 0 cm 09/06/22 1300   Post-Procedure Width (cm) 0 cm 09/06/22 1300   Post-Procedure Depth (cm) 0 cm 09/06/22 1300   Post-Procedure Surface Area (cm^2) 0 cm^2 09/06/22 1300   Post-Procedure Volume (cm^3) 0 cm^3 09/06/22 1300   Wound Assessment Pink/red 08/30/22 1255   Drainage Amount Moderate 08/30/22 1255   Drainage Description Serosanguinous 08/30/22 1255   Odor None 08/30/22 1255   Trudi-wound Assessment Blanchable erythema 08/30/22 1255   Margins Defined edges 08/30/22 1255   Wound Thickness Description not for Pressure Injury Full thickness 08/30/22 1255   Number of days: 70       Wound 08/30/22 Pretibial Right #3 (Active)   Wound Etiology Venous 09/06/22 1300   Dressing Status New drainage noted; Old drainage noted 09/06/22 1300   Wound Cleansed Cleansed with saline 09/06/22 1300   Wound Length (cm) 0.8 cm 09/06/22 1300   Wound Width (cm) 1.2 cm 09/06/22 1300   Wound Depth (cm) 0.1 cm 09/06/22 1300   Wound Surface Area (cm^2) 0.96 cm^2 09/06/22 1300   Change in Wound Size % (l*w) 80 09/06/22 1300   Wound Volume (cm^3) 0.096 cm^3 09/06/22 1300   Wound Healing % 80 09/06/22 1300   Post-Procedure Length (cm) 0.8 cm 09/06/22 1300   Post-Procedure Width (cm) 1.2 cm 09/06/22 1300   Post-Procedure Depth (cm) 0.1 cm 09/06/22 1300   Post-Procedure Surface Area (cm^2) 0.96 cm^2 09/06/22 1300   Post-Procedure Volume (cm^3) 0.096 cm^3 09/06/22 1300   Wound Assessment Pink/red 09/06/22 1300   Drainage Amount Moderate 09/06/22 1300   Drainage Description Serosanguinous 09/06/22 1300   Odor None 09/06/22 1300   Trudi-wound Assessment Blanchable erythema 09/06/22 1300   Margins Defined edges 09/06/22 1300   Wound Thickness Description not for Pressure Injury Full thickness 09/06/22 1300   Number of days: 7                     Plan:     -cont unna boot this week Dr Celia Whitten.    -Evelena Gent right heel while driving to prevent pressure injury    -RTC one week    -See Discharge Instructions        Written patient dismissal instructions given to patient and signed by patient or POA.            Electronically signed by JESICA Angulo CNP on 9/6/2022 at 1:13 PM

## 2022-09-09 NOTE — DISCHARGE INSTRUCTIONS
[address] [facility],[address] -Phone: [phone] Fax: [phone]             Visit  Discharge Instructions / Physician Orders     DATE: 9/13/2022     Home Care: N/A     SUPPLIES ORDERED THRU: N/A     Wound Location: Right Medial Lower Leg     Cleanse with: saline     Dressing Orders: Right 2nd Toe:  Normal-saline moistened tiesha, dry dressing                                 Right Medial Lower Leg: [place] to wounds x2, Zinc Unna Boot-keep dry and intact                                               Frequency: Daily. Keep [employer] Dry and Intact     Additional Orders: Increase protein to diet (meat, cheese, eggs, fish, peanut butter, nuts and beans)  Multivitamin daily  ELEVATE LEGS AS MUCH AS POSSIBLE  2/10/2022-Culture Taken  2/14/22- antibiotic at pharmacy  2/28/22-X-rays ordered. 3/21/22-A1C and Nutritional labs ordered  3/29/22- X-Ray Ordered  6/14/2022-Culture Taken  6/15/2022- X-Ray Right Toe  6/17/22- Procedure on right 2nd toe  8/30/22- Juxta Lite Ordered Right Leg     Cleared to return to work 7/8/2022     Your next appointment with [address] [place] [address] is in 1 week with Dr. Ian Avalos        (Please note your next appointment above and if you are unable to keep, kindly give a 24 hour notice. Thank you.)     If you experience any of the following, please call the [address] [place] [address] during business hours:  [phone]  Your Phone call may be forwarded to [address] during business hours that [phone] is closed. * Increase in Pain  * Temperature over 101  * Increase in drainage from your wound  * Drainage with a foul odor  * Bleeding  * Increase in swelling  * Need for compression bandage changes due to slippage, breakthrough drainage. If you need medical attention outside of the business hours of the [address] [place] [address] please contact your PCP or go to the nearest emergency room.      The information contained in the After Visit Summary has been reviewed with me, the patient and/or responsible adult, by my health care provider(s). I had the opportunity to ask questions regarding this information.  I have elected to receive;      []After Visit Summary  [x]Comprehensive Discharge Instruction        Patient signature______________________________________Date:________  Electronically signed by Bowen Maguire RN on 9/13/2022 at 1:13 PM   Electronically signed by Shira Schwartz DPM on 9/13/2022 at 12:50 PM

## 2022-09-13 ENCOUNTER — HOSPITAL ENCOUNTER (OUTPATIENT)
Dept: WOUND CARE | Age: 62
Discharge: HOME | End: 2022-09-13
Payer: COMMERCIAL

## 2022-09-13 VITALS
DIASTOLIC BLOOD PRESSURE: 76 MMHG | WEIGHT: 249 LBS | HEART RATE: 83 BPM | BODY MASS INDEX: 33.72 KG/M2 | HEIGHT: 72 IN | SYSTOLIC BLOOD PRESSURE: 143 MMHG | RESPIRATION RATE: 16 BRPM | TEMPERATURE: 97.9 F

## 2022-09-13 DIAGNOSIS — L97.524 CHRONIC ULCER OF TOE, LEFT, WITH NECROSIS OF BONE (HCC): ICD-10-CM

## 2022-09-13 DIAGNOSIS — M86.9 OSTEOMYELITIS OF SECOND TOE OF LEFT FOOT (HCC): Primary | ICD-10-CM

## 2022-09-13 DIAGNOSIS — L97.512 ULCER OF TOE OF RIGHT FOOT, WITH FAT LAYER EXPOSED (HCC): ICD-10-CM

## 2022-09-13 DIAGNOSIS — E11.42 DIABETIC POLYNEUROPATHY ASSOCIATED WITH TYPE 2 DIABETES MELLITUS (HCC): ICD-10-CM
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
## 2022-09-13 PROCEDURE — 11042 DBRDMT SUBQ TIS 1ST 20SQCM/<: CPT

## 2022-09-13 RX ORDER — BACITRACIN, NEOMYCIN, POLYMYXIN B 400; 3.5; 5 [USP'U]/G; MG/G; [USP'U]/G
OINTMENT TOPICAL ONCE
Status: CANCELLED | OUTPATIENT
Start: 2022-09-13 | End: 2022-09-13

## 2022-09-13 RX ORDER — BETAMETHASONE DIPROPIONATE 0.05 %
OINTMENT (GRAM) TOPICAL ONCE
Status: CANCELLED | OUTPATIENT
Start: 2022-09-13 | End: 2022-09-13

## 2022-09-13 RX ORDER — LIDOCAINE 50 MG/G
OINTMENT TOPICAL ONCE
Status: CANCELLED | OUTPATIENT
Start: 2022-09-13 | End: 2022-09-13

## 2022-09-13 RX ORDER — LIDOCAINE HYDROCHLORIDE 20 MG/ML
JELLY TOPICAL ONCE
Status: COMPLETED | OUTPATIENT
Start: 2022-09-13 | End: 2022-09-13

## 2022-09-13 RX ORDER — LIDOCAINE HYDROCHLORIDE 40 MG/ML
SOLUTION TOPICAL ONCE
Status: CANCELLED | OUTPATIENT
Start: 2022-09-13 | End: 2022-09-13

## 2022-09-13 RX ORDER — LIDOCAINE HYDROCHLORIDE 20 MG/ML
JELLY TOPICAL ONCE
Status: CANCELLED | OUTPATIENT
Start: 2022-09-13 | End: 2022-09-13

## 2022-09-13 RX ORDER — LIDOCAINE 40 MG/G
CREAM TOPICAL ONCE
Status: CANCELLED | OUTPATIENT
Start: 2022-09-13 | End: 2022-09-13

## 2022-09-13 RX ORDER — BACITRACIN ZINC AND POLYMYXIN B SULFATE 500; 1000 [USP'U]/G; [USP'U]/G
OINTMENT TOPICAL ONCE
Status: CANCELLED | OUTPATIENT
Start: 2022-09-13 | End: 2022-09-13

## 2022-09-13 RX ORDER — CLOBETASOL PROPIONATE 0.5 MG/G
OINTMENT TOPICAL ONCE
Status: CANCELLED | OUTPATIENT
Start: 2022-09-13 | End: 2022-09-13

## 2022-09-13 RX ORDER — GINSENG 100 MG
CAPSULE ORAL ONCE
Status: CANCELLED | OUTPATIENT
Start: 2022-09-13 | End: 2022-09-13

## 2022-09-13 RX ORDER — GENTAMICIN SULFATE 1 MG/G
OINTMENT TOPICAL ONCE
Status: CANCELLED | OUTPATIENT
Start: 2022-09-13 | End: 2022-09-13

## 2022-09-13 RX ORDER — DOXYCYCLINE HYCLATE 100 MG
100 TABLET ORAL 2 TIMES DAILY
Qty: 20 TABLET | Refills: 0 | Status: SHIPPED | OUTPATIENT
Start: 2022-09-13 | End: 2022-09-23

## 2022-09-13 RX ADMIN — LIDOCAINE HYDROCHLORIDE 6 ML: 20 JELLY TOPICAL at 13:02

## 2022-09-13 ASSESSMENT — PAIN SCALES - GENERAL: PAINLEVEL_OUTOF10: 0

## 2022-09-13 NOTE — PROGRESS NOTES
[employer] Application   Below Knee    NAME:  Brinaa Pretty OF BIRTH:  1960  MEDICAL RECORD NUMBER:  [id number]  DATE:  9/13/2022    [x] Applied moisturizing agent to dry skin as needed. [x] Appied primary and secondary dressing as ordered    [x] Applied Unna roll from toes to knee overlapping each time. [x] Applied ace wrap or coban from toes to below the knee. [x] Secured with tape and/or metal clips covered with tape. [x] Instructed patient/caregiver to keep dressing dry and intact. DO NOT REMOVE DRESSING. [x] Instructed pt/family/caregiver to report excessive draining, loose bandage, wet dressing, severe pain or tingling in toes. [x] Applied [place] dressing below the knee to Bilateral lower leg(s)       Unna Boot(s) were applied per  Guidelines.      Electronically signed by Brandon Reyna RN on 9/13/2022 at 1:31 PM

## 2022-09-13 NOTE — PROGRESS NOTES
[address]   Progress Note and Procedure Note      Ty Waggoner  MEDICAL RECORD NUMBER:  [id number]  AGE: 64 y.o. GENDER: male  : 1960  EPISODE DATE:  2022    Subjective:     Chief Complaint   Patient presents with    Wound Check     Right leg         HISTORY of PRESENT ILLNESS HPI     Ty Waggoner is a 64 y.o. male who presents today for wound/ulcer evaluation. S/P    LEFT stravix graft , EPIFIX , 2nd right S/P ARTHROPLASTY 22 . PT IS BTW. HAS EXTRA DEPTH SHOES (CANDIE). KAYCEE/SSD RIGHT QOD. Maintained unna boot intact right - \"banged the right leg with unna boot on\"    Ulcer Identification:  Ulcer Type: diabetic  Contributing Factors: diabetes and chronic pressure       22 1204    Specimen Description . TOE    Special Requests . TOE   SECOND LEFT DIGIT    Direct Exam RARE NEUTROPHILS Abnormal     Direct Exam FEW GRAM POSITIVE COCCI IN PAIRS Abnormal     Culture STREPTOCOCCI, BETA HEMOLYTIC GROUP B MODERATE GROWTH Abnormal     Culture  Abnormal   STAPHYLOCOCCUS AUREUS MODERATE GROWTH This isolate is methicillin susceptible. For susceptibility, refer to previous culture. Culture No anaerobic organisms isolated at 5 days.                 PAST MEDICAL HISTORY        Diagnosis Date    Acquired trigger finger 2018    CAD (coronary artery disease)     NWOCC/ involving coronary bypass graft of native heart with unstable angina pectoris    Cervical spondylosis     CHF (congestive heart failure) ([facility])     Chronic back pain     on 18 pt states is presently in pain mgmnt    Contusion of elbow 2018    Contusion of knee 2018    Contusion of shoulder region 2018    Diabetic foot ulcer with osteomyelitis ([address] 2018    Diabetic ulcer of toe associated with type 2 diabetes mellitus, with fat layer exposed ([address] 2018    Hypercholesterolemia 2017    Hyperlipemia, mixed     Hypertension     Ischemic cardiomyopathy 2018    Non-pressure chronic ulcer of left lower leg with fat layer exposed ([address] 6/9/2014    Obesity     Obesity, Class III, BMI 40-49.9 (morbid obesity) ([address] 4/6/2015    Obstructive sleep apnea syndrome     Osteoarthritis of carpometacarpal (CMC) joint of thumb 6/5/2018    Osteoarthritis of knee 6/5/2018    Peripheral vascular disease ([address]     with venous stasis and ulcerations. Dr Cesar Munoz    Presence of coronary angioplasty implant and graft 9/11/2009    Sleep apnea     Dr Franklyn Luther of knee and leg 6/5/2018    Sprain of shoulder and upper arm 6/5/2018    Type II or unspecified type diabetes mellitus without mention of complication, not stated as uncontrolled     Ulcer of left foot, with fat layer exposed ([address] 1/9/2018    Unspecified sleep apnea     Dr Satya Walker CPAP    Venous insufficiency of both lower extremities 1/31/2018       PAST SURGICAL HISTORY    Past Surgical History:   Procedure Laterality Date    ARTHROPLASTY Left 4/1/2022    ARTHROPLASTY 2ND LEFT TOE performed by Tony Bailey DPM at [facility] Bilateral 6/17/2022    ARTHROPLASTY 2ND DIGIT RIGHT APPLICATION EPIFIX LEFT FOOT performed by Tony Bailey DPM at [facility]  09/2021    stent     [address] Right 3/22/13    Dr. Jasiel Espinosa.     COLONOSCOPY  2020    CORONARY ARTERY BYPASS GRAFT  2/26/07    [facility], Dr Sheldon Figueroa (X 3)    CORONARY ARTERY BYPASS GRAFT  2006    NECK SURGERY  2010    cervical stenoses C5-C6-C7and partial T1 laminectomy with fusion of C6-7    OTHER SURGICAL HISTORY      wound care ulcers of legs (bilaterally) with Dr Sebastian Montana Bilateral 5/18/2022    STRAVIX LEFT FOOT, THERASKIN RIGHT FOOT performed by Tony Bailey DPM at [facility] Left 4/6/2022    TOE AMPUTATION, SECOND DIGIT performed by Tony Bailey DPM at [place] [address]  age 11    VEIN SURGERY      closure of peripherator veins of legs, Dr Lisseth Romero EXTRACTION         FAMILY HISTORY    Family History   Problem Relation Age of Onset    Cancer Mother         lung    Diabetes Father     Heart Disease Father     High Blood Pressure Father     Diabetes Brother     Heart Disease Brother     High Blood Pressure Brother        SOCIAL HISTORY    Social History     Tobacco Use    Smoking status: Never    Smokeless tobacco: Never   Vaping Use    Vaping Use: Never used   Substance Use Topics    Alcohol use: Not Currently     Comment: rare social, non past 5 months    Drug use: No       ALLERGIES    Allergies   Allergen Reactions    Pcn [Penicillins] Other (See Comments)     Unknown rx    Penicillin G Rash       MEDICATIONS    Current Outpatient Medications on File Prior to Encounter   Medication Sig Dispense Refill    phentermine (ADIPEX-P) 37.5 MG tablet Take 1 tablet by mouth every morning (before breakfast) for 30 days. 30 tablet 0    testosterone cypionate (DEPOTESTOTERONE CYPIONATE) 200 MG/ML injection Inject 1 mL into the muscle every 30 days for 30 days. Dispense with 3 cc syringe 18g needle and 22 1/2 g needle each month 1 mL 5    tiZANidine (ZANAFLEX) 2 MG tablet Take 1 tablet by mouth 3 times daily Patient to take 1 tablet in the morning and 2 at bedtime. 90 tablet 0    omeprazole (PRILOSEC) 20 MG delayed release capsule TAKE 1 CAPSULE DAILY 90 capsule 1    atorvastatin (LIPITOR) 40 MG tablet Take 1 tablet by mouth in the morning. 90 tablet 3    topiramate (TOPAMAX) 200 MG tablet Take 1 tablet by mouth in the morning and 1 tablet before bedtime. 60 tablet 5    HYDROcodone Bitartrate ER 15 MG CP12 Take 15 mg by mouth in the morning and at bedtime.       metFORMIN (GLUCOPHAGE) 1000 MG tablet Take 1 tablet by mouth 2 times daily (with meals) 60 tablet 3    tiZANidine (ZANAFLEX) 4 MG tablet 1 po qd 14 tablet 3    clopidogrel (PLAVIX) 75 MG tablet Take 1 tablet by mouth daily 90 tablet 3    carvedilol (COREG) 12.5 MG tablet Take 1 tablet by mouth 2 times daily (with meals) 180 tablet 3    dapagliflozin (FARXIGA) 10 MG tablet Take 1 tablet by mouth every morning 90 tablet 3    traZODone (DESYREL) 150 MG tablet TAKE ONE TABLET BY MOUTH EVERY EVENING 90 tablet 3    linagliptin (TRADJENTA) 5 MG tablet TAKE 1 TABLET BY MOUTH ONE TIME A DAY 90 tablet 3    hydroCHLOROthiazide (MICROZIDE) 12.5 MG capsule 1 po qd 90 capsule 3    gabapentin (NEURONTIN) 300 MG capsule Take 900 mg by mouth nightly. Misc Natural Products (GLUCOSAMINE CHOND MSM FORMULA PO) Take 2 tablets by mouth daily      HYDROcodone-acetaminophen (NORCO)  MG per tablet Take 1 tablet by mouth every 4-6 hours as needed for Pain.      meloxicam (MOBIC) 15 MG tablet Take 1 tablet by mouth daily 90 tablet 3    NEEDLE, DISP, 18 G (BD DISP NEEDLES) 18G X 1-1/2\" MISC 1 Device by Does not apply route every 30 days 25 each 1    Multiple Vitamin (MULTI VITAMIN DAILY PO) Take by mouth      nitroGLYCERIN (NITROSTAT) 0.4 MG SL tablet Place 0.4 mg under the tongue      aspirin 325 MG tablet Take 325 mg by mouth daily. No current facility-administered medications on file prior to encounter. REVIEW OF SYSTEMS    Review of Systems   Constitutional:  Negative for chills and fever. Skin:  Positive for wound. Neurological:  Negative for numbness. Objective:      BP (!) 143/76   Pulse 83   Temp 97.9 °F (36.6 °C) (Tympanic)   Resp 16   Ht 6' (1.829 m)   Wt 249 lb (112.9 kg)   BMI 33.77 kg/m²     Wt Readings from Last 3 Encounters:   09/13/22 249 lb (112.9 kg)   09/06/22 249 lb (112.9 kg)   08/16/22 249 lb (112.9 kg)       Physical Exam:  General:  Alert and oriented x3. In no acute distress. Lower Extremity Physical Exam:    Vascular: DP pulses are palpable, Bilateral. PT pulses are palpable, Bilateral. CFT <3 seconds to all digits, Bilateral.  3/7 PITTING edema RIGHT. Hair growth is absent to the level of the digits, Bilateral.     Neuro: Saph/sural/SP/DP/plantar sensation intact to light touch. Musculoskeletal: EHL/FHL/GS/TA gross motor intact. Gross deformity is present, hammertoes bilateral.     Dermatologic: clean wound RIGHT FOOT AND RIGHT LEG      Wound 04/26/22 Toe (Comment  which one) Left #4 Left 2nd toe amp site (Healed 8/2/22) (Reopen 8/30/22) (Active)   Wound Image   06/07/22 1319   Wound Etiology Non-Healing Surgical 08/30/22 1255   Dressing Status Old drainage noted 08/30/22 1255   Wound Cleansed Cleansed with saline 08/30/22 1255   Offloading for Diabetic Foot Ulcers Offloading ordered;Diabetic shoes/inserts 08/30/22 1255   Wound Length (cm) 0 cm 09/13/22 1256   Wound Width (cm) 0 cm 09/13/22 1256   Wound Depth (cm) 0 cm 09/13/22 1256   Wound Surface Area (cm^2) 0 cm^2 09/13/22 1256   Change in Wound Size % (l*w) 100 09/13/22 1256   Wound Volume (cm^3) 0 cm^3 09/13/22 1256   Wound Healing % 100 09/13/22 1256   Post-Procedure Length (cm) 0 cm 09/13/22 1256   Post-Procedure Width (cm) 0 cm 09/13/22 1256   Post-Procedure Depth (cm) 0 cm 09/13/22 1256   Post-Procedure Surface Area (cm^2) 0 cm^2 09/13/22 1256   Post-Procedure Volume (cm^3) 0 cm^3 09/13/22 1256   Wound Assessment [place]/red;Slough 08/30/22 1255   Drainage Amount Moderate 08/30/22 1255   Drainage Description Serosanguinous 08/30/22 1255   Odor None 08/30/22 1255   Trudi-wound Assessment Dry/flaky; Intact 08/30/22 1255   Margins Defined edges 08/30/22 1255   Wound Thickness Description not for Pressure Injury Full thickness 08/30/22 1255   Number of days: 140       Wound 06/28/22 Toe (Comment  which one) Right #2 2nd Toe (Active)   Wound Image   08/16/22 1251   Wound Etiology Non-Healing Surgical 08/30/22 1255   Dressing Status Old drainage noted;New drainage noted 09/13/22 1256   Wound Cleansed Soap and water 09/13/22 1256   Offloading for Diabetic Foot Ulcers Offloading ordered;Diabetic shoes/inserts 08/30/22 1255   Wound Length (cm) 0.8 cm 09/13/22 1256   Wound Width (cm) 0.7 cm 09/13/22 1256   Wound Depth (cm) 0.1 cm 09/13/22 1256   Wound Surface Area (cm^2) 0.56 cm^2 09/13/22 1256   Change in Wound Size % (l*w) 44 09/13/22 1256   Wound Volume (cm^3) 0.056 cm^3 09/13/22 1256   Wound Healing % 81 09/13/22 1256   Post-Procedure Length (cm) 1 cm 09/13/22 1256   Post-Procedure Width (cm) 1 cm 09/13/22 1256   Post-Procedure Depth (cm) 0.3 cm 09/13/22 1256   Post-Procedure Surface Area (cm^2) 1 cm^2 09/13/22 1256   Post-Procedure Volume (cm^3) 0.3 cm^3 09/13/22 1256   Wound Assessment Pink/red 09/13/22 1256   Drainage Amount Moderate 09/13/22 1256   Drainage Description Serosanguinous 09/13/22 1256   Odor None 09/13/22 1256   Trudi-wound Assessment Blanchable erythema 09/13/22 1256   Margins Defined edges 09/13/22 1256   Wound Thickness Description not for Pressure Injury Full thickness 08/30/22 1255   Number of days: 77       Wound 08/30/22 Leg Right; Lower;Medial #3 (Active)   Wound Etiology Venous 09/13/22 1256   Dressing Status New drainage noted; Old drainage noted 09/13/22 1256   Wound Cleansed Soap and water 09/13/22 1256   Wound Length (cm) 0.4 cm 09/13/22 1256   Wound Width (cm) 0.5 cm 09/13/22 1256   Wound Depth (cm) 0.2 cm 09/13/22 1256   Wound Surface Area (cm^2) 0.2 cm^2 09/13/22 1256   Change in Wound Size % (l*w) 95.83 09/13/22 1256   Wound Volume (cm^3) 0.04 cm^3 09/13/22 1256   Wound Healing % 92 09/13/22 1256   Post-Procedure Length (cm) 0.7 cm 09/13/22 1256   Post-Procedure Width (cm) 1 cm 09/13/22 1256   Post-Procedure Depth (cm) 0.3 cm 09/13/22 1256   Post-Procedure Surface Area (cm^2) 0.7 cm^2 09/13/22 1256   Post-Procedure Volume (cm^3) 0.21 cm^3 09/13/22 1256   Wound Assessment Pink/red 09/13/22 1256   Drainage Amount Moderate 09/13/22 1256   Drainage Description Serosanguinous 09/13/22 1256   Odor None 09/13/22 1256   Trudi-wound Assessment Blanchable erythema 09/13/22 1256   Margins Defined edges 09/13/22 1256   Wound Thickness Description not for Pressure Injury Full thickness 09/13/22 1256   Number of days: 14 Wound 09/13/22 Leg Right; Lower; Lateral #5 (Active)   Wound Image   09/13/22 1256   Dressing Status Old drainage noted;New drainage noted 09/13/22 1256   Wound Cleansed Soap and water 09/13/22 1256   Post-Procedure Length (cm) 1.2 cm 09/13/22 1256   Post-Procedure Width (cm) 1.2 cm 09/13/22 1256   Post-Procedure Depth (cm) 0.3 cm 09/13/22 1256   Post-Procedure Surface Area (cm^2) 1.44 cm^2 09/13/22 1256   Post-Procedure Volume (cm^3) 0.432 cm^3 09/13/22 1256   Wound Assessment Pink/red 09/13/22 1256   Drainage Amount Moderate 09/13/22 1256   Drainage Description Serosanguinous 09/13/22 1256   Odor None 09/13/22 1256   Trudi-wound Assessment Blanchable erythema 09/13/22 1256   Margins Defined edges 09/13/22 1256   Number of days: 0              Procedure:  Wound Location: RIGHT LEG, RIGHT FOOT    Lidocaine gel soaked gauze was applied at beginning of wound evaluation. The wound(s) was excisionally debrided sharply of fibrotic, necrotic, and hyperkeratotic tissue, including a layer of surrounding healthy tissue using curette. The wound was debrided down through and including subcutaneous. Percent of Wound Debrided: 100%    Total Surface Area Debrided:  (see above for sq cm)    Bleeding: Minimal    Patient tolerated procedure well and was given proper instruction. Post debridement wound description:  clean       E11.42, . L97.512, L03.9, L97.812, I89.0      Plan:    EXCISIONAL DEBRIDEMENT OF WOUNDS THRU SUB Q TISSUE WITH CURRETTE - SIG WORSENING NOTED RIGHT FOOT WITH SURROUNDING ERYTHEMA.       KAYCEE TO RIGHT FOOT  - CHANGE QOD  SILVERCELL WITH UNNA RIGHT LEG    RX DOXY    PT TO GET HEEL PROTECTORS TO OFF LOAD HEELS - PT HAS PAIN WHILE IN BED    [address] 1 WEEK    Treatment Note please see attached Discharge Instructions

## 2022-09-20 NOTE — DISCHARGE INSTRUCTIONS
Aly Ashley -Phone: [phone] Fax: [phone]             Visit  Discharge Instructions / Physician Orders     DATE: 9/22/2022     Home Care: N/A     SUPPLIES ORDERED THRU: N/A     Wound Location: Right Medial Lower Leg     Cleanse with: saline     Dressing Orders: Right 2nd Toe:  Normal-saline moistened tiesha, dry dressing                                 Right Medial Lower Leg: [place] to wounds, Zinc Unna Boot-keep dry and intact                                               Frequency: Daily. Keep [place] Dry and Intact     Additional Orders: Increase protein to diet (meat, cheese, eggs, fish, peanut butter, nuts and beans)  Multivitamin daily  ELEVATE LEGS AS MUCH AS POSSIBLE  2/10/2022-Culture Taken  2/14/22- antibiotic at pharmacy  2/28/22-X-rays ordered. 3/21/22-A1C and Nutritional labs ordered  3/29/22- X-Ray Ordered  6/14/2022-Culture Taken  6/15/2022- X-Ray Right Toe  6/17/22- Procedure on right 2nd toe  8/30/22- Juxta Lite Ordered Right Leg     Cleared to return to work 7/8/2022     Your next appointment with [address] [place] [address] [facility] is in 1 week with Dr. Sae Torres        (Please note your next appointment above and if you are unable to keep, kindly give a 24 hour notice. Thank you.)     If you experience any of the following, please call the [address] [place] [address] during business hours:  [phone]  Your Phone call may be forwarded to [address] during business hours that [phone] is closed. * Increase in Pain  * Temperature over 101  * Increase in drainage from your wound  * Drainage with a foul odor  * Bleeding  * Increase in swelling  * Need for compression bandage changes due to slippage, breakthrough drainage. If you need medical attention outside of the business hours of the [address] [place] [address] please contact your PCP or go to the nearest emergency room.      The information contained in the After Visit Summary has been reviewed with me, the patient

## 2022-09-22 ENCOUNTER — HOSPITAL ENCOUNTER (OUTPATIENT)
Dept: WOUND CARE | Age: 62
Discharge: HOME | End: 2022-09-22
Payer: COMMERCIAL

## 2022-09-22 VITALS
DIASTOLIC BLOOD PRESSURE: 81 MMHG | SYSTOLIC BLOOD PRESSURE: 157 MMHG | HEIGHT: 72 IN | HEART RATE: 80 BPM | TEMPERATURE: 97.4 F | RESPIRATION RATE: 16 BRPM | WEIGHT: 249 LBS | BODY MASS INDEX: 33.72 KG/M2

## 2022-09-22 DIAGNOSIS — I73.9 PERIPHERAL VASCULAR DISEASE (HCC): ICD-10-CM

## 2022-09-22 DIAGNOSIS — L97.512 ULCER OF TOE OF RIGHT FOOT, WITH FAT LAYER EXPOSED (HCC): ICD-10-CM

## 2022-09-22 DIAGNOSIS — E11.42 DIABETIC POLYNEUROPATHY ASSOCIATED WITH TYPE 2 DIABETES MELLITUS (HCC): ICD-10-CM

## 2022-09-22 DIAGNOSIS — I87.2 VENOUS INSUFFICIENCY OF BOTH LOWER EXTREMITIES: ICD-10-CM

## 2022-09-22 DIAGNOSIS — L97.812 ULCER OF RIGHT PRETIBIAL REGION, WITH FAT LAYER EXPOSED (HCC): Primary | ICD-10-CM

## 2022-09-22 PROBLEM — M86.9 OSTEOMYELITIS OF SECOND TOE OF LEFT FOOT (HCC): Status: RESOLVED | Noted: 2022-04-05 | Resolved: 2022-09-22

## 2022-09-22 PROBLEM — L97.509 DIABETIC FOOT ULCER WITH OSTEOMYELITIS (HCC): Status: RESOLVED | Noted: 2018-01-09 | Resolved: 2022-09-22

## 2022-09-22 PROBLEM — M86.9 DIABETIC FOOT ULCER WITH OSTEOMYELITIS (HCC): Status: RESOLVED | Noted: 2018-01-09 | Resolved: 2022-09-22

## 2022-09-22 PROBLEM — S61.451D: Status: RESOLVED | Noted: 2022-02-10 | Resolved: 2022-09-22

## 2022-09-22 PROBLEM — L03.032 CELLULITIS OF THIRD TOE, LEFT: Status: RESOLVED | Noted: 2017-11-06 | Resolved: 2022-09-22

## 2022-09-22 PROBLEM — S01.25XA: Status: RESOLVED | Noted: 2022-02-10 | Resolved: 2022-09-22

## 2022-09-22 PROBLEM — E11.621 DIABETIC FOOT ULCER WITH OSTEOMYELITIS (HCC): Status: RESOLVED | Noted: 2018-01-09 | Resolved: 2022-09-22

## 2022-09-22 PROBLEM — S61.452D: Status: RESOLVED | Noted: 2022-02-10 | Resolved: 2022-09-22

## 2022-09-22 PROBLEM — E11.621 DIABETIC ULCER OF TOE ASSOCIATED WITH TYPE 2 DIABETES MELLITUS, WITH FAT LAYER EXPOSED (HCC): Status: RESOLVED | Noted: 2018-01-31 | Resolved: 2022-09-22

## 2022-09-22 PROBLEM — L03.031 CELLULITIS OF SECOND TOE, RIGHT: Status: RESOLVED | Noted: 2017-11-06 | Resolved: 2022-09-22

## 2022-09-22 PROBLEM — L97.522 ULCER OF LEFT FOOT, WITH FAT LAYER EXPOSED (HCC): Status: RESOLVED | Noted: 2018-01-09 | Resolved: 2022-09-22

## 2022-09-22 PROBLEM — S01.352A: Status: RESOLVED | Noted: 2022-02-10 | Resolved: 2022-09-22

## 2022-09-22 PROBLEM — E11.69 DIABETIC FOOT ULCER WITH OSTEOMYELITIS (HCC): Status: RESOLVED | Noted: 2018-01-09 | Resolved: 2022-09-22

## 2022-09-22 PROBLEM — L97.511 RIGHT FOOT ULCER, LIMITED TO BREAKDOWN OF SKIN (HCC): Status: RESOLVED | Noted: 2022-08-30 | Resolved: 2022-09-22

## 2022-09-22 PROBLEM — L97.524: Status: RESOLVED | Noted: 2022-03-29 | Resolved: 2022-09-22

## 2022-09-22 PROBLEM — L97.502 DIABETIC ULCER OF TOE ASSOCIATED WITH TYPE 2 DIABETES MELLITUS, WITH FAT LAYER EXPOSED (HCC): Status: RESOLVED | Noted: 2018-01-31 | Resolved: 2022-09-22
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
## 2022-09-22 PROCEDURE — 11042 DBRDMT SUBQ TIS 1ST 20SQCM/<: CPT | Performed by: NURSE PRACTITIONER

## 2022-09-22 PROCEDURE — 11042 DBRDMT SUBQ TIS 1ST 20SQCM/<: CPT

## 2022-09-22 RX ORDER — LIDOCAINE HYDROCHLORIDE 40 MG/ML
SOLUTION TOPICAL ONCE
Status: CANCELLED | OUTPATIENT
Start: 2022-09-22 | End: 2022-09-22

## 2022-09-22 RX ORDER — CLOBETASOL PROPIONATE 0.5 MG/G
OINTMENT TOPICAL ONCE
Status: CANCELLED | OUTPATIENT
Start: 2022-09-22 | End: 2022-09-22

## 2022-09-22 RX ORDER — BACITRACIN ZINC AND POLYMYXIN B SULFATE 500; 1000 [USP'U]/G; [USP'U]/G
OINTMENT TOPICAL ONCE
Status: CANCELLED | OUTPATIENT
Start: 2022-09-22 | End: 2022-09-22

## 2022-09-22 RX ORDER — LIDOCAINE HYDROCHLORIDE 20 MG/ML
JELLY TOPICAL ONCE
Status: CANCELLED | OUTPATIENT
Start: 2022-09-22 | End: 2022-09-22

## 2022-09-22 RX ORDER — LIDOCAINE HYDROCHLORIDE 20 MG/ML
JELLY TOPICAL ONCE
Status: COMPLETED | OUTPATIENT
Start: 2022-09-22 | End: 2022-09-22

## 2022-09-22 RX ORDER — BACITRACIN, NEOMYCIN, POLYMYXIN B 400; 3.5; 5 [USP'U]/G; MG/G; [USP'U]/G
OINTMENT TOPICAL ONCE
Status: CANCELLED | OUTPATIENT
Start: 2022-09-22 | End: 2022-09-22

## 2022-09-22 RX ORDER — BETAMETHASONE DIPROPIONATE 0.05 %
OINTMENT (GRAM) TOPICAL ONCE
Status: CANCELLED | OUTPATIENT
Start: 2022-09-22 | End: 2022-09-22

## 2022-09-22 RX ORDER — GINSENG 100 MG
CAPSULE ORAL ONCE
Status: CANCELLED | OUTPATIENT
Start: 2022-09-22 | End: 2022-09-22

## 2022-09-22 RX ORDER — GENTAMICIN SULFATE 1 MG/G
OINTMENT TOPICAL ONCE
Status: CANCELLED | OUTPATIENT
Start: 2022-09-22 | End: 2022-09-22

## 2022-09-22 RX ORDER — LIDOCAINE 50 MG/G
OINTMENT TOPICAL ONCE
Status: CANCELLED | OUTPATIENT
Start: 2022-09-22 | End: 2022-09-22

## 2022-09-22 RX ORDER — LIDOCAINE 40 MG/G
CREAM TOPICAL ONCE
Status: CANCELLED | OUTPATIENT
Start: 2022-09-22 | End: 2022-09-22

## 2022-09-22 RX ADMIN — LIDOCAINE HYDROCHLORIDE 6 ML: 20 JELLY TOPICAL at 08:42

## 2022-09-22 ASSESSMENT — ENCOUNTER SYMPTOMS
ABDOMINAL PAIN: 0
SHORTNESS OF BREATH: 0
DIARRHEA: 0
VOMITING: 0
RHINORRHEA: 0
COUGH: 0
NAUSEA: 0

## 2022-09-22 ASSESSMENT — PAIN SCALES - GENERAL: PAINLEVEL_OUTOF10: 0

## 2022-09-22 NOTE — PROGRESS NOTES
[address]   Progress Note and Procedure Note      Alana Santillan  MEDICAL RECORD NUMBER:  [id number]  AGE: 64 y.o. GENDER: male  : 1960  EPISODE DATE:  2022    Subjective:     Chief Complaint   Patient presents with    Wound Check     RLE         HISTORY of PRESENT ILLNESS HPI     Alana Santillan is a 64 y.o. male who presents today for wound/ulcer evaluation. History of Wound Context: here to follow up on right pretibial and right second toe ulcerations. Doing well with unna boot.    Wound/Ulcer Pain Timing/Severity: none  Quality of pain: N/A  Severity:  0 / 10   Modifying Factors: None  Associated Signs/Symptoms: none    Ulcer Identification:  Ulcer Type: venous and traumatic  Contributing Factors: venous stasis, diabetes, decreased mobility, obesity, and anticoagulation therapy         PAST MEDICAL HISTORY        Diagnosis Date    Acquired trigger finger 2018    CAD (coronary artery disease)     NWOCC/ involving coronary bypass graft of native heart with unstable angina pectoris    Cervical spondylosis     CHF (congestive heart failure) ([address]     Chronic back pain     on 18 pt states is presently in pain mgmnt    Chronic ulcer of toe, left, with necrosis of bone ([address] 3/29/2022    Contusion of elbow 2018    Contusion of knee 2018    Contusion of shoulder region 2018    Diabetic foot ulcer with osteomyelitis ([address] 2018    Diabetic ulcer of toe associated with type 2 diabetes mellitus, with fat layer exposed ([address] 2018    Hypercholesterolemia 2017    Hyperlipemia, mixed     Hypertension     Ischemic cardiomyopathy 2018    Non-pressure chronic ulcer of left lower leg with fat layer exposed ([address] 2014    Obesity     Obesity, Class III, BMI 40-49.9 (morbid obesity) ([address] 2015    Obstructive sleep apnea syndrome     Osteoarthritis of carpometacarpal (CMC) joint of thumb 2018    Osteoarthritis of knee 2018 Peripheral vascular disease ([facility]     with venous stasis and ulcerations. Dr Estevan Vidal    Presence of coronary angioplasty implant and graft 9/11/2009    Sleep apnea     Dr Jacquie Davalos of knee and leg 6/5/2018    Sprain of shoulder and upper arm 6/5/2018    Type II or unspecified type diabetes mellitus without mention of complication, not stated as uncontrolled     Ulcer of left foot, with fat layer exposed ([facility] 1/9/2018    Unspecified sleep apnea     Dr Mir Perish CPAP    Venous insufficiency of both lower extremities 1/31/2018       PAST SURGICAL HISTORY    Past Surgical History:   Procedure Laterality Date    ARTHROPLASTY Left 4/1/2022    ARTHROPLASTY 2ND LEFT TOE performed by Nimesh Sahu DPM at [address] [facility] Bilateral 6/17/2022    ARTHROPLASTY 2ND DIGIT RIGHT APPLICATION EPIFIX LEFT FOOT performed by Nimesh Sahu DPM at [address] [facility] [address]  09/2021    stent     [address] Right 3/22/13    Dr. Szymanski [facility].     COLONOSCOPY  2020    CORONARY ARTERY BYPASS GRAFT  2/26/07    [facility], Dr Sarah Beth Willams (X 3)    CORONARY ARTERY BYPASS GRAFT  2006    NECK SURGERY  2010    cervical stenoses C5-C6-C7and partial T1 laminectomy with fusion of C6-7    OTHER SURGICAL HISTORY      wound care ulcers of legs (bilaterally) with Dr Calli Reagan Bilateral 5/18/2022    STRAVIX LEFT FOOT, THERASKIN RIGHT FOOT performed by Nimesh Sahu DPM at [address] Left 4/6/2022    TOE AMPUTATION, SECOND DIGIT performed by Nimesh Sahu DPM at [address]  age 11    VEIN SURGERY      closure of peripherator veins of legs, Dr LAWSON Lara History   Problem Relation Age of Onset    Cancer Mother         lung    Diabetes Father     Heart Disease Father     High Blood Pressure Father     Diabetes Brother     Heart Disease Brother     High Blood Pressure Brother        SOCIAL EVERY EVENING 90 tablet 3    linagliptin (TRADJENTA) 5 MG tablet TAKE 1 TABLET BY MOUTH ONE TIME A DAY 90 tablet 3    hydroCHLOROthiazide (MICROZIDE) 12.5 MG capsule 1 po qd 90 capsule 3    gabapentin (NEURONTIN) 300 MG capsule Take 900 mg by mouth nightly. Misc Natural Products (GLUCOSAMINE CHOND MSM FORMULA PO) Take 2 tablets by mouth daily      HYDROcodone-acetaminophen (NORCO)  MG per tablet Take 1 tablet by mouth every 4-6 hours as needed for Pain.      meloxicam (MOBIC) 15 MG tablet Take 1 tablet by mouth daily 90 tablet 3    NEEDLE, DISP, 18 G ([employer] DISP NEEDLES) 18G X 1-1/2\" MISC 1 Device by Does not apply route every 30 days 25 each 1    Multiple Vitamin (MULTI VITAMIN DAILY PO) Take by mouth      nitroGLYCERIN (NITROSTAT) 0.4 MG SL tablet Place 0.4 mg under the tongue      aspirin 325 MG tablet Take 325 mg by mouth daily. No current facility-administered medications on file prior to encounter. REVIEW OF SYSTEMS    Review of Systems   Constitutional:  Negative for chills, fatigue and fever. HENT:  Negative for congestion and rhinorrhea. Respiratory:  Negative for cough and shortness of breath. Cardiovascular:  Positive for leg swelling. Negative for chest pain. Gastrointestinal:  Negative for abdominal pain, diarrhea, nausea and vomiting. Musculoskeletal:  Negative for gait problem. Skin:  Positive for wound (right pretibial and right second toe ulcerations). Allergic/Immunologic: Negative for immunocompromised state. Neurological:  Negative for dizziness, syncope and weakness. Psychiatric/Behavioral:  Negative for agitation. The patient is not nervous/anxious.       Objective:      BP (!) 157/81   Pulse 80   Temp 97.4 °F (36.3 °C) (Tympanic)   Resp 16   Ht 6' (1.829 m)   Wt 249 lb (112.9 kg)   BMI 33.77 kg/m²     Wt Readings from Last 3 Encounters:   09/22/22 249 lb (112.9 kg)   09/13/22 249 lb (112.9 kg)   09/06/22 249 lb (112.9 kg)       PHYSICAL EXAM    General Appearance: alert and oriented to person, place and time, well-developed and well-nourished, in no acute distress  Skin: warm and dry, no rash or erythema, right pretibial and right second toe ulcerations  Head: normocephalic and atraumatic  Eyes: pupils equal, round and conjunctivae normal  Pulmonary/Chest: normal air movement, no respiratory distress  Extremities: no cyanosis and no clubbing   Musculoskeletal: no joint swelling, deformity or tenderness  Neurologic: gait, coordination normal and speech normal      Assessment:     Problem List Items Addressed This Visit       Ulcer of right pretibial region, with fat layer exposed ([address]    Diabetic polyneuropathy associated with type 2 diabetes mellitus (HCC) - Primary    Relevant Orders    Initiate Outpatient Wound Care Protocol    Peripheral vascular disease ([address]    Ulcer of toe of right foot, with fat layer exposed ([address]    Relevant Orders    Initiate Outpatient Wound Care Protocol    Venous insufficiency of both lower extremities        Procedure Note  Indications:  Based on my examination of this patient's wound(s)/ulcer(s) today, debridement is required to promote healing and evaluate the wound base. Performed by: JESICA Anne - CNP    Consent obtained:  Yes    Time out taken:  Yes    Pain Control: Anesthetic  Anesthetic: 2% Lidocaine Gel Topical     Debridement:Excisional Debridement    Using curette the wound(s)/ulcer(s) was/were sharply debrided down through and including the removal of subcutaneous tissue.         Devitalized Tissue Debrided:  biofilm and slough    Pre Debridement Measurements:  Are located in the [place]  Documentation Flow Sheet    Wound/Ulcer #: 2 and 5    Post Debridement Measurements:  Wound/Ulcer Descriptions are Pre Debridement except measurements:    Wound 06/28/22 Toe (Comment  which one) Right #2 2nd Toe (Active)   Wound Image   08/16/22 1251   Wound Etiology Traumatic 09/22/22 0839   Dressing Status New drainage noted; Old drainage noted 09/22/22 0839   Wound Cleansed Soap and water 09/22/22 0839   Offloading for Diabetic Foot Ulcers Offloading ordered;Diabetic shoes/inserts 09/22/22 0839   Wound Length (cm) 0.9 cm 09/22/22 0839   Wound Width (cm) 1 cm 09/22/22 0839   Wound Depth (cm) 0.1 cm 09/22/22 0839   Wound Surface Area (cm^2) 0.9 cm^2 09/22/22 0839   Change in Wound Size % (l*w) 10 09/22/22 0839   Wound Volume (cm^3) 0.09 cm^3 09/22/22 0839   Wound Healing % 70 09/22/22 0839   Post-Procedure Length (cm) 0.9 cm 09/22/22 0839   Post-Procedure Width (cm) 1 cm 09/22/22 0839   Post-Procedure Depth (cm) 0.1 cm 09/22/22 0839   Post-Procedure Surface Area (cm^2) 0.9 cm^2 09/22/22 0839   Post-Procedure Volume (cm^3) 0.09 cm^3 09/22/22 0839   Wound Assessment [place]/red;Slough 09/22/22 0839   Drainage Amount Moderate 09/22/22 0839   Drainage Description Serosanguinous 09/22/22 0839   Odor None 09/22/22 0839   Trudi-wound Assessment Blanchable erythema 09/22/22 0839   Margins Defined edges 09/22/22 0839   Wound Thickness Description not for Pressure Injury Full thickness 08/30/22 1255   Number of days: 86       Wound 08/30/22 Leg Right; Lower;Medial #3 (Active)   Wound Etiology Venous 09/22/22 0839   Dressing Status Dry; Intact 09/22/22 0839   Wound Cleansed Soap and water 09/13/22 1256   Wound Length (cm) 0 cm 09/22/22 0839   Wound Width (cm) 0 cm 09/22/22 0839   Wound Depth (cm) 0 cm 09/22/22 0839   Wound Surface Area (cm^2) 0 cm^2 09/22/22 0839   Change in Wound Size % (l*w) 100 09/22/22 0839   Wound Volume (cm^3) 0 cm^3 09/22/22 0839   Wound Healing % 100 09/22/22 0839   Post-Procedure Length (cm) 0 cm 09/22/22 0839   Post-Procedure Width (cm) 0 cm 09/22/22 0839   Post-Procedure Depth (cm) 0 cm 09/22/22 0839   Post-Procedure Surface Area (cm^2) 0 cm^2 09/22/22 0839   Post-Procedure Volume (cm^3) 0 cm^3 09/22/22 0839   Wound Assessment Dry 09/22/22 0839   Drainage Amount Moderate 09/13/22 1256   Drainage Description Serosanguinous 09/13/22 1256   Odor None 09/13/22 1256   Trudi-wound Assessment Blanchable erythema 09/13/22 1256   Margins Defined edges 09/13/22 1256   Wound Thickness Description not for Pressure Injury Full thickness 09/13/22 1256   Number of days: 22       Wound 09/13/22 Leg Right; Lower; Lateral #5 (Active)   Wound Image   09/13/22 1256   Wound Etiology Venous 09/22/22 0839   Dressing Status New drainage noted; Old drainage noted 09/22/22 0839   Wound Cleansed Soap and water 09/22/22 0839   Wound Length (cm) 0.7 cm 09/22/22 0839   Wound Width (cm) 0.5 cm 09/22/22 0839   Wound Depth (cm) 0.1 cm 09/22/22 0839   Wound Surface Area (cm^2) 0.35 cm^2 09/22/22 0839   Wound Volume (cm^3) 0.035 cm^3 09/22/22 0839   Post-Procedure Length (cm) 0.7 cm 09/22/22 0839   Post-Procedure Width (cm) 0.5 cm 09/22/22 0839   Post-Procedure Depth (cm) 0.1 cm 09/22/22 0839   Post-Procedure Surface Area (cm^2) 0.35 cm^2 09/22/22 0839   Post-Procedure Volume (cm^3) 0.035 cm^3 09/22/22 0839   Wound Assessment Slough;[place]/red 09/22/22 0839   Drainage Amount Moderate 09/22/22 0839   Drainage Description Serosanguinous 09/22/22 0839   Odor None 09/22/22 0839   Trudi-wound Assessment Fragile 09/22/22 0839   Margins Defined edges 09/22/22 0839   Number of days: 8          Percent of Wound(s)/Ulcer(s) Debrided: 100%    Total Surface Area Debrided:  1.25 sq cm     Diabetic/Pressure/Non Pressure Ulcers only:  Ulcer: Non-Pressure ulcer, fat layer exposed    Estimated Blood Loss:  Minimal    Hemostasis Achieved:  by pressure    Procedural Pain:  0  / 10     Post Procedural Pain:  0 / 10     Response to treatment:  Well tolerated by patient. Plan:     Treatment Note please see Discharge Instructions    Written patient dismissal instructions given to patient and signed by patient or POA.            Electronically signed by JESICA Mcadams Ace, CNP on 9/22/2022 at 9:16 AM

## 2022-09-23 NOTE — DISCHARGE INSTRUCTIONS
Aly Ashley -Phone: [phone] Fax: [phone]             Visit  Discharge Instructions / Physician Orders     DATE: 9/27/2022     Home Care: N/A     SUPPLIES ORDERED THRU: N/A     Wound Location: Right Medial Lower Leg     Cleanse with: saline     Dressing Orders: Right 2nd Toe:  Normal-saline moistened tiesha, dry dressing                                 Right Medial Lower Leg: [place] to wounds, Zinc Unna Boot- keep dry and intact                                               Frequency: Every Other Day. Keep [place] Dry and Intact     Additional Orders: Increase protein to diet (meat, cheese, eggs, fish, peanut butter, nuts and beans)  Multivitamin daily  ELEVATE LEGS AS MUCH AS POSSIBLE  2/10/2022-Culture Taken  2/14/22- antibiotic at pharmacy  2/28/22-X-rays ordered. 3/21/22-A1C and Nutritional labs ordered  3/29/22- X-Ray Ordered  6/14/2022-Culture Taken  6/15/2022- X-Ray Right Toe  6/17/22- Procedure on right 2nd toe  8/30/22- Juxta Lite Ordered Right Leg     Cleared to return to work 7/8/2022     Your next appointment with [address] [place] [address] is in 1 week with Dr. Silvia Chavez        (Please note your next appointment above and if you are unable to keep, kindly give a 24 hour notice. Thank you.)     If you experience any of the following, please call the [address] [place] [address] during business hours:  [phone]  Your Phone call may be forwarded to [address] during business hours that [phone] is closed. * Increase in Pain  * Temperature over 101  * Increase in drainage from your wound  * Drainage with a foul odor  * Bleeding  * Increase in swelling  * Need for compression bandage changes due to slippage, breakthrough drainage. If you need medical attention outside of the business hours of the [address] [place] [address] please contact your PCP or go to the nearest emergency room.      The information contained in the After Visit Summary has been reviewed with me, the patient and/or responsible adult, by my health care provider(s). I had the opportunity to ask questions regarding this information.  I have elected to receive;      []After Visit Summary  [x]Comprehensive Discharge Instruction        Patient signature______________________________________Date:________  Electronically signed by Winter Mclean RN on 9/27/2022 at 1:27 PM  Electronically signed by Niraj Yao DPM on 9/27/2022 at 12:48 PM

## 2022-09-27 ENCOUNTER — HOSPITAL ENCOUNTER (OUTPATIENT)
Dept: WOUND CARE | Age: 62
Discharge: HOME | End: 2022-09-27
Payer: COMMERCIAL

## 2022-09-27 ENCOUNTER — TELEMEDICINE (OUTPATIENT)
Dept: FAMILY MEDICINE CLINIC | Age: 62
End: 2022-09-27
Payer: COMMERCIAL

## 2022-09-27 VITALS
DIASTOLIC BLOOD PRESSURE: 69 MMHG | WEIGHT: 249 LBS | BODY MASS INDEX: 33.72 KG/M2 | SYSTOLIC BLOOD PRESSURE: 127 MMHG | HEART RATE: 72 BPM | HEIGHT: 72 IN | TEMPERATURE: 98.3 F

## 2022-09-27 DIAGNOSIS — K21.00 GASTROESOPHAGEAL REFLUX DISEASE WITH ESOPHAGITIS WITHOUT HEMORRHAGE: Primary | ICD-10-CM

## 2022-09-27 DIAGNOSIS — I87.2 VENOUS INSUFFICIENCY OF BOTH LOWER EXTREMITIES: ICD-10-CM

## 2022-09-27 DIAGNOSIS — E34.9 HYPOTESTOSTERONISM: ICD-10-CM

## 2022-09-27 DIAGNOSIS — L97.812 ULCER OF RIGHT PRETIBIAL REGION, WITH FAT LAYER EXPOSED (HCC): ICD-10-CM

## 2022-09-27 DIAGNOSIS — E66.01 OBESITY, CLASS III, BMI 40-49.9 (MORBID OBESITY) (HCC): ICD-10-CM

## 2022-09-27 DIAGNOSIS — E11.42 DIABETIC POLYNEUROPATHY ASSOCIATED WITH TYPE 2 DIABETES MELLITUS (HCC): Primary | ICD-10-CM

## 2022-09-27 DIAGNOSIS — L97.512 ULCER OF TOE OF RIGHT FOOT, WITH FAT LAYER EXPOSED (HCC): ICD-10-CM

## 2022-09-27 PROCEDURE — 99214 OFFICE O/P EST MOD 30 MIN: CPT | Performed by: FAMILY MEDICINE

## 2022-09-27 PROCEDURE — 11042 DBRDMT SUBQ TIS 1ST 20SQCM/<: CPT

## 2022-09-27 RX ORDER — PANTOPRAZOLE SODIUM 40 MG/1
40 TABLET, DELAYED RELEASE ORAL
Qty: 90 TABLET | Refills: 3 | Status: SHIPPED | OUTPATIENT
Start: 2022-09-27

## 2022-09-27 RX ORDER — LIDOCAINE HYDROCHLORIDE 20 MG/ML
JELLY TOPICAL ONCE
Status: COMPLETED | OUTPATIENT
Start: 2022-09-27 | End: 2022-09-27

## 2022-09-27 RX ORDER — PHENTERMINE HYDROCHLORIDE 37.5 MG/1
37.5 TABLET ORAL
Qty: 30 TABLET | Refills: 0 | Status: SHIPPED | OUTPATIENT
Start: 2022-09-27 | End: 2022-10-27

## 2022-09-27 RX ORDER — LIDOCAINE HYDROCHLORIDE 20 MG/ML
JELLY TOPICAL ONCE
Status: CANCELLED | OUTPATIENT
Start: 2022-09-27 | End: 2022-09-27

## 2022-09-27 RX ORDER — LIDOCAINE HYDROCHLORIDE 40 MG/ML
SOLUTION TOPICAL ONCE
Status: CANCELLED | OUTPATIENT
Start: 2022-09-27 | End: 2022-09-27

## 2022-09-27 RX ADMIN — LIDOCAINE HYDROCHLORIDE 6 ML: 20 JELLY TOPICAL at 13:05

## 2022-09-27 ASSESSMENT — PATIENT HEALTH QUESTIONNAIRE - PHQ9
SUM OF ALL RESPONSES TO PHQ QUESTIONS 1-9: 0
SUM OF ALL RESPONSES TO PHQ9 QUESTIONS 1 & 2: 0
SUM OF ALL RESPONSES TO PHQ QUESTIONS 1-9: 0
SUM OF ALL RESPONSES TO PHQ QUESTIONS 1-9: 0
1. LITTLE INTEREST OR PLEASURE IN DOING THINGS: 0
2. FEELING DOWN, DEPRESSED OR HOPELESS: 0
SUM OF ALL RESPONSES TO PHQ QUESTIONS 1-9: 0

## 2022-09-27 ASSESSMENT — PAIN DESCRIPTION - PAIN TYPE: TYPE: ACUTE PAIN

## 2022-09-27 ASSESSMENT — PAIN DESCRIPTION - FREQUENCY: FREQUENCY: INTERMITTENT

## 2022-09-27 ASSESSMENT — PAIN DESCRIPTION - DESCRIPTORS: DESCRIPTORS: ACHING

## 2022-09-27 ASSESSMENT — PAIN DESCRIPTION - ORIENTATION: ORIENTATION: LEFT

## 2022-09-27 NOTE — PROGRESS NOTES
[employer] Application   Below Knee    NAME:  Turner Santos OF BIRTH:  1960  MEDICAL RECORD NUMBER:  [id number]  DATE:  9/27/2022    [x] Applied moisturizing agent to dry skin as needed. [x] Appied primary and secondary dressing as ordered    [x] Applied Unna roll from toes to knee overlapping each time. [x] Applied ace wrap or coban from toes to below the knee. [x] Secured with tape and/or metal clips covered with tape. [x] Instructed patient/caregiver to keep dressing dry and intact. DO NOT REMOVE DRESSING. [x] Instructed pt/family/caregiver to report excessive draining, loose bandage, wet dressing, severe pain or tingling in toes. [x] Applied [place] dressing below the knee to Right lower leg(s)       Unna Boot(s) were applied per  Guidelines.      Electronically signed by Carri Gordon RN on 9/27/2022 at 1:46 PM

## 2022-09-27 NOTE — PROGRESS NOTES
2022    TELEHEALTH EVALUATION -- Audio/Visual (During [place] [address] public health emergency)    HPI:    Alana Santillan (:  1960) has requested an audio/video evaluation for the following concern(s):    Is seen today for follow-up and medication management  He needs refills of hypotestosteronism injection he needs his omeprazole changed to pantoprazole because he is on Plavix and a refill of Adipex for weight management he is lost more weight he is trying to get his A1c down so he can have joint replacement surgery he is not really having any new complaints or problems today      Review of Systems    Prior to Visit Medications    Medication Sig Taking? Authorizing Provider   pantoprazole (PROTONIX) 40 MG tablet Take 1 tablet by mouth daily (with breakfast) Yes Seema Hameed DO   phentermine (ADIPEX-P) 37.5 MG tablet Take 1 tablet by mouth every morning (before breakfast) for 30 days. Yes Seema Hameed DO   testosterone cypionate (DEPOTESTOTERONE CYPIONATE) 200 MG/ML injection Inject 1 mL into the muscle every 30 days for 30 days. Dispense with 3 cc syringe 18g needle and 22 1/2 g needle each month  Seema Hameed DO   tiZANidine (ZANAFLEX) 2 MG tablet Take 1 tablet by mouth 3 times daily Patient to take 1 tablet in the morning and 2 at bedtime. Seema Hameed DO   atorvastatin (LIPITOR) 40 MG tablet Take 1 tablet by mouth in the morning. Seema Hameed DO   topiramate (TOPAMAX) 200 MG tablet Take 1 tablet by mouth in the morning and 1 tablet before bedtime. Seema Hameed DO   HYDROcodone Bitartrate ER 15 MG CP12 Take 15 mg by mouth in the morning and at bedtime.   Historical Provider, MD   metFORMIN (GLUCOPHAGE) 1000 MG tablet Take 1 tablet by mouth 2 times daily (with meals)  Seema Hameed DO   tiZANidine (ZANAFLEX) 4 MG tablet 1 po qd  Seema Hameed DO   clopidogrel (PLAVIX) 75 MG tablet Take 1 tablet by mouth daily  Seema Hameed DO   carvedilol (COREG) 12.5 MG tablet Take 1 tablet by mouth 2 times daily (with meals)  Seema Hameed DO   dapagliflozin (FARXIGA) 10 MG tablet Take 1 tablet by mouth every morning  Seema Hameed DO   traZODone (DESYREL) 150 MG tablet TAKE ONE TABLET BY MOUTH EVERY EVENING  Seema Hameed DO   linagliptin (TRADJENTA) 5 MG tablet TAKE 1 TABLET BY MOUTH ONE TIME A DAY  Seema Hameed DO   hydroCHLOROthiazide (MICROZIDE) 12.5 MG capsule 1 po qd  Seema Hameed DO   gabapentin (NEURONTIN) 300 MG capsule Take 900 mg by mouth nightly. Historical Provider, MD   Misc Natural Products (GLUCOSAMINE CHOND MSM FORMULA PO) Take 2 tablets by mouth daily  Historical Provider, MD   HYDROcodone-acetaminophen (NORCO)  MG per tablet Take 1 tablet by mouth every 4-6 hours as needed for Pain. Historical Provider, MD   meloxicam (MOBIC) 15 MG tablet Take 1 tablet by mouth daily  Seema Hameed DO   NEEDLE, DISP, 18 G (BD DISP NEEDLES) 18G X 1-1/2\" MISC 1 Device by Does not apply route every 30 days  Seema Hameed DO   Multiple Vitamin (MULTI VITAMIN DAILY PO) Take by mouth  Historical Provider, MD   nitroGLYCERIN (NITROSTAT) 0.4 MG SL tablet Place 0.4 mg under the tongue  Historical Provider, MD   aspirin 325 MG tablet Take 325 mg by mouth daily.     Historical Provider, MD       Social History     Tobacco Use    Smoking status: Never    Smokeless tobacco: Never   Vaping Use    Vaping Use: Never used   Substance Use Topics    Alcohol use: Not Currently     Comment: rare social, non past 5 months    Drug use: No        Allergies   Allergen Reactions    Pcn [Penicillins] Other (See Comments)     Unknown rx    Penicillin G Rash   ,   Past Medical History:   Diagnosis Date    Acquired trigger finger 6/5/2018    CAD (coronary artery disease)     NWOCC/ involving coronary bypass graft of native heart with unstable angina pectoris    Cervical spondylosis     CHF (congestive heart failure) (HCC)     Chronic back pain     on 11/19/18 pt states is presently in pain mgmnt    Chronic ulcer of toe, left, with necrosis of bone ([address] 3/29/2022    Contusion of elbow 6/5/2018    Contusion of knee 6/5/2018    Contusion of shoulder region 6/5/2018    Diabetic foot ulcer with osteomyelitis ([address] 1/9/2018    Diabetic ulcer of toe associated with type 2 diabetes mellitus, with fat layer exposed ([address] 1/31/2018    Hypercholesterolemia 12/5/2017    Hyperlipemia, mixed     Hypertension     Ischemic cardiomyopathy 5/7/2018    Non-pressure chronic ulcer of left lower leg with fat layer exposed ([address] 6/9/2014    Obesity     Obesity, Class III, BMI 40-49.9 (morbid obesity) ([address] 4/6/2015    Obstructive sleep apnea syndrome     Osteoarthritis of carpometacarpal (CMC) joint of thumb 6/5/2018    Osteoarthritis of knee 6/5/2018    Peripheral vascular disease ([address]     with venous stasis and ulcerations. Dr Freeman Solid    Presence of coronary angioplasty implant and graft 9/11/2009    Sleep apnea     Dr Zenon Dunn of knee and leg 6/5/2018    Sprain of shoulder and upper arm 6/5/2018    Type II or unspecified type diabetes mellitus without mention of complication, not stated as uncontrolled     Ulcer of left foot, with fat layer exposed ([address] 1/9/2018    Unspecified sleep apnea     Dr Sonny Odonnell CPAP    Venous insufficiency of both lower extremities 1/31/2018   ,   Past Surgical History:   Procedure Laterality Date    ARTHROPLASTY Left 4/1/2022    ARTHROPLASTY 2ND LEFT TOE performed by Sheeba Florian DPM at [address] [facility] Bilateral 6/17/2022    ARTHROPLASTY 2ND DIGIT RIGHT APPLICATION EPIFIX LEFT FOOT performed by Sheeba Florian DPM at [address]  09/2021    stent     [address] Right 3/22/13    Dr. Garcia Pontiff.     COLONOSCOPY  2020    CORONARY ARTERY BYPASS GRAFT  2/26/07    Dr Julio Stearns (X 3)    CORONARY ARTERY BYPASS GRAFT  2006    NECK SURGERY  2010    cervical stenoses C5-C6-C7and partial T1 laminectomy with fusion of C6-7    OTHER SURGICAL HISTORY      wound care ulcers of legs (bilaterally) with Dr Lana Clifton Bilateral 5/18/2022    STRAVIX LEFT FOOT, THERASKIN RIGHT FOOT performed by Ky Tucker DPM at [address] Left 4/6/2022    TOE AMPUTATION, SECOND DIGIT performed by Ky Tucker DPM at [address]  age 11    VEIN SURGERY      closure of peripherator veins of legs, Dr Leslie Acevedo     ,   Social History     Tobacco Use    Smoking status: Never    Smokeless tobacco: Never   Vaping Use    Vaping Use: Never used   Substance Use Topics    Alcohol use: Not Currently     Comment: rare social, non past 5 months    Drug use: No   ,   Family History   Problem Relation Age of Onset    Cancer Mother         lung    Diabetes Father     Heart Disease Father     High Blood Pressure Father     Diabetes Brother     Heart Disease Brother     High Blood Pressure Brother    ,   Immunization History   Administered Date(s) Administered    COVID-19, MODERNA BLUE border, Primary or Immunocompromised, (age 12y+), IM, 100 mcg/0.5mL 03/11/2021, 03/11/2021, 04/08/2021, 04/08/2021, 01/15/2022, 01/15/2022    Influenza Virus Vaccine 11/22/2006    Td, unspecified formulation 05/18/2001    Tdap (Boostrix, Adacel) 02/22/2013, 12/06/2014, 04/02/2017, 12/03/2019       PHYSICAL EXAMINATION:  [ INSTRUCTIONS:  \"[x]\" Indicates a positive item  \"[]\" Indicates a negative item  -- DELETE ALL ITEMS NOT EXAMINED]  Vital Signs: (As obtained by patient/caregiver or practitioner observation)    Blood pressure-  Heart rate-    Respiratory rate-    Temperature-  Pulse oximetry-     Constitutional: [x] Appears well-developed and well-nourished [x] No apparent distress      [] Abnormal-   Mental status  [x] Alert and awake  [x] Oriented to person/place/time [x]Able to follow commands      Eyes:  EOM    [x]  Normal  [] Abnormal-  Sclera  [x]  Normal  [] Abnormal -         Discharge [x]  None visible  [] Abnormal -    HENT:   [x] Normocephalic, atraumatic. [] Abnormal   [x] Mouth/Throat: Mucous membranes are moist.     External Ears [x] Normal  [] Abnormal-     Neck: [x] No visualized mass     Pulmonary/Chest: [x] Respiratory effort normal.  [x] No visualized signs of difficulty breathing or respiratory distress        [] Abnormal-      Musculoskeletal:   [x] Normal gait with no signs of ataxia         [x] Normal range of motion of neck        [] Abnormal-       Neurological:        [x] No Facial Asymmetry (Cranial nerve 7 motor function) (limited exam to video visit)          [x] No gaze palsy        [] Abnormal-         Skin:        [x] No significant exanthematous lesions or discoloration noted on facial skin         [] Abnormal-            Psychiatric:       [x] Normal Affect [x] No Hallucinations        [] Abnormal-     Other pertinent observable physical exam findings-     ASSESSMENT/PLAN:   Diagnosis Orders   1. Gastroesophageal reflux disease with esophagitis without hemorrhage        2. Hypotestosteronism        3. Obesity, Class III, BMI 40-49.9 (morbid obesity) ([facility])  phentermine (ADIPEX-P) 37.5 MG tablet         No orders of the defined types were placed in this encounter. Requested Prescriptions     Signed Prescriptions Disp Refills    pantoprazole (PROTONIX) 40 MG tablet 90 tablet 3     Sig: Take 1 tablet by mouth daily (with breakfast)    phentermine (ADIPEX-P) 37.5 MG tablet 30 tablet 0     Sig: Take 1 tablet by mouth every morning (before breakfast) for 30 days. Sinew to follow low-calorie diet and follow-up in 1 month  No follow-ups on file. Susana Alvarez is a 58 y.o. male being evaluated by a Virtual Visit (video visit) encounter to address concerns as mentioned above. A caregiver was present when appropriate. Due to this being a TeleHealth encounter (During [place]-39 public health emergency), evaluation of the following organ systems was limited: Vitals/Constitutional/EENT/Resp/CV/GI//MS/Neuro/Skin/Heme-Lymph-Imm.   Pursuant to the emergency declaration under the [facility], [facility] waiver authority and the [employer] Act, this Virtual Visit was conducted with patient's (and/or legal guardian's) consent, to reduce the patient's risk of exposure to COVID-19 and provide necessary medical care. The patient (and/or legal guardian) has also been advised to contact this office for worsening conditions or problems, and seek emergency medical treatment and/or call 911 if deemed necessary. Patient identification was verified at the start of the visit: Yes    Total time spent on this encounter: Not billed by time    Services were provided through a video synchronous discussion virtually to substitute for in-person clinic visit. Patient and provider were located at their individual homes. --Radha Mora DO on 9/27/2022 at 2:33 PM    An electronic signature was used to authenticate this note.

## 2022-09-27 NOTE — PROGRESS NOTES
[address]   Progress Note and Procedure Note      Maurice Gaytan  MEDICAL RECORD NUMBER:  [id number]  AGE: [id number] [facility] y.o. GENDER: male  : 1960  EPISODE DATE:  2022    Subjective:     No chief complaint on file. HISTORY of PRESENT ILLNESS HPI     Maurice Gaytan is a 9[address] [facility] y.o. male who presents today for wound/ulcer evaluation. S/P    LEFT stravix graft , EPIFIX , 2nd right S/P ARTHROPLASTY 22 . PT IS BTW. HAS EXTRA DEPTH SHOES (CANDIE). KAYCEE/SSD RIGHT QOD. Maintained unna boot intact right  - RIGHT TOW OPENED UP BECAUSE BANDAID RUBBED ON SOCK THAT WENT OVER THE [employer]. STILL DRIVING TO [place]. Ulcer Identification:  Ulcer Type: diabetic  Contributing Factors: diabetes and chronic pressure       22 1204    Specimen Description . TOE    Special Requests . TOE   SECOND LEFT DIGIT    Direct Exam RARE NEUTROPHILS Abnormal     Direct Exam FEW GRAM POSITIVE COCCI IN PAIRS Abnormal     Culture STREPTOCOCCI, BETA HEMOLYTIC GROUP B MODERATE GROWTH Abnormal     Culture  Abnormal   STAPHYLOCOCCUS AUREUS MODERATE GROWTH This isolate is methicillin susceptible. For susceptibility, refer to previous culture. Culture No anaerobic organisms isolated at 5 days.                 PAST MEDICAL HISTORY        Diagnosis Date    Acquired trigger finger 2018    CAD (coronary artery disease)     NWOCC/ involving coronary bypass graft of native heart with unstable angina pectoris    Cervical spondylosis     CHF (congestive heart failure) ([facility])     Chronic back pain     on 18 pt states is presently in pain mgmnt    Chronic ulcer of toe, left, with necrosis of bone ([address] 3/29/2022    Contusion of elbow 2018    Contusion of knee 2018    Contusion of shoulder region 2018    Diabetic foot ulcer with osteomyelitis ([address] 2018    Diabetic ulcer of toe associated with type 2 diabetes mellitus, with fat layer exposed ([address] 2018    Hypercholesterolemia 2017 Hyperlipemia, mixed     Hypertension     Ischemic cardiomyopathy 5/7/2018    Non-pressure chronic ulcer of left lower leg with fat layer exposed ([address] 6/9/2014    Obesity     Obesity, Class III, BMI 40-49.9 (morbid obesity) ([address] 4/6/2015    Obstructive sleep apnea syndrome     Osteoarthritis of carpometacarpal (CMC) joint of thumb 6/5/2018    Osteoarthritis of knee 6/5/2018    Peripheral vascular disease ([address]     with venous stasis and ulcerations. Dr Tejas Johansen    Presence of coronary angioplasty implant and graft 9/11/2009    Sleep apnea     Dr Lance All of knee and leg 6/5/2018    Sprain of shoulder and upper arm 6/5/2018    Type II or unspecified type diabetes mellitus without mention of complication, not stated as uncontrolled     Ulcer of left foot, with fat layer exposed ([address] 1/9/2018    Unspecified sleep apnea     Dr Rachel Apodaca CPAP    Venous insufficiency of both lower extremities 1/31/2018       PAST SURGICAL HISTORY    Past Surgical History:   Procedure Laterality Date    ARTHROPLASTY Left 4/1/2022    ARTHROPLASTY 2ND LEFT TOE performed by Osbaldo Alvarenga DPM at [facility] Bilateral 6/17/2022    ARTHROPLASTY 2ND DIGIT RIGHT APPLICATION EPIFIX LEFT FOOT performed by Osbaldo Alvarenga DPM at [address] [facility]  09/2021    stent     [address] Right 3/22/13    Dr. Darlene Johnson.     COLONOSCOPY  2020    CORONARY ARTERY BYPASS GRAFT  2/26/07    [facility], Dr Marysol Mchugh (X 3)    CORONARY ARTERY BYPASS GRAFT  2006    NECK SURGERY  2010    cervical stenoses C5-C6-C7and partial T1 laminectomy with fusion of C6-7    OTHER SURGICAL HISTORY      wound care ulcers of legs (bilaterally) with Dr Johan Tran Bilateral 5/18/2022    STRAVIX LEFT FOOT, THERASKIN RIGHT FOOT performed by Osbaldo Alvarenga DPM at [address] Left 4/6/2022    TOE AMPUTATION, SECOND DIGIT performed by Osbaldo Alvarenga DPM at [address]  age 11    VEIN SURGERY      closure of peripherator veins of legs, Dr Gilmore [place] History   Problem Relation Age of Onset    Cancer Mother         lung    Diabetes Father     Heart Disease Father     High Blood Pressure Father     Diabetes Brother     Heart Disease Brother     High Blood Pressure Brother        SOCIAL HISTORY    Social History     Tobacco Use    Smoking status: Never    Smokeless tobacco: Never   Vaping Use    Vaping Use: Never used   Substance Use Topics    Alcohol use: Not Currently     Comment: rare social, non past 5 months    Drug use: No       ALLERGIES    Allergies   Allergen Reactions    Pcn [Penicillins] Other (See Comments)     Unknown rx    Penicillin G Rash       MEDICATIONS    Current Outpatient Medications on File Prior to Encounter   Medication Sig Dispense Refill    testosterone cypionate (DEPOTESTOTERONE CYPIONATE) 200 MG/ML injection Inject 1 mL into the muscle every 30 days for 30 days. Dispense with 3 cc syringe 18g needle and 22 1/2 g needle each month 1 mL 5    tiZANidine (ZANAFLEX) 2 MG tablet Take 1 tablet by mouth 3 times daily Patient to take 1 tablet in the morning and 2 at bedtime. 90 tablet 0    omeprazole (PRILOSEC) 20 MG delayed release capsule TAKE 1 CAPSULE DAILY 90 capsule 1    atorvastatin (LIPITOR) 40 MG tablet Take 1 tablet by mouth in the morning. 90 tablet 3    topiramate (TOPAMAX) 200 MG tablet Take 1 tablet by mouth in the morning and 1 tablet before bedtime. 60 tablet 5    HYDROcodone Bitartrate ER 15 MG CP12 Take 15 mg by mouth in the morning and at bedtime.       metFORMIN (GLUCOPHAGE) 1000 MG tablet Take 1 tablet by mouth 2 times daily (with meals) 60 tablet 3    tiZANidine (ZANAFLEX) 4 MG tablet 1 po qd 14 tablet 3    clopidogrel (PLAVIX) 75 MG tablet Take 1 tablet by mouth daily 90 tablet 3    carvedilol (COREG) 12.5 MG tablet Take 1 tablet by mouth 2 times daily (with meals) 180 tablet 3    dapagliflozin (FARXIGA) 10 MG tablet Take 1 tablet by mouth every morning 90 tablet 3    traZODone (DESYREL) 150 MG tablet TAKE ONE TABLET BY MOUTH EVERY EVENING 90 tablet 3    linagliptin (TRADJENTA) 5 MG tablet TAKE 1 TABLET BY MOUTH ONE TIME A DAY 90 tablet 3    hydroCHLOROthiazide (MICROZIDE) 12.5 MG capsule 1 po qd 90 capsule 3    gabapentin (NEURONTIN) 300 MG capsule Take 900 mg by mouth nightly. Misc Natural Products (GLUCOSAMINE CHOND MSM FORMULA PO) Take 2 tablets by mouth daily      HYDROcodone-acetaminophen (NORCO)  MG per tablet Take 1 tablet by mouth every 4-6 hours as needed for Pain.      meloxicam (MOBIC) 15 MG tablet Take 1 tablet by mouth daily 90 tablet 3    NEEDLE, DISP, 18 G (BD DISP NEEDLES) 18G X 1-1/2\" MISC 1 Device by Does not apply route every 30 days 25 each 1    Multiple Vitamin (MULTI VITAMIN DAILY PO) Take by mouth      nitroGLYCERIN (NITROSTAT) 0.4 MG SL tablet Place 0.4 mg under the tongue      aspirin 325 MG tablet Take 325 mg by mouth daily. No current facility-administered medications on file prior to encounter. REVIEW OF SYSTEMS    Review of Systems   Constitutional:  Negative for chills and fever. Skin:  Positive for wound. Neurological:  Negative for numbness. Objective: There were no vitals taken for this visit. Wt Readings from Last 3 Encounters:   09/22/22 249 lb (112.9 kg)   09/13/22 249 lb (112.9 kg)   09/06/22 249 lb (112.9 kg)       Physical Exam:  General:  Alert and oriented x3. In no acute distress. Lower Extremity Physical Exam:    Vascular: DP pulses are palpable, Bilateral. PT pulses are palpable, Bilateral. CFT <3 seconds to all digits, Bilateral.  3/7 PITTING edema RIGHT. Hair growth is absent to the level of the digits, Bilateral.     Neuro: Saph/sural/SP/DP/plantar sensation intact to light touch. Musculoskeletal: EHL/FHL/GS/TA gross motor intact.  Gross deformity is present, hammertoes bilateral.     Dermatologic: clean wound RIGHT FOOT AND RIGHT LEG      Wound 06/28/22 Toe (Comment  which one) Right #2 2nd Toe (Active)   Wound Image   08/16/22 1251   Wound Etiology Traumatic 09/27/22 1306   Dressing Status New drainage noted; Old drainage noted 09/27/22 1306   Wound Cleansed Soap and water 09/27/22 1306   Offloading for Diabetic Foot Ulcers Offloading ordered;Diabetic shoes/inserts 09/27/22 1306   Wound Length (cm) 0.5 cm 09/27/22 1306   Wound Width (cm) 0.6 cm 09/27/22 1306   Wound Depth (cm) 0.1 cm 09/27/22 1306   Wound Surface Area (cm^2) 0.3 cm^2 09/27/22 1306   Change in Wound Size % (l*w) 70 09/27/22 1306   Wound Volume (cm^3) 0.03 cm^3 09/27/22 1306   Wound Healing % 90 09/27/22 1306   Post-Procedure Length (cm) 0.9 cm 09/27/22 1306   Post-Procedure Width (cm) 1.1 cm 09/27/22 1306   Post-Procedure Depth (cm) 0.3 cm 09/27/22 1306   Post-Procedure Surface Area (cm^2) 0.99 cm^2 09/27/22 1306   Post-Procedure Volume (cm^3) 0.297 cm^3 09/27/22 1306   Wound Assessment Bleeding;Pink/red 09/27/22 1306   Drainage Amount Moderate 09/27/22 1306   Drainage Description Serosanguinous 09/27/22 1306   Odor None 09/27/22 1306   Trudi-wound Assessment Blanchable erythema 09/27/22 1306   Margins Defined edges 09/27/22 1306   Wound Thickness Description not for Pressure Injury Full thickness 09/27/22 1306   Number of days: 91       Wound 09/13/22 Leg Right; Lower; Lateral #5 (Active)   Wound Image   09/13/22 1256   Wound Etiology Venous 09/27/22 1306   Dressing Status New drainage noted; Old drainage noted 09/27/22 1306   Wound Cleansed Soap and water 09/27/22 1306   Wound Length (cm) 0.1 cm 09/27/22 1306   Wound Width (cm) 0.1 cm 09/27/22 1306   Wound Depth (cm) 0.1 cm 09/27/22 1306   Wound Surface Area (cm^2) 0.01 cm^2 09/27/22 1306   Change in Wound Size % (l*w) 97.14 09/27/22 1306   Wound Volume (cm^3) 0.001 cm^3 09/27/22 1306   Wound Healing % 97 09/27/22 1306   Post-Procedure Length (cm) 0.7 cm 09/27/22 1306   Post-Procedure Width (cm) 0.5 cm 09/27/22 1306   Post-Procedure Depth (cm) 0.3 cm 09/27/22 1306   Post-Procedure Surface Area (cm^2) 0.35 cm^2 09/27/22 1306   Post-Procedure Volume (cm^3) 0.105 cm^3 09/27/22 1306   Wound Assessment Pink/red 09/27/22 1306   Drainage Amount Moderate 09/27/22 1306   Drainage Description Serosanguinous 09/27/22 1306   Odor None 09/27/22 1306   Trudi-wound Assessment Fragile 09/27/22 1306   Margins Defined edges 09/27/22 1306   Wound Thickness Description not for Pressure Injury Full thickness 09/27/22 1306   Number of days: 14              Procedure:  Wound Location: RIGHT LEG, RIGHT FOOT    Lidocaine gel soaked gauze was applied at beginning of wound evaluation. The wound(s) was excisionally debrided sharply of fibrotic, necrotic, and hyperkeratotic tissue, including a layer of surrounding healthy tissue using curette. The wound was debrided down through and including subcutaneous. Percent of Wound Debrided: 100%    Total Surface Area Debrided:  (see above for sq cm)    Bleeding: Minimal    Patient tolerated procedure well and was given proper instruction. Post debridement wound description:  clean       E11.42, .  L97.512, L03.9, L97.812, I89.0      Plan:    EXCISIONAL DEBRIDEMENT OF WOUNDS THRU SUB Q TISSUE WITH CURRETTE     KAYCEE TO RIGHT FOOT  - CHANGE QOD  SILVERCELL WITH UNNA RIGHT LEG    RELACE RIGHT SHOE TO CREATE MORE ROOM FOR THE TOES AND THE [facility] WEEK    Treatment Note please see attached Discharge Instructions

## 2022-09-27 NOTE — LETTER
[address]  [facility] [address] [address]  Phone: [phone]  Fax: [phone]    Thalia Trujillo DO         September 27, 2022     Patient: Raman Navas   YOB: 1960   Date of Visit: 9/27/2022       To Whom It May Concern: It is my medical opinion that Burleigh Bernheim requires a disability parking placard for the following reasons:  He cannot walk 200 feet without stopping to rest.  Duration of need: 5 years    If you have any questions or concerns, please don't hesitate to call.     Sincerely,        Seema Hameed, DO

## 2022-09-30 NOTE — DISCHARGE INSTRUCTIONS
[address] [facility],5[address] -Phone: [phone] Fax: [phone]             Visit  Discharge Instructions / Physician Orders     DATE: 10/4/2022     Home Care: N/A     SUPPLIES ORDERED THRU: N/A     Wound Location: Right 2nd Toe     Cleanse with: saline     Dressing Orders: Right 2nd Toe:  Normal-saline moistened tiesha, dry dressing                                               Frequency: Every Other Day     Additional Orders: Increase protein to diet (meat, cheese, eggs, fish, peanut butter, nuts and beans)  Multivitamin daily  ELEVATE LEGS AS MUCH AS POSSIBLE  2/10/2022-Culture Taken  2/14/22- antibiotic at pharmacy  2/28/22-X-rays ordered. 3/21/22-A1C and Nutritional labs ordered  3/29/22- X-Ray Ordered  6/14/2022-Culture Taken  6/15/2022- X-Ray Right Toe  6/17/22- Procedure on right 2nd toe  8/30/22- Juxta Lite Ordered Right Leg     Cleared to return to work 7/8/2022     Your next appointment with [facility] is in 2 weeks with Dr. Karissa Mata        (Please note your next appointment above and if you are unable to keep, kindly give a 24 hour notice. Thank you.)     If you experience any of the following, please call the [facility] during business hours:  [phone]  Your Phone call may be forwarded to [employer] during business hours that [phone] is closed. * Increase in Pain  * Temperature over 101  * Increase in drainage from your wound  * Drainage with a foul odor  * Bleeding  * Increase in swelling  * Need for compression bandage changes due to slippage, breakthrough drainage. If you need medical attention outside of the business hours of the [address] [place] [address] please contact your PCP or go to the nearest emergency room. The information contained in the After Visit Summary has been reviewed with me, the patient and/or responsible adult, by my health care provider(s). I had the opportunity to ask questions regarding this information.  I have elected to receive;      []After Visit Summary  [x]Comprehensive Discharge Instruction        Patient signature______________________________________Date:________  Electronically signed by Ray Patino RN on 10/4/2022 at 1:32 PM  Electronically signed by Dale Spears DPM on 10/4/2022 at 12:57 PM

## 2022-10-04 ENCOUNTER — HOSPITAL ENCOUNTER (OUTPATIENT)
Dept: WOUND CARE | Age: 62
Discharge: HOME | End: 2022-10-04
Payer: COMMERCIAL

## 2022-10-04 VITALS
SYSTOLIC BLOOD PRESSURE: 120 MMHG | HEART RATE: 76 BPM | DIASTOLIC BLOOD PRESSURE: 66 MMHG | RESPIRATION RATE: 18 BRPM | HEIGHT: 72 IN | BODY MASS INDEX: 30.61 KG/M2 | TEMPERATURE: 97.9 F | WEIGHT: 226 LBS

## 2022-10-04 DIAGNOSIS — I87.2 VENOUS INSUFFICIENCY OF BOTH LOWER EXTREMITIES: ICD-10-CM

## 2022-10-04 DIAGNOSIS — L97.512 ULCER OF TOE OF RIGHT FOOT, WITH FAT LAYER EXPOSED (HCC): ICD-10-CM

## 2022-10-04 DIAGNOSIS — L97.812 ULCER OF RIGHT PRETIBIAL REGION, WITH FAT LAYER EXPOSED (HCC): ICD-10-CM

## 2022-10-04 DIAGNOSIS — E11.42 DIABETIC POLYNEUROPATHY ASSOCIATED WITH TYPE 2 DIABETES MELLITUS (HCC): Primary | ICD-10-CM

## 2022-10-04 PROCEDURE — 11042 DBRDMT SUBQ TIS 1ST 20SQCM/<: CPT

## 2022-10-04 RX ORDER — LIDOCAINE HYDROCHLORIDE 20 MG/ML
JELLY TOPICAL ONCE
Status: COMPLETED | OUTPATIENT
Start: 2022-10-04 | End: 2022-10-04

## 2022-10-04 RX ORDER — LIDOCAINE HYDROCHLORIDE 40 MG/ML
SOLUTION TOPICAL ONCE
Status: CANCELLED | OUTPATIENT
Start: 2022-10-04 | End: 2022-10-04

## 2022-10-04 RX ORDER — LIDOCAINE HYDROCHLORIDE 20 MG/ML
JELLY TOPICAL ONCE
Status: CANCELLED | OUTPATIENT
Start: 2022-10-04 | End: 2022-10-04

## 2022-10-04 RX ADMIN — LIDOCAINE HYDROCHLORIDE 6 ML: 20 JELLY TOPICAL at 13:20

## 2022-10-04 NOTE — PROGRESS NOTES
[address]   Progress Note and Procedure Note      Arminda Lu  MEDICAL RECORD NUMBER:  [id number]  AGE: 58 y.o. GENDER: male  : 1960  EPISODE DATE:  10/4/2022    Subjective:     Chief Complaint   Patient presents with    Wound Check     RLE         HISTORY of PRESENT ILLNESS HPI     Arminda Lu is a 58 y.o. male who presents today for wound/ulcer evaluation. S/P    LEFT stravix graft , EPIFIX , 2nd right S/P ARTHROPLASTY 22 . PT IS BTW. HAS EXTRA DEPTH SHOES (CANDIE). KAYCEE/SSD RIGHT QOD. Maintained unna boot intact right. STILL DRIVING TO [place]. Ulcer Identification:  Ulcer Type: diabetic  Contributing Factors: diabetes and chronic pressure       22 1204    Specimen Description . TOE    Special Requests . TOE   SECOND LEFT DIGIT    Direct Exam RARE NEUTROPHILS Abnormal     Direct Exam FEW GRAM POSITIVE COCCI IN PAIRS Abnormal     Culture STREPTOCOCCI, BETA HEMOLYTIC GROUP B MODERATE GROWTH Abnormal     Culture  Abnormal   STAPHYLOCOCCUS AUREUS MODERATE GROWTH This isolate is methicillin susceptible. For susceptibility, refer to previous culture. Culture No anaerobic organisms isolated at 5 days.                 PAST MEDICAL HISTORY        Diagnosis Date    Acquired trigger finger 2018    CAD (coronary artery disease)     NWOCC/ involving coronary bypass graft of native heart with unstable angina pectoris    Cervical spondylosis     CHF (congestive heart failure) ([facility])     Chronic back pain     on 18 pt states is presently in pain mgmnt    Chronic ulcer of toe, left, with necrosis of bone ([address] 3/29/2022    Contusion of elbow 2018    Contusion of knee 2018    Contusion of shoulder region 2018    Diabetic foot ulcer with osteomyelitis ([address] 2018    Diabetic ulcer of toe associated with type 2 diabetes mellitus, with fat layer exposed ([address] 2018    Hypercholesterolemia 2017    Hyperlipemia, mixed     Hypertension Ischemic cardiomyopathy 5/7/2018    Non-pressure chronic ulcer of left lower leg with fat layer exposed ([address] 6/9/2014    Obesity     Obesity, Class III, BMI 40-49.9 (morbid obesity) ([address] 4/6/2015    Obstructive sleep apnea syndrome     Osteoarthritis of carpometacarpal (CMC) joint of thumb 6/5/2018    Osteoarthritis of knee 6/5/2018    Peripheral vascular disease ([address]     with venous stasis and ulcerations. Dr Diaz Iba    Presence of coronary angioplasty implant and graft 9/11/2009    Sleep apnea     Dr Santoyo Expose of knee and leg 6/5/2018    Sprain of shoulder and upper arm 6/5/2018    Type II or unspecified type diabetes mellitus without mention of complication, not stated as uncontrolled     Ulcer of left foot, with fat layer exposed ([address] 1/9/2018    Unspecified sleep apnea     Dr Ramses Coburn CPAP    Venous insufficiency of both lower extremities 1/31/2018       PAST SURGICAL HISTORY    Past Surgical History:   Procedure Laterality Date    ARTHROPLASTY Left 4/1/2022    ARTHROPLASTY 2ND LEFT TOE performed by Evan Escobar DPM at [facility] Bilateral 6/17/2022    ARTHROPLASTY 2ND DIGIT RIGHT APPLICATION EPIFIX LEFT FOOT performed by Evan Escobar DPM at [address]  09/2021    stent     [address] Right 3/22/13    Dr. Vivian Morin.     COLONOSCOPY  2020    CORONARY ARTERY BYPASS GRAFT  2/26/07    [facility], Dr Greg Osborn (X 3)    CORONARY ARTERY BYPASS GRAFT  2006    NECK SURGERY  2010    cervical stenoses C5-C6-C7and partial T1 laminectomy with fusion of C6-7    OTHER SURGICAL HISTORY      wound care ulcers of legs (bilaterally) with Dr Xu Duenas Bilateral 5/18/2022    STRAVIX LEFT FOOT, THERASKIN RIGHT FOOT performed by Evan Escobar DPM at [facility] Left 4/6/2022    TOE AMPUTATION, SECOND DIGIT performed by Evan Escobar DPM at [address]  age 11    VEIN SURGERY      closure of peripherator veins of legs, Dr Amiel Essex History   Problem Relation Age of Onset    Cancer Mother         lung    Diabetes Father     Heart Disease Father     High Blood Pressure Father     Diabetes Brother     Heart Disease Brother     High Blood Pressure Brother        SOCIAL HISTORY    Social History     Tobacco Use    Smoking status: Never     Passive exposure: Never    Smokeless tobacco: Never   Vaping Use    Vaping Use: Never used   Substance Use Topics    Alcohol use: Not Currently     Comment: rare social, non past 5 months    Drug use: No       ALLERGIES    Allergies   Allergen Reactions    Pcn [Penicillins] Other (See Comments)     Unknown rx    Penicillin G Rash       MEDICATIONS    Current Outpatient Medications on File Prior to Encounter   Medication Sig Dispense Refill    pantoprazole (PROTONIX) 40 MG tablet Take 1 tablet by mouth daily (with breakfast) 90 tablet 3    phentermine (ADIPEX-P) 37.5 MG tablet Take 1 tablet by mouth every morning (before breakfast) for 30 days. 30 tablet 0    testosterone cypionate (DEPOTESTOTERONE CYPIONATE) 200 MG/ML injection Inject 1 mL into the muscle every 30 days for 30 days. Dispense with 3 cc syringe 18g needle and 22 1/2 g needle each month 1 mL 5    tiZANidine (ZANAFLEX) 2 MG tablet Take 1 tablet by mouth 3 times daily Patient to take 1 tablet in the morning and 2 at bedtime. 90 tablet 0    atorvastatin (LIPITOR) 40 MG tablet Take 1 tablet by mouth in the morning. 90 tablet 3    topiramate (TOPAMAX) 200 MG tablet Take 1 tablet by mouth in the morning and 1 tablet before bedtime. 60 tablet 5    HYDROcodone Bitartrate ER 15 MG CP12 Take 15 mg by mouth in the morning and at bedtime.       metFORMIN (GLUCOPHAGE) 1000 MG tablet Take 1 tablet by mouth 2 times daily (with meals) 60 tablet 3    tiZANidine (ZANAFLEX) 4 MG tablet 1 po qd 14 tablet 3    clopidogrel (PLAVIX) 75 MG tablet Take 1 tablet by mouth daily 90 tablet 3 carvedilol (COREG) 12.5 MG tablet Take 1 tablet by mouth 2 times daily (with meals) 180 tablet 3    dapagliflozin (FARXIGA) 10 MG tablet Take 1 tablet by mouth every morning 90 tablet 3    traZODone (DESYREL) 150 MG tablet TAKE ONE TABLET BY MOUTH EVERY EVENING 90 tablet 3    linagliptin (TRADJENTA) 5 MG tablet TAKE 1 TABLET BY MOUTH ONE TIME A DAY 90 tablet 3    hydroCHLOROthiazide (MICROZIDE) 12.5 MG capsule 1 po qd 90 capsule 3    gabapentin (NEURONTIN) 300 MG capsule Take 900 mg by mouth nightly. Misc Natural Products (GLUCOSAMINE CHOND MSM FORMULA PO) Take 2 tablets by mouth daily      HYDROcodone-acetaminophen (NORCO)  MG per tablet Take 1 tablet by mouth every 4-6 hours as needed for Pain.      meloxicam (MOBIC) 15 MG tablet Take 1 tablet by mouth daily 90 tablet 3    NEEDLE, DISP, 18 G (BD DISP NEEDLES) 18G X 1-1/2\" MISC 1 Device by Does not apply route every 30 days 25 each 1    Multiple Vitamin (MULTI VITAMIN DAILY PO) Take by mouth      nitroGLYCERIN (NITROSTAT) 0.4 MG SL tablet Place 0.4 mg under the tongue      aspirin 325 MG tablet Take 325 mg by mouth daily. No current facility-administered medications on file prior to encounter. REVIEW OF SYSTEMS    Review of Systems   Constitutional:  Negative for chills and fever. Skin:  Positive for wound. Neurological:  Negative for numbness. Objective:      /66   Pulse 76   Temp 97.9 °F (36.6 °C) (Tympanic)   Resp 18   Ht 6' (1.829 m)   Wt 226 lb (102.5 kg)   BMI 30.65 kg/m²     Wt Readings from Last 3 Encounters:   10/04/22 226 lb (102.5 kg)   09/27/22 249 lb (112.9 kg)   09/22/22 249 lb (112.9 kg)       Physical Exam:  General:  Alert and oriented x3. In no acute distress. Lower Extremity Physical Exam:    Vascular: DP pulses are palpable, Bilateral. PT pulses are palpable, Bilateral. CFT <3 seconds to all digits, Bilateral.  3/7 PITTING edema RIGHT.   Hair growth is absent to the level of the digits, Bilateral.     Neuro: Saph/sural/SP/DP/plantar sensation intact to light touch. Musculoskeletal: EHL/FHL/GS/TA gross motor intact. Gross deformity is present, hammertoes bilateral.     Dermatologic: clean wound RIGHT FOOT - SIG LESS ERYTHEMA    Wound 06/28/22 Toe (Comment  which one) Right #2 2nd Toe (Active)   Wound Image   10/04/22 1318   Wound Etiology Traumatic 10/04/22 1318   Dressing Status New drainage noted; Old drainage noted 10/04/22 1318   Wound Cleansed Soap and water 10/04/22 1318   Offloading for Diabetic Foot Ulcers Offloading ordered;Diabetic shoes/inserts 10/04/22 1318   Wound Length (cm) 0.4 cm 10/04/22 1318   Wound Width (cm) 0.7 cm 10/04/22 1318   Wound Depth (cm) 0.1 cm 10/04/22 1318   Wound Surface Area (cm^2) 0.28 cm^2 10/04/22 1318   Change in Wound Size % (l*w) 72 10/04/22 1318   Wound Volume (cm^3) 0.028 cm^3 10/04/22 1318   Wound Healing % 91 10/04/22 1318   Post-Procedure Length (cm) 0.5 cm 10/04/22 1318   Post-Procedure Width (cm) 0.7 cm 10/04/22 1318   Post-Procedure Depth (cm) 0.3 cm 10/04/22 1318   Post-Procedure Surface Area (cm^2) 0.35 cm^2 10/04/22 1318   Post-Procedure Volume (cm^3) 0.105 cm^3 10/04/22 1318   Wound Assessment Slough;Pink/red 10/04/22 1318   Drainage Amount Moderate 10/04/22 1318   Drainage Description Serosanguinous 10/04/22 1318   Odor None 10/04/22 1318   Trudi-wound Assessment Blanchable erythema 10/04/22 1318   Margins Defined edges 10/04/22 1318   Wound Thickness Description not for Pressure Injury Full thickness 10/04/22 1318   Number of days: 98       Wound 09/13/22 Leg Right; Lower; Lateral #5 (Active)   Wound Image   10/04/22 1318   Wound Etiology Venous 10/04/22 1318   Dressing Status Clean;Dry; Intact 10/04/22 1318   Wound Cleansed Soap and water 10/04/22 1318   Dressing/Treatment Other (comment) 09/27/22 1344   Wound Length (cm) 0 cm 10/04/22 1318   Wound Width (cm) 0 cm 10/04/22 1318   Wound Depth (cm) 0 cm 10/04/22 1318   Wound Surface Area (cm^2) 0 cm^2 10/04/22 1318   Change in Wound Size % (l*w) 100 10/04/22 1318   Wound Volume (cm^3) 0 cm^3 10/04/22 1318   Wound Healing % 100 10/04/22 1318   Post-Procedure Length (cm) 0 cm 10/04/22 1318   Post-Procedure Width (cm) 0 cm 10/04/22 1318   Post-Procedure Depth (cm) 0 cm 10/04/22 1318   Post-Procedure Surface Area (cm^2) 0 cm^2 10/04/22 1318   Post-Procedure Volume (cm^3) 0 cm^3 10/04/22 1318   Wound Assessment Dry;Fibrin 10/04/22 1318   Drainage Amount Moderate 09/27/22 1306   Drainage Description Serosanguinous 09/27/22 1306   Odor None 09/27/22 1306   Trudi-wound Assessment Fragile 09/27/22 1306   Margins Defined edges 09/27/22 1306   Wound Thickness Description not for Pressure Injury Full thickness 09/27/22 1306   Number of days: 21            Procedure:  Wound Location: RIGHT FOOT    Lidocaine gel soaked gauze was applied at beginning of wound evaluation. The wound(s) was excisionally debrided sharply of fibrotic, necrotic, and hyperkeratotic tissue, including a layer of surrounding healthy tissue using curette. The wound was debrided down through and including subcutaneous. Percent of Wound Debrided: 100%    Total Surface Area Debrided:  (see above for sq cm)    Bleeding: Minimal    Patient tolerated procedure well and was given proper instruction. Post debridement wound description:  clean       E11.42, .  L97.512, L03.9, I89.0      Plan:    EXCISIONAL DEBRIDEMENT OF WOUND THRU SUB Q TISSUE WITH CURRETTE     AKYCEE TO RIGHT FOOT  - CHANGE QOD      [address] 2 WEEKS    Treatment Note please see attached Discharge Instructions

## 2022-10-05 RX ORDER — TIZANIDINE 2 MG/1
2 TABLET ORAL 3 TIMES DAILY
Qty: 90 TABLET | Refills: 0 | Status: SHIPPED | OUTPATIENT
Start: 2022-10-05

## 2022-10-05 NOTE — TELEPHONE ENCOUNTER
Nasima Spivey is calling to request a refill on the following medication(s):    Last Visit Date (If Applicable):  7/76/0646    Next Visit Date:    Visit date not found    Medication Request:  Requested Prescriptions     Pending Prescriptions Disp Refills    tiZANidine (ZANAFLEX) 2 MG tablet 90 tablet 0     Sig: Take 1 tablet by mouth 3 times daily Patient to take 1 tablet in the morning and 2 at bedtime.

## 2022-10-07 RX ORDER — FLUCONAZOLE 150 MG/1
150 TABLET ORAL DAILY
Qty: 14 TABLET | Refills: 0 | Status: SHIPPED | OUTPATIENT
Start: 2022-10-07 | End: 2022-10-21

## 2022-10-07 NOTE — TELEPHONE ENCOUNTER
Arminda Lu is calling to request a refill on the following medication(s):    Last Visit Date (If Applicable):  3/80/2670    Next Visit Date:    Visit date not found    Medication Request:  Requested Prescriptions     Pending Prescriptions Disp Refills    fluconazole (DIFLUCAN) 150 MG tablet 14 tablet 0     Sig: Take 1 tablet by mouth daily for 14 doses

## 2022-10-14 NOTE — DISCHARGE INSTRUCTIONS
[address] [facility],5Th Floor -Phone: [phone] Fax: [phone]             Visit  Discharge Instructions / Physician Orders     DATE: 10/18/2022     Home Care: N/A     SUPPLIES ORDERED THRU: N/A     Wound Location: Right 2nd Toe, Left Leg     Cleanse with: saline     Dressing Orders: Normal-saline moistened tiesha to all wounds, dry dressing, Tubi                                                Frequency: Every Other Day     Additional Orders: Increase protein to diet (meat, cheese, eggs, fish, peanut butter, nuts and beans)  Multivitamin daily  ELEVATE LEGS AS MUCH AS POSSIBLE  2/10/2022-Culture Taken  2/14/22- antibiotic at pharmacy  2/28/22-X-rays ordered. 3/21/22-A1C and Nutritional labs ordered  3/29/22- X-Ray Ordered  6/14/2022-Culture Taken  6/15/2022- X-Ray Right Toe  6/17/22- Procedure on right 2nd toe  8/30/22- Juxta Lite Ordered Right Leg     Cleared to return to work 7/8/2022     Your next appointment with [address] [place] [address] [facility] with Dr. Esvin Love        (Please note your next appointment above and if you are unable to keep, kindly give a 24 hour notice. Thank you.)     If you experience any of the following, please call the [address] [place] [address] during business hours:  [phone]  Your Phone call may be forwarded to [employer] during business hours that [phone] is closed. * Increase in Pain  * Temperature over 101  * Increase in drainage from your wound  * Drainage with a foul odor  * Bleeding  * Increase in swelling  * Need for compression bandage changes due to slippage, breakthrough drainage. If you need medical attention outside of the business hours of the [address] [place] [address] please contact your PCP or go to the nearest emergency room. The information contained in the After Visit Summary has been reviewed with me, the patient and/or responsible adult, by my health care provider(s).  I had the opportunity to ask questions regarding this information.  I have elected to receive;      []After Visit Summary  [x]Comprehensive Discharge Instruction        Patient signature______________________________________Date:________g  Electronically signed by Joselyn Barron RN on 10/18/2022 at 1:20 PM  Electronically signed by Alfredito Lee DPM on 10/18/2022 at 1:02 PM

## 2022-10-18 ENCOUNTER — HOSPITAL ENCOUNTER (OUTPATIENT)
Dept: WOUND CARE | Age: 62
Discharge: HOME | End: 2022-10-18
Payer: COMMERCIAL

## 2022-10-18 VITALS
BODY MASS INDEX: 29.26 KG/M2 | TEMPERATURE: 97.6 F | HEIGHT: 72 IN | HEART RATE: 75 BPM | WEIGHT: 216 LBS | DIASTOLIC BLOOD PRESSURE: 76 MMHG | SYSTOLIC BLOOD PRESSURE: 142 MMHG

## 2022-10-18 DIAGNOSIS — L97.512 ULCER OF TOE OF RIGHT FOOT, WITH FAT LAYER EXPOSED (HCC): ICD-10-CM

## 2022-10-18 DIAGNOSIS — E11.42 DIABETIC POLYNEUROPATHY ASSOCIATED WITH TYPE 2 DIABETES MELLITUS (HCC): Primary | ICD-10-CM

## 2022-10-18 DIAGNOSIS — I87.2 VENOUS INSUFFICIENCY OF BOTH LOWER EXTREMITIES: ICD-10-CM

## 2022-10-18 DIAGNOSIS — L97.812 ULCER OF RIGHT PRETIBIAL REGION, WITH FAT LAYER EXPOSED (HCC): ICD-10-CM

## 2022-10-18 DIAGNOSIS — L97.822 SKIN ULCER OF POPLITEAL REGION, LEFT, WITH FAT LAYER EXPOSED (HCC): ICD-10-CM

## 2022-10-18 PROCEDURE — 11042 DBRDMT SUBQ TIS 1ST 20SQCM/<: CPT

## 2022-10-18 RX ORDER — LIDOCAINE HYDROCHLORIDE 20 MG/ML
JELLY TOPICAL ONCE
Status: COMPLETED | OUTPATIENT
Start: 2022-10-18 | End: 2022-10-18

## 2022-10-18 RX ORDER — LIDOCAINE HYDROCHLORIDE 20 MG/ML
JELLY TOPICAL ONCE
OUTPATIENT
Start: 2022-10-18 | End: 2022-10-18

## 2022-10-18 RX ORDER — LIDOCAINE HYDROCHLORIDE 40 MG/ML
SOLUTION TOPICAL ONCE
OUTPATIENT
Start: 2022-10-18 | End: 2022-10-18

## 2022-10-18 RX ORDER — LIDOCAINE HYDROCHLORIDE 20 MG/ML
JELLY TOPICAL ONCE
Status: CANCELLED | OUTPATIENT
Start: 2022-10-18 | End: 2022-10-18

## 2022-10-18 RX ADMIN — LIDOCAINE HYDROCHLORIDE 6 ML: 20 JELLY TOPICAL at 13:11

## 2022-10-18 NOTE — PROGRESS NOTES
[address]   Progress Note and Procedure Note      Carlton Martin  MEDICAL RECORD NUMBER:  [id number]  AGE: 58 y.o. GENDER: male  : 1960  EPISODE DATE:  10/18/2022    Subjective:     Chief Complaint   Patient presents with    Wound Check     Ble           HISTORY of PRESENT ILLNESS HPI     Carlton Martin is a 58 y.o. male who presents today for wound/ulcer evaluation. S/P    LEFT stravix graft , EPIFIX , 2nd right S/P ARTHROPLASTY 22 . PT IS BTW. HAS EXTRA DEPTH SHOES (CANDIE). KAYCEE/SSD RIGHT QOD. Maintained unna boot intact right. STILL DRIVING TO [place]. Ulcer Identification:  Ulcer Type: diabetic  Contributing Factors: diabetes and chronic pressure       22 1204    Specimen Description . TOE    Special Requests . TOE   SECOND LEFT DIGIT    Direct Exam RARE NEUTROPHILS Abnormal     Direct Exam FEW GRAM POSITIVE COCCI IN PAIRS Abnormal     Culture STREPTOCOCCI, BETA HEMOLYTIC GROUP B MODERATE GROWTH Abnormal     Culture  Abnormal   STAPHYLOCOCCUS AUREUS MODERATE GROWTH This isolate is methicillin susceptible. For susceptibility, refer to previous culture. Culture No anaerobic organisms isolated at 5 days.                 PAST MEDICAL HISTORY        Diagnosis Date    Acquired trigger finger 2018    CAD (coronary artery disease)     NWOCC/ involving coronary bypass graft of native heart with unstable angina pectoris    Cervical spondylosis     CHF (congestive heart failure) ([facility])     Chronic back pain     on 18 pt states is presently in pain mgmnt    Chronic ulcer of toe, left, with necrosis of bone ([address] 3/29/2022    Contusion of elbow 2018    Contusion of knee 2018    Contusion of shoulder region 2018    Diabetic foot ulcer with osteomyelitis ([address] 2018    Diabetic ulcer of toe associated with type 2 diabetes mellitus, with fat layer exposed ([address] 2018    Hypercholesterolemia 2017    Hyperlipemia, mixed     Hypertension Ischemic cardiomyopathy 5/7/2018    Non-pressure chronic ulcer of left lower leg with fat layer exposed ([facility] 6/9/2014    Obesity     Obesity, Class III, BMI 40-49.9 (morbid obesity) ([address] 4/6/2015    Obstructive sleep apnea syndrome     Osteoarthritis of carpometacarpal (CMC) joint of thumb 6/5/2018    Osteoarthritis of knee 6/5/2018    Peripheral vascular disease ([address]     with venous stasis and ulcerations. Dr Akash Dickens    Presence of coronary angioplasty implant and graft 9/11/2009    Sleep apnea     Dr Julisa Galeano of knee and leg 6/5/2018    Sprain of shoulder and upper arm 6/5/2018    Type II or unspecified type diabetes mellitus without mention of complication, not stated as uncontrolled     Ulcer of left foot, with fat layer exposed ([facility] 1/9/2018    Unspecified sleep apnea     Dr Aragon Slice CPAP    Venous insufficiency of both lower extremities 1/31/2018       PAST SURGICAL HISTORY    Past Surgical History:   Procedure Laterality Date    ARTHROPLASTY Left 4/1/2022    ARTHROPLASTY 2ND LEFT TOE performed by Alfredito Lee DPM at [address] [facility] Bilateral 6/17/2022    ARTHROPLASTY 2ND DIGIT RIGHT APPLICATION EPIFIX LEFT FOOT performed by Alfredito Lee DPM at [address] [place] [address]  09/2021    stent     [address] Right 3/22/13    Dr. Weir Rather.     COLONOSCOPY  2020    CORONARY ARTERY BYPASS GRAFT  2/26/07    [facility], Dr Fernando Boland (X 3)    CORONARY ARTERY BYPASS GRAFT  2006    NECK SURGERY  2010    cervical stenoses C5-C6-C7and partial T1 laminectomy with fusion of C6-7    OTHER SURGICAL HISTORY      wound care ulcers of legs (bilaterally) with Dr Sue Meyer Bilateral 5/18/2022    STRAVIX LEFT FOOT, THERASKIN RIGHT FOOT performed by Alfredito Lee DPM at [address] [facility] Left 4/6/2022    TOE AMPUTATION, SECOND DIGIT performed by Alfredito Lee DPM at [facility]  age 11    VEIN SURGERY      closure of peripherator veins of legs, Dr Mata Cam History   Problem Relation Age of Onset    Cancer Mother         lung    Diabetes Father     Heart Disease Father     High Blood Pressure Father     Diabetes Brother     Heart Disease Brother     High Blood Pressure Brother        SOCIAL HISTORY    Social History     Tobacco Use    Smoking status: Never     Passive exposure: Never    Smokeless tobacco: Never   Vaping Use    Vaping Use: Never used   Substance Use Topics    Alcohol use: Not Currently     Comment: rare social, non past 5 months    Drug use: No       ALLERGIES    Allergies   Allergen Reactions    Pcn [Penicillins] Other (See Comments)     Unknown rx    Penicillin G Rash       MEDICATIONS    Current Outpatient Medications on File Prior to Encounter   Medication Sig Dispense Refill    fluconazole (DIFLUCAN) 150 MG tablet Take 1 tablet by mouth daily for 14 doses 14 tablet 0    tiZANidine (ZANAFLEX) 2 MG tablet Take 1 tablet by mouth 3 times daily Patient to take 1 tablet in the morning and 2 at bedtime. 90 tablet 0    pantoprazole (PROTONIX) 40 MG tablet Take 1 tablet by mouth daily (with breakfast) 90 tablet 3    phentermine (ADIPEX-P) 37.5 MG tablet Take 1 tablet by mouth every morning (before breakfast) for 30 days. 30 tablet 0    testosterone cypionate (DEPOTESTOTERONE CYPIONATE) 200 MG/ML injection Inject 1 mL into the muscle every 30 days for 30 days. Dispense with 3 cc syringe 18g needle and 22 1/2 g needle each month 1 mL 5    atorvastatin (LIPITOR) 40 MG tablet Take 1 tablet by mouth in the morning. 90 tablet 3    topiramate (TOPAMAX) 200 MG tablet Take 1 tablet by mouth in the morning and 1 tablet before bedtime. 60 tablet 5    HYDROcodone Bitartrate ER 15 MG CP12 Take 15 mg by mouth in the morning and at bedtime.       metFORMIN (GLUCOPHAGE) 1000 MG tablet Take 1 tablet by mouth 2 times daily (with meals) 60 tablet 3    tiZANidine (ZANAFLEX) 4 MG tablet 1 po qd 14 tablet 3    clopidogrel (PLAVIX) 75 MG tablet Take 1 tablet by mouth daily 90 tablet 3    carvedilol (COREG) 12.5 MG tablet Take 1 tablet by mouth 2 times daily (with meals) 180 tablet 3    dapagliflozin (FARXIGA) 10 MG tablet Take 1 tablet by mouth every morning 90 tablet 3    traZODone (DESYREL) 150 MG tablet TAKE ONE TABLET BY MOUTH EVERY EVENING 90 tablet 3    linagliptin (TRADJENTA) 5 MG tablet TAKE 1 TABLET BY MOUTH ONE TIME A DAY 90 tablet 3    hydroCHLOROthiazide (MICROZIDE) 12.5 MG capsule 1 po qd 90 capsule 3    gabapentin (NEURONTIN) 300 MG capsule Take 900 mg by mouth nightly. Misc Natural Products (GLUCOSAMINE CHOND MSM FORMULA PO) Take 2 tablets by mouth daily      HYDROcodone-acetaminophen (NORCO)  MG per tablet Take 1 tablet by mouth every 4-6 hours as needed for Pain.      meloxicam (MOBIC) 15 MG tablet Take 1 tablet by mouth daily 90 tablet 3    NEEDLE, DISP, 18 G (BD DISP NEEDLES) 18G X 1-1/2\" MISC 1 Device by Does not apply route every 30 days 25 each 1    Multiple Vitamin (MULTI VITAMIN DAILY PO) Take by mouth      nitroGLYCERIN (NITROSTAT) 0.4 MG SL tablet Place 0.4 mg under the tongue      aspirin 325 MG tablet Take 325 mg by mouth daily. No current facility-administered medications on file prior to encounter. REVIEW OF SYSTEMS    Review of Systems   Constitutional:  Negative for chills and fever. Skin:  Positive for wound. Neurological:  Negative for numbness. Objective:      BP (!) 142/76   Pulse 75   Temp 97.6 °F (36.4 °C) (Tympanic)   Ht 6' (1.829 m)   Wt 216 lb (98 kg)   BMI 29.29 kg/m²     Wt Readings from Last 3 Encounters:   10/18/22 216 lb (98 kg)   10/04/22 226 lb (102.5 kg)   09/27/22 249 lb (112.9 kg)       Physical Exam:  General:  Alert and oriented x3. In no acute distress.      Lower Extremity Physical Exam:    Vascular: DP pulses are palpable, Bilateral. PT pulses are palpable, Bilateral. CFT <3 seconds to all digits, Bilateral. 3/7 PITTING edema RIGHT. Hair growth is absent to the level of the digits, Bilateral.     Neuro: Saph/sural/SP/DP/plantar sensation intact to light touch. Musculoskeletal: EHL/FHL/GS/TA gross motor intact. Gross deformity is present, hammertoes bilateral.     Dermatologic: clean wound RIGHT 2ND DIGIT    Wound 06/28/22 Toe (Comment  which one) Right #2 2nd Toe (Active)   Wound Image   10/04/22 1318   Wound Etiology Traumatic 10/18/22 1304   Dressing Status New drainage noted; Old drainage noted 10/18/22 1304   Wound Cleansed Cleansed with saline 10/18/22 1304   Offloading for Diabetic Foot Ulcers Offloading ordered;Diabetic shoes/inserts 10/18/22 1304   Wound Length (cm) 0.1 cm 10/18/22 1304   Wound Width (cm) 0.1 cm 10/18/22 1304   Wound Depth (cm) 0.1 cm 10/18/22 1304   Wound Surface Area (cm^2) 0.01 cm^2 10/18/22 1304   Change in Wound Size % (l*w) 99 10/18/22 1304   Wound Volume (cm^3) 0.001 cm^3 10/18/22 1304   Wound Healing % 100 10/18/22 1304   Post-Procedure Length (cm) 0.3 cm 10/18/22 1304   Post-Procedure Width (cm) 0.4 cm 10/18/22 1304   Post-Procedure Depth (cm) 0.3 cm 10/18/22 1304   Post-Procedure Surface Area (cm^2) 0.12 cm^2 10/18/22 1304   Post-Procedure Volume (cm^3) 0.036 cm^3 10/18/22 1304   Wound Assessment Pink/red 10/18/22 1304   Drainage Amount Moderate 10/18/22 1304   Drainage Description Serosanguinous 10/18/22 1304   Odor None 10/18/22 1304   Trudi-wound Assessment Blanchable erythema 10/18/22 1304   Margins Defined edges 10/18/22 1304   Wound Thickness Description not for Pressure Injury Full thickness 10/18/22 1304   Number of days: 112       Wound 10/18/22 Pretibial Left #5 Cluster (Active)   Wound Image   10/04/22 1318   Wound Etiology Venous 10/18/22 1304   Dressing Status New drainage noted; Old drainage noted 10/18/22 1304   Wound Cleansed Cleansed with saline 10/18/22 1304   Dressing/Treatment Other (comment) 09/27/22 1344   Wound Length (cm) 0.5 cm 10/18/22 1304   Wound Width (cm) 0.5 cm 10/18/22 1304   Wound Depth (cm) 0.1 cm 10/18/22 1304   Wound Surface Area (cm^2) 0.25 cm^2 10/18/22 1304   Change in Wound Size % (l*w) 28.57 10/18/22 1304   Wound Volume (cm^3) 0.025 cm^3 10/18/22 1304   Wound Healing % 29 10/18/22 1304   Post-Procedure Length (cm) 0.5 cm 10/18/22 1304   Post-Procedure Width (cm) 0.5 cm 10/18/22 1304   Post-Procedure Depth (cm) 0.1 cm 10/18/22 1304   Post-Procedure Surface Area (cm^2) 0.25 cm^2 10/18/22 1304   Post-Procedure Volume (cm^3) 0.025 cm^3 10/18/22 1304   Wound Assessment Pink/red 10/18/22 1304   Drainage Amount Moderate 10/18/22 1304   Drainage Description Serosanguinous 10/18/22 1304   Odor None 10/18/22 1304   Trudi-wound Assessment Intact 10/18/22 1304   Margins Defined edges 10/18/22 1304   Wound Thickness Description not for Pressure Injury Full thickness 10/18/22 1304   Number of days: 0              Procedure:  Wound Location: RIGHT FOOT, PRETIBIAL LEFT LEG    Lidocaine gel soaked gauze was applied at beginning of wound evaluation. The wound(s) was excisionally debrided sharply of fibrotic, necrotic, and hyperkeratotic tissue, including a layer of surrounding healthy tissue using curette. The wound was debrided down through and including subcutaneous. Percent of Wound Debrided: 100%    Total Surface Area Debrided:  (see above for sq cm)    Bleeding: Minimal    Patient tolerated procedure well and was given proper instruction. Post debridement wound description:  clean       E11.42, .  L97.512, L97.822, I89.0      Plan:    EXCISIONAL DEBRIDEMENT OF WOUND STHRU SUB Q TISSUE WITH CURRETTE     KAYCEE TO RIGHT FOOT AND LEFT LEG - CHANGE QOD AND APPLY TUBIGRIP LEFT    WILL CALL [facility] TO SEE WHERE THE JUXTALITE ARE - THEY WERE ORDERED IN [facility] 2 WEEKS    Treatment Note please see attached Discharge Instructions

## 2022-10-28 NOTE — DISCHARGE INSTRUCTIONS
Aly Ashley -Phone: [phone] Fax: [phone]             Visit  Discharge Instructions / Physician Orders     DATE: 11/1/2022     Home Care: N/A     SUPPLIES ORDERED THRU: N/A     Wound Location: Right 2nd Toe, Left Leg-healed     Cleanse with: saline     Dressing Orders:                                                Frequency: Every Other Day     Additional Orders: Increase protein to diet (meat, cheese, eggs, fish, peanut butter, nuts and beans)  Multivitamin daily  ELEVATE LEGS AS MUCH AS POSSIBLE  2/10/2022-Culture Taken  2/14/22- antibiotic at pharmacy  2/28/22-X-rays ordered. 3/21/22-A1C and Nutritional labs ordered  3/29/22- X-Ray Ordered  6/14/2022-Culture Taken  6/15/2022- X-Ray Right Toe  6/17/22- Procedure on right 2nd toe  8/30/22- Juxta Lite Ordered Right Leg     Cleared to return to work 7/8/2022     Your next appointment with [facility] is- call if needed        (Please note your next appointment above and if you are unable to keep, kindly give a 24 hour notice. Thank you.)     If you experience any of the following, please call the [facility] during business hours:  [phone]  Your Phone call may be forwarded to [address] during business hours that [facility] is closed. * Increase in Pain  * Temperature over 101  * Increase in drainage from your wound  * Drainage with a foul odor  * Bleeding  * Increase in swelling  * Need for compression bandage changes due to slippage, breakthrough drainage. If you need medical attention outside of the business hours of the [facility] please contact your PCP or go to the nearest emergency room. The information contained in the After Visit Summary has been reviewed with me, the patient and/or responsible adult, by my health care provider(s). I had the opportunity to ask questions regarding this information.  I have elected to receive;      []After Visit Summary  [x]Comprehensive Discharge Instruction        Patient signature______________________________________Date:________  Electronically signed by Pat Hobson RN on 11/1/2022 at 1:09 PM  Electronically signed by Kaelyn Limon DPM on 11/1/2022 at 12:56 PM

## 2022-11-01 ENCOUNTER — HOSPITAL ENCOUNTER (OUTPATIENT)
Dept: WOUND CARE | Age: 62
Discharge: HOME | End: 2022-11-01
Payer: COMMERCIAL

## 2022-11-01 VITALS
HEIGHT: 72 IN | BODY MASS INDEX: 29.26 KG/M2 | TEMPERATURE: 97.9 F | WEIGHT: 216 LBS | SYSTOLIC BLOOD PRESSURE: 103 MMHG | DIASTOLIC BLOOD PRESSURE: 63 MMHG | RESPIRATION RATE: 16 BRPM | HEART RATE: 86 BPM

## 2022-11-01 DIAGNOSIS — I87.2 VENOUS INSUFFICIENCY OF BOTH LOWER EXTREMITIES: ICD-10-CM

## 2022-11-01 DIAGNOSIS — L97.512 ULCER OF TOE OF RIGHT FOOT, WITH FAT LAYER EXPOSED (HCC): ICD-10-CM

## 2022-11-01 DIAGNOSIS — E11.42 DIABETIC POLYNEUROPATHY ASSOCIATED WITH TYPE 2 DIABETES MELLITUS (HCC): Primary | ICD-10-CM

## 2022-11-01 DIAGNOSIS — L97.812 ULCER OF RIGHT PRETIBIAL REGION, WITH FAT LAYER EXPOSED (HCC): ICD-10-CM

## 2022-11-01 PROBLEM — L97.821 ULCER OF LEFT SHIN LIMITED TO BREAKDOWN OF SKIN (HCC): Status: ACTIVE | Noted: 2022-08-30

## 2022-11-01 PROCEDURE — 99212 OFFICE O/P EST SF 10 MIN: CPT

## 2022-11-01 RX ORDER — LIDOCAINE HYDROCHLORIDE 20 MG/ML
JELLY TOPICAL ONCE
Status: COMPLETED | OUTPATIENT
Start: 2022-11-01 | End: 2022-11-01

## 2022-11-01 RX ORDER — LIDOCAINE HYDROCHLORIDE 20 MG/ML
JELLY TOPICAL ONCE
OUTPATIENT
Start: 2022-11-01 | End: 2022-11-01

## 2022-11-01 RX ORDER — DOXYCYCLINE HYCLATE 100 MG
100 TABLET ORAL 2 TIMES DAILY
Qty: 20 TABLET | Refills: 0 | Status: SHIPPED | OUTPATIENT
Start: 2022-11-01 | End: 2022-11-11

## 2022-11-01 RX ORDER — LIDOCAINE HYDROCHLORIDE 40 MG/ML
SOLUTION TOPICAL ONCE
OUTPATIENT
Start: 2022-11-01 | End: 2022-11-01

## 2022-11-01 RX ADMIN — LIDOCAINE HYDROCHLORIDE 6 ML: 20 JELLY TOPICAL at 12:52

## 2022-11-01 ASSESSMENT — PAIN SCALES - GENERAL: PAINLEVEL_OUTOF10: 0

## 2022-11-01 NOTE — TELEPHONE ENCOUNTER
Pt needs refills by today, going back on the road: [place]    Urgent    Medication is pending:    Current Outpatient Medications on File Prior to Visit   Medication Sig Dispense Refill    tiZANidine (ZANAFLEX) 2 MG tablet Take 1 tablet by mouth 3 times daily Patient to take 1 tablet in the morning and 2 at bedtime. 90 tablet 0    pantoprazole (PROTONIX) 40 MG tablet Take 1 tablet by mouth daily (with breakfast) 90 tablet 3    testosterone cypionate (DEPOTESTOTERONE CYPIONATE) 200 MG/ML injection Inject 1 mL into the muscle every 30 days for 30 days. Dispense with 3 cc syringe 18g needle and 22 1/2 g needle each month 1 mL 5    atorvastatin (LIPITOR) 40 MG tablet Take 1 tablet by mouth in the morning. 90 tablet 3    topiramate (TOPAMAX) 200 MG tablet Take 1 tablet by mouth in the morning and 1 tablet before bedtime. 60 tablet 5    HYDROcodone Bitartrate ER 15 MG CP12 Take 15 mg by mouth in the morning and at bedtime. metFORMIN (GLUCOPHAGE) 1000 MG tablet Take 1 tablet by mouth 2 times daily (with meals) 60 tablet 3    tiZANidine (ZANAFLEX) 4 MG tablet 1 po qd 14 tablet 3    clopidogrel (PLAVIX) 75 MG tablet Take 1 tablet by mouth daily 90 tablet 3    carvedilol (COREG) 12.5 MG tablet Take 1 tablet by mouth 2 times daily (with meals) 180 tablet 3    dapagliflozin (FARXIGA) 10 MG tablet Take 1 tablet by mouth every morning 90 tablet 3    traZODone (DESYREL) 150 MG tablet TAKE ONE TABLET BY MOUTH EVERY EVENING 90 tablet 3    linagliptin (TRADJENTA) 5 MG tablet TAKE 1 TABLET BY MOUTH ONE TIME A DAY 90 tablet 3    hydroCHLOROthiazide (MICROZIDE) 12.5 MG capsule 1 po qd 90 capsule 3    gabapentin (NEURONTIN) 300 MG capsule Take 900 mg by mouth nightly.        Misc Natural Products (GLUCOSAMINE CHOND MSM FORMULA PO) Take 2 tablets by mouth daily      HYDROcodone-acetaminophen (NORCO)  MG per tablet Take 1 tablet by mouth every 4-6 hours as needed for Pain.      meloxicam (MOBIC) 15 MG tablet Take 1 tablet by mouth daily 90 tablet 3    NEEDLE, DISP, 18 G (BD DISP NEEDLES) 18G X 1-1/2\" MISC 1 Device by Does not apply route every 30 days 25 each 1    Multiple Vitamin (MULTI VITAMIN DAILY PO) Take by mouth      nitroGLYCERIN (NITROSTAT) 0.4 MG SL tablet Place 0.4 mg under the tongue      aspirin 325 MG tablet Take 325 mg by mouth daily. No current facility-administered medications on file prior to visit.

## 2022-11-01 NOTE — PROGRESS NOTES
[address]   Progress Note and Procedure Note      Anyi Tariq  MEDICAL RECORD NUMBER:  [id number]  AGE: 58 y.o. GENDER: male  : 1960  EPISODE DATE:  2022    Subjective:     Chief Complaint   Patient presents with    Wound Check     Right 2nd toe, left pretib         HISTORY of PRESENT ILLNESS HPI     Anyi Tariq is a 58 y.o. male who presents today for wound/ulcer evaluation. S/P    LEFT stravix graft , EPIFIX , 2nd right S/P ARTHROPLASTY 22 . PT IS BTW. HAS EXTRA DEPTH SHOES (CANDIE). KAYCEE/SSD RIGHT QOD. STILL DRIVING TO [place]. Ulcer Identification:  Ulcer Type: diabetic  Contributing Factors: diabetes and chronic pressure       22 1204    Specimen Description . TOE    Special Requests . TOE   SECOND LEFT DIGIT    Direct Exam RARE NEUTROPHILS Abnormal     Direct Exam FEW GRAM POSITIVE COCCI IN PAIRS Abnormal     Culture STREPTOCOCCI, BETA HEMOLYTIC GROUP B MODERATE GROWTH Abnormal     Culture  Abnormal   STAPHYLOCOCCUS AUREUS MODERATE GROWTH This isolate is methicillin susceptible. For susceptibility, refer to previous culture. Culture No anaerobic organisms isolated at 5 days.                 PAST MEDICAL HISTORY        Diagnosis Date    Acquired trigger finger 2018    CAD (coronary artery disease)     NWOCC/ involving coronary bypass graft of native heart with unstable angina pectoris    Cervical spondylosis     CHF (congestive heart failure) ([facility])     Chronic back pain     on 18 pt states is presently in pain mgmnt    Chronic ulcer of toe, left, with necrosis of bone ([address] 3/29/2022    Contusion of elbow 2018    Contusion of knee 2018    Contusion of shoulder region 2018    Diabetic foot ulcer with osteomyelitis ([address] 2018    Diabetic ulcer of toe associated with type 2 diabetes mellitus, with fat layer exposed ([address] 2018    Hypercholesterolemia 2017    Hyperlipemia, mixed     Hypertension     Ischemic cardiomyopathy 5/7/2018    Non-pressure chronic ulcer of left lower leg with fat layer exposed ([address] 6/9/2014    Obesity     Obesity, Class III, BMI 40-49.9 (morbid obesity) ([address] 4/6/2015    Obstructive sleep apnea syndrome     Osteoarthritis of carpometacarpal (CMC) joint of thumb 6/5/2018    Osteoarthritis of knee 6/5/2018    Peripheral vascular disease ([address]     with venous stasis and ulcerations. Dr Janet Ledesma    Presence of coronary angioplasty implant and graft 9/11/2009    Sleep apnea     Dr Keily Esteban of knee and leg 6/5/2018    Sprain of shoulder and upper arm 6/5/2018    Type II or unspecified type diabetes mellitus without mention of complication, not stated as uncontrolled     Ulcer of left foot, with fat layer exposed ([address] 1/9/2018    Unspecified sleep apnea     Dr Ethel Terrazas CPAP    Venous insufficiency of both lower extremities 1/31/2018       PAST SURGICAL HISTORY    Past Surgical History:   Procedure Laterality Date    ARTHROPLASTY Left 4/1/2022    ARTHROPLASTY 2ND LEFT TOE performed by Peg Weston DPM at [facility] Bilateral 6/17/2022    ARTHROPLASTY 2ND DIGIT RIGHT APPLICATION EPIFIX LEFT FOOT performed by Peg Weston DPM at [facility]  09/2021    stent     [address] Right 3/22/13    Dr. Corwin Sapp.     COLONOSCOPY  2020    CORONARY ARTERY BYPASS GRAFT  2/26/07    [facility], Dr Marcelino Cano (X 3)    CORONARY ARTERY BYPASS GRAFT  2006    NECK SURGERY  2010    cervical stenoses C5-C6-C7and partial T1 laminectomy with fusion of C6-7    OTHER SURGICAL HISTORY      wound care ulcers of legs (bilaterally) with Dr Get Liu Bilateral 5/18/2022    STRAVIX LEFT FOOT, THERASKIN RIGHT FOOT performed by Peg Weston DPM at [address] Left 4/6/2022    TOE AMPUTATION, SECOND DIGIT performed by Peg Weston DPM at [address]  age 11    VEIN SURGERY      closure of peripherator veins of legs, Dr Neetu Guzman History   Problem Relation Age of Onset    Cancer Mother         lung    Diabetes Father     Heart Disease Father     High Blood Pressure Father     Diabetes Brother     Heart Disease Brother     High Blood Pressure Brother        SOCIAL HISTORY    Social History     Tobacco Use    Smoking status: Never     Passive exposure: Never    Smokeless tobacco: Never   Vaping Use    Vaping Use: Never used   Substance Use Topics    Alcohol use: Not Currently     Comment: rare social, non past 5 months    Drug use: No       ALLERGIES    Allergies   Allergen Reactions    Pcn [Penicillins] Other (See Comments)     Unknown rx    Penicillin G Rash       MEDICATIONS    Current Outpatient Medications on File Prior to Encounter   Medication Sig Dispense Refill    tiZANidine (ZANAFLEX) 2 MG tablet Take 1 tablet by mouth 3 times daily Patient to take 1 tablet in the morning and 2 at bedtime. 90 tablet 0    pantoprazole (PROTONIX) 40 MG tablet Take 1 tablet by mouth daily (with breakfast) 90 tablet 3    testosterone cypionate (DEPOTESTOTERONE CYPIONATE) 200 MG/ML injection Inject 1 mL into the muscle every 30 days for 30 days. Dispense with 3 cc syringe 18g needle and 22 1/2 g needle each month 1 mL 5    atorvastatin (LIPITOR) 40 MG tablet Take 1 tablet by mouth in the morning. 90 tablet 3    topiramate (TOPAMAX) 200 MG tablet Take 1 tablet by mouth in the morning and 1 tablet before bedtime. 60 tablet 5    HYDROcodone Bitartrate ER 15 MG CP12 Take 15 mg by mouth in the morning and at bedtime.       metFORMIN (GLUCOPHAGE) 1000 MG tablet Take 1 tablet by mouth 2 times daily (with meals) 60 tablet 3    tiZANidine (ZANAFLEX) 4 MG tablet 1 po qd 14 tablet 3    clopidogrel (PLAVIX) 75 MG tablet Take 1 tablet by mouth daily 90 tablet 3    carvedilol (COREG) 12.5 MG tablet Take 1 tablet by mouth 2 times daily (with meals) 180 tablet 3    dapagliflozin (FARXIGA) 10 MG tablet Take 1 tablet by mouth every morning 90 tablet 3    traZODone (DESYREL) 150 MG tablet TAKE ONE TABLET BY MOUTH EVERY EVENING 90 tablet 3    linagliptin (TRADJENTA) 5 MG tablet TAKE 1 TABLET BY MOUTH ONE TIME A DAY 90 tablet 3    hydroCHLOROthiazide (MICROZIDE) 12.5 MG capsule 1 po qd 90 capsule 3    gabapentin (NEURONTIN) 300 MG capsule Take 900 mg by mouth nightly. Misc Natural Products (GLUCOSAMINE CHOND MSM FORMULA PO) Take 2 tablets by mouth daily      HYDROcodone-acetaminophen (NORCO)  MG per tablet Take 1 tablet by mouth every 4-6 hours as needed for Pain.      meloxicam (MOBIC) 15 MG tablet Take 1 tablet by mouth daily 90 tablet 3    NEEDLE, DISP, 18 G (BD DISP NEEDLES) 18G X 1-1/2\" MISC 1 Device by Does not apply route every 30 days 25 each 1    Multiple Vitamin (MULTI VITAMIN DAILY PO) Take by mouth      nitroGLYCERIN (NITROSTAT) 0.4 MG SL tablet Place 0.4 mg under the tongue      aspirin 325 MG tablet Take 325 mg by mouth daily. No current facility-administered medications on file prior to encounter. REVIEW OF SYSTEMS    Review of Systems   Constitutional:  Negative for chills and fever. Skin:  Positive for wound. Neurological:  Negative for numbness. Objective:      /63   Pulse 86   Temp 97.9 °F (36.6 °C) (Tympanic)   Resp 16   Ht 6' (1.829 m)   Wt 216 lb (98 kg)   BMI 29.29 kg/m²     Wt Readings from Last 3 Encounters:   11/01/22 216 lb (98 kg)   10/18/22 216 lb (98 kg)   10/04/22 226 lb (102.5 kg)       Physical Exam:  General:  Alert and oriented x3. In no acute distress. Lower Extremity Physical Exam:    Vascular: DP pulses are palpable, Bilateral. PT pulses are palpable, Bilateral. CFT <3 seconds to all digits, Bilateral.  3/7 PITTING edema RIGHT. Hair growth is absent to the level of the digits, Bilateral.     Neuro: Saph/sural/SP/DP/plantar sensation intact to light touch. Musculoskeletal: EHL/FHL/GS/TA gross motor intact.  Cassius Muta deformity is present, hammertoes bilateral.     Dermatologic: THINLY HEALED RIGHT 2ND DIGIT, THINLY HEALED LEFT LEG WOUNDS WITH VENOUS STASIS DERMATITIS             E11.42, . L97.511, L97.821, I89.0      Plan:      HAS JUXTALITE - MAINTAIN COMPRESSION - ESPECIALLY FOR HIS LONG DRIVES    RX DOXY  AS PROPHYLAXIS FOR INFECTION. PT CAN KEEP ON HAND AND USE IF HE IS OUT OF TOWN AND NEEDS AN ANTIBIOTIC FOR HIS LOWER EXTREMITIES EMERGENTLY.       [facility]    Treatment Note please see attached Discharge Instructions

## 2022-11-22 ENCOUNTER — OFFICE VISIT (OUTPATIENT)
Dept: FAMILY MEDICINE CLINIC | Age: 62
End: 2022-11-22
Payer: COMMERCIAL

## 2022-11-22 VITALS
BODY MASS INDEX: 30.56 KG/M2 | SYSTOLIC BLOOD PRESSURE: 109 MMHG | OXYGEN SATURATION: 95 % | WEIGHT: 225.6 LBS | HEART RATE: 76 BPM | DIASTOLIC BLOOD PRESSURE: 64 MMHG | HEIGHT: 72 IN

## 2022-11-22 DIAGNOSIS — I10 ESSENTIAL HYPERTENSION: ICD-10-CM

## 2022-11-22 DIAGNOSIS — E11.69 TYPE 2 DIABETES MELLITUS WITH OTHER SPECIFIED COMPLICATION, WITHOUT LONG-TERM CURRENT USE OF INSULIN (HCC): ICD-10-CM

## 2022-11-22 DIAGNOSIS — Z01.818 PRE-OPERATIVE CLEARANCE: Primary | ICD-10-CM

## 2022-11-22 DIAGNOSIS — M15.9 PRIMARY OSTEOARTHRITIS INVOLVING MULTIPLE JOINTS: ICD-10-CM

## 2022-11-22 PROCEDURE — 3051F HG A1C>EQUAL 7.0%<8.0%: CPT | Performed by: FAMILY MEDICINE

## 2022-11-22 PROCEDURE — 3074F SYST BP LT 130 MM HG: CPT | Performed by: FAMILY MEDICINE

## 2022-11-22 PROCEDURE — 3078F DIAST BP <80 MM HG: CPT | Performed by: FAMILY MEDICINE

## 2022-11-22 PROCEDURE — 99214 OFFICE O/P EST MOD 30 MIN: CPT | Performed by: FAMILY MEDICINE

## 2022-11-22 SDOH — ECONOMIC STABILITY: FOOD INSECURITY: WITHIN THE PAST 12 MONTHS, THE FOOD YOU BOUGHT JUST DIDN'T LAST AND YOU DIDN'T HAVE MONEY TO GET MORE.: NEVER TRUE

## 2022-11-22 SDOH — ECONOMIC STABILITY: FOOD INSECURITY: WITHIN THE PAST 12 MONTHS, YOU WORRIED THAT YOUR FOOD WOULD RUN OUT BEFORE YOU GOT MONEY TO BUY MORE.: NEVER TRUE

## 2022-11-22 ASSESSMENT — PATIENT HEALTH QUESTIONNAIRE - PHQ9
1. LITTLE INTEREST OR PLEASURE IN DOING THINGS: 0
SUM OF ALL RESPONSES TO PHQ QUESTIONS 1-9: 0
SUM OF ALL RESPONSES TO PHQ9 QUESTIONS 1 & 2: 0
SUM OF ALL RESPONSES TO PHQ QUESTIONS 1-9: 0
2. FEELING DOWN, DEPRESSED OR HOPELESS: 0
SUM OF ALL RESPONSES TO PHQ QUESTIONS 1-9: 0
SUM OF ALL RESPONSES TO PHQ QUESTIONS 1-9: 0

## 2022-11-22 ASSESSMENT — SOCIAL DETERMINANTS OF HEALTH (SDOH): HOW HARD IS IT FOR YOU TO PAY FOR THE VERY BASICS LIKE FOOD, HOUSING, MEDICAL CARE, AND HEATING?: NOT HARD AT ALL

## 2022-11-22 NOTE — PROGRESS NOTES
[address] [address]  Dept: [phone]      Ryan Tee is a 58 y.o. male who presents today for follow up on his  medical conditions as noted below.       Chief Complaint   Patient presents with    Pre-op Exam       Patient Active Problem List:     Hyperlipemia, mixed     Sleep apnea     Peripheral vascular disease (HCC)     CAD (coronary artery disease)     CHF (congestive heart failure) (HCC)     CTS (carpal tunnel syndrome)     Hypotension     Obesity, Class III, BMI 40-49.9 (morbid obesity) ([address]     Essential hypertension     DDD (degenerative disc disease), cervical     DJD (degenerative joint disease), cervical     Primary osteoarthritis involving multiple joints     Paresthesias in left hand     Type 2 diabetes mellitus with circulatory disorder, without long-term current use of insulin (HCC)     Coronary artery disease involving coronary bypass graft of native heart without angina pectoris     Sleep apnea     Cervical spondylosis     Long term current use of antithrombotics/antiplatelets     H/O cervical spine surgery     Long term (current) use of non-steroidal anti-inflammatories (nsaid)     Chronic prescription opiate use     Severe comorbid illness     Hx of cervical spine surgery     Chronic neck pain     Candidal balanitis     Nail avulsion, toe, initial encounter     Abnormal nuclear stress test     Abnormal stress test     Hypercholesterolemia     Acquired trigger finger     Contusion of elbow     Contusion of shoulder region     Contusion of knee     Degeneration of lumbar intervertebral disc     History of coronary artery bypass graft x 3     Ischemic cardiomyopathy     Osteoarthritis of knee     Osteoarthritis of carpometacarpal (CMC) joint of thumb     Sprain of knee and leg     Sprain of shoulder and upper arm     Presence of coronary angioplasty implant and graft     Venous insufficiency of both lower extremities     Impingement syndrome of shoulder region     Diabetic polyneuropathy associated with type 2 diabetes mellitus (HCC)     Ulcer of toe of right foot, with fat layer exposed ([address]     Cellulitis     Right foot ulcer, limited to breakdown of skin ([address]     Ulcer of left shin limited to breakdown of skin ([address]     Skin ulcer of popliteal region, left, with fat layer exposed [facility])     Past Medical History:   Diagnosis Date    Acquired trigger finger 6/5/2018    CAD (coronary artery disease)     NWOCC/ involving coronary bypass graft of native heart with unstable angina pectoris    Cervical spondylosis     CHF (congestive heart failure) ([address]     Chronic back pain     on 11/19/18 pt states is presently in pain mgmnt    Chronic ulcer of toe, left, with necrosis of bone ([address] 3/29/2022    Contusion of elbow 6/5/2018    Contusion of knee 6/5/2018    Contusion of shoulder region 6/5/2018    Diabetic foot ulcer with osteomyelitis ([address] 1/9/2018    Diabetic ulcer of toe associated with type 2 diabetes mellitus, with fat layer exposed ([address] 1/31/2018    Hypercholesterolemia 12/5/2017    Hyperlipemia, mixed     Hypertension     Ischemic cardiomyopathy 5/7/2018    Non-pressure chronic ulcer of left lower leg with fat layer exposed ([address] 6/9/2014    Obesity     Obesity, Class III, BMI 40-49.9 (morbid obesity) ([address] 4/6/2015    Obstructive sleep apnea syndrome     Osteoarthritis of carpometacarpal (CMC) joint of thumb 6/5/2018    Osteoarthritis of knee 6/5/2018    Peripheral vascular disease ([address]     with venous stasis and ulcerations.  Dr Alia Cuba    Presence of coronary angioplasty implant and graft 9/11/2009    Sleep apnea     Dr Fleming Hem of knee and leg 6/5/2018    Sprain of shoulder and upper arm 6/5/2018    Type II or unspecified type diabetes mellitus without mention of complication, not stated as uncontrolled     Ulcer of left foot, with fat layer exposed ([address] 1/9/2018    Unspecified sleep apnea Dr Kate Acuna CPAP    Venous insufficiency of both lower extremities 1/31/2018      Past Surgical History:   Procedure Laterality Date    ARTHROPLASTY Left 4/1/2022    ARTHROPLASTY 2ND LEFT TOE performed by Staci Mina DPM at [facility] Bilateral 6/17/2022    ARTHROPLASTY 2ND DIGIT RIGHT APPLICATION EPIFIX LEFT FOOT performed by Staci Mina DPM at [address] [place]  09/2021    stent     [address] Right 3/22/13    Dr. Javid Doss. COLONOSCOPY  2020    CORONARY ARTERY BYPASS GRAFT  2/26/07    [facility], Dr Graeme Thomas (X 3)    CORONARY ARTERY BYPASS GRAFT  2006    NECK SURGERY  2010    cervical stenoses C5-C6-C7and partial T1 laminectomy with fusion of C6-7    OTHER SURGICAL HISTORY      wound care ulcers of legs (bilaterally) with Dr Dominic West Bilateral 5/18/2022    STRAVIX LEFT FOOT, THERASKIN RIGHT FOOT performed by Staci Mina DPM at [facility] Left 4/6/2022    TOE AMPUTATION, SECOND DIGIT performed by Staci Mina DPM at [facility]  age 11    VEIN SURGERY      closure of peripherator veins of legs, Dr Georges Cee History   Problem Relation Age of Onset    Cancer Mother         lung    Diabetes Father     Heart Disease Father     High Blood Pressure Father     Diabetes Brother     Heart Disease Brother     High Blood Pressure Brother        Current Outpatient Medications   Medication Sig Dispense Refill    metFORMIN (GLUCOPHAGE) 1000 MG tablet Take 1 tablet by mouth 2 times daily (with meals) 60 tablet 3    tiZANidine (ZANAFLEX) 2 MG tablet Take 1 tablet by mouth 3 times daily Patient to take 1 tablet in the morning and 2 at bedtime.  90 tablet 0    pantoprazole (PROTONIX) 40 MG tablet Take 1 tablet by mouth daily (with breakfast) 90 tablet 3    testosterone cypionate (DEPOTESTOTERONE CYPIONATE) 200 MG/ML injection Inject 1 mL into the muscle every 30 days for 30 days. Dispense with 3 cc syringe 18g needle and 22 1/2 g needle each month 1 mL 5    atorvastatin (LIPITOR) 40 MG tablet Take 1 tablet by mouth in the morning. 90 tablet 3    topiramate (TOPAMAX) 200 MG tablet Take 1 tablet by mouth in the morning and 1 tablet before bedtime. 60 tablet 5    HYDROcodone Bitartrate ER 15 MG CP12 Take 15 mg by mouth in the morning and at bedtime. clopidogrel (PLAVIX) 75 MG tablet Take 1 tablet by mouth daily 90 tablet 3    carvedilol (COREG) 12.5 MG tablet Take 1 tablet by mouth 2 times daily (with meals) 180 tablet 3    dapagliflozin (FARXIGA) 10 MG tablet Take 1 tablet by mouth every morning 90 tablet 3    traZODone (DESYREL) 150 MG tablet TAKE ONE TABLET BY MOUTH EVERY EVENING 90 tablet 3    linagliptin (TRADJENTA) 5 MG tablet TAKE 1 TABLET BY MOUTH ONE TIME A DAY 90 tablet 3    hydroCHLOROthiazide (MICROZIDE) 12.5 MG capsule 1 po qd 90 capsule 3    gabapentin (NEURONTIN) 300 MG capsule Take 900 mg by mouth nightly. Misc Natural Products (GLUCOSAMINE CHOND MSM FORMULA PO) Take 2 tablets by mouth daily      HYDROcodone-acetaminophen (NORCO)  MG per tablet Take 1 tablet by mouth every 4-6 hours as needed for Pain.      meloxicam (MOBIC) 15 MG tablet Take 1 tablet by mouth daily 90 tablet 3    NEEDLE, DISP, 18 G (BD DISP NEEDLES) 18G X 1-1/2\" MISC 1 Device by Does not apply route every 30 days 25 each 1    Multiple Vitamin (MULTI VITAMIN DAILY PO) Take by mouth      nitroGLYCERIN (NITROSTAT) 0.4 MG SL tablet Place 0.4 mg under the tongue      aspirin 325 MG tablet Take 325 mg by mouth daily. tiZANidine (ZANAFLEX) 4 MG tablet 1 po qd (Patient not taking: Reported on 11/22/2022) 14 tablet 3     No current facility-administered medications for this visit.      ALLERGIES:    Allergies   Allergen Reactions    Pcn [Penicillins] Other (See Comments)     Unknown rx    Penicillin G Rash       Social History     Tobacco Use    Smoking status: Never     Passive exposure: Never    Smokeless tobacco: Never   Substance Use Topics    Alcohol use: Not Currently     Comment: rare social, non past 5 months        LDL Cholesterol (mg/dL)   Date Value   06/15/2022 52     HDL (mg/dL)   Date Value   06/15/2022 40 (L)     BUN (mg/dL)   Date Value   06/18/2022 17     Creatinine (mg/dL)   Date Value   06/18/2022 0.87     Glucose (mg/dL)   Date Value   06/18/2022 167 (H)     Hemoglobin A1C (%)   Date Value   08/26/2022 7.6 (H)     Microalb/Crt. Ratio (mcg/mg creat)   Date Value   10/07/2019 CANNOT BE CALCULATED              Subjective:      HPI  Is being seen today for preoperative clearance prior to total hip surgery he did have laboratory studies and an EKG done  His last hemoglobin A1c in September was 7.2  He is seeing a cardiologist today for cardiac clearance    Review of Systems:     Constitutional: Negative for fever, appetite change and fatigue. Family social and medical history reviewed and unchanged     HENT: Negative. Negative for nosebleeds, trouble swallowing and neck pain. Eyes: Negative for photophobia and visual disturbance. Respiratory: Negative. Negative for chest tightness and shortness of breath. Cardiovascular: Negative. Negative for chest pain and leg swelling. Gastrointestinal: Negative. Negative for abdominal pain and blood in stool. Endocrine: Negative for cold intolerance and polyuria. Genitourinary: Negative for dysuria and hematuria. Musculoskeletal: Negative. Skin: Negative for rash. Allergic/Immunologic: Negative. Neurological: Negative. Negative for dizziness, weakness and numbness. Hematological: Negative. Negative for adenopathy. Does not bruise/bleed easily. Psychiatric/Behavioral: Negative for sleep disturbance, dysphoric mood and  decreased concentration. The patient is not nervous/anxious. Objective:     Physical Exam:     Nursing note and vitals reviewed.   /64   Pulse 76   Ht 6' (1.829 m)   Dianne Group 225 lb 9.6 oz (102.3 kg)   SpO2 95%   BMI 30.60 kg/m²   Constitutional: He is oriented to person, place, and time. He   appears well-developed and well-nourished. HENT:   Head: Normocephalic and atraumatic. Right Ear: External ear normal. Tympanic membrane is not erythematous. No middle ear effusion. Left Ear: External ear normal. Tympanic membrane is not erythematous. No middle ear effusion. Nose: No mucosal edema. Mouth/Throat: Oropharynx is clear and moist. No posterior oropharyngeal erythema. Eyes: Conjunctivae and EOM are normal. Pupils are equal, round, and reactive to light. Neck: Normal range of motion. Neck supple. No thyromegaly present. Cardiovascular: Normal rate, regular rhythm and normal heart sounds. No murmur heard. Pulmonary/Chest: Effort normal and breath sounds normal. He has no wheezes. Hehas no rales. Abdominal: Soft. Bowel sounds are normal. He exhibits no distension and no mass. There is no tenderness. There is no rebound and no guarding. Genitourinary/Anorectal:deferred  Musculoskeletal: Normal range of motion. He exhibits no edema or tenderness. Lymphadenopathy: He has no cervical adenopathy. Neurological: He is alert and oriented to person, place, and time. He has normal reflexes. Skin: Skin is warm and dry. No rash noted. Psychiatric: He has a normal mood and affect. His   behavior is normal.       Assessment:      1. Pre-operative clearance    2. Type 2 diabetes mellitus with other specified complication, without long-term current use of insulin ([address]    3. Essential hypertension    4. Primary osteoarthritis involving multiple joints          Plan:      Call or return to clinic prn if these symptoms worsen or fail to improve as anticipated. I have reviewed the instructions with the patient, answering all questions to his satisfaction. Return if symptoms worsen or fail to improve.   No orders of the defined types were placed in this encounter. No orders of the defined types were placed in this encounter.     I will clear him for surgery I am a little bit concerned about his poor teeth this will be up to the surgeon to decide whether or not to make and get them fixed before surgery  Electronically signed by Gage Ledezma DO on 11/22/2022 at 9:46 AM

## 2022-12-08 ENCOUNTER — OFFICE VISIT (OUTPATIENT)
Dept: PULMONOLOGY | Age: 62
End: 2022-12-08
Payer: COMMERCIAL

## 2022-12-08 VITALS
HEIGHT: 72 IN | OXYGEN SATURATION: 98 % | BODY MASS INDEX: 29.93 KG/M2 | HEART RATE: 72 BPM | RESPIRATION RATE: 18 BRPM | DIASTOLIC BLOOD PRESSURE: 64 MMHG | SYSTOLIC BLOOD PRESSURE: 110 MMHG | WEIGHT: 221 LBS

## 2022-12-08 DIAGNOSIS — G47.30 SLEEP APNEA, UNSPECIFIED TYPE: Primary | ICD-10-CM

## 2022-12-08 PROCEDURE — 3078F DIAST BP <80 MM HG: CPT | Performed by: INTERNAL MEDICINE

## 2022-12-08 PROCEDURE — 99214 OFFICE O/P EST MOD 30 MIN: CPT | Performed by: INTERNAL MEDICINE

## 2022-12-08 PROCEDURE — 3074F SYST BP LT 130 MM HG: CPT | Performed by: INTERNAL MEDICINE

## 2022-12-08 NOTE — PROGRESS NOTES
DMEKeith NAVIN Robertoos  12/8/2022    Chief Complaint   Patient presents with    Sleep Apnea     6 month f/u  Having hip replacement surgery on 12/15   Patient has obstructive sleep apnea syndrome that is responding well to the use of BiPAP 6 to 7 hours. BiPAP has been effective in relieving the apnea improving daytime symptoms does not feel sleepy tired fatigue during the daytime. He did not complete an [place] Sleepiness Scale this visit. Unfortunately the patient has been very stressed because his wife had a stroke last week and is in the ICU  Prognosis is not very good. Patient has systemic hypertension that is under good control. Blood sugar control. We will    No known coronary artery disease no PND no orthopnea. He does have peripheral vascular disease. He also    Hyperlipidemia cervical spondylosis he already had COVID-vaccine. I advised him to get the influenza vaccine. He is scheduled to undergo hip replacement surgery          [facility] 6/9/2022 4/15/2021 10/12/2020 9/17/2020 12/2/2019 7/8/2019 8/13/2018   Sitting and reading 0 0 0 0 0 1 0   Watching TV 0 0 0 0 1 1 0   Sitting, inactive in a public place (e.g. a theatre or a meeting) 0 0 0 0 1 0 1   As a passenger in a car for an hour without a break 1 0 0 (No Data) 1 0 0   Lying down to rest in the afternoon when circumstances permit 2 0 3 1 2 3 3   Sitting and talking to someone 0 0 0 0 0 0 0   Sitting quietly after a lunch without alcohol 1 0 0 0 1 3 0   In a car, while stopped for a few minutes in traffic 0 0 0 0 0 0 0   [place] Sleepiness Score 4 0 3 - 6 8 4                   Review of Systems -as assistant were conductive for all other system including upper and lower extremities and no additional information was obtained.   General ROS: negative for - chills, fatigue, fever or weight loss  ENT ROS: negative for - headaches, oral lesions or sore throat  Cardiovascular ROS: no chest pain , orthopnea or pnd Self-Isolating at Home    If it is determined that you do not need to be hospitalized and can be isolated at home, you will be monitored by staff from your local or state health department. You should follow the prevention steps below until a healthcare provider or local or state health department says you can return to your normal activities.    Stay home except to get medical care  People who are mildly ill with COVID-19 or have any other infectious respiratory illness are able to isolate at home during their illness. You should restrict activities outside your home, except for getting medical care. Do not go to work, school, or public areas. Avoid using public transportation, ride-sharing, or taxis.    Separate yourself from other people and animals in your home  People: As much as possible, you should stay in a specific room and away from other people in your home. Also, you should use a separate bathroom, if available.    Animals: You should restrict contact with pets and other animals while you are sick with COVID-19 or any respiratory illness, just like you would around other people. Although there have not been reports of pets or other animals becoming sick with COVID-19, it is still recommended that people sick with COVID-19 limit contact with animals until more information is known about the virus. When possible, have another member of your household care for your animals while you are sick. If you are sick with COVID-19, avoid contact with your pet, including petting, snuggling, being kissed or licked, and sharing food. If you must care for your pet or be around animals while you are sick, wash your hands before and after you interact with pets and wear a facemask. See CDC guidelines for COVID-19 and Animals for more information.    Call ahead before visiting your doctor  If you have a medical appointment, call the healthcare provider and tell them that you have or may have COVID-19 or another possibly  Gastrointestinal ROS: no abdominal pain, change in bowel habits, or black or bloody stools  Skin - no rash   Neuro - no blurry vision , no loc . No focal weakness   msk - no jt tenderness or swelling    Vascular - no claudication , rest completed and negative   Lymphatic - complete and negative   Hematology - oncology - complete and negative   Allergy immunology - complete and negative    no burning or hematuria       LUNG CANCER SCREENING     CRITERIA MET    []     CT ORDERED  []      CRITERIA NOT MET   [x]      REFUSED                    []        REASON CRITERIA NOT MET     SMOKING LESS THAN 30 PY  []      AGE LESS THAN 55 or GREATER 77 YEARS  []      QUIT SMOKING 15 YEARS OR GREATER   [x]      RECENT CT WITH IN 11 MONTHS    []      LIFE EXPECTANCY < 5 YEARS   []      SIGNS  AND SYMPTOMS OF LUNG CANCER   []           Immunization   Immunization History   Administered Date(s) Administered    COVID-19, MODERNA BLUE border, Primary or Immunocompromised, (age 12y+), IM, 100 mcg/0.5mL 03/11/2021, 03/11/2021, 04/08/2021, 04/08/2021, 01/15/2022, 01/15/2022    Influenza Virus Vaccine 11/22/2006    Td, unspecified formulation 05/18/2001    Tdap (Boostrix, Adacel) 02/22/2013, 12/06/2014, 04/02/2017, 12/03/2019        Pneumococcal Vaccine refused vaccination for influenza and Pneumovax.   [] Up to date    [] Indicated   [x] Refused  [] Contraindicated       Influenza Vaccine   [] Up to date    [] Indicated   [x] Refused  [] Contraindicated       PAST MEDICAL HISTORY:         Diagnosis Date    Acquired trigger finger 6/5/2018    CAD (coronary artery disease)     NWOCC/ involving coronary bypass graft of native heart with unstable angina pectoris    Cervical spondylosis     CHF (congestive heart failure) ([address]     Chronic back pain     on 11/19/18 pt states is presently in pain mgmnt    Chronic ulcer of toe, left, with necrosis of bone ([address] 3/29/2022    Contusion of elbow 6/5/2018    Contusion of knee 6/5/2018 contagious respiratory illness. This will help the healthcare provider’s office take steps to keep other people from getting infected or exposed.    Wear a facemask  You should wear a facemask when you are around other people (e.g., sharing a room or vehicle) or pets and before you enter a healthcare provider’s office. If you are not able to wear a facemask (for example, because it causes trouble breathing), then people who live with you should not stay in the same room with you, or they should wear a facemask if they enter your room.    Cover your coughs and sneezes  Cover your mouth and nose with a tissue when you cough or sneeze. Throw used tissues in a lined trash can. Immediately wash your hands with soap and water for at least 20 seconds or, if soap and water are not available, clean your hands with an alcohol-based hand  that contains at least 60% alcohol.    Clean your hands often  Wash your hands often with soap and water for at least 20 seconds, especially after blowing your nose, coughing, or sneezing; going to the bathroom; and before eating or preparing food. If soap and water are not readily available, use an alcohol-based hand  with at least 60% alcohol, covering all surfaces of your hands and rubbing them together until they feel dry.    Soap and water are the best option if hands are visibly dirty. Avoid touching your eyes, nose, and mouth with unwashed hands.    Avoid sharing personal household items  You should not share dishes, drinking glasses, cups, eating utensils, towels, or bedding with other people or pets in your home. After using these items, they should be washed thoroughly with soap and water.    Clean all “high-touch” surfaces everyday  High touch surfaces include counters, tabletops, doorknobs, bathroom fixtures, toilets, phones, keyboards, tablets, and bedside tables. Also, clean any surfaces that may have blood, stool, or body fluids on them. Use a household  Contusion of shoulder region 6/5/2018    Diabetic foot ulcer with osteomyelitis ([address] 1/9/2018    Diabetic ulcer of toe associated with type 2 diabetes mellitus, with fat layer exposed ([address] 1/31/2018    Hypercholesterolemia 12/5/2017    Hyperlipemia, mixed     Hypertension     Ischemic cardiomyopathy 5/7/2018    Non-pressure chronic ulcer of left lower leg with fat layer exposed ([address] 6/9/2014    Obesity     Obesity, Class III, BMI 40-49.9 (morbid obesity) ([address] 4/6/2015    Obstructive sleep apnea syndrome     Osteoarthritis of carpometacarpal (CMC) joint of thumb 6/5/2018    Osteoarthritis of knee 6/5/2018    Peripheral vascular disease ([address]     with venous stasis and ulcerations. Dr Kalie Adams    Presence of coronary angioplasty implant and graft 9/11/2009    Sleep apnea     Dr Marlon Mirza of knee and leg 6/5/2018    Sprain of shoulder and upper arm 6/5/2018    Type II or unspecified type diabetes mellitus without mention of complication, not stated as uncontrolled     Ulcer of left foot, with fat layer exposed ([address] 1/9/2018    Unspecified sleep apnea     Dr Xavier Franco CPAP    Venous insufficiency of both lower extremities 1/31/2018       Family History:       Problem Relation Age of Onset    Cancer Mother         lung    Diabetes Father     Heart Disease Father     High Blood Pressure Father     Diabetes Brother     Heart Disease Brother     High Blood Pressure Brother        SURGICAL HISTORY:   Past Surgical History:   Procedure Laterality Date    ARTHROPLASTY Left 4/1/2022    ARTHROPLASTY 2ND LEFT TOE performed by Rocío White DPM at [address] [place] [address] Bilateral 6/17/2022    ARTHROPLASTY 2ND DIGIT RIGHT APPLICATION EPIFIX LEFT FOOT performed by Rocío White DPM at [facility]  09/2021    stent     [address] Right 3/22/13    Dr. Rosana Brittle.     COLONOSCOPY  2020    CORONARY ARTERY BYPASS GRAFT  2/26/07    Dr Himanshu Stearns (X 3)    CORONARY cleaning spray or wipe, according to the label instructions. Labels contain instructions for safe and effective use of the cleaning product including precautions you should take when applying the product, such as wearing gloves and making sure you have good ventilation during use of the product.    Monitor your symptoms  Seek prompt medical attention if your illness is worsening (e.g., difficulty breathing). Before seeking care, call your healthcare provider and tell them that you have, or are being evaluated for, COVID-19 or another infectious respiratory illness. Put on a facemask before you enter the facility. These steps will help the healthcare provider’s office to keep other people in the office or waiting room from getting infected or exposed. Ask your healthcare provider to call the local or state health department. Persons who are placed under active monitoring or facilitated self-monitoring should follow instructions provided by their local health department or occupational health professionals, as appropriate. When working with your local health department check their available hours.    If you have a medical emergency and need to call 911, notify the dispatch personnel that you have, or are being evaluated for COVID-19. If possible, put on a facemask before emergency medical services arrive.    Discontinuing home isolation  Patients with confirmed COVID-19 or other infectious respiratory illnesses should remain under home isolation precautions until the risk of secondary transmission to others is thought to be low. The decision to discontinue home isolation precautions should be made on a case-by-case basis, in consultation with healthcare providers and state and local health departments.         ARTERY BYPASS GRAFT  2006    NECK SURGERY  2010    cervical stenoses C5-C6-C7and partial T1 laminectomy with fusion of C6-7    OTHER SURGICAL HISTORY      wound care ulcers of legs (bilaterally) with Dr Dale Peña Bilateral 5/18/2022    STRAVIX LEFT FOOT, THERASKIN RIGHT FOOT performed by Liza Dumas DPM at [address] Left 4/6/2022    TOE AMPUTATION, SECOND DIGIT performed by Liza Dumas DPM at [address] [facility]  age 11    VEIN SURGERY      closure of peripherator veins of legs, Dr Tamie Chavez                Not in a hospital admission. Allergies   Allergen Reactions    Pcn [Penicillins] Other (See Comments)     Unknown rx    Penicillin G Rash     Social History     Tobacco Use   Smoking Status Never    Passive exposure: Never   Smokeless Tobacco Never     Prior to Admission medications    Medication Sig Start Date End Date Taking? Authorizing Provider   tiZANidine (ZANAFLEX) 2 MG tablet Take 1 tablet by mouth 3 times daily Patient to take 1 tablet in the morning and 2 at bedtime. 10/5/22  Yes Seema Hameed DO   pantoprazole (PROTONIX) 40 MG tablet Take 1 tablet by mouth daily (with breakfast) 9/27/22  Yes Seema Hameed DO   HYDROcodone Bitartrate ER 15 MG CP12 Take 15 mg by mouth in the morning and at bedtime. 7/15/22  Yes Historical Provider, MD   dapagliflozin (FARXIGA) 10 MG tablet Take 1 tablet by mouth every morning 5/12/22  Yes Seema Hameed DO   traZODone (DESYREL) 150 MG tablet TAKE ONE TABLET BY MOUTH EVERY EVENING 5/12/22  Yes Seema Hameed DO   linagliptin (TRADJENTA) 5 MG tablet TAKE 1 TABLET BY MOUTH ONE TIME A DAY 5/10/22  Yes Seema Hameed DO   hydroCHLOROthiazide (MICROZIDE) 12.5 MG capsule 1 po qd 4/18/22  Yes Seema Hameed DO   gabapentin (NEURONTIN) 300 MG capsule Take 900 mg by mouth nightly.     Yes Historical Provider, MD   Misc Natural Products (GLUCOSAMINE CHOND MSM FORMULA PO) Take 2 tablets by mouth daily Yes Historical Provider, MD   HYDROcodone-acetaminophen (NORCO)  MG per tablet Take 1 tablet by mouth every 4-6 hours as needed for Pain. Yes Historical Provider, MD   NEEDLE DISP, 18 G (BD DISP NEEDLES) 18G X 1-1/2\" MISC 1 Device by Does not apply route every 30 days 6/29/21  Yes Seema Hameed DO   Multiple Vitamin (MULTI VITAMIN DAILY PO) Take by mouth   Yes Historical Provider, MD   nitroGLYCERIN (NITROSTAT) 0.4 MG SL tablet Place 0.4 mg under the tongue   Yes Historical Provider, MD   aspirin 325 MG tablet Take 325 mg by mouth daily. Yes Historical Provider, MD   metFORMIN (GLUCOPHAGE) 1000 MG tablet Take 1 tablet by mouth 2 times daily (with meals) 11/1/22 12/1/22  Seema Hameed DO   testosterone cypionate (DEPOTESTOTERONE CYPIONATE) 200 MG/ML injection Inject 1 mL into the muscle every 30 days for 30 days. Dispense with 3 cc syringe 18g needle and 22 1/2 g needle each month 8/19/22 11/22/22  Seema Hameed DO   atorvastatin (LIPITOR) 40 MG tablet Take 1 tablet by mouth in the morning. 7/28/22 11/22/22  Seema Hameed DO   topiramate (TOPAMAX) 200 MG tablet Take 1 tablet by mouth in the morning and 1 tablet before bedtime. 7/19/22 11/22/22  Seema Hameed DO   tiZANidine (ZANAFLEX) 4 MG tablet 1 po qd  Patient not taking: Reported on 11/22/2022 7/5/22   Seema Hameed DO   clopidogrel (PLAVIX) 75 MG tablet Take 1 tablet by mouth daily 6/8/22 11/22/22  Seema Hameed DO   carvedilol (COREG) 12.5 MG tablet Take 1 tablet by mouth 2 times daily (with meals) 5/26/22 11/22/22  Seema Hameed DO   meloxicam (MOBIC) 15 MG tablet Take 1 tablet by mouth daily 3/10/22 11/22/22  Kassie Ludmila, DO         Physical Exam  General Appearance:    Alert, cooperative, no distress, appears stated age, Overweight, obese. No respiratory distress. He is seeing looks like his age. Head:    Normocephalic, without obvious abnormality, atraumatic   Eye examination revealed no sign and jaundice. No Alesia's s.   No Sinus Tenderness Is Noted     Does reveal no polyps    Throat examination unremarkable.          :    Neck:   Supple, symmetrical, trachea midline, no adenopathy;     thyroid:  no enlargement/tenderness/nodules; no carotid    bruit or JVD. Short fat neck    Back:     Symmetric, no curvature, ROM normal, no CVA tenderness   Lungs:       Air entry on both sides because of obesity. No rales or rhonchi notable upper friction rub audible. Percussion note is impaired due to obesity    Chest Wall:    No tenderness or deformity      Heart:    Regular rate and rhythm, S1 and S2 normal, no murmur, rub        or gallop no rvh                           Abdomen:                                                 Pulses:                                            Lymph nodes:                    Neurologic:                  Soft, non-tender, bowel sounds active all four quadrants,     no masses, no organomegaly         2+ and symmetric all extremities            Cervical, supraclavicular not enlarged or matted or tender      CNII-XII intact, normal strength 5/5 . Sensation grossly normal  and reflexes normal 2+  throughout     Clubbing No  Lower ext edema No1+   [x] , 2 +  [] , 3+   [] bilateral cellulitis being evaluated by the ID service upper ext edema No       Musculoskeletal - no joint swelling or tenderness or synovitis               /64 (Site: Left Upper Arm, Position: Sitting, Cuff Size: Medium Adult)   Pulse 72   Resp 18   Ht 6' (1.829 m)   Wt 221 lb (100.2 kg)   SpO2 98%   BMI 29.97 kg/m²             Assessment  Systemic hypertension   obstructive sleep apnea syndrome  Obesity  Diabetes  Arthritis of the hip joints more on the left and right  Cellulitis of both lower limbs over the feet and lower legs  Plan:  Sleep apnea responding well to the use of BiPAP. I did give him a prescription for BiPAP supplies.     Is going through tremendous amount of stress because of his wife's illness due to stroke      Blood pressure is under good control no angina no PND  He can proceed with hip replacement surgery    Blood sugar is stable and his blood pressure.   I plan to see in follow-up in 6 months dictated

## 2022-12-22 ENCOUNTER — TELEPHONE (OUTPATIENT)
Dept: FAMILY MEDICINE CLINIC | Age: 62
End: 2022-12-22

## 2022-12-22 NOTE — TELEPHONE ENCOUNTER
----- Message from Reg Chang sent at 12/21/2022 10:43 AM EST -----  Subject: Hospital Follow Up    QUESTIONS  What hospital was the Patient Discharged from? [facility]  Date of Discharge? 2022-12-24  Discharge Location? Home  Reason for hospitalization as patient stated? Jerzy Andres from [facility]   called to schedule Pt's HFU. Please ask for Jerzy Andres  What question does the patient have, if applicable?   ---------------------------------------------------------------------------  --------------  CALL BACK INFO  What is the best way for the office to contact you? OK to leave message on   voicemail  Preferred Call Back Phone Number? [phone]  ---------------------------------------------------------------------------  --------------  SCRIPT ANSWERS  Relationship to Patient? Third Party  Third Party Type? Hospital?   Representative Name?  Brock Lambert

## 2022-12-22 NOTE — TELEPHONE ENCOUNTER
I called patient to schedule an appointment for hospital follow up with Dr. Roberto Burrell on 12/28. I tired to call Tigre Abreu the nurse at rehab no answer LVM to contact office.

## 2023-01-24 ENCOUNTER — TELEPHONE (OUTPATIENT)
Dept: FAMILY MEDICINE CLINIC | Age: 63
End: 2023-01-24

## 2023-01-24 NOTE — TELEPHONE ENCOUNTER
Patient called in stating that you prescribed him TRAJENTA but his insurance company wants to know if you prescribe him [facility] instead because it will be no charge for him to get it .       Please advise

## 2023-01-26 NOTE — TELEPHONE ENCOUNTER
Richa Peace is calling to request a refill on the following medication(s):    Last Visit Date (If Applicable):  22/50/7847    Next Visit Date:    Visit date not found    Medication Request:  Requested Prescriptions     Pending Prescriptions Disp Refills    topiramate (TOPAMAX) 200 MG tablet 60 tablet 5     Sig: Take 1 tablet by mouth 2 times daily

## 2023-02-07 ENCOUNTER — TELEMEDICINE (OUTPATIENT)
Dept: FAMILY MEDICINE CLINIC | Age: 63
End: 2023-02-07
Payer: COMMERCIAL

## 2023-02-07 DIAGNOSIS — J06.9 ACUTE URI: Primary | ICD-10-CM

## 2023-02-07 PROCEDURE — 99213 OFFICE O/P EST LOW 20 MIN: CPT | Performed by: FAMILY MEDICINE

## 2023-02-07 RX ORDER — BENZONATATE 200 MG/1
200 CAPSULE ORAL 3 TIMES DAILY PRN
Qty: 30 CAPSULE | Refills: 0 | Status: SHIPPED | OUTPATIENT
Start: 2023-02-07 | End: 2023-02-14

## 2023-02-07 RX ORDER — DOXYCYCLINE HYCLATE 100 MG
100 TABLET ORAL 2 TIMES DAILY
Qty: 28 TABLET | Refills: 0 | Status: SHIPPED | OUTPATIENT
Start: 2023-02-07 | End: 2023-02-21

## 2023-02-07 SDOH — ECONOMIC STABILITY: INCOME INSECURITY: HOW HARD IS IT FOR YOU TO PAY FOR THE VERY BASICS LIKE FOOD, HOUSING, MEDICAL CARE, AND HEATING?: HARD

## 2023-02-07 SDOH — ECONOMIC STABILITY: FOOD INSECURITY: WITHIN THE PAST 12 MONTHS, YOU WORRIED THAT YOUR FOOD WOULD RUN OUT BEFORE YOU GOT MONEY TO BUY MORE.: OFTEN TRUE

## 2023-02-07 SDOH — ECONOMIC STABILITY: FOOD INSECURITY: WITHIN THE PAST 12 MONTHS, THE FOOD YOU BOUGHT JUST DIDN'T LAST AND YOU DIDN'T HAVE MONEY TO GET MORE.: SOMETIMES TRUE

## 2023-02-07 SDOH — ECONOMIC STABILITY: TRANSPORTATION INSECURITY
IN THE PAST 12 MONTHS, HAS LACK OF TRANSPORTATION KEPT YOU FROM MEETINGS, WORK, OR FROM GETTING THINGS NEEDED FOR DAILY LIVING?: YES

## 2023-02-07 SDOH — ECONOMIC STABILITY: HOUSING INSECURITY
IN THE LAST 12 MONTHS, WAS THERE A TIME WHEN YOU DID NOT HAVE A STEADY PLACE TO SLEEP OR SLEPT IN A SHELTER (INCLUDING NOW)?: NO

## 2023-02-07 NOTE — PROGRESS NOTES
2023    TELEHEALTH EVALUATION -- Audio/Visual (During TRQIB-04 public health emergency)    HPI:    Hiral Mckay (:  1960) has requested an audio/video evaluation for the following concern(s):      He is being seen today stating he has had a cough and runny nose for the last few days she did COVID test was negative he is just very concerned because he had total joint done hip not that long ago and he does have a history of osteomyelitis    Review of Systems    Prior to Visit Medications    Medication Sig Taking? Authorizing Provider   doxycycline hyclate (VIBRA-TABS) 100 MG tablet Take 1 tablet by mouth 2 times daily for 14 days Yes Seema Hameed DO   benzonatate (TESSALON) 200 MG capsule Take 1 capsule by mouth 3 times daily as needed for Cough Yes Seema Hameed DO   topiramate (TOPAMAX) 200 MG tablet Take 1 tablet by mouth 2 times daily  Seema Hameed DO   SITagliptin (JANUVIA) 100 MG tablet Take 1 tablet by mouth every morning (before breakfast)  Seema Hameed DO   metFORMIN (GLUCOPHAGE) 1000 MG tablet Take 1 tablet by mouth 2 times daily (with meals)  Seema Hameed DO   tiZANidine (ZANAFLEX) 2 MG tablet Take 1 tablet by mouth 3 times daily Patient to take 1 tablet in the morning and 2 at bedtime. Seema Hameed DO   pantoprazole (PROTONIX) 40 MG tablet Take 1 tablet by mouth daily (with breakfast)  Seema Hameed DO   testosterone cypionate (DEPOTESTOTERONE CYPIONATE) 200 MG/ML injection Inject 1 mL into the muscle every 30 days for 30 days. Dispense with 3 cc syringe 18g needle and 22 1/2 g needle each month  Seema Hameed DO   atorvastatin (LIPITOR) 40 MG tablet Take 1 tablet by mouth in the morning. Seema Hameed DO   HYDROcodone Bitartrate ER 15 MG CP12 Take 15 mg by mouth in the morning and at bedtime.   Historical Provider, MD   clopidogrel (PLAVIX) 75 MG tablet Take 1 tablet by mouth daily  Seema Hameed DO   carvedilol (COREG) 12.5 MG tablet Take 1 tablet by mouth 2 times daily (with meals)  Seema Hameed DO   dapagliflozin (FARXIGA) 10 MG tablet Take 1 tablet by mouth every morning  Seema Hameed DO   traZODone (DESYREL) 150 MG tablet TAKE ONE TABLET BY MOUTH EVERY EVENING  Seema Hameed DO   linagliptin (TRADJENTA) 5 MG tablet TAKE 1 TABLET BY MOUTH ONE TIME A DAY  Seema Hameed DO   hydroCHLOROthiazide (MICROZIDE) 12.5 MG capsule 1 po qd  Seema Hameed DO   gabapentin (NEURONTIN) 300 MG capsule Take 900 mg by mouth nightly. Historical Provider, MD   Misc Natural Products (GLUCOSAMINE CHOND MSM FORMULA PO) Take 2 tablets by mouth daily  Historical Provider, MD   HYDROcodone-acetaminophen (NORCO)  MG per tablet Take 1 tablet by mouth every 4-6 hours as needed for Pain. Historical Provider, MD   meloxicam (MOBIC) 15 MG tablet Take 1 tablet by mouth daily  Seema Hameed DO   NEEDLE, DISP, 18 G (BD DISP NEEDLES) 18G X 1-1/2\" MISC 1 Device by Does not apply route every 30 days  Seema Hameed DO   Multiple Vitamin (MULTI VITAMIN DAILY PO) Take by mouth  Historical Provider, MD   nitroGLYCERIN (NITROSTAT) 0.4 MG SL tablet Place 0.4 mg under the tongue  Historical Provider, MD   aspirin 325 MG tablet Take 325 mg by mouth daily.     Historical Provider, MD       Social History     Tobacco Use    Smoking status: Never     Passive exposure: Never    Smokeless tobacco: Never   Vaping Use    Vaping Use: Never used   Substance Use Topics    Alcohol use: Not Currently     Comment: rare social, non past 5 months    Drug use: No        Allergies   Allergen Reactions    Pcn [Penicillins] Other (See Comments)     Unknown rx    Penicillin G Rash   ,   Past Medical History:   Diagnosis Date    Acquired trigger finger 6/5/2018    CAD (coronary artery disease)     NWOCC/ involving coronary bypass graft of native heart with unstable angina pectoris    Cervical spondylosis     CHF (congestive heart failure) ([facility])     Chronic back pain     on 11/19/18 pt states is presently in pain mgmnt    Chronic ulcer of toe, left, with necrosis of bone ([address] 3/29/2022    Contusion of elbow 6/5/2018    Contusion of knee 6/5/2018    Contusion of shoulder region 6/5/2018    Diabetic foot ulcer with osteomyelitis ([address] 1/9/2018    Diabetic ulcer of toe associated with type 2 diabetes mellitus, with fat layer exposed ([address] 1/31/2018    Hypercholesterolemia 12/5/2017    Hyperlipemia, mixed     Hypertension     Ischemic cardiomyopathy 5/7/2018    Non-pressure chronic ulcer of left lower leg with fat layer exposed ([address] 6/9/2014    Obesity     Obesity, Class III, BMI 40-49.9 (morbid obesity) ([address] 4/6/2015    Obstructive sleep apnea syndrome     Osteoarthritis of carpometacarpal (CMC) joint of thumb 6/5/2018    Osteoarthritis of knee 6/5/2018    Peripheral vascular disease ([address]     with venous stasis and ulcerations. Dr Rory Root    Presence of coronary angioplasty implant and graft 9/11/2009    Sleep apnea     Dr Janeen Carrillo of knee and leg 6/5/2018    Sprain of shoulder and upper arm 6/5/2018    Type II or unspecified type diabetes mellitus without mention of complication, not stated as uncontrolled     Ulcer of left foot, with fat layer exposed ([address] 1/9/2018    Unspecified sleep apnea     Dr Jd Franco CPAP    Venous insufficiency of both lower extremities 1/31/2018   ,   Past Surgical History:   Procedure Laterality Date    ARTHROPLASTY Left 4/1/2022    ARTHROPLASTY 2ND LEFT TOE performed by Talya Carreon DPM at [address] [place] [address] Bilateral 6/17/2022    ARTHROPLASTY 2ND DIGIT RIGHT APPLICATION EPIFIX LEFT FOOT performed by Talya Carreon DPM at [place] [address]  09/2021    stent     [address] Right 3/22/13    Dr. Gemma Evans.     COLONOSCOPY  2020    [facility]    CORONARY ARTERY BYPASS GRAFT  2006    NECK SURGERY  2010    cervical stenoses C5-C6-C7and partial T1 laminectomy with fusion of C6-7    OTHER SURGICAL HISTORY wound care ulcers of legs (bilaterally) with Dr López Bilateral 5/18/2022    STRAVIX LEFT FOOT, THERASKIN RIGHT FOOT performed by Alex Kinney DPM at [address] [place] [address] Rd Left 4/6/2022    TOE AMPUTATION, SECOND DIGIT performed by Alex Kinney DPM at [facility]  age 11    VEIN SURGERY      closure of peripherator veins of legs, Dr Win Cole     ,   Social History     Tobacco Use    Smoking status: Never     Passive exposure: Never    Smokeless tobacco: Never   Vaping Use    Vaping Use: Never used   Substance Use Topics    Alcohol use: Not Currently     Comment: rare social, non past 5 months    Drug use: No   ,   Family History   Problem Relation Age of Onset    Cancer Mother         lung    Diabetes Father     Heart Disease Father     High Blood Pressure Father     Diabetes Brother     Heart Disease Brother     High Blood Pressure Brother    ,   Immunization History   Administered Date(s) Administered    COVID-19, MODERNA BLUE border, Primary or Immunocompromised, (age 12y+), IM, 100 mcg/0.5mL 03/11/2021, 03/11/2021, 04/08/2021, 04/08/2021, 01/15/2022, 01/15/2022    Influenza Virus Vaccine 11/22/2006    Td, unspecified formulation 05/18/2001    Tdap (Boostrix, Adacel) 02/22/2013, 12/06/2014, 04/02/2017, 12/03/2019       PHYSICAL EXAMINATION:  [ INSTRUCTIONS:  \"[x]\" Indicates a positive item  \"[]\" Indicates a negative item  -- DELETE ALL ITEMS NOT EXAMINED]  Vital Signs: (As obtained by patient/caregiver or practitioner observation)    Blood pressure-  Heart rate-    Respiratory rate-    Temperature-  Pulse oximetry-     Constitutional: [x] Appears well-developed and well-nourished [x] No apparent distress      [] Abnormal-   Mental status  [x] Alert and awake  [x] Oriented to person/place/time [x]Able to follow commands      Eyes:  EOM    [x]  Normal  [] Abnormal-  Sclera  [x]  Normal  [] Abnormal -         Discharge [x]  None visible [] Abnormal -    HENT:   [x] Normocephalic, atraumatic. [] Abnormal   [x] Mouth/Throat: Mucous membranes are moist.     External Ears [x] Normal  [] Abnormal-     Neck: [x] No visualized mass     Pulmonary/Chest: [x] Respiratory effort normal.  [x] No visualized signs of difficulty breathing or respiratory distress        [] Abnormal-      Musculoskeletal:   [x] Normal gait with no signs of ataxia         [x] Normal range of motion of neck        [] Abnormal-       Neurological:        [x] No Facial Asymmetry (Cranial nerve 7 motor function) (limited exam to video visit)          [x] No gaze palsy        [] Abnormal-         Skin:        [x] No significant exanthematous lesions or discoloration noted on facial skin         [] Abnormal-            Psychiatric:       [x] Normal Affect [x] No Hallucinations        [] Abnormal-     Other pertinent observable physical exam findings-     ASSESSMENT/PLAN:   Diagnosis Orders   1. Acute URI  doxycycline hyclate (VIBRA-TABS) 100 MG tablet    benzonatate (TESSALON) 200 MG capsule         No orders of the defined types were placed in this encounter. Requested Prescriptions     Signed Prescriptions Disp Refills    doxycycline hyclate (VIBRA-TABS) 100 MG tablet 28 tablet 0     Sig: Take 1 tablet by mouth 2 times daily for 14 days    benzonatate (TESSALON) 200 MG capsule 30 capsule 0     Sig: Take 1 capsule by mouth 3 times daily as needed for Cough       No follow-ups on file. Pina Pennington is a 58 y.o. male being evaluated by a Virtual Visit (video visit) encounter to address concerns as mentioned above. A caregiver was present when appropriate. Due to this being a TeleHealth encounter (During ABEQZ-81 public health emergency), evaluation of the following organ systems was limited: Vitals/Constitutional/EENT/Resp/CV/GI//MS/Neuro/Skin/Heme-Lymph-Imm.   Pursuant to the emergency declaration under the [facility]5 waiver authority and the Coronavirus Preparedness and Response Supplemental Appropriations Act, this Virtual Visit was conducted with patient's (and/or legal guardian's) consent, to reduce the patient's risk of exposure to COVID-19 and provide necessary medical care. The patient (and/or legal guardian) has also been advised to contact this office for worsening conditions or problems, and seek emergency medical treatment and/or call 911 if deemed necessary. Patient identification was verified at the start of the visit: Yes    Total time spent on this encounter: Not billed by time    Services were provided through a video synchronous discussion virtually to substitute for in-person clinic visit. Patient and provider were located at their individual homes. --Claire Chowdhury DO on 2/7/2023 at 2:16 PM    An electronic signature was used to authenticate this note.

## 2023-02-13 RX ORDER — TRAZODONE HYDROCHLORIDE 150 MG/1
TABLET ORAL
Qty: 90 TABLET | Refills: 3 | Status: SHIPPED | OUTPATIENT
Start: 2023-02-13

## 2023-02-13 NOTE — TELEPHONE ENCOUNTER
Maurice Gaytan is calling to request a refill on the following medication(s):    Medication Request:  Requested Prescriptions     Pending Prescriptions Disp Refills    metFORMIN (GLUCOPHAGE) 1000 MG tablet 60 tablet 3     Sig: Take 1 tablet by mouth 2 times daily (with meals)    traZODone (DESYREL) 150 MG tablet 90 tablet 3     Sig: TAKE ONE TABLET BY MOUTH EVERY EVENING       Last Visit Date (If Applicable):  6/9/8572    Next Visit Date:    Visit date not found

## 2023-02-28 ENCOUNTER — OFFICE VISIT (OUTPATIENT)
Dept: PODIATRY | Age: 63
End: 2023-02-28
Payer: COMMERCIAL

## 2023-02-28 VITALS — HEIGHT: 72 IN | WEIGHT: 221 LBS | BODY MASS INDEX: 29.93 KG/M2

## 2023-02-28 DIAGNOSIS — E11.51 TYPE II DIABETES MELLITUS WITH PERIPHERAL CIRCULATORY DISORDER (HCC): Primary | ICD-10-CM

## 2023-02-28 DIAGNOSIS — B35.1 DERMATOPHYTOSIS OF NAIL: ICD-10-CM

## 2023-02-28 DIAGNOSIS — I73.9 PVD (PERIPHERAL VASCULAR DISEASE) (HCC): ICD-10-CM

## 2023-02-28 DIAGNOSIS — M79.672 PAIN IN BOTH FEET: ICD-10-CM

## 2023-02-28 DIAGNOSIS — M79.671 PAIN IN BOTH FEET: ICD-10-CM

## 2023-02-28 PROCEDURE — 11721 DEBRIDE NAIL 6 OR MORE: CPT | Performed by: PODIATRIST

## 2023-02-28 PROCEDURE — 99999 PR OFFICE/OUTPT VISIT,PROCEDURE ONLY: CPT | Performed by: PODIATRIST

## 2023-02-28 RX ORDER — MORPHINE SULFATE 30 MG/1
TABLET, FILM COATED, EXTENDED RELEASE ORAL
COMMUNITY
Start: 2023-02-14

## 2023-02-28 NOTE — PROGRESS NOTES
[address] PODIATRY [facility]  [address]  [address]  Dept: [phone]  Dept Fax: [phone]    DIABETIC PROGRESS NOTE  Date of patient's visit: 2/28/2023  Patient's Name:  Stephanie Hernandez YOB: 1960            Patient Care Team:  Patricia Higginbotham DO as PCP - General (Family Medicine)  Patricia Higginbotham DO as PCP - Empaneled Provider  Chepe Sharma MD (Anesthesiology)  Angela Juarez MD as Consulting Physician (Pulmonology)  Angel Mason MD as Consulting Physician (Cardiology)  Luciano Lara MD as Surgeon (Neurosurgery)  Carri Cisneros DPM as Consulting Physician (Podiatry)          Chief Complaint   Patient presents with    Diabetes     Diabetic foot care    Peripheral Neuropathy     Bilateral feet       Subjective:   Stephanie Hernandez comes to clinic for Diabetes (Diabetic foot care) and Peripheral Neuropathy (Bilateral feet)    he is a diabetic and states that he is doing well. Pt currently has complaint of thickened, elongated nails that they cannot manage by themselves. Pt's primary care physician is Patricia Higginbotham DO last seen 02/07/2023   Pt's last blood sugar was 142    Pt has lost a lot of weight and feeling well . Pt has a new complaint of none today . Lab Results   Component Value Date    LABA1C 7.6 (H) 08/26/2022      Complains of numbness in the feet bilat.   Past Medical History:   Diagnosis Date    Acquired trigger finger 6/5/2018    CAD (coronary artery disease)     NWOCC/ involving coronary bypass graft of native heart with unstable angina pectoris    Cervical spondylosis     CHF (congestive heart failure) ([facility])     Chronic back pain     on 11/19/18 pt states is presently in pain mgmnt    Chronic ulcer of toe, left, with necrosis of bone ([address] 3/29/2022    Contusion of elbow 6/5/2018    Contusion of knee 6/5/2018    Contusion of shoulder region 6/5/2018    Diabetic foot ulcer with osteomyelitis ([facility] 1/9/2018    Diabetic ulcer of toe associated with type 2 diabetes mellitus, with fat layer exposed ([address] 1/31/2018    Hypercholesterolemia 12/5/2017    Hyperlipemia, mixed     Hypertension     Ischemic cardiomyopathy 5/7/2018    Non-pressure chronic ulcer of left lower leg with fat layer exposed ([address] 6/9/2014    Obesity     Obesity, Class III, BMI 40-49.9 (morbid obesity) ([address] 4/6/2015    Obstructive sleep apnea syndrome     Osteoarthritis of carpometacarpal (CMC) joint of thumb 6/5/2018    Osteoarthritis of knee 6/5/2018    Peripheral vascular disease ([address]     with venous stasis and ulcerations. Dr Smooth Frye    Presence of coronary angioplasty implant and graft 9/11/2009    Sleep apnea     Dr Shoaib Guadarrama of knee and leg 6/5/2018    Sprain of shoulder and upper arm 6/5/2018    Type II or unspecified type diabetes mellitus without mention of complication, not stated as uncontrolled     Ulcer of left foot, with fat layer exposed ([facility] 1/9/2018    Unspecified sleep apnea     Dr Iqbal Linear CPAP    Venous insufficiency of both lower extremities 1/31/2018       Allergies   Allergen Reactions    Pcn [Penicillins] Other (See Comments)     Unknown rx    Penicillin G Rash     Current Outpatient Medications on File Prior to Visit   Medication Sig Dispense Refill    morphine (MS CONTIN) 30 MG extended release tablet take 1 tablet by mouth every 8 hours      metFORMIN (GLUCOPHAGE) 1000 MG tablet Take 1 tablet by mouth 2 times daily (with meals) 60 tablet 3    traZODone (DESYREL) 150 MG tablet TAKE ONE TABLET BY MOUTH EVERY EVENING 90 tablet 3    SITagliptin (JANUVIA) 100 MG tablet Take 1 tablet by mouth every morning (before breakfast) 90 tablet 3    tiZANidine (ZANAFLEX) 2 MG tablet Take 1 tablet by mouth 3 times daily Patient to take 1 tablet in the morning and 2 at bedtime.  90 tablet 0    pantoprazole (PROTONIX) 40 MG tablet Take 1 tablet by mouth daily (with breakfast) 90 tablet 3 dapagliflozin (FARXIGA) 10 MG tablet Take 1 tablet by mouth every morning 90 tablet 3    hydroCHLOROthiazide (MICROZIDE) 12.5 MG capsule 1 po qd 90 capsule 3    gabapentin (NEURONTIN) 300 MG capsule Take 900 mg by mouth nightly. Misc Natural Products (GLUCOSAMINE CHOND MSM FORMULA PO) Take 2 tablets by mouth daily      HYDROcodone-acetaminophen (NORCO)  MG per tablet Take 1 tablet by mouth every 4-6 hours as needed for Pain. NEEDLE, DISP, 18 G (BD DISP NEEDLES) 18G X 1-1/2\" MISC 1 Device by Does not apply route every 30 days 25 each 1    Multiple Vitamin (MULTI VITAMIN DAILY PO) Take by mouth      nitroGLYCERIN (NITROSTAT) 0.4 MG SL tablet Place 0.4 mg under the tongue      topiramate (TOPAMAX) 200 MG tablet Take 1 tablet by mouth 2 times daily 60 tablet 5    testosterone cypionate (DEPOTESTOTERONE CYPIONATE) 200 MG/ML injection Inject 1 mL into the muscle every 30 days for 30 days. Dispense with 3 cc syringe 18g needle and 22 1/2 g needle each month 1 mL 5    atorvastatin (LIPITOR) 40 MG tablet Take 1 tablet by mouth in the morning. 90 tablet 3    clopidogrel (PLAVIX) 75 MG tablet Take 1 tablet by mouth daily 90 tablet 3    carvedilol (COREG) 12.5 MG tablet Take 1 tablet by mouth 2 times daily (with meals) 180 tablet 3    meloxicam (MOBIC) 15 MG tablet Take 1 tablet by mouth daily 90 tablet 3     No current facility-administered medications on file prior to visit. Review of Systems    Review of Systems:   History obtained from chart review and the patient  General ROS: negative for - chills, fatigue, fever, night sweats or weight gain  Constitutional: Negative for chills, diaphoresis, fatigue, fever and unexpected weight change. Musculoskeletal: Positive for arthralgias, gait problem and joint swelling. Neurological ROS: negative for - behavioral changes, confusion, headaches or seizures. Negative for weakness and numbness.    Dermatological ROS: negative for - mole changes, rash  Cardiovascular: Negative for leg swelling. Gastrointestinal: Negative for constipation, diarrhea, nausea and vomiting. Objective:  Dermatologic Exam:  Skin lesion/ulceration Absent . Skin No rashes or nodules noted. .   Skin is thin, with flaky sloughing skin as well as decreased hair growth to the lower leg  Small red hemosiderin deposits seen dorsal foot   Musculoskeletal:     1st MPJ ROM WNL, Bilateral.  Muscle strength 5/5, Bilateral.  Pain present upon palpation of toenails 1-4 Left 1-5 right. decreased medial longitudinal arch, Bilateral.  Ankle ROM decreased,Bilateral.    Dorsally contracted digits present digits 2, Bilateral.     Vascular: DP pulses 1/4 bilateral.  PT pulses 0/4 bilateral.   CFT <5 seconds, Bilateral.  Hair growth absent to the level of the digits, Bilateral.  Edema present, Bilateral.  Varicosities absent, Bilateral. Erythema absent, Bilateral    Neurological: Sensation diminshed to light touch to level of digits, Bilateral.  Protective sensation intact 6/10 sites via 5.07/10g [place] Monofilament, Bilateral.  negative Tinel's, Bilateral.  negative Valleix sign, Bilateral.      Integument: Warm, dry, supple, Bilateral.  Open lesion absent, Bilateral.  Interdigital maceration absent to web spaces 4, Bilateral.  Nails 1-5 left and 1-5 right thickened > 3.0 mm, dystrophic and crumbly, discolored with subungual debris. Fissures absent, Bilateral.   General: AAO x 3 in NAD.     Derm  Toenail Description  Sites of Onychomycosis Involvement (Check affected area)  [x] [x] [x] [x] [x] [x] [x] [] [x] [x]  5 4 3 2 1 1 2 3 4 5                          Right                                        Left    Thickness  [x] [x] [x] [x] [x] [x] [x] [] [x] [x]  5 4 3 2 1 1 2 3 4 5                         Right                                        Left    Dystrophic Changes   [x] [x] [x] [x] [x] [x] [x] [x] [x] [x]  5 4 3 2 1 1 2 3 4 5                         Right Left    Color   [x] [x] [x] [x] [x] [x] [x] [] [x] [x]  5 4 3 2 1 1 2 3 4 5                          Right                                        Left    Incurvation/Ingrowin   [] [] [] [] [] [] [] [] [] []  5 4 3 2 1 1 2 3 4 5                         Right                                        Left    Inflammation/Pain   [x] [x] [x] [x] [x] [x] [x] [] [x] [x]  5 4 3 2 1 1 2 3 4 5                         Right                                        Left        Visual inspection:  Deformity: hammertoe deformity heidi feet  amputation: left 3rd toe   Skin lesions: absent  Edema: right- 2+ pitting edema, left- 2+ pitting edema    Sensory exam:  Monofilament sensation: abnormal - 6/10 via SW 5.07/10g monofilament to the plantar foot bilateral feet    Pulses: abnormal - 1/4 dorsalis pedis pulse and 0/4 Posterior tibial pulse,   Pinprick: Impaired  Proprioception: Impaired  Vibration (128 Hz): Impaired       DM with PVD       [x]Yes    []No      Assessment:  58 y.o. male with:   Diagnosis Orders   1. Type II diabetes mellitus with peripheral circulatory disorder (HCC)   DIABETES FOOT EXAM    AR DEBRIDEMENT NAIL ANY METHOD 6/>      2. Dermatophytosis of nail   DIABETES FOOT EXAM    AR DEBRIDEMENT NAIL ANY METHOD 6/>      3. PVD (peripheral vascular disease) ([facility])   DIABETES FOOT EXAM    AR DEBRIDEMENT NAIL ANY METHOD 6/>      4. Pain in both feet   DIABETES FOOT EXAM    AR DEBRIDEMENT NAIL ANY METHOD 6/>              Q7   []Yes    []No                Q8   [x]Yes    []No                     Q9   []Yes    []No    Plan:   Pt was evaluated and examined. Patient was given personalized discharge instructions. Nails 1-9 were debrided sharply in length and thickness with a nipper and , without incident. Pt will follow up in 9 weeks or sooner if any problems arise. Diagnosis was discussed with the pt and all of their questions were answered in detail.  Proper foot hygiene and care was discussed with the pt. Informed patient on proper diabetic foot care and importance of tight glycemic control. Patient to check feet daily and contact the office with any questions/problems/concerns.    Other comorbidity noted and will be managed by PCP.  2/28/2023    Electronically signed by Shoaib Palmer DPM on 2/28/2023 at 10:47 AM  2/28/2023

## 2023-03-09 DIAGNOSIS — M15.9 PRIMARY OSTEOARTHRITIS INVOLVING MULTIPLE JOINTS: ICD-10-CM

## 2023-03-09 RX ORDER — MELOXICAM 15 MG/1
15 TABLET ORAL DAILY
Qty: 90 TABLET | Refills: 3 | Status: SHIPPED | OUTPATIENT
Start: 2023-03-09 | End: 2023-06-07

## 2023-03-09 NOTE — TELEPHONE ENCOUNTER
Maximino Guzmán is calling to request a refill on the following medication(s):    Last Visit Date (If Applicable):  0/0/3601    Next Visit Date:    Visit date not found    Medication Request:  Requested Prescriptions     Pending Prescriptions Disp Refills    meloxicam (MOBIC) 15 MG tablet 90 tablet 3     Sig: Take 1 tablet by mouth daily

## 2023-04-06 NOTE — TELEPHONE ENCOUNTER
Maria Griffiths is calling to request a refill on the following medication(s):    Last Visit Date (If Applicable):  6/8/9856    Next Visit Date:    Visit date not found    Medication Request:  Requested Prescriptions     Pending Prescriptions Disp Refills    metFORMIN (GLUCOPHAGE) 1000 MG tablet 60 tablet 3     Sig: Take 1 tablet by mouth 2 times daily (with meals)

## 2023-05-03 DIAGNOSIS — I10 ESSENTIAL HYPERTENSION: ICD-10-CM

## 2023-05-03 RX ORDER — HYDROCHLOROTHIAZIDE 12.5 MG/1
CAPSULE, GELATIN COATED ORAL
Qty: 90 CAPSULE | Refills: 3 | Status: SHIPPED | OUTPATIENT
Start: 2023-05-03

## 2023-05-03 NOTE — TELEPHONE ENCOUNTER
Arley Venegas is calling to request a refill on the following medication(s):    Last Visit Date (If Applicable):  6913    Next Visit Date:    Visit date not found    Medication Request:  Requested Prescriptions     Pending Prescriptions Disp Refills    hydroCHLOROthiazide (MICROZIDE) 12.5 MG capsule 90 capsule 3     Si po qd

## 2023-05-18 ENCOUNTER — TELEMEDICINE (OUTPATIENT)
Dept: FAMILY MEDICINE CLINIC | Age: 63
End: 2023-05-18
Payer: COMMERCIAL

## 2023-05-18 DIAGNOSIS — E66.01 OBESITY, CLASS III, BMI 40-49.9 (MORBID OBESITY) (HCC): ICD-10-CM

## 2023-05-18 DIAGNOSIS — I73.9 PERIPHERAL VASCULAR DISEASE (HCC): ICD-10-CM

## 2023-05-18 DIAGNOSIS — F51.01 PRIMARY INSOMNIA: ICD-10-CM

## 2023-05-18 DIAGNOSIS — E11.69 TYPE 2 DIABETES MELLITUS WITH OTHER SPECIFIED COMPLICATION, WITHOUT LONG-TERM CURRENT USE OF INSULIN (HCC): Primary | ICD-10-CM

## 2023-05-18 PROCEDURE — 99214 OFFICE O/P EST MOD 30 MIN: CPT | Performed by: FAMILY MEDICINE

## 2023-05-18 RX ORDER — ZOLPIDEM TARTRATE 10 MG/1
10 TABLET ORAL NIGHTLY PRN
Qty: 30 TABLET | Refills: 0 | Status: SHIPPED | OUTPATIENT
Start: 2023-05-18 | End: 2023-06-17

## 2023-05-18 RX ORDER — CLOPIDOGREL BISULFATE 75 MG/1
75 TABLET ORAL DAILY
Qty: 90 TABLET | Refills: 3 | Status: SHIPPED | OUTPATIENT
Start: 2023-05-18 | End: 2023-08-16

## 2023-05-18 RX ORDER — PHENTERMINE HYDROCHLORIDE 37.5 MG/1
37.5 TABLET ORAL
Qty: 30 TABLET | Refills: 0 | Status: SHIPPED | OUTPATIENT
Start: 2023-05-18 | End: 2023-06-17

## 2023-05-18 ASSESSMENT — PATIENT HEALTH QUESTIONNAIRE - PHQ9
SUM OF ALL RESPONSES TO PHQ9 QUESTIONS 1 & 2: 2
2. FEELING DOWN, DEPRESSED OR HOPELESS: 2
SUM OF ALL RESPONSES TO PHQ QUESTIONS 1-9: 2
1. LITTLE INTEREST OR PLEASURE IN DOING THINGS: 0

## 2023-05-18 NOTE — PROGRESS NOTES
and Response Supplemental Appropriations Act, this Virtual Visit was conducted with patient's (and/or legal guardian's) consent, to reduce the patient's risk of exposure to COVID-19 and provide necessary medical care. The patient (and/or legal guardian) has also been advised to contact this office for worsening conditions or problems, and seek emergency medical treatment and/or call 911 if deemed necessary. Patient identification was verified at the start of the visit: Yes    Total time spent on this encounter: Not billed by time    Services were provided through a video synchronous discussion virtually to substitute for in-person clinic visit. Patient and provider were located at their individual homes. --Alisson Diamond DO on 5/18/2023 at 8:49 AM    An electronic signature was used to authenticate this note.

## 2023-05-25 ENCOUNTER — TELEPHONE (OUTPATIENT)
Dept: FAMILY MEDICINE CLINIC | Age: 63
End: 2023-05-25

## 2023-05-26 RX ORDER — DOXYCYCLINE HYCLATE 100 MG
100 TABLET ORAL 2 TIMES DAILY
Qty: 20 TABLET | Refills: 0 | Status: SHIPPED | OUTPATIENT
Start: 2023-05-26 | End: 2023-06-05

## 2023-05-31 NOTE — TELEPHONE ENCOUNTER
Lashae Hinton is calling to request a refill on the following medication(s):    Medication Request:  Requested Prescriptions     Pending Prescriptions Disp Refills    metFORMIN (GLUCOPHAGE) 1000 MG tablet 60 tablet 3     Sig: Take 1 tablet by mouth 2 times daily (with meals)       Last Visit Date (If Applicable):  8/84/2912    Next Visit Date:    6/13/2023

## 2023-06-01 ENCOUNTER — TELEPHONE (OUTPATIENT)
Dept: FAMILY MEDICINE CLINIC | Age: 63
End: 2023-06-01

## 2023-06-01 RX ORDER — ARIPIPRAZOLE 5 MG/1
5 TABLET ORAL DAILY
Qty: 30 TABLET | Refills: 3 | Status: SHIPPED | OUTPATIENT
Start: 2023-06-01

## 2023-06-01 NOTE — TELEPHONE ENCOUNTER
Patient is having ongoing issues with insomnia, patient stated he has tried  zolpidem 10 mg with no relief. Patient would like to know if another medication can be sent to pharmacy .

## 2023-06-15 ENCOUNTER — TELEPHONE (OUTPATIENT)
Dept: FAMILY MEDICINE CLINIC | Age: 63
End: 2023-06-15

## 2023-06-26 ENCOUNTER — TELEPHONE (OUTPATIENT)
Dept: FAMILY MEDICINE CLINIC | Age: 63
End: 2023-06-26

## 2023-06-26 DIAGNOSIS — J06.9 ACUTE URI: Primary | ICD-10-CM

## 2023-06-26 RX ORDER — AZITHROMYCIN 250 MG/1
250 TABLET, FILM COATED ORAL DAILY
Qty: 1 PACKET | Refills: 0 | Status: SHIPPED | OUTPATIENT
Start: 2023-06-26 | End: 2023-07-01

## 2023-07-13 ENCOUNTER — TELEMEDICINE (OUTPATIENT)
Dept: FAMILY MEDICINE CLINIC | Age: 63
End: 2023-07-13
Payer: COMMERCIAL

## 2023-07-13 DIAGNOSIS — E34.9 HYPOTESTOSTERONEMIA: ICD-10-CM

## 2023-07-13 DIAGNOSIS — E11.69 TYPE 2 DIABETES MELLITUS WITH OTHER SPECIFIED COMPLICATION, WITHOUT LONG-TERM CURRENT USE OF INSULIN (HCC): Primary | ICD-10-CM

## 2023-07-13 DIAGNOSIS — E66.01 OBESITY, CLASS III, BMI 40-49.9 (MORBID OBESITY) (HCC): ICD-10-CM

## 2023-07-13 DIAGNOSIS — E78.2 HYPERLIPEMIA, MIXED: ICD-10-CM

## 2023-07-13 DIAGNOSIS — I73.9 PERIPHERAL VASCULAR DISEASE (HCC): ICD-10-CM

## 2023-07-13 DIAGNOSIS — I10 ESSENTIAL HYPERTENSION: ICD-10-CM

## 2023-07-13 DIAGNOSIS — K21.00 GASTROESOPHAGEAL REFLUX DISEASE WITH ESOPHAGITIS WITHOUT HEMORRHAGE: ICD-10-CM

## 2023-07-13 PROCEDURE — 99214 OFFICE O/P EST MOD 30 MIN: CPT | Performed by: FAMILY MEDICINE

## 2023-07-13 RX ORDER — ATORVASTATIN CALCIUM 40 MG/1
40 TABLET, FILM COATED ORAL DAILY
Qty: 90 TABLET | Refills: 3 | Status: SHIPPED | OUTPATIENT
Start: 2023-07-13 | End: 2023-10-11

## 2023-07-13 RX ORDER — PHENTERMINE HYDROCHLORIDE 37.5 MG/1
37.5 TABLET ORAL
Qty: 30 TABLET | Refills: 0 | Status: SHIPPED | OUTPATIENT
Start: 2023-07-13 | End: 2023-08-12

## 2023-07-13 NOTE — PROGRESS NOTES
2023    TELEHEALTH EVALUATION -- Audio/Visual (During MUXNE-51 public health emergency)    HPI:    Tim Marie (:  1960) has requested an audio/video evaluation for the following concern(s):    He is being seen today for follow-up on diabetes hyperlipidemia blood pressure reflux all his other ongoing medical issues also weight management he is due for laboratory studies he needs refills of meds he states he does not have any new problems as of today    Review of Systems    Prior to Visit Medications    Medication Sig Taking? Authorizing Provider   phentermine (ADIPEX-P) 37.5 MG tablet Take 1 tablet by mouth every morning (before breakfast) for 30 days. Yes Seema Hameed DO   atorvastatin (LIPITOR) 40 MG tablet Take 1 tablet by mouth daily Yes Seema Hameed DO   testosterone cypionate (DEPOTESTOTERONE CYPIONATE) 200 MG/ML injection Inject 1 mL into the muscle every 30 days for 180 days.  Dispense with 3 cc syringe 18g needle and 22 1/2 g needle each month Max Daily Amount: 200 mg  Seema Hameed DO   NEEDLE, DISP, 18 G (BD DISP NEEDLES) 18G X 1-1/2\" MISC 1 Device by Does not apply route every 30 days  Seema Hameed DO   NEEDLE, DISP, 22 G 22G X 1-1/2\" MISC 1 Units by Does not apply route every 30 days  Seema Hameed DO   ARIPiprazole (ABILIFY) 10 MG tablet Take 0.5 tablets by mouth daily  Seema Hameed DO   carvedilol (COREG) 12.5 MG tablet Take 1 tablet by mouth 2 times daily (with meals)  Seema Hameed DO   metFORMIN (GLUCOPHAGE) 1000 MG tablet Take 1 tablet by mouth 2 times daily (with meals)  Seema Hameed DO   dapagliflozin (FARXIGA) 10 MG tablet Take 1 tablet by mouth every morning  Seema Hameed DO   clopidogrel (PLAVIX) 75 MG tablet Take 1 tablet by mouth daily  Seema Hameed DO   hydroCHLOROthiazide (MICROZIDE) 12.5 MG capsule 1 po qd  Seema Hameed DO   meloxicam (MOBIC) 15 MG tablet Take 1 tablet by mouth daily  Seema Hameed DO   topiramate (TOPAMAX) 200 MG tablet Take 1 tablet by

## 2023-07-17 RX ORDER — ARIPIPRAZOLE 10 MG/1
5 TABLET ORAL DAILY
Qty: 30 TABLET | Refills: 11 | Status: SHIPPED | OUTPATIENT
Start: 2023-07-17

## 2023-07-18 ENCOUNTER — OFFICE VISIT (OUTPATIENT)
Dept: PODIATRY | Age: 63
End: 2023-07-18
Payer: COMMERCIAL

## 2023-07-18 VITALS — HEIGHT: 72 IN | BODY MASS INDEX: 30.94 KG/M2 | WEIGHT: 228.4 LBS

## 2023-07-18 DIAGNOSIS — M79.672 PAIN IN BOTH FEET: ICD-10-CM

## 2023-07-18 DIAGNOSIS — B35.1 DERMATOPHYTOSIS OF NAIL: ICD-10-CM

## 2023-07-18 DIAGNOSIS — E11.51 TYPE II DIABETES MELLITUS WITH PERIPHERAL CIRCULATORY DISORDER (HCC): Primary | ICD-10-CM

## 2023-07-18 DIAGNOSIS — M79.671 PAIN IN BOTH FEET: ICD-10-CM

## 2023-07-18 DIAGNOSIS — I73.9 PVD (PERIPHERAL VASCULAR DISEASE) (HCC): ICD-10-CM

## 2023-07-18 PROCEDURE — 11721 DEBRIDE NAIL 6 OR MORE: CPT | Performed by: PODIATRIST

## 2023-07-18 PROCEDURE — 99999 PR OFFICE/OUTPT VISIT,PROCEDURE ONLY: CPT | Performed by: PODIATRIST

## 2023-07-18 RX ORDER — TRAZODONE HYDROCHLORIDE 150 MG/1
TABLET ORAL
COMMUNITY
Start: 2023-05-22

## 2023-07-18 RX ORDER — SYRINGE WITH NEEDLE, 1 ML 25GX5/8"
SYRINGE, EMPTY DISPOSABLE MISCELLANEOUS
COMMUNITY
Start: 2023-07-14

## 2023-07-18 RX ORDER — NEEDLES, SAFETY 22GX1 1/2"
NEEDLE, DISPOSABLE MISCELLANEOUS
COMMUNITY
Start: 2023-06-16

## 2023-07-18 NOTE — PROGRESS NOTES
discharge instructions. Nails 1-10 were debrided sharply in length and thickness with a nipper and , without incident. Pt will follow up in 9 weeks or sooner if any problems arise. Diagnosis was discussed with the pt and all of their questions were answered in detail. Proper foot hygiene and care was discussed with the pt. Informed patient on proper diabetic foot care and importance of tight glycemic control. Patient to check feet daily and contact the office with any questions/problems/concerns.    Other comorbidity noted and will be managed by PCP.  7/18/2023    Electronically signed by Flavia Sharif DPM on 7/18/2023 at 9:24 AM  7/18/2023

## 2023-07-21 ENCOUNTER — OFFICE VISIT (OUTPATIENT)
Dept: PULMONOLOGY | Age: 63
End: 2023-07-21
Payer: COMMERCIAL

## 2023-07-21 VITALS
WEIGHT: 226 LBS | DIASTOLIC BLOOD PRESSURE: 67 MMHG | OXYGEN SATURATION: 94 % | SYSTOLIC BLOOD PRESSURE: 106 MMHG | HEIGHT: 72 IN | HEART RATE: 87 BPM | BODY MASS INDEX: 30.61 KG/M2 | RESPIRATION RATE: 16 BRPM

## 2023-07-21 DIAGNOSIS — G47.33 OBSTRUCTIVE SLEEP APNEA SYNDROME: Primary | ICD-10-CM

## 2023-07-21 PROCEDURE — 99214 OFFICE O/P EST MOD 30 MIN: CPT | Performed by: INTERNAL MEDICINE

## 2023-07-21 PROCEDURE — 3078F DIAST BP <80 MM HG: CPT | Performed by: INTERNAL MEDICINE

## 2023-07-21 PROCEDURE — 3074F SYST BP LT 130 MM HG: CPT | Performed by: INTERNAL MEDICINE

## 2023-07-21 ASSESSMENT — SLEEP AND FATIGUE QUESTIONNAIRES
HOW LIKELY ARE YOU TO NOD OFF OR FALL ASLEEP IN A CAR, WHILE STOPPED FOR A FEW MINUTES IN TRAFFIC: 0
HOW LIKELY ARE YOU TO NOD OFF OR FALL ASLEEP WHILE SITTING QUIETLY AFTER LUNCH WITHOUT ALCOHOL: 0
HOW LIKELY ARE YOU TO NOD OFF OR FALL ASLEEP WHILE SITTING INACTIVE IN A PUBLIC PLACE: 0
HOW LIKELY ARE YOU TO NOD OFF OR FALL ASLEEP WHEN YOU ARE A PASSENGER IN A CAR FOR AN HOUR WITHOUT A BREAK: 0
HOW LIKELY ARE YOU TO NOD OFF OR FALL ASLEEP WHILE LYING DOWN TO REST IN THE AFTERNOON WHEN CIRCUMSTANCES PERMIT: 0
ESS TOTAL SCORE: 0
HOW LIKELY ARE YOU TO NOD OFF OR FALL ASLEEP WHILE SITTING AND READING: 0
HOW LIKELY ARE YOU TO NOD OFF OR FALL ASLEEP WHILE WATCHING TV: 0
HOW LIKELY ARE YOU TO NOD OFF OR FALL ASLEEP WHILE SITTING AND TALKING TO SOMEONE: 0

## 2023-07-21 NOTE — PROGRESS NOTES
[facility]  7/21/2023    Chief Complaint   Patient presents with    Sleep Apnea     Follow up    Hypertension  Diabetes  Ulcers at lower legs on both sides      Ericka Luna has obstructive sleep apnea syndrome that is being successfully treated with the use of BiPAP. He regularly uses the BiPAP and feels significant benefit in the improvement of her quality of sleep at night and lack of daytime daytime symptoms. An Verona Sleepiness Scale given to the patient in the office revealed a score of 0 indicating complete lack of daytime symptoms of sleep apnea. Patient    Patient has been taking care of his sick wife which does cause a lot of stress as a as expected. Patient does have systemic hypertension that is under good control    His blood sugar control has improved. He does have    He does have bilateral bilateral lower limb ulceration and cellulitis which probably related to vascular disease and caused by diabetes and hypertension and also due to high blood sugars. This is improving well under care of vascular and wound care. Hyperlipidemia is improving. He also have had COVID-vaccine flu vaccine and Pneumovax. He have hip replacement surgery. .  Sleep Medicine 7/21/2023 6/9/2022 4/15/2021 10/12/2020 9/17/2020 12/2/2019 7/8/2019   Sitting and reading 0 0 0 0 0 0 1   Watching TV 0 0 0 0 0 1 1   Sitting, inactive in a public place (e.g. a theatre or a meeting) 0 0 0 0 0 1 0   As a passenger in a car for an hour without a break 0 1 0 0 (No Data) 1 0   Lying down to rest in the afternoon when circumstances permit 0 2 0 3 1 2 3   Sitting and talking to someone 0 0 0 0 0 0 0   Sitting quietly after a lunch without alcohol 0 1 0 0 0 1 3   In a car, while stopped for a few minutes in traffic 0 0 0 0 0 0 0   Verona Sleepiness Score 0 4 0 3 - 6 8                   Review of Systems -as assistant were conductive for all other system including upper and lower extremities and no additional information was

## 2023-07-22 ENCOUNTER — HOSPITAL ENCOUNTER (OUTPATIENT)
Dept: GENERAL RADIOLOGY | Age: 63
End: 2023-07-22
Payer: COMMERCIAL

## 2023-07-22 ENCOUNTER — HOSPITAL ENCOUNTER (OUTPATIENT)
Age: 63
Discharge: HOME | End: 2023-07-22
Payer: COMMERCIAL

## 2023-07-22 ENCOUNTER — HOSPITAL ENCOUNTER (OUTPATIENT)
Age: 63
End: 2023-07-22
Payer: COMMERCIAL

## 2023-07-22 DIAGNOSIS — E11.69 TYPE 2 DIABETES MELLITUS WITH OTHER SPECIFIED COMPLICATION, WITHOUT LONG-TERM CURRENT USE OF INSULIN (HCC): ICD-10-CM

## 2023-07-22 DIAGNOSIS — I73.9 PERIPHERAL VASCULAR DISEASE (HCC): ICD-10-CM

## 2023-07-22 DIAGNOSIS — E78.2 HYPERLIPEMIA, MIXED: ICD-10-CM

## 2023-07-22 DIAGNOSIS — M54.16 LUMBAR RADICULOPATHY: ICD-10-CM

## 2023-07-22 DIAGNOSIS — E34.9 HYPOTESTOSTERONEMIA: ICD-10-CM

## 2023-07-22 DIAGNOSIS — K21.00 GASTROESOPHAGEAL REFLUX DISEASE WITH ESOPHAGITIS WITHOUT HEMORRHAGE: ICD-10-CM

## 2023-07-22 DIAGNOSIS — R52 PAIN: ICD-10-CM

## 2023-07-22 DIAGNOSIS — I10 ESSENTIAL HYPERTENSION: ICD-10-CM

## 2023-07-22 LAB
ALBUMIN SERPL-MCNC: 4 G/DL (ref 3.5–5.2)
ALBUMIN/GLOB SERPL: 1.3 {RATIO} (ref 1–2.5)
ALP SERPL-CCNC: 75 U/L (ref 40–129)
ALT SERPL-CCNC: 17 U/L (ref 5–41)
ANION GAP SERPL CALCULATED.3IONS-SCNC: 12 MMOL/L (ref 9–17)
AST SERPL-CCNC: 16 U/L
BASOPHILS # BLD: 0.03 K/UL (ref 0–0.2)
BASOPHILS NFR BLD: 1 % (ref 0–2)
BILIRUB SERPL-MCNC: 0.3 MG/DL (ref 0.3–1.2)
BUN SERPL-MCNC: 19 MG/DL (ref 8–23)
CALCIUM SERPL-MCNC: 9.5 MG/DL (ref 8.6–10.4)
CHLORIDE SERPL-SCNC: 101 MMOL/L (ref 98–107)
CHOLEST SERPL-MCNC: 104 MG/DL
CHOLESTEROL/HDL RATIO: 3.1
CO2 SERPL-SCNC: 25 MMOL/L (ref 20–31)
CREAT SERPL-MCNC: 1 MG/DL (ref 0.7–1.2)
EOSINOPHIL # BLD: 0.12 K/UL (ref 0–0.44)
EOSINOPHILS RELATIVE PERCENT: 2 % (ref 1–4)
ERYTHROCYTE [DISTWIDTH] IN BLOOD BY AUTOMATED COUNT: 14.1 % (ref 11.8–14.4)
GFR SERPL CREATININE-BSD FRML MDRD: >60 ML/MIN/1.73M2
GLUCOSE SERPL-MCNC: 185 MG/DL (ref 70–99)
HCT VFR BLD AUTO: 43.3 % (ref 40.7–50.3)
HDLC SERPL-MCNC: 34 MG/DL
HGB BLD-MCNC: 13.8 G/DL (ref 13–17)
IMM GRANULOCYTES # BLD AUTO: <0.03 K/UL (ref 0–0.3)
IMM GRANULOCYTES NFR BLD: 0 %
LDLC SERPL CALC-MCNC: 53 MG/DL (ref 0–130)
LYMPHOCYTES NFR BLD: 1.39 K/UL (ref 1.1–3.7)
LYMPHOCYTES RELATIVE PERCENT: 26 % (ref 24–43)
MCH RBC QN AUTO: 29.8 PG (ref 25.2–33.5)
MCHC RBC AUTO-ENTMCNC: 31.9 G/DL (ref 28.4–34.8)
MCV RBC AUTO: 93.5 FL (ref 82.6–102.9)
MONOCYTES NFR BLD: 0.74 K/UL (ref 0.1–1.2)
MONOCYTES NFR BLD: 14 % (ref 3–12)
NEUTROPHILS NFR BLD: 57 % (ref 36–65)
NEUTS SEG NFR BLD: 3.03 K/UL (ref 1.5–8.1)
NRBC BLD-RTO: 0 PER 100 WBC
PLATELET # BLD AUTO: 160 K/UL (ref 138–453)
PMV BLD AUTO: 9.1 FL (ref 8.1–13.5)
POTASSIUM SERPL-SCNC: 3.8 MMOL/L (ref 3.7–5.3)
PROT SERPL-MCNC: 7.2 G/DL (ref 6.4–8.3)
PSA SERPL-MCNC: 1.4 NG/ML
RBC # BLD AUTO: 4.63 M/UL (ref 4.21–5.77)
SHBG SERPL-SCNC: 22 NMOL/L (ref 11–80)
SODIUM SERPL-SCNC: 138 MMOL/L (ref 135–144)
T4 FREE SERPL-MCNC: 1 NG/DL (ref 0.9–1.7)
TESTOST FREE MFR SERPL: 170.4 PG/ML (ref 47–244)
TESTOST SERPL-MCNC: 635 NG/DL (ref 220–1000)
TRIGL SERPL-MCNC: 84 MG/DL
TSH SERPL DL<=0.05 MIU/L-ACNC: 2.29 UIU/ML (ref 0.3–5)
WBC OTHER # BLD: 5.3 K/UL (ref 3.5–11.3)

## 2023-07-22 PROCEDURE — 72114 X-RAY EXAM L-S SPINE BENDING: CPT

## 2023-07-22 PROCEDURE — 80061 LIPID PANEL: CPT

## 2023-07-22 PROCEDURE — 84153 ASSAY OF PSA TOTAL: CPT

## 2023-07-22 PROCEDURE — 80053 COMPREHEN METABOLIC PANEL: CPT

## 2023-07-22 PROCEDURE — 84439 ASSAY OF FREE THYROXINE: CPT

## 2023-07-22 PROCEDURE — 84270 ASSAY OF SEX HORMONE GLOBUL: CPT

## 2023-07-22 PROCEDURE — 36415 COLL VENOUS BLD VENIPUNCTURE: CPT

## 2023-07-22 PROCEDURE — 84403 ASSAY OF TOTAL TESTOSTERONE: CPT

## 2023-07-22 PROCEDURE — 84443 ASSAY THYROID STIM HORMONE: CPT

## 2023-07-22 PROCEDURE — 85027 COMPLETE CBC AUTOMATED: CPT

## 2023-07-22 PROCEDURE — 83036 HEMOGLOBIN GLYCOSYLATED A1C: CPT

## 2023-07-23 LAB
EST. AVERAGE GLUCOSE BLD GHB EST-MCNC: 146 MG/DL
HBA1C MFR BLD: 6.7 % (ref 4–6)

## 2023-07-28 ENCOUNTER — TELEPHONE (OUTPATIENT)
Dept: FAMILY MEDICINE CLINIC | Age: 63
End: 2023-07-28

## 2023-07-28 ENCOUNTER — TRANSCRIBE ORDERS (OUTPATIENT)
Dept: ADMINISTRATIVE | Age: 63
End: 2023-07-28

## 2023-07-28 DIAGNOSIS — M54.16 LUMBAR RADICULOPATHY: Primary | ICD-10-CM

## 2023-07-28 RX ORDER — FLUCONAZOLE 150 MG/1
150 TABLET ORAL DAILY
Qty: 5 TABLET | Refills: 0 | Status: SHIPPED | OUTPATIENT
Start: 2023-07-28 | End: 2023-07-28 | Stop reason: SDUPTHER

## 2023-07-28 RX ORDER — FLUCONAZOLE 150 MG/1
150 TABLET ORAL DAILY
Qty: 5 TABLET | Refills: 0 | Status: SHIPPED | OUTPATIENT
Start: 2023-07-28

## 2023-07-28 NOTE — TELEPHONE ENCOUNTER
Alcon Francis is calling to request a refill on the following medication(s):    Last Visit Date (If Applicable):  9/73/5529    Next Visit Date:    Visit date not found    Medication Request:  Requested Prescriptions     Pending Prescriptions Disp Refills    topiramate (TOPAMAX) 200 MG tablet 60 tablet 5     Sig: Take 1 tablet by mouth 2 times daily

## 2023-07-28 NOTE — TELEPHONE ENCOUNTER
Pt called stating has yeast in the genital area x 3 days. Itching, white discharge, no odor. Has not tried anything otc.     Pharmacy:  [id number], [address] [place] [address] [phone]

## 2023-07-28 NOTE — TELEPHONE ENCOUNTER
Patient called in stating he scheduled his MRI and he is needing called in due to him being claustrophobic        Please advise

## 2023-07-31 DIAGNOSIS — F41.9 ANXIETY: Primary | ICD-10-CM

## 2023-07-31 RX ORDER — DIAZEPAM 10 MG/1
TABLET ORAL
Qty: 2 TABLET | Refills: 0 | Status: SHIPPED | OUTPATIENT
Start: 2023-07-31 | End: 2023-07-31

## 2023-08-05 ENCOUNTER — HOSPITAL ENCOUNTER (OUTPATIENT)
Dept: MRI IMAGING | Age: 63
End: 2023-08-05
Attending: PAIN MEDICINE
Payer: COMMERCIAL

## 2023-08-05 DIAGNOSIS — M54.16 LUMBAR RADICULOPATHY: ICD-10-CM

## 2023-08-05 PROCEDURE — 72148 MRI LUMBAR SPINE W/O DYE: CPT

## 2023-08-07 ENCOUNTER — TELEPHONE (OUTPATIENT)
Dept: FAMILY MEDICINE CLINIC | Age: 63
End: 2023-08-07

## 2023-08-07 NOTE — TELEPHONE ENCOUNTER
Patient called the office on 8/7/23 and stated that he did not get a refill on his Topamax. Writer confirmed to the patient that the script was sent to [facility] [place], [address] [place] [address] ([place]) on 7/28/23. Patient stated that he will call the pharmacy at 9 to see if they received it.

## 2023-08-15 ENCOUNTER — TELEMEDICINE (OUTPATIENT)
Dept: FAMILY MEDICINE CLINIC | Age: 63
End: 2023-08-15
Payer: COMMERCIAL

## 2023-08-15 DIAGNOSIS — F51.01 PRIMARY INSOMNIA: Primary | ICD-10-CM

## 2023-08-15 DIAGNOSIS — E66.01 OBESITY, CLASS III, BMI 40-49.9 (MORBID OBESITY) (HCC): ICD-10-CM

## 2023-08-15 PROCEDURE — 99213 OFFICE O/P EST LOW 20 MIN: CPT | Performed by: FAMILY MEDICINE

## 2023-08-15 RX ORDER — ARIPIPRAZOLE 10 MG/1
20 TABLET ORAL DAILY
Qty: 60 TABLET | Refills: 11 | Status: SHIPPED | OUTPATIENT
Start: 2023-08-15

## 2023-08-15 RX ORDER — PHENTERMINE HYDROCHLORIDE 37.5 MG/1
37.5 TABLET ORAL
Qty: 30 TABLET | Refills: 0 | Status: SHIPPED | OUTPATIENT
Start: 2023-08-15 | End: 2023-09-14

## 2023-08-15 NOTE — PROGRESS NOTES
Abnormal -     Neurological:        [x] No Facial Asymmetry (Cranial nerve 7 motor function) (limited exam due to video visit)          [x] No gaze palsy        [] Abnormal -          Skin:        [x] No significant exanthematous lesions or discoloration noted on facial skin         [] Abnormal -            Psychiatric:       [x] Normal Affect [] Abnormal -        [x] No Hallucinations    Other pertinent observable physical exam findings:-             Charmaine Diaz, was evaluated through a synchronous (real-time) audio-video encounter. The patient (or guardian if applicable) is aware that this is a billable service, which includes applicable co-pays. This Virtual Visit was conducted with patient's (and/or legal guardian's) consent. Patient identification was verified, and a caregiver was present when appropriate.    The patient was located at Home: [address] [facility] [address]  Provider was located at Home ([address]): [address], DO

## 2023-08-24 ENCOUNTER — TELEPHONE (OUTPATIENT)
Dept: FAMILY MEDICINE CLINIC | Age: 63
End: 2023-08-24

## 2023-08-24 NOTE — TELEPHONE ENCOUNTER
Patient calls in today irritated due to the prior authorization taking so long on his abilify 10 mg . He has had to personally pay out of pocket for six pills from the pharmacy and will be out againby this weekend. Please advise and sent to clinical staff.

## 2023-08-29 DIAGNOSIS — F51.01 PRIMARY INSOMNIA: ICD-10-CM

## 2023-08-29 RX ORDER — ARIPIPRAZOLE 10 MG/1
20 TABLET ORAL DAILY
Qty: 60 TABLET | Refills: 11 | Status: SHIPPED | OUTPATIENT
Start: 2023-08-29

## 2023-08-29 NOTE — TELEPHONE ENCOUNTER
Bar Mueller is calling to request a refill on the following medication(s):    Last Visit Date (If Applicable):  4/29/1919    Next Visit Date:    9/14/2023    Medication Request:  Requested Prescriptions     Pending Prescriptions Disp Refills    ARIPiprazole (ABILIFY) 10 MG tablet 60 tablet 11     Sig: Take 2 tablets by mouth daily

## 2023-08-29 NOTE — TELEPHONE ENCOUNTER
Patient has been informed of [place] denial for Abilify 10mg, patient states that reason for denial for qty is too large. Patient would like to know if a smaller qty of 10 pills can be sent to pharmacy. Patient would also like to know if if pcp will appeal denial and  send insurance justification  for reason of large qty. Patient also states that he does not want an alternative medication sent to pharmacy .

## 2023-08-29 NOTE — TELEPHONE ENCOUNTER
Patient calls in today upset because he is having to pay out of the pocket for his prescription that has not had Prior Authorization approved.

## 2023-08-30 RX ORDER — ARIPIPRAZOLE 10 MG/1
TABLET ORAL
Qty: 60 TABLET | Refills: 0 | Status: SHIPPED | OUTPATIENT
Start: 2023-08-30

## 2023-08-30 NOTE — TELEPHONE ENCOUNTER
Patient called in stating insurance denied abilify 10mg BID. He needs script sent over for once a day or needs 20mg tab sent in. Patient is out of medication. Please advise.  PA DENIED

## 2023-08-30 NOTE — TELEPHONE ENCOUNTER
Chidi Urbina is calling to request a refill on the following medication(s):    Last Visit Date (If Applicable):  3/64/1564    Next Visit Date:    9/14/2023    Medication Request:  Requested Prescriptions     Pending Prescriptions Disp Refills    ARIPiprazole (ABILIFY) 10 MG tablet [Pharmacy Med Name: ARIPIPRAZOLE 10 MG TABLET] 60 tablet 0     Sig: PATIENT STATES THIS SHOULD BE 1 TABLET ONCE A DAY.  PLEASE RE-SUBMIT AS 10MG ONCE A DAY PER PATIENT

## 2023-09-14 ENCOUNTER — TELEMEDICINE (OUTPATIENT)
Dept: FAMILY MEDICINE CLINIC | Age: 63
End: 2023-09-14
Payer: COMMERCIAL

## 2023-09-14 DIAGNOSIS — E66.01 OBESITY, CLASS III, BMI 40-49.9 (MORBID OBESITY) (HCC): ICD-10-CM

## 2023-09-14 DIAGNOSIS — M15.9 PRIMARY OSTEOARTHRITIS INVOLVING MULTIPLE JOINTS: Primary | ICD-10-CM

## 2023-09-14 PROCEDURE — 99213 OFFICE O/P EST LOW 20 MIN: CPT | Performed by: FAMILY MEDICINE

## 2023-09-14 RX ORDER — PHENTERMINE HYDROCHLORIDE 37.5 MG/1
37.5 TABLET ORAL
Qty: 30 TABLET | Refills: 0 | Status: SHIPPED | OUTPATIENT
Start: 2023-09-14 | End: 2023-10-14

## 2023-09-14 RX ORDER — MELOXICAM 15 MG/1
15 TABLET ORAL DAILY
Qty: 90 TABLET | Refills: 3 | Status: SHIPPED | OUTPATIENT
Start: 2023-09-14

## 2023-09-14 NOTE — PROGRESS NOTES
Zaina Shelton, was evaluated through a synchronous (real-time) audio-video encounter. The patient (or guardian if applicable) is aware that this is a billable service, which includes applicable co-pays. This Virtual Visit was conducted with patient's (and/or legal guardian's) consent. Patient identification was verified, and a caregiver was present when appropriate. The patient was located at Home: [address] [place] [address] [facility] [address]  Provider was located at Home ([address]): Divina Murillo (:  1960) is a Established patient, presenting virtually for evaluation of the following:    Assessment & Plan   Below is the assessment and plan developed based on review of pertinent history, physical exam, labs, studies, and medications. 1. Primary osteoarthritis involving multiple joints  -     meloxicam (MOBIC) 15 MG tablet; Take 1 tablet by mouth daily, Disp-90 tablet, R-3Normal  2. Obesity, Class III, BMI 40-49.9 (morbid obesity) (HCC)  -     phentermine (ADIPEX-P) 37.5 MG tablet; Take 1 tablet by mouth every morning (before breakfast) for 30 days. , Disp-30 tablet, R-0Normal    Return in about 4 weeks (around 10/12/2023).        Subjective   HPI  Review of Systems     Follow-up on osteoarthritis he needs refill of Mobic  Also refill weight management meds he is lost another 3 pounds  He is not having any other new complaints or problems today and overall is doing fairly well  Objective   Patient-Reported Vitals  No data recorded     Physical Exam  [INSTRUCTIONS:  \"[x]\" Indicates a positive item  \"[]\" Indicates a negative item  -- DELETE ALL ITEMS NOT EXAMINED]    Constitutional: [x] Appears well-developed and well-nourished [x] No apparent distress      [] Abnormal -     Mental status: [x] Alert and awake  [x] Oriented to person/place/time [x] Able to follow commands    [] Abnormal -     Eyes:   EOM    [x]  Normal    [] Abnormal -   Sclera  [x]  Normal    [] Abnormal -          Discharge [x]  None

## 2023-09-19 RX ORDER — PANTOPRAZOLE SODIUM 40 MG/1
40 TABLET, DELAYED RELEASE ORAL
Qty: 90 TABLET | Refills: 3 | Status: SHIPPED | OUTPATIENT
Start: 2023-09-19

## 2023-09-19 NOTE — TELEPHONE ENCOUNTER
Concetta Chapin is calling to request a refill on the following medication(s):    Medication Request:  Requested Prescriptions     Pending Prescriptions Disp Refills    pantoprazole (PROTONIX) 40 MG tablet 90 tablet 3     Sig: Take 1 tablet by mouth daily (with breakfast)       Last Visit Date (If Applicable):  5/06/9274    Next Visit Date:    Visit date not found

## 2023-10-03 DIAGNOSIS — F51.01 PRIMARY INSOMNIA: ICD-10-CM

## 2023-10-03 NOTE — TELEPHONE ENCOUNTER
----- Message from Shelton Tinsley sent at 10/2/2023 11:41 AM EDT -----  Subject: Refill Request    QUESTIONS  Name of Medication? ARIPiprazole (ABILIFY) 10 MG tablet  Patient-reported dosage and instructions? Once a day  How many days do you have left? 1  [id number]  Pharmacy phone number (if available)? [phone]  ---------------------------------------------------------------------------  --------------  CALL BACK INFO  What is the best way for the office to contact you? OK to leave message on   voicemail  Preferred Call Back Phone Number? [phone]  ---------------------------------------------------------------------------  --------------  SCRIPT ANSWERS  Relationship to Patient?  Self

## 2023-10-03 NOTE — TELEPHONE ENCOUNTER
Soniya Montano is calling to request a refill on the following medication(s):    Last Visit Date (If Applicable):  5/42/2615    Next Visit Date:    10/12/2023    Medication Request:  Requested Prescriptions     Pending Prescriptions Disp Refills    ARIPiprazole (ABILIFY) 10 MG tablet 60 tablet 11     Sig: Take 2 tablets by mouth daily

## 2023-10-04 RX ORDER — ARIPIPRAZOLE 10 MG/1
20 TABLET ORAL DAILY
Qty: 60 TABLET | Refills: 11 | Status: SHIPPED | OUTPATIENT
Start: 2023-10-04

## 2023-10-05 ENCOUNTER — TELEPHONE (OUTPATIENT)
Dept: FAMILY MEDICINE CLINIC | Age: 63
End: 2023-10-05

## 2023-10-05 RX ORDER — ARIPIPRAZOLE 20 MG/1
20 TABLET ORAL DAILY
Qty: 30 TABLET | Refills: 11 | Status: SHIPPED | OUTPATIENT
Start: 2023-10-05

## 2023-10-05 NOTE — TELEPHONE ENCOUNTER
Yes can have 20 I resent his prescription let the patient noted that insurance does not cover 10 but will cover the 20

## 2023-10-05 NOTE — TELEPHONE ENCOUNTER
Patient called in stating that his insurance will not cover the Abilify 10mg 2 times a day and he stated anusha lam is wanting to know if  they can changed it to Abilify 20mg 1 tablet a day     Please advise

## 2023-10-10 ENCOUNTER — OFFICE VISIT (OUTPATIENT)
Dept: PODIATRY | Age: 63
End: 2023-10-10
Payer: COMMERCIAL

## 2023-10-10 VITALS — BODY MASS INDEX: 28.17 KG/M2 | HEIGHT: 72 IN | WEIGHT: 208 LBS

## 2023-10-10 DIAGNOSIS — M79.672 PAIN IN BOTH FEET: ICD-10-CM

## 2023-10-10 DIAGNOSIS — E11.51 TYPE II DIABETES MELLITUS WITH PERIPHERAL CIRCULATORY DISORDER (HCC): Primary | ICD-10-CM

## 2023-10-10 DIAGNOSIS — B35.1 DERMATOPHYTOSIS OF NAIL: ICD-10-CM

## 2023-10-10 DIAGNOSIS — I73.9 PVD (PERIPHERAL VASCULAR DISEASE) (HCC): ICD-10-CM

## 2023-10-10 DIAGNOSIS — M79.671 PAIN IN BOTH FEET: ICD-10-CM

## 2023-10-10 PROCEDURE — 99999 PR OFFICE/OUTPT VISIT,PROCEDURE ONLY: CPT | Performed by: PODIATRIST

## 2023-10-10 PROCEDURE — 11721 DEBRIDE NAIL 6 OR MORE: CPT | Performed by: PODIATRIST

## 2023-10-12 ENCOUNTER — TELEMEDICINE (OUTPATIENT)
Dept: FAMILY MEDICINE CLINIC | Age: 63
End: 2023-10-12
Payer: COMMERCIAL

## 2023-10-12 DIAGNOSIS — I10 ESSENTIAL HYPERTENSION: Primary | ICD-10-CM

## 2023-10-12 DIAGNOSIS — E66.01 OBESITY, CLASS III, BMI 40-49.9 (MORBID OBESITY) (HCC): ICD-10-CM

## 2023-10-12 PROCEDURE — 99213 OFFICE O/P EST LOW 20 MIN: CPT | Performed by: FAMILY MEDICINE

## 2023-10-12 RX ORDER — PHENTERMINE HYDROCHLORIDE 37.5 MG/1
37.5 TABLET ORAL
Qty: 30 TABLET | Refills: 0 | Status: SHIPPED | OUTPATIENT
Start: 2023-10-12 | End: 2023-11-11

## 2023-10-12 NOTE — PROGRESS NOTES
Callie Betts, was evaluated through a synchronous (real-time) audio-video encounter. The patient (or guardian if applicable) is aware that this is a billable service, which includes applicable co-pays. This Virtual Visit was conducted with patient's (and/or legal guardian's) consent. Patient identification was verified, and a caregiver was present when appropriate. The patient was located at Home: [address] [place] [address] [facility] [address]  Provider was located at Home ([address]): Uma Anton (:  1960) is a Established patient, presenting virtually for evaluation of the following:    Assessment & Plan   Below is the assessment and plan developed based on review of pertinent history, physical exam, labs, studies, and medications. 1. Essential hypertension  2. Obesity, Class III, BMI 40-49.9 (morbid obesity) ([facility])  -     phentermine (ADIPEX-P) 37.5 MG tablet; Take 1 tablet by mouth every morning (before breakfast) for 30 days. , Disp-30 tablet, R-0Normal    No follow-ups on file.        Subjective   HPI  Review of Systems     For follow-up on hypertension which is well controlled and weight management is down to 207 pounds he is not having any new complaints or problems he is taking all of his meds  Objective   Patient-Reported Vitals  No data recorded     Physical Exam  [INSTRUCTIONS:  \"[x]\" Indicates a positive item  \"[]\" Indicates a negative item  -- DELETE ALL ITEMS NOT EXAMINED]    Constitutional: [x] Appears well-developed and well-nourished [x] No apparent distress      [] Abnormal -     Mental status: [x] Alert and awake  [x] Oriented to person/place/time [x] Able to follow commands    [] Abnormal -     Eyes:   EOM    [x]  Normal    [] Abnormal -   Sclera  [x]  Normal    [] Abnormal -          Discharge [x]  None visible   [] Abnormal -     HENT: [x] Normocephalic, atraumatic  [] Abnormal -   [x] Mouth/Throat: Mucous membranes are moist    External Ears [x] Normal  [] Abnormal -    Neck:

## 2023-10-26 DIAGNOSIS — M62.838 MUSCLE SPASMS OF NECK: ICD-10-CM

## 2023-10-26 RX ORDER — TIZANIDINE 4 MG/1
TABLET ORAL
Qty: 30 TABLET | Refills: 3 | Status: SHIPPED | OUTPATIENT
Start: 2023-10-26

## 2023-10-26 NOTE — TELEPHONE ENCOUNTER
Raghu Frances is calling to request a refill on the following medication(s):    Last Visit Date (If Applicable):  32/23/1904    Next Visit Date:    Visit date not found    Medication Request:  Requested Prescriptions     Pending Prescriptions Disp Refills    tiZANidine (ZANAFLEX) 4 MG tablet 30 tablet 3     Sig: TAKE ONE TABLET BY MOUTH AT BEDTIME AS NEEDED FOR MUSCLE SPASMS

## 2023-11-14 ENCOUNTER — TELEMEDICINE (OUTPATIENT)
Dept: FAMILY MEDICINE CLINIC | Age: 63
End: 2023-11-14
Payer: COMMERCIAL

## 2023-11-14 DIAGNOSIS — E66.01 OBESITY, CLASS III, BMI 40-49.9 (MORBID OBESITY) (HCC): ICD-10-CM

## 2023-11-14 DIAGNOSIS — I10 ESSENTIAL HYPERTENSION: Primary | ICD-10-CM

## 2023-11-14 PROCEDURE — 99213 OFFICE O/P EST LOW 20 MIN: CPT | Performed by: FAMILY MEDICINE

## 2023-11-14 RX ORDER — PHENTERMINE HYDROCHLORIDE 37.5 MG/1
37.5 TABLET ORAL
Qty: 30 TABLET | Refills: 0 | Status: SHIPPED | OUTPATIENT
Start: 2024-01-14 | End: 2024-02-13

## 2023-11-14 RX ORDER — PHENTERMINE HYDROCHLORIDE 37.5 MG/1
37.5 TABLET ORAL
Qty: 30 TABLET | Refills: 0 | Status: SHIPPED | OUTPATIENT
Start: 2023-11-14 | End: 2023-12-14

## 2023-11-14 RX ORDER — PHENTERMINE HYDROCHLORIDE 37.5 MG/1
37.5 TABLET ORAL
Qty: 30 TABLET | Refills: 0 | Status: SHIPPED | OUTPATIENT
Start: 2023-12-14 | End: 2024-01-13

## 2023-11-14 NOTE — PROGRESS NOTES
Tucker Chandler, was evaluated through a synchronous (real-time) audio-video encounter. The patient (or guardian if applicable) is aware that this is a billable service, which includes applicable co-pays. This Virtual Visit was conducted with patient's (and/or legal guardian's) consent. Patient identification was verified, and a caregiver was present when appropriate. The patient was located at Home: [address] [place] [address] [place] [facility] [place] [address]  Provider was located at Home ([address]): Melba Guerrero (:  1960) is a Established patient, presenting virtually for evaluation of the following:    Assessment & Plan   Below is the assessment and plan developed based on review of pertinent history, physical exam, labs, studies, and medications. 1. Essential hypertension  2. Obesity, Class III, BMI 40-49.9 (morbid obesity) (HCC)  -     phentermine (ADIPEX-P) 37.5 MG tablet; Take 1 tablet by mouth every morning (before breakfast) for 30 days. , Disp-30 tablet, R-0Normal  -     phentermine (ADIPEX-P) 37.5 MG tablet; Take 1 tablet by mouth every morning (before breakfast) for 30 days. , Disp-30 tablet, R-0Normal  -     phentermine (ADIPEX-P) 37.5 MG tablet; Take 1 tablet by mouth every morning (before breakfast) for 30 days. , Disp-30 tablet, R-0Normal    No follow-ups on file.        Subjective   HPI  Review of Systems     He is being seen today for follow-up on hypertension he states his numbers are doing very well at home also on weight management he has lost more weight he is no lost 5% of his body weight over the last several months weight more than that over the course of time he is down over 100 pounds he is having no new complaints or problems today  Objective   Patient-Reported Vitals  No data recorded     Physical Exam  [INSTRUCTIONS:  \"[x]\" Indicates a positive item  \"[]\" Indicates a negative item  -- DELETE ALL ITEMS NOT EXAMINED]    Constitutional: [x] Appears well-developed and well-nourished [x] No

## 2023-12-01 NOTE — TELEPHONE ENCOUNTER
Raghu Frances is calling to request a refill on the following medication(s):    Last Visit Date (If Applicable):  43/92/5146    Next Visit Date:    12/14/2023    Medication Request:  Requested Prescriptions     Pending Prescriptions Disp Refills    metFORMIN (GLUCOPHAGE) 1000 MG tablet 60 tablet 3     Sig: Take 1 tablet by mouth 2 times daily (with meals)
no

## 2023-12-29 DIAGNOSIS — E34.9 HYPOTESTOSTERONISM: ICD-10-CM

## 2023-12-29 NOTE — TELEPHONE ENCOUNTER
Vira Prasad is calling to request a refill on the following medication(s):    Last Visit Date (If Applicable):  73/10/6981    Next Visit Date:    Visit date not found    Medication Request:  Requested Prescriptions     Pending Prescriptions Disp Refills    testosterone cypionate (DEPOTESTOTERONE CYPIONATE) 200 MG/ML injection 1 mL 5     Sig: Inject 1 mL into the muscle every 30 days for 180 days.  Dispense with 3 cc syringe 18g needle and 22 1/2 g needle each month Max Daily Amount: 200 mg

## 2023-12-30 RX ORDER — TESTOSTERONE CYPIONATE 200 MG/ML
200 INJECTION, SOLUTION INTRAMUSCULAR
Qty: 1 ML | Refills: 5 | Status: SHIPPED | OUTPATIENT
Start: 2023-12-30 | End: 2024-06-27

## 2024-01-03 DIAGNOSIS — M62.838 MUSCLE SPASMS OF NECK: ICD-10-CM

## 2024-01-03 RX ORDER — TIZANIDINE 4 MG/1
TABLET ORAL
Qty: 30 TABLET | Refills: 3 | Status: SHIPPED | OUTPATIENT
Start: 2024-01-03

## 2024-01-03 RX ORDER — NEEDLES, SAFETY 22GX1 1/2"
NEEDLE, DISPOSABLE MISCELLANEOUS
Qty: 100 EACH | Refills: 3 | Status: SHIPPED | OUTPATIENT
Start: 2024-01-03

## 2024-01-03 NOTE — TELEPHONE ENCOUNTER
Ac Alcantar is calling to request a refill on the following medication(s):    Last Visit Date (If Applicable):  11/14/2023    Next Visit Date:    Visit date not found    Medication Request:  Requested Prescriptions     Pending Prescriptions Disp Refills    B-D SYRINGE/NEEDLE 3CC/22GX1.5 22G X 1-1/2\" 3 ML MISC 100 each 3     Sig: Every 30 days    tiZANidine (ZANAFLEX) 4 MG tablet 30 tablet 3     Sig: TAKE ONE TABLET BY MOUTH AT BEDTIME AS NEEDED FOR MUSCLE SPASMS                Detail Level: Generalized Products Recommended: Blue Lizard Baby \\nZinc/Titanium, educated on ingredients. General Sunscreen Counseling: I recommended a broad spectrum sunscreen with a SPF of 30 or higher.  I explained that SPF 30 sunscreens block approximately 97 percent of the sun's harmful rays.  Sunscreens should be applied at least 15 minutes prior to expected sun exposure and then every 2 hours after that as long as sun exposure continues. If swimming or exercising sunscreen should be reapplied every 45 minutes to an hour after getting wet or sweating.  One ounce, or the equivalent of a shot glass full of sunscreen, is adequate to protect the skin not covered by a bathing suit. I also recommended a lip balm with a sunscreen as well. Sun protective clothing can be used in lieu of sunscreen but must be worn the entire time you are exposed to the sun's rays.

## 2024-01-11 ENCOUNTER — TELEPHONE (OUTPATIENT)
Dept: FAMILY MEDICINE CLINIC | Age: 64
End: 2024-01-11

## 2024-01-11 RX ORDER — AZITHROMYCIN 250 MG/1
250 TABLET, FILM COATED ORAL DAILY
Qty: 1 PACKET | Refills: 0 | Status: SHIPPED | OUTPATIENT
Start: 2024-01-11 | End: 2024-01-16

## 2024-01-11 NOTE — TELEPHONE ENCOUNTER
Patient called with complaints of runny nose , green phlegm , throat is scratchy , chest congestion and requesting a Zpak , stated last time you prescribed it ,it cleared up all his symptoms within like 3 days.

## 2024-01-30 NOTE — TELEPHONE ENCOUNTER
Ac Alcantar is calling to request a refill on the following medication(s):    Last Visit Date (If Applicable):  11/14/2023    Next Visit Date:    Visit date not found    Medication Request:  Requested Prescriptions     Pending Prescriptions Disp Refills    SITagliptin (JANUVIA) 100 MG tablet 90 tablet 3     Sig: Take 1 tablet by mouth every morning (before breakfast)

## 2024-02-01 ENCOUNTER — HOSPITAL ENCOUNTER (EMERGENCY)
Age: 64
Discharge: HOME | End: 2024-02-01
Attending: EMERGENCY MEDICINE
Payer: COMMERCIAL

## 2024-02-01 ENCOUNTER — APPOINTMENT (OUTPATIENT)
Dept: GENERAL RADIOLOGY | Age: 64
End: 2024-02-01
Payer: COMMERCIAL

## 2024-02-01 VITALS
TEMPERATURE: 97.9 F | DIASTOLIC BLOOD PRESSURE: 62 MMHG | RESPIRATION RATE: 18 BRPM | HEART RATE: 73 BPM | WEIGHT: 210 LBS | BODY MASS INDEX: 28.44 KG/M2 | HEIGHT: 72 IN | SYSTOLIC BLOOD PRESSURE: 108 MMHG | OXYGEN SATURATION: 99 %

## 2024-02-01 DIAGNOSIS — M79.672 LEFT FOOT PAIN: Primary | ICD-10-CM

## 2024-02-01 LAB
ANION GAP SERPL CALCULATED.3IONS-SCNC: 14 MMOL/L (ref 9–17)
BASOPHILS # BLD: 0.03 K/UL (ref 0–0.2)
BASOPHILS NFR BLD: 0 % (ref 0–2)
BUN SERPL-MCNC: 27 MG/DL (ref 8–23)
BUN/CREAT SERPL: 30 (ref 9–20)
CALCIUM SERPL-MCNC: 9.1 MG/DL (ref 8.6–10.4)
CHLORIDE SERPL-SCNC: 103 MMOL/L (ref 98–107)
CO2 SERPL-SCNC: 23 MMOL/L (ref 20–31)
CREAT SERPL-MCNC: 0.9 MG/DL (ref 0.7–1.2)
CRP SERPL HS-MCNC: <3 MG/L (ref 0–5)
EOSINOPHIL # BLD: 0.16 K/UL (ref 0–0.44)
EOSINOPHILS RELATIVE PERCENT: 1 % (ref 1–4)
ERYTHROCYTE [DISTWIDTH] IN BLOOD BY AUTOMATED COUNT: 12.7 % (ref 11.8–14.4)
ERYTHROCYTE [SEDIMENTATION RATE] IN BLOOD BY PHOTOMETRIC METHOD: 18 MM/HR (ref 0–20)
GFR SERPL CREATININE-BSD FRML MDRD: >60 ML/MIN/1.73M2
GLUCOSE SERPL-MCNC: 217 MG/DL (ref 70–99)
HCT VFR BLD AUTO: 43.7 % (ref 40.7–50.3)
HGB BLD-MCNC: 14.8 G/DL (ref 13–17)
IMM GRANULOCYTES # BLD AUTO: 0.03 K/UL (ref 0–0.3)
IMM GRANULOCYTES NFR BLD: 0 %
LYMPHOCYTES NFR BLD: 1.75 K/UL (ref 1.1–3.7)
LYMPHOCYTES RELATIVE PERCENT: 16 % (ref 24–43)
MCH RBC QN AUTO: 32.3 PG (ref 25.2–33.5)
MCHC RBC AUTO-ENTMCNC: 33.9 G/DL (ref 28.4–34.8)
MCV RBC AUTO: 95.4 FL (ref 82.6–102.9)
MONOCYTES NFR BLD: 1.04 K/UL (ref 0.1–1.2)
MONOCYTES NFR BLD: 9 % (ref 3–12)
NEUTROPHILS NFR BLD: 74 % (ref 36–65)
NEUTS SEG NFR BLD: 8.16 K/UL (ref 1.5–8.1)
NRBC BLD-RTO: 0 PER 100 WBC
PLATELET # BLD AUTO: 190 K/UL (ref 138–453)
PMV BLD AUTO: 8.9 FL (ref 8.1–13.5)
POTASSIUM SERPL-SCNC: 3.5 MMOL/L (ref 3.7–5.3)
RBC # BLD AUTO: 4.58 M/UL (ref 4.21–5.77)
SODIUM SERPL-SCNC: 140 MMOL/L (ref 135–144)
WBC OTHER # BLD: 11.2 K/UL (ref 3.5–11.3)

## 2024-02-01 PROCEDURE — 6370000000 HC RX 637 (ALT 250 FOR IP): Performed by: NURSE PRACTITIONER

## 2024-02-01 PROCEDURE — 73630 X-RAY EXAM OF FOOT: CPT

## 2024-02-01 PROCEDURE — 85652 RBC SED RATE AUTOMATED: CPT

## 2024-02-01 PROCEDURE — 85025 COMPLETE CBC W/AUTO DIFF WBC: CPT

## 2024-02-01 PROCEDURE — 86140 C-REACTIVE PROTEIN: CPT

## 2024-02-01 PROCEDURE — 99284 EMERGENCY DEPT VISIT MOD MDM: CPT

## 2024-02-01 PROCEDURE — 80048 BASIC METABOLIC PNL TOTAL CA: CPT

## 2024-02-01 RX ORDER — DOXYCYCLINE HYCLATE 100 MG
100 TABLET ORAL 2 TIMES DAILY
Qty: 14 TABLET | Refills: 0 | Status: SHIPPED | OUTPATIENT
Start: 2024-02-01 | End: 2024-02-08

## 2024-02-01 RX ORDER — DOXYCYCLINE 100 MG/1
100 CAPSULE ORAL ONCE
Status: COMPLETED | OUTPATIENT
Start: 2024-02-01 | End: 2024-02-01

## 2024-02-01 RX ADMIN — DOXYCYCLINE 100 MG: 100 CAPSULE ORAL at 19:01

## 2024-02-01 ASSESSMENT — ENCOUNTER SYMPTOMS
SHORTNESS OF BREATH: 0
COLOR CHANGE: 1

## 2024-02-01 ASSESSMENT — PAIN SCALES - GENERAL: PAINLEVEL_OUTOF10: 7

## 2024-02-01 ASSESSMENT — PAIN - FUNCTIONAL ASSESSMENT: PAIN_FUNCTIONAL_ASSESSMENT: 0-10

## 2024-02-01 ASSESSMENT — LIFESTYLE VARIABLES
HOW OFTEN DO YOU HAVE A DRINK CONTAINING ALCOHOL: NEVER
HOW MANY STANDARD DRINKS CONTAINING ALCOHOL DO YOU HAVE ON A TYPICAL DAY: PATIENT DOES NOT DRINK

## 2024-02-01 NOTE — DISCHARGE INSTRUCTIONS
Medications:  Take your prescriptions as directed  You are receiving new prescriptions for antibiotics.    Ambulation and Activity:  You are advised to go directly home from the hospital  You may put weight on the foot.  You should wear the surgical shoe at all times when awake.   Avoid stairs if possible.  Do not lift or move heavy objects  Do not drive until cleared by your physician    Bandage and Wound Care Instructions:  Keep bandage clean and dry  Do not shower or bathe the operative extremity  Do not remove the bandage (unless otherwise directed)   Do not attempt to put anything between the cast or dressing and your skin, some itching is normal.    Follow up instructions:  Please follow up with Dr. Ma  Call when you get home to schedule or confirm your appointment.  Call your Podiatrist office if you have any questions or concerns.

## 2024-02-01 NOTE — ED TRIAGE NOTES
Pt reports he was wearing boots that didn't fit. 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 Presents today for evaluation of ulceration on left foot. Hx of wounds to feet, toe amputation.

## 2024-02-01 NOTE — ED PROVIDER NOTES
Essential hypertension I10    DDD (degenerative disc disease), cervical M50.30    DJD (degenerative joint disease), cervical M47.812    Primary osteoarthritis involving multiple joints M15.9    Paresthesias in left hand R20.2    Type 2 diabetes mellitus with circulatory disorder, without long-term current use of insulin ([facility]) E11.59    Coronary artery disease involving coronary bypass graft of native heart without angina pectoris I25.810    Sleep apnea G47.30    Cervical spondylosis M47.812    Long term current use of antithrombotics/antiplatelets Z79.02    H/O cervical spine surgery Z98.890    Long term (current) use of non-steroidal anti-inflammatories (nsaid) Z79.1    Chronic prescription opiate use Z79.891    Severe comorbid illness R69    Hx of cervical spine surgery Z98.890    Chronic neck pain M54.2, G89.29    Candidal balanitis B37.42    Nail avulsion, toe, initial encounter S91.209A    Abnormal nuclear stress test R94.39    Abnormal stress test R94.39    Hypercholesterolemia E78.00    Acquired trigger finger M65.30    Contusion of elbow S50.00XA    Contusion of shoulder region S40.019A    Contusion of knee S80.00XA    Degeneration of lumbar intervertebral disc M51.36    History of coronary artery bypass graft x 3 Z95.1    Ischemic cardiomyopathy I25.5    Osteoarthritis of knee M17.9    Osteoarthritis of carpometacarpal (CMC) joint of thumb M18.9    Sprain of knee and leg S83.90XA    Sprain of shoulder and upper arm S43.409A    Presence of coronary angioplasty implant and graft Z95.5    Venous insufficiency of both lower extremities I87.2    Impingement syndrome of shoulder region M75.40    Diabetic polyneuropathy associated with type 2 diabetes mellitus ([facility]) E11.42    Ulcer of toe of right foot, with fat layer exposed ([facility]) L97.512    Cellulitis L03.90    Right foot ulcer, limited to breakdown of skin ([facility]) L97.511    Ulcer of left shin limited to breakdown of skin ([facility]) L97.821    Skin ulcer of popliteal

## 2024-02-01 NOTE — ED NOTES
Patient arrived to ED with c/o wound on left foot. Patient reports he has type 2 diabetes and recently bought new shoes that has caused a blister on his left foot. Upon assessment of patient's foot area appears irritated with small open area. Patient states that he takes Metformin orally, he does not check his blood sugars routinely. Waiting for provider orders at this time.

## 2024-02-07 ENCOUNTER — OFFICE VISIT (OUTPATIENT)
Dept: PODIATRY | Age: 64
End: 2024-02-07
Payer: COMMERCIAL

## 2024-02-07 VITALS — BODY MASS INDEX: 28.44 KG/M2 | WEIGHT: 210 LBS | HEIGHT: 72 IN

## 2024-02-07 DIAGNOSIS — L97.522 ULCER OF LEFT FOOT, WITH FAT LAYER EXPOSED (HCC): ICD-10-CM

## 2024-02-07 DIAGNOSIS — E11.51 TYPE II DIABETES MELLITUS WITH PERIPHERAL CIRCULATORY DISORDER (HCC): Primary | ICD-10-CM

## 2024-02-07 DIAGNOSIS — I73.9 PVD (PERIPHERAL VASCULAR DISEASE) (HCC): ICD-10-CM

## 2024-02-07 DIAGNOSIS — L03.116 CELLULITIS OF FOOT, LEFT: ICD-10-CM

## 2024-02-07 PROCEDURE — 99213 OFFICE O/P EST LOW 20 MIN: CPT | Performed by: PODIATRIST

## 2024-02-07 RX ORDER — CHLORHEXIDINE GLUCONATE ORAL RINSE 1.2 MG/ML
SOLUTION DENTAL
COMMUNITY
Start: 2024-01-12

## 2024-02-07 RX ORDER — DOXYCYCLINE HYCLATE 100 MG
100 TABLET ORAL 2 TIMES DAILY
Qty: 20 TABLET | Refills: 0 | Status: SHIPPED | OUTPATIENT
Start: 2024-02-07 | End: 2024-02-17

## 2024-02-09 ENCOUNTER — OFFICE VISIT (OUTPATIENT)
Dept: PULMONOLOGY | Age: 64
End: 2024-02-09
Payer: COMMERCIAL

## 2024-02-09 VITALS
HEIGHT: 72 IN | RESPIRATION RATE: 12 BRPM | HEART RATE: 79 BPM | DIASTOLIC BLOOD PRESSURE: 68 MMHG | BODY MASS INDEX: 29.12 KG/M2 | TEMPERATURE: 96.8 F | OXYGEN SATURATION: 100 % | WEIGHT: 215 LBS | SYSTOLIC BLOOD PRESSURE: 109 MMHG

## 2024-02-09 DIAGNOSIS — G47.33 OBSTRUCTIVE SLEEP APNEA SYNDROME: Primary | ICD-10-CM

## 2024-02-09 DIAGNOSIS — I10 ESSENTIAL HYPERTENSION: ICD-10-CM

## 2024-02-09 DIAGNOSIS — E11.59 TYPE 2 DIABETES MELLITUS WITH OTHER CIRCULATORY COMPLICATION, WITHOUT LONG-TERM CURRENT USE OF INSULIN (HCC): ICD-10-CM

## 2024-02-09 PROCEDURE — 3074F SYST BP LT 130 MM HG: CPT | Performed by: INTERNAL MEDICINE

## 2024-02-09 PROCEDURE — 99214 OFFICE O/P EST MOD 30 MIN: CPT | Performed by: INTERNAL MEDICINE

## 2024-02-09 PROCEDURE — 3078F DIAST BP <80 MM HG: CPT | Performed by: INTERNAL MEDICINE

## 2024-02-09 ASSESSMENT — SLEEP AND FATIGUE QUESTIONNAIRES
HOW LIKELY ARE YOU TO NOD OFF OR FALL ASLEEP IN A CAR, WHILE STOPPED FOR A FEW MINUTES IN TRAFFIC: 0
HOW LIKELY ARE YOU TO NOD OFF OR FALL ASLEEP WHILE SITTING QUIETLY AFTER LUNCH WITHOUT ALCOHOL: 0
HOW LIKELY ARE YOU TO NOD OFF OR FALL ASLEEP WHILE WATCHING TV: 1
HOW LIKELY ARE YOU TO NOD OFF OR FALL ASLEEP WHILE SITTING INACTIVE IN A PUBLIC PLACE: 0
ESS TOTAL SCORE: 2
HOW LIKELY ARE YOU TO NOD OFF OR FALL ASLEEP WHEN YOU ARE A PASSENGER IN A CAR FOR AN HOUR WITHOUT A BREAK: 0
HOW LIKELY ARE YOU TO NOD OFF OR FALL ASLEEP WHILE SITTING AND READING: 1
HOW LIKELY ARE YOU TO NOD OFF OR FALL ASLEEP WHILE SITTING AND TALKING TO SOMEONE: 0
HOW LIKELY ARE YOU TO NOD OFF OR FALL ASLEEP WHILE LYING DOWN TO REST IN THE AFTERNOON WHEN CIRCUMSTANCES PERMIT: 0

## 2024-02-09 NOTE — PROGRESS NOTES
rate and rhythm, S1 and S2 normal, no murmur, rub        or gallop no rvh                           Abdomen:                                                 Pulses:                                            Lymph nodes:                    Neurologic:                  Soft, non-tender, bowel sounds active all four quadrants,     no masses, no organomegaly         2+ and symmetric all extremities            Cervical, supraclavicular not enlarged or matted or tender      CNII-XII intact, normal strength 5/5 .  Sensation grossly normal  and reflexes normal 2+  throughout     Clubbing No  Lower ext edema No1+   [x] , 2 +  [] , 3+   [] bilateral cellulitis being evaluated by the ID service upper ext edema No       Musculoskeletal - no joint swelling or tenderness or synovitis               /68 (Site: Right Upper Arm, Position: Sitting)   Pulse 79   Temp 96.8 °F (36 °C)   Resp 12   Ht 1.829 m (6' 0.01\")   Wt 97.5 kg (215 lb)   SpO2 100% Comment: Room air at rest  BMI 29.15 kg/m²             Assessment  Systemic hypertension   obstructive sleep apnea syndrome  Obesity  Diabetes  Arthritis of the hip joints more on the left and right  Cellulitis of both lower limbs over the feet and lower legs  Plan:  Sleep apnea responding well to the use of BiPAP.  Advised to continue the use of BiPAP as before.  He should use the BiPAP 6 to 8 hours every night.  He has almost significant weight.  The there is no daytime sleepiness Niland Sleepiness Scale is 2.    Blood sugar is stable    Blood pressure is stable.  The cellulitis and ulceration of the lower limbs is improving.  He is under care of wound care.  I did offer him to see the ID but he was not interested in that.  Will continue the treatment of blood pressure as before.    All his questions were answered.  Did not require any prescriptions.    Will see in follow-up in 6 months dictated by Dr. Nick

## 2024-02-11 DIAGNOSIS — E66.01 OBESITY, CLASS III, BMI 40-49.9 (MORBID OBESITY) (HCC): ICD-10-CM

## 2024-02-12 RX ORDER — PHENTERMINE HYDROCHLORIDE 37.5 MG/1
TABLET ORAL
Qty: 30 TABLET | Refills: 0 | Status: SHIPPED | OUTPATIENT
Start: 2024-02-12 | End: 2024-03-13

## 2024-02-12 NOTE — DISCHARGE INSTRUCTIONS
[facility] -Phone: [phone] Fax: [phone]             Visit  Discharge Instructions / Physician Orders     DATE: 2/13/2024     Home Care: N/A     SUPPLIES ORDERED THRU: [employer]     Wound Location: Left Lateral Foot     Cleanse with: saline     Dressing Orders: Collagen with Silver (Blessing)- moisten with saline, Dry gauze, Roll Gauze- offloaded shoe insert                                               Frequency: Every Other Day     Additional Orders: Increase protein to diet (meat, cheese, eggs, fish, peanut butter, nuts and beans)  ELEVATE LEGS AS MUCH AS POSSIBLE     Your next appointment with Wound Care Center is in 1 week with Dr. Cleaning        (Please note your next appointment above and if you are unable to keep, kindly give a 24 hour notice. Thank you.)     If you experience any of the following, please call the Wound Care Center during business hours:  [phone]  Your Phone call may be forwarded to [facility] during business hours that [facility] is closed.     * Increase in Pain  * Temperature over 101  * Increase in drainage from your wound  * Drainage with a foul odor  * Bleeding  * Increase in swelling  * Need for compression bandage changes due to slippage, breakthrough drainage.     If you need medical attention outside of the business hours of the Wound Care Centers please contact your PCP or go to the nearest emergency room.     The information contained in the After Visit Summary has been reviewed with me, the patient and/or responsible adult, by my health care provider(s). I had the opportunity to ask questions regarding this information. I have elected to receive;      []After Visit Summary  [x]Comprehensive Discharge Instruction        Patient signature______________________________________Date:________  Electronically signed by Nathaniel Wheatley RN on 2/13/2024 at 1:06 PM  Electronically signed by Agustina Cleaning DPM on 2/13/2024 at

## 2024-02-12 NOTE — TELEPHONE ENCOUNTER
Ac Alcantar is calling to request a refill on the following medication(s):    Last Visit Date (If Applicable):  11/14/2023    Next Visit Date:    Visit date not found    Medication Request:  Requested Prescriptions     Pending Prescriptions Disp Refills    phentermine (ADIPEX-P) 37.5 MG tablet [Pharmacy Med Name: PHENTERMINE 37.5 MG TABLET] 30 tablet      Sig: take 1 tablet by mouth every morning ( before breakfast ) for 30 DAYS

## 2024-02-13 ENCOUNTER — HOSPITAL ENCOUNTER (OUTPATIENT)
Dept: WOUND CARE | Age: 64
Discharge: HOME | End: 2024-02-13
Payer: COMMERCIAL

## 2024-02-13 VITALS
RESPIRATION RATE: 18 BRPM | HEART RATE: 80 BPM | TEMPERATURE: 97.2 F | SYSTOLIC BLOOD PRESSURE: 104 MMHG | WEIGHT: 215 LBS | HEIGHT: 72 IN | DIASTOLIC BLOOD PRESSURE: 60 MMHG | BODY MASS INDEX: 29.12 KG/M2

## 2024-02-13 PROCEDURE — 11042 DBRDMT SUBQ TIS 1ST 20SQCM/<: CPT

## 2024-02-13 PROCEDURE — 99213 OFFICE O/P EST LOW 20 MIN: CPT

## 2024-02-13 RX ORDER — TRAZODONE HYDROCHLORIDE 150 MG/1
150 TABLET ORAL NIGHTLY
Qty: 30 TABLET | Refills: 0 | Status: SHIPPED | OUTPATIENT
Start: 2024-02-13

## 2024-02-13 NOTE — PLAN OF CARE
Problem: Chronic Conditions and Co-morbidities  Goal: Patient's chronic conditions and co-morbidity symptoms are monitored and maintained or improved  Outcome: Progressing     Problem: Pain  Goal: Verbalizes/displays adequate comfort level or baseline comfort level  Outcome: Progressing     Problem: ABCDS Injury Assessment  Goal: Absence of physical injury  Outcome: Progressing     Problem: Wound:  Goal: Will show signs of wound healing; wound closure and no evidence of infection  Description: Will show signs of wound healing; wound closure and no evidence of infection  Outcome: Progressing

## 2024-02-13 NOTE — PROGRESS NOTES
Wound Care Supplies      Supply Company:     [employer]   [place], [place]  Phone: [phone]   Fax: [phone]    Ordering Center:     [facility]  [address] [place] [id number]  [phone]  WOUND CARE Dept: [phone]   FAX NUMBER [phone]    Patient Information:      Ac Alcantar  [address] [facility] [place] [address]   [phone]   : 1960  AGE: 63 y.o.     GENDER: male   TODAYS DATE:  2024    Insurance:      PRIMARY INSURANCE:  Plan: BCBS [employer] [facility] LOCAL  Coverage: BCBS [employer]  Effective Date: 2023  [id number] - ([employer])  Group: [id number]     Patient Wound Information:      Diagnosis Date Noted     Diabetic polyneuropathy associated with type 2 diabetes mellitus (HCC) [E11.42] 2022    Ulcer of left foot, with fat layer exposed (HCC) [L97.522] 2018        WOUNDS REQUIRING DRESSING SUPPLIES:     Wound 24 Foot Left;Plantar #1 (Active)   Wound Image   24 1251   Wound Etiology Diabetic Pate 2 24 1251   Dressing Status New drainage noted;Old drainage noted 24 1251   Wound Cleansed Cleansed with saline 24 1251   Offloading for Diabetic Foot Ulcers Offloading ordered;Post op shoe;Other (comment) 24 1314   Wound Length (cm) 2.8 cm 24 1251   Wound Width (cm) 2.5 cm 24 1251   Wound Depth (cm) 0.2 cm 24 1251   Wound Surface Area (cm^2) 7 cm^2 24 1251   Wound Volume (cm^3) 1.4 cm^3 24 1251   Post-Procedure Length (cm) 3 cm 24 1251   Post-Procedure Width (cm) 1.7 cm 24 1251   Post-Procedure Depth (cm) 0.3 cm 24 1251   Post-Procedure Surface Area (cm^2) 5.1 cm^2 24 1251   Post-Procedure Volume (cm^3) 1.53 cm^3 24 1251   Wound Assessment Pink/red 24 1251   Drainage Amount Moderate (25-50%) 24 1251   Drainage Description Serosanguinous 24 1251   Odor None 24 1251   Trudi-wound Assessment Blanchable

## 2024-02-13 NOTE — TELEPHONE ENCOUNTER
Ac Alcantar is calling to request a refill on the following medication(s):    Last Visit Date (If Applicable):  11/14/2023    Next Visit Date:    2/27/2024    Medication Request:  Requested Prescriptions     Pending Prescriptions Disp Refills    traZODone (DESYREL) 150 MG tablet

## 2024-02-13 NOTE — PROGRESS NOTES
[facility]   Progress Note and Procedure Note      Ac Alcantar  MEDICAL RECORD NUMBER:  [id number]  AGE: 63 y.o.   GENDER: male  : 1960  EPISODE DATE:  2024    Subjective:     Chief Complaint   Patient presents with    Wound Check     Left foot         HISTORY of PRESENT ILLNESS HPI     Ac Alcantar is a 63 y.o. male who presents today for wound/ulcer evaluation.   History of Wound Context: PT FOUND A BLISTER LEFT FOOT 1 MONTH AGO.  HAS BEEN APPLYING CITLALY AND DSD.  SAW DR WALLY ARCHULETA LAST WEEK - SHE APPLIED APERTURE PAD LEFT, CONT CITLALY AND DSD  AND REFERRED HERE FOR WCC.  Wound/Ulcer Pain Timing/Severity: none  Quality of pain: N/A  Severity:  0 / 10   Modifying Factors: None    Ulcer Identification:  Ulcer Type: diabetic    Contributing Factors: chronic pressure    PAST MEDICAL HISTORY        Diagnosis Date    Acquired trigger finger 2018    CAD (coronary artery disease)     NWOCC/ involving coronary bypass graft of native heart with unstable angina pectoris    Cervical spondylosis     CHF (congestive heart failure) ([facility])     Chronic back pain     on 18 pt states is presently in pain mgmnt    Chronic ulcer of toe, left, with necrosis of bone ([facility]) 3/29/2022    Contusion of elbow 2018    Contusion of knee 2018    Contusion of shoulder region 2018    Diabetic foot ulcer with osteomyelitis ([facility]) 2018    Diabetic ulcer of toe associated with type 2 diabetes mellitus, with fat layer exposed ([facility]) 2018    Hypercholesterolemia 2017    Hyperlipemia, mixed     Hypertension     Ischemic cardiomyopathy 2018    Non-pressure chronic ulcer of left lower leg with fat layer exposed ([facility]) 2014    Obesity     Obesity, Class III, BMI 40-49.9 (morbid obesity) ([facility]) 2015    Obstructive sleep apnea syndrome     Osteoarthritis of carpometacarpal (CMC) joint of thumb 2018    Osteoarthritis of knee 2018    Peripheral vascular disease ([facility])

## 2024-02-13 NOTE — PROGRESS NOTES
Bilateral.  Nails 1-5 left and 1-5 right thickened > 3.0 mm, dystrophic and crumbly, discolored with subungual debris.  Fissures absent, Bilateral.   General: AAO x 3 in NAD.    Derm  Toenail Description  Sites of Onychomycosis Involvement (Check affected area)  [x] [x] [x] [] [x] [x] [x] [x] [x] [x]  5 4 3 2 1 1 2 3 4 5                          Right                                        Left    Thickness  [x] [x] [x] [] [x] [x] [x] [x] [x] [x]  5 4 3 2 1 1 2 3 4 5                         Right                                        Left    Dystrophic Changes   [x] [x] [x] [] [x] [x] [x] [x] [x] [x]  5 4 3 2 1 1 2 3 4 5                         Right                                        Left    Color   [x] [x] [x] [] [x] [x] [x] [x] [x] [x]  5 4 3 2 1 1 2 3 4 5                          Right                                        Left    Incurvation/Ingrowin   [] [] [] [] [] [] [] [] [] []  5 4 3 2 1 1 2 3 4 5                         Right                                        Left    Inflammation/Pain   [x] [x] [x] [] [x] [x] [x] [x] [x] [x]  5 4 3 2 1 1 2 3 4 5                         Right                                        Left        Visual inspection:  Deformity: hammertoe deformity heidi feet  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 male with:     Diagnosis Orders   1. Type II diabetes mellitus with peripheral circulatory disorder (HCC)        2. Ulcer of left foot, with fat layer exposed (HCC)        3. PVD (peripheral vascular disease) ([facility])        4. Cellulitis of foot, left                Q7

## 2024-02-19 NOTE — DISCHARGE INSTRUCTIONS
[facility] -Phone: [phone] Fax: [phone]             Visit  Discharge Instructions / Physician Orders     DATE: 2/20/2024     Home Care: N/A     SUPPLIES ORDERED THRU: [employer]     Wound Location: Left Lateral Foot     Cleanse with: saline     Dressing Orders: Collagen with Silver (Blessing)- moisten with saline, Dry gauze, Roll Gauze- offloaded shoe insert                                               Frequency: Every Other Day     Additional Orders: Increase protein to diet (meat, cheese, eggs, fish, peanut butter, nuts and beans)  ELEVATE LEGS AS MUCH AS POSSIBLE     Your next appointment with Wound Care Center is in 1 week with Dr. Cleaning        (Please note your next appointment above and if you are unable to keep, kindly give a 24 hour notice. Thank you.)     If you experience any of the following, please call the Wound Care Center during business hours:  [phone]  Your Phone call may be forwarded to [facility] during business hours that [facility] is closed.     * Increase in Pain  * Temperature over 101  * Increase in drainage from your wound  * Drainage with a foul odor  * Bleeding  * Increase in swelling  * Need for compression bandage changes due to slippage, breakthrough drainage.     If you need medical attention outside of the business hours of the Wound Care Centers please contact your PCP or go to the nearest emergency room.     The information contained in the After Visit Summary has been reviewed with me, the patient and/or responsible adult, by my health care provider(s). I had the opportunity to ask questions regarding this information. I have elected to receive;      []After Visit Summary  [x]Comprehensive Discharge Instruction        Patient signature______________________________________Date:________  Electronically signed by Nathaniel Wheatley RN on 2/20/2024 at 3:37 PM  Electronically signed by Agustina Cleaning DPM on 2/20/2024 at

## 2024-02-20 ENCOUNTER — HOSPITAL ENCOUNTER (OUTPATIENT)
Dept: WOUND CARE | Age: 64
Discharge: HOME | End: 2024-02-20
Payer: COMMERCIAL

## 2024-02-20 VITALS
RESPIRATION RATE: 18 BRPM | HEART RATE: 73 BPM | BODY MASS INDEX: 29.12 KG/M2 | TEMPERATURE: 97.4 F | DIASTOLIC BLOOD PRESSURE: 62 MMHG | HEIGHT: 72 IN | SYSTOLIC BLOOD PRESSURE: 110 MMHG | WEIGHT: 215 LBS

## 2024-02-20 DIAGNOSIS — L97.512 ULCER OF TOE OF RIGHT FOOT, WITH FAT LAYER EXPOSED (HCC): ICD-10-CM

## 2024-02-20 DIAGNOSIS — E11.42 DIABETIC POLYNEUROPATHY ASSOCIATED WITH TYPE 2 DIABETES MELLITUS (HCC): Primary | ICD-10-CM

## 2024-02-20 PROCEDURE — 11042 DBRDMT SUBQ TIS 1ST 20SQCM/<: CPT

## 2024-02-20 RX ORDER — GENTAMICIN SULFATE 1 MG/G
OINTMENT TOPICAL ONCE
OUTPATIENT
Start: 2024-02-20 | End: 2024-02-20

## 2024-02-20 RX ORDER — LIDOCAINE 40 MG/G
CREAM TOPICAL ONCE
OUTPATIENT
Start: 2024-02-20 | End: 2024-02-20

## 2024-02-20 RX ORDER — BETAMETHASONE DIPROPIONATE 0.5 MG/G
CREAM TOPICAL ONCE
OUTPATIENT
Start: 2024-02-20 | End: 2024-02-20

## 2024-02-20 RX ORDER — IBUPROFEN 200 MG
TABLET ORAL ONCE
OUTPATIENT
Start: 2024-02-20 | End: 2024-02-20

## 2024-02-20 RX ORDER — BACITRACIN ZINC AND POLYMYXIN B SULFATE 500; 1000 [USP'U]/G; [USP'U]/G
OINTMENT TOPICAL ONCE
OUTPATIENT
Start: 2024-02-20 | End: 2024-02-20

## 2024-02-20 RX ORDER — GINSENG 100 MG
CAPSULE ORAL ONCE
OUTPATIENT
Start: 2024-02-20 | End: 2024-02-20

## 2024-02-20 RX ORDER — LIDOCAINE HYDROCHLORIDE 20 MG/ML
JELLY TOPICAL ONCE
OUTPATIENT
Start: 2024-02-20 | End: 2024-02-20

## 2024-02-20 RX ORDER — TRIAMCINOLONE ACETONIDE 1 MG/G
OINTMENT TOPICAL ONCE
OUTPATIENT
Start: 2024-02-20 | End: 2024-02-20

## 2024-02-20 RX ORDER — CLOBETASOL PROPIONATE 0.5 MG/G
OINTMENT TOPICAL ONCE
OUTPATIENT
Start: 2024-02-20 | End: 2024-02-20

## 2024-02-20 RX ORDER — LIDOCAINE HYDROCHLORIDE 40 MG/ML
SOLUTION TOPICAL ONCE
OUTPATIENT
Start: 2024-02-20 | End: 2024-02-20

## 2024-02-20 RX ORDER — LIDOCAINE 50 MG/G
OINTMENT TOPICAL ONCE
OUTPATIENT
Start: 2024-02-20 | End: 2024-02-20

## 2024-02-20 RX ORDER — SODIUM CHLOR/HYPOCHLOROUS ACID 0.033 %
SOLUTION, IRRIGATION IRRIGATION ONCE
OUTPATIENT
Start: 2024-02-20 | End: 2024-02-20

## 2024-02-20 ASSESSMENT — PAIN DESCRIPTION - ORIENTATION: ORIENTATION: LEFT

## 2024-02-20 ASSESSMENT — PAIN DESCRIPTION - LOCATION: LOCATION: FOOT

## 2024-02-20 ASSESSMENT — PAIN SCALES - GENERAL: PAINLEVEL_OUTOF10: 7

## 2024-02-20 NOTE — PLAN OF CARE
Problem: Chronic Conditions and Co-morbidities  Goal: Patient's chronic conditions and co-morbidity symptoms are monitored and maintained or improved  Outcome: Progressing     Problem: Pain  Goal: Verbalizes/displays adequate comfort level or baseline comfort level  Outcome: Progressing     Problem: Wound:  Goal: Will show signs of wound healing; wound closure and no evidence of infection  Description: Will show signs of wound healing; wound closure and no evidence of infection  Outcome: Progressing     Problem: Falls - Risk of:  Goal: Will remain free from falls  Description: Will remain free from falls  Outcome: Progressing

## 2024-02-20 NOTE — PROGRESS NOTES
01/09/2018        Procedure Note  Indications:  Based on my examination of this patient's wound(s)/ulcer(s) today, debridement is required to promote healing and evaluate the wound base.    Performed by: Agustina Cleaning DPM    Consent obtained:  Yes    Time out taken:  Yes    Pain Control: Anesthetic  Anesthetic: 2% Lidocaine Gel Topical       Debridement: Excisional Debridement    Using curette and tissue nippers the wound(s)/ulcer(s) was/were sharply debrided down through and including the removal of subcutaneous tissue.        Devitalized Tissue Debrided:  fibrin, biofilm, and slough    Culture Obtained:  No    Pre Debridement Measurements:  Are located in the Wound/Ulcer Documentation Flow Sheet    Wound/Ulcer #: 1    Post Debridement Measurements:  Wound/Ulcer Descriptions are Pre Debridement except measurements:    Percent of Wound(s)/Ulcer(s) Debrided: 100%  -3.91 CM      Diabetic/Pressure/Non Pressure Ulcers only:  Ulcer: Diabetic ulcer, fat layer exposed     Estimated Blood Loss:  None    Hemostasis Achieved:  not needed    Procedural Pain:  0  / 10     Post Procedural Pain:  0 / 10     Response to treatment:  Well tolerated by patient.       Plan:   EXCISIONAL DEBRIDEMENT WOUND LEFT FOOT - KAYCEE AND SALINE QOD. BROTHER  HELPS HIM WITH THE DRESSINGS. WARN OF ADHESIVE PULLING OFF HIS SKIN     PT SEEING DR ARCHULETA NEXT WEEK TO GET CUSTOM [employer] ACCOMMODATIVE ORTH TO OFFLOAD THE LATERAL PROMINENCE.    RTWCC 1 WEEK    Treatment Note please see attached Discharge Instructions    Written patient dismissal instructions given to patient and signed by patient or POA.         Discharge Instructions            [facility] -Phone: [phone] Fax: [phone]             Visit  Discharge Instructions / Physician Orders     DATE: 2/20/2024     Home Care: N/A     SUPPLIES ORDERED THRU: [employer]     Wound Location: Left Lateral Foot     Cleanse with: saline     Dressing Orders: Collagen

## 2024-02-24 SDOH — ECONOMIC STABILITY: TRANSPORTATION INSECURITY
IN THE PAST 12 MONTHS, HAS LACK OF TRANSPORTATION KEPT YOU FROM MEETINGS, WORK, OR FROM GETTING THINGS NEEDED FOR DAILY LIVING?: NO

## 2024-02-24 SDOH — ECONOMIC STABILITY: FOOD INSECURITY: WITHIN THE PAST 12 MONTHS, YOU WORRIED THAT YOUR FOOD WOULD RUN OUT BEFORE YOU GOT MONEY TO BUY MORE.: NEVER TRUE

## 2024-02-24 SDOH — ECONOMIC STABILITY: INCOME INSECURITY: HOW HARD IS IT FOR YOU TO PAY FOR THE VERY BASICS LIKE FOOD, HOUSING, MEDICAL CARE, AND HEATING?: NOT HARD AT ALL

## 2024-02-24 SDOH — ECONOMIC STABILITY: FOOD INSECURITY: WITHIN THE PAST 12 MONTHS, THE FOOD YOU BOUGHT JUST DIDN'T LAST AND YOU DIDN'T HAVE MONEY TO GET MORE.: PATIENT DECLINED

## 2024-02-26 NOTE — TELEPHONE ENCOUNTER
Ac Alcantar is calling to request a refill on the following medication(s):    Last Visit Date (If Applicable):  11/14/2023    Next Visit Date:    2/27/2024    Medication Request:  Requested Prescriptions     Pending Prescriptions Disp Refills    metFORMIN (GLUCOPHAGE) 1000 MG tablet 60 tablet 3     Sig: Take 1 tablet by mouth 2 times daily (with meals)

## 2024-02-27 ENCOUNTER — TELEMEDICINE (OUTPATIENT)
Dept: FAMILY MEDICINE CLINIC | Age: 64
End: 2024-02-27
Payer: COMMERCIAL

## 2024-02-27 ENCOUNTER — HOSPITAL ENCOUNTER (OUTPATIENT)
Dept: WOUND CARE | Age: 64
Discharge: HOME | End: 2024-02-27
Payer: COMMERCIAL

## 2024-02-27 ENCOUNTER — TELEPHONE (OUTPATIENT)
Dept: FAMILY MEDICINE CLINIC | Age: 64
End: 2024-02-27

## 2024-02-27 VITALS
WEIGHT: 215 LBS | HEIGHT: 72 IN | HEART RATE: 79 BPM | TEMPERATURE: 97.3 F | DIASTOLIC BLOOD PRESSURE: 63 MMHG | RESPIRATION RATE: 18 BRPM | SYSTOLIC BLOOD PRESSURE: 107 MMHG | BODY MASS INDEX: 29.12 KG/M2

## 2024-02-27 DIAGNOSIS — E11.42 DIABETIC POLYNEUROPATHY ASSOCIATED WITH TYPE 2 DIABETES MELLITUS (HCC): Primary | ICD-10-CM

## 2024-02-27 DIAGNOSIS — E66.01 OBESITY, CLASS III, BMI 40-49.9 (MORBID OBESITY) (HCC): ICD-10-CM

## 2024-02-27 DIAGNOSIS — L97.512 ULCER OF TOE OF RIGHT FOOT, WITH FAT LAYER EXPOSED (HCC): ICD-10-CM

## 2024-02-27 PROCEDURE — 87205 SMEAR GRAM STAIN: CPT

## 2024-02-27 PROCEDURE — 86403 PARTICLE AGGLUT ANTBDY SCRN: CPT

## 2024-02-27 PROCEDURE — 99214 OFFICE O/P EST MOD 30 MIN: CPT | Performed by: FAMILY MEDICINE

## 2024-02-27 PROCEDURE — 11042 DBRDMT SUBQ TIS 1ST 20SQCM/<: CPT

## 2024-02-27 PROCEDURE — 87075 CULTR BACTERIA EXCEPT BLOOD: CPT

## 2024-02-27 PROCEDURE — 87077 CULTURE AEROBIC IDENTIFY: CPT

## 2024-02-27 PROCEDURE — 87186 SC STD MICRODIL/AGAR DIL: CPT

## 2024-02-27 PROCEDURE — 87070 CULTURE OTHR SPECIMN AEROBIC: CPT

## 2024-02-27 RX ORDER — BETAMETHASONE DIPROPIONATE 0.5 MG/G
CREAM TOPICAL ONCE
OUTPATIENT
Start: 2024-02-27 | End: 2024-02-27

## 2024-02-27 RX ORDER — PHENTERMINE HYDROCHLORIDE 37.5 MG/1
37.5 TABLET ORAL
Qty: 30 TABLET | Refills: 0 | Status: SHIPPED | OUTPATIENT
Start: 2024-02-27 | End: 2024-03-28

## 2024-02-27 RX ORDER — BACITRACIN ZINC AND POLYMYXIN B SULFATE 500; 1000 [USP'U]/G; [USP'U]/G
OINTMENT TOPICAL ONCE
OUTPATIENT
Start: 2024-02-27 | End: 2024-02-27

## 2024-02-27 RX ORDER — LIDOCAINE HYDROCHLORIDE 40 MG/ML
SOLUTION TOPICAL ONCE
OUTPATIENT
Start: 2024-02-27 | End: 2024-02-27

## 2024-02-27 RX ORDER — ARIPIPRAZOLE 10 MG/1
TABLET ORAL
Qty: 60 TABLET | Refills: 11 | Status: SHIPPED | OUTPATIENT
Start: 2024-02-27

## 2024-02-27 RX ORDER — GENTAMICIN SULFATE 1 MG/G
OINTMENT TOPICAL ONCE
OUTPATIENT
Start: 2024-02-27 | End: 2024-02-27

## 2024-02-27 RX ORDER — LIDOCAINE 40 MG/G
CREAM TOPICAL ONCE
OUTPATIENT
Start: 2024-02-27 | End: 2024-02-27

## 2024-02-27 RX ORDER — TRIAMCINOLONE ACETONIDE 1 MG/G
OINTMENT TOPICAL ONCE
OUTPATIENT
Start: 2024-02-27 | End: 2024-02-27

## 2024-02-27 RX ORDER — CLOBETASOL PROPIONATE 0.5 MG/G
OINTMENT TOPICAL ONCE
OUTPATIENT
Start: 2024-02-27 | End: 2024-02-27

## 2024-02-27 RX ORDER — LIDOCAINE HYDROCHLORIDE 20 MG/ML
JELLY TOPICAL ONCE
OUTPATIENT
Start: 2024-02-27 | End: 2024-02-27

## 2024-02-27 RX ORDER — LIDOCAINE 50 MG/G
OINTMENT TOPICAL ONCE
OUTPATIENT
Start: 2024-02-27 | End: 2024-02-27

## 2024-02-27 RX ORDER — PHENTERMINE HYDROCHLORIDE 37.5 MG/1
37.5 TABLET ORAL
Qty: 30 TABLET | Refills: 0 | Status: SHIPPED | OUTPATIENT
Start: 2024-03-27 | End: 2024-04-26

## 2024-02-27 RX ORDER — SODIUM CHLOR/HYPOCHLOROUS ACID 0.033 %
SOLUTION, IRRIGATION IRRIGATION ONCE
OUTPATIENT
Start: 2024-02-27 | End: 2024-02-27

## 2024-02-27 RX ORDER — GINSENG 100 MG
CAPSULE ORAL ONCE
OUTPATIENT
Start: 2024-02-27 | End: 2024-02-27

## 2024-02-27 RX ORDER — LIDOCAINE HYDROCHLORIDE 20 MG/ML
JELLY TOPICAL ONCE
Status: COMPLETED | OUTPATIENT
Start: 2024-02-27 | End: 2024-02-27

## 2024-02-27 RX ORDER — IBUPROFEN 200 MG
TABLET ORAL ONCE
OUTPATIENT
Start: 2024-02-27 | End: 2024-02-27

## 2024-02-27 RX ORDER — ARIPIPRAZOLE 20 MG/1
20 TABLET ORAL DAILY
Qty: 30 TABLET | Refills: 11 | Status: SHIPPED | OUTPATIENT
Start: 2024-02-27

## 2024-02-27 RX ORDER — DOXYCYCLINE HYCLATE 100 MG
100 TABLET ORAL 2 TIMES DAILY
Qty: 20 TABLET | Refills: 0 | Status: SHIPPED | OUTPATIENT
Start: 2024-02-27 | End: 2024-03-08

## 2024-02-27 RX ADMIN — LIDOCAINE HYDROCHLORIDE 6 ML: 20 JELLY TOPICAL at 14:52

## 2024-02-27 ASSESSMENT — PAIN SCALES - GENERAL: PAINLEVEL_OUTOF10: 7

## 2024-02-27 ASSESSMENT — PAIN DESCRIPTION - PROGRESSION: CLINICAL_PROGRESSION: NOT CHANGED

## 2024-02-27 NOTE — PROGRESS NOTES
Ac Alcantar, was evaluated through a synchronous (real-time) audio-video encounter. The patient (or guardian if applicable) is aware that this is a billable service, which includes applicable co-pays. This Virtual Visit was conducted with patient's (and/or legal guardian's) consent. Patient identification was verified, and a caregiver was present when appropriate.   The patient was located at Home: [address] [place] [address]  Provider was located at Home (Appt Dept State): [place]      Ac Alcantar (:  1960) is a Established patient, presenting virtually for evaluation of the following:    Assessment & Plan   Below is the assessment and plan developed based on review of pertinent history, physical exam, labs, studies, and medications.  1. Obesity, Class III, BMI 40-49.9 (morbid obesity) (HCC)  -     phentermine (ADIPEX-P) 37.5 MG tablet; Take 1 tablet by mouth every morning (before breakfast) for 30 days., Disp-30 tablet, R-0Normal  -     phentermine (ADIPEX-P) 37.5 MG tablet; Take 1 tablet by mouth every morning (before breakfast) for 30 days., Disp-30 tablet, R-0Normal  -     phentermine (ADIPEX-P) 37.5 MG tablet; Take 1 tablet by mouth every morning (before breakfast) for 30 days. Max Daily Amount: 37.5 mg, Disp-30 tablet, R-0Normal     Follow-up in 3 months    Subjective   HPI  Review of Systems     He is being seen today for follow-up and needing refills of his anxiety medication and also using it for sleep and weight management medications he is now down to 200 pounds he is otherwise doing fairly well he unfortunately has developed a new wound on his bottom of his foot he is going to wound care  Objective   Patient-Reported Vitals  No data recorded     Physical Exam  [INSTRUCTIONS:  \"[x]\" Indicates a positive item  \"[]\" Indicates a negative item  -- DELETE ALL ITEMS NOT EXAMINED]    Constitutional: [x] Appears well-developed and well-nourished [x] No apparent distress      [] Abnormal -

## 2024-02-27 NOTE — DISCHARGE INSTRUCTIONS
[facility] -Phone: [phone] Fax: [phone]             Visit  Discharge Instructions / Physician Orders     DATE: 2/27/2024     Home Care: N/A     SUPPLIES ORDERED THRU: [employer]     Wound Location: Left Lateral Foot     Cleanse with: saline     Dressing Orders: Collagen with Silver (Blessing)- moisten with saline, Bandaid- offloaded shoe insert                                               Frequency: Every Other Day     Additional Orders: Increase protein to diet (meat, cheese, eggs, fish, peanut butter, nuts and beans)  ELEVATE LEGS AS MUCH AS POSSIBLE  Culture Taken 2/27/24     Your next appointment with Wound Care Center is in 1 week with Dr. Cleaning        (Please note your next appointment above and if you are unable to keep, kindly give a 24 hour notice. Thank you.)     If you experience any of the following, please call the Wound Care Center during business hours:  [phone]  Your Phone call may be forwarded to [facility] during business hours that [facility] is closed.     * Increase in Pain  * Temperature over 101  * Increase in drainage from your wound  * Drainage with a foul odor  * Bleeding  * Increase in swelling  * Need for compression bandage changes due to slippage, breakthrough drainage.     If you need medical attention outside of the business hours of the Wound Care Centers please contact your PCP or go to the nearest emergency room.     The information contained in the After Visit Summary has been reviewed with me, the patient and/or responsible adult, by my health care provider(s). I had the opportunity to ask questions regarding this information. I have elected to receive;      []After Visit Summary  [x]Comprehensive Discharge Instruction        Patient signature______________________________________Date:________  Electronically signed by Nathaniel Wheatley RN on 2/27/2024 at 3:10 PM  Electronically signed by Agustina Cleaning DPM on

## 2024-02-27 NOTE — TELEPHONE ENCOUNTER
No  he should be on 20mg    for some reason the 10 was the latest 1 and active meds in the 20 was in history

## 2024-02-27 NOTE — TELEPHONE ENCOUNTER
Pharmacy calling regarding abilify, stated patient was last on 20mg but you sent ini a 10mg, they would like to confirm if this is correct

## 2024-02-27 NOTE — PROGRESS NOTES
Description Serosanguinous 02/27/24 1457   Odor None 02/27/24 1457   Trudi-wound Assessment Blanchable erythema;Maceration 02/27/24 1457   Margins Defined edges 02/27/24 1457   Wound Thickness Description not for Pressure Injury Full thickness 02/27/24 1457   Number of days: 14          Assessment:        Active Hospital Problems    Diagnosis Date Noted    Cellulitis [L03.90] 06/14/2022     Priority: Medium    Diabetic polyneuropathy associated with type 2 diabetes mellitus (HCC) [E11.42] 03/29/2022    Ulcer of left foot, with fat layer exposed (HCC) [L97.522] 01/09/2018        Procedure Note  Indications:  Based on my examination of this patient's wound(s)/ulcer(s) today, debridement is required to promote healing and evaluate the wound base.    Performed by: Agustina Cleaning DPM    Consent obtained:  Yes    Time out taken:  Yes    Pain Control: Anesthetic  Anesthetic: 2% Lidocaine Gel Topical       Debridement: Excisional Debridement    Using curette and tissue nippers the wound(s)/ulcer(s) was/were sharply debrided down through and including the removal of subcutaneous tissue.        Devitalized Tissue Debrided:  fibrin, biofilm, and slough    Culture Obtained:  No    Pre Debridement Measurements:  Are located in the Wound/Ulcer Documentation Flow Sheet    Wound/Ulcer #: 1    Post Debridement Measurements:  Wound/Ulcer Descriptions are Pre Debridement except measurements:    Percent of Wound(s)/Ulcer(s) Debrided: 100%  2.0 CM      Diabetic/Pressure/Non Pressure Ulcers only:  Ulcer: Diabetic ulcer, fat layer exposed     Estimated Blood Loss:  None    Hemostasis Achieved:  not needed    Procedural Pain:  0  / 10     Post Procedural Pain:  0 / 10     Response to treatment:  Well tolerated by patient.       Plan:   EXCISIONAL DEBRIDEMENT WOUND LEFT FOOT - KAYCEE AND SALINE QOD.   C&S LEFT FOOT WOUND  RX DOXY     PT SEEING DR ARCHULETA THIS WEEK TO GET CUSTOM [employer] ACCOMMODATIVE ORTH TO OFFLOAD THE LATERAL

## 2024-02-29 ENCOUNTER — OFFICE VISIT (OUTPATIENT)
Dept: PODIATRY | Age: 64
End: 2024-02-29
Payer: COMMERCIAL

## 2024-02-29 VITALS — BODY MASS INDEX: 29.12 KG/M2 | HEIGHT: 72 IN | WEIGHT: 215 LBS

## 2024-02-29 DIAGNOSIS — L97.512 RIGHT FOOT ULCER, WITH FAT LAYER EXPOSED (HCC): ICD-10-CM

## 2024-02-29 DIAGNOSIS — Z89.431 HISTORY OF AMPUTATION OF FOOT, RIGHT (HCC): ICD-10-CM

## 2024-02-29 DIAGNOSIS — I73.9 PVD (PERIPHERAL VASCULAR DISEASE) (HCC): ICD-10-CM

## 2024-02-29 DIAGNOSIS — E11.51 TYPE II DIABETES MELLITUS WITH PERIPHERAL CIRCULATORY DISORDER (HCC): Primary | ICD-10-CM

## 2024-02-29 LAB
MICROORGANISM SPEC CULT: ABNORMAL
MICROORGANISM/AGENT SPEC: ABNORMAL
MICROORGANISM/AGENT SPEC: ABNORMAL
SPECIMEN DESCRIPTION: ABNORMAL

## 2024-02-29 PROCEDURE — 99213 OFFICE O/P EST LOW 20 MIN: CPT | Performed by: PODIATRIST

## 2024-02-29 NOTE — PROGRESS NOTES
[facility] PODIATRY [address]  [address]  [facility] [address]  Dept: [phone]  Dept Fax: [phone]    RETURN PATIENT PROGRESS NOTE  Date of patient's visit: 2/29/2024  Patient's Name:  Ac Alcantar YOB: 1960            Patient Care Team:  Seema Hameed DO as PCP - General (Family Medicine)  Seema Hameed DO as PCP - Empaneled Provider  Mago Bustamante MD (Anesthesiology)  Yomi Nick MD as Consulting Physician (Pulmonology)  Isaiah Gresham MD as Consulting Physician (Cardiology)  Jesse Burnett MD as Surgeon (Neurosurgery)  Otilio Ma DPM as Consulting Physician (Podiatry)       Ac Alcantar 63 y.o. male that presents for follow-up of   Chief Complaint   Patient presents with    Diabetes     Diabetic     Foot Pain     Discuss orthotics and diabetic shoes      Pt's primary care physician is Seema Hameed DO last seen2/27/2024  Symptoms began 4 month(s) ago and are increased .  Patient relates pain is Absent secondary to neuropathy .  Pain is rated 2 out of 10 and is described as moderate.  Treatments prior to today's visit include: wound care.  He is interested in DM shoes and orhtotics. .  Currently denies F/C/N/V.     Allergies   Allergen Reactions    Pcn [Penicillins] Other (See Comments)     Unknown rx    Penicillin G Rash       Past Medical History:   Diagnosis Date    Acquired trigger finger 6/5/2018    CAD (coronary artery disease)     NWOCC/ involving coronary bypass graft of native heart with unstable angina pectoris    Cervical spondylosis     CHF (congestive heart failure) ([facility])     Chronic back pain     on 11/19/18 pt states is presently in pain mgmnt    Chronic ulcer of toe, left, with necrosis of bone ([facility]) 3/29/2022    Contusion of elbow 6/5/2018    Contusion of knee 6/5/2018    Contusion of shoulder region 6/5/2018    Diabetic foot ulcer with osteomyelitis ([facility]) 1/9/2018

## 2024-03-05 ENCOUNTER — HOSPITAL ENCOUNTER (OUTPATIENT)
Dept: WOUND CARE | Age: 64
Discharge: HOME | End: 2024-03-05
Payer: COMMERCIAL

## 2024-03-05 VITALS
TEMPERATURE: 96.5 F | WEIGHT: 215 LBS | HEART RATE: 69 BPM | SYSTOLIC BLOOD PRESSURE: 114 MMHG | BODY MASS INDEX: 29.12 KG/M2 | HEIGHT: 72 IN | DIASTOLIC BLOOD PRESSURE: 59 MMHG | RESPIRATION RATE: 17 BRPM

## 2024-03-05 DIAGNOSIS — L97.512 ULCER OF TOE OF RIGHT FOOT, WITH FAT LAYER EXPOSED (HCC): ICD-10-CM

## 2024-03-05 DIAGNOSIS — E11.42 DIABETIC POLYNEUROPATHY ASSOCIATED WITH TYPE 2 DIABETES MELLITUS (HCC): Primary | ICD-10-CM

## 2024-03-05 PROCEDURE — 11042 DBRDMT SUBQ TIS 1ST 20SQCM/<: CPT

## 2024-03-05 RX ORDER — IBUPROFEN 200 MG
TABLET ORAL ONCE
OUTPATIENT
Start: 2024-03-05 | End: 2024-03-05

## 2024-03-05 RX ORDER — LIDOCAINE HYDROCHLORIDE 20 MG/ML
JELLY TOPICAL ONCE
OUTPATIENT
Start: 2024-03-05 | End: 2024-03-05

## 2024-03-05 RX ORDER — LIDOCAINE HYDROCHLORIDE 20 MG/ML
JELLY TOPICAL ONCE
Status: COMPLETED | OUTPATIENT
Start: 2024-03-05 | End: 2024-03-05

## 2024-03-05 RX ORDER — BACITRACIN ZINC AND POLYMYXIN B SULFATE 500; 1000 [USP'U]/G; [USP'U]/G
OINTMENT TOPICAL ONCE
OUTPATIENT
Start: 2024-03-05 | End: 2024-03-05

## 2024-03-05 RX ORDER — GINSENG 100 MG
CAPSULE ORAL ONCE
OUTPATIENT
Start: 2024-03-05 | End: 2024-03-05

## 2024-03-05 RX ORDER — LIDOCAINE 40 MG/G
CREAM TOPICAL ONCE
OUTPATIENT
Start: 2024-03-05 | End: 2024-03-05

## 2024-03-05 RX ORDER — LEVOFLOXACIN 500 MG/1
500 TABLET, FILM COATED ORAL DAILY
Qty: 7 TABLET | Refills: 0 | Status: SHIPPED | OUTPATIENT
Start: 2024-03-05 | End: 2024-03-12

## 2024-03-05 RX ORDER — BETAMETHASONE DIPROPIONATE 0.5 MG/G
CREAM TOPICAL ONCE
OUTPATIENT
Start: 2024-03-05 | End: 2024-03-05

## 2024-03-05 RX ORDER — LIDOCAINE 50 MG/G
OINTMENT TOPICAL ONCE
OUTPATIENT
Start: 2024-03-05 | End: 2024-03-05

## 2024-03-05 RX ORDER — CLOBETASOL PROPIONATE 0.5 MG/G
OINTMENT TOPICAL ONCE
OUTPATIENT
Start: 2024-03-05 | End: 2024-03-05

## 2024-03-05 RX ORDER — GENTAMICIN SULFATE 1 MG/G
OINTMENT TOPICAL ONCE
OUTPATIENT
Start: 2024-03-05 | End: 2024-03-05

## 2024-03-05 RX ORDER — TRIAMCINOLONE ACETONIDE 1 MG/G
OINTMENT TOPICAL ONCE
OUTPATIENT
Start: 2024-03-05 | End: 2024-03-05

## 2024-03-05 RX ORDER — SODIUM CHLOR/HYPOCHLOROUS ACID 0.033 %
SOLUTION, IRRIGATION IRRIGATION ONCE
OUTPATIENT
Start: 2024-03-05 | End: 2024-03-05

## 2024-03-05 RX ORDER — LIDOCAINE HYDROCHLORIDE 40 MG/ML
SOLUTION TOPICAL ONCE
OUTPATIENT
Start: 2024-03-05 | End: 2024-03-05

## 2024-03-05 RX ADMIN — LIDOCAINE HYDROCHLORIDE 6 ML: 20 JELLY TOPICAL at 15:23

## 2024-03-05 ASSESSMENT — PAIN DESCRIPTION - ORIENTATION: ORIENTATION: LEFT

## 2024-03-05 ASSESSMENT — PAIN DESCRIPTION - PAIN TYPE: TYPE: CHRONIC PAIN

## 2024-03-05 ASSESSMENT — PAIN DESCRIPTION - LOCATION: LOCATION: FOOT

## 2024-03-05 ASSESSMENT — PAIN DESCRIPTION - DESCRIPTORS: DESCRIPTORS: ACHING;BURNING;SHOOTING;SHARP

## 2024-03-05 ASSESSMENT — PAIN SCALES - GENERAL: PAINLEVEL_OUTOF10: 8

## 2024-03-05 ASSESSMENT — PAIN DESCRIPTION - FREQUENCY: FREQUENCY: INTERMITTENT

## 2024-03-05 ASSESSMENT — PAIN DESCRIPTION - ONSET: ONSET: ON-GOING

## 2024-03-05 NOTE — DISCHARGE INSTRUCTIONS
[facility] WOUND CARE CENTER -Phone: [phone] Fax: [phone]             Visit  Discharge Instructions / Physician Orders     DATE: 3/5/2024     Home Care: N/A     SUPPLIES ORDERED THRU: [employer]     Wound Location: Left Lateral Foot     Cleanse with: saline     Dressing Orders: Saline moistened gauze, dry gauze                                               Frequency: Daily     Additional Orders: Increase protein to diet (meat, cheese, eggs, fish, peanut butter, nuts and beans)  ELEVATE LEGS AS MUCH AS POSSIBLE  Culture Taken 2/27/24  Levofloxacin ordered 3/5/24     Your next appointment with Wound Care Center is in 1 week with Dr. Cleaning        (Please note your next appointment above and if you are unable to keep, kindly give a 24 hour notice. Thank you.)     If you experience any of the following, please call the Wound Care Center during business hours:  [phone]  Your Phone call may be forwarded to [facility] during business hours that [place] is closed.     * Increase in Pain  * Temperature over 101  * Increase in drainage from your wound  * Drainage with a foul odor  * Bleeding  * Increase in swelling  * Need for compression bandage changes due to slippage, breakthrough drainage.     If you need medical attention outside of the business hours of the Wound Care Centers please contact your PCP or go to the nearest emergency room.     The information contained in the After Visit Summary has been reviewed with me, the patient and/or responsible adult, by my health care provider(s). I had the opportunity to ask questions regarding this information. I have elected to receive;      []After Visit Summary  [x]Comprehensive Discharge Instruction        Patient signature______________________________________Date:________  Electronically signed by Nathaniel Wheatley RN on 3/5/2024 at 3:42 PM  Electronically signed by Agustina Cleaning DPM on 3/5/2024 at 3:05 PM

## 2024-03-05 NOTE — PROGRESS NOTES
[facility]   Progress Note and Procedure Note      Ac Alcantar  MEDICAL RECORD NUMBER:  [id number]  AGE: 63 y.o.   GENDER: male  : 1960  EPISODE DATE:  3/5/2024    Subjective:     No chief complaint on file.        HISTORY of PRESENT ILLNESS HPI     Ac Alcantar is a 63 y.o. male who presents today for wound/ulcer evaluation.   History of Wound Context: HAS BEEN APPLYING COLLAGEN AND SALINE WITH LARGE BANDAID AND APERTURE PAD LEFT FOOT. HAS TAKEN HIS DOXY  DR ARCHULETA IS MAKING  CUSTOM INSOLES    C&S 24 STAPH  Wound/Ulcer Pain Timing/Severity: none  Quality of pain: N/A  Severity:  0 / 10   Modifying Factors: None    Ulcer Identification:  Ulcer Type: diabetic    Contributing Factors: chronic pressure    PAST MEDICAL HISTORY        Diagnosis Date    Acquired trigger finger 2018    CAD (coronary artery disease)     NWOCC/ involving coronary bypass graft of native heart with unstable angina pectoris    Cervical spondylosis     CHF (congestive heart failure) ([facility])     Chronic back pain     on 18 pt states is presently in pain mgmnt    Chronic ulcer of toe, left, with necrosis of bone ([facility]) 3/29/2022    Contusion of elbow 2018    Contusion of knee 2018    Contusion of shoulder region 2018    Diabetic foot ulcer with osteomyelitis ([facility]) 2018    Diabetic ulcer of toe associated with type 2 diabetes mellitus, with fat layer exposed ([facility]) 2018    Hypercholesterolemia 2017    Hyperlipemia, mixed     Hypertension     Ischemic cardiomyopathy 2018    Non-pressure chronic ulcer of left lower leg with fat layer exposed ([facility]) 2014    Obesity     Obesity, Class III, BMI 40-49.9 (morbid obesity) ([facility]) 2015    Obstructive sleep apnea syndrome     Osteoarthritis of carpometacarpal (CMC) joint of thumb 2018    Osteoarthritis of knee 2018    Peripheral vascular disease (HCC)     with venous stasis and ulcerations. Dr Manning    Presence

## 2024-03-06 ENCOUNTER — TELEPHONE (OUTPATIENT)
Dept: FAMILY MEDICINE CLINIC | Age: 64
End: 2024-03-06

## 2024-03-06 RX ORDER — AZITHROMYCIN 250 MG/1
250 TABLET, FILM COATED ORAL DAILY
Qty: 1 PACKET | Refills: 0 | Status: SHIPPED | OUTPATIENT
Start: 2024-03-06 | End: 2024-03-11

## 2024-03-06 NOTE — TELEPHONE ENCOUNTER
Patient called has chest congestion, bad cough, thick green mucus, sore throat, duration couple days no otc medication, stats that he gets like this in spring, is requesting a z-pack. Please advise.  Pharmacy updated in chart

## 2024-03-12 ENCOUNTER — HOSPITAL ENCOUNTER (OUTPATIENT)
Dept: WOUND CARE | Age: 64
Discharge: HOME | End: 2024-03-12
Payer: COMMERCIAL

## 2024-03-12 VITALS — SYSTOLIC BLOOD PRESSURE: 116 MMHG | TEMPERATURE: 97.4 F | DIASTOLIC BLOOD PRESSURE: 63 MMHG | HEART RATE: 78 BPM

## 2024-03-12 DIAGNOSIS — L97.512 ULCER OF TOE OF RIGHT FOOT, WITH FAT LAYER EXPOSED (HCC): ICD-10-CM

## 2024-03-12 DIAGNOSIS — E11.42 DIABETIC POLYNEUROPATHY ASSOCIATED WITH TYPE 2 DIABETES MELLITUS (HCC): Primary | ICD-10-CM

## 2024-03-12 PROCEDURE — 11042 DBRDMT SUBQ TIS 1ST 20SQCM/<: CPT

## 2024-03-12 RX ORDER — CLOBETASOL PROPIONATE 0.5 MG/G
OINTMENT TOPICAL ONCE
OUTPATIENT
Start: 2024-03-12 | End: 2024-03-12

## 2024-03-12 RX ORDER — LIDOCAINE 50 MG/G
OINTMENT TOPICAL ONCE
OUTPATIENT
Start: 2024-03-12 | End: 2024-03-12

## 2024-03-12 RX ORDER — TRIAMCINOLONE ACETONIDE 1 MG/G
OINTMENT TOPICAL ONCE
OUTPATIENT
Start: 2024-03-12 | End: 2024-03-12

## 2024-03-12 RX ORDER — LIDOCAINE HYDROCHLORIDE 40 MG/ML
SOLUTION TOPICAL ONCE
OUTPATIENT
Start: 2024-03-12 | End: 2024-03-12

## 2024-03-12 RX ORDER — LIDOCAINE HYDROCHLORIDE 20 MG/ML
JELLY TOPICAL ONCE
OUTPATIENT
Start: 2024-03-12 | End: 2024-03-12

## 2024-03-12 RX ORDER — SODIUM CHLOR/HYPOCHLOROUS ACID 0.033 %
SOLUTION, IRRIGATION IRRIGATION ONCE
OUTPATIENT
Start: 2024-03-12 | End: 2024-03-12

## 2024-03-12 RX ORDER — LIDOCAINE HYDROCHLORIDE 20 MG/ML
JELLY TOPICAL ONCE
Status: COMPLETED | OUTPATIENT
Start: 2024-03-12 | End: 2024-03-12

## 2024-03-12 RX ORDER — GINSENG 100 MG
CAPSULE ORAL ONCE
OUTPATIENT
Start: 2024-03-12 | End: 2024-03-12

## 2024-03-12 RX ORDER — BACITRACIN ZINC AND POLYMYXIN B SULFATE 500; 1000 [USP'U]/G; [USP'U]/G
OINTMENT TOPICAL ONCE
OUTPATIENT
Start: 2024-03-12 | End: 2024-03-12

## 2024-03-12 RX ORDER — LEVOFLOXACIN 500 MG/1
500 TABLET, FILM COATED ORAL DAILY
Qty: 10 TABLET | Refills: 0 | Status: SHIPPED | OUTPATIENT
Start: 2024-03-12 | End: 2024-03-22

## 2024-03-12 RX ORDER — LIDOCAINE 40 MG/G
CREAM TOPICAL ONCE
OUTPATIENT
Start: 2024-03-12 | End: 2024-03-12

## 2024-03-12 RX ORDER — IBUPROFEN 200 MG
TABLET ORAL ONCE
OUTPATIENT
Start: 2024-03-12 | End: 2024-03-12

## 2024-03-12 RX ORDER — GENTAMICIN SULFATE 1 MG/G
OINTMENT TOPICAL ONCE
OUTPATIENT
Start: 2024-03-12 | End: 2024-03-12

## 2024-03-12 RX ORDER — BETAMETHASONE DIPROPIONATE 0.5 MG/G
CREAM TOPICAL ONCE
OUTPATIENT
Start: 2024-03-12 | End: 2024-03-12

## 2024-03-12 RX ADMIN — LIDOCAINE HYDROCHLORIDE 6 ML: 20 JELLY TOPICAL at 14:38

## 2024-03-12 ASSESSMENT — PAIN DESCRIPTION - LOCATION: LOCATION: FOOT

## 2024-03-12 ASSESSMENT — PAIN DESCRIPTION - DESCRIPTORS: DESCRIPTORS: STABBING

## 2024-03-12 ASSESSMENT — PAIN DESCRIPTION - ORIENTATION: ORIENTATION: LEFT

## 2024-03-12 ASSESSMENT — PAIN SCALES - GENERAL: PAINLEVEL_OUTOF10: 8

## 2024-03-12 NOTE — PROGRESS NOTES
[facility] Care [facility]   Progress Note and Procedure Note      Ac Alcantar  MEDICAL RECORD NUMBER:  [id number]  AGE: 63 y.o.   GENDER: male  : 1960  EPISODE DATE:  3/12/2024    Subjective:     No chief complaint on file.        HISTORY of PRESENT ILLNESS HPI     Ac Alcantar is a 63 y.o. male who presents today for wound/ulcer evaluation.   History of Wound Context: HAS BEEN APPLYING SALINE WITH LARGE BANDAID AND APERTURE PAD LEFT FOOT. HAS TAKEN HIS LEVAQUIN  DR ARCHULETA IS MAKING  CUSTOM INSOLES - STILL WAITING    C&S 24 STAP  Wound/Ulcer Pain Timing/Severity: none  Quality of pain: N/A  Severity:  0 / 10   Modifying Factors: None    Ulcer Identification:  Ulcer Type: diabetic    Contributing Factors: chronic pressure    PAST MEDICAL HISTORY        Diagnosis Date    Acquired trigger finger 2018    CAD (coronary artery disease)     NWOCC/ involving coronary bypass graft of native heart with unstable angina pectoris    Cervical spondylosis     CHF (congestive heart failure) ([facility])     Chronic back pain     on 18 pt states is presently in pain mgmnt    Chronic ulcer of toe, left, with necrosis of bone ([facility]) 3/29/2022    Contusion of elbow 2018    Contusion of knee 2018    Contusion of shoulder region 2018    Diabetic foot ulcer with osteomyelitis ([facility]) 2018    Diabetic ulcer of toe associated with type 2 diabetes mellitus, with fat layer exposed ([facility]) 2018    Hypercholesterolemia 2017    Hyperlipemia, mixed     Hypertension     Ischemic cardiomyopathy 2018    Non-pressure chronic ulcer of left lower leg with fat layer exposed ([facility]) 2014    Obesity     Obesity, Class III, BMI 40-49.9 (morbid obesity) ([facility]) 2015    Obstructive sleep apnea syndrome     Osteoarthritis of carpometacarpal (CMC) joint of thumb 2018    Osteoarthritis of knee 2018    Peripheral vascular disease (HCC)     with venous stasis and ulcerations. Dr Manning

## 2024-03-15 ENCOUNTER — TELEPHONE (OUTPATIENT)
Dept: FAMILY MEDICINE CLINIC | Age: 64
End: 2024-03-15

## 2024-03-15 RX ORDER — TRAZODONE HYDROCHLORIDE 150 MG/1
150 TABLET ORAL NIGHTLY
Qty: 90 TABLET | Refills: 3 | Status: SHIPPED | OUTPATIENT
Start: 2024-03-15

## 2024-03-15 RX ORDER — FLUCONAZOLE 150 MG/1
150 TABLET ORAL DAILY
Qty: 5 TABLET | Refills: 0 | Status: SHIPPED | OUTPATIENT
Start: 2024-03-15

## 2024-03-15 RX ORDER — TRAZODONE HYDROCHLORIDE 150 MG/1
150 TABLET ORAL NIGHTLY
Qty: 30 TABLET | Refills: 0 | Status: CANCELLED | OUTPATIENT
Start: 2024-03-15

## 2024-03-15 NOTE — TELEPHONE ENCOUNTER
Patient called and states he recently finished abx and now he has a yeast infection. He asked if you can call medication into pharmacy. He also needs a refill of Trazodone medication.

## 2024-03-18 NOTE — DISCHARGE INSTRUCTIONS
[facility] WOUND CARE CENTER -Phone: [phone] Fax: [phone]             Visit  Discharge Instructions / Physician Orders     DATE: 3/19/2024     Home Care: N/A     SUPPLIES ORDERED THRU: [employer]     Wound Location: Left Lateral Foot     Cleanse with: saline     Dressing Orders: Saline moistened (slightly damp) gauze, dry gauze, Roll Gauze up around ankle and in between toes to help hold dressing in place, paper tape                                               Frequency: Daily     Additional Orders: Increase protein to diet (meat, cheese, eggs, fish, peanut butter, nuts and beans)  ELEVATE LEGS AS MUCH AS POSSIBLE  Culture Taken 2/27/24  Levofloxacin ordered 3/12/24     Your next appointment with Wound Care Center is in 1 week with Dr. Cleaning        (Please note your next appointment above and if you are unable to keep, kindly give a 24 hour notice. Thank you.)     If you experience any of the following, please call the Wound Care Center during business hours:  [phone]  Your Phone call may be forwarded to [facility] during business hours that [place] is closed.     * Increase in Pain  * Temperature over 101  * Increase in drainage from your wound  * Drainage with a foul odor  * Bleeding  * Increase in swelling  * Need for compression bandage changes due to slippage, breakthrough drainage.     If you need medical attention outside of the business hours of the Wound Care Centers please contact your PCP or go to the nearest emergency room.     The information contained in the After Visit Summary has been reviewed with me, the patient and/or responsible adult, by my health care provider(s). I had the opportunity to ask questions regarding this information. I have elected to receive;      []After Visit Summary  [x]Comprehensive Discharge Instruction        Patient signature______________________________________Date:________  Electronically signed by Nathaniel Wheatley

## 2024-03-19 ENCOUNTER — HOSPITAL ENCOUNTER (OUTPATIENT)
Dept: WOUND CARE | Age: 64
Discharge: HOME | End: 2024-03-19
Payer: COMMERCIAL

## 2024-03-19 VITALS
WEIGHT: 215 LBS | TEMPERATURE: 96.6 F | HEIGHT: 72 IN | HEART RATE: 79 BPM | DIASTOLIC BLOOD PRESSURE: 71 MMHG | SYSTOLIC BLOOD PRESSURE: 139 MMHG | BODY MASS INDEX: 29.12 KG/M2

## 2024-03-19 DIAGNOSIS — L97.512 ULCER OF TOE OF RIGHT FOOT, WITH FAT LAYER EXPOSED (HCC): ICD-10-CM

## 2024-03-19 DIAGNOSIS — M21.622 TAILOR'S BUNION OF LEFT FOOT: ICD-10-CM

## 2024-03-19 DIAGNOSIS — E11.42 DIABETIC POLYNEUROPATHY ASSOCIATED WITH TYPE 2 DIABETES MELLITUS (HCC): Primary | ICD-10-CM

## 2024-03-19 PROCEDURE — 11042 DBRDMT SUBQ TIS 1ST 20SQCM/<: CPT

## 2024-03-19 RX ORDER — CLOBETASOL PROPIONATE 0.5 MG/G
OINTMENT TOPICAL ONCE
OUTPATIENT
Start: 2024-03-19 | End: 2024-03-19

## 2024-03-19 RX ORDER — LIDOCAINE 50 MG/G
OINTMENT TOPICAL ONCE
OUTPATIENT
Start: 2024-03-19 | End: 2024-03-19

## 2024-03-19 RX ORDER — SODIUM CHLOR/HYPOCHLOROUS ACID 0.033 %
SOLUTION, IRRIGATION IRRIGATION ONCE
OUTPATIENT
Start: 2024-03-19 | End: 2024-03-19

## 2024-03-19 RX ORDER — TRIAMCINOLONE ACETONIDE 1 MG/G
OINTMENT TOPICAL ONCE
OUTPATIENT
Start: 2024-03-19 | End: 2024-03-19

## 2024-03-19 RX ORDER — LIDOCAINE HYDROCHLORIDE 20 MG/ML
JELLY TOPICAL ONCE
Status: COMPLETED | OUTPATIENT
Start: 2024-03-19 | End: 2024-03-19

## 2024-03-19 RX ORDER — IBUPROFEN 200 MG
TABLET ORAL ONCE
OUTPATIENT
Start: 2024-03-19 | End: 2024-03-19

## 2024-03-19 RX ORDER — GENTAMICIN SULFATE 1 MG/G
OINTMENT TOPICAL ONCE
OUTPATIENT
Start: 2024-03-19 | End: 2024-03-19

## 2024-03-19 RX ORDER — BETAMETHASONE DIPROPIONATE 0.5 MG/G
CREAM TOPICAL ONCE
OUTPATIENT
Start: 2024-03-19 | End: 2024-03-19

## 2024-03-19 RX ORDER — LIDOCAINE 40 MG/G
CREAM TOPICAL ONCE
OUTPATIENT
Start: 2024-03-19 | End: 2024-03-19

## 2024-03-19 RX ORDER — LIDOCAINE HYDROCHLORIDE 20 MG/ML
JELLY TOPICAL ONCE
OUTPATIENT
Start: 2024-03-19 | End: 2024-03-19

## 2024-03-19 RX ORDER — LIDOCAINE HYDROCHLORIDE 40 MG/ML
SOLUTION TOPICAL ONCE
OUTPATIENT
Start: 2024-03-19 | End: 2024-03-19

## 2024-03-19 RX ORDER — BACITRACIN ZINC AND POLYMYXIN B SULFATE 500; 1000 [USP'U]/G; [USP'U]/G
OINTMENT TOPICAL ONCE
OUTPATIENT
Start: 2024-03-19 | End: 2024-03-19

## 2024-03-19 RX ORDER — GINSENG 100 MG
CAPSULE ORAL ONCE
OUTPATIENT
Start: 2024-03-19 | End: 2024-03-19

## 2024-03-19 RX ADMIN — LIDOCAINE HYDROCHLORIDE 6 ML: 20 JELLY TOPICAL at 15:39

## 2024-03-19 ASSESSMENT — PAIN DESCRIPTION - ORIENTATION: ORIENTATION: LEFT

## 2024-03-19 ASSESSMENT — PAIN DESCRIPTION - DESCRIPTORS: DESCRIPTORS: STABBING;ACHING

## 2024-03-19 ASSESSMENT — PAIN SCALES - GENERAL: PAINLEVEL_OUTOF10: 8

## 2024-03-19 ASSESSMENT — PAIN DESCRIPTION - LOCATION: LOCATION: FOOT

## 2024-03-19 NOTE — PROGRESS NOTES
[facility]   Progress Note and Procedure Note      Ac Alcantar  MEDICAL RECORD NUMBER:  [id number]  AGE: 63 y.o.   GENDER: male  : 1960  EPISODE DATE:  3/19/2024    Subjective:     Chief Complaint   Patient presents with    Wound Check     LLE           HISTORY of PRESENT ILLNESS HPI     Ac Alcantar is a 63 y.o. male who presents today for wound/ulcer evaluation.   History of Wound Context: HAS BEEN APPLYING SALINE,  HAS TAKEN HIS LEVAQUIN  DR ARCHULETA IS MAKING  CUSTOM INSOLES - STILL WAITING    C&S 24 STAPH  Wound/Ulcer Pain Timing/Severity: none  Quality of pain: N/A  Severity:  0 / 10   Modifying Factors: None    Ulcer Identification:  Ulcer Type: diabetic    Contributing Factors: chronic pressure    PAST MEDICAL HISTORY        Diagnosis Date    Acquired trigger finger 2018    CAD (coronary artery disease)     NWOCC/ involving coronary bypass graft of native heart with unstable angina pectoris    Cervical spondylosis     CHF (congestive heart failure) ([facility])     Chronic back pain     on 18 pt states is presently in pain mgmnt    Chronic ulcer of toe, left, with necrosis of bone ([facility]) 3/29/2022    Contusion of elbow 2018    Contusion of knee 2018    Contusion of shoulder region 2018    Diabetic foot ulcer with osteomyelitis ([facility]) 2018    Diabetic ulcer of toe associated with type 2 diabetes mellitus, with fat layer exposed ([facility]) 2018    Hypercholesterolemia 2017    Hyperlipemia, mixed     Hypertension     Ischemic cardiomyopathy 2018    Non-pressure chronic ulcer of left lower leg with fat layer exposed ([facility]) 2014    Obesity     Obesity, Class III, BMI 40-49.9 (morbid obesity) ([facility]) 2015    Obstructive sleep apnea syndrome     Osteoarthritis of carpometacarpal (CMC) joint of thumb 2018    Osteoarthritis of knee 2018    Peripheral vascular disease ([facility])     with venous stasis and ulcerations. Dr Manning    Presence

## 2024-03-25 NOTE — DISCHARGE INSTRUCTIONS
[facility] WOUND CARE CENTER -Phone: [phone] Fax: [phone]             Visit  Discharge Instructions / Physician Orders     DATE: 3/26/2024     Home Care: N/A     SUPPLIES ORDERED THRU: [employer]     Wound Location: Left Lateral Foot     Cleanse with: saline     Dressing Orders: Saline moistened (slightly damp) gauze, dry gauze, Roll Gauze up around ankle and in between toes to help hold dressing in place, paper tape                                               Frequency: Daily     Additional Orders: Increase protein to diet (meat, cheese, eggs, fish, peanut butter, nuts and beans)  ELEVATE LEGS AS MUCH AS POSSIBLE  Culture Taken 2/27/24  Levofloxacin ordered 3/12/24     Your next appointment with Wound Care Center is in 1 week with Dr. Cleaning        (Please note your next appointment above and if you are unable to keep, kindly give a 24 hour notice. Thank you.)     If you experience any of the following, please call the Wound Care Center during business hours:  [phone]  Your Phone call may be forwarded to [facility] during business hours that [place] is closed.     * Increase in Pain  * Temperature over 101  * Increase in drainage from your wound  * Drainage with a foul odor  * Bleeding  * Increase in swelling  * Need for compression bandage changes due to slippage, breakthrough drainage.     If you need medical attention outside of the business hours of the Wound Care Centers please contact your PCP or go to the nearest emergency room.     The information contained in the After Visit Summary has been reviewed with me, the patient and/or responsible adult, by my health care provider(s). I had the opportunity to ask questions regarding this information. I have elected to receive;      []After Visit Summary  [x]Comprehensive Discharge Instruction        Patient signature______________________________________Date:________  Electronically signed by Nathaniel Wheatley

## 2024-03-26 ENCOUNTER — HOSPITAL ENCOUNTER (OUTPATIENT)
Dept: WOUND CARE | Age: 64
Discharge: HOME | End: 2024-03-26
Payer: COMMERCIAL

## 2024-03-26 VITALS
SYSTOLIC BLOOD PRESSURE: 115 MMHG | DIASTOLIC BLOOD PRESSURE: 56 MMHG | HEART RATE: 73 BPM | WEIGHT: 213.85 LBS | TEMPERATURE: 98.2 F | RESPIRATION RATE: 16 BRPM | BODY MASS INDEX: 29 KG/M2

## 2024-03-26 DIAGNOSIS — E11.42 DIABETIC POLYNEUROPATHY ASSOCIATED WITH TYPE 2 DIABETES MELLITUS (HCC): Primary | ICD-10-CM

## 2024-03-26 DIAGNOSIS — L97.512 ULCER OF TOE OF RIGHT FOOT, WITH FAT LAYER EXPOSED (HCC): ICD-10-CM

## 2024-03-26 PROCEDURE — 11042 DBRDMT SUBQ TIS 1ST 20SQCM/<: CPT

## 2024-03-26 RX ORDER — LIDOCAINE 50 MG/G
OINTMENT TOPICAL ONCE
OUTPATIENT
Start: 2024-03-26 | End: 2024-03-26

## 2024-03-26 RX ORDER — LIDOCAINE HYDROCHLORIDE 20 MG/ML
JELLY TOPICAL ONCE
Status: COMPLETED | OUTPATIENT
Start: 2024-03-26 | End: 2024-03-26

## 2024-03-26 RX ORDER — TRIAMCINOLONE ACETONIDE 1 MG/G
OINTMENT TOPICAL ONCE
OUTPATIENT
Start: 2024-03-26 | End: 2024-03-26

## 2024-03-26 RX ORDER — LIDOCAINE 40 MG/G
CREAM TOPICAL ONCE
OUTPATIENT
Start: 2024-03-26 | End: 2024-03-26

## 2024-03-26 RX ORDER — GINSENG 100 MG
CAPSULE ORAL ONCE
OUTPATIENT
Start: 2024-03-26 | End: 2024-03-26

## 2024-03-26 RX ORDER — LIDOCAINE HYDROCHLORIDE 40 MG/ML
SOLUTION TOPICAL ONCE
OUTPATIENT
Start: 2024-03-26 | End: 2024-03-26

## 2024-03-26 RX ORDER — BACITRACIN ZINC AND POLYMYXIN B SULFATE 500; 1000 [USP'U]/G; [USP'U]/G
OINTMENT TOPICAL ONCE
OUTPATIENT
Start: 2024-03-26 | End: 2024-03-26

## 2024-03-26 RX ORDER — CLOBETASOL PROPIONATE 0.5 MG/G
OINTMENT TOPICAL ONCE
OUTPATIENT
Start: 2024-03-26 | End: 2024-03-26

## 2024-03-26 RX ORDER — SODIUM CHLOR/HYPOCHLOROUS ACID 0.033 %
SOLUTION, IRRIGATION IRRIGATION ONCE
OUTPATIENT
Start: 2024-03-26 | End: 2024-03-26

## 2024-03-26 RX ORDER — LIDOCAINE HYDROCHLORIDE 20 MG/ML
JELLY TOPICAL ONCE
OUTPATIENT
Start: 2024-03-26 | End: 2024-03-26

## 2024-03-26 RX ORDER — GENTAMICIN SULFATE 1 MG/G
OINTMENT TOPICAL ONCE
OUTPATIENT
Start: 2024-03-26 | End: 2024-03-26

## 2024-03-26 RX ORDER — IBUPROFEN 200 MG
TABLET ORAL ONCE
OUTPATIENT
Start: 2024-03-26 | End: 2024-03-26

## 2024-03-26 RX ORDER — BETAMETHASONE DIPROPIONATE 0.5 MG/G
CREAM TOPICAL ONCE
OUTPATIENT
Start: 2024-03-26 | End: 2024-03-26

## 2024-03-26 RX ADMIN — LIDOCAINE HYDROCHLORIDE 6 ML: 20 JELLY TOPICAL at 15:10

## 2024-03-26 ASSESSMENT — PAIN DESCRIPTION - FREQUENCY: FREQUENCY: INTERMITTENT

## 2024-03-26 ASSESSMENT — PAIN - FUNCTIONAL ASSESSMENT: PAIN_FUNCTIONAL_ASSESSMENT: ACTIVITIES ARE NOT PREVENTED

## 2024-03-26 ASSESSMENT — PAIN DESCRIPTION - ONSET: ONSET: ON-GOING

## 2024-03-26 ASSESSMENT — PAIN DESCRIPTION - DESCRIPTORS: DESCRIPTORS: SHARP

## 2024-03-26 ASSESSMENT — PAIN SCALES - GENERAL: PAINLEVEL_OUTOF10: 10

## 2024-03-26 ASSESSMENT — PAIN DESCRIPTION - LOCATION: LOCATION: FOOT

## 2024-03-26 ASSESSMENT — PAIN DESCRIPTION - PAIN TYPE: TYPE: ACUTE PAIN

## 2024-03-26 ASSESSMENT — PAIN DESCRIPTION - ORIENTATION: ORIENTATION: LEFT

## 2024-03-26 NOTE — PROGRESS NOTES
Wound Care Supplies      Supply Company:     [employer]   [place], [place]  Phone: [phone]   Fax: [phone]    Ordering Center:     [facility]  [address] [place] [address]  [phone]  WOUND CARE Dept: [phone]   FAX NUMBER [phone]    Patient Information:      Ac Alcantar  [address] [address]   [phone]   : 1960  AGE: 63 y.o.     GENDER: male   TODAYS DATE:  3/26/2024    Insurance:      PRIMARY INSURANCE:  Plan: [employer]IG[employer] [facility] LOCAL  Coverage: [employer]  Effective Date: 2023  [id number] - ([place] [employer])  Group: [id number]       Patient Wound Information:      Diagnosis Date Noted      Diabetic polyneuropathy associated with type 2 diabetes mellitus (HCC) [E11.42] 2022    Ulcer of left foot, with fat layer exposed (HCC) [L97.522] 2018          WOUNDS REQUIRING DRESSING SUPPLIES:     Wound 24 Foot Left;Plantar #1 (Active)   Wound Image   24 1251   Wound Etiology Diabetic Pate 2 24 1507   Dressing Status New drainage noted;Old drainage noted 24 1507   Wound Cleansed Cleansed with saline 24 1507   Dressing/Treatment ABD 24 1507   Offloading for Diabetic Foot Ulcers Offloading ordered;Diabetic shoes/inserts 24 1507   Wound Length (cm) 1.6 cm 24 1507   Wound Width (cm) 1.7 cm 24 1507   Wound Depth (cm) 0.3 cm 24 1507   Wound Surface Area (cm^2) 2.72 cm^2 24 1507   Change in Wound Size % (l*w) 61.14 24 1507   Wound Volume (cm^3) 0.816 cm^3 24 1507   Wound Healing % 42 24 1507   Post-Procedure Length (cm) 2 cm 24 1507   Post-Procedure Width (cm) 2 cm 24 1507   Post-Procedure Depth (cm) 0.5 cm 24 1507   Post-Procedure Surface Area (cm^2) 4 cm^2 24 1507   Post-Procedure Volume (cm^3) 2 cm^3 24 1507   Wound Assessment Slough 24 1507   Drainage Amount Moderate (25-50%) 24

## 2024-03-27 DIAGNOSIS — I10 ESSENTIAL HYPERTENSION: ICD-10-CM

## 2024-03-27 RX ORDER — HYDROCHLOROTHIAZIDE 12.5 MG/1
CAPSULE, GELATIN COATED ORAL
Qty: 90 CAPSULE | Refills: 3 | Status: SHIPPED | OUTPATIENT
Start: 2024-03-27

## 2024-03-27 NOTE — TELEPHONE ENCOUNTER
Ac Alcantar is calling to request a refill on the following medication(s):    Last Visit Date (If Applicable):  2024    Next Visit Date:    Visit date not found    Medication Request:  Requested Prescriptions     Pending Prescriptions Disp Refills    hydroCHLOROthiazide 12.5 MG capsule 90 capsule 3     Si po qd

## 2024-03-28 ENCOUNTER — OFFICE VISIT (OUTPATIENT)
Dept: PODIATRY | Age: 64
End: 2024-03-28
Payer: COMMERCIAL

## 2024-03-28 DIAGNOSIS — M79.671 PAIN IN BOTH FEET: Primary | ICD-10-CM

## 2024-03-28 DIAGNOSIS — M79.672 PAIN IN BOTH FEET: Primary | ICD-10-CM

## 2024-03-28 PROCEDURE — 99999 PR OFFICE/OUTPT VISIT,PROCEDURE ONLY: CPT | Performed by: PODIATRIST

## 2024-03-28 PROCEDURE — L3020 FOOT LONGITUD/METATARSAL SUP: HCPCS | Performed by: PODIATRIST

## 2024-03-29 NOTE — TELEPHONE ENCOUNTER
Ac Alcantar is calling to request a refill on the following medication(s):    Last Visit Date (If Applicable):  2/27/2024    Next Visit Date:    Visit date not found    Medication Request:  Requested Prescriptions     Pending Prescriptions Disp Refills    metFORMIN (GLUCOPHAGE) 1000 MG tablet 60 tablet 3     Sig: Take 1 tablet by mouth 2 times daily (with meals)

## 2024-04-01 NOTE — DISCHARGE INSTRUCTIONS
[facility] WOUND CARE CENTER -Phone: [phone] Fax: [phone]             Visit  Discharge Instructions / Physician Orders     DATE: 4/2/2024     Home Care: N/A     SUPPLIES ORDERED THRU: [employer]     Wound Location: Left Lateral Foot     Cleanse with: saline     Dressing Orders: Saline moistened (slightly damp) gauze, dry gauze, Roll Gauze up around ankle and in between toes to help hold dressing in place, paper tape                                               Frequency: Daily     Additional Orders: Increase protein to diet (meat, cheese, eggs, fish, peanut butter, nuts and beans)  ELEVATE LEGS AS MUCH AS POSSIBLE  Culture Taken 4/2/24  Levofloxacin ordered 4/2/24  X-Ray Ordered 4/2/24     Your next appointment with Wound Care Center is in 2 weeks with Dr. Cleaning        (Please note your next appointment above and if you are unable to keep, kindly give a 24 hour notice. Thank you.)     If you experience any of the following, please call the Wound Care Center during business hours:  [phone]  Your Phone call may be forwarded to [facility] during business hours that [place] is closed.     * Increase in Pain  * Temperature over 101  * Increase in drainage from your wound  * Drainage with a foul odor  * Bleeding  * Increase in swelling  * Need for compression bandage changes due to slippage, breakthrough drainage.     If you need medical attention outside of the business hours of the Wound Care Centers please contact your PCP or go to the nearest emergency room.     The information contained in the After Visit Summary has been reviewed with me, the patient and/or responsible adult, by my health care provider(s). I had the opportunity to ask questions regarding this information. I have elected to receive;      []After Visit Summary  [x]Comprehensive Discharge Instruction        Patient signature______________________________________Date:________  Electronically signed

## 2024-04-02 ENCOUNTER — HOSPITAL ENCOUNTER (OUTPATIENT)
Age: 64
Discharge: HOME | End: 2024-04-04
Payer: COMMERCIAL

## 2024-04-02 ENCOUNTER — HOSPITAL ENCOUNTER (OUTPATIENT)
Dept: GENERAL RADIOLOGY | Age: 64
Discharge: HOME | End: 2024-04-04
Payer: COMMERCIAL

## 2024-04-02 ENCOUNTER — HOSPITAL ENCOUNTER (OUTPATIENT)
Dept: WOUND CARE | Age: 64
Discharge: HOME | End: 2024-04-02
Payer: COMMERCIAL

## 2024-04-02 VITALS
SYSTOLIC BLOOD PRESSURE: 107 MMHG | WEIGHT: 213 LBS | TEMPERATURE: 97.5 F | RESPIRATION RATE: 18 BRPM | HEIGHT: 72 IN | BODY MASS INDEX: 28.85 KG/M2 | HEART RATE: 86 BPM | DIASTOLIC BLOOD PRESSURE: 58 MMHG

## 2024-04-02 DIAGNOSIS — M21.622 TAILOR'S BUNION OF LEFT FOOT: ICD-10-CM

## 2024-04-02 DIAGNOSIS — L97.512 ULCER OF TOE OF RIGHT FOOT, WITH FAT LAYER EXPOSED (HCC): ICD-10-CM

## 2024-04-02 DIAGNOSIS — E11.42 DIABETIC POLYNEUROPATHY ASSOCIATED WITH TYPE 2 DIABETES MELLITUS (HCC): Primary | ICD-10-CM

## 2024-04-02 DIAGNOSIS — E11.42 DIABETIC POLYNEUROPATHY ASSOCIATED WITH TYPE 2 DIABETES MELLITUS (HCC): ICD-10-CM

## 2024-04-02 PROCEDURE — 73630 X-RAY EXAM OF FOOT: CPT

## 2024-04-02 PROCEDURE — 87205 SMEAR GRAM STAIN: CPT

## 2024-04-02 PROCEDURE — 87070 CULTURE OTHR SPECIMN AEROBIC: CPT

## 2024-04-02 PROCEDURE — 87075 CULTR BACTERIA EXCEPT BLOOD: CPT

## 2024-04-02 PROCEDURE — 11042 DBRDMT SUBQ TIS 1ST 20SQCM/<: CPT

## 2024-04-02 PROCEDURE — 87077 CULTURE AEROBIC IDENTIFY: CPT

## 2024-04-02 PROCEDURE — 87185 SC STD ENZYME DETCJ PER NZM: CPT

## 2024-04-02 PROCEDURE — 87076 CULTURE ANAEROBE IDENT EACH: CPT

## 2024-04-02 RX ORDER — LIDOCAINE HYDROCHLORIDE 40 MG/ML
SOLUTION TOPICAL ONCE
OUTPATIENT
Start: 2024-04-02 | End: 2024-04-02

## 2024-04-02 RX ORDER — GENTAMICIN SULFATE 1 MG/G
OINTMENT TOPICAL ONCE
OUTPATIENT
Start: 2024-04-02 | End: 2024-04-02

## 2024-04-02 RX ORDER — CLOBETASOL PROPIONATE 0.5 MG/G
OINTMENT TOPICAL ONCE
OUTPATIENT
Start: 2024-04-02 | End: 2024-04-02

## 2024-04-02 RX ORDER — SODIUM CHLOR/HYPOCHLOROUS ACID 0.033 %
SOLUTION, IRRIGATION IRRIGATION ONCE
OUTPATIENT
Start: 2024-04-02 | End: 2024-04-02

## 2024-04-02 RX ORDER — IBUPROFEN 200 MG
TABLET ORAL ONCE
OUTPATIENT
Start: 2024-04-02 | End: 2024-04-02

## 2024-04-02 RX ORDER — BETAMETHASONE DIPROPIONATE 0.5 MG/G
CREAM TOPICAL ONCE
OUTPATIENT
Start: 2024-04-02 | End: 2024-04-02

## 2024-04-02 RX ORDER — BACITRACIN ZINC AND POLYMYXIN B SULFATE 500; 1000 [USP'U]/G; [USP'U]/G
OINTMENT TOPICAL ONCE
OUTPATIENT
Start: 2024-04-02 | End: 2024-04-02

## 2024-04-02 RX ORDER — LIDOCAINE 40 MG/G
CREAM TOPICAL ONCE
OUTPATIENT
Start: 2024-04-02 | End: 2024-04-02

## 2024-04-02 RX ORDER — LIDOCAINE HYDROCHLORIDE 20 MG/ML
JELLY TOPICAL ONCE
Status: COMPLETED | OUTPATIENT
Start: 2024-04-02 | End: 2024-04-02

## 2024-04-02 RX ORDER — GINSENG 100 MG
CAPSULE ORAL ONCE
OUTPATIENT
Start: 2024-04-02 | End: 2024-04-02

## 2024-04-02 RX ORDER — LIDOCAINE HYDROCHLORIDE 20 MG/ML
JELLY TOPICAL ONCE
OUTPATIENT
Start: 2024-04-02 | End: 2024-04-02

## 2024-04-02 RX ORDER — LIDOCAINE 50 MG/G
OINTMENT TOPICAL ONCE
OUTPATIENT
Start: 2024-04-02 | End: 2024-04-02

## 2024-04-02 RX ORDER — LEVOFLOXACIN 750 MG/1
750 TABLET, FILM COATED ORAL DAILY
Qty: 10 TABLET | Refills: 0 | Status: SHIPPED | OUTPATIENT
Start: 2024-04-02 | End: 2024-04-12

## 2024-04-02 RX ORDER — TRIAMCINOLONE ACETONIDE 1 MG/G
OINTMENT TOPICAL ONCE
OUTPATIENT
Start: 2024-04-02 | End: 2024-04-02

## 2024-04-02 RX ADMIN — LIDOCAINE HYDROCHLORIDE 6 ML: 20 JELLY TOPICAL at 15:00

## 2024-04-02 ASSESSMENT — PAIN DESCRIPTION - PROGRESSION: CLINICAL_PROGRESSION: NOT CHANGED

## 2024-04-02 ASSESSMENT — PAIN SCALES - GENERAL: PAINLEVEL_OUTOF10: 3

## 2024-04-02 NOTE — PROGRESS NOTES
Left.  Interdigital macerations absent, Bilateral.      Vascular:  DP/PT pulses palpable 1/4, Bilateral.    CFT <5 seconds to digits 1-5, Bilateral .   Hair growth absent to level of digits, Bilateral.  Edema absent, Bilateral.  Erythema present, Left.     Neurological:  Sensation absent to light touch to level of digits, Bilateral.  Protective sensation  absent via 5.07/10g Trevorton-Lorin monofilament 10/10 sites, Bilateral.  Vibratory sensation absent to 1st MPJ, Bilateral.     Musculoskeletal:  Muscle strength 4/5 all LE groups tested, Bilateral.   TAILORS BUNION LEFT FOOT    Wound 02/13/24 Foot Left;Plantar #1 (Active)   Wound Image   02/13/24 1251   Wound Etiology Diabetic Pate 2 04/02/24 1500   Dressing Status New drainage noted;Old drainage noted 04/02/24 1500   Wound Cleansed Cleansed with saline 04/02/24 1500   Dressing/Treatment ABD 03/26/24 1507   Offloading for Diabetic Foot Ulcers Offloading ordered;Diabetic shoes/inserts 04/02/24 1500   Wound Length (cm) 1.7 cm 04/02/24 1500   Wound Width (cm) 1.6 cm 04/02/24 1500   Wound Depth (cm) 0.2 cm 04/02/24 1500   Wound Surface Area (cm^2) 2.72 cm^2 04/02/24 1500   Change in Wound Size % (l*w) 61.14 04/02/24 1500   Wound Volume (cm^3) 0.544 cm^3 04/02/24 1500   Wound Healing % 61 04/02/24 1500   Post-Procedure Length (cm) 1.7 cm 04/02/24 1500   Post-Procedure Width (cm) 1.6 cm 04/02/24 1500   Post-Procedure Depth (cm) 0.2 cm 04/02/24 1500   Post-Procedure Surface Area (cm^2) 2.72 cm^2 04/02/24 1500   Post-Procedure Volume (cm^3) 0.544 cm^3 04/02/24 1500   Wound Assessment Slough 04/02/24 1500   Drainage Amount Moderate (25-50%) 04/02/24 1500   Drainage Description Serosanguinous 04/02/24 1500   Odor None 04/02/24 1500   Trudi-wound Assessment Hyperkeratosis (callous);Maceration 04/02/24 1500   Margins Defined edges 04/02/24 1500   Wound Thickness Description not for Pressure Injury Full thickness 04/02/24 1500   Number of days: 49           [facility]

## 2024-04-03 DIAGNOSIS — M15.9 PRIMARY OSTEOARTHRITIS INVOLVING MULTIPLE JOINTS: ICD-10-CM

## 2024-04-03 RX ORDER — MELOXICAM 15 MG/1
15 TABLET ORAL DAILY
Qty: 90 TABLET | Refills: 3 | Status: SHIPPED | OUTPATIENT
Start: 2024-04-03 | End: 2024-07-02

## 2024-04-03 NOTE — TELEPHONE ENCOUNTER
Ac Alcantar is calling to request a refill on the following medication(s):    Last Visit Date (If Applicable):  2/27/2024    Next Visit Date:    Visit date not found    Medication Request:  Requested Prescriptions     Pending Prescriptions Disp Refills    meloxicam (MOBIC) 15 MG tablet 90 tablet 3     Sig: Take 1 tablet by mouth daily

## 2024-04-05 RX ORDER — HYDROCODONE BITARTRATE AND ACETAMINOPHEN 7.5; 325 MG/1; MG/1
1 TABLET ORAL EVERY 6 HOURS PRN
COMMUNITY

## 2024-04-05 RX ORDER — HYDROCODONE BITARTRATE AND ACETAMINOPHEN 7.5; 325 MG/1; MG/1
TABLET ORAL
COMMUNITY
Start: 2024-03-12 | End: 2024-04-05

## 2024-04-05 RX ORDER — HYDROCORTISONE ACETATE 0.5 %
CREAM (GRAM) TOPICAL DAILY
COMMUNITY

## 2024-04-05 RX ORDER — ASPIRIN/CALCIUM/MAG/ALUMINUM 325 MG
325 TABLET ORAL DAILY
COMMUNITY

## 2024-04-08 DIAGNOSIS — M15.9 PRIMARY OSTEOARTHRITIS INVOLVING MULTIPLE JOINTS: ICD-10-CM

## 2024-04-08 RX ORDER — MELOXICAM 15 MG/1
15 TABLET ORAL DAILY
Qty: 90 TABLET | Refills: 3 | Status: SHIPPED | OUTPATIENT
Start: 2024-04-08 | End: 2025-04-03

## 2024-04-08 NOTE — PROGRESS NOTES
Writer called office and left message regarding patient's medications- Mobic, Aspirin, and plavix. He was not given any instructions regarding stopping these medications before procedure of 4/10/24. Patient was, also, unaware he needed someone to stay with him after procedure. Asked office to call writer back and contact patient.

## 2024-04-08 NOTE — PROGRESS NOTES
Writer called [facility] and spoke with nurse regarding patient's meds and no direction about holding them. Nurse at facility will pass information onto Dr. Cleaning.

## 2024-04-09 ENCOUNTER — ANESTHESIA EVENT (OUTPATIENT)
Dept: OPERATING ROOM | Age: 64
End: 2024-04-09
Payer: COMMERCIAL

## 2024-04-10 ENCOUNTER — HOSPITAL ENCOUNTER (OUTPATIENT)
Age: 64
Setting detail: OUTPATIENT SURGERY
Discharge: HOME | End: 2024-04-10
Attending: PODIATRIST | Admitting: PODIATRIST
Payer: COMMERCIAL

## 2024-04-10 ENCOUNTER — ANESTHESIA (OUTPATIENT)
Dept: OPERATING ROOM | Age: 64
End: 2024-04-10
Payer: COMMERCIAL

## 2024-04-10 ENCOUNTER — APPOINTMENT (OUTPATIENT)
Dept: GENERAL RADIOLOGY | Age: 64
End: 2024-04-10
Attending: PODIATRIST
Payer: COMMERCIAL

## 2024-04-10 VITALS
BODY MASS INDEX: 28.85 KG/M2 | HEIGHT: 72 IN | SYSTOLIC BLOOD PRESSURE: 111 MMHG | HEART RATE: 60 BPM | TEMPERATURE: 97.2 F | WEIGHT: 213 LBS | OXYGEN SATURATION: 99 % | DIASTOLIC BLOOD PRESSURE: 67 MMHG | RESPIRATION RATE: 18 BRPM

## 2024-04-10 DIAGNOSIS — Z98.890 POST-OPERATIVE STATE: Primary | ICD-10-CM

## 2024-04-10 DIAGNOSIS — E11.42 DIABETIC POLYNEUROPATHY ASSOCIATED WITH TYPE 2 DIABETES MELLITUS (HCC): ICD-10-CM

## 2024-04-10 DIAGNOSIS — L97.522 ULCER OF LEFT FOOT, WITH FAT LAYER EXPOSED (HCC): ICD-10-CM

## 2024-04-10 LAB
BUN BLD-MCNC: 25 MG/DL (ref 8–26)
CA-I BLD-SCNC: 1.3 MMOL/L (ref 1.15–1.33)
CHLORIDE BLD-SCNC: 108 MMOL/L (ref 98–107)
EGFR, POC: >90 ML/MIN/1.73M2
EKG ATRIAL RATE: 250 BPM
EKG Q-T INTERVAL: 392 MS
EKG QRS DURATION: 110 MS
EKG QTC CALCULATION (BAZETT): 367 MS
EKG R AXIS: 20 DEGREES
EKG T AXIS: -4 DEGREES
EKG VENTRICULAR RATE: 53 BPM
GLUCOSE BLD-MCNC: 110 MG/DL (ref 75–110)
GLUCOSE BLD-MCNC: 137 MG/DL (ref 74–100)
HCT VFR BLD AUTO: 45 % (ref 41–53)
POC CREATININE: 0.8 MG/DL (ref 0.51–1.19)
POC HEMOGLOBIN (CALC): 15.5 G/DL (ref 13.5–17.5)
POC LACTIC ACID: 1.2 MMOL/L (ref 0.56–1.39)
POTASSIUM BLD-SCNC: 3.8 MMOL/L (ref 3.5–4.5)
SODIUM BLD-SCNC: 144 MMOL/L (ref 138–146)

## 2024-04-10 PROCEDURE — 2500000003 HC RX 250 WO HCPCS

## 2024-04-10 PROCEDURE — 87205 SMEAR GRAM STAIN: CPT

## 2024-04-10 PROCEDURE — 93005 ELECTROCARDIOGRAM TRACING: CPT | Performed by: PODIATRIST

## 2024-04-10 PROCEDURE — 3600000004 HC SURGERY LEVEL 4 BASE: Performed by: PODIATRIST

## 2024-04-10 PROCEDURE — 88305 TISSUE EXAM BY PATHOLOGIST: CPT

## 2024-04-10 PROCEDURE — 82947 ASSAY GLUCOSE BLOOD QUANT: CPT

## 2024-04-10 PROCEDURE — 88311 DECALCIFY TISSUE: CPT

## 2024-04-10 PROCEDURE — 93010 ELECTROCARDIOGRAM REPORT: CPT | Performed by: INTERNAL MEDICINE

## 2024-04-10 PROCEDURE — 2709999900 HC NON-CHARGEABLE SUPPLY: Performed by: PODIATRIST

## 2024-04-10 PROCEDURE — 84295 ASSAY OF SERUM SODIUM: CPT

## 2024-04-10 PROCEDURE — 87076 CULTURE ANAEROBE IDENT EACH: CPT

## 2024-04-10 PROCEDURE — 82435 ASSAY OF BLOOD CHLORIDE: CPT

## 2024-04-10 PROCEDURE — 6360000002 HC RX W HCPCS

## 2024-04-10 PROCEDURE — 87070 CULTURE OTHR SPECIMN AEROBIC: CPT

## 2024-04-10 PROCEDURE — 7100000010 HC PHASE II RECOVERY - FIRST 15 MIN: Performed by: PODIATRIST

## 2024-04-10 PROCEDURE — 87075 CULTR BACTERIA EXCEPT BLOOD: CPT

## 2024-04-10 PROCEDURE — 73630 X-RAY EXAM OF FOOT: CPT

## 2024-04-10 PROCEDURE — 83605 ASSAY OF LACTIC ACID: CPT

## 2024-04-10 PROCEDURE — 3700000000 HC ANESTHESIA ATTENDED CARE: Performed by: PODIATRIST

## 2024-04-10 PROCEDURE — 87176 TISSUE HOMOGENIZATION CULTR: CPT

## 2024-04-10 PROCEDURE — 84132 ASSAY OF SERUM POTASSIUM: CPT

## 2024-04-10 PROCEDURE — 7100000011 HC PHASE II RECOVERY - ADDTL 15 MIN: Performed by: PODIATRIST

## 2024-04-10 PROCEDURE — 87185 SC STD ENZYME DETCJ PER NZM: CPT

## 2024-04-10 PROCEDURE — 3700000001 HC ADD 15 MINUTES (ANESTHESIA): Performed by: PODIATRIST

## 2024-04-10 PROCEDURE — 2580000003 HC RX 258

## 2024-04-10 PROCEDURE — 2500000003 HC RX 250 WO HCPCS: Performed by: PODIATRIST

## 2024-04-10 PROCEDURE — 3600000014 HC SURGERY LEVEL 4 ADDTL 15MIN: Performed by: PODIATRIST

## 2024-04-10 PROCEDURE — 82565 ASSAY OF CREATININE: CPT

## 2024-04-10 PROCEDURE — 82330 ASSAY OF CALCIUM: CPT

## 2024-04-10 PROCEDURE — 85014 HEMATOCRIT: CPT

## 2024-04-10 PROCEDURE — 84520 ASSAY OF UREA NITROGEN: CPT

## 2024-04-10 RX ORDER — LIDOCAINE HYDROCHLORIDE 10 MG/ML
INJECTION, SOLUTION EPIDURAL; INFILTRATION; INTRACAUDAL; PERINEURAL PRN
Status: DISCONTINUED | OUTPATIENT
Start: 2024-04-10 | End: 2024-04-10 | Stop reason: HOSPADM

## 2024-04-10 RX ORDER — MIDAZOLAM HYDROCHLORIDE 1 MG/ML
INJECTION INTRAMUSCULAR; INTRAVENOUS PRN
Status: DISCONTINUED | OUTPATIENT
Start: 2024-04-10 | End: 2024-04-10 | Stop reason: SDUPTHER

## 2024-04-10 RX ORDER — PROPOFOL 10 MG/ML
INJECTION, EMULSION INTRAVENOUS CONTINUOUS PRN
Status: DISCONTINUED | OUTPATIENT
Start: 2024-04-10 | End: 2024-04-10 | Stop reason: SDUPTHER

## 2024-04-10 RX ORDER — LIDOCAINE HYDROCHLORIDE 10 MG/ML
INJECTION, SOLUTION EPIDURAL; INFILTRATION; INTRACAUDAL; PERINEURAL PRN
Status: DISCONTINUED | OUTPATIENT
Start: 2024-04-10 | End: 2024-04-10 | Stop reason: SDUPTHER

## 2024-04-10 RX ORDER — SODIUM CHLORIDE 9 MG/ML
INJECTION, SOLUTION INTRAVENOUS PRN
Status: DISCONTINUED | OUTPATIENT
Start: 2024-04-10 | End: 2024-04-10 | Stop reason: HOSPADM

## 2024-04-10 RX ORDER — CEFAZOLIN SODIUM 1 G/3ML
INJECTION, POWDER, FOR SOLUTION INTRAMUSCULAR; INTRAVENOUS PRN
Status: DISCONTINUED | OUTPATIENT
Start: 2024-04-10 | End: 2024-04-10 | Stop reason: SDUPTHER

## 2024-04-10 RX ORDER — SODIUM CHLORIDE 0.9 % (FLUSH) 0.9 %
5-40 SYRINGE (ML) INJECTION EVERY 12 HOURS SCHEDULED
Status: DISCONTINUED | OUTPATIENT
Start: 2024-04-10 | End: 2024-04-10 | Stop reason: HOSPADM

## 2024-04-10 RX ORDER — ONDANSETRON 2 MG/ML
4 INJECTION INTRAMUSCULAR; INTRAVENOUS
Status: DISCONTINUED | OUTPATIENT
Start: 2024-04-10 | End: 2024-04-10 | Stop reason: HOSPADM

## 2024-04-10 RX ORDER — SODIUM CHLORIDE, SODIUM LACTATE, POTASSIUM CHLORIDE, CALCIUM CHLORIDE 600; 310; 30; 20 MG/100ML; MG/100ML; MG/100ML; MG/100ML
INJECTION, SOLUTION INTRAVENOUS CONTINUOUS PRN
Status: DISCONTINUED | OUTPATIENT
Start: 2024-04-10 | End: 2024-04-10 | Stop reason: SDUPTHER

## 2024-04-10 RX ORDER — LABETALOL HYDROCHLORIDE 5 MG/ML
10 INJECTION, SOLUTION INTRAVENOUS
Status: DISCONTINUED | OUTPATIENT
Start: 2024-04-10 | End: 2024-04-10 | Stop reason: HOSPADM

## 2024-04-10 RX ORDER — HYDRALAZINE HYDROCHLORIDE 20 MG/ML
10 INJECTION INTRAMUSCULAR; INTRAVENOUS
Status: DISCONTINUED | OUTPATIENT
Start: 2024-04-10 | End: 2024-04-10 | Stop reason: HOSPADM

## 2024-04-10 RX ORDER — FENTANYL CITRATE 50 UG/ML
INJECTION, SOLUTION INTRAMUSCULAR; INTRAVENOUS PRN
Status: DISCONTINUED | OUTPATIENT
Start: 2024-04-10 | End: 2024-04-10 | Stop reason: SDUPTHER

## 2024-04-10 RX ORDER — NALOXONE HYDROCHLORIDE 0.4 MG/ML
INJECTION, SOLUTION INTRAMUSCULAR; INTRAVENOUS; SUBCUTANEOUS PRN
Status: DISCONTINUED | OUTPATIENT
Start: 2024-04-10 | End: 2024-04-10 | Stop reason: HOSPADM

## 2024-04-10 RX ORDER — SODIUM CHLORIDE 0.9 % (FLUSH) 0.9 %
5-40 SYRINGE (ML) INJECTION PRN
Status: DISCONTINUED | OUTPATIENT
Start: 2024-04-10 | End: 2024-04-10 | Stop reason: HOSPADM

## 2024-04-10 RX ADMIN — PROPOFOL 100 MCG/KG/MIN: 10 INJECTION, EMULSION INTRAVENOUS at 10:20

## 2024-04-10 RX ADMIN — MIDAZOLAM 2 MG: 1 INJECTION INTRAMUSCULAR; INTRAVENOUS at 10:17

## 2024-04-10 RX ADMIN — FENTANYL CITRATE 25 MCG: 50 INJECTION, SOLUTION INTRAMUSCULAR; INTRAVENOUS at 10:22

## 2024-04-10 RX ADMIN — CEFAZOLIN 2 G: 1 INJECTION, POWDER, FOR SOLUTION INTRAMUSCULAR; INTRAVENOUS at 10:30

## 2024-04-10 RX ADMIN — LIDOCAINE HYDROCHLORIDE 50 MG: 10 INJECTION, SOLUTION EPIDURAL; INFILTRATION; INTRACAUDAL; PERINEURAL at 10:19

## 2024-04-10 RX ADMIN — SODIUM CHLORIDE, POTASSIUM CHLORIDE, SODIUM LACTATE AND CALCIUM CHLORIDE: 600; 310; 30; 20 INJECTION, SOLUTION INTRAVENOUS at 10:20

## 2024-04-10 RX ADMIN — FENTANYL CITRATE 25 MCG: 50 INJECTION, SOLUTION INTRAMUSCULAR; INTRAVENOUS at 10:49

## 2024-04-10 ASSESSMENT — PAIN DESCRIPTION - DESCRIPTORS: DESCRIPTORS: ACHING

## 2024-04-10 ASSESSMENT — PAIN - FUNCTIONAL ASSESSMENT: PAIN_FUNCTIONAL_ASSESSMENT: 0-10

## 2024-04-10 NOTE — OP NOTE
Operative Note      Patient: Ac Alcantar  YOB: 1960  MRN: [id number]    Date of Procedure: 4/10/2024    Pre-Op Diagnosis:     CHRONIC Ulcer of left foot, with fat layer exposed [L97.522]  Diabetic polyneuropathy associated with type 2 diabetes mellitus [E11.42]  TAILORS BUNION LEFT    Post-Op Diagnosis: Same       Procedure:  Resection 5th metatarsal head, left foot    Surgeon(s):  Agustina Cleaning DPM    Assistant:   Resident: Winifred Blanchard DPM    Anesthesia: Monitor Anesthesia Care    Hemostasis: Pneumatic tourniquet to left ankle @ 250 mmHg for 20 minutes    Estimated Blood Loss (mL): less than 5 cc    Injectables:  Pre-op: 10cc of 1% lidocaine plain  Intra-op: 10cc of 0.5% marcaine plain    Complications: None    Specimens:   ID Type Source Tests Collected by Time Destination   A : FIFTH METATARSAL HEAD LEFT FOOT Bone Foot SURGICAL PATHOLOGY, CULTURE, ANAEROBIC AND AEROBIC Agustina Cleaning DPM 4/10/2024 1041        Implants:  * No implants in log *      Drains: * No LDAs found *    Findings:  Infection Present At Time Of Surgery (PATOS) (choose all levels that have infection present):  No infection present  Other Findings: ulcer sub 5th metatarsal head    Detailed Description of Procedure:   INDICATION FOR PROCEDURE: Patient is a 63 year-old male who has had pain of the left foot for a long time and has developed an ulceration sub 5th metatarsal head.  Patient has failed multiple conservative treatment modalities. Pain has progressed to the point of limiting ambulation, difficulty with shoe gear, and overall negative effective on activities of daily living. Patient elects to undergo surgical intervention. All risks and benefits were discussed. No guarantees were given or implied. Consent is signed and in the chart.       PROCEDURE IN DETAIL:  Under mild sedation, the patient was transported from pre-op to the operating room and placed on the operating table in the supine position with a safety

## 2024-04-10 NOTE — BRIEF OP NOTE
PODIATRY BRIEF OP NOTE    PATIENT NAME: Ac Alcantar  YOB: 1960  -  63 y.o. male  MRN: [id number]  DATE: 4/10/2024  BILLING #: [id number]    Surgeon(s):  Agustina Cleaning DPM     ASSISTANTS: Winifred Rodríguez DPM     PRE-OP DIAGNOSIS:   Ulcer of left foot, with fat layer exposed [L97.522]  Diabetic polyneuropathy associated with type 2 diabetes mellitus [E11.42]    POST-OP DIAGNOSIS: Same as above.    PROCEDURE:   Resection 5th metatarsal head, left foot    ANESTHESIA: MAC    HEMOSTASIS: Pneumatic tourniquet to left ankle @ 250 mmHg for 20 minutes.    ESTIMATED BLOOD LOSS: Less than 5cc.    MATERIALS:   * No implants in log *    INJECTABLES:   Pre-op: 10cc of 1% lidocaine plain  Intra-op: 10cc of 0.5% marcaine plain    SPECIMEN:   ID Type Source Tests Collected by Time Destination   A : FIFTH METATARSAL HEAD LEFT FOOT Bone Foot SURGICAL PATHOLOGY, CULTURE, ANAEROBIC AND AEROBIC Agustina Cleaning DPM 4/10/2024 1041        COMPLICATIONS: None    FINDINGS: Ulceration noted to left plantar aspect sub 5th metatarsal head. Left 5th metatarsal head sent for pathology and culture. 5th metatarsal head resection performed. Patient WBAT with surgical shoe to left foot.     Winifred Rodríguez DPM   Podiatric Medicine & Surgery   4/10/2024 at 11:09 AM

## 2024-04-10 NOTE — PROGRESS NOTES
Discharge instructions discussed with patient , all questions and concerns were answered, verbalizes understanding. All belongings given to patient. Discharge per wheelchair in a stable condition.

## 2024-04-10 NOTE — ANESTHESIA PRE PROCEDURE
02/01/2024 05:20 PM    HGB 14.8 02/01/2024 05:20 PM    HCT 43.7 02/01/2024 05:20 PM    MCV 95.4 02/01/2024 05:20 PM    RDW 12.7 02/01/2024 05:20 PM     02/01/2024 05:20 PM       CMP:   Lab Results   Component Value Date/Time     02/01/2024 05:20 PM    K 3.5 02/01/2024 05:20 PM     02/01/2024 05:20 PM    CO2 23 02/01/2024 05:20 PM    BUN 27 02/01/2024 05:20 PM    CREATININE 0.8 04/10/2024 09:59 AM    CREATININE 0.9 02/01/2024 05:20 PM    GFRAA >60 06/18/2022 06:02 AM    AGRATIO 1.3 07/22/2023 09:59 AM    LABGLOM >60 02/01/2024 05:20 PM    GLUCOSE 217 02/01/2024 05:20 PM    PROT 7.2 07/22/2023 09:59 AM    CALCIUM 9.1 02/01/2024 05:20 PM    BILITOT 0.3 07/22/2023 09:59 AM    ALKPHOS 75 07/22/2023 09:59 AM    AST 16 07/22/2023 09:59 AM    ALT 17 07/22/2023 09:59 AM       POC Tests:   Recent Labs     04/10/24  0959   POCGLU 137*   POCNA 144   POCK 3.8   POCCL 108*   POCBUN 25   POCHCT 45       Coags:   Lab Results   Component Value Date/Time    PROTIME 14.2 04/06/2022 05:45 AM    INR 1.1 04/06/2022 05:45 AM    APTT 28.2 03/31/2022 02:40 PM       HCG (If Applicable): No results found for: \"PREGTESTUR\", \"PREGSERUM\", \"HCG\", \"HCGQUANT\"     ABGs: No results found for: \"PHART\", \"PO2ART\", \"WCP3KAM\", \"YNA0LTJ\", \"BEART\", \"E3HSZAAG\"     Type & Screen (If Applicable):  No results found for: \"LABABO\", \"LABRH\"    Drug/Infectious Status (If Applicable):  Lab Results   Component Value Date/Time    HEPCAB NONREACTIVE 09/12/2016 01:12 PM       COVID-19 Screening (If Applicable):   Lab Results   Component Value Date/Time    COVID19 Not Detected 06/17/2022 07:50 PM           Anesthesia Evaluation  Patient summary reviewed and Nursing notes reviewed   no history of anesthetic complications:   Airway: Mallampati: I  TM distance: >3 FB   Neck ROM: limited  Mouth opening: > = 3 FB   Dental:    (+) poor dentition  Comment: Missing multiple teeth    Pulmonary:   (+)     sleep apnea:

## 2024-04-10 NOTE — ANESTHESIA POSTPROCEDURE EVALUATION
Department of Anesthesiology  Postprocedure Note    Patient: Ac Alcantar  MRN: [id number]  YOB: 1960  Date of evaluation: 4/10/2024    Procedure Summary       Date: 04/10/24 Room / Location: [address]    Anesthesia Start: 1016 Anesthesia Stop: 1103    Procedure: RESECTION FIFTH METATARSAL HEAD LEFT FOOT (Left: Fifth Toe) Diagnosis:       Ulcer of left foot, with fat layer exposed (HCC)      Diabetic polyneuropathy associated with type 2 diabetes mellitus (HCC)      (Ulcer of left foot, with fat layer exposed (HCC) [L97.522])      (Diabetic polyneuropathy associated with type 2 diabetes mellitus (HCC) [E11.42])    Surgeons: Agustina Cleaning DPM Responsible Provider: Jadiel Mitchell MD    Anesthesia Type: MAC ASA Status: 3            Anesthesia Type: No value filed.    Arielle Phase I: Arielle Score: 10    Arielle Phase II: Arielle Score: 10    Anesthesia Post Evaluation    Patient location during evaluation: PACU  Patient participation: complete - patient participated  Level of consciousness: awake  Airway patency: patent  Nausea & Vomiting: no nausea and no vomiting  Cardiovascular status: blood pressure returned to baseline  Respiratory status: acceptable  Hydration status: euvolemic  Comments: /67   Pulse 60   Temp 97.2 °F (36.2 °C) (Temporal)   Resp 18   Ht 1.829 m (6')   Wt 96.6 kg (213 lb)   SpO2 99%   BMI 28.89 kg/m²   Pain Assessment: None - Denies Pain   Pain management: adequate    No notable events documented.

## 2024-04-10 NOTE — H&P
History and Physical    Pt Name: Ac Alcantar  MRN: [id number]  YOB: 1960  Date of evaluation: 4/10/2024  Primary Care Physician: Seema Hameed DO    SUBJECTIVE:   History of Chief Complaint:    Ac Alcantra is a 63 y.o. male who is scheduled today for RESECTION 5TH METATARSAL HEAD - Left. Patient reports he has been having pain to his left fifth toe and left lateral foot, at rest and when he walks, for the last two months. He denies any numbness or tingling to either lower extremity. He has a history of diabetic foot, osteomyelitis. He was found to have an ulcer to his left foot. Patient states he does not use any boot to ambulate but does use orthotics. He has had previous right and left toe arthroplasties.   Allergies  is allergic to pcn [penicillins] and penicillin g.  Medications  Prior to Admission medications    Medication Sig Start Date End Date Taking? Authorizing Provider   Aspirin Buf,NwOql-VdRvh-RlQqx, (BUFFERED ASPIRIN) 325 MG TABS Take 1 tablet by mouth daily   Yes Provider, MD Esperanza   HYDROcodone-acetaminophen (NORCO) 7.5-325 MG per tablet Take 1 tablet by mouth every 6 hours as needed for Pain.   Yes Provider, MD Esperanza   Glucosamine-Chondroit-Vit C-Mn (GLUCOSAMINE CHONDR 1500 COMPLX) CAPS Take by mouth daily   Yes Provider, MD Esperanza   meloxicam (MOBIC) 15 MG tablet Take 1 tablet by mouth daily 4/8/24 4/3/25  Seema Hameed DO   levoFLOXacin (LEVAQUIN) 750 MG tablet Take 1 tablet by mouth daily for 10 days 4/2/24 4/12/24  Agustina Cleaning DPM   metFORMIN (GLUCOPHAGE) 1000 MG tablet Take 1 tablet by mouth 2 times daily (with meals) 3/29/24 4/28/24  Seema Hameed DO   hydroCHLOROthiazide 12.5 MG capsule 1 po qd  Patient taking differently: Take 1 capsule by mouth every morning 3/27/24   Seema Hameed DO   traZODone (DESYREL) 150 MG tablet Take 1 tablet by mouth nightly 3/15/24   Seema Hameed DO   ARIPiprazole (ABILIFY) 20 MG tablet Take 1 tablet by mouth  daily  Patient taking differently: Take 1 tablet by mouth at bedtime 2/27/24   Seema Hameed DO   chlorhexidine (PERIDEX) 0.12 % solution RINSE with 1 tablespoonful and EXPECTORATE 1 to 2 TIMES A DAY 1/12/24   ProviderEsperanza MD   SITagliptin (JANUVIA) 100 MG tablet Take 1 tablet by mouth every morning (before breakfast) 1/30/24   Seema Hameed DO   B-D SYRINGE/NEEDLE 3CC/22GX1.5 22G X 1-1/2\" 3 ML MISC Every 30 days 1/3/24   Seema Hameed DO   tiZANidine (ZANAFLEX) 4 MG tablet TAKE ONE TABLET BY MOUTH AT BEDTIME AS NEEDED FOR MUSCLE SPASMS 1/3/24   Seema Hameed DO   testosterone cypionate (DEPOTESTOTERONE CYPIONATE) 200 MG/ML injection Inject 1 mL into the muscle every 30 days for 180 days. Dispense with 3 cc syringe 18g needle and 22 1/2 g needle each month Max Daily Amount: 200 mg 12/30/23 6/27/24  Seema Hameed DO   pantoprazole (PROTONIX) 40 MG tablet Take 1 tablet by mouth daily (with breakfast) 9/19/23   Seema Hameed DO   B-D 3CC LUER-GERALDINE SYR 88NP3-7/2 18G X 1-1/2\" 3 ML MISC  7/14/23   Provider, MD Esperanza   atorvastatin (LIPITOR) 40 MG tablet Take 1 tablet by mouth daily  Patient taking differently: Take 1 tablet by mouth Daily with lunch 7/13/23 4/10/24  Seema Hameed DO   NEEDLE, DISP, 18 G (BD DISP NEEDLES) 18G X 1-1/2\" MISC 1 Device by Does not apply route every 30 days 6/14/23   Seema Hameed DO   NEEDLE, DISP, 22 G 22G X 1-1/2\" MISC 1 Units by Does not apply route every 30 days 6/14/23 7/14/23  Seema Hameed DO   carvedilol (COREG) 12.5 MG tablet Take 1 tablet by mouth 2 times daily (with meals) 6/13/23 6/7/24  Seema Hameed DO   dapagliflozin (FARXIGA) 10 MG tablet Take 1 tablet by mouth every morning 5/18/23   Seema Hameed DO   clopidogrel (PLAVIX) 75 MG tablet Take 1 tablet by mouth daily 5/18/23 4/10/24  Seema Hameed DO   topiramate (TOPAMAX) 200 MG tablet Take 1 tablet by mouth 2 times daily  Patient taking differently: Take 2 tablets by mouth daily 1/27/23

## 2024-04-15 LAB
MICROORGANISM SPEC CULT: ABNORMAL
MICROORGANISM SPEC CULT: ABNORMAL
MICROORGANISM/AGENT SPEC: ABNORMAL
MICROORGANISM/AGENT SPEC: ABNORMAL
SPECIMEN DESCRIPTION: ABNORMAL
SURGICAL PATHOLOGY REPORT: NORMAL

## 2024-04-15 NOTE — DISCHARGE INSTRUCTIONS
[facility] -Phone: [phone] Fax: [phone]             Visit  Discharge Instructions / Physician Orders     DATE: 4/16/2024     Home Care: N/A     SUPPLIES ORDERED THRU: [facility] [facility]     Wound Location: Left Lateral Foot     Cleanse with: saline     Dressing Orders: Saline moistened (slightly damp) gauze, dry gauze, Roll Gauze up around ankle and in between toes to help hold dressing in place, paper tape                                               Frequency: Daily     Additional Orders: Increase protein to diet (meat, cheese, eggs, fish, peanut butter, nuts and beans)  ELEVATE LEGS AS MUCH AS POSSIBLE  Culture Taken 4/2/24  Levofloxacin ordered 4/2/24  X-Ray Ordered 4/2/24  Antibiotic Ordered 4/16/24     Your next appointment with Wound Care Center is in 1 week with Dr. Cleaning        (Please note your next appointment above and if you are unable to keep, kindly give a 24 hour notice. Thank you.)     If you experience any of the following, please call the Wound Care Center during business hours:  [phone]  Your Phone call may be forwarded to [facility] [place] during business hours that [facility] is closed.     * Increase in Pain  * Temperature over 101  * Increase in drainage from your wound  * Drainage with a foul odor  * Bleeding  * Increase in swelling  * Need for compression bandage changes due to slippage, breakthrough drainage.     If you need medical attention outside of the business hours of the Wound Care Centers please contact your PCP or go to the nearest emergency room.     The information contained in the After Visit Summary has been reviewed with me, the patient and/or responsible adult, by my health care provider(s). I had the opportunity to ask questions regarding this information. I have elected to receive;      []After Visit Summary  [x]Comprehensive Discharge Instruction        Patient

## 2024-04-16 ENCOUNTER — HOSPITAL ENCOUNTER (OUTPATIENT)
Dept: WOUND CARE | Age: 64
Discharge: HOME | End: 2024-04-16
Payer: COMMERCIAL

## 2024-04-16 VITALS
WEIGHT: 213 LBS | DIASTOLIC BLOOD PRESSURE: 75 MMHG | BODY MASS INDEX: 28.85 KG/M2 | HEIGHT: 72 IN | RESPIRATION RATE: 18 BRPM | TEMPERATURE: 96.9 F | SYSTOLIC BLOOD PRESSURE: 132 MMHG | HEART RATE: 98 BPM

## 2024-04-16 DIAGNOSIS — E11.42 DIABETIC POLYNEUROPATHY ASSOCIATED WITH TYPE 2 DIABETES MELLITUS (HCC): Primary | ICD-10-CM

## 2024-04-16 DIAGNOSIS — L97.512 ULCER OF TOE OF RIGHT FOOT, WITH FAT LAYER EXPOSED (HCC): ICD-10-CM

## 2024-04-16 PROCEDURE — 99213 OFFICE O/P EST LOW 20 MIN: CPT

## 2024-04-16 RX ORDER — LIDOCAINE 50 MG/G
OINTMENT TOPICAL ONCE
OUTPATIENT
Start: 2024-04-16 | End: 2024-04-16

## 2024-04-16 RX ORDER — AMOXICILLIN AND CLAVULANATE POTASSIUM 500; 125 MG/1; MG/1
1 TABLET, FILM COATED ORAL 3 TIMES DAILY
Qty: 30 TABLET | Refills: 0 | Status: SHIPPED | OUTPATIENT
Start: 2024-04-16 | End: 2024-04-26

## 2024-04-16 RX ORDER — LIDOCAINE HYDROCHLORIDE 20 MG/ML
JELLY TOPICAL ONCE
OUTPATIENT
Start: 2024-04-16 | End: 2024-04-16

## 2024-04-16 RX ORDER — LIDOCAINE 40 MG/G
CREAM TOPICAL ONCE
OUTPATIENT
Start: 2024-04-16 | End: 2024-04-16

## 2024-04-16 RX ORDER — CLOBETASOL PROPIONATE 0.5 MG/G
OINTMENT TOPICAL ONCE
OUTPATIENT
Start: 2024-04-16 | End: 2024-04-16

## 2024-04-16 RX ORDER — BACITRACIN ZINC AND POLYMYXIN B SULFATE 500; 1000 [USP'U]/G; [USP'U]/G
OINTMENT TOPICAL ONCE
OUTPATIENT
Start: 2024-04-16 | End: 2024-04-16

## 2024-04-16 RX ORDER — GINSENG 100 MG
CAPSULE ORAL ONCE
OUTPATIENT
Start: 2024-04-16 | End: 2024-04-16

## 2024-04-16 RX ORDER — SODIUM CHLOR/HYPOCHLOROUS ACID 0.033 %
SOLUTION, IRRIGATION IRRIGATION ONCE
OUTPATIENT
Start: 2024-04-16 | End: 2024-04-16

## 2024-04-16 RX ORDER — TRIAMCINOLONE ACETONIDE 1 MG/G
OINTMENT TOPICAL ONCE
OUTPATIENT
Start: 2024-04-16 | End: 2024-04-16

## 2024-04-16 RX ORDER — GENTAMICIN SULFATE 1 MG/G
OINTMENT TOPICAL ONCE
OUTPATIENT
Start: 2024-04-16 | End: 2024-04-16

## 2024-04-16 RX ORDER — LIDOCAINE HYDROCHLORIDE 40 MG/ML
SOLUTION TOPICAL ONCE
OUTPATIENT
Start: 2024-04-16 | End: 2024-04-16

## 2024-04-16 RX ORDER — IBUPROFEN 200 MG
TABLET ORAL ONCE
OUTPATIENT
Start: 2024-04-16 | End: 2024-04-16

## 2024-04-16 RX ORDER — BETAMETHASONE DIPROPIONATE 0.5 MG/G
CREAM TOPICAL ONCE
OUTPATIENT
Start: 2024-04-16 | End: 2024-04-16

## 2024-04-16 RX ORDER — LIDOCAINE HYDROCHLORIDE 20 MG/ML
JELLY TOPICAL ONCE
Status: COMPLETED | OUTPATIENT
Start: 2024-04-16 | End: 2024-04-16

## 2024-04-16 RX ADMIN — LIDOCAINE HYDROCHLORIDE 6 ML: 20 JELLY TOPICAL at 12:58

## 2024-04-16 ASSESSMENT — PAIN DESCRIPTION - PROGRESSION: CLINICAL_PROGRESSION: NOT CHANGED

## 2024-04-16 ASSESSMENT — PAIN SCALES - GENERAL: PAINLEVEL_OUTOF10: 6

## 2024-04-16 NOTE — PROGRESS NOTES
[facility]   Progress Note and Procedure Note      Ac Alcantar  MEDICAL RECORD NUMBER:  [id number]  AGE: 63 y.o.   GENDER: male  : 1960  EPISODE DATE:  2024    Subjective:     Chief Complaint   Patient presents with    Wound Check     Left Foot         HISTORY of PRESENT ILLNESS HPI     Ac Alcantar is a 63 y.o. male who presents today for wound/ulcer evaluation.   History of Wound Context: S/P 5TH LEFT MET HEAD RESECTION 4/10/24. MAINTAINED DRESSING INTACT    4/10/24 INTRA-OP BONE CULTURE -PREVOTELLA SPECIES    Wound/Ulcer Pain Timing/Severity: none  Quality of pain: N/A  Severity:  0 / 10   Modifying Factors: None    Ulcer Identification:  Ulcer Type: diabetic    Contributing Factors: chronic pressure    PAST MEDICAL HISTORY        Diagnosis Date    Acquired trigger finger 2018    CAD (coronary artery disease)     NWOCC/ involving coronary bypass graft of native heart with unstable angina pectoris    Cervical spondylosis     CHF (congestive heart failure) ([facility])     Chronic back pain     on 18 pt states is presently in pain mgmnt    Chronic ulcer of toe, left, with necrosis of bone ([facility]) 2022    Contusion of elbow 2018    Contusion of knee 2018    Contusion of shoulder region 2018    Diabetic foot ulcer with osteomyelitis ([facility]) 2018    Diabetic polyneuropathy associated with type 2 diabetes mellitus ([facility])     Diabetic ulcer of toe associated with type 2 diabetes mellitus, with fat layer exposed ([facility]) 2018    Hypercholesterolemia 2017    Hyperlipemia, mixed     Hypertension     Ischemic cardiomyopathy 2018    Non-pressure chronic ulcer of left lower leg with fat layer exposed ([facility]) 2014    Obesity, Class III, BMI 40-49.9 (morbid obesity) ([facility]) 2015    Open wound of left foot 2024    Osteoarthritis of carpometacarpal (CMC) joint of thumb 2018    Osteoarthritis of knee 2018

## 2024-04-19 DIAGNOSIS — I10 ESSENTIAL HYPERTENSION: ICD-10-CM

## 2024-04-22 RX ORDER — HYDROCHLOROTHIAZIDE 12.5 MG/1
12.5 CAPSULE, GELATIN COATED ORAL EVERY MORNING
Qty: 90 CAPSULE | Refills: 2 | Status: SHIPPED | OUTPATIENT
Start: 2024-04-22

## 2024-04-23 ENCOUNTER — HOSPITAL ENCOUNTER (OUTPATIENT)
Age: 64
Discharge: HOME | End: 2024-04-25
Payer: COMMERCIAL

## 2024-04-23 ENCOUNTER — HOSPITAL ENCOUNTER (OUTPATIENT)
Dept: WOUND CARE | Age: 64
Discharge: HOME | End: 2024-04-23
Payer: COMMERCIAL

## 2024-04-23 ENCOUNTER — HOSPITAL ENCOUNTER (OUTPATIENT)
Dept: GENERAL RADIOLOGY | Age: 64
Discharge: HOME | End: 2024-04-25
Payer: COMMERCIAL

## 2024-04-23 VITALS
HEART RATE: 88 BPM | RESPIRATION RATE: 16 BRPM | TEMPERATURE: 96.7 F | BODY MASS INDEX: 28.7 KG/M2 | WEIGHT: 211.64 LBS | DIASTOLIC BLOOD PRESSURE: 82 MMHG | SYSTOLIC BLOOD PRESSURE: 96 MMHG

## 2024-04-23 DIAGNOSIS — M21.622 TAILOR'S BUNION OF LEFT FOOT: ICD-10-CM

## 2024-04-23 DIAGNOSIS — L97.512 ULCER OF TOE OF RIGHT FOOT, WITH FAT LAYER EXPOSED (HCC): ICD-10-CM

## 2024-04-23 DIAGNOSIS — E11.42 DIABETIC POLYNEUROPATHY ASSOCIATED WITH TYPE 2 DIABETES MELLITUS (HCC): Primary | ICD-10-CM

## 2024-04-23 DIAGNOSIS — L97.522 ULCER OF LEFT FOOT, WITH FAT LAYER EXPOSED (HCC): ICD-10-CM

## 2024-04-23 DIAGNOSIS — L97.523 ULCER OF LEFT FOOT, WITH NECROSIS OF MUSCLE (HCC): ICD-10-CM

## 2024-04-23 PROCEDURE — 11043 DBRDMT MUSC&/FSCA 1ST 20/<: CPT

## 2024-04-23 PROCEDURE — 73630 X-RAY EXAM OF FOOT: CPT

## 2024-04-23 RX ORDER — LIDOCAINE 50 MG/G
OINTMENT TOPICAL ONCE
OUTPATIENT
Start: 2024-04-23 | End: 2024-04-23

## 2024-04-23 RX ORDER — LIDOCAINE HYDROCHLORIDE 20 MG/ML
JELLY TOPICAL ONCE
Status: COMPLETED | OUTPATIENT
Start: 2024-04-23 | End: 2024-04-23

## 2024-04-23 RX ORDER — LIDOCAINE HYDROCHLORIDE 40 MG/ML
SOLUTION TOPICAL ONCE
OUTPATIENT
Start: 2024-04-23 | End: 2024-04-23

## 2024-04-23 RX ORDER — SODIUM CHLOR/HYPOCHLOROUS ACID 0.033 %
SOLUTION, IRRIGATION IRRIGATION ONCE
OUTPATIENT
Start: 2024-04-23 | End: 2024-04-23

## 2024-04-23 RX ORDER — BETAMETHASONE DIPROPIONATE 0.5 MG/G
CREAM TOPICAL ONCE
OUTPATIENT
Start: 2024-04-23 | End: 2024-04-23

## 2024-04-23 RX ORDER — LEVOFLOXACIN 750 MG/1
750 TABLET, FILM COATED ORAL DAILY
COMMUNITY

## 2024-04-23 RX ORDER — TRIAMCINOLONE ACETONIDE 1 MG/G
OINTMENT TOPICAL ONCE
OUTPATIENT
Start: 2024-04-23 | End: 2024-04-23

## 2024-04-23 RX ORDER — IBUPROFEN 200 MG
TABLET ORAL ONCE
OUTPATIENT
Start: 2024-04-23 | End: 2024-04-23

## 2024-04-23 RX ORDER — LIDOCAINE 40 MG/G
CREAM TOPICAL ONCE
OUTPATIENT
Start: 2024-04-23 | End: 2024-04-23

## 2024-04-23 RX ORDER — LIDOCAINE HYDROCHLORIDE 20 MG/ML
JELLY TOPICAL ONCE
OUTPATIENT
Start: 2024-04-23 | End: 2024-04-23

## 2024-04-23 RX ORDER — GINSENG 100 MG
CAPSULE ORAL ONCE
OUTPATIENT
Start: 2024-04-23 | End: 2024-04-23

## 2024-04-23 RX ORDER — LEVOFLOXACIN 750 MG/1
750 TABLET, FILM COATED ORAL DAILY
Qty: 10 TABLET | Refills: 0 | Status: SHIPPED | OUTPATIENT
Start: 2024-04-23 | End: 2024-05-03

## 2024-04-23 RX ORDER — AMOXICILLIN AND CLAVULANATE POTASSIUM 500; 125 MG/1; MG/1
1 TABLET, FILM COATED ORAL 3 TIMES DAILY
Qty: 30 TABLET | Refills: 0 | Status: SHIPPED | OUTPATIENT
Start: 2024-04-23 | End: 2024-05-03

## 2024-04-23 RX ORDER — CLOBETASOL PROPIONATE 0.5 MG/G
OINTMENT TOPICAL ONCE
OUTPATIENT
Start: 2024-04-23 | End: 2024-04-23

## 2024-04-23 RX ORDER — BACITRACIN ZINC AND POLYMYXIN B SULFATE 500; 1000 [USP'U]/G; [USP'U]/G
OINTMENT TOPICAL ONCE
OUTPATIENT
Start: 2024-04-23 | End: 2024-04-23

## 2024-04-23 RX ORDER — GENTAMICIN SULFATE 1 MG/G
OINTMENT TOPICAL ONCE
OUTPATIENT
Start: 2024-04-23 | End: 2024-04-23

## 2024-04-23 RX ADMIN — LIDOCAINE HYDROCHLORIDE 6 ML: 20 JELLY TOPICAL at 13:29

## 2024-04-23 ASSESSMENT — PAIN DESCRIPTION - LOCATION: LOCATION: FOOT

## 2024-04-23 ASSESSMENT — PAIN DESCRIPTION - DESCRIPTORS: DESCRIPTORS: SHOOTING

## 2024-04-23 ASSESSMENT — PAIN SCALES - GENERAL: PAINLEVEL_OUTOF10: 5

## 2024-04-23 ASSESSMENT — PAIN DESCRIPTION - ORIENTATION: ORIENTATION: LEFT

## 2024-04-23 NOTE — DISCHARGE INSTRUCTIONS
[facility] -Phone: [phone] Fax: [phone]             Visit  Discharge Instructions / Physician Orders     DATE: 4/23/2024     Home Care: N/A     SUPPLIES ORDERED THRU: [facility]     Wound Location: Left Lateral Foot     Cleanse with: saline     Dressing Orders: Saline moistened (slightly damp) gauze, dry gauze, Roll Gauze up around ankle and in between toes to help hold dressing in place, paper tape                                               Frequency: Daily     Additional Orders: Increase protein to diet (meat, cheese, eggs, fish, peanut butter, nuts and beans)  ELEVATE LEGS AS MUCH AS POSSIBLE  Culture Taken 4/2/24  Levofloxacin ordered 4/2/24  X-Ray Ordered 4/2/24  Antibiotic Ordered 4/23/24  X-Ray Ordered 4/23/24     Your next appointment with Wound Care Center is in 1 week with Dr. Cleaning        (Please note your next appointment above and if you are unable to keep, kindly give a 24 hour notice. Thank you.)     If you experience any of the following, please call the Wound Care Center during business hours:  [phone]  Your Phone call may be forwarded to [facility] during business hours that [facility] is closed.     * Increase in Pain  * Temperature over 101  * Increase in drainage from your wound  * Drainage with a foul odor  * Bleeding  * Increase in swelling  * Need for compression bandage changes due to slippage, breakthrough drainage.     If you need medical attention outside of the business hours of the Wound Care Centers please contact your PCP or go to the nearest emergency room.     The information contained in the After Visit Summary has been reviewed with me, the patient and/or responsible adult, by my health care provider(s). I had the opportunity to ask questions regarding this information. I have elected to receive;      []After Visit Summary  [x]Comprehensive Discharge Instruction        Patient

## 2024-04-23 NOTE — PROGRESS NOTES
Number of days: 0        Total Surface Area Debrided:  2.2 sq cm   Estimated Blood Loss: None.  Hemostasis Achieved:  by pressure  Procedural Pain: 0  / 10   Post Procedural Pain: 0 / 10   Response to treatment: Patient tolerated procedure well with no complaints of pain.    Plan:   - SALINE WET TO DRY QD - DAMP GAUZE AND GAUZE ROLL. DO NOT USE BANDAIDS OR APERTURE PADS    RX AUGMENTIN -AND LEVAQUIN  PT HAS REMOTE PCN ALLERGY - HAS NOT REACTED RECENTLY.  ANCEF GIVEN IV 4/10/24    RTWCC 1 WEEK    Treatment Note please see attached Discharge Instructions    Written patient dismissal instructions given to patient and signed by patient or POA.

## 2024-04-29 NOTE — DISCHARGE INSTRUCTIONS
[facility] -Phone: [phone] Fax: [phone]             Visit  Discharge Instructions / Physician Orders     DATE: 4/30/2024     Home Care: N/A     SUPPLIES ORDERED THRU: [facility]     Wound Location: Left Lateral Foot     Cleanse with: saline     Dressing Orders: Top Of Foot: Saline moistened (slightly damp) gauze, dry gauze, Roll Gauze up around ankle and in between toes to help hold dressing in place, paper tape                     Bottom of Foot: [place]ing moistened with saline, dry gauze, Roll Gauze up around ankle and in between toes to help hold dressing in place, paper tape                                               Frequency: Daily     Additional Orders: Increase protein to diet (meat, cheese, eggs, fish, peanut butter, nuts and beans)  ELEVATE LEGS AS MUCH AS POSSIBLE  Culture Taken 4/2/24  Levofloxacin ordered 4/2/24  X-Ray Ordered 4/2/24  Antibiotic Ordered 4/23/24  X-Ray Ordered 4/23/24     Your next appointment with Wound Care Center is in 2 weeks with Dr. Cleaning        (Please note your next appointment above and if you are unable to keep, kindly give a 24 hour notice. Thank you.)     If you experience any of the following, please call the Wound Care Center during business hours:  [phone]  Your Phone call may be forwarded to [facility] [place] during business hours that [facility] is closed.     * Increase in Pain  * Temperature over 101  * Increase in drainage from your wound  * Drainage with a foul odor  * Bleeding  * Increase in swelling  * Need for compression bandage changes due to slippage, breakthrough drainage.     If you need medical attention outside of the business hours of the Wound Care Centers please contact your PCP or go to the nearest emergency room.     The information contained in the After Visit Summary has been reviewed with me, the patient and/or responsible adult, by my health care provider(s). I had the opportunity to ask

## 2024-04-30 ENCOUNTER — HOSPITAL ENCOUNTER (OUTPATIENT)
Dept: WOUND CARE | Age: 64
Discharge: HOME | End: 2024-04-30
Payer: COMMERCIAL

## 2024-04-30 VITALS
SYSTOLIC BLOOD PRESSURE: 104 MMHG | BODY MASS INDEX: 28.58 KG/M2 | WEIGHT: 211 LBS | DIASTOLIC BLOOD PRESSURE: 76 MMHG | HEIGHT: 72 IN | HEART RATE: 93 BPM | TEMPERATURE: 97.8 F | RESPIRATION RATE: 16 BRPM

## 2024-04-30 DIAGNOSIS — L97.512 ULCER OF TOE OF RIGHT FOOT, WITH FAT LAYER EXPOSED (HCC): ICD-10-CM

## 2024-04-30 DIAGNOSIS — E11.42 DIABETIC POLYNEUROPATHY ASSOCIATED WITH TYPE 2 DIABETES MELLITUS (HCC): Primary | ICD-10-CM

## 2024-04-30 PROCEDURE — 11043 DBRDMT MUSC&/FSCA 1ST 20/<: CPT

## 2024-04-30 RX ORDER — LIDOCAINE HYDROCHLORIDE 20 MG/ML
JELLY TOPICAL ONCE
OUTPATIENT
Start: 2024-04-30 | End: 2024-04-30

## 2024-04-30 RX ORDER — GINSENG 100 MG
CAPSULE ORAL ONCE
OUTPATIENT
Start: 2024-04-30 | End: 2024-04-30

## 2024-04-30 RX ORDER — LIDOCAINE 50 MG/G
OINTMENT TOPICAL ONCE
OUTPATIENT
Start: 2024-04-30 | End: 2024-04-30

## 2024-04-30 RX ORDER — TRIAMCINOLONE ACETONIDE 1 MG/G
OINTMENT TOPICAL ONCE
OUTPATIENT
Start: 2024-04-30 | End: 2024-04-30

## 2024-04-30 RX ORDER — SODIUM CHLOR/HYPOCHLOROUS ACID 0.033 %
SOLUTION, IRRIGATION IRRIGATION ONCE
OUTPATIENT
Start: 2024-04-30 | End: 2024-04-30

## 2024-04-30 RX ORDER — BACITRACIN ZINC AND POLYMYXIN B SULFATE 500; 1000 [USP'U]/G; [USP'U]/G
OINTMENT TOPICAL ONCE
OUTPATIENT
Start: 2024-04-30 | End: 2024-04-30

## 2024-04-30 RX ORDER — GENTAMICIN SULFATE 1 MG/G
OINTMENT TOPICAL ONCE
OUTPATIENT
Start: 2024-04-30 | End: 2024-04-30

## 2024-04-30 RX ORDER — IBUPROFEN 200 MG
TABLET ORAL ONCE
OUTPATIENT
Start: 2024-04-30 | End: 2024-04-30

## 2024-04-30 RX ORDER — AMOXICILLIN AND CLAVULANATE POTASSIUM 500; 125 MG/1; MG/1
1 TABLET, FILM COATED ORAL 2 TIMES DAILY
Qty: 20 TABLET | Refills: 0 | Status: SHIPPED | OUTPATIENT
Start: 2024-04-30 | End: 2024-05-10

## 2024-04-30 RX ORDER — LIDOCAINE 40 MG/G
CREAM TOPICAL ONCE
OUTPATIENT
Start: 2024-04-30 | End: 2024-04-30

## 2024-04-30 RX ORDER — CLOBETASOL PROPIONATE 0.5 MG/G
OINTMENT TOPICAL ONCE
OUTPATIENT
Start: 2024-04-30 | End: 2024-04-30

## 2024-04-30 RX ORDER — BETAMETHASONE DIPROPIONATE 0.5 MG/G
CREAM TOPICAL ONCE
OUTPATIENT
Start: 2024-04-30 | End: 2024-04-30

## 2024-04-30 RX ORDER — LIDOCAINE HYDROCHLORIDE 40 MG/ML
SOLUTION TOPICAL ONCE
OUTPATIENT
Start: 2024-04-30 | End: 2024-04-30

## 2024-04-30 RX ORDER — LIDOCAINE HYDROCHLORIDE 20 MG/ML
JELLY TOPICAL ONCE
Status: COMPLETED | OUTPATIENT
Start: 2024-04-30 | End: 2024-04-30

## 2024-04-30 RX ADMIN — LIDOCAINE HYDROCHLORIDE 6 ML: 20 JELLY TOPICAL at 13:47

## 2024-04-30 ASSESSMENT — PAIN SCALES - GENERAL: PAINLEVEL_OUTOF10: 3

## 2024-04-30 NOTE — PROGRESS NOTES
[facility]   Progress Note and Procedure Note      Ac Alcantar  MEDICAL RECORD NUMBER:  [id number]  AGE: 63 y.o.   GENDER: male  : 1960  EPISODE DATE:  2024    Subjective:     No chief complaint on file.        HISTORY of PRESENT ILLNESS HPI     Ac Alcantar is a 63 y.o. male who presents today for wound/ulcer evaluation.   History of Wound Context: S/P 5TH LEFT MET HEAD RESECTION 4/10/24. PT HAS BEEN CHANGING DRESSING WITH SALINE QD.  TAKING ANTIBIOTICS    4/10/24 INTRA-OP BONE CULTURE -PREVOTELLA SPECIES    Wound/Ulcer Pain Timing/Severity: none  Quality of pain: N/A  Severity:  0 / 10   Modifying Factors: None    Ulcer Identification:  Ulcer Type: diabetic    Contributing Factors: chronic pressure    PAST MEDICAL HISTORY        Diagnosis Date    Acquired trigger finger 2018    CAD (coronary artery disease)     NWOCC/ involving coronary bypass graft of native heart with unstable angina pectoris    Cervical spondylosis     CHF (congestive heart failure) ([facility])     Chronic back pain     on 18 pt states is presently in pain mgmnt    Chronic ulcer of toe, left, with necrosis of bone ([facility]) 2022    Contusion of elbow 2018    Contusion of knee 2018    Contusion of shoulder region 2018    Diabetic foot ulcer with osteomyelitis ([facility]) 2018    Diabetic polyneuropathy associated with type 2 diabetes mellitus ([facility])     Diabetic ulcer of toe associated with type 2 diabetes mellitus, with fat layer exposed ([facility]) 2018    Hypercholesterolemia 2017    Hyperlipemia, mixed     Hypertension     Ischemic cardiomyopathy 2018    Non-pressure chronic ulcer of left lower leg with fat layer exposed ([facility]) 2014    Obesity, Class III, BMI 40-49.9 (morbid obesity) ([facility]) 2015    Open wound of left foot 2024    Osteoarthritis of carpometacarpal (CMC) joint of thumb 2018    Osteoarthritis of knee 2018

## 2024-05-02 DIAGNOSIS — I73.9 PERIPHERAL VASCULAR DISEASE (HCC): ICD-10-CM

## 2024-05-02 RX ORDER — CLOPIDOGREL BISULFATE 75 MG/1
75 TABLET ORAL DAILY
Qty: 90 TABLET | Refills: 3 | Status: SHIPPED | OUTPATIENT
Start: 2024-05-02

## 2024-05-02 NOTE — TELEPHONE ENCOUNTER
Ac Alcantar is calling to request a refill on the following medication(s):    Last Visit Date (If Applicable):  2/27/2024    Next Visit Date:    Visit date not found    Medication Request:  Requested Prescriptions     Pending Prescriptions Disp Refills    clopidogrel (PLAVIX) 75 MG tablet [Pharmacy Med Name: CLOPIDOGREL 75 MG TABLET] 90 tablet 3     Sig: take 1 tablet by mouth once daily

## 2024-05-08 ENCOUNTER — TELEPHONE (OUTPATIENT)
Dept: WOUND CARE | Age: 64
End: 2024-05-08

## 2024-05-08 RX ORDER — FLUCONAZOLE 150 MG/1
150 TABLET ORAL
Qty: 3 TABLET | Refills: 0 | Status: SHIPPED | OUTPATIENT
Start: 2024-05-08 | End: 2024-05-15

## 2024-05-10 DIAGNOSIS — E78.2 HYPERLIPEMIA, MIXED: ICD-10-CM

## 2024-05-10 RX ORDER — CARVEDILOL 12.5 MG/1
12.5 TABLET ORAL 2 TIMES DAILY WITH MEALS
Qty: 180 TABLET | Refills: 3 | Status: SHIPPED | OUTPATIENT
Start: 2024-05-10 | End: 2025-05-05

## 2024-05-10 NOTE — DISCHARGE INSTRUCTIONS
[facility] -Phone: [phone] Fax: [phone]             Visit  Discharge Instructions / Physician Orders     DATE: 5/14/2024     Home Care: N/A     SUPPLIES ORDERED THRU: [employer]     Wound Location: Left Lateral Foot     Cleanse with: saline     Dressing Orders: Bottom of Foot: Blessing moistened with saline, dry gauze, Roll Gauze up around ankle and in between toes to help hold dressing in place, paper tape                                               Frequency: Daily     Additional Orders: Increase protein to diet (meat, cheese, eggs, fish, peanut butter, nuts and beans)  ELEVATE LEGS AS MUCH AS POSSIBLE  Culture Taken 4/2/24  Levofloxacin ordered 4/2/24  X-Ray Ordered 4/2/24  Antibiotic Ordered 4/23/24  X-Ray Ordered 4/23/24  Labs Ordered 5/14/24     Your next appointment with Wound Care Center is in 1 week with Dr. Cleaning        (Please note your next appointment above and if you are unable to keep, kindly give a 24 hour notice. Thank you.)     If you experience any of the following, please call the Wound Care Center during business hours:  [phone]  Your Phone call may be forwarded to [facility] [place] during business hours that [facility] is closed.     * Increase in Pain  * Temperature over 101  * Increase in drainage from your wound  * Drainage with a foul odor  * Bleeding  * Increase in swelling  * Need for compression bandage changes due to slippage, breakthrough drainage.     If you need medical attention outside of the business hours of the Wound Care Centers please contact your PCP or go to the nearest emergency room.     The information contained in the After Visit Summary has been reviewed with me, the patient and/or responsible adult, by my health care provider(s). I had the opportunity to ask questions regarding this information. I have elected to receive;      []After Visit Summary  [x]Comprehensive Discharge Instruction        Patient

## 2024-05-10 NOTE — TELEPHONE ENCOUNTER
Ac Alcantar is calling to request a refill on the following medication(s):    Last Visit Date (If Applicable):  2/27/2024    Next Visit Date:    Visit date not found    Medication Request:  Requested Prescriptions     Pending Prescriptions Disp Refills    carvedilol (COREG) 12.5 MG tablet 180 tablet 3     Sig: Take 1 tablet by mouth 2 times daily (with meals)

## 2024-05-14 ENCOUNTER — HOSPITAL ENCOUNTER (OUTPATIENT)
Dept: WOUND CARE | Age: 64
Discharge: HOME | End: 2024-05-14
Payer: COMMERCIAL

## 2024-05-14 ENCOUNTER — HOSPITAL ENCOUNTER (OUTPATIENT)
Age: 64
Discharge: HOME | End: 2024-05-14
Payer: COMMERCIAL

## 2024-05-14 VITALS
TEMPERATURE: 97.9 F | WEIGHT: 211 LBS | DIASTOLIC BLOOD PRESSURE: 62 MMHG | HEIGHT: 72 IN | BODY MASS INDEX: 28.58 KG/M2 | RESPIRATION RATE: 18 BRPM | HEART RATE: 86 BPM | SYSTOLIC BLOOD PRESSURE: 100 MMHG

## 2024-05-14 DIAGNOSIS — L97.522 ULCER OF LEFT FOOT, WITH FAT LAYER EXPOSED (HCC): ICD-10-CM

## 2024-05-14 DIAGNOSIS — L97.523 ULCER OF LEFT FOOT, WITH NECROSIS OF MUSCLE (HCC): ICD-10-CM

## 2024-05-14 DIAGNOSIS — L97.512 ULCER OF TOE OF RIGHT FOOT, WITH FAT LAYER EXPOSED (HCC): ICD-10-CM

## 2024-05-14 DIAGNOSIS — E11.42 DIABETIC POLYNEUROPATHY ASSOCIATED WITH TYPE 2 DIABETES MELLITUS (HCC): Primary | ICD-10-CM

## 2024-05-14 DIAGNOSIS — E11.42 DIABETIC POLYNEUROPATHY ASSOCIATED WITH TYPE 2 DIABETES MELLITUS (HCC): ICD-10-CM

## 2024-05-14 LAB
EST. AVERAGE GLUCOSE BLD GHB EST-MCNC: 148 MG/DL
HBA1C MFR BLD: 6.8 % (ref 4–6)

## 2024-05-14 PROCEDURE — 36415 COLL VENOUS BLD VENIPUNCTURE: CPT

## 2024-05-14 PROCEDURE — 83036 HEMOGLOBIN GLYCOSYLATED A1C: CPT

## 2024-05-14 PROCEDURE — 82040 ASSAY OF SERUM ALBUMIN: CPT

## 2024-05-14 PROCEDURE — 11042 DBRDMT SUBQ TIS 1ST 20SQCM/<: CPT

## 2024-05-14 RX ORDER — GENTAMICIN SULFATE 1 MG/G
OINTMENT TOPICAL ONCE
OUTPATIENT
Start: 2024-05-14 | End: 2024-05-14

## 2024-05-14 RX ORDER — SODIUM CHLOR/HYPOCHLOROUS ACID 0.033 %
SOLUTION, IRRIGATION IRRIGATION ONCE
OUTPATIENT
Start: 2024-05-14 | End: 2024-05-14

## 2024-05-14 RX ORDER — CLOBETASOL PROPIONATE 0.5 MG/G
OINTMENT TOPICAL ONCE
OUTPATIENT
Start: 2024-05-14 | End: 2024-05-14

## 2024-05-14 RX ORDER — TRIAMCINOLONE ACETONIDE 1 MG/G
OINTMENT TOPICAL ONCE
OUTPATIENT
Start: 2024-05-14 | End: 2024-05-14

## 2024-05-14 RX ORDER — LIDOCAINE 40 MG/G
CREAM TOPICAL ONCE
OUTPATIENT
Start: 2024-05-14 | End: 2024-05-14

## 2024-05-14 RX ORDER — BACITRACIN ZINC AND POLYMYXIN B SULFATE 500; 1000 [USP'U]/G; [USP'U]/G
OINTMENT TOPICAL ONCE
OUTPATIENT
Start: 2024-05-14 | End: 2024-05-14

## 2024-05-14 RX ORDER — LIDOCAINE HYDROCHLORIDE 20 MG/ML
JELLY TOPICAL ONCE
OUTPATIENT
Start: 2024-05-14 | End: 2024-05-14

## 2024-05-14 RX ORDER — LIDOCAINE 50 MG/G
OINTMENT TOPICAL ONCE
OUTPATIENT
Start: 2024-05-14 | End: 2024-05-14

## 2024-05-14 RX ORDER — LIDOCAINE HYDROCHLORIDE 40 MG/ML
SOLUTION TOPICAL ONCE
OUTPATIENT
Start: 2024-05-14 | End: 2024-05-14

## 2024-05-14 RX ORDER — BETAMETHASONE DIPROPIONATE 0.5 MG/G
CREAM TOPICAL ONCE
OUTPATIENT
Start: 2024-05-14 | End: 2024-05-14

## 2024-05-14 RX ORDER — LIDOCAINE HYDROCHLORIDE 20 MG/ML
JELLY TOPICAL ONCE
Status: COMPLETED | OUTPATIENT
Start: 2024-05-14 | End: 2024-05-14

## 2024-05-14 RX ORDER — GINSENG 100 MG
CAPSULE ORAL ONCE
OUTPATIENT
Start: 2024-05-14 | End: 2024-05-14

## 2024-05-14 RX ORDER — IBUPROFEN 200 MG
TABLET ORAL ONCE
OUTPATIENT
Start: 2024-05-14 | End: 2024-05-14

## 2024-05-14 RX ADMIN — LIDOCAINE HYDROCHLORIDE 6 ML: 20 JELLY TOPICAL at 13:39

## 2024-05-14 NOTE — PROGRESS NOTES
(l*w) 72.57 05/14/24 1335   Wound Volume (cm^3) 0.576 cm^3 05/14/24 1335   Wound Healing % 59 05/14/24 1335   Post-Procedure Length (cm) 1.2 cm 05/14/24 1335   Post-Procedure Width (cm) 1 cm 05/14/24 1335   Post-Procedure Depth (cm) 0.4 cm 05/14/24 1335   Post-Procedure Surface Area (cm^2) 1.2 cm^2 05/14/24 1335   Post-Procedure Volume (cm^3) 0.48 cm^3 05/14/24 1335   Undermining Starts ___ O'Clock 7 04/23/24 1320   Undermining Ends___ O'Clock 11 04/23/24 1320   Undermining Maxium Distance (cm) 1@3 04/23/24 1320   Wound Assessment Pink/red;Slough 05/14/24 1335   Drainage Amount Moderate (25-50%) 05/14/24 1335   Drainage Description Serosanguinous 05/14/24 1335   Odor None 05/14/24 1335   Trudi-wound Assessment Blanchable erythema;Maceration 05/14/24 1335   Margins Defined edges 05/14/24 1335   Wound Thickness Description not for Pressure Injury Full thickness 05/14/24 1335   Number of days: 91       Wound 04/23/24 Foot Left;Lateral;Dorsal #2 (Active)   Wound Image   04/23/24 1326   Wound Etiology Surgical 04/30/24 1345   Dressing Status New drainage noted;Old drainage noted 04/30/24 1345   Wound Cleansed Soap and water 04/30/24 1345   Offloading for Diabetic Foot Ulcers Offloading ordered;Diabetic shoes/inserts 04/30/24 1414   Wound Length (cm) 0 cm 05/14/24 1335   Wound Width (cm) 0 cm 05/14/24 1335   Wound Depth (cm) 0 cm 05/14/24 1335   Wound Surface Area (cm^2) 0 cm^2 05/14/24 1335   Change in Wound Size % (l*w) 100 05/14/24 1335   Wound Volume (cm^3) 0 cm^3 05/14/24 1335   Wound Healing % 100 05/14/24 1335   Post-Procedure Length (cm) 0.5 cm 04/30/24 1345   Post-Procedure Width (cm) 0.7 cm 04/30/24 1345   Post-Procedure Depth (cm) 0.3 cm 04/30/24 1345   Post-Procedure Surface Area (cm^2) 0.35 cm^2 04/30/24 1345   Post-Procedure Volume (cm^3) 0.105 cm^3 04/30/24 1345   Wound Assessment Pink/red;Slough 04/30/24 1345   Drainage Amount Moderate (25-50%) 04/30/24 1345   Drainage Description Serosanguinous 04/30/24

## 2024-05-14 NOTE — PLAN OF CARE
Problem: Chronic Conditions and Co-morbidities  Goal: Patient's chronic conditions and co-morbidity symptoms are monitored and maintained or improved  5/14/2024 1407 by Nathaniel Wheatley RN  Outcome: Progressing  5/14/2024 1407 by Nathaniel Wheatley RN  Outcome: Progressing     Problem: Wound:  Goal: Will show signs of wound healing; wound closure and no evidence of infection  Description: Will show signs of wound healing; wound closure and no evidence of infection  5/14/2024 1407 by Nathaniel Wheatley RN  Outcome: Progressing  5/14/2024 1407 by Nathaniel Wheatley RN  Outcome: Progressing     Problem: Falls - Risk of:  Goal: Will remain free from falls  Description: Will remain free from falls  5/14/2024 1407 by Nathaniel Wheatley RN  Outcome: Progressing  5/14/2024 1407 by Nathaniel Wheatley RN  Outcome: Progressing

## 2024-05-15 DIAGNOSIS — E11.69 TYPE 2 DIABETES MELLITUS WITH OTHER SPECIFIED COMPLICATION, WITHOUT LONG-TERM CURRENT USE OF INSULIN (HCC): ICD-10-CM

## 2024-05-15 LAB — ALBUMIN SERPL-MCNC: 4.5 G/DL (ref 3.5–5.2)

## 2024-05-15 RX ORDER — PANTOPRAZOLE SODIUM 40 MG/1
40 TABLET, DELAYED RELEASE ORAL
Qty: 90 TABLET | Refills: 3 | Status: SHIPPED | OUTPATIENT
Start: 2024-05-15

## 2024-05-15 RX ORDER — DAPAGLIFLOZIN 10 MG/1
10 TABLET, FILM COATED ORAL EVERY MORNING
Qty: 90 TABLET | Refills: 3 | Status: SHIPPED | OUTPATIENT
Start: 2024-05-15

## 2024-05-15 NOTE — TELEPHONE ENCOUNTER
Ac Alcantar is calling to request a refill on the following medication(s):    Last Visit Date (If Applicable):  2/27/2024    Next Visit Date:    Visit date not found    Medication Request:  Requested Prescriptions     Pending Prescriptions Disp Refills    pantoprazole (PROTONIX) 40 MG tablet 90 tablet 3     Sig: Take 1 tablet by mouth daily (with breakfast)    dapagliflozin (FARXIGA) 10 MG tablet 90 tablet 3     Sig: Take 1 tablet by mouth every morning

## 2024-05-17 ENCOUNTER — TELEPHONE (OUTPATIENT)
Dept: PULMONOLOGY | Age: 64
End: 2024-05-17

## 2024-05-17 DIAGNOSIS — G47.33 OBSTRUCTIVE SLEEP APNEA SYNDROME: Primary | ICD-10-CM

## 2024-05-17 NOTE — TELEPHONE ENCOUNTER
Patient called, states his mask fits him different and made a \"gouge\" on his nose. It has since healed but he states he has lost 166 pounds in the last 4 years and would like to get a sleep study. Please sign the order.     Patient is aware that insurance might not cover it.

## 2024-05-20 ENCOUNTER — TELEPHONE (OUTPATIENT)
Dept: FAMILY MEDICINE CLINIC | Age: 64
End: 2024-05-20

## 2024-05-20 NOTE — DISCHARGE INSTRUCTIONS
[facility] -Phone: [phone] Fax: [phone]             Visit  Discharge Instructions / Physician Orders     DATE: 5/21/2024     Home Care: N/A     SUPPLIES ORDERED THRU: [employer]     Wound Location: Left Lateral Foot     Cleanse with: saline     Dressing Orders: Bottom of Foot: Endoform AM- moistened with saline, dry gauze, Roll Gauze, Coban to hold in place                                               Frequency: Daily     Additional Orders: Increase protein to diet (meat, cheese, eggs, fish, peanut butter, nuts and beans)  ELEVATE LEGS AS MUCH AS POSSIBLE  Culture Taken 4/2/24  Levofloxacin ordered 4/2/24  X-Ray Ordered 4/2/24  Antibiotic Ordered 4/23/24  X-Ray Ordered 4/23/24  Labs Ordered 5/14/24     Your next appointment with Wound Care Center is in 1 week with Dr. Cleaning        (Please note your next appointment above and if you are unable to keep, kindly give a 24 hour notice. Thank you.)     If you experience any of the following, please call the Wound Care Center during business hours:  [phone]  Your Phone call may be forwarded to [facility] [place] during business hours that [facility] is closed.     * Increase in Pain  * Temperature over 101  * Increase in drainage from your wound  * Drainage with a foul odor  * Bleeding  * Increase in swelling  * Need for compression bandage changes due to slippage, breakthrough drainage.     If you need medical attention outside of the business hours of the Wound Care Centers please contact your PCP or go to the nearest emergency room.     The information contained in the After Visit Summary has been reviewed with me, the patient and/or responsible adult, by my health care provider(s). I had the opportunity to ask questions regarding this information. I have elected to receive;      []After Visit Summary  [x]Comprehensive Discharge Instruction        Patient

## 2024-05-20 NOTE — TELEPHONE ENCOUNTER
Patient called needing a new handicap placard ordered , will come pick order or letter up once completed.

## 2024-05-21 ENCOUNTER — HOSPITAL ENCOUNTER (OUTPATIENT)
Dept: WOUND CARE | Age: 64
Discharge: HOME | End: 2024-05-21
Payer: COMMERCIAL

## 2024-05-21 VITALS
DIASTOLIC BLOOD PRESSURE: 68 MMHG | WEIGHT: 211 LBS | HEIGHT: 72 IN | SYSTOLIC BLOOD PRESSURE: 148 MMHG | HEART RATE: 82 BPM | BODY MASS INDEX: 28.58 KG/M2 | TEMPERATURE: 98.7 F | RESPIRATION RATE: 17 BRPM

## 2024-05-21 DIAGNOSIS — E11.42 DIABETIC POLYNEUROPATHY ASSOCIATED WITH TYPE 2 DIABETES MELLITUS (HCC): Primary | ICD-10-CM

## 2024-05-21 DIAGNOSIS — L97.512 ULCER OF TOE OF RIGHT FOOT, WITH FAT LAYER EXPOSED (HCC): ICD-10-CM

## 2024-05-21 PROCEDURE — 11042 DBRDMT SUBQ TIS 1ST 20SQCM/<: CPT

## 2024-05-21 RX ORDER — BACITRACIN ZINC AND POLYMYXIN B SULFATE 500; 1000 [USP'U]/G; [USP'U]/G
OINTMENT TOPICAL ONCE
OUTPATIENT
Start: 2024-05-21 | End: 2024-05-21

## 2024-05-21 RX ORDER — SODIUM CHLOR/HYPOCHLOROUS ACID 0.033 %
SOLUTION, IRRIGATION IRRIGATION ONCE
OUTPATIENT
Start: 2024-05-21 | End: 2024-05-21

## 2024-05-21 RX ORDER — BETAMETHASONE DIPROPIONATE 0.5 MG/G
CREAM TOPICAL ONCE
OUTPATIENT
Start: 2024-05-21 | End: 2024-05-21

## 2024-05-21 RX ORDER — GINSENG 100 MG
CAPSULE ORAL ONCE
OUTPATIENT
Start: 2024-05-21 | End: 2024-05-21

## 2024-05-21 RX ORDER — GENTAMICIN SULFATE 1 MG/G
OINTMENT TOPICAL ONCE
OUTPATIENT
Start: 2024-05-21 | End: 2024-05-21

## 2024-05-21 RX ORDER — LIDOCAINE HYDROCHLORIDE 20 MG/ML
JELLY TOPICAL ONCE
OUTPATIENT
Start: 2024-05-21 | End: 2024-05-21

## 2024-05-21 RX ORDER — LIDOCAINE 40 MG/G
CREAM TOPICAL ONCE
OUTPATIENT
Start: 2024-05-21 | End: 2024-05-21

## 2024-05-21 RX ORDER — FLUCONAZOLE 150 MG/1
150 TABLET ORAL DAILY
Qty: 3 TABLET | Refills: 0 | Status: SHIPPED | OUTPATIENT
Start: 2024-05-21 | End: 2024-05-24

## 2024-05-21 RX ORDER — LIDOCAINE HYDROCHLORIDE 20 MG/ML
JELLY TOPICAL ONCE
Status: COMPLETED | OUTPATIENT
Start: 2024-05-21 | End: 2024-05-21

## 2024-05-21 RX ORDER — CLOBETASOL PROPIONATE 0.5 MG/G
OINTMENT TOPICAL ONCE
OUTPATIENT
Start: 2024-05-21 | End: 2024-05-21

## 2024-05-21 RX ORDER — IBUPROFEN 200 MG
TABLET ORAL ONCE
OUTPATIENT
Start: 2024-05-21 | End: 2024-05-21

## 2024-05-21 RX ORDER — LIDOCAINE 50 MG/G
OINTMENT TOPICAL ONCE
OUTPATIENT
Start: 2024-05-21 | End: 2024-05-21

## 2024-05-21 RX ORDER — LIDOCAINE HYDROCHLORIDE 40 MG/ML
SOLUTION TOPICAL ONCE
OUTPATIENT
Start: 2024-05-21 | End: 2024-05-21

## 2024-05-21 RX ORDER — TRIAMCINOLONE ACETONIDE 1 MG/G
OINTMENT TOPICAL ONCE
OUTPATIENT
Start: 2024-05-21 | End: 2024-05-21

## 2024-05-21 RX ADMIN — LIDOCAINE HYDROCHLORIDE 6 ML: 20 JELLY TOPICAL at 13:06

## 2024-05-21 ASSESSMENT — PAIN DESCRIPTION - LOCATION: LOCATION: FOOT

## 2024-05-21 ASSESSMENT — PAIN DESCRIPTION - ORIENTATION: ORIENTATION: LEFT

## 2024-05-21 ASSESSMENT — PAIN SCALES - GENERAL: PAINLEVEL_OUTOF10: 0

## 2024-05-21 NOTE — PROGRESS NOTES
[facility]   Progress Note and Procedure Note      Ac Alcantar  MEDICAL RECORD NUMBER:  [id number]  AGE: 63 y.o.   GENDER: male  : 1960  EPISODE DATE:  2024    Subjective:     Chief Complaint   Patient presents with    Wound Check     Left foot         HISTORY of PRESENT ILLNESS HPI     Ac Alcantar is a 63 y.o. male who presents today for wound/ulcer evaluation.   History of Wound Context: S/P 5TH LEFT MET HEAD RESECTION 4/10/24. PT HAS BEEN CHANGING DRESSING WITH [employer].  FINISHED ANTIBIOTICS, HAS BEEN DRINKING PROTEIN SHAKES    24 HBA1C 6.8, ALBUMIN WNL    4/10/24 INTRA-OP BONE CULTURE -PREVOTELLA SPECIES    Wound/Ulcer Pain Timing/Severity: none  Quality of pain: N/A  Severity:  0 / 10   Modifying Factors: None    Ulcer Identification:  Ulcer Type: diabetic    Contributing Factors: chronic pressure    PAST MEDICAL HISTORY        Diagnosis Date    Acquired trigger finger 2018    CAD (coronary artery disease)     NWOCC/ involving coronary bypass graft of native heart with unstable angina pectoris    Cervical spondylosis     CHF (congestive heart failure) ([facility])     Chronic back pain     on 18 pt states is presently in pain mgmnt    Chronic ulcer of toe, left, with necrosis of bone ([facility]) 2022    Contusion of elbow 2018    Contusion of knee 2018    Contusion of shoulder region 2018    Diabetic foot ulcer with osteomyelitis ([facility]) 2018    Diabetic polyneuropathy associated with type 2 diabetes mellitus ([facility])     Diabetic ulcer of toe associated with type 2 diabetes mellitus, with fat layer exposed ([facility]) 2018    Hypercholesterolemia 2017    Hyperlipemia, mixed     Hypertension     Ischemic cardiomyopathy 2018    Non-pressure chronic ulcer of left lower leg with fat layer exposed ([facility]) 2014    Obesity, Class III, BMI 40-49.9 (morbid obesity) ([facility]) 2015    Open wound of left foot 2024

## 2024-05-23 NOTE — TELEPHONE ENCOUNTER
Called patient, left message with sleep centers' phone number to call and schedule due to weight loss, poss need to change pressures, new mask, or if pt needs the machine at all at this point.

## 2024-05-28 ENCOUNTER — HOSPITAL ENCOUNTER (OUTPATIENT)
Dept: WOUND CARE | Age: 64
Discharge: HOME | End: 2024-05-28
Payer: COMMERCIAL

## 2024-05-28 VITALS
SYSTOLIC BLOOD PRESSURE: 140 MMHG | HEART RATE: 74 BPM | RESPIRATION RATE: 16 BRPM | TEMPERATURE: 97.4 F | DIASTOLIC BLOOD PRESSURE: 65 MMHG

## 2024-05-28 DIAGNOSIS — E11.42 DIABETIC POLYNEUROPATHY ASSOCIATED WITH TYPE 2 DIABETES MELLITUS (HCC): Primary | ICD-10-CM

## 2024-05-28 DIAGNOSIS — L97.512 ULCER OF TOE OF RIGHT FOOT, WITH FAT LAYER EXPOSED (HCC): ICD-10-CM

## 2024-05-28 PROCEDURE — 11043 DBRDMT MUSC&/FSCA 1ST 20/<: CPT

## 2024-05-28 RX ORDER — GENTAMICIN SULFATE 1 MG/G
OINTMENT TOPICAL ONCE
OUTPATIENT
Start: 2024-05-28 | End: 2024-05-28

## 2024-05-28 RX ORDER — GINSENG 100 MG
CAPSULE ORAL ONCE
OUTPATIENT
Start: 2024-05-28 | End: 2024-05-28

## 2024-05-28 RX ORDER — IBUPROFEN 200 MG
TABLET ORAL ONCE
OUTPATIENT
Start: 2024-05-28 | End: 2024-05-28

## 2024-05-28 RX ORDER — LIDOCAINE 50 MG/G
OINTMENT TOPICAL ONCE
OUTPATIENT
Start: 2024-05-28 | End: 2024-05-28

## 2024-05-28 RX ORDER — SODIUM CHLOR/HYPOCHLOROUS ACID 0.033 %
SOLUTION, IRRIGATION IRRIGATION ONCE
OUTPATIENT
Start: 2024-05-28 | End: 2024-05-28

## 2024-05-28 RX ORDER — TRIAMCINOLONE ACETONIDE 1 MG/G
OINTMENT TOPICAL ONCE
OUTPATIENT
Start: 2024-05-28 | End: 2024-05-28

## 2024-05-28 RX ORDER — LIDOCAINE HYDROCHLORIDE 40 MG/ML
SOLUTION TOPICAL ONCE
OUTPATIENT
Start: 2024-05-28 | End: 2024-05-28

## 2024-05-28 RX ORDER — BETAMETHASONE DIPROPIONATE 0.5 MG/G
CREAM TOPICAL ONCE
OUTPATIENT
Start: 2024-05-28 | End: 2024-05-28

## 2024-05-28 RX ORDER — LIDOCAINE 40 MG/G
CREAM TOPICAL ONCE
OUTPATIENT
Start: 2024-05-28 | End: 2024-05-28

## 2024-05-28 RX ORDER — LIDOCAINE HYDROCHLORIDE 20 MG/ML
JELLY TOPICAL ONCE
OUTPATIENT
Start: 2024-05-28 | End: 2024-05-28

## 2024-05-28 RX ORDER — LIDOCAINE HYDROCHLORIDE 20 MG/ML
JELLY TOPICAL ONCE
Status: COMPLETED | OUTPATIENT
Start: 2024-05-28 | End: 2024-05-28

## 2024-05-28 RX ORDER — CLOBETASOL PROPIONATE 0.5 MG/G
OINTMENT TOPICAL ONCE
OUTPATIENT
Start: 2024-05-28 | End: 2024-05-28

## 2024-05-28 RX ORDER — BACITRACIN ZINC AND POLYMYXIN B SULFATE 500; 1000 [USP'U]/G; [USP'U]/G
OINTMENT TOPICAL ONCE
OUTPATIENT
Start: 2024-05-28 | End: 2024-05-28

## 2024-05-28 RX ADMIN — LIDOCAINE HYDROCHLORIDE 6 ML: 20 JELLY TOPICAL at 15:01

## 2024-05-28 ASSESSMENT — PAIN DESCRIPTION - ORIENTATION: ORIENTATION: LEFT

## 2024-05-28 ASSESSMENT — PAIN DESCRIPTION - LOCATION: LOCATION: FOOT

## 2024-05-28 ASSESSMENT — PAIN SCALES - GENERAL: PAINLEVEL_OUTOF10: 4

## 2024-05-28 ASSESSMENT — PAIN DESCRIPTION - DESCRIPTORS: DESCRIPTORS: SORE

## 2024-05-28 NOTE — DISCHARGE INSTRUCTIONS
[facility] -Phone: [phone] Fax: [phone]             Visit  Discharge Instructions / Physician Orders     DATE: 5/28/2024     Home Care: N/A     SUPPLIES ORDERED THRU: [employer] [facility]     Wound Location: Left Lateral Foot     Cleanse with: saline     Dressing Orders: Bottom of Foot: Endoform AM- moistened with saline, dry gauze, Roll Gauze, Coban to hold in place                                               Frequency: Daily     Additional Orders: Increase protein to diet (meat, cheese, eggs, fish, peanut butter, nuts and beans)  ELEVATE LEGS AS MUCH AS POSSIBLE  Culture Taken 4/2/24  Levofloxacin ordered 4/2/24  X-Ray Ordered 4/2/24  Antibiotic Ordered 4/23/24  X-Ray Ordered 4/23/24  Labs Ordered 5/14/24     Your next appointment with Wound Care Center is in 1 week with Dr. Cleaning        (Please note your next appointment above and if you are unable to keep, kindly give a 24 hour notice. Thank you.)     If you experience any of the following, please call the Wound Care Center during business hours:  [phone]  Your Phone call may be forwarded to [facility] [place] during business hours that [facility] is closed.     * Increase in Pain  * Temperature over 101  * Increase in drainage from your wound  * Drainage with a foul odor  * Bleeding  * Increase in swelling  * Need for compression bandage changes due to slippage, breakthrough drainage.     If you need medical attention outside of the business hours of the Wound Care Centers please contact your PCP or go to the nearest emergency room.     The information contained in the After Visit Summary has been reviewed with me, the patient and/or responsible adult, by my health care provider(s). I had the opportunity to ask questions regarding this information. I have elected to receive;      []After Visit Summary  [x]Comprehensive Discharge Instruction        Patient

## 2024-05-28 NOTE — PROGRESS NOTES
under the tongue every 5 minutes as needed for Chest pain       No current facility-administered medications on file prior to encounter.       REVIEW OF SYSTEMS    Pertinent items are noted in HPI.    Objective:      There were no vitals taken for this visit.    Wt Readings from Last 3 Encounters:   05/21/24 95.7 kg (211 lb)   05/14/24 95.7 kg (211 lb)   04/30/24 95.7 kg (211 lb)       PHYSICAL EXAM    Wound:         Wound Description: LEFT PLANTAR  FOOT - MIN ERYTHEMA, NO PURULENCE    Integument:  Open lesions present, Left.  Interdigital macerations absent, Bilateral.     Vascular:  DP/PT pulses palpable 1/4, Bilateral.    CFT <5 seconds to digits 1-5, Bilateral .   Hair growth absent to level of digits, Bilateral.  Edema absent, Bilateral.  Erythema present, Left.     Neurological:  Sensation absent to light touch to level of digits, Bilateral.  Protective sensation  absent via 5.07/10g Strandquist-Lorin monofilament 10/10 sites, Bilateral.  Vibratory sensation absent to 1st MPJ, Bilateral.     Musculoskeletal:  Muscle strength 4/5 all LE groups tested, Bilateral.     NO PLANTAR PRESSURE REMAINING UNDER THE SUBMET 5 LEFT WOUND          Wound 02/13/24 Foot Left;Plantar #1 (Active)   Wound Image   05/14/24 1335   Wound Etiology Diabetic Pate 2 05/28/24 1450   Dressing Status New drainage noted;Old drainage noted 05/28/24 1450   Wound Cleansed Cleansed with saline 05/28/24 1450   Dressing/Treatment ABD 03/26/24 1507   Offloading for Diabetic Foot Ulcers Offloading ordered;Diabetic shoes/inserts 05/28/24 1450   Wound Length (cm) 1.1 cm 05/28/24 1450   Wound Width (cm) 1 cm 05/28/24 1450   Wound Depth (cm) 0.1 cm 05/28/24 1450   Wound Surface Area (cm^2) 1.1 cm^2 05/28/24 1450   Change in Wound Size % (l*w) 84.29 05/28/24 1450   Wound Volume (cm^3) 0.11 cm^3 05/28/24 1450   Wound Healing % 92 05/28/24 1450   Post-Procedure Length (cm) 1 cm 05/28/24 1450   Post-Procedure Width (cm) 1 cm 05/28/24 1450   Post-Procedure

## 2024-05-29 ENCOUNTER — OFFICE VISIT (OUTPATIENT)
Dept: PODIATRY | Age: 64
End: 2024-05-29
Payer: COMMERCIAL

## 2024-05-29 VITALS — BODY MASS INDEX: 28.58 KG/M2 | HEIGHT: 72 IN | WEIGHT: 211 LBS

## 2024-05-29 DIAGNOSIS — M79.672 PAIN IN BOTH FEET: ICD-10-CM

## 2024-05-29 DIAGNOSIS — E11.51 TYPE II DIABETES MELLITUS WITH PERIPHERAL CIRCULATORY DISORDER (HCC): Primary | ICD-10-CM

## 2024-05-29 DIAGNOSIS — M79.671 PAIN IN BOTH FEET: ICD-10-CM

## 2024-05-29 DIAGNOSIS — I73.9 PVD (PERIPHERAL VASCULAR DISEASE) (HCC): ICD-10-CM

## 2024-05-29 DIAGNOSIS — B35.1 DERMATOPHYTOSIS OF NAIL: ICD-10-CM

## 2024-05-29 PROCEDURE — 11721 DEBRIDE NAIL 6 OR MORE: CPT | Performed by: PODIATRIST

## 2024-05-29 NOTE — PROGRESS NOTES
[facility]  [address] [place], [place] [id number]  Dept: [phone]    DIABETIC PROGRESS NOTE  Date of patient's visit: 5/29/2024  Patient's Name:  Ac Alcantar YOB: 1960            Patient Care Team:  Seema Hameed DO as PCP - General (Family Medicine)  Seema Hameed DO as PCP - Empaneled Provider  Mago Bustamante MD (Anesthesiology)  Yomi Nick MD as Consulting Physician (Pulmonology)  Isaiah Gresham MD as Consulting Physician (Cardiology)  Jesse Burnett MD as Surgeon (Neurosurgery)  Otilio Ma DPM as Physician (Podiatry)          Chief Complaint   Patient presents with    Diabetes     Diabetic foot care  A1C 6.8    Peripheral Neuropathy     Bl feet       Subjective:   Ac Alcantar comes to clinic for Diabetes (Diabetic foot care/A1C 6.8) and Peripheral Neuropathy (Bl feet)    he is a diabetic and states that he is in wound care right now for his leg and does not want the wound checked.  .  Pt currently has complaint of thickened, elongated nails that they cannot manage by themselves.   Pt's primary care physician is Seema Hameed DO last seen February 27 2024   Pt's last blood sugar was  A1C 6.8 .  Pt has a new complaint of none today.       Lab Results   Component Value Date    LABA1C 6.8 (H) 05/14/2024      Complains of numbness in the feet bilat.  Past Medical History:   Diagnosis Date    Acquired trigger finger 06/05/2018    CAD (coronary artery disease)     NWOCC/ involving coronary bypass graft of native heart with unstable angina pectoris    Cervical spondylosis     CHF (congestive heart failure) ([facility])     Chronic back pain     on 11/19/18 pt states is presently in pain mgmnt    Chronic ulcer of toe, left, with necrosis of bone ([facility]) 03/29/2022    Contusion of elbow 06/05/2018    Contusion of knee 06/05/2018    Contusion of shoulder region 06/05/2018    Diabetic foot

## 2024-06-03 NOTE — DISCHARGE INSTRUCTIONS
signature______________________________________Date:________  Electronically signed by Nathaniel Wheatley RN on 6/4/2024 at 1:57 PM  Electronically signed by Agustina Cleaning DPM on 6/4/2024 at 2:04 PM

## 2024-06-04 ENCOUNTER — HOSPITAL ENCOUNTER (OUTPATIENT)
Dept: WOUND CARE | Age: 64
Discharge: HOME | End: 2024-06-04
Payer: COMMERCIAL

## 2024-06-04 ENCOUNTER — HOSPITAL ENCOUNTER (OUTPATIENT)
Age: 64
Discharge: HOME | End: 2024-06-06
Payer: COMMERCIAL

## 2024-06-04 ENCOUNTER — HOSPITAL ENCOUNTER (OUTPATIENT)
Dept: GENERAL RADIOLOGY | Age: 64
Discharge: HOME | End: 2024-06-06
Payer: COMMERCIAL

## 2024-06-04 VITALS
WEIGHT: 211 LBS | DIASTOLIC BLOOD PRESSURE: 68 MMHG | SYSTOLIC BLOOD PRESSURE: 119 MMHG | HEIGHT: 72 IN | HEART RATE: 81 BPM | TEMPERATURE: 98.1 F | BODY MASS INDEX: 28.58 KG/M2 | RESPIRATION RATE: 18 BRPM

## 2024-06-04 DIAGNOSIS — L97.522 ULCER OF LEFT FOOT, WITH FAT LAYER EXPOSED (HCC): ICD-10-CM

## 2024-06-04 DIAGNOSIS — L97.512 ULCER OF TOE OF RIGHT FOOT, WITH FAT LAYER EXPOSED (HCC): ICD-10-CM

## 2024-06-04 DIAGNOSIS — E11.42 DIABETIC POLYNEUROPATHY ASSOCIATED WITH TYPE 2 DIABETES MELLITUS (HCC): Primary | ICD-10-CM

## 2024-06-04 PROCEDURE — 73630 X-RAY EXAM OF FOOT: CPT

## 2024-06-04 PROCEDURE — 11043 DBRDMT MUSC&/FSCA 1ST 20/<: CPT

## 2024-06-04 RX ORDER — LIDOCAINE 40 MG/G
CREAM TOPICAL ONCE
OUTPATIENT
Start: 2024-06-04 | End: 2024-06-04

## 2024-06-04 RX ORDER — LIDOCAINE HYDROCHLORIDE 20 MG/ML
JELLY TOPICAL ONCE
OUTPATIENT
Start: 2024-06-04 | End: 2024-06-04

## 2024-06-04 RX ORDER — GINSENG 100 MG
CAPSULE ORAL ONCE
OUTPATIENT
Start: 2024-06-04 | End: 2024-06-04

## 2024-06-04 RX ORDER — LIDOCAINE 50 MG/G
OINTMENT TOPICAL ONCE
OUTPATIENT
Start: 2024-06-04 | End: 2024-06-04

## 2024-06-04 RX ORDER — SODIUM CHLOR/HYPOCHLOROUS ACID 0.033 %
SOLUTION, IRRIGATION IRRIGATION ONCE
OUTPATIENT
Start: 2024-06-04 | End: 2024-06-04

## 2024-06-04 RX ORDER — GENTAMICIN SULFATE 1 MG/G
OINTMENT TOPICAL ONCE
OUTPATIENT
Start: 2024-06-04 | End: 2024-06-04

## 2024-06-04 RX ORDER — CLOBETASOL PROPIONATE 0.5 MG/G
OINTMENT TOPICAL ONCE
OUTPATIENT
Start: 2024-06-04 | End: 2024-06-04

## 2024-06-04 RX ORDER — BETAMETHASONE DIPROPIONATE 0.5 MG/G
CREAM TOPICAL ONCE
OUTPATIENT
Start: 2024-06-04 | End: 2024-06-04

## 2024-06-04 RX ORDER — LIDOCAINE HYDROCHLORIDE 20 MG/ML
JELLY TOPICAL ONCE
Status: COMPLETED | OUTPATIENT
Start: 2024-06-04 | End: 2024-06-04

## 2024-06-04 RX ORDER — TRIAMCINOLONE ACETONIDE 1 MG/G
OINTMENT TOPICAL ONCE
OUTPATIENT
Start: 2024-06-04 | End: 2024-06-04

## 2024-06-04 RX ORDER — BACITRACIN ZINC AND POLYMYXIN B SULFATE 500; 1000 [USP'U]/G; [USP'U]/G
OINTMENT TOPICAL ONCE
OUTPATIENT
Start: 2024-06-04 | End: 2024-06-04

## 2024-06-04 RX ORDER — LIDOCAINE HYDROCHLORIDE 40 MG/ML
SOLUTION TOPICAL ONCE
OUTPATIENT
Start: 2024-06-04 | End: 2024-06-04

## 2024-06-04 RX ORDER — IBUPROFEN 200 MG
TABLET ORAL ONCE
OUTPATIENT
Start: 2024-06-04 | End: 2024-06-04

## 2024-06-04 RX ADMIN — LIDOCAINE HYDROCHLORIDE 6 ML: 20 JELLY TOPICAL at 13:50

## 2024-06-04 NOTE — PROGRESS NOTES
7.5-325 MG per tablet Take 1 tablet by mouth every 6 hours as needed for Pain.      Glucosamine-Chondroit-Vit C-Mn (GLUCOSAMINE CHONDR 1500 COMPLX) CAPS Take by mouth daily      metFORMIN (GLUCOPHAGE) 1000 MG tablet Take 1 tablet by mouth 2 times daily (with meals) 60 tablet 3    traZODone (DESYREL) 150 MG tablet Take 1 tablet by mouth nightly 90 tablet 3    ARIPiprazole (ABILIFY) 20 MG tablet Take 1 tablet by mouth daily (Patient taking differently: Take 1 tablet by mouth at bedtime) 30 tablet 11    chlorhexidine (PERIDEX) 0.12 % solution RINSE with 1 tablespoonful and EXPECTORATE 1 to 2 TIMES A DAY      SITagliptin (JANUVIA) 100 MG tablet Take 1 tablet by mouth every morning (before breakfast) 90 tablet 3    B-D SYRINGE/NEEDLE 3CC/22GX1.5 22G X 1-1/2\" 3 ML MISC Every 30 days 100 each 3    tiZANidine (ZANAFLEX) 4 MG tablet TAKE ONE TABLET BY MOUTH AT BEDTIME AS NEEDED FOR MUSCLE SPASMS 30 tablet 3    testosterone cypionate (DEPOTESTOTERONE CYPIONATE) 200 MG/ML injection Inject 1 mL into the muscle every 30 days for 180 days. Dispense with 3 cc syringe 18g needle and 22 1/2 g needle each month Max Daily Amount: 200 mg 1 mL 5    B-D 3CC LUER-GERALDINE SYR 61OF0-9/2 18G X 1-1/2\" 3 ML MISC       atorvastatin (LIPITOR) 40 MG tablet Take 1 tablet by mouth daily (Patient taking differently: Take 1 tablet by mouth Daily with lunch) 90 tablet 3    NEEDLE, DISP, 18 G (BD DISP NEEDLES) 18G X 1-1/2\" MISC 1 Device by Does not apply route every 30 days 25 each 1    NEEDLE, DISP, 22 G 22G X 1-1/2\" MISC 1 Units by Does not apply route every 30 days 25 each 5    topiramate (TOPAMAX) 200 MG tablet Take 1 tablet by mouth 2 times daily (Patient taking differently: Take 2 tablets by mouth daily) 60 tablet 5    gabapentin (NEURONTIN) 300 MG capsule Take 3 capsules by mouth nightly.      Misc Natural Products (GLUCOSAMINE CHOND MSM FORMULA PO) Take 2 tablets by mouth daily      Multiple Vitamin (MULTI VITAMIN DAILY PO) Take by mouth

## 2024-06-06 ENCOUNTER — HOSPITAL ENCOUNTER (OUTPATIENT)
Dept: SLEEP CENTER | Age: 64
Discharge: HOME | End: 2024-06-08
Attending: INTERNAL MEDICINE
Payer: COMMERCIAL

## 2024-06-06 DIAGNOSIS — G47.33 OBSTRUCTIVE SLEEP APNEA SYNDROME: ICD-10-CM

## 2024-06-06 PROCEDURE — G0399 HOME SLEEP TEST/TYPE 3 PORTA: HCPCS

## 2024-06-07 NOTE — DISCHARGE INSTRUCTIONS
[facility] -Phone: [phone] Fax: [phone]             Visit  Discharge Instructions / Physician Orders     DATE: 6/11/2024     Home Care: N/A     SUPPLIES ORDERED THRU: [facility] [facility]     Wound Location: Left Foot x2     Cleanse with: saline     Dressing Orders: Left Foot x2: Saline-moistened gauze, cover with dry dressing, Roll Gauze.                                               Frequency: Daily     Additional Orders: Increase protein to diet (meat, cheese, eggs, fish, peanut butter, nuts and beans)  ELEVATE LEGS AS MUCH AS POSSIBLE  Culture Taken 4/2/24  Levofloxacin ordered 4/2/24  X-Ray Ordered 4/2/24  Antibiotic Ordered 4/23/24  X-Ray Ordered 4/23/24  Labs Ordered 5/14/24  X-Ray Ordered 6/4/24  MRI Ordered 6/11/24     Your next appointment with Wound Care Center is in 1 week with Dr. Cleaning        (Please note your next appointment above and if you are unable to keep, kindly give a 24 hour notice. Thank you.)     If you experience any of the following, please call the Wound Care Center during business hours:  [phone]  Your Phone call may be forwarded to [facility] during business hours that [facility] is closed.     * Increase in Pain  * Temperature over 101  * Increase in drainage from your wound  * Drainage with a foul odor  * Bleeding  * Increase in swelling  * Need for compression bandage changes due to slippage, breakthrough drainage.     If you need medical attention outside of the business hours of the Wound Care Centers please contact your PCP or go to the nearest emergency room.     The information contained in the After Visit Summary has been reviewed with me, the patient and/or responsible adult, by my health care provider(s). I had the opportunity to ask questions regarding this information. I have elected to receive;      []After Visit Summary  [x]Comprehensive Discharge Instruction        Patient

## 2024-06-11 ENCOUNTER — HOSPITAL ENCOUNTER (OUTPATIENT)
Dept: WOUND CARE | Age: 64
Discharge: HOME | End: 2024-06-11
Payer: COMMERCIAL

## 2024-06-11 ENCOUNTER — TELEMEDICINE (OUTPATIENT)
Dept: FAMILY MEDICINE CLINIC | Age: 64
End: 2024-06-11
Payer: COMMERCIAL

## 2024-06-11 VITALS
HEART RATE: 71 BPM | WEIGHT: 211 LBS | TEMPERATURE: 97 F | DIASTOLIC BLOOD PRESSURE: 62 MMHG | BODY MASS INDEX: 28.58 KG/M2 | SYSTOLIC BLOOD PRESSURE: 127 MMHG | RESPIRATION RATE: 19 BRPM | HEIGHT: 72 IN

## 2024-06-11 DIAGNOSIS — K21.00 GASTROESOPHAGEAL REFLUX DISEASE WITH ESOPHAGITIS WITHOUT HEMORRHAGE: ICD-10-CM

## 2024-06-11 DIAGNOSIS — E11.69 TYPE 2 DIABETES MELLITUS WITH OTHER SPECIFIED COMPLICATION, WITHOUT LONG-TERM CURRENT USE OF INSULIN (HCC): Primary | ICD-10-CM

## 2024-06-11 DIAGNOSIS — I25.10 CORONARY ARTERY DISEASE INVOLVING NATIVE CORONARY ARTERY OF NATIVE HEART WITHOUT ANGINA PECTORIS: ICD-10-CM

## 2024-06-11 DIAGNOSIS — E66.01 OBESITY, CLASS III, BMI 40-49.9 (MORBID OBESITY) (HCC): ICD-10-CM

## 2024-06-11 DIAGNOSIS — E34.9 HYPOTESTOSTERONEMIA: ICD-10-CM

## 2024-06-11 DIAGNOSIS — L97.512 ULCER OF TOE OF RIGHT FOOT, WITH FAT LAYER EXPOSED (HCC): ICD-10-CM

## 2024-06-11 DIAGNOSIS — E11.42 DIABETIC POLYNEUROPATHY ASSOCIATED WITH TYPE 2 DIABETES MELLITUS (HCC): Primary | ICD-10-CM

## 2024-06-11 DIAGNOSIS — E78.2 HYPERLIPEMIA, MIXED: ICD-10-CM

## 2024-06-11 DIAGNOSIS — I10 ESSENTIAL HYPERTENSION: ICD-10-CM

## 2024-06-11 DIAGNOSIS — M86.072 ACUTE HEMATOGENOUS OSTEOMYELITIS OF LEFT FOOT (HCC): ICD-10-CM

## 2024-06-11 PROCEDURE — 3044F HG A1C LEVEL LT 7.0%: CPT | Performed by: FAMILY MEDICINE

## 2024-06-11 PROCEDURE — 87205 SMEAR GRAM STAIN: CPT

## 2024-06-11 PROCEDURE — 87070 CULTURE OTHR SPECIMN AEROBIC: CPT

## 2024-06-11 PROCEDURE — 11042 DBRDMT SUBQ TIS 1ST 20SQCM/<: CPT

## 2024-06-11 PROCEDURE — 99214 OFFICE O/P EST MOD 30 MIN: CPT | Performed by: FAMILY MEDICINE

## 2024-06-11 PROCEDURE — 87075 CULTR BACTERIA EXCEPT BLOOD: CPT

## 2024-06-11 PROCEDURE — 11043 DBRDMT MUSC&/FSCA 1ST 20/<: CPT

## 2024-06-11 RX ORDER — IBUPROFEN 200 MG
TABLET ORAL ONCE
OUTPATIENT
Start: 2024-06-11 | End: 2024-06-11

## 2024-06-11 RX ORDER — SODIUM CHLOR/HYPOCHLOROUS ACID 0.033 %
SOLUTION, IRRIGATION IRRIGATION ONCE
OUTPATIENT
Start: 2024-06-11 | End: 2024-06-11

## 2024-06-11 RX ORDER — LIDOCAINE 40 MG/G
CREAM TOPICAL ONCE
OUTPATIENT
Start: 2024-06-11 | End: 2024-06-11

## 2024-06-11 RX ORDER — LIDOCAINE HYDROCHLORIDE 20 MG/ML
JELLY TOPICAL ONCE
OUTPATIENT
Start: 2024-06-11 | End: 2024-06-11

## 2024-06-11 RX ORDER — PHENTERMINE HYDROCHLORIDE 37.5 MG/1
37.5 TABLET ORAL
Qty: 30 TABLET | Refills: 0 | Status: SHIPPED | OUTPATIENT
Start: 2024-06-11 | End: 2024-07-11

## 2024-06-11 RX ORDER — GENTAMICIN SULFATE 1 MG/G
OINTMENT TOPICAL ONCE
OUTPATIENT
Start: 2024-06-11 | End: 2024-06-11

## 2024-06-11 RX ORDER — BACITRACIN ZINC AND POLYMYXIN B SULFATE 500; 1000 [USP'U]/G; [USP'U]/G
OINTMENT TOPICAL ONCE
OUTPATIENT
Start: 2024-06-11 | End: 2024-06-11

## 2024-06-11 RX ORDER — TRIAMCINOLONE ACETONIDE 1 MG/G
OINTMENT TOPICAL ONCE
OUTPATIENT
Start: 2024-06-11 | End: 2024-06-11

## 2024-06-11 RX ORDER — GINSENG 100 MG
CAPSULE ORAL ONCE
OUTPATIENT
Start: 2024-06-11 | End: 2024-06-11

## 2024-06-11 RX ORDER — LIDOCAINE HYDROCHLORIDE 20 MG/ML
JELLY TOPICAL ONCE
Status: COMPLETED | OUTPATIENT
Start: 2024-06-11 | End: 2024-06-11

## 2024-06-11 RX ORDER — BETAMETHASONE DIPROPIONATE 0.5 MG/G
CREAM TOPICAL ONCE
OUTPATIENT
Start: 2024-06-11 | End: 2024-06-11

## 2024-06-11 RX ORDER — LIDOCAINE 50 MG/G
OINTMENT TOPICAL ONCE
OUTPATIENT
Start: 2024-06-11 | End: 2024-06-11

## 2024-06-11 RX ORDER — CLOBETASOL PROPIONATE 0.5 MG/G
OINTMENT TOPICAL ONCE
OUTPATIENT
Start: 2024-06-11 | End: 2024-06-11

## 2024-06-11 RX ORDER — PHENTERMINE HYDROCHLORIDE 37.5 MG/1
37.5 TABLET ORAL
Qty: 30 TABLET | Refills: 0 | Status: SHIPPED | OUTPATIENT
Start: 2024-07-11 | End: 2024-08-10

## 2024-06-11 RX ORDER — LIDOCAINE HYDROCHLORIDE 40 MG/ML
SOLUTION TOPICAL ONCE
OUTPATIENT
Start: 2024-06-11 | End: 2024-06-11

## 2024-06-11 RX ORDER — PHENTERMINE HYDROCHLORIDE 37.5 MG/1
37.5 TABLET ORAL
Qty: 30 TABLET | Refills: 0 | Status: ON HOLD | OUTPATIENT
Start: 2024-08-11 | End: 2024-06-14

## 2024-06-11 RX ADMIN — LIDOCAINE HYDROCHLORIDE 6 ML: 20 JELLY TOPICAL at 14:06

## 2024-06-11 ASSESSMENT — PAIN DESCRIPTION - PAIN TYPE: TYPE: CHRONIC PAIN

## 2024-06-11 ASSESSMENT — PAIN DESCRIPTION - ONSET: ONSET: ON-GOING

## 2024-06-11 ASSESSMENT — PAIN SCALES - GENERAL: PAINLEVEL_OUTOF10: 0

## 2024-06-11 ASSESSMENT — PAIN DESCRIPTION - DESCRIPTORS: DESCRIPTORS: ACHING

## 2024-06-11 ASSESSMENT — PAIN DESCRIPTION - ORIENTATION: ORIENTATION: LEFT

## 2024-06-11 ASSESSMENT — PAIN DESCRIPTION - FREQUENCY: FREQUENCY: INTERMITTENT

## 2024-06-11 ASSESSMENT — PAIN DESCRIPTION - LOCATION: LOCATION: FOOT

## 2024-06-11 ASSESSMENT — PAIN - FUNCTIONAL ASSESSMENT: PAIN_FUNCTIONAL_ASSESSMENT: ACTIVITIES ARE NOT PREVENTED

## 2024-06-11 NOTE — PROGRESS NOTES
is also due for lab profile  Objective   Patient-Reported Vitals  No data recorded     Physical Exam  [INSTRUCTIONS:  \"[x]\" Indicates a positive item  \"[]\" Indicates a negative item  -- DELETE ALL ITEMS NOT EXAMINED]    Constitutional: [x] Appears well-developed and well-nourished [x] No apparent distress      [] Abnormal -     Mental status: [x] Alert and awake  [x] Oriented to person/place/time [x] Able to follow commands    [] Abnormal -     Eyes:   EOM    [x]  Normal    [] Abnormal -   Sclera  [x]  Normal    [] Abnormal -          Discharge [x]  None visible   [] Abnormal -     HENT: [x] Normocephalic, atraumatic  [] Abnormal -   [x] Mouth/Throat: Mucous membranes are moist    External Ears [x] Normal  [] Abnormal -    Neck: [x] No visualized mass [] Abnormal -     Pulmonary/Chest: [x] Respiratory effort normal   [x] No visualized signs of difficulty breathing or respiratory distress        [] Abnormal -      Musculoskeletal:   [x] Normal gait with no signs of ataxia         [x] Normal range of motion of neck        [] Abnormal -     Neurological:        [x] No Facial Asymmetry (Cranial nerve 7 motor function) (limited exam due to video visit)          [x] No gaze palsy        [] Abnormal -          Skin:        [x] No significant exanthematous lesions or discoloration noted on facial skin         [] Abnormal -            Psychiatric:       [x] Normal Affect [] Abnormal -        [x] No Hallucinations    Other pertinent observable physical exam findings:-             --Seema Hameed, DO

## 2024-06-11 NOTE — PROGRESS NOTES
[facility]   Progress Note and Procedure Note      Ac Alcantar  MEDICAL RECORD NUMBER:  [id number]  AGE: 63 y.o.   GENDER: male  : 1960  EPISODE DATE:  2024    Subjective:     Chief Complaint   Patient presents with    Wound Check     Left foot         HISTORY of PRESENT ILLNESS HPI     Ac Alcantar is a 63 y.o. male who presents today for wound/ulcer evaluation.   History of Wound Context: S/P 5TH LEFT MET HEAD RESECTION 4/10/24. HAS BEEN DRINKING PROTEIN SHAKES. MAINTAINED ENDOFORM INTACT    24 XRS SHOWED BONY CHANGES 5TH MET DISTAL ASPECT    24 HBA1C 6.8, ALBUMIN WNL    4/10/24 INTRA-OP BONE CULTURE -PREVOTELLA SPECIES    Wound/Ulcer Pain Timing/Severity: none  Quality of pain: N/A  Severity:  0 / 10   Modifying Factors: None    Ulcer Identification:  Ulcer Type: diabetic    Contributing Factors: chronic pressure    PAST MEDICAL HISTORY        Diagnosis Date    Acquired trigger finger 2018    CAD (coronary artery disease)     NWOCC/ involving coronary bypass graft of native heart with unstable angina pectoris    Cervical spondylosis     CHF (congestive heart failure) ([facility])     Chronic back pain     on 18 pt states is presently in pain mgmnt    Chronic ulcer of toe, left, with necrosis of bone ([facility]) 2022    Contusion of elbow 2018    Contusion of knee 2018    Contusion of shoulder region 2018    Diabetic foot ulcer with osteomyelitis ([facility]) 2018    Diabetic polyneuropathy associated with type 2 diabetes mellitus ([facility])     Diabetic ulcer of toe associated with type 2 diabetes mellitus, with fat layer exposed ([facility]) 2018    Hypercholesterolemia 2017    Hyperlipemia, mixed     Hypertension     Ischemic cardiomyopathy 2018    Non-pressure chronic ulcer of left lower leg with fat layer exposed ([facility]) 2014    Obesity, Class III, BMI 40-49.9 (morbid obesity) ([facility]) 2015    Open wound of left foot

## 2024-06-12 ENCOUNTER — HOSPITAL ENCOUNTER (OUTPATIENT)
Dept: MRI IMAGING | Age: 64
Discharge: HOME | End: 2024-06-14
Attending: PODIATRIST
Payer: COMMERCIAL

## 2024-06-12 DIAGNOSIS — M86.072 ACUTE HEMATOGENOUS OSTEOMYELITIS OF LEFT FOOT (HCC): ICD-10-CM

## 2024-06-12 PROCEDURE — 73718 MRI LOWER EXTREMITY W/O DYE: CPT

## 2024-06-12 NOTE — PROGRESS NOTES
Wound Care Supplies      Supply Company:     [employer]  Phone: [phone]  Fax: [phone]     Ordering Center:     [facility]  [address] [place] [address] [id number] [phone]  WOUND CARE Dept: [phone]   FAX NUMBER [phone]    Patient Information:      Ac Alcantar  [address]  [facility] [address]   [phone]   : 1960  AGE: 63 y.o.     GENDER: male   TODAYS DATE:  2024    Insurance:      PRIMARY INSURANCE:  Plan: [employer] [place]  Coverage: [employer] [place]  Effective Date: 2023  [id number] - (Joel Asthmatx)      Patient Wound Information:       Codes Comments   Diabetic polyneuropathy associated with type 2 diabetes mellitus (HCC)  - Primary E11.42    Ulcer of toe of right foot, with fat layer exposed ([facility]) L97.512    Acute hematogenous osteomyelitis of left foot ([facility]) M86.072        WOUNDS REQUIRING DRESSING SUPPLIES:     Wound 24 Foot Left;Plantar #1 (Active)   Wound Image   24 1335   Wound Etiology Diabetic Pate 2 24 1404   Dressing Status New drainage noted;Old drainage noted 24 1404   Wound Cleansed Cleansed with saline 24 1404   Dressing/Treatment ABD 24 1507   Offloading for Diabetic Foot Ulcers Offloading ordered;Diabetic shoes/inserts 24 1444   Wound Length (cm) 0 cm 24 1404   Wound Width (cm) 0 cm 24 1404   Wound Depth (cm) 0 cm 24 1404   Wound Surface Area (cm^2) 0 cm^2 24 1404   Change in Wound Size % (l*w) 100 24 1404   Wound Volume (cm^3) 0 cm^3 24 1404   Wound Healing % 100 24 1404   Post-Procedure Length (cm) 1.3 cm 24 1404   Post-Procedure Width (cm) 0.5 cm 24 1404   Post-Procedure Depth (cm) 0.4 cm 24 1404   Post-Procedure Surface Area (cm^2) 0.65 cm^2 24 1404   Post-Procedure Volume (cm^3) 0.26 cm^3 24 1404   Undermining Starts ___ O'Clock 6 24 1341   Undermining Ends___ O'Clock 7 24 1341

## 2024-06-13 ENCOUNTER — HOSPITAL ENCOUNTER (OUTPATIENT)
Age: 64
Discharge: HOME | End: 2024-06-13
Payer: COMMERCIAL

## 2024-06-13 DIAGNOSIS — E34.9 HYPOTESTOSTERONEMIA: ICD-10-CM

## 2024-06-13 DIAGNOSIS — K21.00 GASTROESOPHAGEAL REFLUX DISEASE WITH ESOPHAGITIS WITHOUT HEMORRHAGE: ICD-10-CM

## 2024-06-13 DIAGNOSIS — I25.10 CORONARY ARTERY DISEASE INVOLVING NATIVE CORONARY ARTERY OF NATIVE HEART WITHOUT ANGINA PECTORIS: ICD-10-CM

## 2024-06-13 DIAGNOSIS — I10 ESSENTIAL HYPERTENSION: ICD-10-CM

## 2024-06-13 DIAGNOSIS — E78.2 HYPERLIPEMIA, MIXED: ICD-10-CM

## 2024-06-13 DIAGNOSIS — E66.01 OBESITY, CLASS III, BMI 40-49.9 (MORBID OBESITY) (HCC): ICD-10-CM

## 2024-06-13 DIAGNOSIS — E11.69 TYPE 2 DIABETES MELLITUS WITH OTHER SPECIFIED COMPLICATION, WITHOUT LONG-TERM CURRENT USE OF INSULIN (HCC): ICD-10-CM

## 2024-06-13 LAB
ALBUMIN SERPL-MCNC: 4.4 G/DL (ref 3.5–5.2)
ALBUMIN/GLOB SERPL: 1 {RATIO} (ref 1–2.5)
ALP SERPL-CCNC: 77 U/L (ref 40–129)
ALT SERPL-CCNC: 18 U/L (ref 10–50)
ANION GAP SERPL CALCULATED.3IONS-SCNC: 7 MMOL/L (ref 9–16)
AST SERPL-CCNC: 20 U/L (ref 10–50)
BASOPHILS # BLD: 0.05 K/UL (ref 0–0.2)
BASOPHILS NFR BLD: 1 % (ref 0–2)
BILIRUB SERPL-MCNC: 0.4 MG/DL (ref 0–1.2)
BUN SERPL-MCNC: 25 MG/DL (ref 8–23)
CALCIUM SERPL-MCNC: 9.7 MG/DL (ref 8.6–10.4)
CHLORIDE SERPL-SCNC: 105 MMOL/L (ref 98–107)
CHOLEST SERPL-MCNC: 134 MG/DL (ref 0–199)
CHOLESTEROL/HDL RATIO: 3
CO2 SERPL-SCNC: 28 MMOL/L (ref 20–31)
CREAT SERPL-MCNC: 0.9 MG/DL (ref 0.7–1.2)
EOSINOPHIL # BLD: 0.35 K/UL (ref 0–0.44)
EOSINOPHILS RELATIVE PERCENT: 5 % (ref 1–4)
ERYTHROCYTE [DISTWIDTH] IN BLOOD BY AUTOMATED COUNT: 12.6 % (ref 11.8–14.4)
EST. AVERAGE GLUCOSE BLD GHB EST-MCNC: 157 MG/DL
GFR, ESTIMATED: >90 ML/MIN/1.73M2
GLUCOSE SERPL-MCNC: 137 MG/DL (ref 74–99)
HBA1C MFR BLD: 7.1 % (ref 4–6)
HCT VFR BLD AUTO: 44.7 % (ref 40.7–50.3)
HDLC SERPL-MCNC: 46 MG/DL
HGB BLD-MCNC: 14.7 G/DL (ref 13–17)
IMM GRANULOCYTES # BLD AUTO: <0.03 K/UL (ref 0–0.3)
IMM GRANULOCYTES NFR BLD: 0 %
LDLC SERPL CALC-MCNC: 68 MG/DL (ref 0–100)
LYMPHOCYTES NFR BLD: 2.14 K/UL (ref 1.1–3.7)
LYMPHOCYTES RELATIVE PERCENT: 28 % (ref 24–43)
MCH RBC QN AUTO: 31.1 PG (ref 25.2–33.5)
MCHC RBC AUTO-ENTMCNC: 32.9 G/DL (ref 28.4–34.8)
MCV RBC AUTO: 94.7 FL (ref 82.6–102.9)
MONOCYTES NFR BLD: 0.6 K/UL (ref 0.1–1.2)
MONOCYTES NFR BLD: 8 % (ref 3–12)
NEUTROPHILS NFR BLD: 58 % (ref 36–65)
NEUTS SEG NFR BLD: 4.53 K/UL (ref 1.5–8.1)
NRBC BLD-RTO: 0 PER 100 WBC
PLATELET # BLD AUTO: 177 K/UL (ref 138–453)
PMV BLD AUTO: 9.2 FL (ref 8.1–13.5)
POTASSIUM SERPL-SCNC: 3.9 MMOL/L (ref 3.7–5.3)
PROT SERPL-MCNC: 7.5 G/DL (ref 6.6–8.7)
PSA SERPL-MCNC: 1.4 NG/ML (ref 0–4)
RBC # BLD AUTO: 4.72 M/UL (ref 4.21–5.77)
SHBG SERPL-SCNC: 22 NMOL/L (ref 19–76)
SODIUM SERPL-SCNC: 140 MMOL/L (ref 136–145)
TESTOST FREE MFR SERPL: 45.2 PG/ML (ref 47–244)
TESTOST SERPL-MCNC: 192 NG/DL (ref 193–740)
TRIGL SERPL-MCNC: 99 MG/DL
TSH SERPL DL<=0.05 MIU/L-ACNC: 2.58 UIU/ML (ref 0.27–4.2)
VLDLC SERPL CALC-MCNC: 20 MG/DL
WBC OTHER # BLD: 7.7 K/UL (ref 3.5–11.3)

## 2024-06-13 PROCEDURE — 84403 ASSAY OF TOTAL TESTOSTERONE: CPT

## 2024-06-13 PROCEDURE — 84443 ASSAY THYROID STIM HORMONE: CPT

## 2024-06-13 PROCEDURE — 85025 COMPLETE CBC W/AUTO DIFF WBC: CPT

## 2024-06-13 PROCEDURE — 36415 COLL VENOUS BLD VENIPUNCTURE: CPT

## 2024-06-13 PROCEDURE — 84270 ASSAY OF SEX HORMONE GLOBUL: CPT

## 2024-06-13 PROCEDURE — 80053 COMPREHEN METABOLIC PANEL: CPT

## 2024-06-13 PROCEDURE — 83036 HEMOGLOBIN GLYCOSYLATED A1C: CPT

## 2024-06-13 PROCEDURE — 84153 ASSAY OF PSA TOTAL: CPT

## 2024-06-13 PROCEDURE — 80061 LIPID PANEL: CPT

## 2024-06-14 ENCOUNTER — ANESTHESIA (OUTPATIENT)
Dept: OPERATING ROOM | Age: 64
End: 2024-06-14
Payer: COMMERCIAL

## 2024-06-14 ENCOUNTER — ANESTHESIA EVENT (OUTPATIENT)
Dept: OPERATING ROOM | Age: 64
End: 2024-06-14
Payer: COMMERCIAL

## 2024-06-14 ENCOUNTER — HOSPITAL ENCOUNTER (OUTPATIENT)
Age: 64
Setting detail: OUTPATIENT SURGERY
Discharge: HOME | End: 2024-06-14
Attending: PODIATRIST | Admitting: PODIATRIST
Payer: COMMERCIAL

## 2024-06-14 VITALS
HEIGHT: 72 IN | BODY MASS INDEX: 30.34 KG/M2 | TEMPERATURE: 97.3 F | HEART RATE: 60 BPM | WEIGHT: 224 LBS | SYSTOLIC BLOOD PRESSURE: 133 MMHG | RESPIRATION RATE: 15 BRPM | OXYGEN SATURATION: 98 % | DIASTOLIC BLOOD PRESSURE: 70 MMHG

## 2024-06-14 DIAGNOSIS — M86.9 OSTEOMYELITIS OF LEFT FOOT, UNSPECIFIED TYPE (HCC): ICD-10-CM

## 2024-06-14 DIAGNOSIS — Z98.890 POST-OPERATIVE STATE: Primary | ICD-10-CM

## 2024-06-14 DIAGNOSIS — T14.8XXA CHRONIC WOUND: ICD-10-CM

## 2024-06-14 LAB
GLUCOSE BLD-MCNC: 126 MG/DL (ref 75–110)
GLUCOSE BLD-MCNC: 99 MG/DL (ref 75–110)

## 2024-06-14 PROCEDURE — 3600000012 HC SURGERY LEVEL 2 ADDTL 15MIN: Performed by: PODIATRIST

## 2024-06-14 PROCEDURE — 2720000010 HC SURG SUPPLY STERILE: Performed by: PODIATRIST

## 2024-06-14 PROCEDURE — 3700000001 HC ADD 15 MINUTES (ANESTHESIA): Performed by: PODIATRIST

## 2024-06-14 PROCEDURE — 87102 FUNGUS ISOLATION CULTURE: CPT

## 2024-06-14 PROCEDURE — 7100000010 HC PHASE II RECOVERY - FIRST 15 MIN: Performed by: PODIATRIST

## 2024-06-14 PROCEDURE — 3700000000 HC ANESTHESIA ATTENDED CARE: Performed by: PODIATRIST

## 2024-06-14 PROCEDURE — 6360000002 HC RX W HCPCS: Performed by: SPECIALIST

## 2024-06-14 PROCEDURE — 6360000002 HC RX W HCPCS: Performed by: PODIATRIST

## 2024-06-14 PROCEDURE — 2709999900 HC NON-CHARGEABLE SUPPLY: Performed by: PODIATRIST

## 2024-06-14 PROCEDURE — 2500000003 HC RX 250 WO HCPCS: Performed by: PODIATRIST

## 2024-06-14 PROCEDURE — 3600000002 HC SURGERY LEVEL 2 BASE: Performed by: PODIATRIST

## 2024-06-14 PROCEDURE — 88311 DECALCIFY TISSUE: CPT

## 2024-06-14 PROCEDURE — 2500000003 HC RX 250 WO HCPCS: Performed by: SPECIALIST

## 2024-06-14 PROCEDURE — 87070 CULTURE OTHR SPECIMN AEROBIC: CPT

## 2024-06-14 PROCEDURE — 2580000003 HC RX 258: Performed by: ANESTHESIOLOGY

## 2024-06-14 PROCEDURE — 7100000011 HC PHASE II RECOVERY - ADDTL 15 MIN: Performed by: PODIATRIST

## 2024-06-14 PROCEDURE — 87075 CULTR BACTERIA EXCEPT BLOOD: CPT

## 2024-06-14 PROCEDURE — 87205 SMEAR GRAM STAIN: CPT

## 2024-06-14 PROCEDURE — 82947 ASSAY GLUCOSE BLOOD QUANT: CPT

## 2024-06-14 PROCEDURE — 87106 FUNGI IDENTIFICATION YEAST: CPT

## 2024-06-14 PROCEDURE — 87176 TISSUE HOMOGENIZATION CULTR: CPT

## 2024-06-14 PROCEDURE — 88305 TISSUE EXAM BY PATHOLOGIST: CPT

## 2024-06-14 PROCEDURE — 87206 SMEAR FLUORESCENT/ACID STAI: CPT

## 2024-06-14 RX ORDER — OXYCODONE HYDROCHLORIDE 5 MG/1
5 TABLET ORAL
Status: DISCONTINUED | OUTPATIENT
Start: 2024-06-14 | End: 2024-06-14 | Stop reason: HOSPADM

## 2024-06-14 RX ORDER — LIDOCAINE HYDROCHLORIDE 20 MG/ML
INJECTION, SOLUTION EPIDURAL; INFILTRATION; INTRACAUDAL; PERINEURAL PRN
Status: DISCONTINUED | OUTPATIENT
Start: 2024-06-14 | End: 2024-06-14 | Stop reason: SDUPTHER

## 2024-06-14 RX ORDER — SODIUM CHLORIDE 9 MG/ML
INJECTION, SOLUTION INTRAVENOUS PRN
Status: DISCONTINUED | OUTPATIENT
Start: 2024-06-14 | End: 2024-06-14 | Stop reason: HOSPADM

## 2024-06-14 RX ORDER — MEPERIDINE HYDROCHLORIDE 50 MG/ML
12.5 INJECTION INTRAMUSCULAR; INTRAVENOUS; SUBCUTANEOUS EVERY 5 MIN PRN
Status: DISCONTINUED | OUTPATIENT
Start: 2024-06-14 | End: 2024-06-14 | Stop reason: HOSPADM

## 2024-06-14 RX ORDER — LIDOCAINE HYDROCHLORIDE 10 MG/ML
INJECTION, SOLUTION INFILTRATION; PERINEURAL
Status: DISCONTINUED
Start: 2024-06-14 | End: 2024-06-14 | Stop reason: WASHOUT

## 2024-06-14 RX ORDER — HYDROMORPHONE HYDROCHLORIDE 1 MG/ML
0.5 INJECTION, SOLUTION INTRAMUSCULAR; INTRAVENOUS; SUBCUTANEOUS EVERY 5 MIN PRN
Status: DISCONTINUED | OUTPATIENT
Start: 2024-06-14 | End: 2024-06-14 | Stop reason: HOSPADM

## 2024-06-14 RX ORDER — FENTANYL CITRATE 50 UG/ML
25 INJECTION, SOLUTION INTRAMUSCULAR; INTRAVENOUS EVERY 5 MIN PRN
Status: DISCONTINUED | OUTPATIENT
Start: 2024-06-14 | End: 2024-06-14 | Stop reason: HOSPADM

## 2024-06-14 RX ORDER — LIDOCAINE HYDROCHLORIDE 20 MG/ML
INJECTION, SOLUTION EPIDURAL; INFILTRATION; INTRACAUDAL; PERINEURAL PRN
Status: DISCONTINUED | OUTPATIENT
Start: 2024-06-14 | End: 2024-06-14 | Stop reason: ALTCHOICE

## 2024-06-14 RX ORDER — NALOXONE HYDROCHLORIDE 0.4 MG/ML
INJECTION, SOLUTION INTRAMUSCULAR; INTRAVENOUS; SUBCUTANEOUS PRN
Status: DISCONTINUED | OUTPATIENT
Start: 2024-06-14 | End: 2024-06-14 | Stop reason: HOSPADM

## 2024-06-14 RX ORDER — PROPOFOL 10 MG/ML
INJECTION, EMULSION INTRAVENOUS PRN
Status: DISCONTINUED | OUTPATIENT
Start: 2024-06-14 | End: 2024-06-14 | Stop reason: SDUPTHER

## 2024-06-14 RX ORDER — DIPHENHYDRAMINE HYDROCHLORIDE 50 MG/ML
12.5 INJECTION INTRAMUSCULAR; INTRAVENOUS
Status: DISCONTINUED | OUTPATIENT
Start: 2024-06-14 | End: 2024-06-14 | Stop reason: HOSPADM

## 2024-06-14 RX ORDER — METOCLOPRAMIDE HYDROCHLORIDE 5 MG/ML
10 INJECTION INTRAMUSCULAR; INTRAVENOUS
Status: DISCONTINUED | OUTPATIENT
Start: 2024-06-14 | End: 2024-06-14 | Stop reason: HOSPADM

## 2024-06-14 RX ORDER — SODIUM CHLORIDE 9 MG/ML
INJECTION, SOLUTION INTRAVENOUS CONTINUOUS
Status: DISCONTINUED | OUTPATIENT
Start: 2024-06-14 | End: 2024-06-14 | Stop reason: HOSPADM

## 2024-06-14 RX ORDER — SODIUM CHLORIDE 0.9 % (FLUSH) 0.9 %
5-40 SYRINGE (ML) INJECTION EVERY 12 HOURS SCHEDULED
Status: DISCONTINUED | OUTPATIENT
Start: 2024-06-14 | End: 2024-06-14 | Stop reason: HOSPADM

## 2024-06-14 RX ORDER — PROCHLORPERAZINE EDISYLATE 5 MG/ML
10 INJECTION INTRAMUSCULAR; INTRAVENOUS
Status: DISCONTINUED | OUTPATIENT
Start: 2024-06-14 | End: 2024-06-14 | Stop reason: HOSPADM

## 2024-06-14 RX ORDER — SODIUM CHLORIDE 0.9 % (FLUSH) 0.9 %
5-40 SYRINGE (ML) INJECTION PRN
Status: DISCONTINUED | OUTPATIENT
Start: 2024-06-14 | End: 2024-06-14 | Stop reason: HOSPADM

## 2024-06-14 RX ORDER — BUPIVACAINE HYDROCHLORIDE 5 MG/ML
INJECTION, SOLUTION EPIDURAL; INTRACAUDAL
Status: DISCONTINUED
Start: 2024-06-14 | End: 2024-06-14 | Stop reason: WASHOUT

## 2024-06-14 RX ORDER — ATORVASTATIN CALCIUM 40 MG/1
40 TABLET, FILM COATED ORAL DAILY
COMMUNITY

## 2024-06-14 RX ORDER — MIDAZOLAM HYDROCHLORIDE 1 MG/ML
INJECTION INTRAMUSCULAR; INTRAVENOUS PRN
Status: DISCONTINUED | OUTPATIENT
Start: 2024-06-14 | End: 2024-06-14 | Stop reason: SDUPTHER

## 2024-06-14 RX ORDER — LIDOCAINE HYDROCHLORIDE 10 MG/ML
1 INJECTION, SOLUTION EPIDURAL; INFILTRATION; INTRACAUDAL; PERINEURAL
Status: DISCONTINUED | OUTPATIENT
Start: 2024-06-15 | End: 2024-06-14 | Stop reason: HOSPADM

## 2024-06-14 RX ORDER — FENTANYL CITRATE 50 UG/ML
INJECTION, SOLUTION INTRAMUSCULAR; INTRAVENOUS PRN
Status: DISCONTINUED | OUTPATIENT
Start: 2024-06-14 | End: 2024-06-14 | Stop reason: SDUPTHER

## 2024-06-14 RX ORDER — SODIUM CHLORIDE, SODIUM LACTATE, POTASSIUM CHLORIDE, CALCIUM CHLORIDE 600; 310; 30; 20 MG/100ML; MG/100ML; MG/100ML; MG/100ML
INJECTION, SOLUTION INTRAVENOUS CONTINUOUS
Status: DISCONTINUED | OUTPATIENT
Start: 2024-06-14 | End: 2024-06-14 | Stop reason: HOSPADM

## 2024-06-14 RX ADMIN — Medication 500 MG: at 13:55

## 2024-06-14 RX ADMIN — PROPOFOL 50 MG: 10 INJECTION, EMULSION INTRAVENOUS at 12:46

## 2024-06-14 RX ADMIN — Medication 1500 MG: at 12:47

## 2024-06-14 RX ADMIN — PROPOFOL 100 MCG/KG/MIN: 10 INJECTION, EMULSION INTRAVENOUS at 12:52

## 2024-06-14 RX ADMIN — LIDOCAINE HYDROCHLORIDE 80 MG: 20 INJECTION, SOLUTION EPIDURAL; INFILTRATION; INTRACAUDAL; PERINEURAL at 12:46

## 2024-06-14 RX ADMIN — MIDAZOLAM 2 MG: 1 INJECTION INTRAMUSCULAR; INTRAVENOUS at 12:40

## 2024-06-14 RX ADMIN — FENTANYL CITRATE 25 MCG: 50 INJECTION INTRAMUSCULAR; INTRAVENOUS at 12:46

## 2024-06-14 RX ADMIN — SODIUM CHLORIDE, POTASSIUM CHLORIDE, SODIUM LACTATE AND CALCIUM CHLORIDE: 600; 310; 30; 20 INJECTION, SOLUTION INTRAVENOUS at 11:56

## 2024-06-14 ASSESSMENT — PAIN - FUNCTIONAL ASSESSMENT
PAIN_FUNCTIONAL_ASSESSMENT: NONE - DENIES PAIN
PAIN_FUNCTIONAL_ASSESSMENT: 0-10

## 2024-06-14 NOTE — ANESTHESIA PRE PROCEDURE
Answered      Vital Signs (Current):   Vitals:    06/14/24 1138   BP: 124/62   Pulse: 70   Resp: 20   Temp: 97.1 °F (36.2 °C)   TempSrc: Temporal   SpO2: 97%   Weight: 101.6 kg (224 lb)   Height: 1.829 m (6')                                              BP Readings from Last 3 Encounters:   06/14/24 124/62   06/11/24 127/62   06/04/24 119/68       NPO Status: Time of last liquid consumption: 2000                        Time of last solid consumption: 1800                        Date of last liquid consumption: 06/13/24                        Date of last solid food consumption: 06/13/24    BMI:   Wt Readings from Last 3 Encounters:   06/14/24 101.6 kg (224 lb)   06/11/24 95.7 kg (211 lb)   06/04/24 95.7 kg (211 lb)     Body mass index is 30.38 kg/m².    CBC:   Lab Results   Component Value Date/Time    WBC 7.7 06/13/2024 09:27 AM    RBC 4.72 06/13/2024 09:27 AM    HGB 14.7 06/13/2024 09:27 AM    HCT 44.7 06/13/2024 09:27 AM    MCV 94.7 06/13/2024 09:27 AM    RDW 12.6 06/13/2024 09:27 AM     06/13/2024 09:27 AM       CMP:   Lab Results   Component Value Date/Time     06/13/2024 09:27 AM    K 3.9 06/13/2024 09:27 AM     06/13/2024 09:27 AM    CO2 28 06/13/2024 09:27 AM    BUN 25 06/13/2024 09:27 AM    CREATININE 0.9 06/13/2024 09:27 AM    GFRAA >60 06/18/2022 06:02 AM    LABGLOM >90 06/13/2024 09:27 AM    LABGLOM >60 02/01/2024 05:20 PM    GLUCOSE 137 06/13/2024 09:27 AM    PROT 7.3 07/05/2012 10:56 AM    CALCIUM 9.7 06/13/2024 09:27 AM    BILITOT 0.4 06/13/2024 09:27 AM    ALKPHOS 77 06/13/2024 09:27 AM    AST 20 06/13/2024 09:27 AM    ALT 18 06/13/2024 09:27 AM       POC Tests:   Recent Labs     06/14/24  1149   POCGLU 126*       Coags:   Lab Results   Component Value Date/Time    PROTIME 14.2 04/06/2022 05:45 AM    INR 1.1 04/06/2022 05:45 AM    APTT 28.2 03/31/2022 02:40 PM       HCG (If Applicable): No results found for: \"PREGTESTUR\", \"PREGSERUM\", \"HCG\", \"HCGQUANT\"     ABGs: No results found

## 2024-06-14 NOTE — DISCHARGE INSTRUCTIONS
Instructions: Call your doctor immediately if you develop any of the following.  Fever over 100.4 degrees Fahrenheit by mouth - take your temperature daily until your first follow up visit.  Pain not relieved by medication ordered  Swelling, increased redness, warmth, or hardness around operative area.  Numb, tingling or cold toes.  Toe(s) become white or bluish  Bandage becomes wet, soiled, or blood soaked (small amount of bleeding may be normal)  Increased or progressive drainage from surgical area.    Follow up instructions:  You will need to follow up with Agustina Mccormick DPM.  Call when you get home to schedule or confirm your appointment.  Call your Podiatrist office if you have any questions or concerns.

## 2024-06-14 NOTE — CONSULTS
Pharmacy dosing of once dose of vancomycin ordered for surgical prophylaxis.    Wt Readings from Last 1 Encounters:   06/14/24 101.6 kg (224 lb)       1500 mg ordered x 1.    Nathaniel Hernandez, PharmD  Inpatient Pharmacist  6/14/2024 12:22 PM

## 2024-06-14 NOTE — OP NOTE
PODIATRY OP NOTE    PATIENT NAME: Ac Alcantar  YOB: 1960  -  63 y.o. male  MRN: [id number]  DATE: 6/14/2024  BILLING #: [id number]    Surgeon(s):  Agustina Cleaning DPM     ASSISTANTS: Joss Washington DPM     PRE-OP DIAGNOSIS:   Osteomyelitis, left foot  Chronic ulcer of left foot, with fat layer exposed  Status post partial fifth metatarsal resection, left foot  Type 2 diabetes mellitus, with peripheral neuropathy    POST-OP DIAGNOSIS: Same as above.    PROCEDURE:   5th ray amputation, left foot    ANESTHESIA: MAC    HEMOSTASIS: Pneumatic tourniquet to right ankle @ 250 mmHg for 50 minutes.    ESTIMATED BLOOD LOSS: Minimal    MATERIALS:   * No implants in log *    INJECTABLES:   Preoperatively 10 cc of 2% lidocaine plain    SPECIMEN:   ID Type Source Tests Collected by Time Destination   1 : BONE CULTURE LEFT 5TH METATARSAL Bone Toe CULTURE, FUNGUS, CULTURE, ANAEROBIC AND AEROBIC Agustina Cleaning DPM 6/14/2024 1317    A : BONE BIOPSY LEFT 5TH METATARSAL Bone Toe SURGICAL PATHOLOGY Agustina Cleaning DPM 6/14/2024 1320    B : LEFT 5TH RAY Bone Toe SURGICAL PATHOLOGY Agustina Cleaning DPM 6/14/2024 1323        COMPLICATIONS: None    FINDINGS: Attention was directed to the lateral left foot where a healed chronic ulceration was noted to the plantar aspect of the 5th MPTJ, and a separate ulcer was noted to the dorsal aspect of the 5th metatarsal. A longitudinal incision was made overlying the 5th metatarsal and was extended distally where a circumferential incision was made around the 5th digit overlying the base of the proximal phalanx. The digit was disarticulated, then the 5th metatarsal was excised. The medullary bone at the distal aspect of the metatarsal was noted to be yellow and gray in color and soft, consistent with osteomyelitis. Any remaining necrotic/fibrotic tissue was excised, the wound was flushed, and then closed utilizing 3-0 nylon suture.    INDICATIONS FOR PROCEDURE: Patient is a 63-year-old  diabetic male who previously had a chronic nonhealing ulceration to the plantar lateral aspect of the left foot overlying the fifth metatarsal head for quite some time, however this ulceration is now healed and the new ulcer has opened up on the dorsal aspect of the lateral left foot.  Patient previously underwent partial fifth metatarsal resection. To this point the patient has failed antibiotic therapy and wound care.  Radiographs and MRI of the left foot were obtained preoperatively which demonstrated osteomyelitis of the fifth metatarsal correlating with the ulceration site. Surgical intervention is necessary at this time for further infection control and limb salvage of the left foot.  Discussed with the patient that he is at high risk for further amputation and limb loss given history of infections and comorbidities.  All risk and benefits were discussed in detail with the patient.  No guarantees were given or implied.  Consent is signed in the chart.      PROCEDURE IN DETAIL: Patient was transported from preop to the operating room and placed on the operating table in a supine position with a safety strap across the lap.  A well-padded pneumatic tourniquet was placed around the left ankle.  Following adequate sedation by the anesthesia team a local block of 10 cc of 2% lidocaine plain was given.  A timeout was performed confirming the correct patient, correct site, correct procedure, preoperative antibiotics, everyone in the room was in agreement. The left foot was then scrubbed, prepped, draped in the usual aseptic fashion.    Attention was directed to the left foot where we noted a previous area of ulceration to the plantar aspect of the left foot overlying the 5th metatarsal head which is now healed, however there is a new area of ulceration on the dorsal lateral left foot overlying the fifth metatarsal shaft.  A #15 blade was utilized to create 2 converging semielliptical incisions around the proximal

## 2024-06-14 NOTE — H&P
[facility] Surgical H&P    Reason for surgery:  The patient is a 63 y.o. male with chronic ulceration to the plantar aspect of the lateral left foot.  Patient has failed multiple conservative therapies and requires further surgery to remove infected tissue and bone from the left foot.  Preoperatively MRI revealed ulceration with subjacent osseous destruction and osteomyelitis of the fifth metatarsal as well as suspected early osteomyelitis of the proximal aspect of the fifth proximal phalanx.    Past Medical History:    Past Medical History:   Diagnosis Date    Acquired trigger finger 06/05/2018    CAD (coronary artery disease)     NWOCC/ involving coronary bypass graft of native heart with unstable angina pectoris    Cervical spondylosis     CHF (congestive heart failure) ([facility])     Chronic back pain     on 11/19/18 pt states is presently in pain mgmnt    Chronic ulcer of toe, left, with necrosis of bone ([facility]) 03/29/2022    Contusion of elbow 06/05/2018    Contusion of knee 06/05/2018    Contusion of shoulder region 06/05/2018    Diabetic foot ulcer with osteomyelitis ([facility]) 01/09/2018    Diabetic polyneuropathy associated with type 2 diabetes mellitus ([facility])     Diabetic ulcer of toe associated with type 2 diabetes mellitus, with fat layer exposed ([facility]) 01/31/2018    Hypercholesterolemia 12/05/2017    Hyperlipemia, mixed     Hypertension     Ischemic cardiomyopathy 05/07/2018    Non-pressure chronic ulcer of left lower leg with fat layer exposed ([facility]) 06/09/2014    Obesity, Class III, BMI 40-49.9 (morbid obesity) ([facility]) 04/06/2015    Open wound of left foot 04/05/2024    Osteoarthritis of carpometacarpal (CMC) joint of thumb 06/05/2018    Osteoarthritis of knee 06/05/2018    Peripheral vascular disease ([facility])     with venous stasis and ulcerations. Dr Manning    Presence of coronary angioplasty implant and graft 09/11/2009    Sleep apnea     Bipap    Sprain of knee and leg 06/05/2018    Sprain of shoulder

## 2024-06-15 LAB
MICROORGANISM SPEC CULT: NORMAL
MICROORGANISM/AGENT SPEC: NORMAL
SERVICE CMNT-IMP: NORMAL
SPECIMEN DESCRIPTION: NORMAL

## 2024-06-16 LAB
MICROORGANISM SPEC CULT: NORMAL
MICROORGANISM/AGENT SPEC: NORMAL
SERVICE CMNT-IMP: NORMAL
SERVICE CMNT-IMP: NORMAL
SPECIMEN DESCRIPTION: NORMAL
SPECIMEN DESCRIPTION: NORMAL

## 2024-06-17 NOTE — DISCHARGE INSTRUCTIONS
[facility] -Phone: [phone] Fax: [phone]             Visit  Discharge Instructions / Physician Orders     DATE: 6/18/2024     Home Care: N/A     SUPPLIES ORDERED THRU: [employer]     Wound Location: Left Foot Amp Site     Cleanse with: KEEP DRY AND INTACT     Dressing Orders: Left Foot: Saline-moistened Blessing over incision line, Adaptic, ABD Pad, Roll Gauze, ACE Wrap       PUT WEIGHT ON HEEL ONLY WHEN WALKING OR STANDING- STAY OFF FEET AS MUCH AS POSSIBLE                                               Frequency: KEEP DRY AND INTACT     Additional Orders: Increase protein to diet (meat, cheese, eggs, fish, peanut butter, nuts and beans)  ELEVATE LEGS AS MUCH AS POSSIBLE  Culture Taken 4/2/24  Levofloxacin ordered 4/2/24  X-Ray Ordered 4/2/24  Antibiotic Ordered 4/23/24  X-Ray Ordered 4/23/24  Labs Ordered 5/14/24  X-Ray Ordered 6/4/24  MRI Ordered 6/11/24     Your next appointment with Wound Care Center is in 1 week with Dr. Cleaning        (Please note your next appointment above and if you are unable to keep, kindly give a 24 hour notice. Thank you.)     If you experience any of the following, please call the Wound Care Center during business hours:  [phone]  Your Phone call may be forwarded to [facility] [place] during business hours that [facility] is closed.     * Increase in Pain  * Temperature over 101  * Increase in drainage from your wound  * Drainage with a foul odor  * Bleeding  * Increase in swelling  * Need for compression bandage changes due to slippage, breakthrough drainage.     If you need medical attention outside of the business hours of the Wound Care Centers please contact your PCP or go to the nearest emergency room.     The information contained in the After Visit Summary has been reviewed with me, the patient and/or responsible adult, by my health care provider(s). I had the opportunity to ask questions regarding this information. I have

## 2024-06-18 ENCOUNTER — HOSPITAL ENCOUNTER (OUTPATIENT)
Dept: WOUND CARE | Age: 64
Discharge: HOME | End: 2024-06-18
Payer: COMMERCIAL

## 2024-06-18 VITALS
TEMPERATURE: 97.9 F | SYSTOLIC BLOOD PRESSURE: 120 MMHG | DIASTOLIC BLOOD PRESSURE: 66 MMHG | WEIGHT: 224 LBS | BODY MASS INDEX: 30.34 KG/M2 | HEART RATE: 72 BPM | HEIGHT: 72 IN

## 2024-06-18 DIAGNOSIS — E11.42 DIABETIC POLYNEUROPATHY ASSOCIATED WITH TYPE 2 DIABETES MELLITUS (HCC): Primary | ICD-10-CM

## 2024-06-18 DIAGNOSIS — L97.512 ULCER OF TOE OF RIGHT FOOT, WITH FAT LAYER EXPOSED (HCC): ICD-10-CM

## 2024-06-18 PROCEDURE — 99213 OFFICE O/P EST LOW 20 MIN: CPT

## 2024-06-18 RX ORDER — LIDOCAINE 50 MG/G
OINTMENT TOPICAL ONCE
OUTPATIENT
Start: 2024-06-18 | End: 2024-06-18

## 2024-06-18 RX ORDER — LIDOCAINE HYDROCHLORIDE 20 MG/ML
JELLY TOPICAL ONCE
OUTPATIENT
Start: 2024-06-18 | End: 2024-06-18

## 2024-06-18 RX ORDER — GINSENG 100 MG
CAPSULE ORAL ONCE
OUTPATIENT
Start: 2024-06-18 | End: 2024-06-18

## 2024-06-18 RX ORDER — CLOBETASOL PROPIONATE 0.5 MG/G
OINTMENT TOPICAL ONCE
OUTPATIENT
Start: 2024-06-18 | End: 2024-06-18

## 2024-06-18 RX ORDER — BETAMETHASONE DIPROPIONATE 0.5 MG/G
CREAM TOPICAL ONCE
OUTPATIENT
Start: 2024-06-18 | End: 2024-06-18

## 2024-06-18 RX ORDER — DOXYCYCLINE HYCLATE 100 MG
100 TABLET ORAL 2 TIMES DAILY
Qty: 20 TABLET | Refills: 0 | Status: SHIPPED | OUTPATIENT
Start: 2024-06-18 | End: 2024-06-28

## 2024-06-18 RX ORDER — GENTAMICIN SULFATE 1 MG/G
OINTMENT TOPICAL ONCE
OUTPATIENT
Start: 2024-06-18 | End: 2024-06-18

## 2024-06-18 RX ORDER — IBUPROFEN 200 MG
TABLET ORAL ONCE
OUTPATIENT
Start: 2024-06-18 | End: 2024-06-18

## 2024-06-18 RX ORDER — TRIAMCINOLONE ACETONIDE 1 MG/G
OINTMENT TOPICAL ONCE
OUTPATIENT
Start: 2024-06-18 | End: 2024-06-18

## 2024-06-18 RX ORDER — LIDOCAINE HYDROCHLORIDE 40 MG/ML
SOLUTION TOPICAL ONCE
OUTPATIENT
Start: 2024-06-18 | End: 2024-06-18

## 2024-06-18 RX ORDER — SODIUM CHLOR/HYPOCHLOROUS ACID 0.033 %
SOLUTION, IRRIGATION IRRIGATION ONCE
OUTPATIENT
Start: 2024-06-18 | End: 2024-06-18

## 2024-06-18 RX ORDER — LIDOCAINE 40 MG/G
CREAM TOPICAL ONCE
OUTPATIENT
Start: 2024-06-18 | End: 2024-06-18

## 2024-06-18 RX ORDER — BACITRACIN ZINC AND POLYMYXIN B SULFATE 500; 1000 [USP'U]/G; [USP'U]/G
OINTMENT TOPICAL ONCE
OUTPATIENT
Start: 2024-06-18 | End: 2024-06-18

## 2024-06-18 ASSESSMENT — PAIN DESCRIPTION - ORIENTATION: ORIENTATION: LEFT

## 2024-06-18 ASSESSMENT — PAIN DESCRIPTION - LOCATION: LOCATION: FOOT

## 2024-06-18 ASSESSMENT — PAIN DESCRIPTION - DESCRIPTORS: DESCRIPTORS: ACHING

## 2024-06-18 NOTE — PROGRESS NOTES
of carpometacarpal (CMC) joint of thumb 06/05/2018    Osteoarthritis of knee 06/05/2018    Peripheral vascular disease (HCC)     with venous stasis and ulcerations. Dr Manning    Presence of coronary angioplasty implant and graft 09/11/2009    Sleep apnea     Bipap    Sprain of knee and leg 06/05/2018    Sprain of shoulder and upper arm 06/05/2018    Type II or unspecified type diabetes mellitus without mention of complication, not stated as uncontrolled     Ulcer of left foot, with fat layer exposed (HCC) 01/09/2018    Under care of team     Podiatry: Otilio Ma DPM, [facility], last visit 3/28/2024    Under care of team     Pulmonary: Yomi Nick MD, [facility], last visit 2/2024    Under care of team     Cardiologist: Jorje Lay MD, [facility], last visit 10/2023    Under care of team 04/05/2024    PCP: Seema Hameed DO, [facility]    Venous insufficiency of both lower extremities 01/31/2018    Wears partial dentures     Partial top/full bottom       PAST SURGICAL HISTORY    Past Surgical History:   Procedure Laterality Date    ARTHROPLASTY Left 04/01/2022    ARTHROPLASTY 2ND LEFT TOE performed by Agustina Cleaning DPM at [facility] OR    ARTHROPLASTY Bilateral 06/17/2022    ARTHROPLASTY 2ND DIGIT RIGHT APPLICATION EPIFIX LEFT FOOT performed by Agustina Cleaning DPM at [facility] OR    CARDIAC CATHETERIZATION  09/2021    stent     CARDIAC SURGERY      CARPAL TUNNEL RELEASE Right 03/22/2013    Dr. Burnett.    COLONOSCOPY  2020    CORONARY ARTERY [facility]RA[facility]    CORONARY ARTERY BYPASS GRAFT  2006    FOOT SURGERY Left 04/10/2024    RESECTION FIFTH METATARSAL HEAD LEFT FOOT    FOOT SURGERY Left 04/10/2024    RESECTION FIFTH METATARSAL HEAD LEFT FOOT performed by Agustina Cleaning DPM at [facility] OR    FOOT SURGERY Left 6/14/2024    5TH METATARSAL RESECTION LEFT performed by Agustina Cleaning DPM at [facility] OR    HERNIA REPAIR      As a child    NECK

## 2024-06-18 NOTE — PLAN OF CARE
Problem: Chronic Conditions and Co-morbidities  Goal: Patient's chronic conditions and co-morbidity symptoms are monitored and maintained or improved  6/18/2024 1510 by Nathaniel Wheatley RN  Outcome: Progressing     Problem: Wound:  Goal: Will show signs of wound healing; wound closure and no evidence of infection  Description: Will show signs of wound healing; wound closure and no evidence of infection  6/18/2024 1531 by Yessi Darling RN  Outcome: Progressing  6/18/2024 1510 by Nathaniel Wheatley RN  Outcome: Progressing     Problem: Falls - Risk of:  Goal: Will remain free from falls  Description: Will remain free from falls  6/18/2024 1531 by Yessi Darling RN  Outcome: Progressing  6/18/2024 1510 by Nathaniel Wheatley RN  Outcome: Progressing

## 2024-06-19 LAB
MICROORGANISM SPEC CULT: ABNORMAL
MICROORGANISM SPEC CULT: ABNORMAL
MICROORGANISM SPEC CULT: NORMAL
MICROORGANISM SPEC CULT: NORMAL
MICROORGANISM/AGENT SPEC: ABNORMAL
MICROORGANISM/AGENT SPEC: NORMAL
MICROORGANISM/AGENT SPEC: NORMAL
SERVICE CMNT-IMP: ABNORMAL
SERVICE CMNT-IMP: NORMAL
SPECIMEN DESCRIPTION: ABNORMAL
SPECIMEN DESCRIPTION: NORMAL
STATUS: NORMAL
SURGICAL PATHOLOGY REPORT: NORMAL

## 2024-06-24 NOTE — DISCHARGE INSTRUCTIONS
[facility]ENTER -Phone: [phone] Fax: [phone]             Visit  Discharge Instructions / Physician Orders     DATE: 6/25/2024     Home Care: N/A     SUPPLIES ORDERED THRU: [employer]     Wound Location: Left Foot Amp Site     Cleanse with: KEEP DRY AND INTACT     Dressing Orders: Left Foot: Blessing over top opening, Saline moistened gauze, ABD Pad, Roll Gauze, ACE Wrap       PUT WEIGHT ON HEEL ONLY WHEN WALKING OR STANDING- STAY OFF FEET AS MUCH AS POSSIBLE                                               Frequency: KEEP DRY AND INTACT     Additional Orders: Increase protein to diet (meat, cheese, eggs, fish, peanut butter, nuts and beans)  ELEVATE LEGS AS MUCH AS POSSIBLE  Culture Taken 4/2/24  Levofloxacin ordered 4/2/24  X-Ray Ordered 4/2/24  Antibiotic Ordered 4/23/24  X-Ray Ordered 4/23/24  Labs Ordered 5/14/24  X-Ray Ordered 6/4/24  MRI Ordered 6/11/24     Your next appointment with Wound Care Center is in 1 week with Dr. Cleaning        (Please note your next appointment above and if you are unable to keep, kindly give a 24 hour notice. Thank you.)     If you experience any of the following, please call the Wound Care Center during business hours:  [phone]  Your Phone call may be forwarded to [facility] [place] during business hours that [facility] is closed.     * Increase in Pain  * Temperature over 101  * Increase in drainage from your wound  * Drainage with a foul odor  * Bleeding  * Increase in swelling  * Need for compression bandage changes due to slippage, breakthrough drainage.     If you need medical attention outside of the business hours of the Wound Care Centers please contact your PCP or go to the nearest emergency room.     The information contained in the After Visit Summary has been reviewed with me, the patient and/or responsible adult, by my health care provider(s). I had the opportunity to ask questions regarding this information. I have

## 2024-06-25 ENCOUNTER — HOSPITAL ENCOUNTER (OUTPATIENT)
Dept: WOUND CARE | Age: 64
Discharge: HOME | End: 2024-06-25
Payer: COMMERCIAL

## 2024-06-25 VITALS
SYSTOLIC BLOOD PRESSURE: 111 MMHG | HEIGHT: 72 IN | HEART RATE: 76 BPM | WEIGHT: 224 LBS | BODY MASS INDEX: 30.34 KG/M2 | TEMPERATURE: 98.3 F | DIASTOLIC BLOOD PRESSURE: 66 MMHG

## 2024-06-25 DIAGNOSIS — L97.512 ULCER OF TOE OF RIGHT FOOT, WITH FAT LAYER EXPOSED (HCC): ICD-10-CM

## 2024-06-25 DIAGNOSIS — E11.42 DIABETIC POLYNEUROPATHY ASSOCIATED WITH TYPE 2 DIABETES MELLITUS (HCC): Primary | ICD-10-CM

## 2024-06-25 PROCEDURE — 99213 OFFICE O/P EST LOW 20 MIN: CPT

## 2024-06-25 RX ORDER — LIDOCAINE HYDROCHLORIDE 20 MG/ML
JELLY TOPICAL ONCE
OUTPATIENT
Start: 2024-06-25 | End: 2024-06-25

## 2024-06-25 RX ORDER — SODIUM CHLOR/HYPOCHLOROUS ACID 0.033 %
SOLUTION, IRRIGATION IRRIGATION ONCE
OUTPATIENT
Start: 2024-06-25 | End: 2024-06-25

## 2024-06-25 RX ORDER — BETAMETHASONE DIPROPIONATE 0.5 MG/G
CREAM TOPICAL ONCE
OUTPATIENT
Start: 2024-06-25 | End: 2024-06-25

## 2024-06-25 RX ORDER — LIDOCAINE 40 MG/G
CREAM TOPICAL ONCE
OUTPATIENT
Start: 2024-06-25 | End: 2024-06-25

## 2024-06-25 RX ORDER — LIDOCAINE 50 MG/G
OINTMENT TOPICAL ONCE
OUTPATIENT
Start: 2024-06-25 | End: 2024-06-25

## 2024-06-25 RX ORDER — IBUPROFEN 200 MG
TABLET ORAL ONCE
OUTPATIENT
Start: 2024-06-25 | End: 2024-06-25

## 2024-06-25 RX ORDER — BACITRACIN ZINC AND POLYMYXIN B SULFATE 500; 1000 [USP'U]/G; [USP'U]/G
OINTMENT TOPICAL ONCE
OUTPATIENT
Start: 2024-06-25 | End: 2024-06-25

## 2024-06-25 RX ORDER — GINSENG 100 MG
CAPSULE ORAL ONCE
OUTPATIENT
Start: 2024-06-25 | End: 2024-06-25

## 2024-06-25 RX ORDER — LIDOCAINE HYDROCHLORIDE 20 MG/ML
JELLY TOPICAL ONCE
Status: DISCONTINUED | OUTPATIENT
Start: 2024-06-25 | End: 2024-06-26 | Stop reason: HOSPADM

## 2024-06-25 RX ORDER — GENTAMICIN SULFATE 1 MG/G
OINTMENT TOPICAL ONCE
OUTPATIENT
Start: 2024-06-25 | End: 2024-06-25

## 2024-06-25 RX ORDER — TRIAMCINOLONE ACETONIDE 1 MG/G
OINTMENT TOPICAL ONCE
OUTPATIENT
Start: 2024-06-25 | End: 2024-06-25

## 2024-06-25 RX ORDER — CLOBETASOL PROPIONATE 0.5 MG/G
OINTMENT TOPICAL ONCE
OUTPATIENT
Start: 2024-06-25 | End: 2024-06-25

## 2024-06-25 RX ORDER — LIDOCAINE HYDROCHLORIDE 40 MG/ML
SOLUTION TOPICAL ONCE
OUTPATIENT
Start: 2024-06-25 | End: 2024-06-25

## 2024-06-25 NOTE — PROGRESS NOTES
[facility]   Progress Note and Procedure Note      Ac Alcantar  MEDICAL RECORD NUMBER:  [id number]  AGE: 63 y.o.   GENDER: male  : 1960  EPISODE DATE:  2024    Subjective:     No chief complaint on file.        HISTORY of PRESENT ILLNESS HPI     Ac Alcantar is a 63 y.o. male who presents today for wound/ulcer evaluation.   History of Wound Context: S/P 5TH LEFT RAY RESECTION 24. HAS BEEN DRINKING PROTEIN SHAKES. MAINTAINED DRESSING INTACT    INTRA-OP C&S NEG    24 XRS SHOWED BONY CHANGES 5TH MET DISTAL ASPECT    24 HBA1C 6.8, ALBUMIN WNL    4/10/24 INTRA-OP BONE CULTURE -PREVOTELLA SPECIES    Wound/Ulcer Pain Timing/Severity: none  Quality of pain: N/A  Severity:  0 / 10   Modifying Factors: None    Ulcer Identification:  Ulcer Type: diabetic    Contributing Factors: chronic pressure    PAST MEDICAL HISTORY        Diagnosis Date    Acquired trigger finger 2018    CAD (coronary artery disease)     NWOCC/ involving coronary bypass graft of native heart with unstable angina pectoris    Cervical spondylosis     CHF (congestive heart failure) ([facility])     Chronic back pain     on 18 pt states is presently in pain mgmnt    Chronic ulcer of toe, left, with necrosis of bone ([facility]) 2022    Contusion of elbow 2018    Contusion of knee 2018    Contusion of shoulder region 2018    Diabetic foot ulcer with osteomyelitis ([facility]) 2018    Diabetic polyneuropathy associated with type 2 diabetes mellitus ([facility])     Diabetic ulcer of toe associated with type 2 diabetes mellitus, with fat layer exposed ([facility]) 2018    Hypercholesterolemia 2017    Hyperlipemia, mixed     Hypertension     Ischemic cardiomyopathy 2018    Non-pressure chronic ulcer of left lower leg with fat layer exposed ([facility]) 2014    Obesity, Class III, BMI 40-49.9 (morbid obesity) ([facility]) 2015    Open wound of left foot 2024    Osteoarthritis

## 2024-07-01 NOTE — DISCHARGE INSTRUCTIONS
formerly [facility] -Phone: [phone] Fax: [phone]             Visit  Discharge Instructions / Physician Orders     DATE: 7/2/2024     Home Care: N/A     SUPPLIES ORDERED THRU: [employer]     Wound Location: Left Foot Amp Site     Cleanse with: Saline     Dressing Orders: Left Foot: Blessing over top opening, Saline moistened gauze, ABD Pad, Roll Gauze, ACE Wrap       PUT WEIGHT ON HEEL ONLY WHEN WALKING OR STANDING- STAY OFF FEET AS MUCH AS POSSIBLE                                               Frequency: Daily     Additional Orders: Increase protein to diet (meat, cheese, eggs, fish, peanut butter, nuts and beans)  ELEVATE LEGS AS MUCH AS POSSIBLE  Culture Taken 4/2/24  Levofloxacin ordered 4/2/24  X-Ray Ordered 4/2/24  Antibiotic Ordered 4/23/24  X-Ray Ordered 4/23/24  Labs Ordered 5/14/24  X-Ray Ordered 6/4/24  MRI Ordered 6/11/24     Your next appointment with Wound Care Center is in 1 week with Dr. Cleaning        (Please note your next appointment above and if you are unable to keep, kindly give a 24 hour notice. Thank you.)     If you experience any of the following, please call the Wound Care Center during business hours:  [phone]  Your Phone call may be forwarded to [facility] during business hours that [facility] is closed.     * Increase in Pain  * Temperature over 101  * Increase in drainage from your wound  * Drainage with a foul odor  * Bleeding  * Increase in swelling  * Need for compression bandage changes due to slippage, breakthrough drainage.     If you need medical attention outside of the business hours of the Wound Care Centers please contact your PCP or go to the nearest emergency room.     The information contained in the After Visit Summary has been reviewed with me, the patient and/or responsible adult, by my health care provider(s). I had the opportunity to ask questions regarding this information. I have elected to receive;

## 2024-07-02 ENCOUNTER — HOSPITAL ENCOUNTER (OUTPATIENT)
Dept: WOUND CARE | Age: 64
Discharge: HOME | End: 2024-07-02
Payer: COMMERCIAL

## 2024-07-02 VITALS — SYSTOLIC BLOOD PRESSURE: 135 MMHG | TEMPERATURE: 98.8 F | HEART RATE: 89 BPM | DIASTOLIC BLOOD PRESSURE: 85 MMHG

## 2024-07-02 DIAGNOSIS — B37.89 CANDIDA OSTEOMYELITIS, ACUTE (HCC): ICD-10-CM

## 2024-07-02 DIAGNOSIS — M86.10 CANDIDA OSTEOMYELITIS, ACUTE (HCC): ICD-10-CM

## 2024-07-02 DIAGNOSIS — L97.512 ULCER OF TOE OF RIGHT FOOT, WITH FAT LAYER EXPOSED (HCC): ICD-10-CM

## 2024-07-02 DIAGNOSIS — E11.42 DIABETIC POLYNEUROPATHY ASSOCIATED WITH TYPE 2 DIABETES MELLITUS (HCC): Primary | ICD-10-CM

## 2024-07-02 PROCEDURE — 11042 DBRDMT SUBQ TIS 1ST 20SQCM/<: CPT

## 2024-07-02 RX ORDER — LIDOCAINE 50 MG/G
OINTMENT TOPICAL ONCE
OUTPATIENT
Start: 2024-07-02 | End: 2024-07-02

## 2024-07-02 RX ORDER — IBUPROFEN 200 MG
TABLET ORAL ONCE
OUTPATIENT
Start: 2024-07-02 | End: 2024-07-02

## 2024-07-02 RX ORDER — BETAMETHASONE DIPROPIONATE 0.5 MG/G
CREAM TOPICAL ONCE
OUTPATIENT
Start: 2024-07-02 | End: 2024-07-02

## 2024-07-02 RX ORDER — SODIUM CHLOR/HYPOCHLOROUS ACID 0.033 %
SOLUTION, IRRIGATION IRRIGATION ONCE
OUTPATIENT
Start: 2024-07-02 | End: 2024-07-02

## 2024-07-02 RX ORDER — LIDOCAINE HYDROCHLORIDE 40 MG/ML
SOLUTION TOPICAL ONCE
OUTPATIENT
Start: 2024-07-02 | End: 2024-07-02

## 2024-07-02 RX ORDER — LIDOCAINE 40 MG/G
CREAM TOPICAL ONCE
OUTPATIENT
Start: 2024-07-02 | End: 2024-07-02

## 2024-07-02 RX ORDER — GENTAMICIN SULFATE 1 MG/G
OINTMENT TOPICAL ONCE
OUTPATIENT
Start: 2024-07-02 | End: 2024-07-02

## 2024-07-02 RX ORDER — BACITRACIN ZINC AND POLYMYXIN B SULFATE 500; 1000 [USP'U]/G; [USP'U]/G
OINTMENT TOPICAL ONCE
OUTPATIENT
Start: 2024-07-02 | End: 2024-07-02

## 2024-07-02 RX ORDER — TRIAMCINOLONE ACETONIDE 1 MG/G
OINTMENT TOPICAL ONCE
OUTPATIENT
Start: 2024-07-02 | End: 2024-07-02

## 2024-07-02 RX ORDER — CLOBETASOL PROPIONATE 0.5 MG/G
OINTMENT TOPICAL ONCE
OUTPATIENT
Start: 2024-07-02 | End: 2024-07-02

## 2024-07-02 RX ORDER — FLUCONAZOLE 100 MG/1
100 TABLET ORAL DAILY
Qty: 30 TABLET | Refills: 0 | Status: SHIPPED | OUTPATIENT
Start: 2024-07-02 | End: 2024-08-01

## 2024-07-02 RX ORDER — GINSENG 100 MG
CAPSULE ORAL ONCE
OUTPATIENT
Start: 2024-07-02 | End: 2024-07-02

## 2024-07-02 RX ORDER — LIDOCAINE HYDROCHLORIDE 20 MG/ML
JELLY TOPICAL ONCE
Status: COMPLETED | OUTPATIENT
Start: 2024-07-02 | End: 2024-07-02

## 2024-07-02 RX ORDER — LIDOCAINE HYDROCHLORIDE 20 MG/ML
JELLY TOPICAL ONCE
OUTPATIENT
Start: 2024-07-02 | End: 2024-07-02

## 2024-07-02 RX ADMIN — LIDOCAINE HYDROCHLORIDE 6 ML: 20 JELLY TOPICAL at 14:52

## 2024-07-02 NOTE — PROGRESS NOTES
located in the Wound/Ulcer Documentation Flow Sheet    Wound/Ulcer #: 2  Post Debridement Measurements:  Wound/Ulcer Descriptions are Pre Debridement except measurements:  Wound 07/02/24 Foot Left;Lateral;Distal #1 (Active)   Wound Etiology Non-Healing Surgical 07/02/24 1456   Dressing Status Old drainage noted;New drainage noted 07/02/24 1456   Wound Cleansed Cleansed with saline 07/02/24 1456   Wound Length (cm) 1.2 cm 07/02/24 1456   Wound Width (cm) 0.5 cm 07/02/24 1456   Wound Depth (cm) 0.3 cm 07/02/24 1456   Wound Surface Area (cm^2) 0.6 cm^2 07/02/24 1456   Wound Volume (cm^3) 0.18 cm^3 07/02/24 1456   Post-Procedure Length (cm) 1.2 cm 07/02/24 1456   Post-Procedure Width (cm) 0.5 cm 07/02/24 1456   Post-Procedure Depth (cm) 0.3 cm 07/02/24 1456   Post-Procedure Surface Area (cm^2) 0.6 cm^2 07/02/24 1456   Post-Procedure Volume (cm^3) 0.18 cm^3 07/02/24 1456   Wound Assessment Pink/red 07/02/24 1456   Drainage Amount Moderate (25-50%) 07/02/24 1456   Drainage Description Serosanguinous 07/02/24 1456   Odor None 07/02/24 1456   Trudi-wound Assessment Blanchable erythema 07/02/24 1456   Margins Defined edges 07/02/24 1456   Wound Thickness Description not for Pressure Injury Full thickness 07/02/24 1456   Number of days: 0       Wound 07/02/24 Foot Left;Lateral;Dorsal #2 (Active)   Wound Etiology Non-Healing Surgical 07/02/24 1456   Dressing Status Old drainage noted;New drainage noted 07/02/24 1456   Wound Cleansed Cleansed with saline 07/02/24 1456   Wound Length (cm) 1 cm 07/02/24 1456   Wound Width (cm) 0.3 cm 07/02/24 1456   Wound Depth (cm) 0.3 cm 07/02/24 1456   Wound Surface Area (cm^2) 0.3 cm^2 07/02/24 1456   Wound Volume (cm^3) 0.09 cm^3 07/02/24 1456   Post-Procedure Length (cm) 1 cm 07/02/24 1456   Post-Procedure Width (cm) 0.3 cm 07/02/24 1456   Post-Procedure Depth (cm) 0.3 cm 07/02/24 1456   Post-Procedure Surface Area (cm^2) 0.3 cm^2 07/02/24 1456   Post-Procedure Volume (cm^3) 0.09 cm^3

## 2024-07-08 NOTE — DISCHARGE INSTRUCTIONS
[facility]ENTER -Phone: [phone] Fax: [phone]             Visit  Discharge Instructions / Physician Orders     DATE: 7/9/2024     Home Care: N/A     SUPPLIES ORDERED THRU: [facility]     Wound Location: Left Foot Amp Site     Cleanse with: Saline     Dressing Orders: Left Foot: Saline moistened gauze, ABD Pad, Roll Gauze, ACE Wrap       PUT WEIGHT ON HEEL ONLY WHEN WALKING OR STANDING- STAY OFF FEET AS MUCH AS POSSIBLE                                               Frequency: Daily     Additional Orders: Increase protein to diet (meat, cheese, eggs, fish, peanut butter, nuts and beans)  ELEVATE LEGS AS MUCH AS POSSIBLE  Culture Taken 4/2/24  Levofloxacin ordered 4/2/24  X-Ray Ordered 4/2/24  Antibiotic Ordered 4/23/24  X-Ray Ordered 4/23/24  Labs Ordered 5/14/24  X-Ray Ordered 6/4/24  MRI Ordered 6/11/24     Your next appointment with Wound Care Center is in 1 week with Dr. Cleaning        (Please note your next appointment above and if you are unable to keep, kindly give a 24 hour notice. Thank you.)     If you experience any of the following, please call the Wound Care Center during business hours:  [phone]  Your Phone call may be forwarded to [place] during business hours that [facility] is closed.     * Increase in Pain  * Temperature over 101  * Increase in drainage from your wound  * Drainage with a foul odor  * Bleeding  * Increase in swelling  * Need for compression bandage changes due to slippage, breakthrough drainage.     If you need medical attention outside of the business hours of the Wound Care Centers please contact your PCP or go to the nearest emergency room.     The information contained in the After Visit Summary has been reviewed with me, the patient and/or responsible adult, by my health care provider(s). I had the opportunity to ask questions regarding this information. I have elected to receive;      []After Visit

## 2024-07-09 ENCOUNTER — TELEPHONE (OUTPATIENT)
Dept: PULMONOLOGY | Age: 64
End: 2024-07-09

## 2024-07-09 ENCOUNTER — HOSPITAL ENCOUNTER (OUTPATIENT)
Dept: WOUND CARE | Age: 64
Discharge: HOME | End: 2024-07-09
Payer: COMMERCIAL

## 2024-07-09 VITALS
RESPIRATION RATE: 16 BRPM | WEIGHT: 224 LBS | HEIGHT: 72 IN | BODY MASS INDEX: 30.34 KG/M2 | DIASTOLIC BLOOD PRESSURE: 79 MMHG | TEMPERATURE: 97.6 F | SYSTOLIC BLOOD PRESSURE: 132 MMHG | HEART RATE: 83 BPM

## 2024-07-09 DIAGNOSIS — L97.512 ULCER OF TOE OF RIGHT FOOT, WITH FAT LAYER EXPOSED (HCC): ICD-10-CM

## 2024-07-09 DIAGNOSIS — E11.42 DIABETIC POLYNEUROPATHY ASSOCIATED WITH TYPE 2 DIABETES MELLITUS (HCC): Primary | ICD-10-CM

## 2024-07-09 PROCEDURE — 11042 DBRDMT SUBQ TIS 1ST 20SQCM/<: CPT

## 2024-07-09 RX ORDER — LIDOCAINE HYDROCHLORIDE 20 MG/ML
JELLY TOPICAL ONCE
Status: COMPLETED | OUTPATIENT
Start: 2024-07-09 | End: 2024-07-09

## 2024-07-09 RX ORDER — LIDOCAINE 40 MG/G
CREAM TOPICAL ONCE
OUTPATIENT
Start: 2024-07-09 | End: 2024-07-09

## 2024-07-09 RX ORDER — LIDOCAINE HYDROCHLORIDE 20 MG/ML
JELLY TOPICAL ONCE
OUTPATIENT
Start: 2024-07-09 | End: 2024-07-09

## 2024-07-09 RX ORDER — BETAMETHASONE DIPROPIONATE 0.5 MG/G
CREAM TOPICAL ONCE
OUTPATIENT
Start: 2024-07-09 | End: 2024-07-09

## 2024-07-09 RX ORDER — LIDOCAINE 50 MG/G
OINTMENT TOPICAL ONCE
OUTPATIENT
Start: 2024-07-09 | End: 2024-07-09

## 2024-07-09 RX ORDER — IBUPROFEN 200 MG
TABLET ORAL ONCE
OUTPATIENT
Start: 2024-07-09 | End: 2024-07-09

## 2024-07-09 RX ORDER — CLOBETASOL PROPIONATE 0.5 MG/G
OINTMENT TOPICAL ONCE
OUTPATIENT
Start: 2024-07-09 | End: 2024-07-09

## 2024-07-09 RX ORDER — LIDOCAINE HYDROCHLORIDE 40 MG/ML
SOLUTION TOPICAL ONCE
OUTPATIENT
Start: 2024-07-09 | End: 2024-07-09

## 2024-07-09 RX ORDER — TRIAMCINOLONE ACETONIDE 1 MG/G
OINTMENT TOPICAL ONCE
OUTPATIENT
Start: 2024-07-09 | End: 2024-07-09

## 2024-07-09 RX ORDER — GENTAMICIN SULFATE 1 MG/G
OINTMENT TOPICAL ONCE
OUTPATIENT
Start: 2024-07-09 | End: 2024-07-09

## 2024-07-09 RX ORDER — SODIUM CHLOR/HYPOCHLOROUS ACID 0.033 %
SOLUTION, IRRIGATION IRRIGATION ONCE
OUTPATIENT
Start: 2024-07-09 | End: 2024-07-09

## 2024-07-09 RX ORDER — GINSENG 100 MG
CAPSULE ORAL ONCE
OUTPATIENT
Start: 2024-07-09 | End: 2024-07-09

## 2024-07-09 RX ORDER — BACITRACIN ZINC AND POLYMYXIN B SULFATE 500; 1000 [USP'U]/G; [USP'U]/G
OINTMENT TOPICAL ONCE
OUTPATIENT
Start: 2024-07-09 | End: 2024-07-09

## 2024-07-09 RX ADMIN — LIDOCAINE HYDROCHLORIDE 6 ML: 20 JELLY TOPICAL at 15:20

## 2024-07-09 NOTE — TELEPHONE ENCOUNTER
Spoke with patient and informed.  Patient will keep appt 8/9/24 to get something stating that on after visit summary to take to [employer] for license

## 2024-07-09 NOTE — PROGRESS NOTES
[facility]   Progress Note and Procedure Note      Ac Alcantar  MEDICAL RECORD NUMBER:  [id number]  AGE: 63 y.o.   GENDER: male  : 1960  EPISODE DATE:  2024    Subjective:     Chief Complaint   Patient presents with    Wound Check     Left foot         HISTORY of PRESENT ILLNESS HPI     Ac Alcantar is a 63 y.o. male who presents today for wound/ulcer evaluation.   History of Wound Context: S/P 5TH LEFT RAY RESECTION 24. HAS BEEN DRINKING PROTEIN SHAKES. CHANGING DRESSING WITH SALINE AND KAYCEE (SON PULLS OFF KAYCEE), TAKING DIFLUCAN    INTRA-OP C&S POSITIVE FOR CANDIDA PARAPSILOSIS    24 XRS SHOWED BONY CHANGES 5TH MET DISTAL ASPECT    24 HBA1C 6.8, ALBUMIN WNL    4/10/24 INTRA-OP BONE CULTURE -PREVOTELLA SPECIES    Wound/Ulcer Pain Timing/Severity: none  Quality of pain: N/A  Severity:  0 / 10   Modifying Factors: None    Ulcer Identification:  Ulcer Type: diabetic    Contributing Factors: chronic pressure    PAST MEDICAL HISTORY        Diagnosis Date    Acquired trigger finger 2018    CAD (coronary artery disease)     NWOCC/ involving coronary bypass graft of native heart with unstable angina pectoris    Cervical spondylosis     CHF (congestive heart failure) ([facility])     Chronic back pain     on 18 pt states is presently in pain mgmnt    Chronic ulcer of toe, left, with necrosis of bone ([facility]) 2022    Contusion of elbow 2018    Contusion of knee 2018    Contusion of shoulder region 2018    Diabetic foot ulcer with osteomyelitis ([facility]) 2018    Diabetic polyneuropathy associated with type 2 diabetes mellitus ([facility])     Diabetic ulcer of toe associated with type 2 diabetes mellitus, with fat layer exposed ([facility]) 2018    Hypercholesterolemia 2017    Hyperlipemia, mixed     Hypertension     Ischemic cardiomyopathy 2018    Non-pressure chronic ulcer of left lower leg with fat layer exposed ([facility]) 2014

## 2024-07-09 NOTE — TELEPHONE ENCOUNTER
Patient called and left a VM stating that he still has not heard anything from his sleep study he had done on 6/6/24.  Can you please review and advise if  patient still needs to be using a machine.

## 2024-07-09 NOTE — PLAN OF CARE
Problem: Chronic Conditions and Co-morbidities  Goal: Patient's chronic conditions and co-morbidity symptoms are monitored and maintained or improved  Outcome: Progressing     Problem: Wound:  Goal: Will show signs of wound healing; wound closure and no evidence of infection  Description: Will show signs of wound healing; wound closure and no evidence of infection  Outcome: Progressing     Problem: Falls - Risk of:  Goal: Will remain free from falls  Description: Will remain free from falls  Outcome: Progressing     Problem: Chronic Conditions and Co-morbidities  Goal: Patient's chronic conditions and co-morbidity symptoms are monitored and maintained or improved  Outcome: Progressing

## 2024-07-12 NOTE — TELEPHONE ENCOUNTER
Ac Alcantar is calling to request a refill on the following medication(s):    Last Visit Date (If Applicable):  6/11/2024    Next Visit Date:    Visit date not found    Medication Request:  Requested Prescriptions     Pending Prescriptions Disp Refills    metFORMIN (GLUCOPHAGE) 1000 MG tablet 60 tablet 3     Sig: Take 1 tablet by mouth 2 times daily (with meals)

## 2024-07-15 NOTE — DISCHARGE INSTRUCTIONS
[facility] -Phone: [phone] Fax: [phone]             Visit  Discharge Instructions / Physician Orders     DATE: 7/16/2024     Home Care: N/A     SUPPLIES ORDERED THRU: [facility] [facility]     Wound Location: Left Foot Amp Site     Cleanse with: Saline     Dressing Orders: Left Foot: Saline moistened gauze, ABD Pad, Roll Gauze, ACE Wrap       Vitamin E Oil to old wound sites- massage into area                                               Frequency: Daily     Additional Orders: Increase protein to diet (meat, cheese, eggs, fish, peanut butter, nuts and beans)  ELEVATE LEGS AS MUCH AS POSSIBLE  Culture Taken 4/2/24  Levofloxacin ordered 4/2/24  X-Ray Ordered 4/2/24  Antibiotic Ordered 4/23/24  X-Ray Ordered 4/23/24  Labs Ordered 5/14/24  X-Ray Ordered 6/4/24  MRI Ordered 6/11/24     Your next appointment with Wound Care Center is in 1 week with Dr. Cleaning        (Please note your next appointment above and if you are unable to keep, kindly give a 24 hour notice. Thank you.)     If you experience any of the following, please call the Wound Care Center during business hours:  [phone]  Your Phone call may be forwarded to [facility] during business hours that [facility] is closed.     * Increase in Pain  * Temperature over 101  * Increase in drainage from your wound  * Drainage with a foul odor  * Bleeding  * Increase in swelling  * Need for compression bandage changes due to slippage, breakthrough drainage.     If you need medical attention outside of the business hours of the Wound Care Centers please contact your PCP or go to the nearest emergency room.     The information contained in the After Visit Summary has been reviewed with me, the patient and/or responsible adult, by my health care provider(s). I had the opportunity to ask questions regarding this information. I have elected to receive;      []After Visit Summary  [x]Comprehensive Discharge

## 2024-07-16 ENCOUNTER — TELEPHONE (OUTPATIENT)
Dept: FAMILY MEDICINE CLINIC | Age: 64
End: 2024-07-16

## 2024-07-16 ENCOUNTER — HOSPITAL ENCOUNTER (OUTPATIENT)
Dept: WOUND CARE | Age: 64
Discharge: HOME | End: 2024-07-16
Payer: COMMERCIAL

## 2024-07-16 VITALS
TEMPERATURE: 97.2 F | BODY MASS INDEX: 30.34 KG/M2 | WEIGHT: 224 LBS | SYSTOLIC BLOOD PRESSURE: 110 MMHG | HEIGHT: 72 IN | DIASTOLIC BLOOD PRESSURE: 71 MMHG | RESPIRATION RATE: 18 BRPM | HEART RATE: 88 BPM

## 2024-07-16 DIAGNOSIS — L97.512 ULCER OF TOE OF RIGHT FOOT, WITH FAT LAYER EXPOSED (HCC): ICD-10-CM

## 2024-07-16 DIAGNOSIS — E11.42 DIABETIC POLYNEUROPATHY ASSOCIATED WITH TYPE 2 DIABETES MELLITUS (HCC): Primary | ICD-10-CM

## 2024-07-16 PROCEDURE — 11042 DBRDMT SUBQ TIS 1ST 20SQCM/<: CPT

## 2024-07-16 RX ORDER — BACITRACIN ZINC AND POLYMYXIN B SULFATE 500; 1000 [USP'U]/G; [USP'U]/G
OINTMENT TOPICAL ONCE
OUTPATIENT
Start: 2024-07-16 | End: 2024-07-16

## 2024-07-16 RX ORDER — LIDOCAINE HYDROCHLORIDE 20 MG/ML
JELLY TOPICAL ONCE
OUTPATIENT
Start: 2024-07-16 | End: 2024-07-16

## 2024-07-16 RX ORDER — GENTAMICIN SULFATE 1 MG/G
OINTMENT TOPICAL ONCE
OUTPATIENT
Start: 2024-07-16 | End: 2024-07-16

## 2024-07-16 RX ORDER — BETAMETHASONE DIPROPIONATE 0.5 MG/G
CREAM TOPICAL ONCE
OUTPATIENT
Start: 2024-07-16 | End: 2024-07-16

## 2024-07-16 RX ORDER — TRIAMCINOLONE ACETONIDE 1 MG/G
OINTMENT TOPICAL ONCE
OUTPATIENT
Start: 2024-07-16 | End: 2024-07-16

## 2024-07-16 RX ORDER — LIDOCAINE 50 MG/G
OINTMENT TOPICAL ONCE
OUTPATIENT
Start: 2024-07-16 | End: 2024-07-16

## 2024-07-16 RX ORDER — GINSENG 100 MG
CAPSULE ORAL ONCE
OUTPATIENT
Start: 2024-07-16 | End: 2024-07-16

## 2024-07-16 RX ORDER — IBUPROFEN 200 MG
TABLET ORAL ONCE
OUTPATIENT
Start: 2024-07-16 | End: 2024-07-16

## 2024-07-16 RX ORDER — LIDOCAINE HYDROCHLORIDE 20 MG/ML
JELLY TOPICAL ONCE
Status: COMPLETED | OUTPATIENT
Start: 2024-07-16 | End: 2024-07-16

## 2024-07-16 RX ORDER — LIDOCAINE 40 MG/G
CREAM TOPICAL ONCE
OUTPATIENT
Start: 2024-07-16 | End: 2024-07-16

## 2024-07-16 RX ORDER — LIDOCAINE HYDROCHLORIDE 40 MG/ML
SOLUTION TOPICAL ONCE
OUTPATIENT
Start: 2024-07-16 | End: 2024-07-16

## 2024-07-16 RX ORDER — CLOBETASOL PROPIONATE 0.5 MG/G
OINTMENT TOPICAL ONCE
OUTPATIENT
Start: 2024-07-16 | End: 2024-07-16

## 2024-07-16 RX ORDER — SODIUM CHLOR/HYPOCHLOROUS ACID 0.033 %
SOLUTION, IRRIGATION IRRIGATION ONCE
OUTPATIENT
Start: 2024-07-16 | End: 2024-07-16

## 2024-07-16 RX ADMIN — LIDOCAINE HYDROCHLORIDE 6 ML: 20 JELLY TOPICAL at 13:02

## 2024-07-16 ASSESSMENT — PAIN DESCRIPTION - PROGRESSION: CLINICAL_PROGRESSION: NOT CHANGED

## 2024-07-16 ASSESSMENT — PAIN SCALES - GENERAL
PAINLEVEL_OUTOF10: 0
PAINLEVEL_OUTOF10: 0

## 2024-07-16 NOTE — PROGRESS NOTES
[facility]   Progress Note and Procedure Note      Ac Alcantar  MEDICAL RECORD NUMBER:  [id number]  AGE: 63 y.o.   GENDER: male  : 1960  EPISODE DATE:  2024    Subjective:     Chief Complaint   Patient presents with    Wound Check     Left Heel         HISTORY of PRESENT ILLNESS HPI     Ac Alcantar is a 63 y.o. male who presents today for wound/ulcer evaluation.   History of Wound Context: S/P 5TH LEFT RAY RESECTION 24. HAS BEEN DRINKING PROTEIN SHAKES. CHANGING DRESSING WITH SALINE WET TO DRY, TAKING DIFLUCAN    INTRA-OP C&S POSITIVE FOR CANDIDA PARAPSILOSIS    24 XRS SHOWED BONY CHANGES 5TH MET DISTAL ASPECT    24 HBA1C 6.8, ALBUMIN WNL    4/10/24 INTRA-OP BONE CULTURE -PREVOTELLA SPECIES    Wound/Ulcer Pain Timing/Severity: none  Quality of pain: N/A  Severity:  0 / 10   Modifying Factors: None    Ulcer Identification:  Ulcer Type: diabetic    Contributing Factors: chronic pressure    PAST MEDICAL HISTORY        Diagnosis Date    Acquired trigger finger 2018    CAD (coronary artery disease)     NWOCC/ involving coronary bypass graft of native heart with unstable angina pectoris    Cervical spondylosis     CHF (congestive heart failure) ([facility])     Chronic back pain     on 18 pt states is presently in pain mgmnt    Chronic ulcer of toe, left, with necrosis of bone ([facility]) 2022    Contusion of elbow 2018    Contusion of knee 2018    Contusion of shoulder region 2018    Diabetic foot ulcer with osteomyelitis ([facility]) 2018    Diabetic polyneuropathy associated with type 2 diabetes mellitus ([facility])     Diabetic ulcer of toe associated with type 2 diabetes mellitus, with fat layer exposed ([facility]) 2018    Hypercholesterolemia 2017    Hyperlipemia, mixed     Hypertension     Ischemic cardiomyopathy 2018    Non-pressure chronic ulcer of left lower leg with fat layer exposed ([facility]) 2014    Obesity, Class

## 2024-07-16 NOTE — TELEPHONE ENCOUNTER
Patient called in stating he is needing a letter stating that you prescribe him trazodone and that he only takes to at night to sleep       Please advise

## 2024-07-22 ENCOUNTER — TELEPHONE (OUTPATIENT)
Dept: FAMILY MEDICINE CLINIC | Age: 64
End: 2024-07-22

## 2024-07-22 NOTE — TELEPHONE ENCOUNTER
Patient called in stating that he needs you to stop prescribing him trazodone and he also needs a letter stating you took him off of the medication he needs this letter in order for him to be able to get his [employer] license      Please advise

## 2024-07-22 NOTE — DISCHARGE INSTRUCTIONS
[facility]ENTER -Phone: [phone] Fax: [phone]             Visit  Discharge Instructions / Physician Orders     DATE: 7/23/2024     Home Care: N/A     SUPPLIES ORDERED THRU: [facility]     Wound Location: Left Foot Amp Site     Cleanse with: Saline     Dressing Orders: Left Foot: Saline moistened gauze, ABD Pad, Roll Gauze, ACE Wrap       Vitamin E Oil to old wound sites- massage into area                                               Frequency: Daily     Additional Orders: Increase protein to diet (meat, cheese, eggs, fish, peanut butter, nuts and beans)  ELEVATE LEGS AS MUCH AS POSSIBLE  Culture Taken 4/2/24  Levofloxacin ordered 4/2/24  X-Ray Ordered 4/2/24  Antibiotic Ordered 4/23/24  X-Ray Ordered 4/23/24  Labs Ordered 5/14/24  X-Ray Ordered 6/4/24  MRI Ordered 6/11/24     Your next appointment with Wound Care Center is in 1 week with Dr. Cleaning        (Please note your next appointment above and if you are unable to keep, kindly give a 24 hour notice. Thank you.)     If you experience any of the following, please call the Wound Care Center during business hours:  [phone]  Your Phone call may be forwarded to [facility] during business hours that [place] is closed.     * Increase in Pain  * Temperature over 101  * Increase in drainage from your wound  * Drainage with a foul odor  * Bleeding  * Increase in swelling  * Need for compression bandage changes due to slippage, breakthrough drainage.     If you need medical attention outside of the business hours of the Wound Care Centers please contact your PCP or go to the nearest emergency room.     The information contained in the After Visit Summary has been reviewed with me, the patient and/or responsible adult, by my health care provider(s). I had the opportunity to ask questions regarding this information. I have elected to receive;      []After Visit Summary  [x]Comprehensive Discharge

## 2024-07-23 ENCOUNTER — HOSPITAL ENCOUNTER (OUTPATIENT)
Dept: WOUND CARE | Age: 64
Discharge: HOME | End: 2024-07-23
Payer: COMMERCIAL

## 2024-07-23 VITALS
TEMPERATURE: 97.3 F | HEIGHT: 72 IN | WEIGHT: 224 LBS | BODY MASS INDEX: 30.34 KG/M2 | HEART RATE: 95 BPM | SYSTOLIC BLOOD PRESSURE: 113 MMHG | RESPIRATION RATE: 17 BRPM | DIASTOLIC BLOOD PRESSURE: 65 MMHG

## 2024-07-23 DIAGNOSIS — L97.512 ULCER OF TOE OF RIGHT FOOT, WITH FAT LAYER EXPOSED (HCC): ICD-10-CM

## 2024-07-23 DIAGNOSIS — E11.42 DIABETIC POLYNEUROPATHY ASSOCIATED WITH TYPE 2 DIABETES MELLITUS (HCC): Primary | ICD-10-CM

## 2024-07-23 PROCEDURE — 11042 DBRDMT SUBQ TIS 1ST 20SQCM/<: CPT

## 2024-07-23 RX ORDER — LIDOCAINE HYDROCHLORIDE 20 MG/ML
JELLY TOPICAL ONCE
Status: COMPLETED | OUTPATIENT
Start: 2024-07-23 | End: 2024-07-23

## 2024-07-23 RX ORDER — IBUPROFEN 200 MG
TABLET ORAL ONCE
OUTPATIENT
Start: 2024-07-23 | End: 2024-07-23

## 2024-07-23 RX ORDER — GINSENG 100 MG
CAPSULE ORAL ONCE
OUTPATIENT
Start: 2024-07-23 | End: 2024-07-23

## 2024-07-23 RX ORDER — CLOBETASOL PROPIONATE 0.5 MG/G
OINTMENT TOPICAL ONCE
OUTPATIENT
Start: 2024-07-23 | End: 2024-07-23

## 2024-07-23 RX ORDER — BETAMETHASONE DIPROPIONATE 0.5 MG/G
CREAM TOPICAL ONCE
OUTPATIENT
Start: 2024-07-23 | End: 2024-07-23

## 2024-07-23 RX ORDER — LIDOCAINE 40 MG/G
CREAM TOPICAL ONCE
OUTPATIENT
Start: 2024-07-23 | End: 2024-07-23

## 2024-07-23 RX ORDER — TRIAMCINOLONE ACETONIDE 1 MG/G
OINTMENT TOPICAL ONCE
OUTPATIENT
Start: 2024-07-23 | End: 2024-07-23

## 2024-07-23 RX ORDER — LIDOCAINE HYDROCHLORIDE 20 MG/ML
JELLY TOPICAL ONCE
OUTPATIENT
Start: 2024-07-23 | End: 2024-07-23

## 2024-07-23 RX ORDER — BACITRACIN ZINC AND POLYMYXIN B SULFATE 500; 1000 [USP'U]/G; [USP'U]/G
OINTMENT TOPICAL ONCE
OUTPATIENT
Start: 2024-07-23 | End: 2024-07-23

## 2024-07-23 RX ORDER — GENTAMICIN SULFATE 1 MG/G
OINTMENT TOPICAL ONCE
OUTPATIENT
Start: 2024-07-23 | End: 2024-07-23

## 2024-07-23 RX ORDER — LIDOCAINE 50 MG/G
OINTMENT TOPICAL ONCE
OUTPATIENT
Start: 2024-07-23 | End: 2024-07-23

## 2024-07-23 RX ORDER — LIDOCAINE HYDROCHLORIDE 40 MG/ML
SOLUTION TOPICAL ONCE
OUTPATIENT
Start: 2024-07-23 | End: 2024-07-23

## 2024-07-23 RX ORDER — SODIUM CHLOR/HYPOCHLOROUS ACID 0.033 %
SOLUTION, IRRIGATION IRRIGATION ONCE
OUTPATIENT
Start: 2024-07-23 | End: 2024-07-23

## 2024-07-23 RX ADMIN — LIDOCAINE HYDROCHLORIDE 5 ML: 20 JELLY TOPICAL at 13:17

## 2024-07-23 NOTE — PROGRESS NOTES
tongue every 5 minutes as needed for Chest pain       No current facility-administered medications on file prior to encounter.       REVIEW OF SYSTEMS    Pertinent items are noted in HPI.    Objective:      /65   Pulse 95   Temp 97.3 °F (36.3 °C) (Tympanic)   Resp 17   Ht 1.829 m (6')   Wt 101.6 kg (224 lb)   BMI 30.38 kg/m²     Wt Readings from Last 3 Encounters:   07/23/24 101.6 kg (224 lb)   07/16/24 101.6 kg (224 lb)   07/09/24 101.6 kg (224 lb)       PHYSICAL EXAM    Wound:         Wound Description:SUTURES INTACT,    Integument:  Open lesions ABSENT, Left.  Interdigital macerations absent, Bilateral.     Vascular:  DP/PT pulses palpable 1/4, Bilateral.    CFT <5 seconds to digits 1-5, Bilateral .   Hair growth absent to level of digits, Bilateral.  Edema absent, Bilateral.  Erythema present, Left.     Neurological:  Sensation absent to light touch to level of digits, Bilateral.  Protective sensation  absent via 5.07/10g [place] monofilament 10/10 sites, Bilateral.  Vibratory sensation absent to 1st MPJ, Bilateral.     Musculoskeletal:  Muscle strength 4/5 all LE groups tested, Bilateral.             Wound 07/02/24 Foot Left;Lateral;Distal #1 (Active)   Wound Etiology Non-Healing Surgical 07/23/24 1312   Dressing Status Old drainage noted;New drainage noted 07/23/24 1312   Wound Cleansed Soap and water 07/23/24 1312   Wound Length (cm) 3 cm 07/23/24 1312   Wound Width (cm) 0.3 cm 07/23/24 1312   Wound Depth (cm) 0.2 cm 07/23/24 1312   Wound Surface Area (cm^2) 0.9 cm^2 07/23/24 1312   Change in Wound Size % (l*w) -50 07/23/24 1312   Wound Volume (cm^3) 0.18 cm^3 07/23/24 1312   Wound Healing % 0 07/23/24 1312   Post-Procedure Length (cm) 0.6 cm 07/23/24 1312   Post-Procedure Width (cm) 0.4 cm 07/23/24 1312   Post-Procedure Depth (cm) 0.3 cm 07/23/24 1312   Post-Procedure Surface Area (cm^2) 0.24 cm^2 07/23/24 1312   Post-Procedure Volume (cm^3) 0.072 cm^3 07/23/24 1312   Wound Assessment

## 2024-08-01 NOTE — TELEPHONE ENCOUNTER
Ac Alcantar is calling to request a refill on the following medication(s):    Last Visit Date (If Applicable):  6/11/2024    Next Visit Date:    Visit date not found    Medication Request:  Requested Prescriptions     Pending Prescriptions Disp Refills    topiramate (TOPAMAX) 200 MG tablet 60 tablet 5     Sig: Take 1 tablet by mouth 2 times daily

## 2024-08-04 NOTE — PROGRESS NOTES
Subjective:      Patient ID: Ac Alcantar is a 63 y.o. male.     Patient must see physician at next appointment    HPI  Patient here for follow-up for LOKI. Last seen in office on 2/9/2024 per   [] Dr. Mott  [] Dr. Regan  [] Dr. Harper  [] Dr. Velazquez  [] Dr. Escobedo  [x] Dr. Nick  [] Dr. Marte  [] ANAHI Raymundo APRN- CNP      Today's visit: Patient is not on PAP therapy- is doing well reviewed sleep study. Patient wishes to follow up only on a as needed basis.     Medications:   No pulmonary meds        PRIOR WORKUP:  PFT:  None on chart    CT Imaging:  None on chart    Cardiac Workup:  Echocardiogram: 11/5/21: [facility]: LVEF 55-60%, grade 1 diastolic dysfunction.  Moderate aortic regurgitation with mild stenosis, peak gradient 20.0 mmHg with calculated mean gradient of 11.0.  RVSP 26 mmHg.        Cardiac Cath: 9/28/2021: [facility]: Right dominance, mild diffuse disease in left main, LAD to distal LAD lesion 90% stenosed, left circumflex OST Cx to proximal Cx lesion 70% stenosed.  Angioplasty and RHIANNON placed.    Sleep Study:  Home sleep study 6/6/2024: CLYDE at 4% O2 desaturation was 3.8 events per hour with a total of 16 apneas and 8 hypopneas.  Lowest SpO2 was 86% with 0 minutes at an SpO2 of less than 89%.  No significant sleep apnea.      Laboratory Evaluation:      Immunizations:   Immunization History   Administered Date(s) Administered    COVID-19, MODERNA BLUE border, Primary or Immunocompromised, (age 12y+), IM, 100 mcg/0.5mL 03/11/2021, 03/11/2021, 04/08/2021, 04/08/2021, 01/15/2022, 01/15/2022    Influenza Virus Vaccine 11/22/2006    TDaP, ADACEL (age 10y-64y), BOOSTRIX (age 10y+), IM, 0.5mL 02/22/2013, 12/06/2014, 04/02/2017, 12/03/2019    Td, unspecified formulation 05/18/2001 8/9/2024     9:24 AM 2/9/2024     9:20 AM 7/21/2023    10:39 AM 6/9/2022    11:07 AM 4/15/2021    10:19 AM 10/12/2020    10:42 AM 9/17/2020    11:19 AM   Sleep Medicine   Sitting and reading 0 1 0 0 0 0 0   Watching

## 2024-08-05 NOTE — DISCHARGE INSTRUCTIONS
signature______________________________________Date:________  Electronically signed by Nathaniel Wheatley RN on 8/6/2024 at 3:08 PM  Electronically signed by Agustina Cleaning DPM on 8/6/2024 at 3:06 PM

## 2024-08-06 ENCOUNTER — HOSPITAL ENCOUNTER (OUTPATIENT)
Dept: WOUND CARE | Age: 64
Discharge: HOME | End: 2024-08-06
Payer: COMMERCIAL

## 2024-08-06 VITALS
HEIGHT: 72 IN | WEIGHT: 224 LBS | RESPIRATION RATE: 18 BRPM | TEMPERATURE: 96.9 F | SYSTOLIC BLOOD PRESSURE: 120 MMHG | HEART RATE: 81 BPM | DIASTOLIC BLOOD PRESSURE: 68 MMHG | BODY MASS INDEX: 30.34 KG/M2

## 2024-08-06 DIAGNOSIS — L97.512 ULCER OF TOE OF RIGHT FOOT, WITH FAT LAYER EXPOSED (HCC): ICD-10-CM

## 2024-08-06 DIAGNOSIS — E11.42 DIABETIC POLYNEUROPATHY ASSOCIATED WITH TYPE 2 DIABETES MELLITUS (HCC): Primary | ICD-10-CM

## 2024-08-06 PROCEDURE — 99211 OFF/OP EST MAY X REQ PHY/QHP: CPT

## 2024-08-06 RX ORDER — LIDOCAINE 40 MG/G
CREAM TOPICAL ONCE
OUTPATIENT
Start: 2024-08-06 | End: 2024-08-06

## 2024-08-06 RX ORDER — BACITRACIN ZINC AND POLYMYXIN B SULFATE 500; 1000 [USP'U]/G; [USP'U]/G
OINTMENT TOPICAL ONCE
OUTPATIENT
Start: 2024-08-06 | End: 2024-08-06

## 2024-08-06 RX ORDER — LIDOCAINE HYDROCHLORIDE 40 MG/ML
SOLUTION TOPICAL ONCE
OUTPATIENT
Start: 2024-08-06 | End: 2024-08-06

## 2024-08-06 RX ORDER — TRIAMCINOLONE ACETONIDE 1 MG/G
OINTMENT TOPICAL ONCE
OUTPATIENT
Start: 2024-08-06 | End: 2024-08-06

## 2024-08-06 RX ORDER — LIDOCAINE HYDROCHLORIDE 20 MG/ML
JELLY TOPICAL ONCE
OUTPATIENT
Start: 2024-08-06 | End: 2024-08-06

## 2024-08-06 RX ORDER — LIDOCAINE 50 MG/G
OINTMENT TOPICAL ONCE
OUTPATIENT
Start: 2024-08-06 | End: 2024-08-06

## 2024-08-06 RX ORDER — CLOBETASOL PROPIONATE 0.5 MG/G
OINTMENT TOPICAL ONCE
OUTPATIENT
Start: 2024-08-06 | End: 2024-08-06

## 2024-08-06 RX ORDER — LIDOCAINE HYDROCHLORIDE 20 MG/ML
JELLY TOPICAL ONCE
Status: DISCONTINUED | OUTPATIENT
Start: 2024-08-06 | End: 2024-08-07 | Stop reason: HOSPADM

## 2024-08-06 RX ORDER — GINSENG 100 MG
CAPSULE ORAL ONCE
OUTPATIENT
Start: 2024-08-06 | End: 2024-08-06

## 2024-08-06 RX ORDER — BETAMETHASONE DIPROPIONATE 0.5 MG/G
CREAM TOPICAL ONCE
OUTPATIENT
Start: 2024-08-06 | End: 2024-08-06

## 2024-08-06 RX ORDER — GENTAMICIN SULFATE 1 MG/G
OINTMENT TOPICAL ONCE
OUTPATIENT
Start: 2024-08-06 | End: 2024-08-06

## 2024-08-06 RX ORDER — SODIUM CHLOR/HYPOCHLOROUS ACID 0.033 %
SOLUTION, IRRIGATION IRRIGATION ONCE
OUTPATIENT
Start: 2024-08-06 | End: 2024-08-06

## 2024-08-06 RX ORDER — IBUPROFEN 200 MG
TABLET ORAL ONCE
OUTPATIENT
Start: 2024-08-06 | End: 2024-08-06

## 2024-08-07 ENCOUNTER — TELEPHONE (OUTPATIENT)
Dept: FAMILY MEDICINE CLINIC | Age: 64
End: 2024-08-07

## 2024-08-07 RX ORDER — ATORVASTATIN CALCIUM 40 MG/1
40 TABLET, FILM COATED ORAL DAILY
Qty: 30 TABLET | Refills: 11 | Status: SHIPPED | OUTPATIENT
Start: 2024-08-07

## 2024-08-07 NOTE — TELEPHONE ENCOUNTER
Patient called stating that for his labs stating that there is a code for morbid obesity, he is stating that he is not, and the insurance wont pay for the labs. Stating that he received a letter stating that they wont cover the lab because of the code. Please advise.

## 2024-08-07 NOTE — TELEPHONE ENCOUNTER
Ac Alcantar is calling to request a refill on the following medication(s):    Last Visit Date (If Applicable):  6/11/2024    Next Visit Date:    Visit date not found    Medication Request:  Requested Prescriptions     Pending Prescriptions Disp Refills    atorvastatin (LIPITOR) 40 MG tablet 30 tablet 0     Sig: Take 1 tablet by mouth daily

## 2024-08-09 ENCOUNTER — OFFICE VISIT (OUTPATIENT)
Dept: PULMONOLOGY | Age: 64
End: 2024-08-09
Payer: COMMERCIAL

## 2024-08-09 VITALS
WEIGHT: 224 LBS | OXYGEN SATURATION: 97 % | HEIGHT: 72 IN | BODY MASS INDEX: 30.34 KG/M2 | SYSTOLIC BLOOD PRESSURE: 119 MMHG | RESPIRATION RATE: 19 BRPM | HEART RATE: 99 BPM | DIASTOLIC BLOOD PRESSURE: 76 MMHG

## 2024-08-09 DIAGNOSIS — G47.30 SLEEP APNEA, UNSPECIFIED TYPE: Primary | ICD-10-CM

## 2024-08-09 PROCEDURE — 3078F DIAST BP <80 MM HG: CPT | Performed by: NURSE PRACTITIONER

## 2024-08-09 PROCEDURE — 3074F SYST BP LT 130 MM HG: CPT | Performed by: NURSE PRACTITIONER

## 2024-08-09 PROCEDURE — 99213 OFFICE O/P EST LOW 20 MIN: CPT | Performed by: NURSE PRACTITIONER

## 2024-08-09 RX ORDER — ARIPIPRAZOLE 20 MG/1
TABLET ORAL
COMMUNITY
Start: 2024-06-26

## 2024-08-09 ASSESSMENT — SLEEP AND FATIGUE QUESTIONNAIRES
HOW LIKELY ARE YOU TO NOD OFF OR FALL ASLEEP WHILE LYING DOWN TO REST IN THE AFTERNOON WHEN CIRCUMSTANCES PERMIT: SLIGHT CHANCE OF DOZING
HOW LIKELY ARE YOU TO NOD OFF OR FALL ASLEEP WHILE WATCHING TV: WOULD NEVER DOZE
ESS TOTAL SCORE: 1
HOW LIKELY ARE YOU TO NOD OFF OR FALL ASLEEP WHILE SITTING AND TALKING TO SOMEONE: WOULD NEVER DOZE
HOW LIKELY ARE YOU TO NOD OFF OR FALL ASLEEP IN A CAR, WHILE STOPPED FOR A FEW MINUTES IN TRAFFIC: WOULD NEVER DOZE
HOW LIKELY ARE YOU TO NOD OFF OR FALL ASLEEP WHEN YOU ARE A PASSENGER IN A CAR FOR AN HOUR WITHOUT A BREAK: WOULD NEVER DOZE
HOW LIKELY ARE YOU TO NOD OFF OR FALL ASLEEP WHILE SITTING QUIETLY AFTER LUNCH WITHOUT ALCOHOL: WOULD NEVER DOZE
HOW LIKELY ARE YOU TO NOD OFF OR FALL ASLEEP WHILE SITTING AND READING: WOULD NEVER DOZE
HOW LIKELY ARE YOU TO NOD OFF OR FALL ASLEEP WHILE SITTING INACTIVE IN A PUBLIC PLACE: WOULD NEVER DOZE

## 2024-08-09 ASSESSMENT — ENCOUNTER SYMPTOMS
EYES NEGATIVE: 1
RESPIRATORY NEGATIVE: 1
GASTROINTESTINAL NEGATIVE: 1
ALLERGIC/IMMUNOLOGIC NEGATIVE: 1

## 2024-09-10 DIAGNOSIS — M62.838 MUSCLE SPASMS OF NECK: ICD-10-CM

## 2024-09-13 NOTE — PROGRESS NOTES
Progress note     Chief Complaint   Patient presents with    Wound Check     Left Hand        HPI:   Frankie Arreguin is a 61 y.o. male who presents with a dog bite on his left ear as well as multiple lacerations on both hands including the thumb and fingers. Patient dates that it occurred on Friday. Patient is here today for evaluation treatment. Patient states that part of the ear was removed during the bite. Patient is on antibiotics currently. Patient is a . Patient was referred from the emergency room. Patient is here for evaluation and treatment. Medications:     Current Outpatient Medications   Medication Sig Dispense Refill    NEEDLE, DISP, 22 G 22G X 1-1/2\" MISC 1 Device by Does not apply route every 30 days 25 each 1    NEEDLE, DISP, 18 G (BD DISP NEEDLES) 18G X 1-1/2\" MISC 1 Device by Does not apply route every 30 days 25 each 1    linagliptin (TRADJENTA) 5 MG tablet TAKE 1 TABLET BY MOUTH ONE TIME A DAY 30 tablet 5    omeprazole (PRILOSEC) 20 MG delayed release capsule TAKE 1 CAPSULE BY MOUTH ONE TIME A DAY 90 capsule 1    BELBUCA 300 MCG FILM Place 300 mcg inside cheek 2 times daily.  hydroCHLOROthiazide (MICROZIDE) 12.5 MG capsule 1 po qd 90 capsule 3    traZODone (DESYREL) 150 MG tablet TAKE ONE TABLET BY MOUTH EVERY EVENING 90 tablet 3    atorvastatin (LIPITOR) 40 MG tablet Take 1 tablet by mouth daily 90 tablet 3    metFORMIN (GLUCOPHAGE) 1000 MG tablet Take 1 tablet by mouth 2 times daily (with meals) 180 tablet 3    carvedilol (COREG) 12.5 MG tablet Take 1 tablet by mouth 2 times daily (with meals) 180 tablet 3    clopidogrel (PLAVIX) 75 MG tablet Take 1 tablet by mouth daily 90 tablet 3    meloxicam (MOBIC) 15 MG tablet Take 1 tablet by mouth daily 90 tablet 3    testosterone cypionate (DEPOTESTOTERONE CYPIONATE) 200 MG/ML injection       Testosterone Cypionate 200 MG/ML KIT Inject 200 mg into the muscle every 30 days for 30 days.  please include syringes 1 kit 5    tiZANidine (ZANAFLEX) 4 MG tablet TAKE ONE TABLET BY MOUTH EVERY EVENING AS NEEDED FOR MUSCLE SPASMS 30 tablet 3    NEEDLE, DISP, 18 G 18G X 1\" MISC 1 Syringe by Does not apply route every 30 days 25 each 0    NEEDLE, DISP, 22 G 22G X 1-1/2\" MISC 1 Syringe by Does not apply route every 30 days 25 each 0    topiramate (TOPAMAX) 200 MG tablet       HYDROcodone-acetaminophen (NORCO)  MG per tablet Take 1 tablet by mouth every 6 hours as needed for Pain. Joya Trevino Multiple Vitamin (MULTI VITAMIN DAILY PO) Take by mouth      nitroGLYCERIN (NITROSTAT) 0.4 MG SL tablet Place 0.4 mg under the tongue      aspirin 325 MG tablet Take 325 mg by mouth daily. No current facility-administered medications for this encounter. Allergies: Allergies   Allergen Reactions    Pcn [Penicillins] Other (See Comments)     Unknown rx     Review of Systems:   Constitutional: Negative for fever, chills, fatigue and unexpected weight change. HENT: Negative for hearing loss, sore throat and facial swelling. Eyes: Negative for pain and discharge. Respiratory: No history of sleep apnea. Cardiovascular: Patient with a history of CHF, coronary artery disease, cardiac myopathy, peripheral vascular disease. Patient with hypercholesterolemia, hyperlipidemia, hypertension, ischemic cardiomyopathy. Gastrointestinal: Negative for nausea, vomiting, diarrhea and constipation. Skin: Negative for pallor and rash. Neurological: Patient has chronic back pain. .   Hematological: Does not bruise/bleed easily. Psychiatric/Behavioral: Negative for behavioral problems. The patient is not nervous/anxious.       Past Medical History:   Diagnosis Date    Acquired trigger finger 6/5/2018    CAD (coronary artery disease)     [facility]/ involving coronary bypass graft of native heart with unstable angina pectoris    Cervical spondylosis     CH[facility]congest[facility] heart failure) [facility])     Chronic back pain     on 11/19/18 pt states is presently in pain mgmnt    Contusion of elbow 6/5/2018    Contusion of knee 6/5/2018    Contusion of shoulder region 6/5/2018    Diabetic foot ulcer with osteomyelitis ([address] 1/9/2018    Diabetic ulcer of toe associated with type 2 diabetes mellitus, with fat layer exposed ([address] 1/31/2018    Hypercholesterolemia 12/5/2017    Hyperlipemia, mixed     Hypertension     Ischemic cardiomyopathy 5/7/2018    Non-pressure chronic ulcer of left lower leg with fat layer exposed ([address] 6/9/2014    Obesity     Obesity, Class III, BMI 40-49.9 (morbid obesity) ([address] 4/6/2015    Obstructive sleep apnea syndrome     Osteoarthritis of carpometacarpal (CMC) joint of thumb 6/5/2018    Osteoarthritis of knee 6/5/2018    Peripheral vascular disease ([address]     with venous stasis and ulcerations. Dr Muller Said Presence of coronary angioplasty implant and graft 9/11/2009    Sleep apnea     Dr Rangel Face of knee and leg 6/5/2018    Sprain of shoulder and upper arm 6/5/2018    Type II or unspecified type diabetes mellitus without mention of complication, not stated as uncontrolled     Ulcer of left foot, with fat layer exposed ([address] 1/9/2018    Unspecified sleep apnea     Dr Anmol Fox CPAP    Venous insufficiency of both lower extremities 1/31/2018     Past Surgical History:   Procedure Laterality Date    CARPAL TUNNEL RELEASE Right 3/22/13    Dr. Vernon Bobby.     CORONARY ANGIOPLASTY WITH STENT PLACEMENT  2002,2006.2009    X 3 separately    CORONARY ARTERY BYPASS GRAFT  2/26/07    Dr Humphrey Stearns (X 3)    NECK SURGERY  2010    cervical stenoses C5-C6-C7and partial T1 laminectomy with fusion of C6-7    OTHER SURGICAL HISTORY      wound care ulcers of legs (bilaterally) with Dr Pasquale Cardona  age 11    VEIN SURGERY      closure of peripherator veins of legs, Dr Cisneros Beam History     Socioeconomic History    Marital status:      Spouse name: Not on file    Number of children: Not on file    Years of education: Not on file    Highest education level: Not on file   Occupational History    Not on file   Tobacco Use    Smoking status: Never Smoker    Smokeless tobacco: Never Used   Vaping Use    Vaping Use: Never used   Substance and Sexual Activity    Alcohol use: Not Currently     Comment: rare social, non past 5 months    Drug use: No    Sexual activity: Yes     Partners: Female     Comment: spouse   Other Topics Concern    Not on file   Social History Narrative    Not on file     Social Determinants of Health     Financial Resource Strain:     Difficulty of Paying Living Expenses:    Food Insecurity:     Worried About Running Out of Food in the Last Year:     [address] in the Last Year:    Transportation Needs:     Lack of Transportation (Medical):  Lack of Transportation (Non-Medical):    Physical Activity:     Days of Exercise per Week:     Minutes of Exercise per Session:    Stress:     Feeling of Stress :    Social Connections:     Frequency of Communication with Friends and Family:     Frequency of Social Gatherings with Friends and Family:     Attends [employer] Services:     Active Member of Clubs or Organizations:     Attends Club or Organization Meetings:     Marital Status:    Intimate Partner Violence:     Fear of Current or Ex-Partner:     Emotionally Abused:     Physically Abused:     Sexually Abused:      Family History   Problem Relation Age of Onset    Cancer Mother         lung    Diabetes Father     Heart Disease Father     High Blood Pressure Father     Diabetes Brother     Heart Disease Brother     High Blood Pressure Brother      Physical Exam:   /74   Pulse 80   Temp 97.5 °F (36.4 °C)   Resp 16    There is no height or weight on file to calculate BMI. Physical Exam   Nursing note and vitals reviewed. Constitutional: Oriented to person, place, and time.  Appears well-developed and well-nourished. No distress. Head: Normocephalic and atraumatic. Eyes: Conjunctivae and EOM are normal.   Pulmonary/Chest: Effort normal. No respiratory distress. Neurological: Alert and oriented to person, place, and time. Musculoskeletal: Patient has an extensor lag on the ring finger. Psychiatric: Normal mood and affect. Behavior is normal    Skin:  Wound 05/24/21 Hand Left #1 CLUSTER (Active)   Wound Image   05/24/21 0909   Wound Etiology Traumatic 07/12/21 0850   Dressing Status Old drainage noted;New drainage noted 07/12/21 0850   Wound Cleansed Cleansed with saline 07/12/21 0850   Dressing/Treatment Other (comment) 05/24/21 0950   Wound Length (cm) 2.8 cm 07/12/21 0850   Wound Width (cm) 2.3 cm 07/12/21 0850   Wound Depth (cm) 0.1 cm 07/12/21 0850   Wound Surface Area (cm^2) 6.44 cm^2 07/12/21 0850   Change in Wound Size % (l*w) 88.05 07/12/21 0850   Wound Volume (cm^3) 0.644 cm^3 07/12/21 0850   Wound Healing % 94 07/12/21 0850   Post-Procedure Length (cm) 4.4 cm 06/21/21 0833   Post-Procedure Width (cm) 3.5 cm 06/21/21 0833   Post-Procedure Depth (cm) 0.6 cm 06/21/21 0833   Post-Procedure Surface Area (cm^2) 15.4 cm^2 06/21/21 0833   Post-Procedure Volume (cm^3) 9.24 cm^3 06/21/21 0833   Wound Assessment Granulation tissue 07/12/21 0850   Drainage Amount Moderate 07/12/21 0850   Drainage Description Serosanguinous 07/12/21 0850   Odor None 07/12/21 0850   Trudi-wound Assessment Blanchable erythema 07/12/21 0850   Margins Defined edges 07/12/21 0850   Wound Thickness Description not for Pressure Injury Full thickness 07/12/21 0850   Number of days: 49          Procedure Note  Indications:  Based on my examination of this patient's wound(s)/ulcer(s) today, debridement is required to promote healing and evaluate the wound base.     Performed by: Nguyen Beach MD    Consent obtained:  Yes    Time out taken:  Yes    Pain Control: Anesthetic  Anesthetic: 2% Lidocaine Gel Topical Debridement:Excisional Debridement    Using curette, scissors and forceps the wound(s)/ulcer(s) was/were sharply debrided down through and including the removal of skin and subcutaneous tissue    Devitalized Tissue Debrided:  necrotic/eschar    Pre Debridement Measurements:  Are located in the Wound/Ulcer Documentation Flow Sheet    Wound/Ulcer #: 1           Percent of Wound(s)/Ulcer(s) Debrided: 20%    Total Surface Area Debrided: 6.44 sq cm to the subcutaneous tissue. Of the left hand dorsum      Diabetic/Pressure/Non Pressure Ulcers only:  Ulcer: Non-Pressure ulcer, fat layer exposed      Estimated Blood Loss:  Minimal    Hemostasis Achieved:  by pressure    Procedural Pain:  1  / 10     Post Procedural Pain:  0 / 10     Response to treatment:  Well tolerated by patient. Imaging:   @[address]@        Impression/Plan:      Diagnosis Orders   1.  Dog bite, initial encounter       Patient Active Problem List   Diagnosis    Hyperlipemia, mixed    Sleep apnea    Peripheral vascular disease ([address]    CAD (coronary artery disease)    [facility]    CTS (carpal tunnel syndrome)    Non-pressure chronic ulcer of left lower leg with fat layer exposed ([address]    Hypotension    Obesity, Class III, BMI 40-49.9 (morbid obesity) ([address]    Essential hypertension    DDD (degenerative disc disease), cervical    DJD (degenerative joint disease), cervical    Primary osteoarthritis involving multiple joints    Paresthesias in left hand    Uncontrolled type 2 diabetes mellitus without complication, without long-term current use of insulin    Coronary artery disease involving coronary bypass graft of native heart without angina pectoris    Sleep apnea    Cervical spondylosis    Obesity    Long term current use of antithrombotics/antiplatelets    BMI 31.5-60.0, adult ([address]    H/O cervical spine surgery    Long term (current) use of non-steroidal anti-inflammatories (nsaid)    Chronic prescription opiate use    Severe comorbid illness    Hx of cervical spine surgery    Chronic neck pain    Candidal balanitis    Cellulitis of third toe, left    Nail avulsion, toe, initial encounter    Cellulitis of second toe, right    Abnormal nuclear stress test    Abnormal stress test    Hypercholesterolemia    Diabetic foot ulcer with osteomyelitis (HCC)    Ulcer of left foot, with fat layer exposed ([address]    Acquired trigger finger    Contusion of elbow    Contusion of shoulder region    Contusion of knee    Degeneration of lumbar intervertebral disc    Diabetic ulcer of toe associated with type 2 diabetes mellitus, with fat layer exposed ([address]    History of coronary artery bypass graft x 3    Ischemic cardiomyopathy    Osteoarthritis of knee    Osteoarthritis of carpometacarpal (CMC) joint of thumb    Sprain of knee and leg    Sprain of shoulder and upper arm    Presence of coronary angioplasty implant and graft    Venous insufficiency of both lower extremities    Dog bite     Plan:    The tendon is no longer exposed. Continue Blessing. Follow-up in 1 month.        Electronically signed by:  Chris Benavides MD 7/12/2021 English

## 2024-09-17 ENCOUNTER — TELEMEDICINE (OUTPATIENT)
Dept: FAMILY MEDICINE CLINIC | Age: 64
End: 2024-09-17
Payer: COMMERCIAL

## 2024-09-17 DIAGNOSIS — G89.29 CHRONIC RIGHT SHOULDER PAIN: Primary | ICD-10-CM

## 2024-09-17 DIAGNOSIS — M25.511 CHRONIC RIGHT SHOULDER PAIN: Primary | ICD-10-CM

## 2024-09-17 DIAGNOSIS — E66.01 OBESITY, CLASS III, BMI 40-49.9 (MORBID OBESITY) (HCC): ICD-10-CM

## 2024-09-17 PROCEDURE — 99213 OFFICE O/P EST LOW 20 MIN: CPT | Performed by: FAMILY MEDICINE

## 2024-09-17 RX ORDER — PHENTERMINE HYDROCHLORIDE 37.5 MG/1
37.5 CAPSULE ORAL EVERY MORNING
Qty: 30 CAPSULE | Refills: 0 | Status: SHIPPED | OUTPATIENT
Start: 2024-10-17 | End: 2024-11-16

## 2024-09-17 RX ORDER — PHENTERMINE HYDROCHLORIDE 37.5 MG/1
37.5 CAPSULE ORAL EVERY MORNING
Qty: 30 CAPSULE | Refills: 0 | Status: SHIPPED | OUTPATIENT
Start: 2024-09-17 | End: 2024-10-17

## 2024-09-17 RX ORDER — PHENTERMINE HYDROCHLORIDE 37.5 MG/1
37.5 CAPSULE ORAL EVERY MORNING
Qty: 30 CAPSULE | Refills: 0 | Status: SHIPPED | OUTPATIENT
Start: 2024-11-17 | End: 2024-12-17

## 2024-10-12 ENCOUNTER — HOSPITAL ENCOUNTER (OUTPATIENT)
Dept: GENERAL RADIOLOGY | Age: 64
Discharge: HOME | End: 2024-10-14
Payer: COMMERCIAL

## 2024-10-12 ENCOUNTER — HOSPITAL ENCOUNTER (OUTPATIENT)
Age: 64
Discharge: HOME | End: 2024-10-14
Payer: COMMERCIAL

## 2024-10-12 DIAGNOSIS — G89.29 CHRONIC RIGHT SHOULDER PAIN: ICD-10-CM

## 2024-10-12 DIAGNOSIS — M25.511 CHRONIC RIGHT SHOULDER PAIN: ICD-10-CM

## 2024-10-12 PROCEDURE — 73030 X-RAY EXAM OF SHOULDER: CPT

## 2024-11-08 NOTE — PLAN OF CARE
Problem: Chronic Conditions and Co-morbidities  Goal: Patient's chronic conditions and co-morbidity symptoms are monitored and maintained or improved  Outcome: Progressing     Problem: Wound:  Goal: Will show signs of wound healing; wound closure and no evidence of infection  Description: Will show signs of wound healing; wound closure and no evidence of infection  Outcome: Progressing     Problem: Falls - Risk of:  Goal: Will remain free from falls  Description: Will remain free from falls  Outcome: Progressing      "I want to go to a substance program"

## 2024-11-11 ENCOUNTER — OFFICE VISIT (OUTPATIENT)
Dept: FAMILY MEDICINE CLINIC | Age: 64
End: 2024-11-11
Payer: COMMERCIAL

## 2024-11-11 VITALS
DIASTOLIC BLOOD PRESSURE: 61 MMHG | BODY MASS INDEX: 27.8 KG/M2 | HEART RATE: 62 BPM | SYSTOLIC BLOOD PRESSURE: 102 MMHG | WEIGHT: 205 LBS

## 2024-11-11 DIAGNOSIS — G89.29 CHRONIC RIGHT SHOULDER PAIN: Primary | ICD-10-CM

## 2024-11-11 DIAGNOSIS — M25.511 CHRONIC RIGHT SHOULDER PAIN: Primary | ICD-10-CM

## 2024-11-11 DIAGNOSIS — G89.29 CHRONIC LEFT SHOULDER PAIN: ICD-10-CM

## 2024-11-11 DIAGNOSIS — M25.512 CHRONIC LEFT SHOULDER PAIN: ICD-10-CM

## 2024-11-11 PROCEDURE — 3078F DIAST BP <80 MM HG: CPT | Performed by: FAMILY MEDICINE

## 2024-11-11 PROCEDURE — 3074F SYST BP LT 130 MM HG: CPT | Performed by: FAMILY MEDICINE

## 2024-11-11 PROCEDURE — 99213 OFFICE O/P EST LOW 20 MIN: CPT | Performed by: FAMILY MEDICINE

## 2024-11-11 RX ORDER — TRIAMCINOLONE ACETONIDE 40 MG/ML
40 INJECTION, SUSPENSION INTRA-ARTICULAR; INTRAMUSCULAR ONCE
Status: COMPLETED | OUTPATIENT
Start: 2024-11-11 | End: 2024-11-11

## 2024-11-11 RX ORDER — LIDOCAINE HYDROCHLORIDE 10 MG/ML
3 INJECTION, SOLUTION INFILTRATION; PERINEURAL ONCE
Status: COMPLETED | OUTPATIENT
Start: 2024-11-11 | End: 2024-11-11

## 2024-11-11 RX ADMIN — LIDOCAINE HYDROCHLORIDE 3 ML: 10 INJECTION, SOLUTION INFILTRATION; PERINEURAL at 13:08

## 2024-11-11 RX ADMIN — TRIAMCINOLONE ACETONIDE 40 MG: 40 INJECTION, SUSPENSION INTRA-ARTICULAR; INTRAMUSCULAR at 13:09

## 2024-11-11 NOTE — PROGRESS NOTES
injection instructions given to patient.    Electronically signed by Seema Hameed DO on 11/11/2024 at 11:55 AM

## 2024-12-16 DIAGNOSIS — E66.01 OBESITY, CLASS III, BMI 40-49.9 (MORBID OBESITY): ICD-10-CM

## 2024-12-16 RX ORDER — PHENTERMINE HYDROCHLORIDE 37.5 MG/1
37.5 CAPSULE ORAL EVERY MORNING
Qty: 30 CAPSULE | Refills: 0 | Status: SHIPPED | OUTPATIENT
Start: 2024-12-16 | End: 2025-01-15

## 2024-12-16 NOTE — TELEPHONE ENCOUNTER
Ac Alcantar is calling to request a refill on the following medication(s):    Last Visit Date (If Applicable):  11/11/2024    Next Visit Date:    Visit date not found    Medication Request:  Requested Prescriptions     Pending Prescriptions Disp Refills    phentermine 37.5 MG capsule 30 capsule 0     Sig: Take 1 capsule by mouth every morning for 30 days.

## 2025-01-13 DIAGNOSIS — E66.01 OBESITY, CLASS III, BMI 40-49.9 (MORBID OBESITY): ICD-10-CM

## 2025-01-13 RX ORDER — PHENTERMINE HYDROCHLORIDE 37.5 MG/1
37.5 CAPSULE ORAL EVERY MORNING
Qty: 30 CAPSULE | Refills: 0 | Status: SHIPPED | OUTPATIENT
Start: 2025-01-13 | End: 2025-02-12

## 2025-01-24 NOTE — TELEPHONE ENCOUNTER
Ac Alcantar is calling to request a refill on the following medication(s):    Last Visit Date (If Applicable):  11/11/2024    Next Visit Date:    Visit date not found    Medication Request:  Requested Prescriptions     Pending Prescriptions Disp Refills    metFORMIN (GLUCOPHAGE) 1000 MG tablet 60 tablet 5     Sig: Take 1 tablet by mouth 2 times daily (with meals)               How Severe Are Your Spot(S)?: mild What Is The Reason For Today's Visit?: Full Body Skin Examination What Is The Reason For Today's Visit? (Being Monitored For X): the development of a new lesion

## 2025-01-30 RX ORDER — TOPIRAMATE 200 MG/1
200 TABLET, FILM COATED ORAL 2 TIMES DAILY
Qty: 60 TABLET | Refills: 5 | Status: SHIPPED | OUTPATIENT
Start: 2025-01-30 | End: 2025-07-29

## 2025-02-10 SDOH — ECONOMIC STABILITY: INCOME INSECURITY: IN THE LAST 12 MONTHS, WAS THERE A TIME WHEN YOU WERE NOT ABLE TO PAY THE MORTGAGE OR RENT ON TIME?: NO

## 2025-02-10 SDOH — ECONOMIC STABILITY: FOOD INSECURITY: WITHIN THE PAST 12 MONTHS, THE FOOD YOU BOUGHT JUST DIDN'T LAST AND YOU DIDN'T HAVE MONEY TO GET MORE.: NEVER TRUE

## 2025-02-10 SDOH — ECONOMIC STABILITY: FOOD INSECURITY: WITHIN THE PAST 12 MONTHS, YOU WORRIED THAT YOUR FOOD WOULD RUN OUT BEFORE YOU GOT MONEY TO BUY MORE.: NEVER TRUE

## 2025-02-10 SDOH — ECONOMIC STABILITY: TRANSPORTATION INSECURITY
IN THE PAST 12 MONTHS, HAS THE LACK OF TRANSPORTATION KEPT YOU FROM MEDICAL APPOINTMENTS OR FROM GETTING MEDICATIONS?: NO

## 2025-02-10 NOTE — TELEPHONE ENCOUNTER
Ac Alcantar is calling to request a refill on the following medication(s):    Last Visit Date (If Applicable):  11/11/2024    Next Visit Date:    2/11/2025    Medication Request:  Requested Prescriptions     Pending Prescriptions Disp Refills    SITagliptin (JANUVIA) 100 MG tablet 90 tablet 3     Sig: Take 1 tablet by mouth every morning (before breakfast)

## 2025-02-11 ENCOUNTER — TELEMEDICINE (OUTPATIENT)
Dept: FAMILY MEDICINE CLINIC | Age: 65
End: 2025-02-11
Payer: COMMERCIAL

## 2025-02-11 DIAGNOSIS — E66.01 OBESITY, CLASS III, BMI 40-49.9 (MORBID OBESITY): ICD-10-CM

## 2025-02-11 DIAGNOSIS — I50.9 OTHER CONGESTIVE HEART FAILURE (HCC): ICD-10-CM

## 2025-02-11 DIAGNOSIS — E11.69 TYPE 2 DIABETES MELLITUS WITH OTHER SPECIFIED COMPLICATION, WITHOUT LONG-TERM CURRENT USE OF INSULIN (HCC): Primary | ICD-10-CM

## 2025-02-11 DIAGNOSIS — L97.512 ULCER OF TOE OF RIGHT FOOT, WITH FAT LAYER EXPOSED (HCC): ICD-10-CM

## 2025-02-11 PROCEDURE — 99214 OFFICE O/P EST MOD 30 MIN: CPT | Performed by: FAMILY MEDICINE

## 2025-02-11 RX ORDER — ATORVASTATIN CALCIUM 40 MG/1
40 TABLET, FILM COATED ORAL DAILY
Qty: 90 TABLET | Refills: 3 | Status: SHIPPED | OUTPATIENT
Start: 2025-02-11

## 2025-02-11 RX ORDER — ARIPIPRAZOLE 20 MG/1
20 TABLET ORAL DAILY
Qty: 90 TABLET | Refills: 3 | Status: SHIPPED | OUTPATIENT
Start: 2025-02-11

## 2025-02-11 RX ORDER — PHENTERMINE HYDROCHLORIDE 37.5 MG/1
37.5 TABLET ORAL
Qty: 30 TABLET | Refills: 0 | Status: SHIPPED | OUTPATIENT
Start: 2025-04-11 | End: 2025-05-11

## 2025-02-11 RX ORDER — PHENTERMINE HYDROCHLORIDE 37.5 MG/1
37.5 TABLET ORAL
Qty: 30 TABLET | Refills: 0 | Status: SHIPPED | OUTPATIENT
Start: 2025-03-11 | End: 2025-04-10

## 2025-02-11 RX ORDER — TOPIRAMATE 200 MG/1
200 TABLET, FILM COATED ORAL 2 TIMES DAILY
Qty: 180 TABLET | Refills: 3 | Status: SHIPPED | OUTPATIENT
Start: 2025-02-11 | End: 2025-08-10

## 2025-02-11 RX ORDER — PHENTERMINE HYDROCHLORIDE 37.5 MG/1
37.5 CAPSULE ORAL EVERY MORNING
Qty: 30 CAPSULE | Refills: 0 | Status: SHIPPED | OUTPATIENT
Start: 2025-02-11 | End: 2025-03-13

## 2025-02-11 NOTE — ASSESSMENT & PLAN NOTE
Chronic, at goal (stable), continue current treatment plan    Orders:    phentermine 37.5 MG capsule; Take 1 capsule by mouth every morning for 30 days.    phentermine (ADIPEX-P) 37.5 MG tablet; Take 1 tablet by mouth every morning (before breakfast) for 30 days.    phentermine (ADIPEX-P) 37.5 MG tablet; Take 1 tablet by mouth every morning (before breakfast) for 30 days.

## 2025-02-11 NOTE — PROGRESS NOTES
Ac Alcantar, was evaluated through a synchronous (real-time) audio-video encounter. The patient (or guardian if applicable) is aware that this is a billable service, which includes applicable co-pays. This Virtual Visit was conducted with patient's (and/or legal guardian's) consent. Patient identification was verified, and a caregiver was present when appropriate.   The patient was located at Home: [address] [place] [id number]  Provider was located at Home (Appt Dept State): [place]  Confirm you are appropriately licensed, registered, or certified to deliver care in the state where the patient is located as indicated above. If you are not or unsure, please re-schedule the visit: Yes, I confirm.     Ac Alcantar (:  1960) is a Established patient, presenting virtually for evaluation of the following:      Below is the assessment and plan developed based on review of pertinent history, physical exam, labs, studies, and medications.     Assessment & Plan  Type 2 diabetes mellitus with other specified complication, without long-term current use of insulin (HCC)           Obesity, Class III, BMI 40-49.9 (morbid obesity)   Chronic, at goal (stable), continue current treatment plan    Orders:    phentermine 37.5 MG capsule; Take 1 capsule by mouth every morning for 30 days.    phentermine (ADIPEX-P) 37.5 MG tablet; Take 1 tablet by mouth every morning (before breakfast) for 30 days.    phentermine (ADIPEX-P) 37.5 MG tablet; Take 1 tablet by mouth every morning (before breakfast) for 30 days.    Ulcer of toe of right foot, with fat layer exposed (HCC)            Other congestive heart failure (HCC)                     Subjective   HPI  Review of Systems     History of Present Illness  The patient presents via virtual visit for a medication refill.    He is seeking a refill of his Adipex prescription. He has been experiencing weight loss, with a current weight of 195 pounds, indicating a loss of

## 2025-02-27 ENCOUNTER — OFFICE VISIT (OUTPATIENT)
Dept: PODIATRY | Age: 65
End: 2025-02-27

## 2025-02-27 VITALS — WEIGHT: 195 LBS | HEIGHT: 72 IN | BODY MASS INDEX: 26.41 KG/M2

## 2025-02-27 DIAGNOSIS — B35.1 DERMATOPHYTOSIS OF NAIL: ICD-10-CM

## 2025-02-27 DIAGNOSIS — M79.672 PAIN IN BOTH FEET: ICD-10-CM

## 2025-02-27 DIAGNOSIS — M79.671 PAIN IN BOTH FEET: ICD-10-CM

## 2025-02-27 DIAGNOSIS — E11.51 TYPE II DIABETES MELLITUS WITH PERIPHERAL CIRCULATORY DISORDER (HCC): Primary | ICD-10-CM

## 2025-02-27 DIAGNOSIS — Z89.431 HISTORY OF AMPUTATION OF FOOT, RIGHT (HCC): ICD-10-CM

## 2025-02-27 DIAGNOSIS — I73.9 PVD (PERIPHERAL VASCULAR DISEASE): ICD-10-CM

## 2025-02-27 DIAGNOSIS — E11.42 DIABETIC POLYNEUROPATHY ASSOCIATED WITH TYPE 2 DIABETES MELLITUS (HCC): ICD-10-CM

## 2025-02-27 NOTE — PROGRESS NOTES
1 tablet by mouth 2 times daily (with meals) 180 tablet 3    phentermine 37.5 MG capsule Take 1 capsule by mouth every morning for 30 days. 30 capsule 0    topiramate (TOPAMAX) 200 MG tablet Take 1 tablet by mouth 2 times daily 180 tablet 3    atorvastatin (LIPITOR) 40 MG tablet Take 1 tablet by mouth daily 90 tablet 3    ARIPiprazole (ABILIFY) 20 MG tablet Take 1 tablet by mouth daily 90 tablet 3    [START ON 3/11/2025] phentermine (ADIPEX-P) 37.5 MG tablet Take 1 tablet by mouth every morning (before breakfast) for 30 days. 30 tablet 0    [START ON 4/11/2025] phentermine (ADIPEX-P) 37.5 MG tablet Take 1 tablet by mouth every morning (before breakfast) for 30 days. 30 tablet 0    SITagliptin (JANUVIA) 100 MG tablet Take 1 tablet by mouth every morning (before breakfast) 90 tablet 3    tiZANidine (ZANAFLEX) 4 MG tablet TAKE ONE TABLET BY MOUTH AT BEDTIME AS NEEDED FOR MUSCLE SPASMS 30 tablet 3    pantoprazole (PROTONIX) 40 MG tablet Take 1 tablet by mouth daily (with breakfast) 90 tablet 3    dapagliflozin (FARXIGA) 10 MG tablet Take 1 tablet by mouth every morning 90 tablet 3    carvedilol (COREG) 12.5 MG tablet Take 1 tablet by mouth 2 times daily (with meals) 180 tablet 3    clopidogrel (PLAVIX) 75 MG tablet take 1 tablet by mouth once daily 90 tablet 3    hydroCHLOROthiazide 12.5 MG capsule Take 1 capsule by mouth every morning 90 capsule 2    meloxicam (MOBIC) 15 MG tablet Take 1 tablet by mouth daily 90 tablet 3    Glucosamine-Chondroit-Vit C-Mn (GLUCOSAMINE CHONDR 1500 COMPLX) CAPS Take by mouth daily      chlorhexidine (PERIDEX) 0.12 % solution       B-D SYRINGE/NEEDLE 3CC/22GX1.5 22G X 1-1/2\" 3 ML MISC Every 30 days 100 each 3    NEEDLE, DISP, 18 G (BD DISP NEEDLES) 18G X 1-1/2\" MISC 1 Device by Does not apply route every 30 days 25 each 1    Misc Natural Products (GLUCOSAMINE CHOND MSM FORMULA PO) Take 2 tablets by mouth daily      Multiple Vitamin (MULTI VITAMIN DAILY PO) Take by mouth      nitroGLYCERIN

## 2025-03-17 DIAGNOSIS — E11.69 TYPE 2 DIABETES MELLITUS WITH OTHER SPECIFIED COMPLICATION, WITHOUT LONG-TERM CURRENT USE OF INSULIN: ICD-10-CM

## 2025-03-18 RX ORDER — DAPAGLIFLOZIN 10 MG/1
10 TABLET, FILM COATED ORAL EVERY MORNING
Qty: 90 TABLET | Refills: 3 | Status: SHIPPED | OUTPATIENT
Start: 2025-03-18

## 2025-04-01 NOTE — DISCHARGE INSTRUCTIONS
[facility] -Phone: [phone] Fax: [phone]   Visit  Discharge Instructions / Physician Orders    DATE: 4/3/2025     Home Care:      SUPPLIES ORDERED THRU:      Wound Location: Left Middle Finger     Cleanse with: Liquid antibacterial soap and water, rinse well (do not soak)     Dressing Orders: Silvercel to wound, dry dressing     Frequency: Daily     Additional Orders: Increase protein to diet (meat, cheese, eggs, fish, peanut butter, nuts and beans)  Culture Taken 4/3/25  Labs Ordered 4/3/25    Your next appointment with Wound Care Center is in 1 week     (Please note your next appointment above and if you are unable to keep, kindly give a 24 hour notice. Thank you.)  If more than 15 min late we cannot guarantee you will be seen due to clinician schedule  Per Policy, Excessive cancellation will call for dismissal from program.     If you experience any of the following, please call the Wound Care Center during business hours:  [phone]     * Increase in Pain  * Temperature over 101  * Increase in drainage from your wound  * Drainage with a foul odor  * Bleeding  * Increase in swelling  * Need for compression bandage changes due to slippage, breakthrough drainage.     If you need medical attention outside of the business hours of the Wound Care Centers please contact your PCP or go to the an urgent care or emergency department     The information contained in the After Visit Summary has been reviewed with me, the patient and/or responsible adult, by my health care provider(s). I had the opportunity to ask questions regarding this information. I have elected to receive;      []After Visit Summary  [x]Comprehensive Discharge Instruction      Patient signature______________________________________Date:________  Electronically signed by Nathaniel Wheatley RN on 4/3/2025 at 8:51 AM  Electronically signed by Evans Carroll MD on 4/3/2025 at 9:03 AM

## 2025-04-03 ENCOUNTER — HOSPITAL ENCOUNTER (OUTPATIENT)
Dept: WOUND CARE | Age: 65
Discharge: HOME | End: 2025-04-03
Payer: COMMERCIAL

## 2025-04-03 ENCOUNTER — HOSPITAL ENCOUNTER (OUTPATIENT)
Age: 65
Discharge: HOME | End: 2025-04-03
Payer: COMMERCIAL

## 2025-04-03 VITALS
HEART RATE: 81 BPM | DIASTOLIC BLOOD PRESSURE: 86 MMHG | SYSTOLIC BLOOD PRESSURE: 132 MMHG | RESPIRATION RATE: 16 BRPM | TEMPERATURE: 98.6 F

## 2025-04-03 DIAGNOSIS — S61.209A OPEN WOUND OF FINGER OF RIGHT HAND, INITIAL ENCOUNTER: ICD-10-CM

## 2025-04-03 DIAGNOSIS — E11.42 DIABETIC POLYNEUROPATHY ASSOCIATED WITH TYPE 2 DIABETES MELLITUS: ICD-10-CM

## 2025-04-03 DIAGNOSIS — L97.512 ULCER OF TOE OF RIGHT FOOT, WITH FAT LAYER EXPOSED (HCC): ICD-10-CM

## 2025-04-03 DIAGNOSIS — S61.209A OPEN WOUND OF FINGER OF RIGHT HAND, INITIAL ENCOUNTER: Primary | ICD-10-CM

## 2025-04-03 DIAGNOSIS — M86.9 OSTEOMYELITIS OF LEFT HAND, UNSPECIFIED TYPE (HCC): ICD-10-CM

## 2025-04-03 LAB
BASOPHILS # BLD: 0.04 K/UL (ref 0–0.2)
BASOPHILS NFR BLD: 1 % (ref 0–2)
BUN SERPL-MCNC: 22 MG/DL (ref 8–23)
CREAT SERPL-MCNC: 0.8 MG/DL (ref 0.7–1.2)
CRP SERPL HS-MCNC: 3.1 MG/L (ref 0–5)
EOSINOPHIL # BLD: 0.17 K/UL (ref 0–0.44)
EOSINOPHILS RELATIVE PERCENT: 2 % (ref 1–4)
ERYTHROCYTE [DISTWIDTH] IN BLOOD BY AUTOMATED COUNT: 12.7 % (ref 11.8–14.4)
GFR, ESTIMATED: >90 ML/MIN/1.73M2
HCT VFR BLD AUTO: 47.1 % (ref 40.7–50.3)
HGB BLD-MCNC: 14.7 G/DL (ref 13–17)
IMM GRANULOCYTES # BLD AUTO: <0.03 K/UL (ref 0–0.3)
IMM GRANULOCYTES NFR BLD: 0 %
LYMPHOCYTES NFR BLD: 2.3 K/UL (ref 1.1–3.7)
LYMPHOCYTES RELATIVE PERCENT: 27 % (ref 24–43)
MCH RBC QN AUTO: 30.1 PG (ref 25.2–33.5)
MCHC RBC AUTO-ENTMCNC: 31.2 G/DL (ref 28.4–34.8)
MCV RBC AUTO: 96.5 FL (ref 82.6–102.9)
MONOCYTES NFR BLD: 0.74 K/UL (ref 0.1–1.2)
MONOCYTES NFR BLD: 9 % (ref 3–12)
NEUTROPHILS NFR BLD: 61 % (ref 36–65)
NEUTS SEG NFR BLD: 5.18 K/UL (ref 1.5–8.1)
NRBC BLD-RTO: 0 PER 100 WBC
PLATELET # BLD AUTO: 258 K/UL (ref 138–453)
PMV BLD AUTO: 9.1 FL (ref 8.1–13.5)
RBC # BLD AUTO: 4.88 M/UL (ref 4.21–5.77)
WBC OTHER # BLD: 8.5 K/UL (ref 3.5–11.3)

## 2025-04-03 PROCEDURE — 82565 ASSAY OF CREATININE: CPT

## 2025-04-03 PROCEDURE — 87186 SC STD MICRODIL/AGAR DIL: CPT

## 2025-04-03 PROCEDURE — 99213 OFFICE O/P EST LOW 20 MIN: CPT

## 2025-04-03 PROCEDURE — 86140 C-REACTIVE PROTEIN: CPT

## 2025-04-03 PROCEDURE — 84520 ASSAY OF UREA NITROGEN: CPT

## 2025-04-03 PROCEDURE — 36415 COLL VENOUS BLD VENIPUNCTURE: CPT

## 2025-04-03 PROCEDURE — 11042 DBRDMT SUBQ TIS 1ST 20SQCM/<: CPT

## 2025-04-03 PROCEDURE — 87070 CULTURE OTHR SPECIMN AEROBIC: CPT

## 2025-04-03 PROCEDURE — 87205 SMEAR GRAM STAIN: CPT

## 2025-04-03 PROCEDURE — 87075 CULTR BACTERIA EXCEPT BLOOD: CPT

## 2025-04-03 PROCEDURE — 85025 COMPLETE CBC W/AUTO DIFF WBC: CPT

## 2025-04-03 RX ORDER — LIDOCAINE 40 MG/G
CREAM TOPICAL PRN
OUTPATIENT
Start: 2025-04-03

## 2025-04-03 RX ORDER — LIDOCAINE HYDROCHLORIDE 20 MG/ML
JELLY TOPICAL PRN
OUTPATIENT
Start: 2025-04-03

## 2025-04-03 RX ORDER — LEVOFLOXACIN 750 MG/1
750 TABLET, FILM COATED ORAL DAILY
Qty: 7 TABLET | Refills: 0 | Status: SHIPPED | OUTPATIENT
Start: 2025-04-03 | End: 2025-04-10

## 2025-04-03 RX ORDER — TRIAMCINOLONE ACETONIDE 1 MG/G
OINTMENT TOPICAL PRN
OUTPATIENT
Start: 2025-04-03

## 2025-04-03 RX ORDER — BETAMETHASONE DIPROPIONATE 0.5 MG/G
CREAM TOPICAL PRN
OUTPATIENT
Start: 2025-04-03

## 2025-04-03 RX ORDER — SODIUM CHLOR/HYPOCHLOROUS ACID 0.033 %
SOLUTION, IRRIGATION IRRIGATION PRN
OUTPATIENT
Start: 2025-04-03

## 2025-04-03 RX ORDER — GINSENG 100 MG
CAPSULE ORAL PRN
OUTPATIENT
Start: 2025-04-03

## 2025-04-03 RX ORDER — NEOMYCIN/BACITRACIN/POLYMYXINB 3.5-400-5K
OINTMENT (GRAM) TOPICAL PRN
OUTPATIENT
Start: 2025-04-03

## 2025-04-03 RX ORDER — GENTAMICIN SULFATE 1 MG/G
OINTMENT TOPICAL PRN
OUTPATIENT
Start: 2025-04-03

## 2025-04-03 RX ORDER — CLOBETASOL PROPIONATE 0.5 MG/G
OINTMENT TOPICAL PRN
OUTPATIENT
Start: 2025-04-03

## 2025-04-03 RX ORDER — LIDOCAINE HYDROCHLORIDE 40 MG/ML
SOLUTION TOPICAL PRN
OUTPATIENT
Start: 2025-04-03

## 2025-04-03 RX ORDER — BACITRACIN ZINC AND POLYMYXIN B SULFATE 500; 1000 [USP'U]/G; [USP'U]/G
OINTMENT TOPICAL PRN
OUTPATIENT
Start: 2025-04-03

## 2025-04-03 RX ORDER — LIDOCAINE 50 MG/G
OINTMENT TOPICAL PRN
OUTPATIENT
Start: 2025-04-03

## 2025-04-03 RX ORDER — MUPIROCIN 20 MG/G
OINTMENT TOPICAL PRN
OUTPATIENT
Start: 2025-04-03

## 2025-04-03 RX ORDER — PANTOPRAZOLE SODIUM 40 MG/1
40 TABLET, DELAYED RELEASE ORAL
Qty: 90 TABLET | Refills: 3 | Status: SHIPPED | OUTPATIENT
Start: 2025-04-03

## 2025-04-03 RX ORDER — SILVER SULFADIAZINE 10 MG/G
CREAM TOPICAL PRN
OUTPATIENT
Start: 2025-04-03

## 2025-04-03 RX ORDER — DOXYCYCLINE 100 MG/1
100 CAPSULE ORAL 2 TIMES DAILY
COMMUNITY
Start: 2025-03-26 | End: 2025-04-05

## 2025-04-03 ASSESSMENT — PAIN SCALES - GENERAL: PAINLEVEL_OUTOF10: 4

## 2025-04-03 ASSESSMENT — PAIN DESCRIPTION - LOCATION: LOCATION: FINGER (COMMENT WHICH ONE)

## 2025-04-03 ASSESSMENT — PAIN DESCRIPTION - ORIENTATION: ORIENTATION: LEFT

## 2025-04-03 NOTE — PROGRESS NOTES
[facility]   Progress Note and Procedure Note      Ac Alcantar  MEDICAL RECORD NUMBER:  [id number]  AGE: 64 y.o.   GENDER: male  : 1960  EPISODE DATE:  4/3/2025    Subjective:     Chief Complaint   Patient presents with    Wound Check     Left hand         HISTORY of PRESENT ILLNESS HPI     Ac Alcantar is a 64 y.o. male who presents today for wound/ulcer evaluation.   History of Wound Context:   Wound/Ulcer Pain Timing/Severity: The patient is here for left middle finger open wound for 2 weeks with swelling and redness to the whole finger, limited range of motion to the DIP joint.  He denied significant trauma.  The patient was evaluated by primary care physician, had an x-ray of the left hand at [facility] that showed osteomyelitis according to the patient, imaging not available currently.  The patient was started on oral doxycycline that he is tolerating, had around 10 days course, no significant improvement.  Quality of pain: aching  Severity:  3 / 10   Associated Signs/Symptoms: edema and erythema    Ulcer Identification:  Ulcer Type: non-healing/non-surgical    Contributing Factors: diabetes    Acute Wound: N/A    PAST MEDICAL HISTORY        Diagnosis Date    Acquired trigger finger 2018    CAD (coronary artery disease)     [facility]/ involving coronary bypass graft of native heart with unstable angina pectoris    Cervical spondylosis     CHF (congestive heart failure) ([facility]     Chronic back pain     on 18 pt states is presently in pain mgmnt    Chronic ulcer of toe, left, with necrosis of bone ([facility]) 2022    Contusion of elbow 2018    Contusion of knee 2018    Contusion of shoulder region 2018    Diabetic foot ulcer with osteomyelitis ([facility]) 2018    Diabetic polyneuropathy associated with type 2 diabetes mellitus     Diabetic ulcer of toe associated with type 2 diabetes mellitus, with fat layer exposed 2018

## 2025-04-03 NOTE — TELEPHONE ENCOUNTER
Ac Alcantar is calling to request a refill on the following medication(s):    Last Visit Date (If Applicable):  2/11/2025    Next Visit Date:    Visit date not found    Medication Request:  Requested Prescriptions     Pending Prescriptions Disp Refills    pantoprazole (PROTONIX) 40 MG tablet 90 tablet 3     Sig: Take 1 tablet by mouth daily (with breakfast)

## 2025-04-05 LAB
MICROORGANISM SPEC CULT: ABNORMAL
MICROORGANISM/AGENT SPEC: ABNORMAL
MICROORGANISM/AGENT SPEC: ABNORMAL
SERVICE CMNT-IMP: ABNORMAL
SPECIMEN DESCRIPTION: ABNORMAL

## 2025-04-07 DIAGNOSIS — M15.0 PRIMARY OSTEOARTHRITIS INVOLVING MULTIPLE JOINTS: ICD-10-CM

## 2025-04-07 RX ORDER — MELOXICAM 15 MG/1
15 TABLET ORAL DAILY
Qty: 90 TABLET | Refills: 3 | Status: SHIPPED | OUTPATIENT
Start: 2025-04-07 | End: 2026-04-02

## 2025-04-08 NOTE — DISCHARGE INSTRUCTIONS
[facility] -Phone: [phone] Fax: [phone]    Visit  Discharge Instructions / Physician Orders     DATE: 4/10/2025     Home Care:      SUPPLIES ORDERED THRU:      Wound Location: Left Middle Finger     Cleanse with: Liquid antibacterial soap and water, rinse well (do not soak)     Dressing Orders: Silvercel to wound, dry dressing     Frequency: Daily     Additional Orders: Increase protein to diet (meat, cheese, eggs, fish, peanut butter, nuts and beans)  Culture Taken 4/3/25  Labs Ordered 4/3/25  Antibiotic Ordered 4/10/25     Your next appointment with Wound Care Center is in 1 week     (Please note your next appointment above and if you are unable to keep, kindly give a 24 hour notice. Thank you.)  If more than 15 min late we cannot guarantee you will be seen due to clinician schedule  Per Policy, Excessive cancellation will call for dismissal from program.     If you experience any of the following, please call the Wound Care Center during business hours:  [phone]     * Increase in Pain  * Temperature over 101  * Increase in drainage from your wound  * Drainage with a foul odor  * Bleeding  * Increase in swelling  * Need for compression bandage changes due to slippage, breakthrough drainage.     If you need medical attention outside of the business hours of the Wound Care Centers please contact your PCP or go to the an urgent care or emergency department     The information contained in the After Visit Summary has been reviewed with me, the patient and/or responsible adult, by my health care provider(s). I had the opportunity to ask questions regarding this information. I have elected to receive;      []After Visit Summary  [x]Comprehensive Discharge Instruction        Patient signature______________________________________Date:________  Electronically signed by Nathaniel Wheatley RN on 4/10/2025 at 8:59 AM  Electronically signed by Evans Carroll MD on 4/10/2025 at 9:06 AM

## 2025-04-10 ENCOUNTER — HOSPITAL ENCOUNTER (OUTPATIENT)
Dept: WOUND CARE | Age: 65
Discharge: HOME | End: 2025-04-10
Payer: COMMERCIAL

## 2025-04-10 VITALS
DIASTOLIC BLOOD PRESSURE: 71 MMHG | BODY MASS INDEX: 30.34 KG/M2 | WEIGHT: 224 LBS | TEMPERATURE: 96.7 F | SYSTOLIC BLOOD PRESSURE: 125 MMHG | RESPIRATION RATE: 18 BRPM | HEART RATE: 79 BPM | HEIGHT: 72 IN

## 2025-04-10 DIAGNOSIS — M86.141 OTHER ACUTE OSTEOMYELITIS OF RIGHT HAND: ICD-10-CM

## 2025-04-10 DIAGNOSIS — S61.209A OPEN WOUND OF FINGER OF RIGHT HAND, INITIAL ENCOUNTER: ICD-10-CM

## 2025-04-10 DIAGNOSIS — E11.42 DIABETIC POLYNEUROPATHY ASSOCIATED WITH TYPE 2 DIABETES MELLITUS: Primary | ICD-10-CM

## 2025-04-10 PROBLEM — M86.9 PYOGENIC INFLAMMATION OF BONE (HCC): Status: ACTIVE | Noted: 2025-04-10

## 2025-04-10 PROCEDURE — 11042 DBRDMT SUBQ TIS 1ST 20SQCM/<: CPT

## 2025-04-10 PROCEDURE — 11042 DBRDMT SUBQ TIS 1ST 20SQCM/<: CPT | Performed by: INTERNAL MEDICINE

## 2025-04-10 PROCEDURE — 99214 OFFICE O/P EST MOD 30 MIN: CPT | Performed by: INTERNAL MEDICINE

## 2025-04-10 RX ORDER — TRIAMCINOLONE ACETONIDE 1 MG/G
OINTMENT TOPICAL PRN
OUTPATIENT
Start: 2025-04-10

## 2025-04-10 RX ORDER — LIDOCAINE HYDROCHLORIDE 20 MG/ML
JELLY TOPICAL PRN
Status: DISCONTINUED | OUTPATIENT
Start: 2025-04-10 | End: 2025-04-11 | Stop reason: HOSPADM

## 2025-04-10 RX ORDER — BETAMETHASONE DIPROPIONATE 0.5 MG/G
CREAM TOPICAL PRN
OUTPATIENT
Start: 2025-04-10

## 2025-04-10 RX ORDER — LIDOCAINE HYDROCHLORIDE 40 MG/ML
SOLUTION TOPICAL PRN
OUTPATIENT
Start: 2025-04-10

## 2025-04-10 RX ORDER — LIDOCAINE HYDROCHLORIDE 20 MG/ML
JELLY TOPICAL PRN
OUTPATIENT
Start: 2025-04-10

## 2025-04-10 RX ORDER — SODIUM CHLOR/HYPOCHLOROUS ACID 0.033 %
SOLUTION, IRRIGATION IRRIGATION PRN
OUTPATIENT
Start: 2025-04-10

## 2025-04-10 RX ORDER — BACITRACIN ZINC AND POLYMYXIN B SULFATE 500; 1000 [USP'U]/G; [USP'U]/G
OINTMENT TOPICAL PRN
OUTPATIENT
Start: 2025-04-10

## 2025-04-10 RX ORDER — SILVER SULFADIAZINE 10 MG/G
CREAM TOPICAL PRN
OUTPATIENT
Start: 2025-04-10

## 2025-04-10 RX ORDER — GENTAMICIN SULFATE 1 MG/G
OINTMENT TOPICAL PRN
OUTPATIENT
Start: 2025-04-10

## 2025-04-10 RX ORDER — LIDOCAINE 50 MG/G
OINTMENT TOPICAL PRN
OUTPATIENT
Start: 2025-04-10

## 2025-04-10 RX ORDER — LIDOCAINE 40 MG/G
CREAM TOPICAL PRN
OUTPATIENT
Start: 2025-04-10

## 2025-04-10 RX ORDER — NEOMYCIN/BACITRACIN/POLYMYXINB 3.5-400-5K
OINTMENT (GRAM) TOPICAL PRN
OUTPATIENT
Start: 2025-04-10

## 2025-04-10 RX ORDER — GINSENG 100 MG
CAPSULE ORAL PRN
OUTPATIENT
Start: 2025-04-10

## 2025-04-10 RX ORDER — CLOBETASOL PROPIONATE 0.5 MG/G
OINTMENT TOPICAL PRN
OUTPATIENT
Start: 2025-04-10

## 2025-04-10 RX ORDER — LINEZOLID 600 MG/1
600 TABLET, FILM COATED ORAL 2 TIMES DAILY
Qty: 14 TABLET | Refills: 0 | Status: SHIPPED | OUTPATIENT
Start: 2025-04-10 | End: 2025-04-17

## 2025-04-10 RX ORDER — MUPIROCIN 20 MG/G
OINTMENT TOPICAL PRN
OUTPATIENT
Start: 2025-04-10

## 2025-04-10 RX ADMIN — LIDOCAINE HYDROCHLORIDE 6 ML: 20 JELLY TOPICAL at 08:13

## 2025-04-10 ASSESSMENT — PAIN DESCRIPTION - ORIENTATION: ORIENTATION: LEFT

## 2025-04-10 ASSESSMENT — PAIN DESCRIPTION - DESCRIPTORS: DESCRIPTORS: ACHING

## 2025-04-10 ASSESSMENT — PAIN DESCRIPTION - PROGRESSION: CLINICAL_PROGRESSION: NOT CHANGED

## 2025-04-10 ASSESSMENT — PAIN SCALES - GENERAL: PAINLEVEL_OUTOF10: 4

## 2025-04-10 ASSESSMENT — PAIN DESCRIPTION - LOCATION: LOCATION: FINGER (COMMENT WHICH ONE)

## 2025-04-10 NOTE — PROGRESS NOTES
[facility]   Progress Note and Procedure Note      Ac Alcantar  MEDICAL RECORD NUMBER:  [id number]  AGE: 64 y.o.   GENDER: male  : 1960  EPISODE DATE:  4/10/2025    Subjective:     Chief Complaint   Patient presents with    Wound Check     Left Middle Finger         HISTORY of PRESENT ILLNESS HPI     Ac Alcantar is a 64 y.o. male who presents today for wound/ulcer evaluation.   History of Wound Context:   Wound/Ulcer Pain Timing/Severity: The patient is here for left middle finger open wound for 3 weeks with swelling and redness to the whole finger, limited range of motion to the DIP joint.   The patient was evaluated by primary care physician, had an x-ray of the left hand at [facility] that showed osteomyelitis  The patient had debridement done by Dr. Jo-Ann aggarwal at [facility] on 2025  Tissue culture on 2025 grew Streptococcus dysgalactiae   STREPTOCOCCUS AGALACTIAE, also grew Staph aureus according to microbiology lab, sensitivity pending.  The patient was treated with 10 days of doxycycline that was changed to Levaquin last week, completed.  Quality of pain: aching  Severity:  3 / 10   Associated Signs/Symptoms: edema and erythema    Ulcer Identification:  Ulcer Type: non-healing/non-surgical    Contributing Factors: diabetes    Acute Wound: N/A    PAST MEDICAL HISTORY        Diagnosis Date    Acquired trigger finger 2018    CAD (coronary artery disease)     NWOCC/ involving coronary bypass graft of native heart with unstable angina pectoris    Cervical spondylosis     CHF (congestive heart failure) ([facility])     Chronic back pain     on 18 pt states is presently in pain mgmnt    Chronic ulcer of toe, left, with necrosis of bone ([facility]) 2022    Contusion of elbow 2018    Contusion of knee 2018    Contusion of shoulder region 2018    Diabetic foot ulcer with osteomyelitis ([facility]) 2018    Diabetic polyneuropathy associated with type 2 
Self

## 2025-04-15 NOTE — DISCHARGE INSTRUCTIONS
[facility] -Phone: [phone] Fax: [phone]    Visit  Discharge Instructions / Physician Orders     DATE: 4/17/2025     Home Care:      SUPPLIES ORDERED THRU:      Wound Location: Left Middle Finger     Cleanse with: Liquid antibacterial soap and water, rinse well (do not soak)     Dressing Orders: Silvercel to wound, dry dressing     Frequency: Daily     Additional Orders: Increase protein to diet (meat, cheese, eggs, fish, peanut butter, nuts and beans)  Culture Taken 4/3/25  Labs Ordered 4/3/25  Antibiotic Ordered 4/17/25     Your next appointment with Wound Care Center is in 1 weeks with Dr. Carroll     (Please note your next appointment above and if you are unable to keep, kindly give a 24 hour notice. Thank you.)  If more than 15 min late we cannot guarantee you will be seen due to clinician schedule  Per Policy, Excessive cancellation will call for dismissal from program.     If you experience any of the following, please call the Wound Care Center during business hours:  [phone]     * Increase in Pain  * Temperature over 101  * Increase in drainage from your wound  * Drainage with a foul odor  * Bleeding  * Increase in swelling  * Need for compression bandage changes due to slippage, breakthrough drainage.     If you need medical attention outside of the business hours of the Wound Care Centers please contact your PCP or go to the an urgent care or emergency department     The information contained in the After Visit Summary has been reviewed with me, the patient and/or responsible adult, by my health care provider(s). I had the opportunity to ask questions regarding this information. I have elected to receive;      []After Visit Summary  [x]Comprehensive Discharge Instruction        Patient signature______________________________________Date:________  Electronically signed by Nathaniel Wheatley RN on 4/17/2025 at 8:34 AM  Electronically signed by Evans Carroll MD on 4/17/2025 at 8:43

## 2025-04-17 ENCOUNTER — HOSPITAL ENCOUNTER (OUTPATIENT)
Dept: WOUND CARE | Age: 65
Discharge: HOME | End: 2025-04-17
Payer: COMMERCIAL

## 2025-04-17 VITALS
BODY MASS INDEX: 26.68 KG/M2 | HEART RATE: 78 BPM | HEIGHT: 72 IN | TEMPERATURE: 96.6 F | DIASTOLIC BLOOD PRESSURE: 76 MMHG | WEIGHT: 197 LBS | RESPIRATION RATE: 19 BRPM | SYSTOLIC BLOOD PRESSURE: 137 MMHG

## 2025-04-17 DIAGNOSIS — M86.9 OSTEOMYELITIS OF LEFT HAND, UNSPECIFIED TYPE (HCC): ICD-10-CM

## 2025-04-17 DIAGNOSIS — E11.42 DIABETIC POLYNEUROPATHY ASSOCIATED WITH TYPE 2 DIABETES MELLITUS: Primary | ICD-10-CM

## 2025-04-17 DIAGNOSIS — S61.209A OPEN WOUND OF FINGER OF RIGHT HAND, INITIAL ENCOUNTER: ICD-10-CM

## 2025-04-17 PROCEDURE — 11042 DBRDMT SUBQ TIS 1ST 20SQCM/<: CPT | Performed by: INTERNAL MEDICINE

## 2025-04-17 PROCEDURE — 11042 DBRDMT SUBQ TIS 1ST 20SQCM/<: CPT

## 2025-04-17 PROCEDURE — 99213 OFFICE O/P EST LOW 20 MIN: CPT | Performed by: INTERNAL MEDICINE

## 2025-04-17 RX ORDER — TRIAMCINOLONE ACETONIDE 1 MG/G
OINTMENT TOPICAL PRN
OUTPATIENT
Start: 2025-04-17

## 2025-04-17 RX ORDER — LIDOCAINE HYDROCHLORIDE 20 MG/ML
JELLY TOPICAL PRN
OUTPATIENT
Start: 2025-04-17

## 2025-04-17 RX ORDER — LEVOFLOXACIN 750 MG/1
750 TABLET, FILM COATED ORAL DAILY
Qty: 30 TABLET | Refills: 0 | Status: SHIPPED | OUTPATIENT
Start: 2025-04-17 | End: 2025-05-17

## 2025-04-17 RX ORDER — LIDOCAINE 40 MG/G
CREAM TOPICAL PRN
OUTPATIENT
Start: 2025-04-17

## 2025-04-17 RX ORDER — LIDOCAINE HYDROCHLORIDE 40 MG/ML
SOLUTION TOPICAL PRN
OUTPATIENT
Start: 2025-04-17

## 2025-04-17 RX ORDER — BACITRACIN ZINC AND POLYMYXIN B SULFATE 500; 1000 [USP'U]/G; [USP'U]/G
OINTMENT TOPICAL PRN
OUTPATIENT
Start: 2025-04-17

## 2025-04-17 RX ORDER — LIDOCAINE 50 MG/G
OINTMENT TOPICAL PRN
OUTPATIENT
Start: 2025-04-17

## 2025-04-17 RX ORDER — LIDOCAINE HYDROCHLORIDE 20 MG/ML
JELLY TOPICAL PRN
Status: DISCONTINUED | OUTPATIENT
Start: 2025-04-17 | End: 2025-04-18 | Stop reason: HOSPADM

## 2025-04-17 RX ORDER — SODIUM CHLOR/HYPOCHLOROUS ACID 0.033 %
SOLUTION, IRRIGATION IRRIGATION PRN
OUTPATIENT
Start: 2025-04-17

## 2025-04-17 RX ORDER — BETAMETHASONE DIPROPIONATE 0.5 MG/G
CREAM TOPICAL PRN
OUTPATIENT
Start: 2025-04-17

## 2025-04-17 RX ORDER — GINSENG 100 MG
CAPSULE ORAL PRN
OUTPATIENT
Start: 2025-04-17

## 2025-04-17 RX ORDER — GENTAMICIN SULFATE 1 MG/G
OINTMENT TOPICAL PRN
OUTPATIENT
Start: 2025-04-17

## 2025-04-17 RX ORDER — MUPIROCIN 20 MG/G
OINTMENT TOPICAL PRN
OUTPATIENT
Start: 2025-04-17

## 2025-04-17 RX ORDER — CLOBETASOL PROPIONATE 0.5 MG/G
OINTMENT TOPICAL PRN
OUTPATIENT
Start: 2025-04-17

## 2025-04-17 RX ORDER — SILVER SULFADIAZINE 10 MG/G
CREAM TOPICAL PRN
OUTPATIENT
Start: 2025-04-17

## 2025-04-17 RX ORDER — NEOMYCIN/BACITRACIN/POLYMYXINB 3.5-400-5K
OINTMENT (GRAM) TOPICAL PRN
OUTPATIENT
Start: 2025-04-17

## 2025-04-17 RX ADMIN — LIDOCAINE HYDROCHLORIDE 6 ML: 20 JELLY TOPICAL at 08:17

## 2025-04-17 NOTE — PROGRESS NOTES
[facility]   Progress Note and Procedure Note      Ac Alcantar  MEDICAL RECORD NUMBER:  [id number]  AGE: 64 y.o.   GENDER: male  : 1960  EPISODE DATE:  2025    Subjective:     Chief Complaint   Patient presents with    Wound Check     Left 3rd finger         HISTORY of PRESENT ILLNESS HPI     Ac Alcantar is a 64 y.o. male who presents today for wound/ulcer evaluation.   History of Wound Context:   Wound/Ulcer Pain Timing/Severity: The patient is here for follow-up on left middle finger wound, less swelling and redness, limited range of motion to the left middle finger joints.  He denied fever or chills, no other complaints.  The patient had left middle finger open wound for 4 weeks with swelling and redness to the whole finger, limited range of motion to the DIP joint.   The patient was evaluated by primary care physician, had an x-ray of the left hand at [facility] that showed osteomyelitis  The patient had debridement done by Dr. Jo-Ann aggarwal at [facility] on 2025  Tissue culture on 2025 grew   STREPTOCOCCUS AGALACTIAE, also grew Staph aureus MSSA  Tissue culture on 4/3/2025 grew STREPTOCOCCUS AGALACTIAE, also grew Staph aureus MSSA  The patient was treated with 10 days of doxycycline that was changed to Levaquin on 4/3/2025 through 4/10/2025 followed by 1 week of Zyvox that was completed today 2025  Quality of pain: aching  Severity:  3 / 10   Associated Signs/Symptoms: edema and erythema    Ulcer Identification:  Ulcer Type: non-healing/non-surgical    Contributing Factors: diabetes    Acute Wound: N/A    PAST MEDICAL HISTORY        Diagnosis Date    Acquired trigger finger 2018    CAD (coronary artery disease)     NWOCC/ involving coronary bypass graft of native heart with unstable angina pectoris    Cervical spondylosis     CHF (congestive heart failure) ([facility])     Chronic back pain     on 18 pt states is presently in pain mgmnt    Chronic ulcer

## 2025-04-22 NOTE — DISCHARGE INSTRUCTIONS
[facility] -Phone: [phone] Fax: [phone]    Visit  Discharge Instructions / Physician Orders     DATE: 4/24/2025     Home Care:      SUPPLIES ORDERED THRU:      Wound Location: Left Middle Finger     Cleanse with: Liquid antibacterial soap and water, rinse well (do not soak)     Dressing Orders: Silvercel to wound, dry dressing     Frequency: Daily     Additional Orders: Increase protein to diet (meat, cheese, eggs, fish, peanut butter, nuts and beans)  Culture Taken 4/3/25  Labs Ordered 4/3/25  Antibiotic Ordered 4/17/25     Your next appointment with Wound Care Center is in 1 weeks with Dr. Carroll     (Please note your next appointment above and if you are unable to keep, kindly give a 24 hour notice. Thank you.)  If more than 15 min late we cannot guarantee you will be seen due to clinician schedule  Per Policy, Excessive cancellation will call for dismissal from program.     If you experience any of the following, please call the Wound Care Center during business hours:  [phone]     * Increase in Pain  * Temperature over 101  * Increase in drainage from your wound  * Drainage with a foul odor  * Bleeding  * Increase in swelling  * Need for compression bandage changes due to slippage, breakthrough drainage.     If you need medical attention outside of the business hours of the Wound Care Centers please contact your PCP or go to the an urgent care or emergency department     The information contained in the After Visit Summary has been reviewed with me, the patient and/or responsible adult, by my health care provider(s). I had the opportunity to ask questions regarding this information. I have elected to receive;      []After Visit Summary  [x]Comprehensive Discharge Instruction        Patient signature______________________________________Date:________  Electronically signed by Nathaniel Wheatley RN on 4/24/2025 at 10:32 AM  Electronically signed by Evans Carroll MD on 4/24/2025 at

## 2025-04-23 DIAGNOSIS — I10 ESSENTIAL HYPERTENSION: ICD-10-CM

## 2025-04-23 RX ORDER — HYDROCHLOROTHIAZIDE 12.5 MG/1
12.5 CAPSULE ORAL EVERY MORNING
Qty: 90 CAPSULE | Refills: 2 | Status: SHIPPED | OUTPATIENT
Start: 2025-04-23

## 2025-04-23 NOTE — TELEPHONE ENCOUNTER
Ac Alcantar is calling to request a refill on the following medication(s):    Last Visit Date (If Applicable):  2/11/2025    Next Visit Date:    Visit date not found    Medication Request:  Requested Prescriptions     Pending Prescriptions Disp Refills    hydroCHLOROthiazide 12.5 MG capsule 90 capsule 2     Sig: Take 1 capsule by mouth every morning

## 2025-04-24 ENCOUNTER — HOSPITAL ENCOUNTER (OUTPATIENT)
Dept: WOUND CARE | Age: 65
Discharge: HOME | End: 2025-04-24
Payer: COMMERCIAL

## 2025-04-24 VITALS
HEART RATE: 75 BPM | TEMPERATURE: 97.4 F | WEIGHT: 197 LBS | HEIGHT: 72 IN | BODY MASS INDEX: 26.68 KG/M2 | SYSTOLIC BLOOD PRESSURE: 127 MMHG | DIASTOLIC BLOOD PRESSURE: 74 MMHG | RESPIRATION RATE: 18 BRPM

## 2025-04-24 DIAGNOSIS — M86.9 OSTEOMYELITIS OF LEFT HAND, UNSPECIFIED TYPE (HCC): ICD-10-CM

## 2025-04-24 DIAGNOSIS — S61.209A OPEN WOUND OF FINGER OF RIGHT HAND, INITIAL ENCOUNTER: ICD-10-CM

## 2025-04-24 DIAGNOSIS — L97.512 ULCER OF TOE OF RIGHT FOOT, WITH FAT LAYER EXPOSED (HCC): ICD-10-CM

## 2025-04-24 DIAGNOSIS — E11.42 DIABETIC POLYNEUROPATHY ASSOCIATED WITH TYPE 2 DIABETES MELLITUS (HCC): Primary | ICD-10-CM

## 2025-04-24 PROCEDURE — 11042 DBRDMT SUBQ TIS 1ST 20SQCM/<: CPT | Performed by: INTERNAL MEDICINE

## 2025-04-24 PROCEDURE — 11042 DBRDMT SUBQ TIS 1ST 20SQCM/<: CPT

## 2025-04-24 PROCEDURE — 99213 OFFICE O/P EST LOW 20 MIN: CPT | Performed by: INTERNAL MEDICINE

## 2025-04-24 RX ORDER — SILVER SULFADIAZINE 10 MG/G
CREAM TOPICAL PRN
OUTPATIENT
Start: 2025-04-24

## 2025-04-24 RX ORDER — LIDOCAINE 40 MG/G
CREAM TOPICAL PRN
OUTPATIENT
Start: 2025-04-24

## 2025-04-24 RX ORDER — LIDOCAINE HYDROCHLORIDE 20 MG/ML
JELLY TOPICAL PRN
OUTPATIENT
Start: 2025-04-24

## 2025-04-24 RX ORDER — SODIUM CHLOR/HYPOCHLOROUS ACID 0.033 %
SOLUTION, IRRIGATION IRRIGATION PRN
OUTPATIENT
Start: 2025-04-24

## 2025-04-24 RX ORDER — LIDOCAINE HYDROCHLORIDE 20 MG/ML
JELLY TOPICAL PRN
Status: DISCONTINUED | OUTPATIENT
Start: 2025-04-24 | End: 2025-04-25 | Stop reason: HOSPADM

## 2025-04-24 RX ORDER — LIDOCAINE HYDROCHLORIDE 40 MG/ML
SOLUTION TOPICAL PRN
OUTPATIENT
Start: 2025-04-24

## 2025-04-24 RX ORDER — BETAMETHASONE DIPROPIONATE 0.5 MG/G
CREAM TOPICAL PRN
OUTPATIENT
Start: 2025-04-24

## 2025-04-24 RX ORDER — LIDOCAINE 50 MG/G
OINTMENT TOPICAL PRN
OUTPATIENT
Start: 2025-04-24

## 2025-04-24 RX ORDER — GINSENG 100 MG
CAPSULE ORAL PRN
OUTPATIENT
Start: 2025-04-24

## 2025-04-24 RX ORDER — CLOBETASOL PROPIONATE 0.5 MG/G
OINTMENT TOPICAL PRN
OUTPATIENT
Start: 2025-04-24

## 2025-04-24 RX ORDER — BACITRACIN ZINC AND POLYMYXIN B SULFATE 500; 1000 [USP'U]/G; [USP'U]/G
OINTMENT TOPICAL PRN
OUTPATIENT
Start: 2025-04-24

## 2025-04-24 RX ORDER — GENTAMICIN SULFATE 1 MG/G
OINTMENT TOPICAL PRN
OUTPATIENT
Start: 2025-04-24

## 2025-04-24 RX ORDER — TRIAMCINOLONE ACETONIDE 1 MG/G
OINTMENT TOPICAL PRN
OUTPATIENT
Start: 2025-04-24

## 2025-04-24 RX ORDER — MUPIROCIN 20 MG/G
OINTMENT TOPICAL PRN
OUTPATIENT
Start: 2025-04-24

## 2025-04-24 RX ORDER — NEOMYCIN/BACITRACIN/POLYMYXINB 3.5-400-5K
OINTMENT (GRAM) TOPICAL PRN
OUTPATIENT
Start: 2025-04-24

## 2025-04-24 RX ADMIN — LIDOCAINE HYDROCHLORIDE 6 ML: 20 JELLY TOPICAL at 10:00

## 2025-04-24 NOTE — PROGRESS NOTES
(cm^2) 1.7 cm^2 04/24/25 1000   Change in Wound Size % (l*w) 64.58 04/24/25 1000   Wound Volume (cm^3) 0.17 cm^3 04/24/25 1000   Wound Healing % 82 04/24/25 1000   Post-Procedure Length (cm) 1 cm 04/24/25 1000   Post-Procedure Width (cm) 1.7 cm 04/24/25 1000   Post-Procedure Depth (cm) 0.1 cm 04/24/25 1000   Post-Procedure Surface Area (cm^2) 1.7 cm^2 04/24/25 1000   Post-Procedure Volume (cm^3) 0.17 cm^3 04/24/25 1000   Wound Assessment Pink/red;Slough 04/24/25 1000   Drainage Amount Moderate (25-50%) 04/24/25 1000   Drainage Description Serosanguinous 04/24/25 1000   Odor None 04/24/25 1000   Trudi-wound Assessment Blanchable erythema;Edematous 04/24/25 1000   Margins Defined edges 04/24/25 1000   Wound Thickness Description not for Pressure Injury Full thickness 04/24/25 1000   Number of days: 21          Total Surface Area Debrided:  1.7 sq cm     Estimated Blood Loss:  Minimal    Hemostasis Achieved:  by pressure    Procedural Pain:  3  / 10     Post Procedural Pain:  2 / 10     Response to treatment:  Well tolerated by patient.       Plan:   P.o. Levaquin through 5/17/2025  BUN/creatinine and C-reactive protein  Treatment Note please see Discharge Instructions    Written patient dismissal instructions given to patient and signed by patient or POA.         Electronically signed by Evans Carroll MD on 4/24/2025 at 10:36 AM

## 2025-04-30 NOTE — DISCHARGE INSTRUCTIONS
[facility] -Phone: [phone] Fax: [phone]    Visit  Discharge Instructions / Physician Orders     DATE: 5/1/2025     Home Care:      SUPPLIES ORDERED THRU:      Wound Location: Left Middle Finger     Cleanse with: Liquid antibacterial soap and water, rinse well (do not soak)     Dressing Orders: Silvercel to wound, dry dressing     Frequency: Daily     Additional Orders: Increase protein to diet (meat, cheese, eggs, fish, peanut butter, nuts and beans)  Culture Taken 4/3/25  Labs Ordered 4/3/25  Antibiotic Ordered 4/17/25     Your next appointment with Wound Care Center is in 1 weeks with Dr. Carroll     (Please note your next appointment above and if you are unable to keep, kindly give a 24 hour notice. Thank you.)  If more than 15 min late we cannot guarantee you will be seen due to clinician schedule  Per Policy, Excessive cancellation will call for dismissal from program.     If you experience any of the following, please call the Wound Care Center during business hours:  [phone]     * Increase in Pain  * Temperature over 101  * Increase in drainage from your wound  * Drainage with a foul odor  * Bleeding  * Increase in swelling  * Need for compression bandage changes due to slippage, breakthrough drainage.     If you need medical attention outside of the business hours of the Wound Care Centers please contact your PCP or go to the an urgent care or emergency department     The information contained in the After Visit Summary has been reviewed with me, the patient and/or responsible adult, by my health care provider(s). I had the opportunity to ask questions regarding this information. I have elected to receive;      []After Visit Summary  [x]Comprehensive Discharge Instruction        Patient signature______________________________________Date:________  Electronically signed by Evans Carroll MD on 5/1/2025 at 8:49 AM

## 2025-05-01 ENCOUNTER — HOSPITAL ENCOUNTER (OUTPATIENT)
Dept: WOUND CARE | Age: 65
Discharge: HOME | End: 2025-05-01
Payer: COMMERCIAL

## 2025-05-01 ENCOUNTER — HOSPITAL ENCOUNTER (OUTPATIENT)
Age: 65
Setting detail: SPECIMEN
Discharge: HOME | End: 2025-05-01
Payer: COMMERCIAL

## 2025-05-01 VITALS
RESPIRATION RATE: 16 BRPM | DIASTOLIC BLOOD PRESSURE: 92 MMHG | TEMPERATURE: 96.2 F | SYSTOLIC BLOOD PRESSURE: 107 MMHG | HEART RATE: 91 BPM

## 2025-05-01 DIAGNOSIS — M86.142 OTHER ACUTE OSTEOMYELITIS OF LEFT HAND (HCC): ICD-10-CM

## 2025-05-01 DIAGNOSIS — M86.9 OSTEOMYELITIS OF LEFT HAND, UNSPECIFIED TYPE (HCC): ICD-10-CM

## 2025-05-01 DIAGNOSIS — L97.512 ULCER OF TOE OF RIGHT FOOT, WITH FAT LAYER EXPOSED (HCC): ICD-10-CM

## 2025-05-01 DIAGNOSIS — E11.42 DIABETIC POLYNEUROPATHY ASSOCIATED WITH TYPE 2 DIABETES MELLITUS (HCC): Primary | ICD-10-CM

## 2025-05-01 DIAGNOSIS — S61.209A OPEN WOUND OF FINGER OF RIGHT HAND, INITIAL ENCOUNTER: ICD-10-CM

## 2025-05-01 LAB
BUN SERPL-MCNC: 24 MG/DL (ref 8–23)
CREAT SERPL-MCNC: 1.2 MG/DL (ref 0.7–1.2)
GFR, ESTIMATED: 68 ML/MIN/1.73M2

## 2025-05-01 PROCEDURE — 11042 DBRDMT SUBQ TIS 1ST 20SQCM/<: CPT | Performed by: INTERNAL MEDICINE

## 2025-05-01 PROCEDURE — 82565 ASSAY OF CREATININE: CPT

## 2025-05-01 PROCEDURE — 84520 ASSAY OF UREA NITROGEN: CPT

## 2025-05-01 PROCEDURE — 36415 COLL VENOUS BLD VENIPUNCTURE: CPT

## 2025-05-01 PROCEDURE — 99213 OFFICE O/P EST LOW 20 MIN: CPT | Performed by: INTERNAL MEDICINE

## 2025-05-01 PROCEDURE — 11042 DBRDMT SUBQ TIS 1ST 20SQCM/<: CPT

## 2025-05-01 RX ORDER — NEOMYCIN/BACITRACIN/POLYMYXINB 3.5-400-5K
OINTMENT (GRAM) TOPICAL PRN
OUTPATIENT
Start: 2025-05-01

## 2025-05-01 RX ORDER — MUPIROCIN 20 MG/G
OINTMENT TOPICAL PRN
OUTPATIENT
Start: 2025-05-01

## 2025-05-01 RX ORDER — BETAMETHASONE DIPROPIONATE 0.5 MG/G
CREAM TOPICAL PRN
OUTPATIENT
Start: 2025-05-01

## 2025-05-01 RX ORDER — SULFAMETHOXAZOLE AND TRIMETHOPRIM 800; 160 MG/1; MG/1
1 TABLET ORAL 2 TIMES DAILY
COMMUNITY
End: 2025-05-07

## 2025-05-01 RX ORDER — CLOBETASOL PROPIONATE 0.5 MG/G
OINTMENT TOPICAL PRN
OUTPATIENT
Start: 2025-05-01

## 2025-05-01 RX ORDER — GINSENG 100 MG
CAPSULE ORAL PRN
OUTPATIENT
Start: 2025-05-01

## 2025-05-01 RX ORDER — SODIUM CHLOR/HYPOCHLOROUS ACID 0.033 %
SOLUTION, IRRIGATION IRRIGATION PRN
OUTPATIENT
Start: 2025-05-01

## 2025-05-01 RX ORDER — LIDOCAINE 40 MG/G
CREAM TOPICAL PRN
OUTPATIENT
Start: 2025-05-01

## 2025-05-01 RX ORDER — FLUCONAZOLE 150 MG/1
150 TABLET ORAL
Qty: 2 TABLET | Refills: 0 | Status: SHIPPED | OUTPATIENT
Start: 2025-05-01 | End: 2025-05-07

## 2025-05-01 RX ORDER — LIDOCAINE HYDROCHLORIDE 40 MG/ML
SOLUTION TOPICAL PRN
OUTPATIENT
Start: 2025-05-01

## 2025-05-01 RX ORDER — LIDOCAINE 50 MG/G
OINTMENT TOPICAL PRN
OUTPATIENT
Start: 2025-05-01

## 2025-05-01 RX ORDER — SILVER SULFADIAZINE 10 MG/G
CREAM TOPICAL PRN
OUTPATIENT
Start: 2025-05-01

## 2025-05-01 RX ORDER — LIDOCAINE HYDROCHLORIDE 20 MG/ML
JELLY TOPICAL PRN
OUTPATIENT
Start: 2025-05-01

## 2025-05-01 RX ORDER — BACITRACIN ZINC AND POLYMYXIN B SULFATE 500; 1000 [USP'U]/G; [USP'U]/G
OINTMENT TOPICAL PRN
OUTPATIENT
Start: 2025-05-01

## 2025-05-01 RX ORDER — TRIAMCINOLONE ACETONIDE 1 MG/G
OINTMENT TOPICAL PRN
OUTPATIENT
Start: 2025-05-01

## 2025-05-01 RX ORDER — LIDOCAINE HYDROCHLORIDE 20 MG/ML
JELLY TOPICAL PRN
Status: DISCONTINUED | OUTPATIENT
Start: 2025-05-01 | End: 2025-05-02 | Stop reason: HOSPADM

## 2025-05-01 RX ORDER — GENTAMICIN SULFATE 1 MG/G
OINTMENT TOPICAL PRN
OUTPATIENT
Start: 2025-05-01

## 2025-05-01 RX ADMIN — LIDOCAINE HYDROCHLORIDE 6 ML: 20 JELLY TOPICAL at 08:19

## 2025-05-01 NOTE — PROGRESS NOTES
[facility]   Progress Note and Procedure Note      Ac Alcantar  MEDICAL RECORD NUMBER:  [id number]  AGE: 64 y.o.   GENDER: male  : 1960  EPISODE DATE:  2025    Subjective:     Chief Complaint   Patient presents with    Wound Check     Left middle finger         HISTORY of PRESENT ILLNESS HPI     Ac Alcantar is a 64 y.o. male who presents today for wound/ulcer evaluation.   History of Wound Context:   Wound/Ulcer Pain Timing/Severity: The patient is here for follow-up on left middle finger wound, no purulent drainage, less surrounding redness and swelling, limited range of motion to the left middle finger joints.   He is tolerating antibiotics, complaining of yeast infection to the genital area.  He denied fever or chills, no significant pain, no other complaints.    The patient  had an x-ray of the left hand at [facility] that showed osteomyelitis  The patient had debridement done by Dr. Jo-Ann aggarwal at [facility] on 2025  Tissue culture on 2025 grew   STREPTOCOCCUS AGALACTIAE, also grew Staph aureus MSSA  Tissue culture on 4/3/2025 grew STREPTOCOCCUS AGALACTIAE, also grew Staph aureus MSSA  The patient was treated with 10 days of doxycycline that was changed to Levaquin on 4/3/2025 through 4/10/2025 followed by 1 week of Zyvox that was completed  2025 followed by Levaquin that he is tolerating.  P.o. Bactrim was added around 2025      Ulcer Identification:  Ulcer Type: non-healing/non-surgical    Contributing Factors: diabetes    Acute Wound: N/A    PAST MEDICAL HISTORY        Diagnosis Date    Acquired trigger finger 2018    CAD (coronary artery disease)     NWOCC/ involving coronary bypass graft of native heart with unstable angina pectoris    Cervical spondylosis     CHF (congestive heart failure) ([facility])     Chronic back pain     on 18 pt states is presently in pain mgmnt    Chronic ulcer of toe, left, with necrosis of bone ([facility]) 2022

## 2025-05-06 NOTE — DISCHARGE INSTRUCTIONS
[facility] -Phone: [phone] Fax: [phone]    Visit  Discharge Instructions / Physician Orders     DATE: 5/8/2025     Home Care:      SUPPLIES ORDERED THRU:      Wound Location: Left Middle Finger     Cleanse with: Liquid antibacterial soap and water, rinse well (do not soak)     Dressing Orders: Collagen (Fibracol) to wound, dry dressing     Frequency: Daily     Additional Orders: Increase protein to diet (meat, cheese, eggs, fish, peanut butter, nuts and beans)  Culture Taken 4/3/25  Labs Ordered 4/3/25  Antibiotic Ordered 4/17/25     Your next appointment with Wound Care Center is in 1 weeks with Dr. Carroll     (Please note your next appointment above and if you are unable to keep, kindly give a 24 hour notice. Thank you.)  If more than 15 min late we cannot guarantee you will be seen due to clinician schedule  Per Policy, Excessive cancellation will call for dismissal from program.     If you experience any of the following, please call the Wound Care Center during business hours:  [phone]     * Increase in Pain  * Temperature over 101  * Increase in drainage from your wound  * Drainage with a foul odor  * Bleeding  * Increase in swelling  * Need for compression bandage changes due to slippage, breakthrough drainage.     If you need medical attention outside of the business hours of the Wound Care Centers please contact your PCP or go to the an urgent care or emergency department     The information contained in the After Visit Summary has been reviewed with me, the patient and/or responsible adult, by my health care provider(s). I had the opportunity to ask questions regarding this information. I have elected to receive;      []After Visit Summary  [x]Comprehensive Discharge Instruction        Patient signature______________________________________Date:________  Electronically signed by Nathaniel Wheatley RN on 5/8/2025 at 9:01 AM  Electronically signed by Evans Carroll MD on 5/8/2025

## 2025-05-08 ENCOUNTER — HOSPITAL ENCOUNTER (OUTPATIENT)
Dept: WOUND CARE | Age: 65
Discharge: HOME | End: 2025-05-08
Payer: COMMERCIAL

## 2025-05-08 VITALS
TEMPERATURE: 96.2 F | HEART RATE: 79 BPM | SYSTOLIC BLOOD PRESSURE: 91 MMHG | DIASTOLIC BLOOD PRESSURE: 64 MMHG | RESPIRATION RATE: 19 BRPM

## 2025-05-08 DIAGNOSIS — E11.42 DIABETIC POLYNEUROPATHY ASSOCIATED WITH TYPE 2 DIABETES MELLITUS (HCC): Primary | ICD-10-CM

## 2025-05-08 DIAGNOSIS — M86.9 OSTEOMYELITIS OF HAND, UNSPECIFIED LATERALITY, UNSPECIFIED TYPE (HCC): ICD-10-CM

## 2025-05-08 DIAGNOSIS — S61.209A OPEN WOUND OF FINGER OF RIGHT HAND, INITIAL ENCOUNTER: ICD-10-CM

## 2025-05-08 DIAGNOSIS — L97.512 ULCER OF TOE OF RIGHT FOOT, WITH FAT LAYER EXPOSED (HCC): ICD-10-CM

## 2025-05-08 PROCEDURE — 11042 DBRDMT SUBQ TIS 1ST 20SQCM/<: CPT

## 2025-05-08 RX ORDER — LIDOCAINE HYDROCHLORIDE 20 MG/ML
JELLY TOPICAL PRN
OUTPATIENT
Start: 2025-05-08

## 2025-05-08 RX ORDER — NEOMYCIN/BACITRACIN/POLYMYXINB 3.5-400-5K
OINTMENT (GRAM) TOPICAL PRN
OUTPATIENT
Start: 2025-05-08

## 2025-05-08 RX ORDER — TRIAMCINOLONE ACETONIDE 1 MG/G
OINTMENT TOPICAL PRN
OUTPATIENT
Start: 2025-05-08

## 2025-05-08 RX ORDER — LIDOCAINE HYDROCHLORIDE 40 MG/ML
SOLUTION TOPICAL PRN
OUTPATIENT
Start: 2025-05-08

## 2025-05-08 RX ORDER — GINSENG 100 MG
CAPSULE ORAL PRN
OUTPATIENT
Start: 2025-05-08

## 2025-05-08 RX ORDER — BACITRACIN ZINC AND POLYMYXIN B SULFATE 500; 1000 [USP'U]/G; [USP'U]/G
OINTMENT TOPICAL PRN
OUTPATIENT
Start: 2025-05-08

## 2025-05-08 RX ORDER — LIDOCAINE 50 MG/G
OINTMENT TOPICAL PRN
OUTPATIENT
Start: 2025-05-08

## 2025-05-08 RX ORDER — SODIUM CHLOR/HYPOCHLOROUS ACID 0.033 %
SOLUTION, IRRIGATION IRRIGATION PRN
OUTPATIENT
Start: 2025-05-08

## 2025-05-08 RX ORDER — GENTAMICIN SULFATE 1 MG/G
OINTMENT TOPICAL PRN
OUTPATIENT
Start: 2025-05-08

## 2025-05-08 RX ORDER — CLOBETASOL PROPIONATE 0.5 MG/G
OINTMENT TOPICAL PRN
OUTPATIENT
Start: 2025-05-08

## 2025-05-08 RX ORDER — BETAMETHASONE DIPROPIONATE 0.5 MG/G
CREAM TOPICAL PRN
OUTPATIENT
Start: 2025-05-08

## 2025-05-08 RX ORDER — LIDOCAINE 40 MG/G
CREAM TOPICAL PRN
OUTPATIENT
Start: 2025-05-08

## 2025-05-08 RX ORDER — MUPIROCIN 20 MG/G
OINTMENT TOPICAL PRN
OUTPATIENT
Start: 2025-05-08

## 2025-05-08 RX ORDER — LIDOCAINE HYDROCHLORIDE 20 MG/ML
JELLY TOPICAL PRN
Status: DISCONTINUED | OUTPATIENT
Start: 2025-05-08 | End: 2025-05-09 | Stop reason: HOSPADM

## 2025-05-08 RX ORDER — SILVER SULFADIAZINE 10 MG/G
CREAM TOPICAL PRN
OUTPATIENT
Start: 2025-05-08

## 2025-05-08 RX ADMIN — LIDOCAINE HYDROCHLORIDE 6 ML: 20 JELLY TOPICAL at 08:28

## 2025-05-08 NOTE — PROGRESS NOTES
lidocaine (XYLOCAINE) 2 % uro-jet    Other Relevant Orders    MD COMMUNICATION 1    MD COMMUNICATION 2    Initiate Outpatient Wound Care Protocol    Initiate Oxygen Therapy Protocol    Open wound of finger of right hand    Relevant Medications    lidocaine (XYLOCAINE) 2 % uro-jet    Other Relevant Orders    MD COMMUNICATION 1    MD COMMUNICATION 2    Initiate Outpatient Wound Care Protocol    Initiate Oxygen Therapy Protocol    Pyogenic inflammation of bone (HCC)   Cellulitis     Procedure Note  Indications:  Based on my examination of this patient's wound(s)/ulcer(s) today, debridement is required to promote healing and evaluate the wound base.    Performed by: Evans Carroll MD    Consent obtained:  Yes    Time out taken:  Yes    Pain Control: Anesthetic  Anesthetic: 2% Lidocaine Gel Topical       Debridement: Excisional Debridement    Using curette the wound(s)/ulcer(s) was/were debrided down through and including the removal of subcutaneous tissue.        Devitalized Tissue Debrided:  fibrin, biofilm, and slough    Pre Debridement Measurements:  Are located in the Wound/Ulcer Documentation Flow Sheet    Diabetic/Pressure/Non Pressure Ulcers only:  Ulcer: N/A     Wound/Ulcer #: 1    Post Debridement Measurements:  Wound/Ulcer Descriptions are Pre Debridement except measurements:    Wound 04/03/25 Finger (Comment which one) Left #1 3rd finger (Active)   Wound Image   05/01/25 0817   Wound Etiology Other 05/08/25 0827   Dressing Status Old drainage noted;New drainage noted 05/08/25 0827   Wound Cleansed Cleansed with saline 05/08/25 0827   Wound Length (cm) 1 cm 05/08/25 0827   Wound Width (cm) 1.2 cm 05/08/25 0827   Wound Depth (cm) 0.1 cm 05/08/25 0827   Wound Surface Area (cm^2) 1.2 cm^2 05/08/25 0827   Change in Wound Size % (l*w) 75 05/08/25 0827   Wound Volume (cm^3) 0.12 cm^3 05/08/25 0827   Wound Healing % 88 05/08/25 0827   Post-Procedure Length (cm) 1 cm 05/08/25 0827   Post-Procedure Width (cm) 1.2 cm

## 2025-05-13 NOTE — DISCHARGE INSTRUCTIONS
[facility] -Phone: [phone] Fax: [phone]    Visit  Discharge Instructions / Physician Orders     DATE: 5/15/2025     Home Care:      SUPPLIES ORDERED THRU:      Wound Location: Left Middle Finger     Cleanse with: Liquid antibacterial soap and water, rinse well (do not soak)     Dressing Orders: Collagen (Fibracol) to wound, dry dressing     Frequency: Daily     Additional Orders: Increase protein to diet (meat, cheese, eggs, fish, peanut butter, nuts and beans)  Culture Taken 4/3/25  Labs Ordered 4/3/25  Antibiotic Ordered 4/17/25     Your next appointment with Wound Care Center is in 1 weeks with Dr. Carroll     (Please note your next appointment above and if you are unable to keep, kindly give a 24 hour notice. Thank you.)  If more than 15 min late we cannot guarantee you will be seen due to clinician schedule  Per Policy, Excessive cancellation will call for dismissal from program.     If you experience any of the following, please call the Wound Care Center during business hours:  [phone]     * Increase in Pain  * Temperature over 101  * Increase in drainage from your wound  * Drainage with a foul odor  * Bleeding  * Increase in swelling  * Need for compression bandage changes due to slippage, breakthrough drainage.     If you need medical attention outside of the business hours of the Wound Care Centers please contact your PCP or go to the an urgent care or emergency department     The information contained in the After Visit Summary has been reviewed with me, the patient and/or responsible adult, by my health care provider(s). I had the opportunity to ask questions regarding this information. I have elected to receive;      []After Visit Summary  [x]Comprehensive Discharge Instruction        Patient signature______________________________________Date:________  Electronically signed by Nathaniel Wheatley RN on 5/15/2025 at 8:38 AM  Electronically signed by Evans Carroll MD on

## 2025-05-15 ENCOUNTER — HOSPITAL ENCOUNTER (OUTPATIENT)
Dept: WOUND CARE | Age: 65
Discharge: HOME | End: 2025-05-15
Payer: COMMERCIAL

## 2025-05-15 VITALS
RESPIRATION RATE: 16 BRPM | SYSTOLIC BLOOD PRESSURE: 106 MMHG | HEART RATE: 100 BPM | TEMPERATURE: 97.5 F | DIASTOLIC BLOOD PRESSURE: 67 MMHG

## 2025-05-15 DIAGNOSIS — E11.42 DIABETIC POLYNEUROPATHY ASSOCIATED WITH TYPE 2 DIABETES MELLITUS (HCC): ICD-10-CM

## 2025-05-15 DIAGNOSIS — S61.402D OPEN WOUND OF LEFT HAND WITHOUT FOREIGN BODY, UNSPECIFIED WOUND TYPE, SUBSEQUENT ENCOUNTER: ICD-10-CM

## 2025-05-15 DIAGNOSIS — S61.209A OPEN WOUND OF FINGER OF RIGHT HAND, INITIAL ENCOUNTER: ICD-10-CM

## 2025-05-15 DIAGNOSIS — E66.813 OBESITY, CLASS III, BMI 40-49.9 (MORBID OBESITY) (HCC): ICD-10-CM

## 2025-05-15 DIAGNOSIS — M86.9 OSTEOMYELITIS OF LEFT HAND, UNSPECIFIED TYPE (HCC): Primary | ICD-10-CM

## 2025-05-15 DIAGNOSIS — L97.512 ULCER OF TOE OF RIGHT FOOT, WITH FAT LAYER EXPOSED (HCC): ICD-10-CM

## 2025-05-15 PROBLEM — S61.402A OPEN WOUND OF LEFT HAND WITHOUT FOREIGN BODY: Status: ACTIVE | Noted: 2025-05-15

## 2025-05-15 PROCEDURE — 11042 DBRDMT SUBQ TIS 1ST 20SQCM/<: CPT

## 2025-05-15 RX ORDER — BETAMETHASONE DIPROPIONATE 0.5 MG/G
CREAM TOPICAL PRN
OUTPATIENT
Start: 2025-05-15

## 2025-05-15 RX ORDER — LIDOCAINE HYDROCHLORIDE 20 MG/ML
JELLY TOPICAL PRN
OUTPATIENT
Start: 2025-05-15

## 2025-05-15 RX ORDER — BACITRACIN ZINC AND POLYMYXIN B SULFATE 500; 1000 [USP'U]/G; [USP'U]/G
OINTMENT TOPICAL PRN
OUTPATIENT
Start: 2025-05-15

## 2025-05-15 RX ORDER — LIDOCAINE HYDROCHLORIDE 40 MG/ML
SOLUTION TOPICAL PRN
OUTPATIENT
Start: 2025-05-15

## 2025-05-15 RX ORDER — LIDOCAINE 50 MG/G
OINTMENT TOPICAL PRN
OUTPATIENT
Start: 2025-05-15

## 2025-05-15 RX ORDER — NEOMYCIN/BACITRACIN/POLYMYXINB 3.5-400-5K
OINTMENT (GRAM) TOPICAL PRN
OUTPATIENT
Start: 2025-05-15

## 2025-05-15 RX ORDER — MUPIROCIN 20 MG/G
OINTMENT TOPICAL PRN
OUTPATIENT
Start: 2025-05-15

## 2025-05-15 RX ORDER — PHENTERMINE HYDROCHLORIDE 37.5 MG/1
37.5 TABLET ORAL
Qty: 30 TABLET | Refills: 0 | Status: SHIPPED | OUTPATIENT
Start: 2025-05-15 | End: 2025-06-14

## 2025-05-15 RX ORDER — GINSENG 100 MG
CAPSULE ORAL PRN
OUTPATIENT
Start: 2025-05-15

## 2025-05-15 RX ORDER — SILVER SULFADIAZINE 10 MG/G
CREAM TOPICAL PRN
OUTPATIENT
Start: 2025-05-15

## 2025-05-15 RX ORDER — SODIUM CHLOR/HYPOCHLOROUS ACID 0.033 %
SOLUTION, IRRIGATION IRRIGATION PRN
OUTPATIENT
Start: 2025-05-15

## 2025-05-15 RX ORDER — CLOBETASOL PROPIONATE 0.5 MG/G
OINTMENT TOPICAL PRN
OUTPATIENT
Start: 2025-05-15

## 2025-05-15 RX ORDER — LIDOCAINE 40 MG/G
CREAM TOPICAL PRN
OUTPATIENT
Start: 2025-05-15

## 2025-05-15 RX ORDER — GENTAMICIN SULFATE 1 MG/G
OINTMENT TOPICAL PRN
OUTPATIENT
Start: 2025-05-15

## 2025-05-15 RX ORDER — TRIAMCINOLONE ACETONIDE 1 MG/G
OINTMENT TOPICAL PRN
OUTPATIENT
Start: 2025-05-15

## 2025-05-15 NOTE — TELEPHONE ENCOUNTER
Ac Alcantar is calling to request a refill on the following medication(s):    Last Visit Date (If Applicable):  2/11/2025    Next Visit Date:    Visit date not found    Medication Request:  Requested Prescriptions     Pending Prescriptions Disp Refills    phentermine (ADIPEX-P) 37.5 MG tablet 30 tablet 0     Sig: Take 1 tablet by mouth every morning (before breakfast) for 30 days.

## 2025-05-15 NOTE — PROGRESS NOTES
Insurance Verification Request      Ordering Center:     [facility]  [address] [place] [address]  Wound Center Phone Number [phone]  Fax: [phone]  Nathaniel Wheatley RN  Facility NPI: [id number]  Facility Tax ID [id number]    Provider Information:     Provider: Dr. Evans Carroll MD  NPI: [id number]    Patient Information:     Ac NAVIN Ortiz  [address] [address]   [phone]   : 1960  AGE: 64 y.o.     GENDER: male   TODAYS DATE:  5/15/2025    Insurance:     PRIMARY INSURANCE:  [place]  [place]  Effective Date: 2023  Group Number: [id number]   Subscriber Number: [id number] - ([place] [employer])    Payer/Plan Subscr  Sex Relation Sub. Ins. ID Effective Group Num   1. [place] * GEORGINA HERNANDEZKITTY HOLDEN 1960 Male Self [id number]                                    [id number]       Application/product Information:     Benefit Verification Request    Prior Authorization Required: Yes    Reason for Request: New Wound    [employer] FAX# [phone]    Face/Hands/Feet 137[address] (1st 25 sq cm)    Epifix    Has patient been treated with any other Skin Substitute on this Wound:     No    WOUND #: 1    Total Square CM: 1.28    Procedure setting: Hospital Outpatient Department    Is the Patient currently in a skilled nursing facility: No     Is the wound work related: No    Procedure date: 25    Patient Information:     Diagnoses     Codes Comments   Osteomyelitis of left hand, unspecified type ([facility])  - Primary M86.9    Open wound of left hand without foreign body, unspecified wound type, subsequent encounter S61.402D    Diabetic polyneuropathy associated with type 2 diabetes mellitus ([facility]) E11.42    Ulcer of toe of right foot, with fat layer exposed ([facility]) L97.512    Open wound of finger of right hand, initial encounter S61.209A        Wound Care Treatment Summary  Initial visit in OP Wound?: No (2025  9:50 AM)  Is Wound

## 2025-05-22 ENCOUNTER — HOSPITAL ENCOUNTER (OUTPATIENT)
Dept: WOUND CARE | Age: 65
Discharge: HOME | End: 2025-05-22
Payer: COMMERCIAL

## 2025-05-22 VITALS
SYSTOLIC BLOOD PRESSURE: 107 MMHG | HEART RATE: 91 BPM | HEIGHT: 72 IN | WEIGHT: 197 LBS | TEMPERATURE: 95.7 F | DIASTOLIC BLOOD PRESSURE: 63 MMHG | RESPIRATION RATE: 17 BRPM | BODY MASS INDEX: 26.68 KG/M2

## 2025-05-22 DIAGNOSIS — S61.209A OPEN WOUND OF FINGER OF RIGHT HAND, INITIAL ENCOUNTER: ICD-10-CM

## 2025-05-22 DIAGNOSIS — E11.42 DIABETIC POLYNEUROPATHY ASSOCIATED WITH TYPE 2 DIABETES MELLITUS (HCC): Primary | ICD-10-CM

## 2025-05-22 DIAGNOSIS — M86.9 OSTEOMYELITIS, UNSPECIFIED SITE, UNSPECIFIED TYPE (HCC): ICD-10-CM

## 2025-05-22 PROCEDURE — 11042 DBRDMT SUBQ TIS 1ST 20SQCM/<: CPT

## 2025-05-22 RX ORDER — LIDOCAINE HYDROCHLORIDE 20 MG/ML
JELLY TOPICAL PRN
OUTPATIENT
Start: 2025-05-22

## 2025-05-22 RX ORDER — GENTAMICIN SULFATE 1 MG/G
OINTMENT TOPICAL PRN
OUTPATIENT
Start: 2025-05-22

## 2025-05-22 RX ORDER — LIDOCAINE HYDROCHLORIDE 20 MG/ML
JELLY TOPICAL PRN
Status: DISCONTINUED | OUTPATIENT
Start: 2025-05-22 | End: 2025-05-23 | Stop reason: HOSPADM

## 2025-05-22 RX ORDER — BETAMETHASONE DIPROPIONATE 0.5 MG/G
CREAM TOPICAL PRN
OUTPATIENT
Start: 2025-05-22

## 2025-05-22 RX ORDER — MUPIROCIN 20 MG/G
OINTMENT TOPICAL PRN
OUTPATIENT
Start: 2025-05-22

## 2025-05-22 RX ORDER — SODIUM CHLOR/HYPOCHLOROUS ACID 0.033 %
SOLUTION, IRRIGATION IRRIGATION PRN
OUTPATIENT
Start: 2025-05-22

## 2025-05-22 RX ORDER — LIDOCAINE 40 MG/G
CREAM TOPICAL PRN
OUTPATIENT
Start: 2025-05-22

## 2025-05-22 RX ORDER — SILVER SULFADIAZINE 10 MG/G
CREAM TOPICAL PRN
OUTPATIENT
Start: 2025-05-22

## 2025-05-22 RX ORDER — TRIAMCINOLONE ACETONIDE 1 MG/G
OINTMENT TOPICAL PRN
OUTPATIENT
Start: 2025-05-22

## 2025-05-22 RX ORDER — GINSENG 100 MG
CAPSULE ORAL PRN
OUTPATIENT
Start: 2025-05-22

## 2025-05-22 RX ORDER — NEOMYCIN/BACITRACIN/POLYMYXINB 3.5-400-5K
OINTMENT (GRAM) TOPICAL PRN
OUTPATIENT
Start: 2025-05-22

## 2025-05-22 RX ORDER — CLOBETASOL PROPIONATE 0.5 MG/G
OINTMENT TOPICAL PRN
OUTPATIENT
Start: 2025-05-22

## 2025-05-22 RX ORDER — BACITRACIN ZINC AND POLYMYXIN B SULFATE 500; 1000 [USP'U]/G; [USP'U]/G
OINTMENT TOPICAL PRN
OUTPATIENT
Start: 2025-05-22

## 2025-05-22 RX ORDER — LIDOCAINE HYDROCHLORIDE 40 MG/ML
SOLUTION TOPICAL PRN
OUTPATIENT
Start: 2025-05-22

## 2025-05-22 RX ORDER — LIDOCAINE 50 MG/G
OINTMENT TOPICAL PRN
OUTPATIENT
Start: 2025-05-22

## 2025-05-22 RX ADMIN — LIDOCAINE HYDROCHLORIDE 6 ML: 20 JELLY TOPICAL at 08:18

## 2025-05-22 NOTE — DISCHARGE INSTRUCTIONS
[facility] -Phone: [phone] Fax: [phone]    Visit  Discharge Instructions / Physician Orders     DATE: 5/22/2025     Home Care:      SUPPLIES ORDERED THRU:      Wound Location: Left Middle Finger     Cleanse with: Liquid antibacterial soap and water, rinse well (do not soak)     Dressing Orders: Collagen (Fibracol) to wound, dry dressing     Frequency: Daily     Additional Orders: Increase protein to diet (meat, cheese, eggs, fish, peanut butter, nuts and beans)  Culture Taken 4/3/25  Labs Ordered 4/3/25  Antibiotic Ordered 4/17/25     Your next appointment with Wound Care Center is in 1 weeks with Dr. Carroll     (Please note your next appointment above and if you are unable to keep, kindly give a 24 hour notice. Thank you.)  If more than 15 min late we cannot guarantee you will be seen due to clinician schedule  Per Policy, Excessive cancellation will call for dismissal from program.     If you experience any of the following, please call the Wound Care Center during business hours:  [phone]     * Increase in Pain  * Temperature over 101  * Increase in drainage from your wound  * Drainage with a foul odor  * Bleeding  * Increase in swelling  * Need for compression bandage changes due to slippage, breakthrough drainage.     If you need medical attention outside of the business hours of the Wound Care Centers please contact your PCP or go to the an urgent care or emergency department     The information contained in the After Visit Summary has been reviewed with me, the patient and/or responsible adult, by my health care provider(s). I had the opportunity to ask questions regarding this information. I have elected to receive;      []After Visit Summary  [x]Comprehensive Discharge Instruction        Patient signature______________________________________Date:________  Electronically signed by Nathaniel Wheatley RN on 5/22/2025 at 8:39 AM  Electronically signed by Evans Carroll MD on

## 2025-05-22 NOTE — PROGRESS NOTES
Pink/red;Slough;Hyper granulation tissue 05/22/25 0814   Drainage Amount Moderate (25-50%) 05/22/25 0814   Drainage Description Serosanguinous 05/22/25 0814   Odor None 05/22/25 0814   Trudi-wound Assessment Blanchable erythema;Edematous 05/22/25 0814   Margins Defined edges 05/22/25 0814   Wound Thickness Description not for Pressure Injury Full thickness 05/22/25 0814   Number of days: 49          Total Surface Area Debrided:  0.8 sq cm     Estimated Blood Loss:  Minimal    Hemostasis Achieved:  by pressure    Procedural Pain:  0  / 10     Post Procedural Pain:  0 / 10     Response to treatment:  Well tolerated by patient.       Plan:   Pracquel Gage completed 5/17/2025    Treatment Note please see Discharge Instructions    Written patient dismissal instructions given to patient and signed by patient or POA.         Electronically signed by Evans Carroll MD on 5/22/2025 at 8:43 AM

## 2025-05-28 NOTE — DISCHARGE INSTRUCTIONS
[facility] -Phone: [phone] Fax: [phone]    Visit  Discharge Instructions / Physician Orders     DATE: 5/29/2025     Wound Location: Left Middle Finger     Cleanse with: Liquid antibacterial soap and water, rinse well (do not soak)     Dressing Orders: Aquacel Ag, dry dressing     Frequency: Daily     Additional Orders: Increase protein to diet (meat, cheese, eggs, fish, peanut butter, nuts and beans)  Culture Taken 4/3/25  Labs Ordered 4/3/25  Antibiotic Ordered 4/17/25     Your next appointment with Wound Care Center is in 1 weeks with Dr. Carroll     (Please note your next appointment above and if you are unable to keep, kindly give a 24 hour notice. Thank you.)  If more than 15 min late we cannot guarantee you will be seen due to clinician schedule  Per Policy, Excessive cancellation will call for dismissal from program.     If you experience any of the following, please call the Wound Care Center during business hours:  [phone]     * Increase in Pain  * Temperature over 101  * Increase in drainage from your wound  * Drainage with a foul odor  * Bleeding  * Increase in swelling  * Need for compression bandage changes due to slippage, breakthrough drainage.     If you need medical attention outside of the business hours of the Wound Care Centers please contact your PCP or go to the an urgent care or emergency department     The information contained in the After Visit Summary has been reviewed with me, the patient and/or responsible adult, by my health care provider(s). I had the opportunity to ask questions regarding this information. I have elected to receive;      []After Visit Summary  [x]Comprehensive Discharge Instruction        Patient signature______________________________________Date:________  Electronically signed by Nathaniel Wheatley RN on 5/29/2025 at 8:55 AM  Electronically signed by Evans Carroll MD on 5/29/2025 at 9:01 AM

## 2025-05-29 ENCOUNTER — HOSPITAL ENCOUNTER (OUTPATIENT)
Dept: WOUND CARE | Age: 65
Discharge: HOME | End: 2025-05-29
Payer: COMMERCIAL

## 2025-05-29 VITALS
SYSTOLIC BLOOD PRESSURE: 111 MMHG | TEMPERATURE: 97.6 F | HEART RATE: 83 BPM | DIASTOLIC BLOOD PRESSURE: 69 MMHG | RESPIRATION RATE: 18 BRPM

## 2025-05-29 DIAGNOSIS — M86.9 OSTEOMYELITIS OF HAND, UNSPECIFIED LATERALITY, UNSPECIFIED TYPE (HCC): ICD-10-CM

## 2025-05-29 DIAGNOSIS — E11.42 DIABETIC POLYNEUROPATHY ASSOCIATED WITH TYPE 2 DIABETES MELLITUS (HCC): Primary | ICD-10-CM

## 2025-05-29 DIAGNOSIS — S61.402D OPEN WOUND OF LEFT HAND WITHOUT FOREIGN BODY, UNSPECIFIED WOUND TYPE, SUBSEQUENT ENCOUNTER: ICD-10-CM

## 2025-05-29 PROCEDURE — 11042 DBRDMT SUBQ TIS 1ST 20SQCM/<: CPT

## 2025-05-29 RX ORDER — BETAMETHASONE DIPROPIONATE 0.5 MG/G
CREAM TOPICAL PRN
OUTPATIENT
Start: 2025-05-29

## 2025-05-29 RX ORDER — MUPIROCIN 20 MG/G
OINTMENT TOPICAL PRN
OUTPATIENT
Start: 2025-05-29

## 2025-05-29 RX ORDER — GENTAMICIN SULFATE 1 MG/G
OINTMENT TOPICAL PRN
OUTPATIENT
Start: 2025-05-29

## 2025-05-29 RX ORDER — TRIAMCINOLONE ACETONIDE 1 MG/G
OINTMENT TOPICAL PRN
OUTPATIENT
Start: 2025-05-29

## 2025-05-29 RX ORDER — NEOMYCIN/BACITRACIN/POLYMYXINB 3.5-400-5K
OINTMENT (GRAM) TOPICAL PRN
OUTPATIENT
Start: 2025-05-29

## 2025-05-29 RX ORDER — SODIUM CHLOR/HYPOCHLOROUS ACID 0.033 %
SOLUTION, IRRIGATION IRRIGATION PRN
OUTPATIENT
Start: 2025-05-29

## 2025-05-29 RX ORDER — BACITRACIN ZINC AND POLYMYXIN B SULFATE 500; 1000 [USP'U]/G; [USP'U]/G
OINTMENT TOPICAL PRN
OUTPATIENT
Start: 2025-05-29

## 2025-05-29 RX ORDER — GINSENG 100 MG
CAPSULE ORAL PRN
OUTPATIENT
Start: 2025-05-29

## 2025-05-29 RX ORDER — SILVER SULFADIAZINE 10 MG/G
CREAM TOPICAL PRN
OUTPATIENT
Start: 2025-05-29

## 2025-05-29 RX ORDER — LIDOCAINE 50 MG/G
OINTMENT TOPICAL PRN
OUTPATIENT
Start: 2025-05-29

## 2025-05-29 RX ORDER — LIDOCAINE HYDROCHLORIDE 20 MG/ML
JELLY TOPICAL PRN
Status: DISCONTINUED | OUTPATIENT
Start: 2025-05-29 | End: 2025-05-30 | Stop reason: HOSPADM

## 2025-05-29 RX ORDER — LIDOCAINE HYDROCHLORIDE 20 MG/ML
JELLY TOPICAL PRN
OUTPATIENT
Start: 2025-05-29

## 2025-05-29 RX ORDER — CLOBETASOL PROPIONATE 0.5 MG/G
OINTMENT TOPICAL PRN
OUTPATIENT
Start: 2025-05-29

## 2025-05-29 RX ORDER — LIDOCAINE 40 MG/G
CREAM TOPICAL PRN
OUTPATIENT
Start: 2025-05-29

## 2025-05-29 RX ORDER — LIDOCAINE HYDROCHLORIDE 40 MG/ML
SOLUTION TOPICAL PRN
OUTPATIENT
Start: 2025-05-29

## 2025-05-29 RX ADMIN — LIDOCAINE HYDROCHLORIDE 6 ML: 20 JELLY TOPICAL at 08:16

## 2025-05-29 NOTE — PROGRESS NOTES
[facility]   Progress Note and Procedure Note      Ac Alcantar  MEDICAL RECORD NUMBER:  [id number]  AGE: 64 y.o.   GENDER: male  : 1960  EPISODE DATE:  2025    Subjective:     Chief Complaint   Patient presents with    Wound Check     Left 3rd finger         HISTORY of PRESENT ILLNESS HPI     Ac Alcantar is a 64 y.o. male who presents today for wound/ulcer evaluation.   History of Wound Context:   Wound/Ulcer Pain Timing/Severity: The patient is here for follow-up on left middle finger wound, improving, hypergranulation to the wound base, no purulent drainage or necrotic tissue, less swelling to the finger, still have limited range of motion to the left middle finger joints.   He had mild pain, denied fever, no other complaints.  He completed a course of Levaquin.  2025 BUN 24, creatinine 1.2  C-reactive protein on 4/3/2025 was 3.1    The patient  had an x-ray of the left hand at [facility] that showed osteomyelitis  The patient had debridement done by Dr. Jo-Ann aggarwal at [facility] on 2025  Tissue culture on 2025 grew   STREPTOCOCCUS AGALACTIAE, also grew Staph aureus MSSA  Tissue culture on 4/3/2025 grew STREPTOCOCCUS AGALACTIAE, also grew Staph aureus MSSA  The patient was treated with 10 days of doxycycline that was changed to Levaquin on 4/3/2025 through 4/10/2025 followed by 1 week of Zyvox that was completed  2025 followed by Levaquin that he is tolerating.  P.o. Bactrim was added around 2025      Ulcer Identification:  Ulcer Type: non-healing/non-surgical    Contributing Factors: diabetes    Acute Wound: N/A    PAST MEDICAL HISTORY        Diagnosis Date    Acquired trigger finger 2018    CAD (coronary artery disease)     NWOCC/ involving coronary bypass graft of native heart with unstable angina pectoris    Cervical spondylosis     CHF (congestive heart failure) ([facility])     Chronic back pain     on 18 pt states is presently in pain mgmnt

## 2025-06-05 ENCOUNTER — HOSPITAL ENCOUNTER (OUTPATIENT)
Dept: WOUND CARE | Age: 65
Discharge: HOME | End: 2025-06-05
Payer: COMMERCIAL

## 2025-06-05 VITALS
HEART RATE: 75 BPM | WEIGHT: 197 LBS | TEMPERATURE: 96.4 F | BODY MASS INDEX: 26.68 KG/M2 | RESPIRATION RATE: 18 BRPM | HEIGHT: 72 IN | SYSTOLIC BLOOD PRESSURE: 114 MMHG | DIASTOLIC BLOOD PRESSURE: 73 MMHG

## 2025-06-05 DIAGNOSIS — M86.9 OSTEOMYELITIS, UNSPECIFIED SITE, UNSPECIFIED TYPE (HCC): ICD-10-CM

## 2025-06-05 DIAGNOSIS — S61.209A OPEN WOUND OF FINGER OF RIGHT HAND, INITIAL ENCOUNTER: Primary | ICD-10-CM

## 2025-06-05 PROCEDURE — 99213 OFFICE O/P EST LOW 20 MIN: CPT | Performed by: INTERNAL MEDICINE

## 2025-06-05 PROCEDURE — 11042 DBRDMT SUBQ TIS 1ST 20SQCM/<: CPT | Performed by: INTERNAL MEDICINE

## 2025-06-05 PROCEDURE — 11042 DBRDMT SUBQ TIS 1ST 20SQCM/<: CPT

## 2025-06-05 RX ORDER — NEOMYCIN/BACITRACIN/POLYMYXINB 3.5-400-5K
OINTMENT (GRAM) TOPICAL PRN
OUTPATIENT
Start: 2025-06-05

## 2025-06-05 RX ORDER — SILVER SULFADIAZINE 10 MG/G
CREAM TOPICAL PRN
OUTPATIENT
Start: 2025-06-05

## 2025-06-05 RX ORDER — LIDOCAINE HYDROCHLORIDE 40 MG/ML
SOLUTION TOPICAL PRN
OUTPATIENT
Start: 2025-06-05

## 2025-06-05 RX ORDER — SODIUM CHLOR/HYPOCHLOROUS ACID 0.033 %
SOLUTION, IRRIGATION IRRIGATION PRN
OUTPATIENT
Start: 2025-06-05

## 2025-06-05 RX ORDER — GINSENG 100 MG
CAPSULE ORAL PRN
OUTPATIENT
Start: 2025-06-05

## 2025-06-05 RX ORDER — LIDOCAINE 40 MG/G
CREAM TOPICAL PRN
OUTPATIENT
Start: 2025-06-05

## 2025-06-05 RX ORDER — TRIAMCINOLONE ACETONIDE 1 MG/G
OINTMENT TOPICAL PRN
OUTPATIENT
Start: 2025-06-05

## 2025-06-05 RX ORDER — CLOBETASOL PROPIONATE 0.5 MG/G
OINTMENT TOPICAL PRN
OUTPATIENT
Start: 2025-06-05

## 2025-06-05 RX ORDER — BACITRACIN ZINC AND POLYMYXIN B SULFATE 500; 1000 [USP'U]/G; [USP'U]/G
OINTMENT TOPICAL PRN
OUTPATIENT
Start: 2025-06-05

## 2025-06-05 RX ORDER — LIDOCAINE HYDROCHLORIDE 20 MG/ML
JELLY TOPICAL PRN
OUTPATIENT
Start: 2025-06-05

## 2025-06-05 RX ORDER — MUPIROCIN 20 MG/G
OINTMENT TOPICAL PRN
OUTPATIENT
Start: 2025-06-05

## 2025-06-05 RX ORDER — LIDOCAINE HYDROCHLORIDE 20 MG/ML
JELLY TOPICAL PRN
Status: DISCONTINUED | OUTPATIENT
Start: 2025-06-05 | End: 2025-06-06 | Stop reason: HOSPADM

## 2025-06-05 RX ORDER — GENTAMICIN SULFATE 1 MG/G
OINTMENT TOPICAL PRN
OUTPATIENT
Start: 2025-06-05

## 2025-06-05 RX ORDER — LIDOCAINE 50 MG/G
OINTMENT TOPICAL PRN
OUTPATIENT
Start: 2025-06-05

## 2025-06-05 RX ORDER — BETAMETHASONE DIPROPIONATE 0.5 MG/G
CREAM TOPICAL PRN
OUTPATIENT
Start: 2025-06-05

## 2025-06-05 RX ADMIN — LIDOCAINE HYDROCHLORIDE 6 ML: 20 JELLY TOPICAL at 08:20

## 2025-06-05 ASSESSMENT — PAIN SCALES - GENERAL: PAINLEVEL_OUTOF10: 0

## 2025-06-05 NOTE — PROGRESS NOTES
(See Comments)     Unknown rx    Penicillin G Rash       MEDICATIONS    Current Outpatient Medications on File Prior to Encounter   Medication Sig Dispense Refill    phentermine (ADIPEX-P) 37.5 MG tablet Take 1 tablet by mouth every morning (before breakfast) for 30 days. 30 tablet 0    hydroCHLOROthiazide 12.5 MG capsule Take 1 capsule by mouth every morning 90 capsule 2    meloxicam (MOBIC) 15 MG tablet Take 1 tablet by mouth daily 90 tablet 3    pantoprazole (PROTONIX) 40 MG tablet Take 1 tablet by mouth daily (with breakfast) 90 tablet 3    dapagliflozin (FARXIGA) 10 MG tablet Take 1 tablet by mouth every morning 90 tablet 3    topiramate (TOPAMAX) 200 MG tablet Take 1 tablet by mouth 2 times daily 180 tablet 3    atorvastatin (LIPITOR) 40 MG tablet Take 1 tablet by mouth daily 90 tablet 3    ARIPiprazole (ABILIFY) 20 MG tablet Take 1 tablet by mouth daily 90 tablet 3    SITagliptin (JANUVIA) 100 MG tablet Take 1 tablet by mouth every morning (before breakfast) 90 tablet 3    tiZANidine (ZANAFLEX) 4 MG tablet TAKE ONE TABLET BY MOUTH AT BEDTIME AS NEEDED FOR MUSCLE SPASMS 30 tablet 3    clopidogrel (PLAVIX) 75 MG tablet take 1 tablet by mouth once daily 90 tablet 3    Glucosamine-Chondroit-Vit C-Mn (GLUCOSAMINE CHONDR 1500 COMPLX) CAPS Take by mouth daily      B-D SYRINGE/NEEDLE 3CC/22GX1.5 22G X 1-1/2\" 3 ML MISC Every 30 days 100 each 3    NEEDLE, DISP, 18 G (BD DISP NEEDLES) 18G X 1-1/2\" MISC 1 Device by Does not apply route every 30 days 25 each 1    Misc Natural Products (GLUCOSAMINE CHOND MSM FORMULA PO) Take 2 tablets by mouth daily      Multiple Vitamin (MULTI VITAMIN DAILY PO) Take by mouth      metFORMIN (GLUCOPHAGE) 1000 MG tablet Take 1 tablet by mouth 2 times daily (with meals) 180 tablet 3    carvedilol (COREG) 12.5 MG tablet Take 1 tablet by mouth 2 times daily (with meals) 180 tablet 3    chlorhexidine (PERIDEX) 0.12 % solution  (Patient not taking: Reported on 6/5/2025)      testosterone

## 2025-06-05 NOTE — DISCHARGE INSTRUCTIONS
[facility] -Phone: [phone] Fax: [phone]    Visit  Discharge Instructions / Physician Orders     DATE: 6/5/2025     Wound Location: Left Middle Finger     Cleanse with: Liquid antibacterial soap and water, rinse well (do not soak)     Dressing Orders: Aquacel Ag, dry dressing     Frequency: Daily     Additional Orders: Increase protein to diet (meat, cheese, eggs, fish, peanut butter, nuts and beans)  Culture Taken 4/3/25  Labs Ordered 4/3/25  Antibiotic Ordered 4/17/25     Your next appointment with Wound Care Center is in 2 weeks with Dr. Carroll     (Please note your next appointment above and if you are unable to keep, kindly give a 24 hour notice. Thank you.)  If more than 15 min late we cannot guarantee you will be seen due to clinician schedule  Per Policy, Excessive cancellation will call for dismissal from program.     If you experience any of the following, please call the Wound Care Center during business hours:  [phone]     * Increase in Pain  * Temperature over 101  * Increase in drainage from your wound  * Drainage with a foul odor  * Bleeding  * Increase in swelling  * Need for compression bandage changes due to slippage, breakthrough drainage.     If you need medical attention outside of the business hours of the Wound Care Centers please contact your PCP or go to the an urgent care or emergency department     The information contained in the After Visit Summary has been reviewed with me, the patient and/or responsible adult, by my health care provider(s). I had the opportunity to ask questions regarding this information. I have elected to receive;      []After Visit Summary  [x]Comprehensive Discharge Instruction        Patient signature______________________________________Date:________  Electronically signed by Nathaniel Wheatley RN on 6/5/2025 at 8:45 AM  Electronically signed by Evans Carroll MD on 6/5/2025 at 8:52 AM

## 2025-06-17 NOTE — DISCHARGE INSTRUCTIONS
[facility] -Phone: [phone] Fax: [phone]    Visit  Discharge Instructions / Physician Orders     DATE: 6/19/2025     Wound Location: Left Middle Finger     Cleanse with: Liquid antibacterial soap and water, rinse well (do not soak)     Dressing Orders: Aquacel Ag, dry dressing     Frequency: Daily     Additional Orders: Increase protein to diet (meat, cheese, eggs, fish, peanut butter, nuts and beans)  Culture Taken 4/3/25  Labs Ordered 4/3/25  Antibiotic Ordered 4/17/25     Your next appointment with Wound Care Center is in 2 weeks with Dr. Carroll     (Please note your next appointment above and if you are unable to keep, kindly give a 24 hour notice. Thank you.)  If more than 15 min late we cannot guarantee you will be seen due to clinician schedule  Per Policy, Excessive cancellation will call for dismissal from program.     If you experience any of the following, please call the Wound Care Center during business hours:  [phone]     * Increase in Pain  * Temperature over 101  * Increase in drainage from your wound  * Drainage with a foul odor  * Bleeding  * Increase in swelling  * Need for compression bandage changes due to slippage, breakthrough drainage.     If you need medical attention outside of the business hours of the Wound Care Centers please contact your PCP or go to the an urgent care or emergency department     The information contained in the After Visit Summary has been reviewed with me, the patient and/or responsible adult, by my health care provider(s). I had the opportunity to ask questions regarding this information. I have elected to receive;      []After Visit Summary  [x]Comprehensive Discharge Instruction        Patient signature______________________________________Date:________  Electronically signed by Nathaniel Wheatley RN on 6/19/2025 at 8:42 AM  Electronically signed by Evans Carroll MD on 6/19/2025 at 8:46 AM

## 2025-06-18 DIAGNOSIS — E66.813 OBESITY, CLASS III, BMI 40-49.9 (MORBID OBESITY) (HCC): ICD-10-CM

## 2025-06-18 DIAGNOSIS — I73.9 PERIPHERAL VASCULAR DISEASE: ICD-10-CM

## 2025-06-18 RX ORDER — CLOPIDOGREL BISULFATE 75 MG/1
75 TABLET ORAL DAILY
Qty: 90 TABLET | Refills: 3 | Status: SHIPPED | OUTPATIENT
Start: 2025-06-18

## 2025-06-18 RX ORDER — PHENTERMINE HYDROCHLORIDE 37.5 MG/1
37.5 TABLET ORAL
Qty: 30 TABLET | Refills: 0 | OUTPATIENT
Start: 2025-06-18 | End: 2025-07-18

## 2025-06-18 NOTE — TELEPHONE ENCOUNTER
Ac Alcantar is calling to request a refill on the following medication(s):    Last Visit Date (If Applicable):  2/11/2025    Next Visit Date:    Visit date not found    Medication Request:  Requested Prescriptions     Pending Prescriptions Disp Refills    phentermine (ADIPEX-P) 37.5 MG tablet 30 tablet 0     Sig: Take 1 tablet by mouth every morning (before breakfast) for 30 days.    clopidogrel (PLAVIX) 75 MG tablet 90 tablet 3     Sig: Take 1 tablet by mouth daily

## 2025-06-19 ENCOUNTER — HOSPITAL ENCOUNTER (OUTPATIENT)
Dept: WOUND CARE | Age: 65
Discharge: HOME | End: 2025-06-19
Payer: COMMERCIAL

## 2025-06-19 VITALS
SYSTOLIC BLOOD PRESSURE: 105 MMHG | HEART RATE: 75 BPM | RESPIRATION RATE: 19 BRPM | DIASTOLIC BLOOD PRESSURE: 60 MMHG | TEMPERATURE: 96.4 F

## 2025-06-19 DIAGNOSIS — E11.42 DIABETIC POLYNEUROPATHY ASSOCIATED WITH TYPE 2 DIABETES MELLITUS (HCC): Primary | ICD-10-CM

## 2025-06-19 DIAGNOSIS — S61.209A OPEN WOUND OF FINGER OF RIGHT HAND, INITIAL ENCOUNTER: ICD-10-CM

## 2025-06-19 DIAGNOSIS — L97.512 ULCER OF TOE OF RIGHT FOOT, WITH FAT LAYER EXPOSED (HCC): ICD-10-CM

## 2025-06-19 DIAGNOSIS — M86.9 OSTEOMYELITIS OF HAND, UNSPECIFIED LATERALITY, UNSPECIFIED TYPE (HCC): ICD-10-CM

## 2025-06-19 PROCEDURE — 11042 DBRDMT SUBQ TIS 1ST 20SQCM/<: CPT | Performed by: INTERNAL MEDICINE

## 2025-06-19 PROCEDURE — 99213 OFFICE O/P EST LOW 20 MIN: CPT | Performed by: INTERNAL MEDICINE

## 2025-06-19 PROCEDURE — 11042 DBRDMT SUBQ TIS 1ST 20SQCM/<: CPT

## 2025-06-19 RX ORDER — CLOBETASOL PROPIONATE 0.5 MG/G
OINTMENT TOPICAL PRN
OUTPATIENT
Start: 2025-06-19

## 2025-06-19 RX ORDER — BACITRACIN ZINC AND POLYMYXIN B SULFATE 500; 1000 [USP'U]/G; [USP'U]/G
OINTMENT TOPICAL PRN
OUTPATIENT
Start: 2025-06-19

## 2025-06-19 RX ORDER — LIDOCAINE HYDROCHLORIDE 20 MG/ML
JELLY TOPICAL PRN
Status: DISCONTINUED | OUTPATIENT
Start: 2025-06-19 | End: 2025-06-20 | Stop reason: HOSPADM

## 2025-06-19 RX ORDER — GINSENG 100 MG
CAPSULE ORAL PRN
OUTPATIENT
Start: 2025-06-19

## 2025-06-19 RX ORDER — SODIUM CHLOR/HYPOCHLOROUS ACID 0.033 %
SOLUTION, IRRIGATION IRRIGATION PRN
OUTPATIENT
Start: 2025-06-19

## 2025-06-19 RX ORDER — LIDOCAINE HYDROCHLORIDE 40 MG/ML
SOLUTION TOPICAL PRN
OUTPATIENT
Start: 2025-06-19

## 2025-06-19 RX ORDER — LIDOCAINE 40 MG/G
CREAM TOPICAL PRN
OUTPATIENT
Start: 2025-06-19

## 2025-06-19 RX ORDER — LIDOCAINE HYDROCHLORIDE 20 MG/ML
JELLY TOPICAL PRN
OUTPATIENT
Start: 2025-06-19

## 2025-06-19 RX ORDER — GENTAMICIN SULFATE 1 MG/G
OINTMENT TOPICAL PRN
OUTPATIENT
Start: 2025-06-19

## 2025-06-19 RX ORDER — NEOMYCIN/BACITRACIN/POLYMYXINB 3.5-400-5K
OINTMENT (GRAM) TOPICAL PRN
OUTPATIENT
Start: 2025-06-19

## 2025-06-19 RX ORDER — TRIAMCINOLONE ACETONIDE 1 MG/G
OINTMENT TOPICAL PRN
OUTPATIENT
Start: 2025-06-19

## 2025-06-19 RX ORDER — BETAMETHASONE DIPROPIONATE 0.5 MG/G
CREAM TOPICAL PRN
OUTPATIENT
Start: 2025-06-19

## 2025-06-19 RX ORDER — LIDOCAINE 50 MG/G
OINTMENT TOPICAL PRN
OUTPATIENT
Start: 2025-06-19

## 2025-06-19 RX ORDER — SILVER SULFADIAZINE 10 MG/G
CREAM TOPICAL PRN
OUTPATIENT
Start: 2025-06-19

## 2025-06-19 RX ORDER — MUPIROCIN 2 %
OINTMENT (GRAM) TOPICAL PRN
OUTPATIENT
Start: 2025-06-19

## 2025-06-19 RX ADMIN — LIDOCAINE HYDROCHLORIDE 6 ML: 20 JELLY TOPICAL at 08:23

## 2025-06-19 NOTE — PROGRESS NOTES
Outpatient Wound Care Protocol    Initiate Oxygen Therapy Protocol    Open wound of finger of right hand    Relevant Medications    lidocaine (XYLOCAINE) 2 % uro-jet    Other Relevant Orders    MD COMMUNICATION 1    MD COMMUNICATION 2    Initiate Outpatient Wound Care Protocol    Initiate Oxygen Therapy Protocol    Pyogenic inflammation of bone (HCC)   Cellulitis     Procedure Note  Indications:  Based on my examination of this patient's wound(s)/ulcer(s) today, debridement is required to promote healing and evaluate the wound base.    Performed by: Evnas Carroll MD    Consent obtained:  Yes    Time out taken:  Yes    Pain Control: Anesthetic  Anesthetic: 2% Lidocaine Gel Topical       Debridement: Excisional Debridement    Using curette the wound(s)/ulcer(s) was/were debrided down through and including the removal of subcutaneous tissue.        Devitalized Tissue Debrided:  fibrin, biofilm, and slough    Pre Debridement Measurements:  Are located in the Wound/Ulcer Documentation Flow Sheet    Diabetic/Pressure/Non Pressure Ulcers only:  Ulcer: N/A     Wound/Ulcer #: 1    Post Debridement Measurements:  Wound/Ulcer Descriptions are Pre Debridement except measurements:    Wound 04/03/25 Finger (Comment which one) Left #1 3rd finger (Active)   Wound Image   05/15/25 0820   Wound Etiology Other 06/19/25 0818   Dressing Status Old drainage noted;New drainage noted 06/19/25 0818   Wound Cleansed Cleansed with saline 06/19/25 0818   Wound Length (cm) 0 cm 06/19/25 0818   Wound Width (cm) 0 cm 06/19/25 0818   Wound Depth (cm) 0 cm 06/19/25 0818   Wound Surface Area (cm^2) 0 cm^2 06/19/25 0818   Change in Wound Size % (l*w) 100 06/19/25 0818   Wound Volume (cm^3) 0 cm^3 06/19/25 0818   Wound Healing % 100 06/19/25 0818   Post-Procedure Length (cm) 0.2 cm 06/19/25 0818   Post-Procedure Width (cm) 0.5 cm 06/19/25 0818   Post-Procedure Depth (cm) 0.1 cm 06/19/25 0818   Post-Procedure Surface Area (cm^2) 0.1 cm^2 06/19/25 0818

## 2025-06-26 ENCOUNTER — TELEMEDICINE (OUTPATIENT)
Dept: FAMILY MEDICINE CLINIC | Age: 65
End: 2025-06-26

## 2025-06-26 DIAGNOSIS — I87.2 VENOUS INSUFFICIENCY OF BOTH LOWER EXTREMITIES: ICD-10-CM

## 2025-06-26 DIAGNOSIS — I73.9 PERIPHERAL VASCULAR DISEASE: ICD-10-CM

## 2025-06-26 DIAGNOSIS — E66.811 CLASS 1 OBESITY DUE TO EXCESS CALORIES WITH SERIOUS COMORBIDITY AND BODY MASS INDEX (BMI) OF 30.0 TO 30.9 IN ADULT: ICD-10-CM

## 2025-06-26 DIAGNOSIS — I50.9 CONGESTIVE HEART FAILURE, UNSPECIFIED HF CHRONICITY, UNSPECIFIED HEART FAILURE TYPE (HCC): ICD-10-CM

## 2025-06-26 DIAGNOSIS — E78.2 HYPERLIPEMIA, MIXED: ICD-10-CM

## 2025-06-26 DIAGNOSIS — I10 ESSENTIAL HYPERTENSION: ICD-10-CM

## 2025-06-26 DIAGNOSIS — I25.5 ISCHEMIC CARDIOMYOPATHY: ICD-10-CM

## 2025-06-26 DIAGNOSIS — E66.09 CLASS 1 OBESITY DUE TO EXCESS CALORIES WITH SERIOUS COMORBIDITY AND BODY MASS INDEX (BMI) OF 30.0 TO 30.9 IN ADULT: ICD-10-CM

## 2025-06-26 DIAGNOSIS — E34.9 HYPOTESTOSTERONISM: ICD-10-CM

## 2025-06-26 DIAGNOSIS — N42.9 PROSTATE DISORDER: ICD-10-CM

## 2025-06-26 DIAGNOSIS — I25.10 CORONARY ARTERY DISEASE INVOLVING NATIVE CORONARY ARTERY OF NATIVE HEART WITHOUT ANGINA PECTORIS: ICD-10-CM

## 2025-06-26 DIAGNOSIS — K21.00 GASTROESOPHAGEAL REFLUX DISEASE WITH ESOPHAGITIS WITHOUT HEMORRHAGE: ICD-10-CM

## 2025-06-26 DIAGNOSIS — E11.69 TYPE 2 DIABETES MELLITUS WITH OTHER SPECIFIED COMPLICATION, WITHOUT LONG-TERM CURRENT USE OF INSULIN (HCC): Primary | ICD-10-CM

## 2025-06-26 ASSESSMENT — PATIENT HEALTH QUESTIONNAIRE - PHQ9
SUM OF ALL RESPONSES TO PHQ QUESTIONS 1-9: 0
1. LITTLE INTEREST OR PLEASURE IN DOING THINGS: NOT AT ALL
2. FEELING DOWN, DEPRESSED OR HOPELESS: NOT AT ALL

## 2025-06-26 NOTE — PROGRESS NOTES
Ac Alcantar, was evaluated through a synchronous (real-time) audio-video encounter. The patient (or guardian if applicable) is aware that this is a billable service, which includes applicable co-pays. This Virtual Visit was conducted with patient's (and/or legal guardian's) consent. Patient identification was verified, and a caregiver was present when appropriate.   The patient was located at Home: [address] [address]  Provider was located at Home (Appt Dept State): [place]  Confirm you are appropriately licensed, registered, or certified to deliver care in the state where the patient is located as indicated above. If you are not or unsure, please re-schedule the visit: Yes, I confirm.     Ac Alcantar (:  1960) is a Established patient, presenting virtually for evaluation of the following:      Below is the assessment and plan developed based on review of pertinent history, physical exam, labs, studies, and medications.     Assessment & Plan  Type 2 diabetes mellitus with other specified complication, without long-term current use of insulin (HCC)   Chronic, not at goal (unstable), continue current treatment plan    Orders:    Hemoglobin A1C; Future    Class 1 obesity due to excess calories with serious comorbidity and body mass index (BMI) of 30.0 to 30.9 in adult            Essential hypertension       Orders:    Hemoglobin A1C; Future    Peripheral vascular disease       Orders:    Hemoglobin A1C; Future    Gastroesophageal reflux disease with esophagitis without hemorrhage       Orders:    Hemoglobin A1C; Future    Hyperlipemia, mixed       Orders:    Hemoglobin A1C; Future    Hypotestosteronism       Orders:    Hemoglobin A1C; Future    Congestive heart failure, unspecified HF chronicity, unspecified heart failure type (HCC)       Orders:    Hemoglobin A1C; Future    Coronary artery disease involving native coronary artery of native heart without angina pectoris       Orders:

## 2025-06-30 DIAGNOSIS — E66.813 OBESITY, CLASS III, BMI 40-49.9 (MORBID OBESITY) (HCC): ICD-10-CM

## 2025-06-30 RX ORDER — PHENTERMINE HYDROCHLORIDE 37.5 MG/1
37.5 TABLET ORAL
Qty: 30 TABLET | Refills: 0 | Status: SHIPPED | OUTPATIENT
Start: 2025-06-30 | End: 2025-07-30

## 2025-06-30 NOTE — TELEPHONE ENCOUNTER
Ac Alcantar is calling to request a refill on the following medication(s):    Last Visit Date (If Applicable):  6/26/2025    Next Visit Date:    Visit date not found    Medication Request:  Requested Prescriptions     Pending Prescriptions Disp Refills    phentermine (ADIPEX-P) 37.5 MG tablet 30 tablet 0     Sig: Take 1 tablet by mouth every morning (before breakfast) for 30 days.

## 2025-07-01 NOTE — DISCHARGE INSTRUCTIONS
[facility] -Phone: [phone] Fax: [phone]    Visit  Discharge Instructions / Physician Orders     DATE: 7/3/2025     Wound Location: Left Middle Finger     Cleanse with: Liquid antibacterial soap and water, rinse well (do not soak)     Dressing Orders: Aquacel Ag, dry dressing- only if it opens back up     Frequency: Daily     Additional Orders: Increase protein to diet (meat, cheese, eggs, fish, peanut butter, nuts and beans)  Culture Taken 4/3/25  Labs Ordered 4/3/25  Antibiotic Ordered 4/17/25  Labs Ordered 7/3/25     Your next appointment with Wound Care Center is in 4 weeks with Dr. Carroll     (Please note your next appointment above and if you are unable to keep, kindly give a 24 hour notice. Thank you.)  If more than 15 min late we cannot guarantee you will be seen due to clinician schedule  Per Policy, Excessive cancellation will call for dismissal from program.     If you experience any of the following, please call the Wound Care Center during business hours:  [phone]     * Increase in Pain  * Temperature over 101  * Increase in drainage from your wound  * Drainage with a foul odor  * Bleeding  * Increase in swelling  * Need for compression bandage changes due to slippage, breakthrough drainage.     If you need medical attention outside of the business hours of the Wound Care Centers please contact your PCP or go to the an urgent care or emergency department     The information contained in the After Visit Summary has been reviewed with me, the patient and/or responsible adult, by my health care provider(s). I had the opportunity to ask questions regarding this information. I have elected to receive;      []After Visit Summary  [x]Comprehensive Discharge Instruction        Patient signature______________________________________Date:________  Electronically signed by Nathaniel Wheatley RN on 7/3/2025 at 8:39 AM  Electronically signed by Evans Carroll MD on 7/3/2025 at 8:41 AM

## 2025-07-03 ENCOUNTER — HOSPITAL ENCOUNTER (OUTPATIENT)
Dept: WOUND CARE | Age: 65
Discharge: HOME | End: 2025-07-03
Payer: COMMERCIAL

## 2025-07-03 ENCOUNTER — HOSPITAL ENCOUNTER (OUTPATIENT)
Age: 65
Discharge: HOME | End: 2025-07-03
Payer: COMMERCIAL

## 2025-07-03 VITALS
DIASTOLIC BLOOD PRESSURE: 70 MMHG | HEART RATE: 78 BPM | TEMPERATURE: 96.5 F | RESPIRATION RATE: 18 BRPM | SYSTOLIC BLOOD PRESSURE: 116 MMHG

## 2025-07-03 DIAGNOSIS — K21.00 GASTROESOPHAGEAL REFLUX DISEASE WITH ESOPHAGITIS WITHOUT HEMORRHAGE: ICD-10-CM

## 2025-07-03 DIAGNOSIS — S61.402D OPEN WOUND OF LEFT HAND WITHOUT FOREIGN BODY, UNSPECIFIED WOUND TYPE, SUBSEQUENT ENCOUNTER: ICD-10-CM

## 2025-07-03 DIAGNOSIS — E78.2 HYPERLIPEMIA, MIXED: ICD-10-CM

## 2025-07-03 DIAGNOSIS — I87.2 VENOUS INSUFFICIENCY OF BOTH LOWER EXTREMITIES: ICD-10-CM

## 2025-07-03 DIAGNOSIS — I25.10 CORONARY ARTERY DISEASE INVOLVING NATIVE CORONARY ARTERY OF NATIVE HEART WITHOUT ANGINA PECTORIS: ICD-10-CM

## 2025-07-03 DIAGNOSIS — I10 ESSENTIAL HYPERTENSION: ICD-10-CM

## 2025-07-03 DIAGNOSIS — L97.512 ULCER OF TOE OF RIGHT FOOT, WITH FAT LAYER EXPOSED (HCC): ICD-10-CM

## 2025-07-03 DIAGNOSIS — I73.9 PERIPHERAL VASCULAR DISEASE: ICD-10-CM

## 2025-07-03 DIAGNOSIS — S61.209A OPEN WOUND OF FINGER OF RIGHT HAND, INITIAL ENCOUNTER: ICD-10-CM

## 2025-07-03 DIAGNOSIS — N42.9 PROSTATE DISORDER: ICD-10-CM

## 2025-07-03 DIAGNOSIS — E11.42 DIABETIC POLYNEUROPATHY ASSOCIATED WITH TYPE 2 DIABETES MELLITUS (HCC): Primary | ICD-10-CM

## 2025-07-03 DIAGNOSIS — I25.5 ISCHEMIC CARDIOMYOPATHY: ICD-10-CM

## 2025-07-03 DIAGNOSIS — E34.9 HYPOTESTOSTERONISM: ICD-10-CM

## 2025-07-03 DIAGNOSIS — I50.9 CONGESTIVE HEART FAILURE, UNSPECIFIED HF CHRONICITY, UNSPECIFIED HEART FAILURE TYPE (HCC): ICD-10-CM

## 2025-07-03 DIAGNOSIS — E11.69 TYPE 2 DIABETES MELLITUS WITH OTHER SPECIFIED COMPLICATION, WITHOUT LONG-TERM CURRENT USE OF INSULIN (HCC): ICD-10-CM

## 2025-07-03 LAB
CRP SERPL HS-MCNC: <3 MG/L (ref 0–5)
EST. AVERAGE GLUCOSE BLD GHB EST-MCNC: 154 MG/DL
HBA1C MFR BLD: 7 % (ref 4–6)

## 2025-07-03 PROCEDURE — 99211 OFF/OP EST MAY X REQ PHY/QHP: CPT

## 2025-07-03 PROCEDURE — 83036 HEMOGLOBIN GLYCOSYLATED A1C: CPT

## 2025-07-03 PROCEDURE — 86140 C-REACTIVE PROTEIN: CPT

## 2025-07-03 PROCEDURE — 36415 COLL VENOUS BLD VENIPUNCTURE: CPT

## 2025-07-03 PROCEDURE — 99213 OFFICE O/P EST LOW 20 MIN: CPT | Performed by: INTERNAL MEDICINE

## 2025-07-03 RX ORDER — LIDOCAINE HYDROCHLORIDE 20 MG/ML
JELLY TOPICAL PRN
OUTPATIENT
Start: 2025-07-03

## 2025-07-03 RX ORDER — GENTAMICIN SULFATE 1 MG/G
OINTMENT TOPICAL PRN
OUTPATIENT
Start: 2025-07-03

## 2025-07-03 RX ORDER — GINSENG 100 MG
CAPSULE ORAL PRN
OUTPATIENT
Start: 2025-07-03

## 2025-07-03 RX ORDER — SODIUM CHLOR/HYPOCHLOROUS ACID 0.033 %
SOLUTION, IRRIGATION IRRIGATION PRN
OUTPATIENT
Start: 2025-07-03

## 2025-07-03 RX ORDER — NEOMYCIN/BACITRACIN/POLYMYXINB 3.5-400-5K
OINTMENT (GRAM) TOPICAL PRN
OUTPATIENT
Start: 2025-07-03

## 2025-07-03 RX ORDER — TRIAMCINOLONE ACETONIDE 1 MG/G
OINTMENT TOPICAL PRN
OUTPATIENT
Start: 2025-07-03

## 2025-07-03 RX ORDER — LIDOCAINE 40 MG/G
CREAM TOPICAL PRN
OUTPATIENT
Start: 2025-07-03

## 2025-07-03 RX ORDER — LIDOCAINE HYDROCHLORIDE 20 MG/ML
JELLY TOPICAL PRN
Status: DISCONTINUED | OUTPATIENT
Start: 2025-07-03 | End: 2025-07-04 | Stop reason: HOSPADM

## 2025-07-03 RX ORDER — BACITRACIN ZINC AND POLYMYXIN B SULFATE 500; 1000 [USP'U]/G; [USP'U]/G
OINTMENT TOPICAL PRN
OUTPATIENT
Start: 2025-07-03

## 2025-07-03 RX ORDER — SILVER SULFADIAZINE 10 MG/G
CREAM TOPICAL PRN
OUTPATIENT
Start: 2025-07-03

## 2025-07-03 RX ORDER — MUPIROCIN 2 %
OINTMENT (GRAM) TOPICAL PRN
OUTPATIENT
Start: 2025-07-03

## 2025-07-03 RX ORDER — CLOBETASOL PROPIONATE 0.5 MG/G
OINTMENT TOPICAL PRN
OUTPATIENT
Start: 2025-07-03

## 2025-07-03 RX ORDER — LIDOCAINE 50 MG/G
OINTMENT TOPICAL PRN
OUTPATIENT
Start: 2025-07-03

## 2025-07-03 RX ORDER — BETAMETHASONE DIPROPIONATE 0.5 MG/G
CREAM TOPICAL PRN
OUTPATIENT
Start: 2025-07-03

## 2025-07-03 RX ORDER — LIDOCAINE HYDROCHLORIDE 40 MG/ML
SOLUTION TOPICAL PRN
OUTPATIENT
Start: 2025-07-03

## 2025-07-03 RX ADMIN — LIDOCAINE HYDROCHLORIDE 6 ML: 20 JELLY TOPICAL at 08:11

## 2025-07-03 NOTE — PROGRESS NOTES
[facility]   Progress Note and Procedure Note      Ac Alcantar  MEDICAL RECORD NUMBER:  [id number]  AGE: 64 y.o.   GENDER: male  : 1960  EPISODE DATE:  7/3/2025    Subjective:     Chief Complaint   Patient presents with    Wound Check     Left middle finger         HISTORY of PRESENT ILLNESS HPI     Ac Alcantar is a 64 y.o. male who presents today for wound/ulcer evaluation.   History of Wound Context:   Wound/Ulcer Pain Timing/Severity: The patient is here for follow-up on left middle finger wound that was healed with a scab.  He still have limited range of motion to the left middle finger small joints, no significant redness or drainage.  He denied pain, denied fever or chills, no other complaints.  He completed a course of Levaquin.  2025 BUN 24, creatinine 1.2  C-reactive protein on 4/3/2025 was 3.1    The patient  had an x-ray of the left hand at [facility] that showed osteomyelitis  The patient had debridement done by Dr. Jo-Ann aggarwal at [facility] on 2025  Tissue culture on 2025 grew   STREPTOCOCCUS AGALACTIAE, also grew Staph aureus MSSA  Tissue culture on 4/3/2025 grew STREPTOCOCCUS AGALACTIAE, also grew Staph aureus MSSA  The patient was treated with 10 days of doxycycline that was changed to Levaquin on 4/3/2025 through 4/10/2025 followed by 1 week of Zyvox that was completed  2025 followed by Levaquin that he is tolerating.  P.o. Bactrim was added around 2025      Ulcer Identification:  Ulcer Type: non-healing/non-surgical    Contributing Factors: diabetes    Acute Wound: N/A    PAST MEDICAL HISTORY        Diagnosis Date    Acquired trigger finger 2018    CAD (coronary artery disease)     NWOCC/ involving coronary bypass graft of native heart with unstable angina pectoris    Cervical spondylosis     CHF (congestive heart failure) ([facility])     Chronic back pain     on 18 pt states is presently in pain mgmnt    Chronic ulcer of toe, left,

## 2025-07-23 DIAGNOSIS — E78.2 HYPERLIPEMIA, MIXED: ICD-10-CM

## 2025-07-23 RX ORDER — CARVEDILOL 12.5 MG/1
12.5 TABLET ORAL 2 TIMES DAILY WITH MEALS
Qty: 180 TABLET | Refills: 3 | Status: SHIPPED | OUTPATIENT
Start: 2025-07-23 | End: 2026-07-18

## 2025-07-23 NOTE — TELEPHONE ENCOUNTER
Ac Alcantar is calling to request a refill on the following medication(s):    Last Visit Date (If Applicable):  6/26/2025    Next Visit Date:    Visit date not found    Medication Request:  Requested Prescriptions     Pending Prescriptions Disp Refills    metFORMIN (GLUCOPHAGE) 1000 MG tablet 180 tablet 3     Sig: Take 1 tablet by mouth 2 times daily (with meals)

## 2025-07-23 NOTE — TELEPHONE ENCOUNTER
Ac Alcantar is calling to request a refill on the following medication(s):    Last Visit Date (If Applicable):  6/26/2025    Next Visit Date:    Visit date not found    Medication Request:  Requested Prescriptions     Pending Prescriptions Disp Refills    carvedilol (COREG) 12.5 MG tablet 180 tablet 3     Sig: Take 1 tablet by mouth 2 times daily (with meals)            [facility] [address] [place] [address] [phone] [facility] [phone]

## 2025-07-30 NOTE — DISCHARGE INSTRUCTIONS
[facility] -Phone: [phone] Fax: [phone]    Visit  Discharge Instructions / Physician Orders     DATE: 7/31/2025     Wound Location: Left Middle Finger     Cleanse with: Liquid antibacterial soap and water, rinse well (do not soak)     Dressing Orders: dry dressing- only if it opens back up     Frequency: Daily     Additional Orders: Increase protein to diet (meat, cheese, eggs, fish, peanut butter, nuts and beans)  Culture Taken 4/3/25  Labs Ordered 4/3/25  Antibiotic Ordered 4/17/25  Labs Ordered 7/3/25     Complete labs before next appointment  Your next appointment with Wound Care Center is in 3 months with Dr. Carroll     (Please note your next appointment above and if you are unable to keep, kindly give a 24 hour notice. Thank you.)  If more than 15 min late we cannot guarantee you will be seen due to clinician schedule  Per Policy, Excessive cancellation will call for dismissal from program.     If you experience any of the following, please call the Wound Care Center during business hours:  [phone]     * Increase in Pain  * Temperature over 101  * Increase in drainage from your wound  * Drainage with a foul odor  * Bleeding  * Increase in swelling  * Need for compression bandage changes due to slippage, breakthrough drainage.     If you need medical attention outside of the business hours of the W[facility] Centers please contact your PCP or go to the an urgent care or emergency department     The information contained in the After Visit Summary has been reviewed with me, the patient and/or responsible adult, by my health care provider(s). I had the opportunity to ask questions regarding this information. I have elected to receive;      []After Visit Summary  [x]Comprehensive Discharge Instruction        Patient signature______________________________________Date:________  Electronically signed by Nathaniel Wheatley RN on 7/31/2025 at 8:50 AM  Electronically signed by Evans Carroll,

## 2025-07-31 ENCOUNTER — HOSPITAL ENCOUNTER (OUTPATIENT)
Dept: WOUND CARE | Age: 65
Discharge: HOME | End: 2025-07-31
Payer: COMMERCIAL

## 2025-07-31 VITALS
HEART RATE: 92 BPM | DIASTOLIC BLOOD PRESSURE: 85 MMHG | TEMPERATURE: 96.5 F | RESPIRATION RATE: 16 BRPM | SYSTOLIC BLOOD PRESSURE: 106 MMHG

## 2025-07-31 DIAGNOSIS — M86.9 OSTEOMYELITIS OF LEFT HAND, UNSPECIFIED TYPE (HCC): ICD-10-CM

## 2025-07-31 DIAGNOSIS — E66.813 OBESITY, CLASS III, BMI 40-49.9 (MORBID OBESITY) (HCC): ICD-10-CM

## 2025-07-31 DIAGNOSIS — E11.42 DIABETIC POLYNEUROPATHY ASSOCIATED WITH TYPE 2 DIABETES MELLITUS (HCC): Primary | ICD-10-CM

## 2025-07-31 DIAGNOSIS — S61.209A OPEN WOUND OF FINGER OF RIGHT HAND, INITIAL ENCOUNTER: ICD-10-CM

## 2025-07-31 DIAGNOSIS — L97.512 ULCER OF TOE OF RIGHT FOOT, WITH FAT LAYER EXPOSED (HCC): ICD-10-CM

## 2025-07-31 PROCEDURE — 99211 OFF/OP EST MAY X REQ PHY/QHP: CPT

## 2025-07-31 PROCEDURE — 99213 OFFICE O/P EST LOW 20 MIN: CPT | Performed by: INTERNAL MEDICINE

## 2025-07-31 RX ORDER — LIDOCAINE 40 MG/G
CREAM TOPICAL PRN
OUTPATIENT
Start: 2025-07-31

## 2025-07-31 RX ORDER — BACITRACIN ZINC AND POLYMYXIN B SULFATE 500; 1000 [USP'U]/G; [USP'U]/G
OINTMENT TOPICAL PRN
OUTPATIENT
Start: 2025-07-31

## 2025-07-31 RX ORDER — GINSENG 100 MG
CAPSULE ORAL PRN
OUTPATIENT
Start: 2025-07-31

## 2025-07-31 RX ORDER — BETAMETHASONE DIPROPIONATE 0.5 MG/G
CREAM TOPICAL PRN
OUTPATIENT
Start: 2025-07-31

## 2025-07-31 RX ORDER — MUPIROCIN 2 %
OINTMENT (GRAM) TOPICAL PRN
OUTPATIENT
Start: 2025-07-31

## 2025-07-31 RX ORDER — SODIUM CHLOR/HYPOCHLOROUS ACID 0.033 %
SOLUTION, IRRIGATION IRRIGATION PRN
OUTPATIENT
Start: 2025-07-31

## 2025-07-31 RX ORDER — LIDOCAINE HYDROCHLORIDE 20 MG/ML
JELLY TOPICAL PRN
Status: DISCONTINUED | OUTPATIENT
Start: 2025-07-31 | End: 2025-08-01 | Stop reason: HOSPADM

## 2025-07-31 RX ORDER — GENTAMICIN SULFATE 1 MG/G
OINTMENT TOPICAL PRN
OUTPATIENT
Start: 2025-07-31

## 2025-07-31 RX ORDER — PHENTERMINE HYDROCHLORIDE 37.5 MG/1
37.5 TABLET ORAL
Qty: 30 TABLET | Refills: 0 | Status: SHIPPED | OUTPATIENT
Start: 2025-07-31 | End: 2025-08-30

## 2025-07-31 RX ORDER — LIDOCAINE HYDROCHLORIDE 20 MG/ML
JELLY TOPICAL PRN
OUTPATIENT
Start: 2025-07-31

## 2025-07-31 RX ORDER — NEOMYCIN/BACITRACIN/POLYMYXINB 3.5-400-5K
OINTMENT (GRAM) TOPICAL PRN
OUTPATIENT
Start: 2025-07-31

## 2025-07-31 RX ORDER — SILVER SULFADIAZINE 10 MG/G
CREAM TOPICAL PRN
OUTPATIENT
Start: 2025-07-31

## 2025-07-31 RX ORDER — LIDOCAINE HYDROCHLORIDE 40 MG/ML
SOLUTION TOPICAL PRN
OUTPATIENT
Start: 2025-07-31

## 2025-07-31 RX ORDER — CLOBETASOL PROPIONATE 0.5 MG/G
OINTMENT TOPICAL PRN
OUTPATIENT
Start: 2025-07-31

## 2025-07-31 RX ORDER — TRIAMCINOLONE ACETONIDE 1 MG/G
OINTMENT TOPICAL PRN
OUTPATIENT
Start: 2025-07-31

## 2025-07-31 RX ORDER — LIDOCAINE 50 MG/G
OINTMENT TOPICAL PRN
OUTPATIENT
Start: 2025-07-31

## 2025-07-31 NOTE — TELEPHONE ENCOUNTER
Ac Alcantar is calling to request a refill on the following medication(s):    Last Visit Date (If Applicable):  6/26/2025    Next Visit Date:    Visit date not found    Medication Request:  Requested Prescriptions     Pending Prescriptions Disp Refills    phentermine (ADIPEX-P) 37.5 MG tablet 30 tablet 0     Sig: Take 1 tablet by mouth every morning (before breakfast) for 30 days.           \

## 2025-07-31 NOTE — PROGRESS NOTES
[facility]   Progress Note and Procedure Note      Ac Alcantar  MEDICAL RECORD NUMBER:  [id number]  AGE: 64 y.o.   GENDER: male  : 1960  EPISODE DATE:  2025    Subjective:     Chief Complaint   Patient presents with    Wound Check     Left 3rd finger         HISTORY of PRESENT ILLNESS HPI     Ac Alcantar is a 64 y.o. male who presents today for wound/ulcer evaluation.   History of Wound Context:   Wound/Ulcer Pain Timing/Severity: The patient is here for follow-up on left middle finger wound that was healed .  He still have limited range of motion to the left middle finger small joints, no significant redness or drainage.  He denied fever or chills, no significant pain, no other complaints.  He completed a course of Levaquin.      The patient  had an x-ray of the left hand at [facility] that showed osteomyelitis  The patient had debridement done by Dr. Jo-Ann aggarwal at [facility] on 2025  Tissue culture on 2025 grew   STREPTOCOCCUS AGALACTIAE, also grew Staph aureus MSSA  Tissue culture on 4/3/2025 grew STREPTOCOCCUS AGALACTIAE, also grew Staph aureus MSSA  The patient was treated with 10 days of doxycycline that was changed to Levaquin on 4/3/2025 through 4/10/2025 followed by 1 week of Zyvox that was completed  2025 followed by Levaquin . P.o. Bactrim was added around 2025      Ulcer Identification:  Ulcer Type: non-healing/non-surgical    Contributing Factors: diabetes    Acute Wound: N/A    PAST MEDICAL HISTORY        Diagnosis Date    Acquired trigger finger 2018    CAD (coronary artery disease)     NWOCC/ involving coronary bypass graft of native heart with unstable angina pectoris    Cervical spondylosis     CHF (congestive heart failure) ([facility])     Chronic back pain     on 18 pt states is presently in pain mgmnt    Chronic ulcer of toe, left, with necrosis of bone ([facility]) 2022    Contusion of elbow 2018    Contusion of knee

## 2025-08-28 ENCOUNTER — OFFICE VISIT (OUTPATIENT)
Dept: PODIATRY | Age: 65
End: 2025-08-28

## 2025-08-28 VITALS — HEIGHT: 72 IN | BODY MASS INDEX: 25.06 KG/M2 | WEIGHT: 185 LBS

## 2025-08-28 DIAGNOSIS — M79.672 PAIN IN BOTH FEET: ICD-10-CM

## 2025-08-28 DIAGNOSIS — B35.1 DERMATOPHYTOSIS OF NAIL: ICD-10-CM

## 2025-08-28 DIAGNOSIS — L97.522 ULCER OF LEFT FOOT, WITH FAT LAYER EXPOSED (HCC): ICD-10-CM

## 2025-08-28 DIAGNOSIS — E11.51 TYPE II DIABETES MELLITUS WITH PERIPHERAL CIRCULATORY DISORDER (HCC): ICD-10-CM

## 2025-08-28 DIAGNOSIS — L03.116 CELLULITIS OF FOOT, LEFT: ICD-10-CM

## 2025-08-28 DIAGNOSIS — M77.32 BILATERAL CALCANEAL SPURS: Primary | ICD-10-CM

## 2025-08-28 DIAGNOSIS — M79.671 PAIN IN BOTH FEET: ICD-10-CM

## 2025-08-28 DIAGNOSIS — M77.31 BILATERAL CALCANEAL SPURS: Primary | ICD-10-CM

## 2025-08-28 DIAGNOSIS — M72.2 PLANTAR FASCIAL FIBROMATOSIS: ICD-10-CM

## 2025-08-28 DIAGNOSIS — L97.512 RIGHT FOOT ULCER, WITH FAT LAYER EXPOSED (HCC): ICD-10-CM

## 2025-08-28 DIAGNOSIS — E11.42 DIABETIC POLYNEUROPATHY ASSOCIATED WITH TYPE 2 DIABETES MELLITUS (HCC): ICD-10-CM

## 2025-08-28 DIAGNOSIS — I73.9 PVD (PERIPHERAL VASCULAR DISEASE): ICD-10-CM

## 2025-08-28 DIAGNOSIS — Z89.431 HISTORY OF AMPUTATION OF FOOT, RIGHT (HCC): ICD-10-CM

## 2025-09-02 DIAGNOSIS — E66.813 OBESITY, CLASS III, BMI 40-49.9 (MORBID OBESITY) (HCC): ICD-10-CM

## 2025-09-02 RX ORDER — PHENTERMINE HYDROCHLORIDE 37.5 MG/1
37.5 TABLET ORAL
Qty: 30 TABLET | Refills: 0 | OUTPATIENT
Start: 2025-09-02 | End: 2025-10-02

## (undated) DEVICE — SUTURE VCRL SZ 3-0 L27IN ABSRB UD L26MM SH 1/2 CIR J416H

## (undated) DEVICE — SMALL TEAR CROSS CUT RASP (11.0 X 5.0MM)

## (undated) DEVICE — APPLICATOR MEDICATED 26 CC SOLUTION HI LT ORNG CHLORAPREP

## (undated) DEVICE — SPONGE LAP W18XL18IN WHT COT 4 PLY FLD STRUNG RADPQ DISP ST

## (undated) DEVICE — BANDAGE COBAN 4 IN COMPR W4INXL5YD FOAM COHESIVE QUIK STK SELF ADH SFT

## (undated) DEVICE — INTENDED FOR TISSUE SEPARATION, AND OTHER PROCEDURES THAT REQUIRE A SHARP SURGICAL BLADE TO PUNCTURE OR CUT.: Brand: BARD-PARKER ® CARBON RIB-BACK BLADES

## (undated) DEVICE — PAD,EYE,1-5/8X2 5/8,STERILE,LF,1/PK: Brand: MEDLINE

## (undated) DEVICE — 4-PORT MANIFOLD: Brand: NEPTUNE 2

## (undated) DEVICE — GOWN,AURORA,NONREINFORCED,LARGE: Brand: MEDLINE

## (undated) DEVICE — BANDAGE,GAUZE,BULKEE II,4.5"X4.1YD,STRL: Brand: MEDLINE

## (undated) DEVICE — PRECISION THIN (9.0 X 0.38 X 25.0MM)

## (undated) DEVICE — GLOVE SURG SZ 6 CRM LTX FREE POLYISOPRENE POLYMER BEAD ANTI

## (undated) DEVICE — SVMMC POD PK

## (undated) DEVICE — YANKAUER,FLEXIBLE HANDLE,REGLR CAPACITY: Brand: MEDLINE INDUSTRIES, INC.

## (undated) DEVICE — BANDAGE COMPR W4INXL5YD WHT BGE POLY COT M E WRP WV HK AND

## (undated) DEVICE — SOLUTION IV IRRIG 500ML 0.9% SODIUM CHL 2F7123

## (undated) DEVICE — STOCKINETTE,DOUBLE PLY,6X48,STERILE: Brand: MEDLINE

## (undated) DEVICE — Device

## (undated) DEVICE — SUTURE N ABSRB L 18 IN SZ 4-0 NDL L 19 MM NYL MONOFILAMENT

## (undated) DEVICE — SYRINGE, LUER LOCK, 10ML: Brand: MEDLINE

## (undated) DEVICE — SKIN PREP TRAY W/CHG: Brand: MEDLINE INDUSTRIES, INC.

## (undated) DEVICE — STOCKINETTE,DOUBLE PLY,4X48,STERILE: Brand: MEDLINE

## (undated) DEVICE — SUTURE VCRL SZ 5-0 L18IN ABSRB UD P-3 L13MM 3/8 CIR PRIM J493H

## (undated) DEVICE — SUTURE VCRL SZ 4-0 L27IN ABSRB UD L26MM SH 1/2 CIR J415H

## (undated) DEVICE — SUTURE ETHLN SZ 4-0 L18IN NONABSORBABLE BLK L19MM PS-2 3/8 1667H

## (undated) DEVICE — STOCKINETTE ORTH W6XL48IN OFF WHT SGL PLY UNBLEACHED COT RIB

## (undated) DEVICE — HANDPIECE SET WITH COAXIAL HIGH FLOW TIP AND SUCTION TUBE: Brand: INTERPULSE

## (undated) DEVICE — NEEDLE HYPO 25GA L1.5IN BLU POLYPR HUB S STL REG BVL STR

## (undated) DEVICE — STRAP,POSITIONING,KNEE/BODY,FOAM,4X60": Brand: MEDLINE

## (undated) DEVICE — BNDG,ELSTC,MATRIX,STRL,4"X5YD,LF,HOOK&LP: Brand: MEDLINE

## (undated) DEVICE — STRIP,CLOSURE,WOUND,MEDI-STRIP,1/2X4: Brand: MEDLINE

## (undated) DEVICE — SOLUTION IRRIG 3000ML 0.9% SOD CHL USP UROMATIC PLAS CONT

## (undated) DEVICE — BLADE,CARBON-STEEL,15,STRL,DISPOSABLE,TB: Brand: MEDLINE

## (undated) DEVICE — PADDING CAST W6INXL4YD POLY POR SPUN DACRON SYN VERSATILE

## (undated) DEVICE — SUTURE PDS II SZ 4-0 L18IN ABSRB UD L19MM PS-2 3/8 CIR PRIM Z496G

## (undated) DEVICE — SUTURE ETHILON SZ 4-0 L18IN NONABSORBABLE BLK L19MM PS-2 3/8 1667H

## (undated) DEVICE — BLANKET WRM W29.9XL79.1IN UP BODY FORC AIR MISTRAL-AIR

## (undated) DEVICE — THIN OFFSET (9.0 X 0.38 X 25.0MM)

## (undated) DEVICE — STRIP SKIN CLSR W0.25XL4IN WHT SPUNBOUND FBR NYL HI ADH

## (undated) DEVICE — STAZ LOWER EXTREMITY: Brand: MEDLINE INDUSTRIES, INC.

## (undated) DEVICE — SUTURE ETHILON SZ 3-0 L18IN NONABSORBABLE BLK PS-2 L19MM 3/8 1669H

## (undated) DEVICE — STRAP ARMBRD W1.5XL32IN FOAM STR YET SFT W/ HK AND LOOP

## (undated) DEVICE — TOWEL,OR,DSP,ST,BLUE,DLX,XR,4/PK,20PK/CS: Brand: MEDLINE

## (undated) DEVICE — CONTAINER,SPECIMEN,OR STERILE,4OZ: Brand: MEDLINE

## (undated) DEVICE — SUTURE PDS II SZ 4-0 L27IN ABSRB VLT L17MM RB-1 1/2 CIR Z304H

## (undated) DEVICE — GLOVE SURG SZ 6 THK91MIL LTX FREE SYN POLYISOPRENE ANTI

## (undated) DEVICE — DUP USE 394429 BLADE SAW SAG OSCIL MED NAR 5.5MM

## (undated) DEVICE — Z INACTIVE USE 2635508 SOLUTION IRRIG 500ML 0.9% SOD CHL USP POUR PLAS BTL